# Patient Record
Sex: FEMALE | Race: WHITE | NOT HISPANIC OR LATINO | Employment: OTHER | URBAN - METROPOLITAN AREA
[De-identification: names, ages, dates, MRNs, and addresses within clinical notes are randomized per-mention and may not be internally consistent; named-entity substitution may affect disease eponyms.]

---

## 2021-01-20 ENCOUNTER — TELEPHONE (OUTPATIENT)
Dept: NEUROLOGY | Facility: CLINIC | Age: 86
End: 2021-01-20

## 2021-02-01 ENCOUNTER — TELEPHONE (OUTPATIENT)
Dept: NEUROLOGY | Facility: CLINIC | Age: 86
End: 2021-02-01

## 2021-02-02 ENCOUNTER — TELEPHONE (OUTPATIENT)
Dept: NEUROLOGY | Facility: CLINIC | Age: 86
End: 2021-02-02

## 2021-04-14 ENCOUNTER — OFFICE VISIT (OUTPATIENT)
Dept: INTERNAL MEDICINE | Facility: CLINIC | Age: 86
End: 2021-04-14
Payer: MEDICARE

## 2021-04-14 DIAGNOSIS — E78.5 HYPERLIPIDEMIA, UNSPECIFIED HYPERLIPIDEMIA TYPE: ICD-10-CM

## 2021-04-14 DIAGNOSIS — Z00.00 PREVENTATIVE HEALTH CARE: Primary | ICD-10-CM

## 2021-04-14 DIAGNOSIS — E03.9 HYPOTHYROIDISM, UNSPECIFIED TYPE: ICD-10-CM

## 2021-04-14 DIAGNOSIS — R45.84 ANHEDONIA: ICD-10-CM

## 2021-04-14 DIAGNOSIS — K62.89 RECTAL PAIN: ICD-10-CM

## 2021-04-14 DIAGNOSIS — D69.3 IDIOPATHIC THROMBOCYTOPENIC PURPURA (ITP): ICD-10-CM

## 2021-04-14 DIAGNOSIS — M81.0 OSTEOPOROSIS, UNSPECIFIED OSTEOPOROSIS TYPE, UNSPECIFIED PATHOLOGICAL FRACTURE PRESENCE: ICD-10-CM

## 2021-04-14 PROCEDURE — 99387 INIT PM E/M NEW PAT 65+ YRS: CPT | Mod: S$GLB,,, | Performed by: INTERNAL MEDICINE

## 2021-04-14 PROCEDURE — 99999 PR PBB SHADOW E&M-EST. PATIENT-LVL IV: ICD-10-PCS | Mod: PBBFAC,,, | Performed by: INTERNAL MEDICINE

## 2021-04-14 PROCEDURE — 1101F PR PT FALLS ASSESS DOC 0-1 FALLS W/OUT INJ PAST YR: ICD-10-PCS | Mod: CPTII,S$GLB,, | Performed by: INTERNAL MEDICINE

## 2021-04-14 PROCEDURE — 1126F PR PAIN SEVERITY QUANTIFIED, NO PAIN PRESENT: ICD-10-PCS | Mod: S$GLB,,, | Performed by: INTERNAL MEDICINE

## 2021-04-14 PROCEDURE — 99387 PR PREVENTIVE VISIT,NEW,65 & OVER: ICD-10-PCS | Mod: S$GLB,,, | Performed by: INTERNAL MEDICINE

## 2021-04-14 PROCEDURE — 3288F PR FALLS RISK ASSESSMENT DOCUMENTED: ICD-10-PCS | Mod: CPTII,S$GLB,, | Performed by: INTERNAL MEDICINE

## 2021-04-14 PROCEDURE — 1126F AMNT PAIN NOTED NONE PRSNT: CPT | Mod: S$GLB,,, | Performed by: INTERNAL MEDICINE

## 2021-04-14 PROCEDURE — 3288F FALL RISK ASSESSMENT DOCD: CPT | Mod: CPTII,S$GLB,, | Performed by: INTERNAL MEDICINE

## 2021-04-14 PROCEDURE — 1101F PT FALLS ASSESS-DOCD LE1/YR: CPT | Mod: CPTII,S$GLB,, | Performed by: INTERNAL MEDICINE

## 2021-04-14 PROCEDURE — 99999 PR PBB SHADOW E&M-EST. PATIENT-LVL IV: CPT | Mod: PBBFAC,,, | Performed by: INTERNAL MEDICINE

## 2021-04-14 RX ORDER — LEVOTHYROXINE SODIUM 75 UG/1
TABLET ORAL
COMMUNITY
End: 2021-04-14 | Stop reason: SDUPTHER

## 2021-04-14 RX ORDER — MEMANTINE HYDROCHLORIDE 10 MG/1
10 TABLET ORAL 2 TIMES DAILY
COMMUNITY
Start: 2021-04-05 | End: 2021-08-18 | Stop reason: SDUPTHER

## 2021-04-14 RX ORDER — AMLODIPINE BESYLATE 5 MG/1
5 TABLET ORAL DAILY
Qty: 90 TABLET | Refills: 1 | Status: SHIPPED | OUTPATIENT
Start: 2021-04-14 | End: 2021-09-21

## 2021-04-14 RX ORDER — AMLODIPINE BESYLATE 5 MG/1
TABLET ORAL
COMMUNITY
Start: 2021-03-05 | End: 2021-04-14 | Stop reason: SDUPTHER

## 2021-04-14 RX ORDER — BUPROPION HYDROCHLORIDE 150 MG/1
150 TABLET, EXTENDED RELEASE ORAL
COMMUNITY
End: 2021-04-14 | Stop reason: SDUPTHER

## 2021-04-14 RX ORDER — LOSARTAN POTASSIUM 50 MG/1
50 TABLET ORAL DAILY
Qty: 90 TABLET | Refills: 1 | Status: SHIPPED | OUTPATIENT
Start: 2021-04-14 | End: 2021-10-18

## 2021-04-14 RX ORDER — OMEPRAZOLE 20 MG/1
20 CAPSULE, DELAYED RELEASE ORAL DAILY PRN
Qty: 90 CAPSULE | Refills: 1 | Status: SHIPPED | OUTPATIENT
Start: 2021-04-14 | End: 2021-10-18

## 2021-04-14 RX ORDER — LEVOTHYROXINE SODIUM 75 UG/1
75 TABLET ORAL
Qty: 90 TABLET | Refills: 1 | Status: SHIPPED | OUTPATIENT
Start: 2021-04-14 | End: 2021-05-13 | Stop reason: SDUPTHER

## 2021-04-14 RX ORDER — PRAVASTATIN SODIUM 40 MG/1
TABLET ORAL
COMMUNITY
End: 2021-04-14 | Stop reason: SDUPTHER

## 2021-04-14 RX ORDER — PRAVASTATIN SODIUM 40 MG/1
40 TABLET ORAL DAILY
Qty: 90 TABLET | Refills: 1 | Status: SHIPPED | OUTPATIENT
Start: 2021-04-14 | End: 2021-09-21

## 2021-04-14 RX ORDER — GALANTAMINE HYDROBROMIDE 16 MG/1
CAPSULE, EXTENDED RELEASE ORAL
COMMUNITY
Start: 2021-03-22 | End: 2021-08-18 | Stop reason: SDUPTHER

## 2021-04-14 RX ORDER — OMEPRAZOLE 20 MG/1
CAPSULE, DELAYED RELEASE ORAL
COMMUNITY
End: 2021-04-14 | Stop reason: SDUPTHER

## 2021-04-14 RX ORDER — LOSARTAN POTASSIUM 50 MG/1
TABLET ORAL
COMMUNITY
End: 2021-04-14 | Stop reason: SDUPTHER

## 2021-04-14 RX ORDER — BUPROPION HYDROCHLORIDE 150 MG/1
150 TABLET ORAL DAILY
Qty: 90 TABLET | Refills: 1 | Status: SHIPPED | OUTPATIENT
Start: 2021-04-14 | End: 2021-09-21

## 2021-04-14 RX ORDER — PREDNISONE 1 MG/1
TABLET ORAL
COMMUNITY
Start: 2021-02-15 | End: 2022-04-05

## 2021-04-18 VITALS
TEMPERATURE: 99 F | DIASTOLIC BLOOD PRESSURE: 76 MMHG | BODY MASS INDEX: 26.75 KG/M2 | HEIGHT: 60 IN | WEIGHT: 136.25 LBS | SYSTOLIC BLOOD PRESSURE: 112 MMHG | HEART RATE: 67 BPM | OXYGEN SATURATION: 95 %

## 2021-04-21 ENCOUNTER — PATIENT MESSAGE (OUTPATIENT)
Dept: SURGERY | Facility: CLINIC | Age: 86
End: 2021-04-21

## 2021-04-21 ENCOUNTER — OFFICE VISIT (OUTPATIENT)
Dept: SURGERY | Facility: CLINIC | Age: 86
End: 2021-04-21
Payer: MEDICARE

## 2021-04-21 ENCOUNTER — LAB VISIT (OUTPATIENT)
Dept: LAB | Facility: HOSPITAL | Age: 86
End: 2021-04-21
Attending: INTERNAL MEDICINE
Payer: MEDICARE

## 2021-04-21 VITALS
WEIGHT: 135.69 LBS | HEIGHT: 60 IN | SYSTOLIC BLOOD PRESSURE: 126 MMHG | DIASTOLIC BLOOD PRESSURE: 79 MMHG | BODY MASS INDEX: 26.64 KG/M2 | HEART RATE: 76 BPM

## 2021-04-21 DIAGNOSIS — Z00.00 PREVENTATIVE HEALTH CARE: ICD-10-CM

## 2021-04-21 DIAGNOSIS — D69.3 IDIOPATHIC THROMBOCYTOPENIC PURPURA (ITP): ICD-10-CM

## 2021-04-21 DIAGNOSIS — R45.84 ANHEDONIA: ICD-10-CM

## 2021-04-21 DIAGNOSIS — K61.2 ANORECTAL ABSCESS: ICD-10-CM

## 2021-04-21 DIAGNOSIS — E03.9 HYPOTHYROIDISM, UNSPECIFIED TYPE: ICD-10-CM

## 2021-04-21 DIAGNOSIS — Z86.010 PERSONAL HISTORY OF COLONIC POLYPS: Primary | ICD-10-CM

## 2021-04-21 DIAGNOSIS — E78.5 HYPERLIPIDEMIA, UNSPECIFIED HYPERLIPIDEMIA TYPE: ICD-10-CM

## 2021-04-21 DIAGNOSIS — K62.89 RECTAL PAIN: ICD-10-CM

## 2021-04-21 LAB
ALBUMIN SERPL BCP-MCNC: 3.5 G/DL (ref 3.5–5.2)
ALP SERPL-CCNC: 77 U/L (ref 55–135)
ALT SERPL W/O P-5'-P-CCNC: 20 U/L (ref 10–44)
ANION GAP SERPL CALC-SCNC: 5 MMOL/L (ref 8–16)
AST SERPL-CCNC: 17 U/L (ref 10–40)
BASOPHILS # BLD AUTO: 0.05 K/UL (ref 0–0.2)
BASOPHILS NFR BLD: 1.1 % (ref 0–1.9)
BILIRUB SERPL-MCNC: 0.7 MG/DL (ref 0.1–1)
BUN SERPL-MCNC: 11 MG/DL (ref 8–23)
CALCIUM SERPL-MCNC: 9.8 MG/DL (ref 8.7–10.5)
CHLORIDE SERPL-SCNC: 112 MMOL/L (ref 95–110)
CHOLEST SERPL-MCNC: 135 MG/DL (ref 120–199)
CHOLEST/HDLC SERPL: 2.3 {RATIO} (ref 2–5)
CO2 SERPL-SCNC: 27 MMOL/L (ref 23–29)
CREAT SERPL-MCNC: 0.9 MG/DL (ref 0.5–1.4)
DIFFERENTIAL METHOD: ABNORMAL
EOSINOPHIL # BLD AUTO: 0.1 K/UL (ref 0–0.5)
EOSINOPHIL NFR BLD: 2 % (ref 0–8)
ERYTHROCYTE [DISTWIDTH] IN BLOOD BY AUTOMATED COUNT: 13.9 % (ref 11.5–14.5)
EST. GFR  (AFRICAN AMERICAN): >60 ML/MIN/1.73 M^2
EST. GFR  (NON AFRICAN AMERICAN): 58.5 ML/MIN/1.73 M^2
FOLATE SERPL-MCNC: 7.7 NG/ML (ref 4–24)
GLUCOSE SERPL-MCNC: 83 MG/DL (ref 70–110)
HCT VFR BLD AUTO: 45 % (ref 37–48.5)
HDLC SERPL-MCNC: 58 MG/DL (ref 40–75)
HDLC SERPL: 43 % (ref 20–50)
HGB BLD-MCNC: 14.9 G/DL (ref 12–16)
IMM GRANULOCYTES # BLD AUTO: 0.01 K/UL (ref 0–0.04)
IMM GRANULOCYTES NFR BLD AUTO: 0.2 % (ref 0–0.5)
LDLC SERPL CALC-MCNC: 56.6 MG/DL (ref 63–159)
LYMPHOCYTES # BLD AUTO: 1.8 K/UL (ref 1–4.8)
LYMPHOCYTES NFR BLD: 41.8 % (ref 18–48)
MCH RBC QN AUTO: 32 PG (ref 27–31)
MCHC RBC AUTO-ENTMCNC: 33.1 G/DL (ref 32–36)
MCV RBC AUTO: 97 FL (ref 82–98)
MONOCYTES # BLD AUTO: 0.6 K/UL (ref 0.3–1)
MONOCYTES NFR BLD: 13.9 % (ref 4–15)
NEUTROPHILS # BLD AUTO: 1.8 K/UL (ref 1.8–7.7)
NEUTROPHILS NFR BLD: 41 % (ref 38–73)
NONHDLC SERPL-MCNC: 77 MG/DL
NRBC BLD-RTO: 0 /100 WBC
PLATELET # BLD AUTO: 76 K/UL (ref 150–450)
PMV BLD AUTO: 11.1 FL (ref 9.2–12.9)
POTASSIUM SERPL-SCNC: 3.7 MMOL/L (ref 3.5–5.1)
PROT SERPL-MCNC: 6.3 G/DL (ref 6–8.4)
RBC # BLD AUTO: 4.65 M/UL (ref 4–5.4)
SODIUM SERPL-SCNC: 144 MMOL/L (ref 136–145)
TRIGL SERPL-MCNC: 102 MG/DL (ref 30–150)
TSH SERPL DL<=0.005 MIU/L-ACNC: 2.02 UIU/ML (ref 0.4–4)
VIT B12 SERPL-MCNC: 366 PG/ML (ref 210–950)
WBC # BLD AUTO: 4.4 K/UL (ref 3.9–12.7)

## 2021-04-21 PROCEDURE — 85025 COMPLETE CBC W/AUTO DIFF WBC: CPT | Performed by: INTERNAL MEDICINE

## 2021-04-21 PROCEDURE — 80061 LIPID PANEL: CPT | Performed by: INTERNAL MEDICINE

## 2021-04-21 PROCEDURE — 1126F PR PAIN SEVERITY QUANTIFIED, NO PAIN PRESENT: ICD-10-PCS | Mod: S$GLB,,, | Performed by: NURSE PRACTITIONER

## 2021-04-21 PROCEDURE — 99999 PR PBB SHADOW E&M-EST. PATIENT-LVL IV: ICD-10-PCS | Mod: PBBFAC,,, | Performed by: NURSE PRACTITIONER

## 2021-04-21 PROCEDURE — 80053 COMPREHEN METABOLIC PANEL: CPT | Performed by: INTERNAL MEDICINE

## 2021-04-21 PROCEDURE — 3288F FALL RISK ASSESSMENT DOCD: CPT | Mod: CPTII,S$GLB,, | Performed by: NURSE PRACTITIONER

## 2021-04-21 PROCEDURE — 99204 OFFICE O/P NEW MOD 45 MIN: CPT | Mod: 25,S$GLB,, | Performed by: NURSE PRACTITIONER

## 2021-04-21 PROCEDURE — 1159F MED LIST DOCD IN RCRD: CPT | Mod: S$GLB,,, | Performed by: NURSE PRACTITIONER

## 2021-04-21 PROCEDURE — 1101F PR PT FALLS ASSESS DOC 0-1 FALLS W/OUT INJ PAST YR: ICD-10-PCS | Mod: CPTII,S$GLB,, | Performed by: NURSE PRACTITIONER

## 2021-04-21 PROCEDURE — 84443 ASSAY THYROID STIM HORMONE: CPT | Performed by: INTERNAL MEDICINE

## 2021-04-21 PROCEDURE — 46600 PR DIAG2STIC A2SCOPY: ICD-10-PCS | Mod: S$GLB,,, | Performed by: NURSE PRACTITIONER

## 2021-04-21 PROCEDURE — 99999 PR PBB SHADOW E&M-EST. PATIENT-LVL IV: CPT | Mod: PBBFAC,,, | Performed by: NURSE PRACTITIONER

## 2021-04-21 PROCEDURE — 1101F PT FALLS ASSESS-DOCD LE1/YR: CPT | Mod: CPTII,S$GLB,, | Performed by: NURSE PRACTITIONER

## 2021-04-21 PROCEDURE — 46600 DIAGNOSTIC ANOSCOPY SPX: CPT | Mod: S$GLB,,, | Performed by: NURSE PRACTITIONER

## 2021-04-21 PROCEDURE — 82746 ASSAY OF FOLIC ACID SERUM: CPT | Performed by: INTERNAL MEDICINE

## 2021-04-21 PROCEDURE — 82607 VITAMIN B-12: CPT | Performed by: INTERNAL MEDICINE

## 2021-04-21 PROCEDURE — 1159F PR MEDICATION LIST DOCUMENTED IN MEDICAL RECORD: ICD-10-PCS | Mod: S$GLB,,, | Performed by: NURSE PRACTITIONER

## 2021-04-21 PROCEDURE — 36415 COLL VENOUS BLD VENIPUNCTURE: CPT | Performed by: INTERNAL MEDICINE

## 2021-04-21 PROCEDURE — 1126F AMNT PAIN NOTED NONE PRSNT: CPT | Mod: S$GLB,,, | Performed by: NURSE PRACTITIONER

## 2021-04-21 PROCEDURE — 99204 PR OFFICE/OUTPT VISIT, NEW, LEVL IV, 45-59 MIN: ICD-10-PCS | Mod: 25,S$GLB,, | Performed by: NURSE PRACTITIONER

## 2021-04-21 PROCEDURE — 3288F PR FALLS RISK ASSESSMENT DOCUMENTED: ICD-10-PCS | Mod: CPTII,S$GLB,, | Performed by: NURSE PRACTITIONER

## 2021-04-26 ENCOUNTER — PATIENT MESSAGE (OUTPATIENT)
Dept: ENDOSCOPY | Facility: HOSPITAL | Age: 86
End: 2021-04-26

## 2021-04-26 DIAGNOSIS — Z12.11 SPECIAL SCREENING FOR MALIGNANT NEOPLASMS, COLON: Primary | ICD-10-CM

## 2021-04-26 DIAGNOSIS — Z01.818 PRE-OP TESTING: ICD-10-CM

## 2021-04-26 RX ORDER — SODIUM, POTASSIUM,MAG SULFATES 17.5-3.13G
1 SOLUTION, RECONSTITUTED, ORAL ORAL DAILY
Qty: 1 KIT | Refills: 0 | Status: SHIPPED | OUTPATIENT
Start: 2021-04-26 | End: 2021-04-28

## 2021-04-29 ENCOUNTER — OFFICE VISIT (OUTPATIENT)
Dept: HEMATOLOGY/ONCOLOGY | Facility: CLINIC | Age: 86
End: 2021-04-29
Payer: MEDICARE

## 2021-04-29 VITALS
SYSTOLIC BLOOD PRESSURE: 116 MMHG | OXYGEN SATURATION: 95 % | RESPIRATION RATE: 16 BRPM | HEIGHT: 59 IN | TEMPERATURE: 99 F | HEART RATE: 63 BPM | WEIGHT: 138.31 LBS | BODY MASS INDEX: 27.88 KG/M2 | DIASTOLIC BLOOD PRESSURE: 63 MMHG

## 2021-04-29 DIAGNOSIS — D69.3 IDIOPATHIC THROMBOCYTOPENIC PURPURA (ITP): Primary | ICD-10-CM

## 2021-04-29 DIAGNOSIS — K64.8 OTHER HEMORRHOIDS: ICD-10-CM

## 2021-04-29 PROCEDURE — 99213 PR OFFICE/OUTPT VISIT, EST, LEVL III, 20-29 MIN: ICD-10-PCS | Mod: S$GLB,,, | Performed by: NURSE PRACTITIONER

## 2021-04-29 PROCEDURE — 1159F PR MEDICATION LIST DOCUMENTED IN MEDICAL RECORD: ICD-10-PCS | Mod: S$GLB,,, | Performed by: NURSE PRACTITIONER

## 2021-04-29 PROCEDURE — 99999 PR PBB SHADOW E&M-EST. PATIENT-LVL IV: CPT | Mod: PBBFAC,,, | Performed by: NURSE PRACTITIONER

## 2021-04-29 PROCEDURE — 1159F MED LIST DOCD IN RCRD: CPT | Mod: S$GLB,,, | Performed by: NURSE PRACTITIONER

## 2021-04-29 PROCEDURE — 99213 OFFICE O/P EST LOW 20 MIN: CPT | Mod: S$GLB,,, | Performed by: NURSE PRACTITIONER

## 2021-04-29 PROCEDURE — 99999 PR PBB SHADOW E&M-EST. PATIENT-LVL IV: ICD-10-PCS | Mod: PBBFAC,,, | Performed by: NURSE PRACTITIONER

## 2021-04-29 PROCEDURE — 1126F AMNT PAIN NOTED NONE PRSNT: CPT | Mod: S$GLB,,, | Performed by: NURSE PRACTITIONER

## 2021-04-29 PROCEDURE — 1126F PR PAIN SEVERITY QUANTIFIED, NO PAIN PRESENT: ICD-10-PCS | Mod: S$GLB,,, | Performed by: NURSE PRACTITIONER

## 2021-05-05 DIAGNOSIS — D69.3 IDIOPATHIC THROMBOCYTOPENIC PURPURA (ITP): ICD-10-CM

## 2021-05-05 DIAGNOSIS — C90.00 MULTIPLE MYELOMA, REMISSION STATUS UNSPECIFIED: Primary | ICD-10-CM

## 2021-05-13 RX ORDER — LEVOTHYROXINE SODIUM 75 UG/1
75 TABLET ORAL
Qty: 90 TABLET | Refills: 1 | Status: SHIPPED | OUTPATIENT
Start: 2021-05-13 | End: 2021-11-22

## 2021-05-17 ENCOUNTER — PATIENT MESSAGE (OUTPATIENT)
Dept: ENDOSCOPY | Facility: HOSPITAL | Age: 86
End: 2021-05-17

## 2021-05-20 ENCOUNTER — LAB VISIT (OUTPATIENT)
Dept: LAB | Facility: HOSPITAL | Age: 86
End: 2021-05-20
Payer: MEDICARE

## 2021-05-20 ENCOUNTER — OFFICE VISIT (OUTPATIENT)
Dept: NEUROLOGY | Facility: CLINIC | Age: 86
End: 2021-05-20
Payer: MEDICARE

## 2021-05-20 VITALS
HEART RATE: 58 BPM | DIASTOLIC BLOOD PRESSURE: 73 MMHG | HEIGHT: 59 IN | SYSTOLIC BLOOD PRESSURE: 120 MMHG | BODY MASS INDEX: 27.94 KG/M2

## 2021-05-20 DIAGNOSIS — E53.8 LOW SERUM VITAMIN B12: ICD-10-CM

## 2021-05-20 DIAGNOSIS — H91.93 BILATERAL HEARING LOSS, UNSPECIFIED HEARING LOSS TYPE: ICD-10-CM

## 2021-05-20 DIAGNOSIS — R41.3 MEMORY LOSS: ICD-10-CM

## 2021-05-20 DIAGNOSIS — R41.3 MEMORY LOSS: Primary | ICD-10-CM

## 2021-05-20 DIAGNOSIS — G47.33 OSA (OBSTRUCTIVE SLEEP APNEA): ICD-10-CM

## 2021-05-20 LAB
RPR SER QL: NORMAL
T4 FREE SERPL-MCNC: 1.25 NG/DL (ref 0.71–1.51)
TSH SERPL DL<=0.005 MIU/L-ACNC: 5.02 UIU/ML (ref 0.4–4)

## 2021-05-20 PROCEDURE — 84439 ASSAY OF FREE THYROXINE: CPT | Performed by: NURSE PRACTITIONER

## 2021-05-20 PROCEDURE — 84443 ASSAY THYROID STIM HORMONE: CPT | Performed by: NURSE PRACTITIONER

## 2021-05-20 PROCEDURE — 84425 ASSAY OF VITAMIN B-1: CPT | Performed by: NURSE PRACTITIONER

## 2021-05-20 PROCEDURE — 86592 SYPHILIS TEST NON-TREP QUAL: CPT | Performed by: NURSE PRACTITIONER

## 2021-05-20 PROCEDURE — 3288F FALL RISK ASSESSMENT DOCD: CPT | Mod: CPTII,S$GLB,, | Performed by: NURSE PRACTITIONER

## 2021-05-20 PROCEDURE — 99215 PR OFFICE/OUTPT VISIT, EST, LEVL V, 40-54 MIN: ICD-10-PCS | Mod: S$GLB,,, | Performed by: NURSE PRACTITIONER

## 2021-05-20 PROCEDURE — 36415 COLL VENOUS BLD VENIPUNCTURE: CPT | Performed by: NURSE PRACTITIONER

## 2021-05-20 PROCEDURE — 99215 OFFICE O/P EST HI 40 MIN: CPT | Mod: S$GLB,,, | Performed by: NURSE PRACTITIONER

## 2021-05-20 PROCEDURE — 99999 PR PBB SHADOW E&M-EST. PATIENT-LVL III: CPT | Mod: PBBFAC,,, | Performed by: NURSE PRACTITIONER

## 2021-05-20 PROCEDURE — 1159F MED LIST DOCD IN RCRD: CPT | Mod: S$GLB,,, | Performed by: NURSE PRACTITIONER

## 2021-05-20 PROCEDURE — 1126F PR PAIN SEVERITY QUANTIFIED, NO PAIN PRESENT: ICD-10-PCS | Mod: S$GLB,,, | Performed by: NURSE PRACTITIONER

## 2021-05-20 PROCEDURE — 3288F PR FALLS RISK ASSESSMENT DOCUMENTED: ICD-10-PCS | Mod: CPTII,S$GLB,, | Performed by: NURSE PRACTITIONER

## 2021-05-20 PROCEDURE — 1159F PR MEDICATION LIST DOCUMENTED IN MEDICAL RECORD: ICD-10-PCS | Mod: S$GLB,,, | Performed by: NURSE PRACTITIONER

## 2021-05-20 PROCEDURE — 1101F PR PT FALLS ASSESS DOC 0-1 FALLS W/OUT INJ PAST YR: ICD-10-PCS | Mod: CPTII,S$GLB,, | Performed by: NURSE PRACTITIONER

## 2021-05-20 PROCEDURE — 1126F AMNT PAIN NOTED NONE PRSNT: CPT | Mod: S$GLB,,, | Performed by: NURSE PRACTITIONER

## 2021-05-20 PROCEDURE — 1101F PT FALLS ASSESS-DOCD LE1/YR: CPT | Mod: CPTII,S$GLB,, | Performed by: NURSE PRACTITIONER

## 2021-05-20 PROCEDURE — 99999 PR PBB SHADOW E&M-EST. PATIENT-LVL III: ICD-10-PCS | Mod: PBBFAC,,, | Performed by: NURSE PRACTITIONER

## 2021-05-25 LAB — VIT B1 BLD-MCNC: 57 UG/L (ref 38–122)

## 2021-05-30 ENCOUNTER — LAB VISIT (OUTPATIENT)
Dept: SPORTS MEDICINE | Facility: CLINIC | Age: 86
End: 2021-05-30
Payer: MEDICARE

## 2021-05-30 DIAGNOSIS — Z01.818 PRE-OP TESTING: ICD-10-CM

## 2021-05-30 LAB — SARS-COV-2 RNA RESP QL NAA+PROBE: NOT DETECTED

## 2021-05-30 PROCEDURE — U0005 INFEC AGEN DETEC AMPLI PROBE: HCPCS | Performed by: CLINICAL NURSE SPECIALIST

## 2021-05-30 PROCEDURE — U0003 INFECTIOUS AGENT DETECTION BY NUCLEIC ACID (DNA OR RNA); SEVERE ACUTE RESPIRATORY SYNDROME CORONAVIRUS 2 (SARS-COV-2) (CORONAVIRUS DISEASE [COVID-19]), AMPLIFIED PROBE TECHNIQUE, MAKING USE OF HIGH THROUGHPUT TECHNOLOGIES AS DESCRIBED BY CMS-2020-01-R: HCPCS | Performed by: CLINICAL NURSE SPECIALIST

## 2021-06-02 ENCOUNTER — ANESTHESIA (OUTPATIENT)
Dept: ENDOSCOPY | Facility: HOSPITAL | Age: 86
End: 2021-06-02
Payer: MEDICARE

## 2021-06-02 ENCOUNTER — HOSPITAL ENCOUNTER (OUTPATIENT)
Facility: HOSPITAL | Age: 86
Discharge: HOME | End: 2021-06-02
Attending: COLON & RECTAL SURGERY | Admitting: COLON & RECTAL SURGERY
Payer: MEDICARE

## 2021-06-02 ENCOUNTER — ANESTHESIA EVENT (OUTPATIENT)
Dept: ENDOSCOPY | Facility: HOSPITAL | Age: 86
End: 2021-06-02
Payer: MEDICARE

## 2021-06-02 VITALS
OXYGEN SATURATION: 95 % | SYSTOLIC BLOOD PRESSURE: 111 MMHG | HEIGHT: 60 IN | WEIGHT: 134 LBS | TEMPERATURE: 98 F | DIASTOLIC BLOOD PRESSURE: 60 MMHG | HEART RATE: 57 BPM | RESPIRATION RATE: 17 BRPM | BODY MASS INDEX: 26.31 KG/M2

## 2021-06-02 DIAGNOSIS — Z86.010 PERSONAL HISTORY OF COLONIC POLYPS: Primary | ICD-10-CM

## 2021-06-02 PROBLEM — Z86.0100 PERSONAL HISTORY OF COLONIC POLYPS: Status: ACTIVE | Noted: 2021-06-02

## 2021-06-02 PROCEDURE — 45385 PR COLONOSCOPY,REMV LESN,SNARE: ICD-10-PCS | Mod: PT,,, | Performed by: COLON & RECTAL SURGERY

## 2021-06-02 PROCEDURE — 88305 TISSUE EXAM BY PATHOLOGIST: ICD-10-PCS | Mod: 26,,, | Performed by: PATHOLOGY

## 2021-06-02 PROCEDURE — 27201012 HC FORCEPS, HOT/COLD, DISP: Performed by: COLON & RECTAL SURGERY

## 2021-06-02 PROCEDURE — E9220 PRA ENDO ANESTHESIA: HCPCS | Mod: PT,,, | Performed by: NURSE ANESTHETIST, CERTIFIED REGISTERED

## 2021-06-02 PROCEDURE — 88305 TISSUE EXAM BY PATHOLOGIST: CPT | Performed by: PATHOLOGY

## 2021-06-02 PROCEDURE — 45380 COLONOSCOPY AND BIOPSY: CPT | Mod: 59,,, | Performed by: COLON & RECTAL SURGERY

## 2021-06-02 PROCEDURE — 37000008 HC ANESTHESIA 1ST 15 MINUTES: Performed by: COLON & RECTAL SURGERY

## 2021-06-02 PROCEDURE — E9220 PRA ENDO ANESTHESIA: ICD-10-PCS | Mod: PT,,, | Performed by: NURSE ANESTHETIST, CERTIFIED REGISTERED

## 2021-06-02 PROCEDURE — 45385 COLONOSCOPY W/LESION REMOVAL: CPT | Performed by: COLON & RECTAL SURGERY

## 2021-06-02 PROCEDURE — 25000003 PHARM REV CODE 250: Performed by: NURSE ANESTHETIST, CERTIFIED REGISTERED

## 2021-06-02 PROCEDURE — 27201089 HC SNARE, DISP (ANY): Performed by: COLON & RECTAL SURGERY

## 2021-06-02 PROCEDURE — 88305 TISSUE EXAM BY PATHOLOGIST: CPT | Mod: 26,,, | Performed by: PATHOLOGY

## 2021-06-02 PROCEDURE — 63600175 PHARM REV CODE 636 W HCPCS: Performed by: NURSE ANESTHETIST, CERTIFIED REGISTERED

## 2021-06-02 PROCEDURE — 25000003 PHARM REV CODE 250: Performed by: COLON & RECTAL SURGERY

## 2021-06-02 PROCEDURE — 37000009 HC ANESTHESIA EA ADD 15 MINS: Performed by: COLON & RECTAL SURGERY

## 2021-06-02 PROCEDURE — 45380 COLONOSCOPY AND BIOPSY: CPT | Performed by: COLON & RECTAL SURGERY

## 2021-06-02 PROCEDURE — 45380 PR COLONOSCOPY,BIOPSY: ICD-10-PCS | Mod: 59,,, | Performed by: COLON & RECTAL SURGERY

## 2021-06-02 PROCEDURE — 45385 COLONOSCOPY W/LESION REMOVAL: CPT | Mod: PT,,, | Performed by: COLON & RECTAL SURGERY

## 2021-06-02 RX ORDER — PROPOFOL 10 MG/ML
VIAL (ML) INTRAVENOUS CONTINUOUS PRN
Status: DISCONTINUED | OUTPATIENT
Start: 2021-06-02 | End: 2021-06-02

## 2021-06-02 RX ORDER — SODIUM CHLORIDE 9 MG/ML
INJECTION, SOLUTION INTRAVENOUS CONTINUOUS
Status: DISCONTINUED | OUTPATIENT
Start: 2021-06-02 | End: 2021-06-02 | Stop reason: HOSPADM

## 2021-06-02 RX ORDER — PROPOFOL 10 MG/ML
VIAL (ML) INTRAVENOUS
Status: DISCONTINUED | OUTPATIENT
Start: 2021-06-02 | End: 2021-06-02

## 2021-06-02 RX ORDER — LIDOCAINE HYDROCHLORIDE 20 MG/ML
INJECTION, SOLUTION EPIDURAL; INFILTRATION; INTRACAUDAL; PERINEURAL
Status: DISCONTINUED | OUTPATIENT
Start: 2021-06-02 | End: 2021-06-02

## 2021-06-02 RX ADMIN — PROPOFOL 40 MG: 10 INJECTION, EMULSION INTRAVENOUS at 12:06

## 2021-06-02 RX ADMIN — PROPOFOL 125 MCG/KG/MIN: 10 INJECTION, EMULSION INTRAVENOUS at 12:06

## 2021-06-02 RX ADMIN — SODIUM CHLORIDE: 0.9 INJECTION, SOLUTION INTRAVENOUS at 12:06

## 2021-06-02 RX ADMIN — GLYCOPYRROLATE 0.1 MG: 0.2 INJECTION, SOLUTION INTRAMUSCULAR; INTRAVITREAL at 12:06

## 2021-06-02 RX ADMIN — LIDOCAINE HYDROCHLORIDE 100 MG: 20 INJECTION, SOLUTION EPIDURAL; INFILTRATION; INTRACAUDAL at 12:06

## 2021-06-10 LAB
FINAL PATHOLOGIC DIAGNOSIS: NORMAL
Lab: NORMAL

## 2021-06-30 ENCOUNTER — OFFICE VISIT (OUTPATIENT)
Dept: NEUROLOGY | Facility: CLINIC | Age: 86
End: 2021-06-30
Payer: MEDICARE

## 2021-06-30 VITALS — WEIGHT: 135.81 LBS | BODY MASS INDEX: 26.52 KG/M2

## 2021-06-30 DIAGNOSIS — R41.3 MEMORY LOSS: ICD-10-CM

## 2021-06-30 DIAGNOSIS — E78.00 HYPERCHOLESTEREMIA: ICD-10-CM

## 2021-06-30 DIAGNOSIS — G31.84 AMNESTIC MCI (MILD COGNITIVE IMPAIRMENT WITH MEMORY LOSS): Primary | ICD-10-CM

## 2021-06-30 DIAGNOSIS — K64.9 HEMORRHOIDS, UNSPECIFIED HEMORRHOID TYPE: ICD-10-CM

## 2021-06-30 DIAGNOSIS — F33.0 MILD EPISODE OF RECURRENT MAJOR DEPRESSIVE DISORDER: ICD-10-CM

## 2021-06-30 DIAGNOSIS — E07.9 THYROID DISEASE: ICD-10-CM

## 2021-06-30 DIAGNOSIS — D69.6 THROMBOCYTOPENIA: ICD-10-CM

## 2021-06-30 DIAGNOSIS — H02.402 PTOSIS OF LEFT EYELID: ICD-10-CM

## 2021-06-30 PROCEDURE — 99212 OFFICE O/P EST SF 10 MIN: CPT | Mod: PBBFAC,25

## 2021-06-30 PROCEDURE — 96133 PR NEUROPSYCHOLOGIC TEST EVAL SVCS, EA ADDTL HR: ICD-10-PCS | Mod: S$GLB,,, | Performed by: PSYCHIATRY & NEUROLOGY

## 2021-06-30 PROCEDURE — 96133 NRPSYC TST EVAL PHYS/QHP EA: CPT | Mod: S$GLB,,, | Performed by: PSYCHIATRY & NEUROLOGY

## 2021-06-30 PROCEDURE — 96138 PR PSYCH/NEUROPSYCH TEST ADMIN/SCORING, BY TECH, 2+ TESTS, 1ST 30 MIN: ICD-10-PCS | Mod: S$GLB,,, | Performed by: PSYCHIATRY & NEUROLOGY

## 2021-06-30 PROCEDURE — 99499 NO LOS: ICD-10-PCS | Mod: S$GLB,,, | Performed by: PSYCHIATRY & NEUROLOGY

## 2021-06-30 PROCEDURE — 96139 PSYCL/NRPSYC TST TECH EA: CPT | Mod: S$GLB,,, | Performed by: PSYCHIATRY & NEUROLOGY

## 2021-06-30 PROCEDURE — 99499 UNLISTED E&M SERVICE: CPT | Mod: S$GLB,,, | Performed by: PSYCHIATRY & NEUROLOGY

## 2021-06-30 PROCEDURE — 96116 PR NEUROBEHAVIORAL STATUS EXAM BY PSYCH/PHYS: ICD-10-PCS | Mod: S$GLB,,, | Performed by: PSYCHIATRY & NEUROLOGY

## 2021-06-30 PROCEDURE — 96139 PR PSYCH/NEUROPSYCH TEST ADMIN/SCORING, BY TECH, 2+ TESTS, EA ADDTL 30 MIN: ICD-10-PCS | Mod: S$GLB,,, | Performed by: PSYCHIATRY & NEUROLOGY

## 2021-06-30 PROCEDURE — 96132 NRPSYC TST EVAL PHYS/QHP 1ST: CPT | Mod: S$GLB,,, | Performed by: PSYCHIATRY & NEUROLOGY

## 2021-06-30 PROCEDURE — 96116 NUBHVL XM PHYS/QHP 1ST HR: CPT | Mod: S$GLB,,, | Performed by: PSYCHIATRY & NEUROLOGY

## 2021-06-30 PROCEDURE — 99999 PR PBB SHADOW E&M-EST. PATIENT-LVL II: ICD-10-PCS | Mod: PBBFAC,,,

## 2021-06-30 PROCEDURE — 96132 PR NEUROPSYCHOLOGIC TEST EVAL SVCS, 1ST HR: ICD-10-PCS | Mod: S$GLB,,, | Performed by: PSYCHIATRY & NEUROLOGY

## 2021-06-30 PROCEDURE — 99999 PR PBB SHADOW E&M-EST. PATIENT-LVL II: CPT | Mod: PBBFAC,,,

## 2021-06-30 PROCEDURE — 99215 PR OFFICE/OUTPT VISIT, EST, LEVL V, 40-54 MIN: ICD-10-PCS | Mod: S$GLB,,, | Performed by: NURSE PRACTITIONER

## 2021-06-30 PROCEDURE — 96116 NUBHVL XM PHYS/QHP 1ST HR: CPT | Mod: PBBFAC | Performed by: PSYCHIATRY & NEUROLOGY

## 2021-06-30 PROCEDURE — 99215 OFFICE O/P EST HI 40 MIN: CPT | Mod: S$GLB,,, | Performed by: NURSE PRACTITIONER

## 2021-06-30 PROCEDURE — 96138 PSYCL/NRPSYC TECH 1ST: CPT | Mod: S$GLB,,, | Performed by: PSYCHIATRY & NEUROLOGY

## 2021-06-30 RX ORDER — LANOLIN ALCOHOL/MO/W.PET/CERES
100 CREAM (GRAM) TOPICAL DAILY
Status: ON HOLD | COMMUNITY
End: 2023-07-18 | Stop reason: SDUPTHER

## 2021-07-05 PROBLEM — G31.84 AMNESTIC MCI (MILD COGNITIVE IMPAIRMENT WITH MEMORY LOSS): Status: ACTIVE | Noted: 2021-07-05

## 2021-07-16 ENCOUNTER — TELEPHONE (OUTPATIENT)
Dept: NEUROLOGY | Facility: CLINIC | Age: 86
End: 2021-07-16

## 2021-07-16 ENCOUNTER — OUTPATIENT CASE MANAGEMENT (OUTPATIENT)
Dept: NEUROLOGY | Facility: CLINIC | Age: 86
End: 2021-07-16

## 2021-07-26 ENCOUNTER — OUTPATIENT CASE MANAGEMENT (OUTPATIENT)
Dept: NEUROLOGY | Facility: CLINIC | Age: 86
End: 2021-07-26

## 2021-08-06 ENCOUNTER — HOSPITAL ENCOUNTER (OUTPATIENT)
Dept: RADIOLOGY | Facility: CLINIC | Age: 86
Discharge: HOME | End: 2021-08-06
Attending: INTERNAL MEDICINE
Payer: MEDICARE

## 2021-08-06 ENCOUNTER — OFFICE VISIT (OUTPATIENT)
Dept: HEMATOLOGY/ONCOLOGY | Facility: CLINIC | Age: 86
End: 2021-08-06
Payer: MEDICARE

## 2021-08-06 VITALS
RESPIRATION RATE: 16 BRPM | HEART RATE: 57 BPM | HEIGHT: 60 IN | WEIGHT: 137.13 LBS | OXYGEN SATURATION: 95 % | BODY MASS INDEX: 26.92 KG/M2 | SYSTOLIC BLOOD PRESSURE: 123 MMHG | DIASTOLIC BLOOD PRESSURE: 62 MMHG | TEMPERATURE: 98 F

## 2021-08-06 DIAGNOSIS — M81.0 OSTEOPOROSIS, UNSPECIFIED OSTEOPOROSIS TYPE, UNSPECIFIED PATHOLOGICAL FRACTURE PRESENCE: ICD-10-CM

## 2021-08-06 DIAGNOSIS — D69.6 THROMBOCYTOPENIA: Primary | ICD-10-CM

## 2021-08-06 PROCEDURE — 3288F FALL RISK ASSESSMENT DOCD: CPT | Mod: CPTII,S$GLB,, | Performed by: INTERNAL MEDICINE

## 2021-08-06 PROCEDURE — 1101F PT FALLS ASSESS-DOCD LE1/YR: CPT | Mod: CPTII,S$GLB,, | Performed by: INTERNAL MEDICINE

## 2021-08-06 PROCEDURE — 77080 DXA BONE DENSITY AXIAL: CPT | Mod: 26,,, | Performed by: INTERNAL MEDICINE

## 2021-08-06 PROCEDURE — 3288F PR FALLS RISK ASSESSMENT DOCUMENTED: ICD-10-PCS | Mod: CPTII,S$GLB,, | Performed by: INTERNAL MEDICINE

## 2021-08-06 PROCEDURE — 1126F AMNT PAIN NOTED NONE PRSNT: CPT | Mod: CPTII,S$GLB,, | Performed by: INTERNAL MEDICINE

## 2021-08-06 PROCEDURE — 77080 DEXA BONE DENSITY SPINE HIP: ICD-10-PCS | Mod: 26,,, | Performed by: INTERNAL MEDICINE

## 2021-08-06 PROCEDURE — 3074F SYST BP LT 130 MM HG: CPT | Mod: CPTII,S$GLB,, | Performed by: INTERNAL MEDICINE

## 2021-08-06 PROCEDURE — 99999 PR PBB SHADOW E&M-EST. PATIENT-LVL IV: ICD-10-PCS | Mod: PBBFAC,,, | Performed by: INTERNAL MEDICINE

## 2021-08-06 PROCEDURE — 99204 OFFICE O/P NEW MOD 45 MIN: CPT | Mod: GC,S$GLB,, | Performed by: INTERNAL MEDICINE

## 2021-08-06 PROCEDURE — 1160F PR REVIEW ALL MEDS BY PRESCRIBER/CLIN PHARMACIST DOCUMENTED: ICD-10-PCS | Mod: CPTII,S$GLB,, | Performed by: INTERNAL MEDICINE

## 2021-08-06 PROCEDURE — 1160F RVW MEDS BY RX/DR IN RCRD: CPT | Mod: CPTII,S$GLB,, | Performed by: INTERNAL MEDICINE

## 2021-08-06 PROCEDURE — 1126F PR PAIN SEVERITY QUANTIFIED, NO PAIN PRESENT: ICD-10-PCS | Mod: CPTII,S$GLB,, | Performed by: INTERNAL MEDICINE

## 2021-08-06 PROCEDURE — 3078F PR MOST RECENT DIASTOLIC BLOOD PRESSURE < 80 MM HG: ICD-10-PCS | Mod: CPTII,S$GLB,, | Performed by: INTERNAL MEDICINE

## 2021-08-06 PROCEDURE — 99204 PR OFFICE/OUTPT VISIT, NEW, LEVL IV, 45-59 MIN: ICD-10-PCS | Mod: GC,S$GLB,, | Performed by: INTERNAL MEDICINE

## 2021-08-06 PROCEDURE — 1101F PR PT FALLS ASSESS DOC 0-1 FALLS W/OUT INJ PAST YR: ICD-10-PCS | Mod: CPTII,S$GLB,, | Performed by: INTERNAL MEDICINE

## 2021-08-06 PROCEDURE — 3078F DIAST BP <80 MM HG: CPT | Mod: CPTII,S$GLB,, | Performed by: INTERNAL MEDICINE

## 2021-08-06 PROCEDURE — 1159F MED LIST DOCD IN RCRD: CPT | Mod: CPTII,S$GLB,, | Performed by: INTERNAL MEDICINE

## 2021-08-06 PROCEDURE — 3074F PR MOST RECENT SYSTOLIC BLOOD PRESSURE < 130 MM HG: ICD-10-PCS | Mod: CPTII,S$GLB,, | Performed by: INTERNAL MEDICINE

## 2021-08-06 PROCEDURE — 1159F PR MEDICATION LIST DOCUMENTED IN MEDICAL RECORD: ICD-10-PCS | Mod: CPTII,S$GLB,, | Performed by: INTERNAL MEDICINE

## 2021-08-06 PROCEDURE — 77080 DXA BONE DENSITY AXIAL: CPT | Mod: TC

## 2021-08-06 PROCEDURE — 99999 PR PBB SHADOW E&M-EST. PATIENT-LVL IV: CPT | Mod: PBBFAC,,, | Performed by: INTERNAL MEDICINE

## 2021-08-06 RX ORDER — MEMANTINE HYDROCHLORIDE 5 MG/1
5 TABLET ORAL NIGHTLY
COMMUNITY
Start: 2021-06-01 | End: 2021-08-18 | Stop reason: SDUPTHER

## 2021-08-06 RX ORDER — ALENDRONATE SODIUM 70 MG/1
70 TABLET ORAL
COMMUNITY
Start: 2021-05-24 | End: 2021-08-06

## 2021-08-14 ENCOUNTER — TELEPHONE (OUTPATIENT)
Dept: INTERNAL MEDICINE | Facility: CLINIC | Age: 86
End: 2021-08-14

## 2021-08-16 ENCOUNTER — OUTPATIENT CASE MANAGEMENT (OUTPATIENT)
Dept: NEUROLOGY | Facility: CLINIC | Age: 86
End: 2021-08-16

## 2021-08-16 RX ORDER — ALENDRONATE SODIUM 70 MG/1
70 TABLET ORAL
Qty: 4 TABLET | Refills: 11 | Status: ON HOLD | OUTPATIENT
Start: 2021-08-16 | End: 2022-12-01

## 2021-08-17 ENCOUNTER — OUTPATIENT CASE MANAGEMENT (OUTPATIENT)
Dept: NEUROLOGY | Facility: CLINIC | Age: 86
End: 2021-08-17

## 2021-08-18 ENCOUNTER — OFFICE VISIT (OUTPATIENT)
Dept: INTERNAL MEDICINE | Facility: CLINIC | Age: 86
End: 2021-08-18
Payer: MEDICARE

## 2021-08-18 ENCOUNTER — LAB VISIT (OUTPATIENT)
Dept: LAB | Facility: HOSPITAL | Age: 86
End: 2021-08-18
Attending: INTERNAL MEDICINE
Payer: MEDICARE

## 2021-08-18 DIAGNOSIS — I10 HYPERTENSION, UNSPECIFIED TYPE: Primary | ICD-10-CM

## 2021-08-18 DIAGNOSIS — I10 HYPERTENSION, UNSPECIFIED TYPE: ICD-10-CM

## 2021-08-18 LAB
ALBUMIN SERPL BCP-MCNC: 3.6 G/DL (ref 3.5–5.2)
ALP SERPL-CCNC: 71 U/L (ref 55–135)
ALT SERPL W/O P-5'-P-CCNC: 21 U/L (ref 10–44)
ANION GAP SERPL CALC-SCNC: 7 MMOL/L (ref 8–16)
AST SERPL-CCNC: 16 U/L (ref 10–40)
BILIRUB SERPL-MCNC: 0.7 MG/DL (ref 0.1–1)
BUN SERPL-MCNC: 11 MG/DL (ref 8–23)
CALCIUM SERPL-MCNC: 10.2 MG/DL (ref 8.7–10.5)
CHLORIDE SERPL-SCNC: 110 MMOL/L (ref 95–110)
CO2 SERPL-SCNC: 26 MMOL/L (ref 23–29)
CREAT SERPL-MCNC: 0.9 MG/DL (ref 0.5–1.4)
EST. GFR  (AFRICAN AMERICAN): >60 ML/MIN/1.73 M^2
EST. GFR  (NON AFRICAN AMERICAN): 58.5 ML/MIN/1.73 M^2
GLUCOSE SERPL-MCNC: 88 MG/DL (ref 70–110)
POTASSIUM SERPL-SCNC: 4.3 MMOL/L (ref 3.5–5.1)
PROT SERPL-MCNC: 6.2 G/DL (ref 6–8.4)
SODIUM SERPL-SCNC: 143 MMOL/L (ref 136–145)

## 2021-08-18 PROCEDURE — 1159F PR MEDICATION LIST DOCUMENTED IN MEDICAL RECORD: ICD-10-PCS | Mod: CPTII,S$GLB,, | Performed by: INTERNAL MEDICINE

## 2021-08-18 PROCEDURE — 3074F SYST BP LT 130 MM HG: CPT | Mod: CPTII,S$GLB,, | Performed by: INTERNAL MEDICINE

## 2021-08-18 PROCEDURE — 3074F PR MOST RECENT SYSTOLIC BLOOD PRESSURE < 130 MM HG: ICD-10-PCS | Mod: CPTII,S$GLB,, | Performed by: INTERNAL MEDICINE

## 2021-08-18 PROCEDURE — 99215 OFFICE O/P EST HI 40 MIN: CPT | Mod: S$GLB,,, | Performed by: INTERNAL MEDICINE

## 2021-08-18 PROCEDURE — 3288F FALL RISK ASSESSMENT DOCD: CPT | Mod: CPTII,S$GLB,, | Performed by: INTERNAL MEDICINE

## 2021-08-18 PROCEDURE — 1101F PR PT FALLS ASSESS DOC 0-1 FALLS W/OUT INJ PAST YR: ICD-10-PCS | Mod: CPTII,S$GLB,, | Performed by: INTERNAL MEDICINE

## 2021-08-18 PROCEDURE — 3288F PR FALLS RISK ASSESSMENT DOCUMENTED: ICD-10-PCS | Mod: CPTII,S$GLB,, | Performed by: INTERNAL MEDICINE

## 2021-08-18 PROCEDURE — 1101F PT FALLS ASSESS-DOCD LE1/YR: CPT | Mod: CPTII,S$GLB,, | Performed by: INTERNAL MEDICINE

## 2021-08-18 PROCEDURE — 99999 PR PBB SHADOW E&M-EST. PATIENT-LVL IV: CPT | Mod: PBBFAC,,, | Performed by: INTERNAL MEDICINE

## 2021-08-18 PROCEDURE — 1126F PR PAIN SEVERITY QUANTIFIED, NO PAIN PRESENT: ICD-10-PCS | Mod: CPTII,S$GLB,, | Performed by: INTERNAL MEDICINE

## 2021-08-18 PROCEDURE — 3078F PR MOST RECENT DIASTOLIC BLOOD PRESSURE < 80 MM HG: ICD-10-PCS | Mod: CPTII,S$GLB,, | Performed by: INTERNAL MEDICINE

## 2021-08-18 PROCEDURE — 99999 PR PBB SHADOW E&M-EST. PATIENT-LVL IV: ICD-10-PCS | Mod: PBBFAC,,, | Performed by: INTERNAL MEDICINE

## 2021-08-18 PROCEDURE — 1159F MED LIST DOCD IN RCRD: CPT | Mod: CPTII,S$GLB,, | Performed by: INTERNAL MEDICINE

## 2021-08-18 PROCEDURE — 36415 COLL VENOUS BLD VENIPUNCTURE: CPT | Performed by: INTERNAL MEDICINE

## 2021-08-18 PROCEDURE — 80053 COMPREHEN METABOLIC PANEL: CPT | Performed by: INTERNAL MEDICINE

## 2021-08-18 PROCEDURE — 1126F AMNT PAIN NOTED NONE PRSNT: CPT | Mod: CPTII,S$GLB,, | Performed by: INTERNAL MEDICINE

## 2021-08-18 PROCEDURE — 3078F DIAST BP <80 MM HG: CPT | Mod: CPTII,S$GLB,, | Performed by: INTERNAL MEDICINE

## 2021-08-18 PROCEDURE — 99215 PR OFFICE/OUTPT VISIT, EST, LEVL V, 40-54 MIN: ICD-10-PCS | Mod: S$GLB,,, | Performed by: INTERNAL MEDICINE

## 2021-08-18 RX ORDER — GALANTAMINE HYDROBROMIDE 16 MG/1
16 CAPSULE, EXTENDED RELEASE ORAL
Qty: 90 CAPSULE | Refills: 1 | Status: SHIPPED | OUTPATIENT
Start: 2021-08-18 | End: 2022-03-02

## 2021-08-18 RX ORDER — MEMANTINE HYDROCHLORIDE 10 MG/1
10 TABLET ORAL 2 TIMES DAILY
Qty: 180 TABLET | Refills: 1 | Status: SHIPPED | OUTPATIENT
Start: 2021-08-18 | End: 2022-02-25

## 2021-08-23 VITALS
OXYGEN SATURATION: 95 % | DIASTOLIC BLOOD PRESSURE: 60 MMHG | HEART RATE: 64 BPM | TEMPERATURE: 99 F | HEIGHT: 60 IN | BODY MASS INDEX: 27.18 KG/M2 | WEIGHT: 138.44 LBS | SYSTOLIC BLOOD PRESSURE: 100 MMHG

## 2021-09-03 ENCOUNTER — PATIENT MESSAGE (OUTPATIENT)
Dept: NEUROLOGY | Facility: CLINIC | Age: 86
End: 2021-09-03

## 2021-10-06 ENCOUNTER — LAB VISIT (OUTPATIENT)
Dept: LAB | Facility: HOSPITAL | Age: 86
End: 2021-10-06
Attending: INTERNAL MEDICINE
Payer: MEDICARE

## 2021-10-06 DIAGNOSIS — D69.6 THROMBOCYTOPENIA: ICD-10-CM

## 2021-10-06 LAB
BASOPHILS # BLD AUTO: 0.04 K/UL (ref 0–0.2)
BASOPHILS NFR BLD: 0.8 % (ref 0–1.9)
DIFFERENTIAL METHOD: ABNORMAL
EOSINOPHIL # BLD AUTO: 0.1 K/UL (ref 0–0.5)
EOSINOPHIL NFR BLD: 1.8 % (ref 0–8)
ERYTHROCYTE [DISTWIDTH] IN BLOOD BY AUTOMATED COUNT: 13.2 % (ref 11.5–14.5)
HCT VFR BLD AUTO: 44.7 % (ref 37–48.5)
HGB BLD-MCNC: 14.4 G/DL (ref 12–16)
IMM GRANULOCYTES # BLD AUTO: 0.02 K/UL (ref 0–0.04)
IMM GRANULOCYTES NFR BLD AUTO: 0.4 % (ref 0–0.5)
LYMPHOCYTES # BLD AUTO: 1.4 K/UL (ref 1–4.8)
LYMPHOCYTES NFR BLD: 28.5 % (ref 18–48)
MCH RBC QN AUTO: 31.9 PG (ref 27–31)
MCHC RBC AUTO-ENTMCNC: 32.2 G/DL (ref 32–36)
MCV RBC AUTO: 99 FL (ref 82–98)
MONOCYTES # BLD AUTO: 0.7 K/UL (ref 0.3–1)
MONOCYTES NFR BLD: 13.3 % (ref 4–15)
NEUTROPHILS # BLD AUTO: 2.8 K/UL (ref 1.8–7.7)
NEUTROPHILS NFR BLD: 55.2 % (ref 38–73)
NRBC BLD-RTO: 0 /100 WBC
PLATELET # BLD AUTO: 68 K/UL (ref 150–450)
PMV BLD AUTO: 12.8 FL (ref 9.2–12.9)
RBC # BLD AUTO: 4.51 M/UL (ref 4–5.4)
WBC # BLD AUTO: 4.98 K/UL (ref 3.9–12.7)

## 2021-10-06 PROCEDURE — 85025 COMPLETE CBC W/AUTO DIFF WBC: CPT | Performed by: INTERNAL MEDICINE

## 2021-10-06 PROCEDURE — 36415 COLL VENOUS BLD VENIPUNCTURE: CPT | Mod: PO | Performed by: INTERNAL MEDICINE

## 2021-11-10 ENCOUNTER — LAB VISIT (OUTPATIENT)
Dept: LAB | Facility: HOSPITAL | Age: 86
End: 2021-11-10
Attending: INTERNAL MEDICINE
Payer: MEDICARE

## 2021-11-10 DIAGNOSIS — D69.3 IDIOPATHIC THROMBOCYTOPENIC PURPURA (ITP): ICD-10-CM

## 2021-11-10 LAB
BASOPHILS # BLD AUTO: 0.06 K/UL (ref 0–0.2)
BASOPHILS NFR BLD: 1.2 % (ref 0–1.9)
DIFFERENTIAL METHOD: ABNORMAL
EOSINOPHIL # BLD AUTO: 0.1 K/UL (ref 0–0.5)
EOSINOPHIL NFR BLD: 1.6 % (ref 0–8)
ERYTHROCYTE [DISTWIDTH] IN BLOOD BY AUTOMATED COUNT: 13.2 % (ref 11.5–14.5)
HCT VFR BLD AUTO: 45.5 % (ref 37–48.5)
HGB BLD-MCNC: 14.5 G/DL (ref 12–16)
IMM GRANULOCYTES # BLD AUTO: 0.01 K/UL (ref 0–0.04)
IMM GRANULOCYTES NFR BLD AUTO: 0.2 % (ref 0–0.5)
LYMPHOCYTES # BLD AUTO: 1.6 K/UL (ref 1–4.8)
LYMPHOCYTES NFR BLD: 32 % (ref 18–48)
MCH RBC QN AUTO: 31.5 PG (ref 27–31)
MCHC RBC AUTO-ENTMCNC: 31.9 G/DL (ref 32–36)
MCV RBC AUTO: 99 FL (ref 82–98)
MONOCYTES # BLD AUTO: 0.6 K/UL (ref 0.3–1)
MONOCYTES NFR BLD: 13 % (ref 4–15)
NEUTROPHILS # BLD AUTO: 2.6 K/UL (ref 1.8–7.7)
NEUTROPHILS NFR BLD: 52 % (ref 38–73)
NRBC BLD-RTO: 0 /100 WBC
PLATELET # BLD AUTO: 48 K/UL (ref 150–450)
PMV BLD AUTO: 12.5 FL (ref 9.2–12.9)
RBC # BLD AUTO: 4.6 M/UL (ref 4–5.4)
WBC # BLD AUTO: 4.91 K/UL (ref 3.9–12.7)

## 2021-11-10 PROCEDURE — 36415 COLL VENOUS BLD VENIPUNCTURE: CPT | Mod: PO | Performed by: INTERNAL MEDICINE

## 2021-11-10 PROCEDURE — 85025 COMPLETE CBC W/AUTO DIFF WBC: CPT | Performed by: INTERNAL MEDICINE

## 2021-11-23 ENCOUNTER — OFFICE VISIT (OUTPATIENT)
Dept: HEMATOLOGY/ONCOLOGY | Facility: CLINIC | Age: 86
End: 2021-11-23
Payer: MEDICARE

## 2021-11-23 DIAGNOSIS — D69.3 IMMUNE THROMBOCYTOPENIA: Primary | ICD-10-CM

## 2021-11-23 PROCEDURE — 99214 OFFICE O/P EST MOD 30 MIN: CPT | Mod: 95,,, | Performed by: INTERNAL MEDICINE

## 2021-11-23 PROCEDURE — 99214 PR OFFICE/OUTPT VISIT, EST, LEVL IV, 30-39 MIN: ICD-10-PCS | Mod: 95,,, | Performed by: INTERNAL MEDICINE

## 2021-11-29 ENCOUNTER — PATIENT MESSAGE (OUTPATIENT)
Dept: HEMATOLOGY/ONCOLOGY | Facility: CLINIC | Age: 86
End: 2021-11-29
Payer: MEDICARE

## 2021-12-01 ENCOUNTER — LAB VISIT (OUTPATIENT)
Dept: LAB | Facility: HOSPITAL | Age: 86
End: 2021-12-01
Attending: INTERNAL MEDICINE
Payer: MEDICARE

## 2021-12-01 ENCOUNTER — OFFICE VISIT (OUTPATIENT)
Dept: INTERNAL MEDICINE | Facility: CLINIC | Age: 86
End: 2021-12-01
Payer: MEDICARE

## 2021-12-01 DIAGNOSIS — R05.9 COUGH: ICD-10-CM

## 2021-12-01 DIAGNOSIS — R51.9 NONINTRACTABLE HEADACHE, UNSPECIFIED CHRONICITY PATTERN, UNSPECIFIED HEADACHE TYPE: Primary | ICD-10-CM

## 2021-12-01 DIAGNOSIS — I10 HYPERTENSION, UNSPECIFIED TYPE: ICD-10-CM

## 2021-12-01 LAB
ALBUMIN SERPL BCP-MCNC: 3.7 G/DL (ref 3.5–5.2)
ALP SERPL-CCNC: 74 U/L (ref 55–135)
ALT SERPL W/O P-5'-P-CCNC: 10 U/L (ref 10–44)
ANION GAP SERPL CALC-SCNC: 8 MMOL/L (ref 8–16)
AST SERPL-CCNC: 15 U/L (ref 10–40)
BILIRUB SERPL-MCNC: 0.6 MG/DL (ref 0.1–1)
BUN SERPL-MCNC: 7 MG/DL (ref 8–23)
CALCIUM SERPL-MCNC: 10.1 MG/DL (ref 8.7–10.5)
CHLORIDE SERPL-SCNC: 111 MMOL/L (ref 95–110)
CO2 SERPL-SCNC: 23 MMOL/L (ref 23–29)
CREAT SERPL-MCNC: 0.8 MG/DL (ref 0.5–1.4)
EST. GFR  (AFRICAN AMERICAN): >60 ML/MIN/1.73 M^2
EST. GFR  (NON AFRICAN AMERICAN): >60 ML/MIN/1.73 M^2
GLUCOSE SERPL-MCNC: 85 MG/DL (ref 70–110)
POTASSIUM SERPL-SCNC: 4.3 MMOL/L (ref 3.5–5.1)
PROT SERPL-MCNC: 6.5 G/DL (ref 6–8.4)
SODIUM SERPL-SCNC: 142 MMOL/L (ref 136–145)
T4 FREE SERPL-MCNC: 0.88 NG/DL (ref 0.71–1.51)
TSH SERPL DL<=0.005 MIU/L-ACNC: 10.29 UIU/ML (ref 0.4–4)

## 2021-12-01 PROCEDURE — 36415 COLL VENOUS BLD VENIPUNCTURE: CPT | Performed by: INTERNAL MEDICINE

## 2021-12-01 PROCEDURE — 99999 PR PBB SHADOW E&M-EST. PATIENT-LVL IV: ICD-10-PCS | Mod: PBBFAC,,, | Performed by: INTERNAL MEDICINE

## 2021-12-01 PROCEDURE — 80053 COMPREHEN METABOLIC PANEL: CPT | Performed by: INTERNAL MEDICINE

## 2021-12-01 PROCEDURE — 99215 OFFICE O/P EST HI 40 MIN: CPT | Mod: S$GLB,,, | Performed by: INTERNAL MEDICINE

## 2021-12-01 PROCEDURE — 99999 PR PBB SHADOW E&M-EST. PATIENT-LVL IV: CPT | Mod: PBBFAC,,, | Performed by: INTERNAL MEDICINE

## 2021-12-01 PROCEDURE — 99215 PR OFFICE/OUTPT VISIT, EST, LEVL V, 40-54 MIN: ICD-10-PCS | Mod: S$GLB,,, | Performed by: INTERNAL MEDICINE

## 2021-12-01 PROCEDURE — 84439 ASSAY OF FREE THYROXINE: CPT | Performed by: INTERNAL MEDICINE

## 2021-12-01 PROCEDURE — 84443 ASSAY THYROID STIM HORMONE: CPT | Performed by: INTERNAL MEDICINE

## 2021-12-01 RX ORDER — AMOXICILLIN 875 MG/1
875 TABLET, FILM COATED ORAL 2 TIMES DAILY
Qty: 14 TABLET | Refills: 0 | Status: SHIPPED | OUTPATIENT
Start: 2021-12-01 | End: 2021-12-08

## 2021-12-04 ENCOUNTER — TELEPHONE (OUTPATIENT)
Dept: INTERNAL MEDICINE | Facility: CLINIC | Age: 86
End: 2021-12-04
Payer: MEDICARE

## 2021-12-04 VITALS
TEMPERATURE: 99 F | DIASTOLIC BLOOD PRESSURE: 74 MMHG | WEIGHT: 134.94 LBS | HEIGHT: 60 IN | HEART RATE: 69 BPM | OXYGEN SATURATION: 95 % | SYSTOLIC BLOOD PRESSURE: 138 MMHG | BODY MASS INDEX: 26.49 KG/M2

## 2021-12-04 DIAGNOSIS — E03.9 HYPOTHYROIDISM, UNSPECIFIED TYPE: Primary | ICD-10-CM

## 2021-12-07 ENCOUNTER — TELEPHONE (OUTPATIENT)
Dept: INTERNAL MEDICINE | Facility: CLINIC | Age: 86
End: 2021-12-07
Payer: MEDICARE

## 2021-12-07 RX ORDER — LEVOTHYROXINE SODIUM 100 UG/1
100 TABLET ORAL
Qty: 30 TABLET | Refills: 11 | Status: SHIPPED | OUTPATIENT
Start: 2021-12-07 | End: 2022-04-05 | Stop reason: SDUPTHER

## 2021-12-28 ENCOUNTER — LAB VISIT (OUTPATIENT)
Dept: LAB | Facility: HOSPITAL | Age: 86
End: 2021-12-28
Attending: INTERNAL MEDICINE
Payer: MEDICARE

## 2021-12-28 DIAGNOSIS — D69.6 THROMBOCYTOPENIA: ICD-10-CM

## 2021-12-28 LAB
BASOPHILS # BLD AUTO: 0.04 K/UL (ref 0–0.2)
BASOPHILS NFR BLD: 0.9 % (ref 0–1.9)
DIFFERENTIAL METHOD: ABNORMAL
EOSINOPHIL # BLD AUTO: 0.1 K/UL (ref 0–0.5)
EOSINOPHIL NFR BLD: 1.3 % (ref 0–8)
ERYTHROCYTE [DISTWIDTH] IN BLOOD BY AUTOMATED COUNT: 13.5 % (ref 11.5–14.5)
HCT VFR BLD AUTO: 44.6 % (ref 37–48.5)
HGB BLD-MCNC: 14.5 G/DL (ref 12–16)
IMM GRANULOCYTES # BLD AUTO: 0.02 K/UL (ref 0–0.04)
IMM GRANULOCYTES NFR BLD AUTO: 0.4 % (ref 0–0.5)
LYMPHOCYTES # BLD AUTO: 1.2 K/UL (ref 1–4.8)
LYMPHOCYTES NFR BLD: 26.9 % (ref 18–48)
MCH RBC QN AUTO: 31.5 PG (ref 27–31)
MCHC RBC AUTO-ENTMCNC: 32.5 G/DL (ref 32–36)
MCV RBC AUTO: 97 FL (ref 82–98)
MONOCYTES # BLD AUTO: 0.6 K/UL (ref 0.3–1)
MONOCYTES NFR BLD: 12.9 % (ref 4–15)
NEUTROPHILS # BLD AUTO: 2.6 K/UL (ref 1.8–7.7)
NEUTROPHILS NFR BLD: 57.6 % (ref 38–73)
NRBC BLD-RTO: 0 /100 WBC
PLATELET # BLD AUTO: 76 K/UL (ref 150–450)
PMV BLD AUTO: 12.2 FL (ref 9.2–12.9)
RBC # BLD AUTO: 4.6 M/UL (ref 4–5.4)
WBC # BLD AUTO: 4.57 K/UL (ref 3.9–12.7)

## 2021-12-28 PROCEDURE — 85025 COMPLETE CBC W/AUTO DIFF WBC: CPT | Performed by: INTERNAL MEDICINE

## 2021-12-28 PROCEDURE — 36415 COLL VENOUS BLD VENIPUNCTURE: CPT | Mod: PN | Performed by: INTERNAL MEDICINE

## 2022-01-07 ENCOUNTER — LAB VISIT (OUTPATIENT)
Dept: LAB | Facility: HOSPITAL | Age: 87
End: 2022-01-07
Attending: INTERNAL MEDICINE
Payer: MEDICARE

## 2022-01-07 DIAGNOSIS — E03.9 HYPOTHYROIDISM, UNSPECIFIED TYPE: ICD-10-CM

## 2022-01-07 LAB
T4 FREE SERPL-MCNC: 1.03 NG/DL (ref 0.71–1.51)
TSH SERPL DL<=0.005 MIU/L-ACNC: 4.83 UIU/ML (ref 0.4–4)

## 2022-01-07 PROCEDURE — 84443 ASSAY THYROID STIM HORMONE: CPT | Performed by: INTERNAL MEDICINE

## 2022-01-07 PROCEDURE — 84439 ASSAY OF FREE THYROXINE: CPT | Performed by: INTERNAL MEDICINE

## 2022-01-07 PROCEDURE — 36415 COLL VENOUS BLD VENIPUNCTURE: CPT | Performed by: INTERNAL MEDICINE

## 2022-01-09 NOTE — TELEPHONE ENCOUNTER
No new care gaps identified.  Powered by [employer]. Reference number: [id number].   1/09/2022 4:54:57 AM CST

## 2022-01-12 RX ORDER — LOSARTAN POTASSIUM 50 MG/1
TABLET ORAL
Qty: 90 TABLET | Refills: 3 | Status: ON HOLD | OUTPATIENT
Start: 2022-01-12 | End: 2022-12-01 | Stop reason: HOSPADM

## 2022-01-13 NOTE — TELEPHONE ENCOUNTER
Refill Authorization Note   Rosario Menendez  is requesting a refill authorization.  Brief Assessment and Rationale for Refill:  Approve     Medication Therapy Plan:       Medication Reconciliation Completed: No   Comments:   --->Care Gap information included below if applicable.   Orders Placed This Encounter    losartan (COZAAR) 50 MG tablet      Requested Prescriptions   Signed Prescriptions Disp Refills    losartan (COZAAR) 50 MG tablet 90 tablet 3     Sig: TAKE 1 TABLET EVERY DAY       Cardiovascular:  Angiotensin Receptor Blockers Passed - 1/12/2022 10:53 PM        Passed - Patient is at least 18 years old        Passed - Last BP in normal range within 360 days     BP Readings from Last 1 Encounters:   12/01/21 138/74               Passed - Valid encounter within last 15 months     Recent Visits  Date Type Provider Dept   12/01/21 Office Visit Shi Simon MD [facility] Internal Medicine   08/18/21 Office Visit Shi Simon MD [facility] Internal Medicine   04/14/21 Office Visit Shi Simon MD [facility] Internal Medicine   Showing recent visits within past 720 days and meeting all other requirements  Future Appointments  No visits were found meeting these conditions.  Showing future appointments within next 150 days and meeting all other requirements      Future Appointments              In 2 weeks LAB, [facility] - Lab, [facility]    In 2 weeks Guilherme Regalado MD [facility] - Bone Marrow Transplant, [facility]    In 2 months MD [facility], [facility]                Passed - Cr is 1.39 or below and within 360 days     Lab Results   Component Value Date    CREATININE 0.8 12/01/2021    CREATININE 0.9 08/18/2021    CREATININE 0.9 04/21/2021              Passed - K is 5.2 or below and within 360 days     Potassium   Date Value Ref Range Status   12/01/2021 4.3 3.5 - 5.1 mmol/L Final   08/18/2021 4.3 3.5 - 5.1 mmol/L Final   04/21/2021 3.7  3.5 - 5.1 mmol/L Final              Passed - eGFR within 360 days     Lab Results   Component Value Date    EGFRNONAA >60.0 12/01/2021    EGFRNONAA 58.5 (A) 08/18/2021    EGFRNONAA 58.5 (A) 04/21/2021                    Appointments  past 12m or future 3m with PCP    Date Provider   Last Visit   12/1/2021 Shi Simon MD   Next Visit   4/5/2022 Shi Simon MD   ED visits in past 90 days: 0     Note composed:10:55 PM 01/12/2022

## 2022-01-28 ENCOUNTER — LAB VISIT (OUTPATIENT)
Dept: LAB | Facility: HOSPITAL | Age: 87
End: 2022-01-28
Attending: INTERNAL MEDICINE
Payer: MEDICARE

## 2022-01-28 DIAGNOSIS — D69.6 THROMBOCYTOPENIA: ICD-10-CM

## 2022-01-28 LAB
BASOPHILS # BLD AUTO: 0.04 K/UL (ref 0–0.2)
BASOPHILS NFR BLD: 0.7 % (ref 0–1.9)
DIFFERENTIAL METHOD: ABNORMAL
EOSINOPHIL # BLD AUTO: 0 K/UL (ref 0–0.5)
EOSINOPHIL NFR BLD: 0.7 % (ref 0–8)
ERYTHROCYTE [DISTWIDTH] IN BLOOD BY AUTOMATED COUNT: 14.6 % (ref 11.5–14.5)
HCT VFR BLD AUTO: 45 % (ref 37–48.5)
HGB BLD-MCNC: 14.4 G/DL (ref 12–16)
IMM GRANULOCYTES # BLD AUTO: 0.02 K/UL (ref 0–0.04)
IMM GRANULOCYTES NFR BLD AUTO: 0.4 % (ref 0–0.5)
LYMPHOCYTES # BLD AUTO: 1.3 K/UL (ref 1–4.8)
LYMPHOCYTES NFR BLD: 24.4 % (ref 18–48)
MCH RBC QN AUTO: 31.6 PG (ref 27–31)
MCHC RBC AUTO-ENTMCNC: 32 G/DL (ref 32–36)
MCV RBC AUTO: 99 FL (ref 82–98)
MONOCYTES # BLD AUTO: 0.6 K/UL (ref 0.3–1)
MONOCYTES NFR BLD: 11.1 % (ref 4–15)
NEUTROPHILS # BLD AUTO: 3.4 K/UL (ref 1.8–7.7)
NEUTROPHILS NFR BLD: 62.7 % (ref 38–73)
NRBC BLD-RTO: 0 /100 WBC
PLATELET # BLD AUTO: 47 K/UL (ref 150–450)
PMV BLD AUTO: 12.4 FL (ref 9.2–12.9)
RBC # BLD AUTO: 4.55 M/UL (ref 4–5.4)
WBC # BLD AUTO: 5.42 K/UL (ref 3.9–12.7)

## 2022-01-28 PROCEDURE — 36415 COLL VENOUS BLD VENIPUNCTURE: CPT | Performed by: INTERNAL MEDICINE

## 2022-01-28 PROCEDURE — 85025 COMPLETE CBC W/AUTO DIFF WBC: CPT | Performed by: INTERNAL MEDICINE

## 2022-02-25 RX ORDER — PRAVASTATIN SODIUM 40 MG/1
TABLET ORAL
Qty: 90 TABLET | Refills: 1 | Status: SHIPPED | OUTPATIENT
Start: 2022-02-25 | End: 2022-07-25

## 2022-02-25 RX ORDER — MEMANTINE HYDROCHLORIDE 10 MG/1
TABLET ORAL
Qty: 180 TABLET | Refills: 1 | Status: SHIPPED | OUTPATIENT
Start: 2022-02-25 | End: 2022-07-25

## 2022-02-25 RX ORDER — LOSARTAN POTASSIUM 50 MG/1
50 TABLET ORAL DAILY
Qty: 90 TABLET | Refills: 0 | Status: ON HOLD | OUTPATIENT
Start: 2022-02-25 | End: 2022-12-20 | Stop reason: SDUPTHER

## 2022-02-25 RX ORDER — BUPROPION HYDROCHLORIDE 150 MG/1
TABLET ORAL
Qty: 90 TABLET | Refills: 1 | Status: SHIPPED | OUTPATIENT
Start: 2022-02-25 | End: 2022-07-25

## 2022-02-25 NOTE — TELEPHONE ENCOUNTER
No new care gaps identified.  Powered by [employer]. Reference number: [id number].   2/25/2022 3:45:22 AM CST

## 2022-04-05 ENCOUNTER — OFFICE VISIT (OUTPATIENT)
Dept: INTERNAL MEDICINE | Facility: CLINIC | Age: 87
End: 2022-04-05
Payer: MEDICARE

## 2022-04-05 ENCOUNTER — LAB VISIT (OUTPATIENT)
Dept: LAB | Facility: HOSPITAL | Age: 87
End: 2022-04-05
Attending: INTERNAL MEDICINE
Payer: MEDICARE

## 2022-04-05 DIAGNOSIS — I10 HYPERTENSION, UNSPECIFIED TYPE: Primary | ICD-10-CM

## 2022-04-05 DIAGNOSIS — D69.3 IMMUNE THROMBOCYTOPENIA: ICD-10-CM

## 2022-04-05 DIAGNOSIS — I10 HYPERTENSION, UNSPECIFIED TYPE: ICD-10-CM

## 2022-04-05 DIAGNOSIS — E78.5 HYPERLIPIDEMIA, UNSPECIFIED HYPERLIPIDEMIA TYPE: ICD-10-CM

## 2022-04-05 LAB
ALBUMIN SERPL BCP-MCNC: 3.5 G/DL (ref 3.5–5.2)
ALP SERPL-CCNC: 64 U/L (ref 55–135)
ALT SERPL W/O P-5'-P-CCNC: 18 U/L (ref 10–44)
ANION GAP SERPL CALC-SCNC: 9 MMOL/L (ref 8–16)
AST SERPL-CCNC: 14 U/L (ref 10–40)
BILIRUB SERPL-MCNC: 0.6 MG/DL (ref 0.1–1)
BUN SERPL-MCNC: 13 MG/DL (ref 8–23)
CALCIUM SERPL-MCNC: 10.3 MG/DL (ref 8.7–10.5)
CHLORIDE SERPL-SCNC: 106 MMOL/L (ref 95–110)
CO2 SERPL-SCNC: 25 MMOL/L (ref 23–29)
CREAT SERPL-MCNC: 1 MG/DL (ref 0.5–1.4)
EST. GFR  (AFRICAN AMERICAN): 58.9 ML/MIN/1.73 M^2
EST. GFR  (NON AFRICAN AMERICAN): 51.1 ML/MIN/1.73 M^2
GLUCOSE SERPL-MCNC: 85 MG/DL (ref 70–110)
POTASSIUM SERPL-SCNC: 4.4 MMOL/L (ref 3.5–5.1)
PROT SERPL-MCNC: 6.3 G/DL (ref 6–8.4)
SODIUM SERPL-SCNC: 140 MMOL/L (ref 136–145)

## 2022-04-05 PROCEDURE — 99999 PR PBB SHADOW E&M-EST. PATIENT-LVL IV: CPT | Mod: PBBFAC,,, | Performed by: INTERNAL MEDICINE

## 2022-04-05 PROCEDURE — 99214 PR OFFICE/OUTPT VISIT, EST, LEVL IV, 30-39 MIN: ICD-10-PCS | Mod: S$GLB,,, | Performed by: INTERNAL MEDICINE

## 2022-04-05 PROCEDURE — 36415 COLL VENOUS BLD VENIPUNCTURE: CPT | Performed by: INTERNAL MEDICINE

## 2022-04-05 PROCEDURE — 99214 OFFICE O/P EST MOD 30 MIN: CPT | Mod: S$GLB,,, | Performed by: INTERNAL MEDICINE

## 2022-04-05 PROCEDURE — 80053 COMPREHEN METABOLIC PANEL: CPT | Performed by: INTERNAL MEDICINE

## 2022-04-05 PROCEDURE — 99214 OFFICE O/P EST MOD 30 MIN: CPT | Mod: PBBFAC | Performed by: INTERNAL MEDICINE

## 2022-04-05 PROCEDURE — 99999 PR PBB SHADOW E&M-EST. PATIENT-LVL IV: ICD-10-PCS | Mod: PBBFAC,,, | Performed by: INTERNAL MEDICINE

## 2022-04-05 RX ORDER — LEVOTHYROXINE SODIUM 100 UG/1
100 TABLET ORAL
Qty: 90 TABLET | Refills: 1 | Status: SHIPPED | OUTPATIENT
Start: 2022-04-05 | End: 2022-08-31

## 2022-04-09 VITALS
OXYGEN SATURATION: 97 % | WEIGHT: 136.69 LBS | TEMPERATURE: 98 F | HEART RATE: 60 BPM | BODY MASS INDEX: 27.56 KG/M2 | DIASTOLIC BLOOD PRESSURE: 78 MMHG | SYSTOLIC BLOOD PRESSURE: 138 MMHG | HEIGHT: 59 IN

## 2022-04-09 NOTE — PROGRESS NOTES
Subjective:       Patient ID: Rosario Menendez is a 86 y.o. female.    Chief Complaint: Hypertension    HPI  She returns for management of hypertension.  She has had hypertension for over a year.  Current treatment has included medications outlined in medication list.  She denies chest pain or shortness of breath.  No palpitations.  Denies left arm or neck pain.  She has hyperlipidemia.  Currently on Pravachol    Medications:  See med list    Social history:  Does not smoke, does not drink alcohol      Review of Systems   Constitutional: Negative for chills, fatigue, fever and unexpected weight change.   Respiratory: Negative for chest tightness and shortness of breath.    Cardiovascular: Negative for chest pain and palpitations.   Gastrointestinal: Negative for abdominal pain and blood in stool.   Neurological: Negative for dizziness, syncope, numbness and headaches.       Objective:      Physical Exam  HENT:      Right Ear: External ear normal.      Left Ear: External ear normal.      Nose: Nose normal.      Mouth/Throat:      Mouth: Mucous membranes are moist.      Pharynx: Oropharynx is clear.   Eyes:      Pupils: Pupils are equal, round, and reactive to light.   Cardiovascular:      Rate and Rhythm: Normal rate and regular rhythm.      Heart sounds: No murmur heard.  Pulmonary:      Breath sounds: Normal breath sounds.   Chest:   Breasts:      Right: No axillary adenopathy.      Left: No axillary adenopathy.       Abdominal:      General: There is no distension.      Palpations: There is no hepatomegaly or splenomegaly.      Tenderness: There is no abdominal tenderness.   Musculoskeletal:      Cervical back: Normal range of motion.   Lymphadenopathy:      Cervical: No cervical adenopathy.      Upper Body:      Right upper body: No axillary adenopathy.      Left upper body: No axillary adenopathy.   Neurological:      Cranial Nerves: No cranial nerve deficit.      Sensory: No sensory deficit.      Motor: Motor  function is intact.      Deep Tendon Reflexes: Reflexes are normal and symmetric.         Assessment/Plan       Assessment and plan:  1.  Hypertension:  Check CMP  2. Hyperlipidemia:  Continue Pravachol  3. Discussed mammogram, breast exam, CT scan of head, chest x-ray.  She declined all

## 2022-05-16 RX ORDER — GALANTAMINE HYDROBROMIDE 16 MG/1
CAPSULE, EXTENDED RELEASE ORAL
Qty: 90 CAPSULE | Refills: 0 | Status: SHIPPED | OUTPATIENT
Start: 2022-05-16 | End: 2022-10-07

## 2022-06-06 ENCOUNTER — OFFICE VISIT (OUTPATIENT)
Dept: URGENT CARE | Facility: CLINIC | Age: 87
End: 2022-06-06
Payer: MEDICARE

## 2022-06-06 VITALS
HEIGHT: 59 IN | TEMPERATURE: 98 F | HEART RATE: 75 BPM | OXYGEN SATURATION: 94 % | WEIGHT: 135 LBS | RESPIRATION RATE: 18 BRPM | SYSTOLIC BLOOD PRESSURE: 99 MMHG | DIASTOLIC BLOOD PRESSURE: 65 MMHG | BODY MASS INDEX: 27.21 KG/M2

## 2022-06-06 DIAGNOSIS — J20.8 ACUTE VIRAL BRONCHITIS: Primary | ICD-10-CM

## 2022-06-06 LAB
CTP QC/QA: YES
CTP QC/QA: YES
POC MOLECULAR INFLUENZA A AGN: NEGATIVE
POC MOLECULAR INFLUENZA B AGN: NEGATIVE
SARS-COV-2 RDRP RESP QL NAA+PROBE: NEGATIVE

## 2022-06-06 PROCEDURE — 1125F PR PAIN SEVERITY QUANTIFIED, PAIN PRESENT: ICD-10-PCS | Mod: CPTII,S$GLB,, | Performed by: STUDENT IN AN ORGANIZED HEALTH CARE EDUCATION/TRAINING PROGRAM

## 2022-06-06 PROCEDURE — 99214 PR OFFICE/OUTPT VISIT, EST, LEVL IV, 30-39 MIN: ICD-10-PCS | Mod: 25,S$GLB,, | Performed by: STUDENT IN AN ORGANIZED HEALTH CARE EDUCATION/TRAINING PROGRAM

## 2022-06-06 PROCEDURE — 87502 POCT INFLUENZA A/B MOLECULAR: ICD-10-PCS | Mod: QW,S$GLB,, | Performed by: STUDENT IN AN ORGANIZED HEALTH CARE EDUCATION/TRAINING PROGRAM

## 2022-06-06 PROCEDURE — U0002 COVID-19 LAB TEST NON-CDC: HCPCS | Mod: QW,S$GLB,, | Performed by: STUDENT IN AN ORGANIZED HEALTH CARE EDUCATION/TRAINING PROGRAM

## 2022-06-06 PROCEDURE — U0002: ICD-10-PCS | Mod: QW,S$GLB,, | Performed by: STUDENT IN AN ORGANIZED HEALTH CARE EDUCATION/TRAINING PROGRAM

## 2022-06-06 PROCEDURE — 1160F PR REVIEW ALL MEDS BY PRESCRIBER/CLIN PHARMACIST DOCUMENTED: ICD-10-PCS | Mod: CPTII,S$GLB,, | Performed by: STUDENT IN AN ORGANIZED HEALTH CARE EDUCATION/TRAINING PROGRAM

## 2022-06-06 PROCEDURE — 71046 X-RAY EXAM CHEST 2 VIEWS: CPT | Mod: FY,S$GLB,, | Performed by: RADIOLOGY

## 2022-06-06 PROCEDURE — 1125F AMNT PAIN NOTED PAIN PRSNT: CPT | Mod: CPTII,S$GLB,, | Performed by: STUDENT IN AN ORGANIZED HEALTH CARE EDUCATION/TRAINING PROGRAM

## 2022-06-06 PROCEDURE — 87502 INFLUENZA DNA AMP PROBE: CPT | Mod: QW,S$GLB,, | Performed by: STUDENT IN AN ORGANIZED HEALTH CARE EDUCATION/TRAINING PROGRAM

## 2022-06-06 PROCEDURE — 94640 AIRWAY INHALATION TREATMENT: CPT | Mod: S$GLB,,, | Performed by: STUDENT IN AN ORGANIZED HEALTH CARE EDUCATION/TRAINING PROGRAM

## 2022-06-06 PROCEDURE — 94640 PR INHAL RX, AIRWAY OBST/DX SPUTUM INDUCT: ICD-10-PCS | Mod: S$GLB,,, | Performed by: STUDENT IN AN ORGANIZED HEALTH CARE EDUCATION/TRAINING PROGRAM

## 2022-06-06 PROCEDURE — 99214 OFFICE O/P EST MOD 30 MIN: CPT | Mod: 25,S$GLB,, | Performed by: STUDENT IN AN ORGANIZED HEALTH CARE EDUCATION/TRAINING PROGRAM

## 2022-06-06 PROCEDURE — 1159F MED LIST DOCD IN RCRD: CPT | Mod: CPTII,S$GLB,, | Performed by: STUDENT IN AN ORGANIZED HEALTH CARE EDUCATION/TRAINING PROGRAM

## 2022-06-06 PROCEDURE — 71046 XR CHEST PA AND LATERAL: ICD-10-PCS | Mod: FY,S$GLB,, | Performed by: RADIOLOGY

## 2022-06-06 PROCEDURE — 1159F PR MEDICATION LIST DOCUMENTED IN MEDICAL RECORD: ICD-10-PCS | Mod: CPTII,S$GLB,, | Performed by: STUDENT IN AN ORGANIZED HEALTH CARE EDUCATION/TRAINING PROGRAM

## 2022-06-06 PROCEDURE — 1160F RVW MEDS BY RX/DR IN RCRD: CPT | Mod: CPTII,S$GLB,, | Performed by: STUDENT IN AN ORGANIZED HEALTH CARE EDUCATION/TRAINING PROGRAM

## 2022-06-06 RX ORDER — ALBUTEROL SULFATE 0.83 MG/ML
2.5 SOLUTION RESPIRATORY (INHALATION)
Status: COMPLETED | OUTPATIENT
Start: 2022-06-06 | End: 2022-06-06

## 2022-06-06 RX ORDER — BENZONATATE 100 MG/1
100 CAPSULE ORAL 3 TIMES DAILY PRN
Qty: 30 CAPSULE | Refills: 0 | Status: SHIPPED | OUTPATIENT
Start: 2022-06-06 | End: 2022-06-16

## 2022-06-06 RX ADMIN — ALBUTEROL SULFATE 2.5 MG: 0.83 SOLUTION RESPIRATORY (INHALATION) at 05:06

## 2022-06-06 NOTE — PROGRESS NOTES
"Subjective:       Patient ID: Rosario Menendez is a 86 y.o. female.    Vitals:  height is 4' 11" (1.499 m) and weight is 61.2 kg (135 lb). Her oral temperature is 98.2 °F (36.8 °C). Her blood pressure is 99/65 and her pulse is 75. Her respiration is 18 and oxygen saturation is 91% (abnormal).     Chief Complaint: Cough (86 years old. Cough and fever. Fully vaccinated with both boosters - Entered by patient)    Pt explain that symptoms began on Saturday and she used otc pain meds to treat her symptoms with but no relief and she would liked to be tested for covid in today visited. Baseline O2 93-94%. No known lung or cardiac conditions.     Cough  This is a new problem. The current episode started in the past 7 days. The problem has been unchanged. The problem occurs every few minutes. The cough is non-productive. Pertinent negatives include no chest pain, ear pain, hemoptysis, sore throat, shortness of breath or wheezing. Nothing aggravates the symptoms. Treatments tried: Tylenol. The treatment provided no relief.       HENT: Negative for ear pain, congestion, sinus pain, sinus pressure and sore throat.    Cardiovascular: Negative for chest pain.   Respiratory: Positive for cough. Negative for chest tightness, bloody sputum, shortness of breath and wheezing.        Objective:      Physical Exam   Constitutional: She does not appear ill. No distress.   Eyes: Conjunctivae are normal.   Cardiovascular: Normal rate.   Pulmonary/Chest: No respiratory distress. She has decreased breath sounds in the left upper field and the left middle field.   Neurological: She is alert.   Skin: Skin is warm and dry.   Psychiatric: Her behavior is normal. Mood and thought content normal.   Nursing note and vitals reviewed.        Assessment:       1. Acute viral bronchitis        Results for orders placed or performed in visit on 06/06/22   POCT COVID-19 Rapid Screening   Result Value Ref Range    POC Rapid COVID Negative Negative    "  Acceptable Yes    POCT Influenza A/B MOLECULAR   Result Value Ref Range    POC Molecular Influenza A Ag Negative Negative, Not Reported    POC Molecular Influenza B Ag Negative Negative, Not Reported     Acceptable Yes        Plan:         Acute viral bronchitis  -     POCT COVID-19 Rapid Screening  -     POCT Influenza A/B MOLECULAR  -     XR CHEST PA AND LATERAL; Future; Expected date: 06/06/2022  -     benzonatate (TESSALON) 100 MG capsule; Take 1 capsule (100 mg total) by mouth 3 (three) times daily as needed for Cough.  Dispense: 30 capsule; Refill: 0  -     albuterol nebulizer solution 2.5 mg    Rapid COVID and flu test both negative.  Chest x-ray without acute findings.  Discussed these results with the patient her daughter.  O2 91% on room air upon arrival.  Noted baseline to be 93-94% on room air.  Patient treated with albuterol neb in clinic and O2 sat improved to 94% on room air.  Discussed acute viral bronchitis with the patient.  More information provided and after visit summary.  Supportive care and medications for symptom relief as needed.  Tessalon Perles prescribed.  Okay to take plain Mucinex at home.  Follow-up with [facility] ED for any severe symptoms.

## 2022-06-07 ENCOUNTER — PATIENT MESSAGE (OUTPATIENT)
Dept: INTERNAL MEDICINE | Facility: CLINIC | Age: 87
End: 2022-06-07
Payer: MEDICARE

## 2022-07-24 NOTE — TELEPHONE ENCOUNTER
Care Due:                  Date            Visit Type   Department     Provider  --------------------------------------------------------------------------------                                EP -                              PRIMARY      [facility] INTERNAL  Last Visit: 04-      CARE ([facility])   MEDICINE       Shi Simon                              [facility]                              PRIMARY      [facility] INTERNAL  Next Visit: 10-      CARE ([facility])   MEDICINE       Shi Simon                                                            Last  Test          Frequency    Reason                     Performed    Due Date  --------------------------------------------------------------------------------    Lipid Panel.  12 months..  pravastatin..............  04- 04-    [employer] Embedded Care Gaps. Reference number: [id number]. 7/24/2022   2:45:42 AM CDT

## 2022-07-25 RX ORDER — MEMANTINE HYDROCHLORIDE 10 MG/1
TABLET ORAL
Qty: 180 TABLET | Refills: 1 | Status: SHIPPED | OUTPATIENT
Start: 2022-07-25 | End: 2023-03-02

## 2022-07-25 RX ORDER — PRAVASTATIN SODIUM 40 MG/1
40 TABLET ORAL DAILY
Qty: 90 TABLET | Refills: 0 | Status: ON HOLD | OUTPATIENT
Start: 2022-07-25 | End: 2022-12-19

## 2022-07-25 RX ORDER — BUPROPION HYDROCHLORIDE 150 MG/1
150 TABLET ORAL DAILY
Qty: 90 TABLET | Refills: 2 | Status: SHIPPED | OUTPATIENT
Start: 2022-07-25 | End: 2023-05-14

## 2022-07-25 NOTE — TELEPHONE ENCOUNTER
Refill Routing Note   Medication(s) are not appropriate for processing by [facility] for the following reason(s):      - Outside of protocol  - Required laboratory values are outdated    [facility] action(s):  Defer  Route  Approve Medication-related problems identified: Requires labs     Medication Therapy Plan: FOV  Medication reconciliation completed: No     Appointments  past 12m or future 3m with PCP    Date Provider   Last Visit   4/5/2022 Shi Simon MD   Next Visit   10/4/2022 Shi Simon MD   ED visits in past 90 days: 0        Note composed:1:55 PM 07/25/2022

## 2022-10-04 ENCOUNTER — OFFICE VISIT (OUTPATIENT)
Dept: INTERNAL MEDICINE | Facility: CLINIC | Age: 87
End: 2022-10-04
Payer: MEDICARE

## 2022-10-04 ENCOUNTER — TELEPHONE (OUTPATIENT)
Dept: HEMATOLOGY/ONCOLOGY | Facility: CLINIC | Age: 87
End: 2022-10-04
Payer: MEDICARE

## 2022-10-04 DIAGNOSIS — R45.84 ANHEDONIA: ICD-10-CM

## 2022-10-04 DIAGNOSIS — Z00.00 PREVENTATIVE HEALTH CARE: ICD-10-CM

## 2022-10-04 DIAGNOSIS — D69.3 IDIOPATHIC THROMBOCYTOPENIC PURPURA (ITP): Primary | ICD-10-CM

## 2022-10-04 DIAGNOSIS — I10 HYPERTENSION, UNSPECIFIED TYPE: ICD-10-CM

## 2022-10-04 DIAGNOSIS — E03.9 HYPOTHYROIDISM, UNSPECIFIED TYPE: ICD-10-CM

## 2022-10-04 PROCEDURE — 1159F MED LIST DOCD IN RCRD: CPT | Mod: CPTII,S$GLB,, | Performed by: INTERNAL MEDICINE

## 2022-10-04 PROCEDURE — 99397 PER PM REEVAL EST PAT 65+ YR: CPT | Mod: S$GLB,,, | Performed by: INTERNAL MEDICINE

## 2022-10-04 PROCEDURE — 99999 PR PBB SHADOW E&M-EST. PATIENT-LVL IV: CPT | Mod: PBBFAC,,, | Performed by: INTERNAL MEDICINE

## 2022-10-04 PROCEDURE — 3288F FALL RISK ASSESSMENT DOCD: CPT | Mod: CPTII,S$GLB,, | Performed by: INTERNAL MEDICINE

## 2022-10-04 PROCEDURE — 99397 PR PREVENTIVE VISIT,EST,65 & OVER: ICD-10-PCS | Mod: S$GLB,,, | Performed by: INTERNAL MEDICINE

## 2022-10-04 PROCEDURE — 1159F PR MEDICATION LIST DOCUMENTED IN MEDICAL RECORD: ICD-10-PCS | Mod: CPTII,S$GLB,, | Performed by: INTERNAL MEDICINE

## 2022-10-04 PROCEDURE — 99999 PR PBB SHADOW E&M-EST. PATIENT-LVL IV: ICD-10-PCS | Mod: PBBFAC,,, | Performed by: INTERNAL MEDICINE

## 2022-10-04 PROCEDURE — 1101F PT FALLS ASSESS-DOCD LE1/YR: CPT | Mod: CPTII,S$GLB,, | Performed by: INTERNAL MEDICINE

## 2022-10-04 PROCEDURE — 1126F PR PAIN SEVERITY QUANTIFIED, NO PAIN PRESENT: ICD-10-PCS | Mod: CPTII,S$GLB,, | Performed by: INTERNAL MEDICINE

## 2022-10-04 PROCEDURE — 1101F PR PT FALLS ASSESS DOC 0-1 FALLS W/OUT INJ PAST YR: ICD-10-PCS | Mod: CPTII,S$GLB,, | Performed by: INTERNAL MEDICINE

## 2022-10-04 PROCEDURE — 3288F PR FALLS RISK ASSESSMENT DOCUMENTED: ICD-10-PCS | Mod: CPTII,S$GLB,, | Performed by: INTERNAL MEDICINE

## 2022-10-04 PROCEDURE — 1126F AMNT PAIN NOTED NONE PRSNT: CPT | Mod: CPTII,S$GLB,, | Performed by: INTERNAL MEDICINE

## 2022-10-04 NOTE — TELEPHONE ENCOUNTER
----- Message from Sharee Rowe RN sent at 10/4/2022  2:18 PM CDT -----  Regarding: FW: Scheduling    ----- Message -----  From: Sophie Minor  Sent: 10/4/2022   2:05 PM CDT  To: Fabricio MCCARTY ([facility]) Staff, #  Subject: Scheduling                                       Please contact patient to schedule a follow-up and also labs the same day

## 2022-10-08 VITALS
HEART RATE: 76 BPM | BODY MASS INDEX: 26.45 KG/M2 | OXYGEN SATURATION: 98 % | WEIGHT: 131.19 LBS | SYSTOLIC BLOOD PRESSURE: 104 MMHG | HEIGHT: 59 IN | TEMPERATURE: 99 F | DIASTOLIC BLOOD PRESSURE: 76 MMHG

## 2022-10-08 NOTE — PROGRESS NOTES
Subjective:       Patient ID: Rosario Menendez is a 86 y.o. female.    Chief Complaint: Annual Exam    HPI  She is here for annual exam        Past medical history:  ITP, hypertension, hyperlipidemia, hypothyroidism, osteoporosis, sleep apnea, status post hysterectomy, wrist surgery, dementia, colon adenoma.  She had a colonoscopy June 2021     Medications:   Wellbutrin 150 mg daily, Razadyne, Synthroid .1 mg daily, Cozaar 50 mg daily, Namenda, Pravachol 40 mg daily, fosamax     No known drug allergies       Review of Systems   Constitutional:  Negative for chills, fatigue, fever and unexpected weight change.   Respiratory:  Negative for chest tightness and shortness of breath.    Cardiovascular:  Negative for chest pain and palpitations.   Gastrointestinal:  Negative for abdominal pain and blood in stool.   Neurological:  Negative for dizziness, syncope, numbness and headaches.     Objective:      Physical Exam  HENT:      Right Ear: External ear normal.      Left Ear: External ear normal.      Nose: Nose normal.      Mouth/Throat:      Mouth: Mucous membranes are moist.      Pharynx: Oropharynx is clear.   Eyes:      Pupils: Pupils are equal, round, and reactive to light.   Cardiovascular:      Rate and Rhythm: Normal rate and regular rhythm.      Heart sounds: No murmur heard.  Pulmonary:      Breath sounds: Normal breath sounds.   Abdominal:      General: There is no distension.      Palpations: There is no hepatomegaly or splenomegaly.      Tenderness: There is no abdominal tenderness.   Musculoskeletal:      Cervical back: Normal range of motion.   Lymphadenopathy:      Cervical: No cervical adenopathy.      Upper Body:      Right upper body: No axillary adenopathy.      Left upper body: No axillary adenopathy.   Neurological:      Cranial Nerves: No cranial nerve deficit.      Sensory: No sensory deficit.      Motor: Motor function is intact.      Deep Tendon Reflexes: Reflexes are normal and symmetric.        Assessment/Plan       Assessment and plan:  Annual exam.  Check CMP, lipid panel, TSH, B12

## 2022-10-28 ENCOUNTER — PATIENT MESSAGE (OUTPATIENT)
Dept: INTERNAL MEDICINE | Facility: CLINIC | Age: 87
End: 2022-10-28
Payer: MEDICARE

## 2022-10-28 NOTE — TELEPHONE ENCOUNTER
In response to message below, case was sent to on call provider for review.    Rosario Menendez  to BIBIANA CHRISTOPHER Staff (supporting Shi Simon MD)      4:37 PM  Dr Simon, Im pretty sure my Mother has a UTI.  Is there any way you could call in a prescription without seeing her. The pharmacy is [facility]. Thank you,   Aditi Menendez (for Rosario Menendez)

## 2022-10-29 RX ORDER — CEPHALEXIN 500 MG/1
500 CAPSULE ORAL EVERY 12 HOURS
Qty: 10 CAPSULE | Refills: 0 | Status: SHIPPED | OUTPATIENT
Start: 2022-10-29 | End: 2022-11-03

## 2022-11-12 RX ORDER — LOSARTAN POTASSIUM 50 MG/1
TABLET ORAL
Qty: 90 TABLET | Refills: 1 | Status: ON HOLD | OUTPATIENT
Start: 2022-11-12 | End: 2022-12-01 | Stop reason: HOSPADM

## 2022-11-12 NOTE — TELEPHONE ENCOUNTER
Care Due:                  Date            Visit Type   Department     Provider  --------------------------------------------------------------------------------                                EP -                              PRIMARY      [facility] INTERNAL  Last Visit: 10-      CARE ([facility])   MEDICINE       Shi Simon                              [facility]                              PRIMARY      [facility] INTERNAL  Next Visit: 04-      CARE ([facility])   MEDICINE       Shi Simon                                                            Last  Test          Frequency    Reason                     Performed    Due Date  --------------------------------------------------------------------------------    Lipid Panel.  12 months..  pravastatin..............  04- 04-    TSH.........  12 months..  levothyroxine............  01- 01-    [facility] Embedded Care Gaps. Reference number: [id number]. 11/12/2022   2:18:20 AM CST

## 2022-11-12 NOTE — TELEPHONE ENCOUNTER
Refill Decision Note   Rosario Menendez  is requesting a refill authorization.  Brief Assessment and Rationale for Refill:  Approve    -Medication-Related Problems Identified: Requires labs  Medication Therapy Plan:       Medication Reconciliation Completed: No   Comments:     Provider Staff:     Action is required for this patient.   Please see care gap opportunities below in Care Due Message.     Thanks!  [facility]     Appointments      Date Provider   Last Visit   10/4/2022 Shi Simon MD   Next Visit   4/4/2023 Shi Simon MD     Note composed:2:22 PM 11/12/2022           Note composed:2:22 PM 11/12/2022

## 2022-11-28 ENCOUNTER — TELEPHONE (OUTPATIENT)
Dept: HEMATOLOGY/ONCOLOGY | Facility: CLINIC | Age: 87
End: 2022-11-28
Payer: MEDICARE

## 2022-11-28 ENCOUNTER — PATIENT MESSAGE (OUTPATIENT)
Dept: INTERNAL MEDICINE | Facility: CLINIC | Age: 87
End: 2022-11-28
Payer: MEDICARE

## 2022-11-29 ENCOUNTER — HOSPITAL ENCOUNTER (OUTPATIENT)
Facility: HOSPITAL | Age: 87
Discharge: HOME | End: 2022-12-01
Attending: EMERGENCY MEDICINE | Admitting: INTERNAL MEDICINE
Payer: MEDICARE

## 2022-11-29 DIAGNOSIS — R53.1 GENERALIZED WEAKNESS: ICD-10-CM

## 2022-11-29 DIAGNOSIS — N30.01 ACUTE CYSTITIS WITH HEMATURIA: ICD-10-CM

## 2022-11-29 DIAGNOSIS — R07.9 CHEST PAIN: ICD-10-CM

## 2022-11-29 DIAGNOSIS — S32.010A CLOSED COMPRESSION FRACTURE OF L1 VERTEBRA, INITIAL ENCOUNTER: Primary | ICD-10-CM
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
## 2022-11-29 LAB
ALBUMIN SERPL BCP-MCNC: 3.6 G/DL (ref 3.5–5.2)
ALP SERPL-CCNC: 75 U/L (ref 55–135)
ALT SERPL W/O P-5'-P-CCNC: 19 U/L (ref 10–44)
ANION GAP SERPL CALC-SCNC: 7 MMOL/L (ref 8–16)
AST SERPL-CCNC: 19 U/L (ref 10–40)
BACTERIA #/AREA URNS AUTO: ABNORMAL /HPF
BASOPHILS # BLD AUTO: 0.04 K/UL (ref 0–0.2)
BASOPHILS NFR BLD: 0.4 % (ref 0–1.9)
BILIRUB SERPL-MCNC: 0.8 MG/DL (ref 0.1–1)
BILIRUB UR QL STRIP: NEGATIVE
BNP SERPL-MCNC: 19 PG/ML (ref 0–99)
BUN SERPL-MCNC: 14 MG/DL (ref 8–23)
CALCIUM SERPL-MCNC: 10 MG/DL (ref 8.7–10.5)
CHLORIDE SERPL-SCNC: 109 MMOL/L (ref 95–110)
CLARITY UR REFRACT.AUTO: ABNORMAL
CO2 SERPL-SCNC: 24 MMOL/L (ref 23–29)
COLOR UR AUTO: YELLOW
CREAT SERPL-MCNC: 0.9 MG/DL (ref 0.5–1.4)
DIFFERENTIAL METHOD: ABNORMAL
EOSINOPHIL # BLD AUTO: 0 K/UL (ref 0–0.5)
EOSINOPHIL NFR BLD: 0.3 % (ref 0–8)
ERYTHROCYTE [DISTWIDTH] IN BLOOD BY AUTOMATED COUNT: 13.3 % (ref 11.5–14.5)
EST. GFR  (NO RACE VARIABLE): >60 ML/MIN/1.73 M^2
GLUCOSE SERPL-MCNC: 93 MG/DL (ref 70–110)
GLUCOSE UR QL STRIP: NEGATIVE
HCT VFR BLD AUTO: 45 % (ref 37–48.5)
HGB BLD-MCNC: 15 G/DL (ref 12–16)
HGB UR QL STRIP: ABNORMAL
HYALINE CASTS UR QL AUTO: 0 /LPF
IMM GRANULOCYTES # BLD AUTO: 0.06 K/UL (ref 0–0.04)
IMM GRANULOCYTES NFR BLD AUTO: 0.7 % (ref 0–0.5)
KETONES UR QL STRIP: NEGATIVE
LEUKOCYTE ESTERASE UR QL STRIP: ABNORMAL
LYMPHOCYTES # BLD AUTO: 1.3 K/UL (ref 1–4.8)
LYMPHOCYTES NFR BLD: 14.9 % (ref 18–48)
MCH RBC QN AUTO: 31.2 PG (ref 27–31)
MCHC RBC AUTO-ENTMCNC: 33.3 G/DL (ref 32–36)
MCV RBC AUTO: 94 FL (ref 82–98)
MICROSCOPIC COMMENT: ABNORMAL
MONOCYTES # BLD AUTO: 0.7 K/UL (ref 0.3–1)
MONOCYTES NFR BLD: 7.9 % (ref 4–15)
NEUTROPHILS # BLD AUTO: 6.8 K/UL (ref 1.8–7.7)
NEUTROPHILS NFR BLD: 75.8 % (ref 38–73)
NITRITE UR QL STRIP: POSITIVE
NRBC BLD-RTO: 0 /100 WBC
PH UR STRIP: 7 [PH] (ref 5–8)
PLATELET # BLD AUTO: 37 K/UL (ref 150–450)
PLATELET BLD QL SMEAR: ABNORMAL
PMV BLD AUTO: 12.9 FL (ref 9.2–12.9)
POTASSIUM SERPL-SCNC: 4.3 MMOL/L (ref 3.5–5.1)
PROT SERPL-MCNC: 6.4 G/DL (ref 6–8.4)
PROT UR QL STRIP: ABNORMAL
RBC # BLD AUTO: 4.81 M/UL (ref 4–5.4)
RBC #/AREA URNS AUTO: 12 /HPF (ref 0–4)
SODIUM SERPL-SCNC: 140 MMOL/L (ref 136–145)
SP GR UR STRIP: 1.03 (ref 1–1.03)
SQUAMOUS #/AREA URNS AUTO: 5 /HPF
TROPONIN I SERPL DL<=0.01 NG/ML-MCNC: <0.006 NG/ML (ref 0–0.03)
TSH SERPL DL<=0.005 MIU/L-ACNC: 0.41 UIU/ML (ref 0.4–4)
URN SPEC COLLECT METH UR: ABNORMAL
WBC # BLD AUTO: 8.95 K/UL (ref 3.9–12.7)
WBC #/AREA URNS AUTO: >100 /HPF (ref 0–5)
WBC CLUMPS UR QL AUTO: ABNORMAL

## 2022-11-29 PROCEDURE — 93010 EKG 12-LEAD: ICD-10-PCS | Mod: ,,, | Performed by: INTERNAL MEDICINE

## 2022-11-29 PROCEDURE — G0378 HOSPITAL OBSERVATION PER HR: HCPCS

## 2022-11-29 PROCEDURE — 84484 ASSAY OF TROPONIN QUANT: CPT | Performed by: EMERGENCY MEDICINE

## 2022-11-29 PROCEDURE — 87077 CULTURE AEROBIC IDENTIFY: CPT | Performed by: EMERGENCY MEDICINE

## 2022-11-29 PROCEDURE — 63600175 PHARM REV CODE 636 W HCPCS: Performed by: EMERGENCY MEDICINE

## 2022-11-29 PROCEDURE — 80053 COMPREHEN METABOLIC PANEL: CPT | Performed by: EMERGENCY MEDICINE

## 2022-11-29 PROCEDURE — 87086 URINE CULTURE/COLONY COUNT: CPT | Performed by: EMERGENCY MEDICINE

## 2022-11-29 PROCEDURE — 99285 EMERGENCY DEPT VISIT HI MDM: CPT | Mod: 25

## 2022-11-29 PROCEDURE — 87040 BLOOD CULTURE FOR BACTERIA: CPT | Performed by: EMERGENCY MEDICINE

## 2022-11-29 PROCEDURE — 87088 URINE BACTERIA CULTURE: CPT | Performed by: EMERGENCY MEDICINE

## 2022-11-29 PROCEDURE — 81001 URINALYSIS AUTO W/SCOPE: CPT | Performed by: EMERGENCY MEDICINE

## 2022-11-29 PROCEDURE — 85025 COMPLETE CBC W/AUTO DIFF WBC: CPT | Performed by: EMERGENCY MEDICINE

## 2022-11-29 PROCEDURE — 25000003 PHARM REV CODE 250: Performed by: EMERGENCY MEDICINE

## 2022-11-29 PROCEDURE — 83880 ASSAY OF NATRIURETIC PEPTIDE: CPT | Performed by: EMERGENCY MEDICINE

## 2022-11-29 PROCEDURE — 84443 ASSAY THYROID STIM HORMONE: CPT | Performed by: EMERGENCY MEDICINE

## 2022-11-29 PROCEDURE — 99220 PR INITIAL OBSERVATION CARE,LEVL III: ICD-10-PCS | Mod: ,,, | Performed by: NURSE PRACTITIONER

## 2022-11-29 PROCEDURE — 96361 HYDRATE IV INFUSION ADD-ON: CPT

## 2022-11-29 PROCEDURE — 99285 EMERGENCY DEPT VISIT HI MDM: CPT | Mod: ,,, | Performed by: EMERGENCY MEDICINE

## 2022-11-29 PROCEDURE — 94761 N-INVAS EAR/PLS OXIMETRY MLT: CPT

## 2022-11-29 PROCEDURE — 99285 PR EMERGENCY DEPT VISIT,LEVEL V: ICD-10-PCS | Mod: ,,, | Performed by: EMERGENCY MEDICINE

## 2022-11-29 PROCEDURE — 96365 THER/PROPH/DIAG IV INF INIT: CPT

## 2022-11-29 PROCEDURE — 93010 ELECTROCARDIOGRAM REPORT: CPT | Mod: ,,, | Performed by: INTERNAL MEDICINE

## 2022-11-29 PROCEDURE — 87186 SC STD MICRODIL/AGAR DIL: CPT | Performed by: EMERGENCY MEDICINE

## 2022-11-29 PROCEDURE — 93005 ELECTROCARDIOGRAM TRACING: CPT

## 2022-11-29 PROCEDURE — 99220 PR INITIAL OBSERVATION CARE,LEVL III: CPT | Mod: ,,, | Performed by: NURSE PRACTITIONER

## 2022-11-29 PROCEDURE — 25000003 PHARM REV CODE 250: Performed by: NURSE PRACTITIONER

## 2022-11-29 RX ORDER — IBUPROFEN 200 MG
16 TABLET ORAL
Status: DISCONTINUED | OUTPATIENT
Start: 2022-11-30 | End: 2022-12-01 | Stop reason: HOSPADM

## 2022-11-29 RX ORDER — GLUCAGON 1 MG
1 KIT INJECTION
Status: DISCONTINUED | OUTPATIENT
Start: 2022-11-30 | End: 2022-12-01 | Stop reason: HOSPADM

## 2022-11-29 RX ORDER — MEMANTINE HYDROCHLORIDE 5 MG/1
10 TABLET ORAL 2 TIMES DAILY
Status: DISCONTINUED | OUTPATIENT
Start: 2022-11-29 | End: 2022-12-01 | Stop reason: HOSPADM

## 2022-11-29 RX ORDER — IBUPROFEN 200 MG
24 TABLET ORAL
Status: DISCONTINUED | OUTPATIENT
Start: 2022-11-30 | End: 2022-12-01 | Stop reason: HOSPADM

## 2022-11-29 RX ORDER — AMOXICILLIN 250 MG
1 CAPSULE ORAL DAILY
Status: DISCONTINUED | OUTPATIENT
Start: 2022-11-30 | End: 2022-12-01 | Stop reason: HOSPADM

## 2022-11-29 RX ORDER — BUPROPION HYDROCHLORIDE 150 MG/1
150 TABLET ORAL DAILY
Status: DISCONTINUED | OUTPATIENT
Start: 2022-11-30 | End: 2022-12-01 | Stop reason: HOSPADM

## 2022-11-29 RX ORDER — ONDANSETRON 2 MG/ML
4 INJECTION INTRAMUSCULAR; INTRAVENOUS EVERY 8 HOURS PRN
Status: DISCONTINUED | OUTPATIENT
Start: 2022-11-30 | End: 2022-12-01 | Stop reason: HOSPADM

## 2022-11-29 RX ORDER — ACETAMINOPHEN 325 MG/1
650 TABLET ORAL EVERY 6 HOURS PRN
Status: DISCONTINUED | OUTPATIENT
Start: 2022-11-29 | End: 2022-12-01 | Stop reason: HOSPADM

## 2022-11-29 RX ORDER — METHOCARBAMOL 500 MG/1
500 TABLET, FILM COATED ORAL 4 TIMES DAILY
Status: DISCONTINUED | OUTPATIENT
Start: 2022-11-29 | End: 2022-12-01 | Stop reason: HOSPADM

## 2022-11-29 RX ORDER — LOSARTAN POTASSIUM 50 MG/1
50 TABLET ORAL DAILY
Status: DISCONTINUED | OUTPATIENT
Start: 2022-11-30 | End: 2022-12-01

## 2022-11-29 RX ORDER — HYDROCODONE BITARTRATE AND ACETAMINOPHEN 5; 325 MG/1; MG/1
1 TABLET ORAL EVERY 6 HOURS PRN
Status: DISCONTINUED | OUTPATIENT
Start: 2022-11-29 | End: 2022-12-01 | Stop reason: HOSPADM

## 2022-11-29 RX ORDER — LEVOTHYROXINE SODIUM 100 UG/1
100 TABLET ORAL
Status: DISCONTINUED | OUTPATIENT
Start: 2022-11-30 | End: 2022-12-01 | Stop reason: HOSPADM

## 2022-11-29 RX ORDER — LIDOCAINE 50 MG/G
1 PATCH TOPICAL
Status: COMPLETED | OUTPATIENT
Start: 2022-11-29 | End: 2022-11-30

## 2022-11-29 RX ORDER — POLYETHYLENE GLYCOL 3350 17 G/17G
17 POWDER, FOR SOLUTION ORAL 2 TIMES DAILY PRN
Status: DISCONTINUED | OUTPATIENT
Start: 2022-11-30 | End: 2022-12-01 | Stop reason: HOSPADM

## 2022-11-29 RX ORDER — TALC
6 POWDER (GRAM) TOPICAL NIGHTLY PRN
Status: DISCONTINUED | OUTPATIENT
Start: 2022-11-30 | End: 2022-12-01 | Stop reason: HOSPADM

## 2022-11-29 RX ORDER — SODIUM CHLORIDE 0.9 % (FLUSH) 0.9 %
10 SYRINGE (ML) INJECTION EVERY 12 HOURS PRN
Status: DISCONTINUED | OUTPATIENT
Start: 2022-11-30 | End: 2022-12-01 | Stop reason: HOSPADM

## 2022-11-29 RX ORDER — HYDROCODONE BITARTRATE AND ACETAMINOPHEN 5; 325 MG/1; MG/1
1 TABLET ORAL
Status: COMPLETED | OUTPATIENT
Start: 2022-11-29 | End: 2022-11-29

## 2022-11-29 RX ORDER — PRAVASTATIN SODIUM 40 MG/1
40 TABLET ORAL DAILY
Status: DISCONTINUED | OUTPATIENT
Start: 2022-11-30 | End: 2022-12-01 | Stop reason: HOSPADM

## 2022-11-29 RX ORDER — LIDOCAINE 50 MG/G
1 PATCH TOPICAL
Status: DISCONTINUED | OUTPATIENT
Start: 2022-11-29 | End: 2022-12-01 | Stop reason: HOSPADM

## 2022-11-29 RX ORDER — NALOXONE HCL 0.4 MG/ML
0.02 VIAL (ML) INJECTION
Status: DISCONTINUED | OUTPATIENT
Start: 2022-11-30 | End: 2022-12-01 | Stop reason: HOSPADM

## 2022-11-29 RX ADMIN — SODIUM CHLORIDE 500 ML: 0.9 INJECTION, SOLUTION INTRAVENOUS at 04:11

## 2022-11-29 RX ADMIN — HYDROCODONE BITARTRATE AND ACETAMINOPHEN 1 TABLET: 5; 325 TABLET ORAL at 06:11

## 2022-11-29 RX ADMIN — CEFTRIAXONE 1 G: 1 INJECTION, SOLUTION INTRAVENOUS at 06:11

## 2022-11-29 RX ADMIN — METHOCARBAMOL 500 MG: 500 TABLET ORAL at 10:11

## 2022-11-29 RX ADMIN — MEMANTINE 10 MG: 10 TABLET ORAL at 10:11

## 2022-11-29 RX ADMIN — LIDOCAINE 1 PATCH: 50 PATCH CUTANEOUS at 06:11

## 2022-11-29 NOTE — ED PROVIDER NOTES
Encounter Date: 2022       History     Chief Complaint   Patient presents with    Fall     Pt had a tripped and fall. Denies hitting head. Reports tailbone pain.      The patient is an 87-year-old who presents by ambulance following a trip and fall that occurred in the kitchen of her home.  She was unable to stand following the fall.  She denies head trauma.  She landed on her buttocks and complains of coccygeal pain.  Her pain improved with acetaminophen that she took prior to EMS arrival.  She complains of generalized weakness that began earlier today.  She had an episode shortness of breath earlier today.  She denies cough, chest pain, abdominal pain, nausea, and vomiting.  She had lightheadedness earlier as well.    The history is provided by the patient. No  was used.   Review of patient's allergies indicates:  No Known Allergies  Past Medical History:   Diagnosis Date    Arthritis     Depression     Hypercholesteremia     Thrombocytopenia     Thyroid disease      Past Surgical History:   Procedure Laterality Date    ANKLE SURGERY      COLONOSCOPY N/A 2021    Procedure: COLONOSCOPY;  Surgeon: Alfonso Haque MD;  Location: [address];  Service: Endoscopy;  Laterality: N/A;  any crs  covid test -[facility]    HYSTERECTOMY      KNEE SURGERY      WRIST SURGERY       Family History   Problem Relation Age of Onset    Heart failure Mother          at 64    Heart failure Father          at 93    Suicide Son          at 34    Cancer Maternal Uncle         colon     Social History     Tobacco Use    Smoking status: Never    Smokeless tobacco: Never     Review of Systems   Constitutional:  Positive for appetite change. Negative for chills and fever.        + generalized weakness   Eyes:  Negative for visual disturbance.   Respiratory:  Positive for shortness of breath. Negative for cough.    Cardiovascular:  Negative for chest pain, palpitations and leg swelling.    Gastrointestinal:  Negative for abdominal pain, diarrhea, nausea and vomiting.   Endocrine: Negative for polydipsia and polyuria.   Genitourinary:  Negative for difficulty urinating and dysuria.   Musculoskeletal:  Positive for back pain.   Skin:  Negative for rash and wound.   Allergic/Immunologic: Negative for immunocompromised state.   Neurological:  Positive for light-headedness.     Physical Exam     Initial Vitals [11/29/22 1414]   BP Pulse Resp Temp SpO2   (!) 133/92 (!) 57 20 98.2 °F (36.8 °C) 96 %      MAP       --         Physical Exam    Nursing note and vitals reviewed.  Constitutional: She is not diaphoretic. No distress.   HENT:   Head: Normocephalic and atraumatic.   Mouth/Throat: Oropharynx is clear and moist.   Eyes: Conjunctivae and EOM are normal. Pupils are equal, round, and reactive to light. No scleral icterus.   Neck: No JVD present.   Cardiovascular:  Normal rate, regular rhythm and normal heart sounds.     Exam reveals no gallop and no friction rub.       No murmur heard.  Pulmonary/Chest: Effort normal and breath sounds normal. No stridor. No respiratory distress. She has no decreased breath sounds. She has no wheezes. She has no rhonchi. She has no rales.   Abdominal: Abdomen is soft. She exhibits no distension. There is no abdominal tenderness.   Musculoskeletal:      Thoracic back: No swelling, deformity, spasms or tenderness.      Lumbar back: No swelling, deformity, spasms or tenderness. Decreased range of motion. Negative right straight leg raise test and negative left straight leg raise test.      Right hip: No deformity or tenderness. Normal range of motion.      Left hip: No deformity or tenderness. Normal range of motion.      Right upper leg: No swelling, deformity or tenderness.      Left upper leg: No swelling, deformity or tenderness.      Right knee: No swelling. Normal range of motion. No tenderness.      Left knee: No swelling. Normal range of motion. No tenderness.      Neurological: She is alert and oriented to person, place, and time. She has normal strength. No cranial nerve deficit. Coordination normal. GCS score is 15. GCS eye subscore is 4. GCS verbal subscore is 5. GCS motor subscore is 6.   Skin: Skin is warm and dry. No pallor.   Psychiatric: She has a normal mood and affect. Her speech is normal and behavior is normal. She is not actively hallucinating. She is attentive.       ED Course   Procedures  Labs Reviewed   CBC W/ AUTO DIFFERENTIAL - Abnormal; Notable for the following components:       Result Value    MCH 31.2 (*)     Platelets 37 (*)     Immature Granulocytes 0.7 (*)     Immature Grans (Abs) 0.06 (*)     Gran % 75.8 (*)     Lymph % 14.9 (*)     Platelet Estimate Decreased (*)     All other components within normal limits    Narrative:      PLT  critical result(s) called and verbal readback obtained from   ARSEN AGUILAR RN by SAKSIA 11/29/2022 16:54   COMPREHENSIVE METABOLIC PANEL - Abnormal; Notable for the following components:    Anion Gap 7 (*)     All other components within normal limits   URINALYSIS, REFLEX TO URINE CULTURE - Abnormal; Notable for the following components:    Appearance, UA Hazy (*)     Protein, UA 1+ (*)     Occult Blood UA Trace (*)     Nitrite, UA Positive (*)     Leukocytes, UA 3+ (*)     All other components within normal limits    Narrative:     Specimen Source->Urine   URINALYSIS MICROSCOPIC - Abnormal; Notable for the following components:    RBC, UA 12 (*)     WBC, UA >100 (*)     WBC Clumps, UA Moderate (*)     Bacteria Many (*)     All other components within normal limits    Narrative:     Specimen Source->Urine   CULTURE, URINE   CULTURE, BLOOD   CULTURE, BLOOD   TROPONIN I   B-TYPE NATRIURETIC PEPTIDE   TSH     EKG Readings: (Independently Interpreted)   11/29/2022 16:47   Sinus bradycardia.  Ventricular rate 57 beats per minute.  Normal axis.  Normal QRS and QT intervals.  No ST segment elevation or depression.  Lateral  T-wave flattening.     Imaging Results              X-Ray Lumbar Spine Ap And Lateral (Final result)  Result time 11/29/22 20:21:27      Final result by Jose R Glass MD (11/29/22 20:21:27)                   Impression:      Grade 2 anterior spondylolisthesis L4 on L5.    Mild anterior superior endplate compression deformity L1 with approximately 20% anterior loss of height.    Severe degenerative disc space narrowing L4-5 and L5-S1.  Remaining lumbar disc spaces appear maintained.    Degenerative changes in the lower lumbar facet joints.      Electronically signed by: Jose R Glass MD  Date:    11/29/2022  Time:    20:21               Narrative:    EXAMINATION:  XR LUMBAR SPINE AP AND LATERAL    CLINICAL HISTORY:  Low back pain;    TECHNIQUE:  AP, lateral and spot images were performed of the lumbar spine.    COMPARISON:  None    FINDINGS:  Alignment: Grade 2 anterior spondylolisthesis L4 on L5.    Vertebrae: Mild anterior superior endplate compression deformity L1 with approximately 20% anterior loss of height.  Remaining lumbar vertebral body heights appear maintained.    Discs and facets: Severe degenerative disc space narrowing L4-5 and L5-S1.  Remaining lumbar disc spaces appear maintained.  Degenerative changes in the lower lumbar facet joints.    Miscellaneous: Vascular calcifications in the aorta.                                       X-Ray Chest AP Portable (Final result)  Result time 11/29/22 17:56:17      Final result by Eron Gibson MD (11/29/22 17:56:17)                   Impression:      No acute cardiopulmonary findings.    Electronically signed by resident: Eric Xiong  Date:    11/29/2022  Time:    17:39    Electronically signed by: Eron Gibson MD  Date:    11/29/2022  Time:    17:56               Narrative:    EXAMINATION:  XR CHEST AP PORTABLE    CLINICAL HISTORY:  Shortness of breath;    TECHNIQUE:  Single frontal view of the chest was performed.    COMPARISON:  Chest radiograph  06/06/2022    FINDINGS:  Mediastinal structures midline.  Cardiac silhouette stable.  Aortic arch calcification.    Few bands of subsegmental atelectasis, left more so than right.  No large consolidation.  No pleural fluid or pneumothorax.    No significant osseous abnormality.                                       Medications   LIDOcaine 5 % patch 1 patch (1 patch Transdermal Patch Applied 11/29/22 1826)   buPROPion TB24 tablet 150 mg (has no administration in time range)   memantine tablet 10 mg (has no administration in time range)   pravastatin tablet 40 mg (has no administration in time range)   losartan tablet 50 mg (has no administration in time range)   levothyroxine tablet 100 mcg (has no administration in time range)   cefTRIAXone (ROCEPHIN) 1 g/50 mL D5W IVPB (has no administration in time range)   acetaminophen tablet 650 mg (has no administration in time range)   HYDROcodone-acetaminophen 5-325 mg per tablet 1 tablet (has no administration in time range)   LIDOcaine 5 % patch 1 patch (has no administration in time range)   methocarbamoL tablet 500 mg (has no administration in time range)   sodium chloride 0.9% bolus 500 mL (0 mLs Intravenous Stopped 11/29/22 1849)   HYDROcodone-acetaminophen 5-325 mg per tablet 1 tablet (1 tablet Oral Given 11/29/22 1827)   cefTRIAXone (ROCEPHIN) 1 g/50 mL D5W IVPB (0 g Intravenous Stopped 11/29/22 1911)     Medical Decision Making:   History:   Old Medical Records: I decided to obtain old medical records.  Old Records Summarized: other records.       <> Summary of Records: [facility] reviewed as above.  Differential Diagnosis:   Dehydration, urinary tract and other infections, cardiac arrhythmia, acute coronary syndromes, electrolyte anomalies, renal failure  Clinical Tests:   Lab Tests: Ordered and Reviewed  Radiological Study: Ordered and Reviewed  Medical Tests: Ordered and Reviewed    MDM:  This was an emergent evaluation.  Differential diagnoses included the above as  well as other conditions. EKG negative for arrhythmia. No WBC elevation. Benign abdomen. UA c/w UTI. Patient developed suprapubic discomfort while in the ED. IV Rocephin administered for acute cystitis. Norco and lidoderm patch given for low back pain. L1 compression fracture on lumbar spine x-rays. Patient had continued mobility issues due to pain. She was admitted to hospital medicine for additional pain management, PT consult. Discharge home if pain and mobility improve. Otherwise, brief admission to a SNF might be needed.           ED Course as of 11/29/22 2115 Tue Nov 29, 2022 1732 Patient has UTI which explains her weakness. She is now complaining of suprapubic discomfort. Abdomen is still benign. Attempted to have her stand and ambulate. She complained of back pain which prevented this. She has low back pain with movement but no tenderness or bruising. Her daughter reports that she was diagnosed with a UTI within the last few weeks. She was having frequency. She completed a course of antibiotic that they think was Keflex. I am ordering x-rays of the lumbar spine. Will order a lidocaine patch and Norco for additional pain management. Also ordering IV Rocephin. [LP]      ED Course User Index  [LP] Emery Kelley III, MD                 Clinical Impression:   Final diagnoses:  [R53.1] Generalized weakness  [N30.01] Acute cystitis with hematuria (Primary)  [S32.010A] Closed compression fracture of L1 vertebra, initial encounter        ED Disposition Condition    Observation Stable                Emery Kelley III, MD  11/29/22 2116

## 2022-11-30 ENCOUNTER — PATIENT MESSAGE (OUTPATIENT)
Dept: HEMATOLOGY/ONCOLOGY | Facility: CLINIC | Age: 87
End: 2022-11-30
Payer: MEDICARE

## 2022-11-30 PROBLEM — S32.010A CLOSED COMPRESSION FRACTURE OF BODY OF L1 VERTEBRA: Status: ACTIVE | Noted: 2022-11-30

## 2022-11-30 PROBLEM — M43.9 COMPRESSION DEFORMITY OF VERTEBRA: Status: ACTIVE | Noted: 2022-11-30

## 2022-11-30 LAB
ANION GAP SERPL CALC-SCNC: 8 MMOL/L (ref 8–16)
BASOPHILS # BLD AUTO: 0.04 K/UL (ref 0–0.2)
BASOPHILS NFR BLD: 0.6 % (ref 0–1.9)
BUN SERPL-MCNC: 12 MG/DL (ref 8–23)
CALCIUM SERPL-MCNC: 10 MG/DL (ref 8.7–10.5)
CHLORIDE SERPL-SCNC: 109 MMOL/L (ref 95–110)
CO2 SERPL-SCNC: 22 MMOL/L (ref 23–29)
CREAT SERPL-MCNC: 0.8 MG/DL (ref 0.5–1.4)
DIFFERENTIAL METHOD: ABNORMAL
EOSINOPHIL # BLD AUTO: 0.1 K/UL (ref 0–0.5)
EOSINOPHIL NFR BLD: 0.9 % (ref 0–8)
ERYTHROCYTE [DISTWIDTH] IN BLOOD BY AUTOMATED COUNT: 13.4 % (ref 11.5–14.5)
EST. GFR  (NO RACE VARIABLE): >60 ML/MIN/1.73 M^2
GLUCOSE SERPL-MCNC: 94 MG/DL (ref 70–110)
HCT VFR BLD AUTO: 43.1 % (ref 37–48.5)
HGB BLD-MCNC: 14.4 G/DL (ref 12–16)
IMM GRANULOCYTES # BLD AUTO: 0.02 K/UL (ref 0–0.04)
IMM GRANULOCYTES NFR BLD AUTO: 0.3 % (ref 0–0.5)
LYMPHOCYTES # BLD AUTO: 1.1 K/UL (ref 1–4.8)
LYMPHOCYTES NFR BLD: 16.6 % (ref 18–48)
MAGNESIUM SERPL-MCNC: 2.2 MG/DL (ref 1.6–2.6)
MCH RBC QN AUTO: 31.2 PG (ref 27–31)
MCHC RBC AUTO-ENTMCNC: 33.4 G/DL (ref 32–36)
MCV RBC AUTO: 93 FL (ref 82–98)
MONOCYTES # BLD AUTO: 0.7 K/UL (ref 0.3–1)
MONOCYTES NFR BLD: 10.2 % (ref 4–15)
NEUTROPHILS # BLD AUTO: 4.7 K/UL (ref 1.8–7.7)
NEUTROPHILS NFR BLD: 71.4 % (ref 38–73)
NRBC BLD-RTO: 0 /100 WBC
PLATELET # BLD AUTO: 28 K/UL (ref 150–450)
PLATELET BLD QL SMEAR: ABNORMAL
PMV BLD AUTO: 13.1 FL (ref 9.2–12.9)
POTASSIUM SERPL-SCNC: 4 MMOL/L (ref 3.5–5.1)
RBC # BLD AUTO: 4.62 M/UL (ref 4–5.4)
SODIUM SERPL-SCNC: 139 MMOL/L (ref 136–145)
WBC # BLD AUTO: 6.64 K/UL (ref 3.9–12.7)

## 2022-11-30 PROCEDURE — 63600175 PHARM REV CODE 636 W HCPCS: Performed by: NURSE PRACTITIONER

## 2022-11-30 PROCEDURE — G0378 HOSPITAL OBSERVATION PER HR: HCPCS

## 2022-11-30 PROCEDURE — 99204 PR OFFICE/OUTPT VISIT, NEW, LEVL IV, 45-59 MIN: ICD-10-PCS | Mod: ,,, | Performed by: PHYSICIAN ASSISTANT

## 2022-11-30 PROCEDURE — 97165 OT EVAL LOW COMPLEX 30 MIN: CPT

## 2022-11-30 PROCEDURE — 25000003 PHARM REV CODE 250: Performed by: NURSE PRACTITIONER

## 2022-11-30 PROCEDURE — 96366 THER/PROPH/DIAG IV INF ADDON: CPT

## 2022-11-30 PROCEDURE — 85025 COMPLETE CBC W/AUTO DIFF WBC: CPT | Performed by: NURSE PRACTITIONER

## 2022-11-30 PROCEDURE — 36415 COLL VENOUS BLD VENIPUNCTURE: CPT | Performed by: NURSE PRACTITIONER

## 2022-11-30 PROCEDURE — 80048 BASIC METABOLIC PNL TOTAL CA: CPT | Performed by: NURSE PRACTITIONER

## 2022-11-30 PROCEDURE — 97535 SELF CARE MNGMENT TRAINING: CPT

## 2022-11-30 PROCEDURE — 99226 PR SUBSEQUENT OBSERVATION CARE,LEVEL III: ICD-10-PCS | Mod: ,,, | Performed by: PHYSICIAN ASSISTANT

## 2022-11-30 PROCEDURE — 99226 PR SUBSEQUENT OBSERVATION CARE,LEVEL III: CPT | Mod: ,,, | Performed by: PHYSICIAN ASSISTANT

## 2022-11-30 PROCEDURE — 99204 OFFICE O/P NEW MOD 45 MIN: CPT | Mod: ,,, | Performed by: PHYSICIAN ASSISTANT

## 2022-11-30 PROCEDURE — 83735 ASSAY OF MAGNESIUM: CPT | Performed by: NURSE PRACTITIONER

## 2022-11-30 RX ORDER — GALANTAMINE HYDROBROMIDE 16 MG/1
16 CAPSULE, EXTENDED RELEASE ORAL
Status: DISCONTINUED | OUTPATIENT
Start: 2022-12-01 | End: 2022-11-30

## 2022-11-30 RX ORDER — GABAPENTIN 100 MG/1
100 CAPSULE ORAL 3 TIMES DAILY
Status: DISCONTINUED | OUTPATIENT
Start: 2022-11-30 | End: 2022-12-01 | Stop reason: HOSPADM

## 2022-11-30 RX ORDER — GALANTAMINE HYDROBROMIDE 16 MG/1
16 CAPSULE, EXTENDED RELEASE ORAL
Status: DISCONTINUED | OUTPATIENT
Start: 2022-12-01 | End: 2022-12-01 | Stop reason: HOSPADM

## 2022-11-30 RX ORDER — CYANOCOBALAMIN (VITAMIN B-12) 250 MCG
250 TABLET ORAL DAILY
Status: DISCONTINUED | OUTPATIENT
Start: 2022-11-30 | End: 2022-12-01 | Stop reason: HOSPADM

## 2022-11-30 RX ADMIN — HYDROCODONE BITARTRATE AND ACETAMINOPHEN 1 TABLET: 5; 325 TABLET ORAL at 05:11

## 2022-11-30 RX ADMIN — MEMANTINE 10 MG: 10 TABLET ORAL at 09:11

## 2022-11-30 RX ADMIN — GABAPENTIN 100 MG: 100 CAPSULE ORAL at 09:11

## 2022-11-30 RX ADMIN — LOSARTAN POTASSIUM 50 MG: 50 TABLET, FILM COATED ORAL at 09:11

## 2022-11-30 RX ADMIN — METHOCARBAMOL 500 MG: 500 TABLET ORAL at 09:11

## 2022-11-30 RX ADMIN — HYDROCODONE BITARTRATE AND ACETAMINOPHEN 1 TABLET: 5; 325 TABLET ORAL at 12:11

## 2022-11-30 RX ADMIN — BUPROPION HYDROCHLORIDE 150 MG: 150 TABLET, FILM COATED, EXTENDED RELEASE ORAL at 09:11

## 2022-11-30 RX ADMIN — Medication 6 MG: at 09:11

## 2022-11-30 RX ADMIN — CYANOCOBALAMIN TAB 250 MCG 250 MCG: 250 TAB at 09:11

## 2022-11-30 RX ADMIN — GABAPENTIN 100 MG: 100 CAPSULE ORAL at 06:11

## 2022-11-30 RX ADMIN — PRAVASTATIN SODIUM 40 MG: 40 TABLET ORAL at 09:11

## 2022-11-30 RX ADMIN — SENNOSIDES AND DOCUSATE SODIUM 1 TABLET: 50; 8.6 TABLET ORAL at 09:11

## 2022-11-30 RX ADMIN — LEVOTHYROXINE SODIUM 100 MCG: 100 TABLET ORAL at 05:11

## 2022-11-30 RX ADMIN — METHOCARBAMOL 500 MG: 500 TABLET ORAL at 12:11

## 2022-11-30 RX ADMIN — HYDROCODONE BITARTRATE AND ACETAMINOPHEN 1 TABLET: 5; 325 TABLET ORAL at 09:11

## 2022-11-30 RX ADMIN — METHOCARBAMOL 500 MG: 500 TABLET ORAL at 06:11

## 2022-11-30 RX ADMIN — CEFTRIAXONE 1 G: 1 INJECTION, SOLUTION INTRAVENOUS at 06:11

## 2022-11-30 NOTE — ASSESSMENT & PLAN NOTE
-Afebrile, no leukocytosis.  -UA reviewed, follow cx.  -Blood cxs in process.  -Continue rocephin.

## 2022-11-30 NOTE — ASSESSMENT & PLAN NOTE
Patient is chronically on statin.will continue for now. Monitor clinically. Last LDL was   Lab Results   Component Value Date    LDLCALC 56.6 (L) 04/21/2021

## 2022-11-30 NOTE — ASSESSMENT & PLAN NOTE
Patient has chronic hypothyroidism. TFTs reviewed-   Lab Results   Component Value Date    TSH 0.412 11/29/2022   . Will continue chronic levothyroxine and adjust for and clinical changes.

## 2022-11-30 NOTE — ASSESSMENT & PLAN NOTE
Closed compression fracture of body of L1 vertebra  -Labs largely unremarkable. UA with UTI, likely contributing to weakness.  -Patient also reports poor oral intake for the last several days.  -Xray lumbar spine with grade 2 anterior spondylolisthesis L4 on L5. Mild anterior superior endplate compression deformity L1 with approximately 20% anterior loss of height.  -CT lumbar spine showed mild L1 vertebral body compression fracture of undetermined age. Grade 2 anterolisthesis of L4 on L5 associated with pars defect and mild spinal canal stenosis, severe right and moderate left neural foraminal narrowing  -Xray roslyn hips and pelvis in process.  -Fall precautions.  -Start scheduled robaxin, gabapentin, and lidocaine patches.  -PRN tylenol, norco  -NSGY consulted, appreciate recs  -PT/OT appreciate recs.

## 2022-11-30 NOTE — ASSESSMENT & PLAN NOTE
87 y.o. female with a pmhx of osteoporosis, thrombocytopenia, mild cognitive impairment, and arthritis. Patient tripped and fell onto buttocks yesterday with residual lower back pain after fall. Lumbar CT showing L1 compression fracture. Neurosurgery consulted for further eval.    --Admitted to  for treatment of UTI  --All pertinent labs and diagnostics personally reviewed.  --Thrombocytopenia: Chronic issue. Platelets 28 on 11/30.  --UTI: UA 11/29 obtained in ED showing +nitrite, 3+leukocytes, many bacteria, and >100 WBC. UA culture pending. Currently on Rocephin for treatment per primary.   --Lumbar x-rays (AP/Lateral) 11/29: revealing L1 compression fracture with 20% anterior height loss, and grade 2 anterolisthesis of L4 on L5.   --Lumbar CT 11/30 re demonstrating L1 compression fracture, and grade 2 anterolisthesis for L4 on L5 with associated pars defect. Severe R and moderate L neural foraminal narrowing.     Plan:  --No acute surgical intervention  --Obtain lumbar xrays upright/supine to assess compression fracture  --Obtain lumbar flex/ex xrays to assess for dynamic instability  --TLSO brace ordered/at bedside for comfort  --Final plan pending completion of imaging.    Discussed with Dr. Rivera

## 2022-11-30 NOTE — SUBJECTIVE & OBJECTIVE
Past Medical History:   Diagnosis Date    Arthritis     Depression     Hypercholesteremia     Thrombocytopenia     Thyroid disease        Past Surgical History:   Procedure Laterality Date    ANKLE SURGERY      COLONOSCOPY N/A 6/2/2021    Procedure: COLONOSCOPY;  Surgeon: Alfonso Haque MD;  Location: [address];  Service: Endoscopy;  Laterality: N/A;  any crs  covid test 5/30-[facility]    HYSTERECTOMY      KNEE SURGERY      WRIST SURGERY         Review of patient's allergies indicates:  No Known Allergies    No current facility-administered medications on file prior to encounter.     Current Outpatient Medications on File Prior to Encounter   Medication Sig    losartan (COZAAR) 50 MG tablet TAKE 1 TABLET EVERY DAY    alendronate (FOSAMAX) 70 MG tablet Take 1 tablet (70 mg total) by mouth every 7 days.    buPROPion (WELLBUTRIN XL) 150 MG TB24 tablet Take 1 tablet (150 mg total) by mouth once daily.    cyanocobalamin (VITAMIN B-12) 1000 MCG tablet Take 100 mcg by mouth once daily.    galantamine (RAZADYNE ER) 16 MG 24 hr capsule TAKE 1 CAPSULE EVERY DAY WITH BREAKFAST    levothyroxine (SYNTHROID) 100 MCG tablet TAKE 1 TABLET (100 MCG TOTAL) BY MOUTH BEFORE BREAKFAST.    losartan (COZAAR) 50 MG tablet TAKE 1 TABLET EVERY DAY    losartan (COZAAR) 50 MG tablet Take 1 tablet (50 mg total) by mouth once daily.    memantine (NAMENDA) 10 MG Tab TAKE 1 TABLET TWICE DAILY    pravastatin (PRAVACHOL) 40 MG tablet Take 1 tablet (40 mg total) by mouth once daily.     Family History       Problem Relation (Age of Onset)    Cancer Maternal Uncle    Heart failure Mother, Father    Suicide Son          Tobacco Use    Smoking status: Never    Smokeless tobacco: Never   Substance and Sexual Activity    Alcohol use: Not on file    Drug use: Not on file    Sexual activity: Not on file     Review of Systems   Constitutional:  Positive for appetite change (decreased). Negative for chills, diaphoresis, fatigue and fever.   HENT:   Negative for congestion, rhinorrhea and sore throat.    Eyes:  Negative for photophobia and visual disturbance.   Respiratory:  Positive for shortness of breath (resolved). Negative for cough and wheezing.    Cardiovascular:  Negative for chest pain, palpitations and leg swelling.   Gastrointestinal:  Negative for abdominal distention, abdominal pain, diarrhea, nausea and vomiting.   Genitourinary:  Positive for dysuria and frequency. Negative for difficulty urinating and hematuria.   Musculoskeletal:  Positive for arthralgias, back pain and gait problem. Negative for myalgias and neck pain.   Skin:  Negative for color change, pallor, rash and wound.   Neurological:  Positive for weakness (generalized) and light-headedness. Negative for dizziness, syncope, facial asymmetry and numbness.   Psychiatric/Behavioral:  Negative for confusion and hallucinations. The patient is not nervous/anxious.    Objective:     Vital Signs (Most Recent):  Temp: 98.2 °F (36.8 °C) (11/29/22 1414)  Pulse: (!) 56 (11/29/22 1923)  Resp: 16 (11/29/22 1923)  BP: (!) 151/73 (11/29/22 1923)  SpO2: 98 % (11/29/22 1923)   Vital Signs (24h Range):  Temp:  [98.2 °F (36.8 °C)] 98.2 °F (36.8 °C)  Pulse:  [56-57] 56  Resp:  [16-20] 16  SpO2:  [96 %-98 %] 98 %  BP: (133-156)/(72-92) 151/73     Weight: 59.9 kg (132 lb)  Body mass index is 26.66 kg/m².    Physical Exam  Vitals and nursing note reviewed.   Constitutional:       General: She is not in acute distress.     Appearance: She is not toxic-appearing or diaphoretic.   HENT:      Head: Normocephalic and atraumatic.      Nose: Nose normal.      Mouth/Throat:      Mouth: Mucous membranes are moist.   Eyes:      Pupils: Pupils are equal, round, and reactive to light.   Cardiovascular:      Rate and Rhythm: Normal rate and regular rhythm.      Pulses: Normal pulses.   Pulmonary:      Effort: Pulmonary effort is normal. No respiratory distress.      Breath sounds: No wheezing, rhonchi or rales.    Abdominal:      General: Bowel sounds are normal. There is no distension.      Palpations: Abdomen is soft.      Tenderness: There is no abdominal tenderness. There is no guarding.   Musculoskeletal:         General: No swelling or deformity.      Cervical back: Normal range of motion. No tenderness.      Thoracic back: No tenderness.      Lumbar back: No deformity or tenderness. Decreased range of motion (2/2 pain).   Skin:     General: Skin is warm and dry.      Capillary Refill: Capillary refill takes less than 2 seconds.   Neurological:      General: No focal deficit present.      Mental Status: She is alert and oriented to person, place, and time. Mental status is at baseline.      Sensory: No sensory deficit.      Motor: Weakness (4/5 strength to BLE, 5/5 strength to BUE) present.   Psychiatric:         Mood and Affect: Mood normal.         Behavior: Behavior normal.         CRANIAL NERVES     CN III, IV, VI   Pupils are equal, round, and reactive to light.     Significant Labs: All pertinent labs within the past 24 hours have been reviewed.  CBC:   Recent Labs   Lab 11/29/22  1602   WBC 8.95   HGB 15.0   HCT 45.0   PLT 37*     CMP:   Recent Labs   Lab 11/29/22  1602      K 4.3      CO2 24   GLU 93   BUN 14   CREATININE 0.9   CALCIUM 10.0   PROT 6.4   ALBUMIN 3.6   BILITOT 0.8   ALKPHOS 75   AST 19   ALT 19   ANIONGAP 7*     Cardiac Markers:   Recent Labs   Lab 11/29/22  1602   BNP 19     Troponin:   Recent Labs   Lab 11/29/22  1602   TROPONINI <0.006     Urine Studies:   Recent Labs   Lab 11/29/22  1600   COLORU Yellow   APPEARANCEUA Hazy*   PHUR 7.0   SPECGRAV 1.030   PROTEINUA 1+*   GLUCUA Negative   KETONESU Negative   BILIRUBINUA Negative   OCCULTUA Trace*   NITRITE Positive*   LEUKOCYTESUR 3+*   RBCUA 12*   WBCUA >100*   BACTERIA Many*   SQUAMEPITHEL 5   HYALINECASTS 0       Significant Imaging: I have reviewed all pertinent imaging results/findings within the past 24 hours.    Imaging  Results              X-Ray Hips Bilateral 2 View Inc AP Pelvis (In process)                      X-Ray Lumbar Spine Ap And Lateral (Final result)  Result time 11/29/22 20:21:27      Final result by Jose R Glass MD (11/29/22 20:21:27)                   Impression:      Grade 2 anterior spondylolisthesis L4 on L5.    Mild anterior superior endplate compression deformity L1 with approximately 20% anterior loss of height.    Severe degenerative disc space narrowing L4-5 and L5-S1.  Remaining lumbar disc spaces appear maintained.    Degenerative changes in the lower lumbar facet joints.      Electronically signed by: Jose R Glass MD  Date:    11/29/2022  Time:    20:21               Narrative:    EXAMINATION:  XR LUMBAR SPINE AP AND LATERAL    CLINICAL HISTORY:  Low back pain;    TECHNIQUE:  AP, lateral and spot images were performed of the lumbar spine.    COMPARISON:  None    FINDINGS:  Alignment: Grade 2 anterior spondylolisthesis L4 on L5.    Vertebrae: Mild anterior superior endplate compression deformity L1 with approximately 20% anterior loss of height.  Remaining lumbar vertebral body heights appear maintained.    Discs and facets: Severe degenerative disc space narrowing L4-5 and L5-S1.  Remaining lumbar disc spaces appear maintained.  Degenerative changes in the lower lumbar facet joints.    Miscellaneous: Vascular calcifications in the aorta.                                       X-Ray Chest AP Portable (Final result)  Result time 11/29/22 17:56:17      Final result by Eron Gibson MD (11/29/22 17:56:17)                   Impression:      No acute cardiopulmonary findings.    Electronically signed by resident: Eric Xiong  Date:    11/29/2022  Time:    17:39    Electronically signed by: Eron Gibson MD  Date:    11/29/2022  Time:    17:56               Narrative:    EXAMINATION:  XR CHEST AP PORTABLE    CLINICAL HISTORY:  Shortness of breath;    TECHNIQUE:  Single frontal view of the chest was  performed.    COMPARISON:  Chest radiograph 06/06/2022    FINDINGS:  Mediastinal structures midline.  Cardiac silhouette stable.  Aortic arch calcification.    Few bands of subsegmental atelectasis, left more so than right.  No large consolidation.  No pleural fluid or pneumothorax.    No significant osseous abnormality.

## 2022-11-30 NOTE — ASSESSMENT & PLAN NOTE
Patient has chronic hypothyroidism. TFTs reviewed-   Lab Results   Component Value Date    TSH 4.827 (H) 01/07/2022   . Will continue chronic levothyroxine and adjust for and clinical changes.

## 2022-11-30 NOTE — HPI
Rosario Menendez is a 87 y.o. female with a PMHx of osteoporosis, thrombocytopenia, depression, arthritis, hypercholesteremia, hypothyroidism, and MCI who presents to the ED via EMS after a trip and fall at home. The patient reports she tripped and fell in the kitchen and was unable to stand up following the fall. She denies any head trauma or LOC. She landed on her buttocks and complains of lower back pain and pain to her coccyx. She reports the pain is aggravated by movement.  Her pain improved with acetaminophen that she took prior to EMS arrival. She complains of generalized weakness that began earlier today. She had an episode shortness of breath earlier today that has since resolved. She had lightheadedness earlier as well. The patient notes poor oral intake for the last several days. She was treated recently for UTI with keflex but has had ongoing urinary frequency/dysuria for the last several weeks. She denies cough, chest pain, abdominal pain, nausea, vomiting, fever, or chills.      In the ED, mildly bradycardic but otherwise VSSAF. Plt 37k (at baseline). CMP unremarkable. Blood cxs in process. BNP and troponin WNL. UA nitrite + with 3+ leukocytes, >100 WBCs, and many bacteria. CXR negative for acute process. Xray lumbar spine with grade 2 anterior spondylolisthesis L4 on L5. Mild anterior superior endplate compression deformity L1 with approximately 20% anterior loss of height. Severe degenerative disc space narrowing L4-5 and L5-S1. Degenerative changes in the lower lumbar facet joints. Xray roslyn hips and pelvis in process. The patient received norco, lidocaine patch, IVF, and rocephin.

## 2022-11-30 NOTE — PROGRESS NOTES
"[facility]  Neurosurgery  Progress Note    Subjective:     History of Present Illness: Rosario Menendez is a 87 y.o. female with a past medical history significant for osteoporosis, thrombocytopenia, hypothyroidism, mild cognitive impairment, depression, and arthritis. She presents to [facility] after she tripped and fell onto her buttocks while in the kitchen. Patient states she "bounced" during her fall.  After fall she noted buttock and lower back pain. Buttock pain is worse than lower back pain. Pain started after fall and is exacerbated by movement. She denies radiating leg pain. She denies numbness, tinging, saddle anesthesia, or bowel dysfunction. Lumbar x-rays (AP/Lateral) obtained in ED for lower back pain revealing L1 compression fracture with 20% anterior height loss, and grade 2 anterolisthesis of L4 on L5. Lumbar CT 11/30 re demonstrating L1 compression fracture, and grade 2 anterolisthesis for L4 on L5 with associated pars defect.     Notes recent UTI with persistent dysuria s/p antibiotic treatment. She denies chills, nausea, or vomiting. Endorses slight subjective fever yesterday. UA 11/29 obtained in ED showing +nitrite, 3+leukocytes, many bacteria, and >100 WBC. UA culture pending. Blood culture NGTD.     Of note, she has baseline thrombocytopenia. Today platelets are 28.      Post-Op Info:  * No surgery found *         Medications Prior to Admission   Medication Sig Dispense Refill Last Dose    losartan (COZAAR) 50 MG tablet TAKE 1 TABLET EVERY DAY 90 tablet 1     alendronate (FOSAMAX) 70 MG tablet Take 1 tablet (70 mg total) by mouth every 7 days. 4 tablet 11     buPROPion (WELLBUTRIN XL) 150 MG TB24 tablet Take 1 tablet (150 mg total) by mouth once daily. 90 tablet 2     cyanocobalamin (VITAMIN B-12) 1000 MCG tablet Take 100 mcg by mouth once daily.       galantamine (RAZADYNE ER) 16 MG 24 hr capsule TAKE 1 CAPSULE EVERY DAY WITH BREAKFAST 90 capsule 0     levothyroxine " (SYNTHROID) 100 MCG tablet TAKE 1 TABLET (100 MCG TOTAL) BY MOUTH BEFORE BREAKFAST. 90 tablet 1     losartan (COZAAR) 50 MG tablet TAKE 1 TABLET EVERY DAY 90 tablet 3     losartan (COZAAR) 50 MG tablet Take 1 tablet (50 mg total) by mouth once daily. 90 tablet 0     memantine (NAMENDA) 10 MG Tab TAKE 1 TABLET TWICE DAILY 180 tablet 1     pravastatin (PRAVACHOL) 40 MG tablet Take 1 tablet (40 mg total) by mouth once daily. 90 tablet 0        Review of patient's allergies indicates:  No Known Allergies    Past Medical History:   Diagnosis Date    Arthritis     Depression     Hypercholesteremia     Thrombocytopenia     Thyroid disease      Past Surgical History:   Procedure Laterality Date    ANKLE SURGERY      COLONOSCOPY N/A 6/2/2021    Procedure: COLONOSCOPY;  Surgeon: Alfonso Haque MD;  Location: [address];  Service: Endoscopy;  Laterality: N/A;  any crs  covid test 5/30-[place]    HYSTERECTOMY      KNEE SURGERY      WRIST SURGERY       Family History       Problem Relation (Age of Onset)    Cancer Maternal Uncle    Heart failure Mother, Father    Suicide Son          Tobacco Use    Smoking status: Never    Smokeless tobacco: Never   Substance and Sexual Activity    Alcohol use: Not on file    Drug use: Not on file    Sexual activity: Not on file     Review of Systems   Constitutional:  Positive for fever. Negative for chills.   HENT:  Negative for hearing loss.    Eyes:  Negative for visual disturbance.   Respiratory:  Negative for shortness of breath.    Cardiovascular:  Negative for chest pain.   Gastrointestinal:  Negative for vomiting.   Genitourinary:  Positive for dysuria.   Musculoskeletal:  Positive for back pain.   Skin:  Negative for rash.   Neurological:  Negative for syncope and numbness.   Objective:     Weight: 63.7 kg (140 lb 6.9 oz)  Body mass index is 27.43 kg/m².  Vital Signs (Most Recent):  Temp: 97.5 °F (36.4 °C) (11/30/22 0837)  Pulse: (!) 58 (11/30/22  0837)  Resp: 19 (11/30/22 0837)  BP: (!) 150/68 (11/30/22 0837)  SpO2: (!) 94 % (11/30/22 0837)   Vital Signs (24h Range):  Temp:  [96.6 °F (35.9 °C)-98.6 °F (37 °C)] 97.5 °F (36.4 °C)  Pulse:  [56-67] 58  Resp:  [16-20] 19  SpO2:  [94 %-98 %] 94 %  BP: (133-183)/(68-92) 150/68       Neurosurgery Physical Exam  General: well developed, well nourished, no distress.   Head: normocephalic, atraumatic  Neck: No tracheal deviation. No palpable masses. Full ROM.   Neurologic: Alert. Slight confused present.  Mental Status: Awake, Alert, Oriented x3   Language: No aphasia  Speech: No dysarthria  Cranial nerves: face symmetric, tongue midline, CN II-XII grossly intact.   Eyes: EOMI.   Pulmonary: normal respirations, no signs of respiratory distress  Abdomen: soft, slightly-distended, not tender to palpation    Sensory: intact to light touch throughout  Motor Strength: Moves all extremities spontaneously with good tone.  Full strength upper and lower extremities. No abnormal movements seen.     Strength  Deltoids Triceps Biceps Wrist Extension Wrist Flexion Hand    Upper: R 5/5 5/5 5/5 5/5 5/5 5/5    L 5/5 5/5 5/5 5/5 5/5 5/5     Strength  Iliopsoas Quadriceps Knee  Flexion Tibialis  anterior Gastro- cnemius EHL   Lower: R 5/5 5/5 5/5 5/5 5/5 5/5    L 5/5 5/5 5/5 5/5 5/5 5/5     Vascular: No LE edema.   Skin: Skin is warm, dry and intact.    Reflexes:   Kim's: Negative  Clonus: Negative    Midline lumbar TTP: negative  TTP at coccyx/buttock: positive    Significant Labs:  Recent Labs   Lab 11/29/22  1602 11/30/22  0358   GLU 93 94    139   K 4.3 4.0    109   CO2 24 22*   BUN 14 12   CREATININE 0.9 0.8   CALCIUM 10.0 10.0   MG  --  2.2     Recent Labs   Lab 11/29/22  1602 11/30/22  0358   WBC 8.95 6.64   HGB 15.0 14.4   HCT 45.0 43.1   PLT 37* 28*     No results for input(s): LABPT, INR, APTT in the last 48 hours.  Microbiology Results (last 7 days)       Procedure Component Value Units Date/Time    Blood  Culture #1 **CANNOT BE ORDERED STAT** [[id number]] Collected: 11/29/22 1826    Order Status: Completed Specimen: Blood from Peripheral, Forearm, Right Updated: 11/30/22 0145     Blood Culture, Routine No Growth to date    Blood Culture #2 **CANNOT BE ORDERED STAT** [[id number]] Collected: 11/29/22 1826    Order Status: Completed Specimen: Blood from Peripheral, Antecubital, Right Updated: 11/30/22 0145     Blood Culture, Routine No Growth to date    Urine culture [[id number]] Collected: 11/29/22 1600    Order Status: No result Specimen: Urine Updated: 11/29/22 1711          All pertinent labs from the last 24 hours have been reviewed.    Significant Diagnostics:  I have reviewed all pertinent imaging results/findings within the past 24 hours.    Assessment/Plan:     Compression deformity of vertebra  87 y.o. female with a pmhx of osteoporosis, thrombocytopenia, mild cognitive impairment, and arthritis. Patient tripped and fell onto buttocks yesterday with residual lower back pain after fall. Lumbar CT showing L1 compression fracture. Neurosurgery consulted for further eval.    --Admitted to  for treatment of UTI  --All pertinent labs and diagnostics personally reviewed.  --Thrombocytopenia: Chronic issue. Platelets 28 on 11/30.  --UTI: UA 11/29 obtained in ED showing +nitrite, 3+leukocytes, many bacteria, and >100 WBC. UA culture pending. Currently on Rocephin for treatment per primary.   --Lumbar x-rays (AP/Lateral) 11/29: revealing L1 compression fracture with 20% anterior height loss, and grade 2 anterolisthesis of L4 on L5.   --Lumbar CT 11/30 re demonstrating L1 compression fracture, and grade 2 anterolisthesis for L4 on L5 with associated pars defect. Severe R and moderate L neural foraminal narrowing.     Plan:  --No acute surgical intervention  --Obtain lumbar xrays upright/supine to assess compression fracture  --Obtain lumbar flex/ex xrays to assess for dynamic instability  --TLSO brace ordered/at bedside  for comfort  --Final plan pending completion of imaging.    Discussed with Dr. Nicole Ozuna PA-C  Neurosurgery  [facility]

## 2022-11-30 NOTE — PROGRESS NOTES
[facility] [address] [place] [address] Medicine  Progress Note    Patient Name: Rosario Menendez  MRN: [id number]  Patient Class: OP- Observation   Admission Date: 11/29/2022  Length of Stay: 0 days  Attending Physician: Ulises Reaves MD  Primary Care Provider: Shi Simon MD        Subjective:     Principal Problem:Fall        HPI:  Rosario Menendez is a 87 y.o. female with a PMHx of osteoporosis, thrombocytopenia, depression, arthritis, hypercholesteremia, hypothyroidism, and MCI who presents to the ED via EMS after a trip and fall at home. The patient reports she tripped and fell in the kitchen and was unable to stand up following the fall. She denies any head trauma or LOC. She landed on her buttocks and complains of lower back pain and pain to her coccyx. She reports the pain is aggravated by movement.  Her pain improved with acetaminophen that she took prior to EMS arrival. She complains of generalized weakness that began earlier today. She had an episode shortness of breath earlier today that has since resolved. She had lightheadedness earlier as well. The patient notes poor oral intake for the last several days. She was treated recently for UTI with keflex but has had ongoing urinary frequency/dysuria for the last several weeks. She denies cough, chest pain, abdominal pain, nausea, vomiting, fever, or chills.      In the ED, mildly bradycardic but otherwise VSSAF. Plt 37k (at baseline). CMP unremarkable. Blood cxs in process. BNP and troponin WNL. UA nitrite + with 3+ leukocytes, >100 WBCs, and many bacteria. CXR negative for acute process. Xray lumbar spine with grade 2 anterior spondylolisthesis L4 on L5. Mild anterior superior endplate compression deformity L1 with approximately 20% anterior loss of height. Severe degenerative disc space narrowing L4-5 and L5-S1. Degenerative changes in the lower lumbar facet joints. Xray roslyn hips and pelvis in process. The patient received norco, lidocaine patch,  IVF, and rocephin.           Overview/Hospital Course:  Placed on observation s/p mechanical fall while at home. Xray bilat hips were negative for fractures as well as the CXR which showed no cardiopulmonary abnormalities. CT lumbar spine revealed compression fraction of L1 vertebrae and grade 2 anterolisthesis of L4 on L5 associated with pars defect and mild spinal canal stenosis. Neurosurgery consulted and recommendations advise TLSO brace, obtain upright + flex xrays of lumbar spine with brace to ensure stability of compression fracture followed by PT/OT. UA results showed haziness and many bacteria with RBCs + WBCs. Urine culture pending and blood cultures x2 have shown no growth to date. IV rocephin was initiated and will continue course as prescribed. Pain controlled with norco, robaxin, gabapentin, and lidocaine patches. Plan discharge tomorrow to SNF pending Xray results per neurosurgery recommendations      Interval History: NAEON. Pt seen and evaluated at bedside this am. Pt doing well pain well controlled w/ pain management regimen. NSGY consulted. PTOT to evaluate and treat.    Review of Systems   Constitutional:  Positive for fever. Negative for activity change, chills, diaphoresis and fatigue.   HENT:  Negative for congestion, facial swelling, hearing loss, rhinorrhea and sore throat.    Eyes:  Negative for photophobia, itching and visual disturbance.   Respiratory:  Negative for cough, chest tightness, shortness of breath and wheezing.    Cardiovascular:  Negative for chest pain, palpitations and leg swelling.   Gastrointestinal:  Negative for abdominal distention, abdominal pain, blood in stool, constipation, diarrhea, nausea and vomiting.   Genitourinary:  Positive for dysuria. Negative for difficulty urinating, frequency, hematuria and urgency.   Musculoskeletal:  Positive for back pain. Negative for arthralgias and neck stiffness.   Skin:  Negative for rash.   Neurological:  Negative for  dizziness, tremors, seizures, syncope, weakness, light-headedness, numbness and headaches.   Psychiatric/Behavioral:  Negative for agitation, confusion and hallucinations.    Objective:     Vital Signs (Most Recent):  Temp: 97.5 °F (36.4 °C) (11/30/22 1149)  Pulse: (!) 59 (11/30/22 1149)  Resp: 18 (11/30/22 1244)  BP: 134/71 (11/30/22 1149)  SpO2: (!) 91 % (11/30/22 1149)   Vital Signs (24h Range):  Temp:  [96.6 °F (35.9 °C)-98.6 °F (37 °C)] 97.5 °F (36.4 °C)  Pulse:  [56-67] 59  Resp:  [16-19] 18  SpO2:  [91 %-98 %] 91 %  BP: (134-183)/(68-81) 134/71     Weight: 63.7 kg (140 lb 6.9 oz)  Body mass index is 27.43 kg/m².  No intake or output data in the 24 hours ending 11/30/22 1415   Physical Exam  Vitals and nursing note reviewed.   Constitutional:       General: She is not in acute distress.     Appearance: She is not toxic-appearing or diaphoretic.   HENT:      Head: Normocephalic and atraumatic.      Nose: Nose normal.      Mouth/Throat:      Mouth: Mucous membranes are moist.   Eyes:      Pupils: Pupils are equal, round, and reactive to light.   Cardiovascular:      Rate and Rhythm: Normal rate and regular rhythm.      Pulses: Normal pulses.   Pulmonary:      Effort: Pulmonary effort is normal. No respiratory distress.      Breath sounds: No wheezing, rhonchi or rales.   Abdominal:      General: Bowel sounds are normal. There is no distension.      Palpations: Abdomen is soft.      Tenderness: There is no abdominal tenderness. There is no guarding.   Musculoskeletal:         General: No swelling or deformity.      Cervical back: Normal range of motion. No tenderness.      Thoracic back: No tenderness.      Lumbar back: No deformity or tenderness. Decreased range of motion (2/2 pain).   Skin:     General: Skin is warm and dry.      Capillary Refill: Capillary refill takes less than 2 seconds.   Neurological:      General: No focal deficit present.      Mental Status: She is alert and oriented to person, place,  and time. Mental status is at baseline.      Sensory: No sensory deficit.      Motor: Weakness (4/5 strength to BLE, 5/5 strength to BUE) present.   Psychiatric:         Mood and Affect: Mood normal.         Behavior: Behavior normal.       Significant Labs: All pertinent labs within the past 24 hours have been reviewed.    Significant Imaging: I have reviewed all pertinent imaging results/findings within the past 24 hours.      Assessment/Plan:      * Fall  Closed compression fracture of body of L1 vertebra  -Labs largely unremarkable. UA with UTI, likely contributing to weakness.  -Patient also reports poor oral intake for the last several days.  -Xray lumbar spine with grade 2 anterior spondylolisthesis L4 on L5. Mild anterior superior endplate compression deformity L1 with approximately 20% anterior loss of height.  -CT lumbar spine showed mild L1 vertebral body compression fracture of undetermined age. Grade 2 anterolisthesis of L4 on L5 associated with pars defect and mild spinal canal stenosis, severe right and moderate left neural foraminal narrowing  -Xray roslyn hips and pelvis in process.  -Fall precautions.  -Start scheduled robaxin, gabapentin, and lidocaine patches.  -PRN tylenol, norco  -NSGY consulted, appreciate recs  -PT/OT appreciate recs.    Acute cystitis with hematuria  -Afebrile, no leukocytosis.  -UA reviewed,  w/ GNR  -Blood cxs NGTD  -Continue rocephin.    Amnestic MCI (mild cognitive impairment with memory loss)  -Continue namenda.    Thrombocytopenia, unspecified  -Chronic, stable.  -Monitor and transfuse for <50K if bleeding or <10K if not bleeding  -Avoid antiplatelet medications including Lovenox, heparin,NSAIDs and aspirin.  -Follows with hem/onc outpatient.    Hypercholesteremia   Patient is chronically on statin.will continue for now. Monitor clinically. Last LDL was   Lab Results   Component Value Date    LDLCALC 56.6 (L) 04/21/2021        Thyroid disease  Patient has chronic  hypothyroidism. TFTs reviewed-   Lab Results   Component Value Date    TSH 0.412 11/29/2022   . Will continue chronic levothyroxine and adjust for and clinical changes.      VTE Risk Mitigation (From admission, onward)         Ordered     IP VTE HIGH RISK PATIENT  Once         11/29/22 2318     Place sequential compression device  Until discontinued         11/29/22 2318     Reason for No Pharmacological VTE Prophylaxis  Once        Question:  Reasons:  Answer:  Thrombocytopenia    11/29/22 2318                Discharge Planning   AMARA: 12/2/2022     Code Status: Full Code   Is the patient medically ready for discharge?: No    Reason for patient still in hospital (select all that apply): Patient trending condition, Laboratory test and Treatment                     Gayle Brewer PA-C  Department of Hospital Medicine   [facility]

## 2022-11-30 NOTE — PT/OT/SLP EVAL
Occupational Therapy   Evaluation    Name: Rosario Menendez  MRN: [id number]  Admitting Diagnosis:  Fall  Recent Surgery: * No surgery found *      Recommendations:     Discharge Recommendations: nursing facility, skilled  Discharge Equipment Recommendations:  none  Barriers to discharge:  None    Assessment:     Rosario Menendez is a 87 y.o. female with a medical diagnosis of Fall.  She presents with impaired ADL and mobility performance deficits. Pt found upright in bed and agreeable for therapy. Pt session included bedroom mobility, bathroom mobility, and setup in chair with pt's friend Yanna present and supportive. Pt limited 2/2 fear of falling and notably unsteady gait requiring min A x2. Pt wearing TLSO throughout session for comfort. PTA, pt was living alone and currently is not at her baseline. Pt would benefit from continued OT skilled services 3x/wk to improve daily living skills to optimize QOL.  Pt is recommended to discharge to SNF at this time.   Performance deficits affecting function: weakness, impaired functional mobility, impaired endurance, gait instability, impaired balance, impaired self care skills, decreased lower extremity function, decreased upper extremity function.      Rehab Prognosis: Good; patient would benefit from acute skilled OT services to address these deficits and reach maximum level of function.       Plan:     Patient to be seen 3 x/week to address the above listed problems via self-care/home management, therapeutic activities, therapeutic exercises  Plan of Care Expires: 12/30/22  Plan of Care Reviewed with: patient, friend    Subjective     Chief Complaint: back pain; fear of falling  Patient/Family Comments/goals: I have a walker but I don't really use it     Occupational Profile:  Living Environment: Pt lives alone in a H with threshold entry (2 small steps). Pt with WIS with bench for bathing.  Previous level of function: I with ADLs and mobility; has RW but rarely  uses.  Roles and Routines: Pt does not drive; daughter and friend Yayo visit (yayo visits 3x/wk, daughter 1x/wk)  Equipment Used at Home:  walker, rolling, wheelchair, rollator, cane, straight  Assistance upon Discharge: see above.    Pain/Comfort:  Pain Rating 1: 0/10  Location - Orientation 2: lower    Patients cultural, spiritual, Scientologist conflicts given the current situation: no    Objective:     Communicated with: RN prior to session.  Patient found HOB elevated with telemetry, PureWick upon OT entry to room.    General Precautions: Standard, fall   Orthopedic Precautions:N/A   Braces: N/A  Respiratory Status: Room air    Occupational Performance:    Bed Mobility:    Patient completed Rolling/Turning to Right with minimum assistance  Patient completed Scooting/Bridging with minimum assistance  Patient completed Supine to Sit with minimum assistance  Patient completed Sit to Supine with minimum assistance    Functional Mobility/Transfers:  Patient completed Sit <> Stand Transfer with minimum assistance and of 2 persons  with  hand-held assist   Patient completed Toilet Transfer Step Transfer technique with minimum assistance and of 2 persons with  hand-held assist  Functional Mobility: Pt stood with min A x2-HHA to bathroom with unsteady gait and lateral sway     Activities of Daily Living:  Upper Body Dressing: maximal assistance donning TLSO  Lower Body Dressing: total assistance donning socks   Toileting: total assistance standing grooming from toilet     Cognitive/Visual Perceptual:  Cognitive/Psychosocial Skills:     -       Oriented to: Person, Place, Time, and Situation   -       Follows Commands/attention:Follows multistep  commands  -       Communication: clear/fluent  -       Memory: No Deficits noted  -       Safety awareness/insight to disability: intact   -       Mood/Affect/Coping skills/emotional control: Pleasant    Physical Exam:  Balance:    -       demo good standing balance, fair dynamic b  alance   Upper Extremity Range of Motion:     -       Right Upper Extremity: WFL  -       Left Upper Extremity: WFL  Upper Extremity Strength:    -       Right Upper Extremity: WFL  -       Left Upper Extremity: WFL   Strength:    -       Right Upper Extremity: WFL  -       Left Upper Extremity: WFL    AMPAC 6 Click ADL:  AMPAC Total Score: 17    Treatment & Education:  Pt educated on role of occupational therapy, POC, and safety during ADLs and functional mobility. Pt and OT discussed importance of safe, continued mobility to optimize daily living skills. Pt verbalized understanding.     White board updated during session. Pt given instruction to call for medical staff/nurse for assistance.       Patient left HOB elevated with all lines intact, call button in reach, and RN notified    GOALS:   Multidisciplinary Problems       Occupational Therapy Goals          Problem: Occupational Therapy    Goal Priority Disciplines Outcome Interventions   Occupational Therapy Goal     OT, PT/OT Ongoing, Progressing    Description: Goals to be met by: 12/7/2022 (1 week)     Patient will increase functional independence with ADLs by performing:    UE Dressing with Supervision.  LE Dressing with Supervision.  Grooming while standing at sink with Supervision.  Toileting from toilet with Supervision for hygiene and clothing management.   Step transfer with Supervision  Toilet transfer to toilet with Supervision.                         History:     Past Medical History:   Diagnosis Date    Arthritis     Depression     Hypercholesteremia     Thrombocytopenia     Thyroid disease          Past Surgical History:   Procedure Laterality Date    ANKLE SURGERY      COLONOSCOPY N/A 6/2/2021    Procedure: COLONOSCOPY;  Surgeon: Alfonso Haque MD;  Location: [address];  Service: Endoscopy;  Laterality: N/A;  any crs  covid test 5/30-[facility]    HYSTERECTOMY      KNEE SURGERY      WRIST SURGERY         Time Tracking:     OT  Date of Treatment: 11/30/22  OT Start Time: 1105  OT Stop Time: 1141  OT Total Time (min): 36 min    Billable Minutes:Evaluation 10 min  Self Care/Home Management 26 min    11/30/2022

## 2022-11-30 NOTE — NURSING
"Patient appears to become confused at night. She could not stay on topic with the conversation. Patient stated that she has been waiting for three days for pain medication but this nurse explained to patient that she has only been on the unit since last night and had not asked for pain medication. I asked the patient was she in pain and she stated yes. Will medicate as prescribed. I asked patient does she get confused at night, she stated "maybe." Throughout the conversation, patient was pointing in the air saying "this is broken," but was unable to tell this nurse what was actually broken. Will notify MD. Will continue to monitor patient. Safety maintained.  "

## 2022-11-30 NOTE — PLAN OF CARE
Problem: Occupational Therapy  Goal: Occupational Therapy Goal  Description: Goals to be met by: 12/7/2022 (1 week)     Patient will increase functional independence with ADLs by performing:    UE Dressing with Supervision.  LE Dressing with Supervision.  Grooming while standing at sink with Supervision.  Toileting from toilet with Supervision for hygiene and clothing management.   Step transfer with Supervision  Toilet transfer to toilet with Supervision.    Evaluated pt and established OT POC. Continue OT as tolerated.  Adelita Anderson OT  11/30/2022    Outcome: Ongoing, Progressing

## 2022-11-30 NOTE — H&P
[facility] - Emergency Dept  [facility]  History & Physical    Patient Name: Rosario Menendez  MRN: [id number]  Patient Class: OP- Observation  Admission Date: 11/29/2022  Attending Physician: Ulises Reaves MD   Primary Care Provider: Shi Simon MD         Patient information was obtained from patient, past medical records, and ER records.     Subjective:     Principal Problem:Acute cystitis with hematuria    Chief Complaint:   Chief Complaint   Patient presents with    Fall     Pt had a tripped and fall. Denies hitting head. Reports tailbone pain.         HPI: Rosario Menendez is a 87 y.o. female with a PMHx of osteoporosis, thrombocytopenia, depression, arthritis, hypercholesteremia, hypothyroidism, and MCI who presents to the ED via EMS after a trip and fall at home. The patient reports she tripped and fell in the kitchen and was unable to stand up following the fall. She denies any head trauma or LOC. She landed on her buttocks and complains of lower back pain and pain to her coccyx. She reports the pain is aggravated by movement.  Her pain improved with acetaminophen that she took prior to EMS arrival. She complains of generalized weakness that began earlier today. She had an episode shortness of breath earlier today that has since resolved. She had lightheadedness earlier as well. The patient notes poor oral intake for the last several days. She was treated recently for UTI with keflex but has had ongoing urinary frequency/dysuria for the last several weeks. She denies cough, chest pain, abdominal pain, nausea, vomiting, fever, or chills.      In the ED, mildly bradycardic but otherwise VSSAF. Plt 37k (at baseline). CMP unremarkable. Blood cxs in process. BNP and troponin WNL. UA nitrite + with 3+ leukocytes, >100 WBCs, and many bacteria. CXR negative for acute process. Xray lumbar spine with grade 2 anterior spondylolisthesis L4 on L5. Mild anterior superior endplate compression deformity L1  with approximately 20% anterior loss of height. Severe degenerative disc space narrowing L4-5 and L5-S1. Degenerative changes in the lower lumbar facet joints. Xray roslyn hips and pelvis in process. The patient received norco, lidocaine patch, IVF, and rocephin.         Past Medical History:   Diagnosis Date    Arthritis     Depression     Hypercholesteremia     Thrombocytopenia     Thyroid disease        Past Surgical History:   Procedure Laterality Date    ANKLE SURGERY      COLONOSCOPY N/A 6/2/2021    Procedure: COLONOSCOPY;  Surgeon: Alfonso Haque MD;  Location: [facility] ([address] [place] [address]);  Service: Endoscopy;  Laterality: N/A;  any crs  covid test 5/30-[facility]    HYSTERECTOMY      KNEE SURGERY      WRIST SURGERY         Review of patient's allergies indicates:  No Known Allergies    No current facility-administered medications on file prior to encounter.     Current Outpatient Medications on File Prior to Encounter   Medication Sig    losartan (COZAAR) 50 MG tablet TAKE 1 TABLET EVERY DAY    alendronate (FOSAMAX) 70 MG tablet Take 1 tablet (70 mg total) by mouth every 7 days.    buPROPion (WELLBUTRIN XL) 150 MG TB24 tablet Take 1 tablet (150 mg total) by mouth once daily.    cyanocobalamin (VITAMIN B-12) 1000 MCG tablet Take 100 mcg by mouth once daily.    galantamine (RAZADYNE ER) 16 MG 24 hr capsule TAKE 1 CAPSULE EVERY DAY WITH BREAKFAST    levothyroxine (SYNTHROID) 100 MCG tablet TAKE 1 TABLET (100 MCG TOTAL) BY MOUTH BEFORE BREAKFAST.    losartan (COZAAR) 50 MG tablet TAKE 1 TABLET EVERY DAY    losartan (COZAAR) 50 MG tablet Take 1 tablet (50 mg total) by mouth once daily.    memantine (NAMENDA) 10 MG Tab TAKE 1 TABLET TWICE DAILY    pravastatin (PRAVACHOL) 40 MG tablet Take 1 tablet (40 mg total) by mouth once daily.     Family History       Problem Relation (Age of Onset)    Cancer Maternal Uncle    Heart failure Mother, Father    Suicide Son          Tobacco Use    Smoking status: Never    Smokeless  tobacco: Never   Substance and Sexual Activity    Alcohol use: Not on file    Drug use: Not on file    Sexual activity: Not on file     Review of Systems   Constitutional:  Positive for appetite change (decreased). Negative for chills, diaphoresis, fatigue and fever.   HENT:  Negative for congestion, rhinorrhea and sore throat.    Eyes:  Negative for photophobia and visual disturbance.   Respiratory:  Positive for shortness of breath (resolved). Negative for cough and wheezing.    Cardiovascular:  Negative for chest pain, palpitations and leg swelling.   Gastrointestinal:  Negative for abdominal distention, abdominal pain, diarrhea, nausea and vomiting.   Genitourinary:  Positive for dysuria and frequency. Negative for difficulty urinating and hematuria.   Musculoskeletal:  Positive for arthralgias, back pain and gait problem. Negative for myalgias and neck pain.   Skin:  Negative for color change, pallor, rash and wound.   Neurological:  Positive for weakness (generalized) and light-headedness. Negative for dizziness, syncope, facial asymmetry and numbness.   Psychiatric/Behavioral:  Negative for confusion and hallucinations. The patient is not nervous/anxious.    Objective:     Vital Signs (Most Recent):  Temp: 98.2 °F (36.8 °C) (11/29/22 1414)  Pulse: (!) 56 (11/29/22 1923)  Resp: 16 (11/29/22 1923)  BP: (!) 151/73 (11/29/22 1923)  SpO2: 98 % (11/29/22 1923)   Vital Signs (24h Range):  Temp:  [98.2 °F (36.8 °C)] 98.2 °F (36.8 °C)  Pulse:  [56-57] 56  Resp:  [16-20] 16  SpO2:  [96 %-98 %] 98 %  BP: (133-156)/(72-92) 151/73     Weight: 59.9 kg (132 lb)  Body mass index is 26.66 kg/m².    Physical Exam  Vitals and nursing note reviewed.   Constitutional:       General: She is not in acute distress.     Appearance: She is not toxic-appearing or diaphoretic.   HENT:      Head: Normocephalic and atraumatic.      Nose: Nose normal.      Mouth/Throat:      Mouth: Mucous membranes are moist.   Eyes:      Pupils: Pupils  are equal, round, and reactive to light.   Cardiovascular:      Rate and Rhythm: Normal rate and regular rhythm.      Pulses: Normal pulses.   Pulmonary:      Effort: Pulmonary effort is normal. No respiratory distress.      Breath sounds: No wheezing, rhonchi or rales.   Abdominal:      General: Bowel sounds are normal. There is no distension.      Palpations: Abdomen is soft.      Tenderness: There is no abdominal tenderness. There is no guarding.   Musculoskeletal:         General: No swelling or deformity.      Cervical back: Normal range of motion. No tenderness.      Thoracic back: No tenderness.      Lumbar back: No deformity or tenderness. Decreased range of motion (2/2 pain).   Skin:     General: Skin is warm and dry.      Capillary Refill: Capillary refill takes less than 2 seconds.   Neurological:      General: No focal deficit present.      Mental Status: She is alert and oriented to person, place, and time. Mental status is at baseline.      Sensory: No sensory deficit.      Motor: Weakness (4/5 strength to BLE, 5/5 strength to BUE) present.   Psychiatric:         Mood and Affect: Mood normal.         Behavior: Behavior normal.         CRANIAL NERVES     CN III, IV, VI   Pupils are equal, round, and reactive to light.     Significant Labs: All pertinent labs within the past 24 hours have been reviewed.  CBC:   Recent Labs   Lab 11/29/22  1602   WBC 8.95   HGB 15.0   HCT 45.0   PLT 37*     CMP:   Recent Labs   Lab 11/29/22  1602      K 4.3      CO2 24   GLU 93   BUN 14   CREATININE 0.9   CALCIUM 10.0   PROT 6.4   ALBUMIN 3.6   BILITOT 0.8   ALKPHOS 75   AST 19   ALT 19   ANIONGAP 7*     Cardiac Markers:   Recent Labs   Lab 11/29/22  1602   BNP 19     Troponin:   Recent Labs   Lab 11/29/22  1602   TROPONINI <0.006     Urine Studies:   Recent Labs   Lab 11/29/22  1600   COLORU Yellow   APPEARANCEUA Hazy*   PHUR 7.0   SPECGRAV 1.030   PROTEINUA 1+*   GLUCUA Negative   KETONESU Negative    BILIRUBINUA Negative   OCCULTUA Trace*   NITRITE Positive*   LEUKOCYTESUR 3+*   RBCUA 12*   WBCUA >100*   BACTERIA Many*   SQUAMEPITHEL 5   HYALINECASTS 0       Significant Imaging: I have reviewed all pertinent imaging results/findings within the past 24 hours.    Imaging Results              X-Ray Hips Bilateral 2 View Inc AP Pelvis (In process)                      X-Ray Lumbar Spine Ap And Lateral (Final result)  Result time 11/29/22 20:21:27      Final result by Jose R Glass MD (11/29/22 20:21:27)                   Impression:      Grade 2 anterior spondylolisthesis L4 on L5.    Mild anterior superior endplate compression deformity L1 with approximately 20% anterior loss of height.    Severe degenerative disc space narrowing L4-5 and L5-S1.  Remaining lumbar disc spaces appear maintained.    Degenerative changes in the lower lumbar facet joints.      Electronically signed by: Jose R Glass MD  Date:    11/29/2022  Time:    20:21               Narrative:    EXAMINATION:  XR LUMBAR SPINE AP AND LATERAL    CLINICAL HISTORY:  Low back pain;    TECHNIQUE:  AP, lateral and spot images were performed of the lumbar spine.    COMPARISON:  None    FINDINGS:  Alignment: Grade 2 anterior spondylolisthesis L4 on L5.    Vertebrae: Mild anterior superior endplate compression deformity L1 with approximately 20% anterior loss of height.  Remaining lumbar vertebral body heights appear maintained.    Discs and facets: Severe degenerative disc space narrowing L4-5 and L5-S1.  Remaining lumbar disc spaces appear maintained.  Degenerative changes in the lower lumbar facet joints.    Miscellaneous: Vascular calcifications in the aorta.                                       X-Ray Chest AP Portable (Final result)  Result time 11/29/22 17:56:17      Final result by Eron Gibson MD (11/29/22 17:56:17)                   Impression:      No acute cardiopulmonary findings.    Electronically signed by resident: Eric  Tyrese  Date:    11/29/2022  Time:    17:39    Electronically signed by: Eron Gibson MD  Date:    11/29/2022  Time:    17:56               Narrative:    EXAMINATION:  XR CHEST AP PORTABLE    CLINICAL HISTORY:  Shortness of breath;    TECHNIQUE:  Single frontal view of the chest was performed.    COMPARISON:  Chest radiograph 06/06/2022    FINDINGS:  Mediastinal structures midline.  Cardiac silhouette stable.  Aortic arch calcification.    Few bands of subsegmental atelectasis, left more so than right.  No large consolidation.  No pleural fluid or pneumothorax.    No significant osseous abnormality.                                    Assessment/Plan:     * Acute cystitis with hematuria  -Afebrile, no leukocytosis.  -UA reviewed, follow cx.  -Blood cxs in process.  -Continue rocephin.    Fall  Compression deformity of vertebra  -Labs largely unremarkable. UA with UTI, likely contributing to weakness.  -Patient also reports poor oral intake for the last several days.  -Xray lumbar spine with grade 2 anterior spondylolisthesis L4 on L5. Mild anterior superior endplate compression deformity L1 with approximately 20% anterior loss of height.  -CT lumbar spine ordered.  -Xray roslyn hips and pelvis in process.  -Fall precautions.  -Start scheduled robaxin, gabapentin, and lidocaine patches.  -PRN tylenol, norco  -PT/OT appreciate recs.    Amnestic MCI (mild cognitive impairment with memory loss)  -Continue namenda.    Thrombocytopenia, unspecified  -Chronic, stable.  -Monitor and transfuse for <50K if bleeding or <10K if not bleeding  -Avoid antiplatelet medications including Lovenox, heparin,NSAIDs and aspirin.  -Follows with hem/onc outpatient.    Hypercholesteremia   Patient is chronically on statin.will continue for now. Monitor clinically. Last LDL was   Lab Results   Component Value Date    LDLCALC 56.6 (L) 04/21/2021        Thyroid disease  Patient has chronic hypothyroidism. TFTs reviewed-   Lab Results   Component  Value Date    TSH 4.827 (H) 01/07/2022   . Will continue chronic levothyroxine and adjust for and clinical changes.    VTE Risk Mitigation (From admission, onward)           Ordered     IP VTE HIGH RISK PATIENT  Once         11/29/22 2318     Place sequential compression device  Until discontinued         11/29/22 2318     Reason for No Pharmacological VTE Prophylaxis  Once        Question:  Reasons:  Answer:  Thrombocytopenia    11/29/22 2318                       Johanna Morrison NP  Department of Hospital Medicine   [facility] - Emergency Dept

## 2022-11-30 NOTE — ED NOTES
Telemetry Verification   Patient placed on Telemetry Box  Verified with War Room  Box # [id number]   Monitor Tech    Rate    Rhythm

## 2022-11-30 NOTE — SUBJECTIVE & OBJECTIVE
Interval History: NAEON. Pt seen and evaluated at bedside this am. Pt doing well pain well controlled w/ pain management regimen. NSGY consulted. PTOT to evaluate and treat.    Review of Systems   Constitutional:  Positive for fever. Negative for activity change, chills, diaphoresis and fatigue.   HENT:  Negative for congestion, facial swelling, hearing loss, rhinorrhea and sore throat.    Eyes:  Negative for photophobia, itching and visual disturbance.   Respiratory:  Negative for cough, chest tightness, shortness of breath and wheezing.    Cardiovascular:  Negative for chest pain, palpitations and leg swelling.   Gastrointestinal:  Negative for abdominal distention, abdominal pain, blood in stool, constipation, diarrhea, nausea and vomiting.   Genitourinary:  Positive for dysuria. Negative for difficulty urinating, frequency, hematuria and urgency.   Musculoskeletal:  Positive for back pain. Negative for arthralgias and neck stiffness.   Skin:  Negative for rash.   Neurological:  Negative for dizziness, tremors, seizures, syncope, weakness, light-headedness, numbness and headaches.   Psychiatric/Behavioral:  Negative for agitation, confusion and hallucinations.    Objective:     Vital Signs (Most Recent):  Temp: 97.5 °F (36.4 °C) (11/30/22 1149)  Pulse: (!) 59 (11/30/22 1149)  Resp: 18 (11/30/22 1244)  BP: 134/71 (11/30/22 1149)  SpO2: (!) 91 % (11/30/22 1149)   Vital Signs (24h Range):  Temp:  [96.6 °F (35.9 °C)-98.6 °F (37 °C)] 97.5 °F (36.4 °C)  Pulse:  [56-67] 59  Resp:  [16-19] 18  SpO2:  [91 %-98 %] 91 %  BP: (134-183)/(68-81) 134/71     Weight: 63.7 kg (140 lb 6.9 oz)  Body mass index is 27.43 kg/m².  No intake or output data in the 24 hours ending 11/30/22 1415   Physical Exam  Vitals and nursing note reviewed.   Constitutional:       General: She is not in acute distress.     Appearance: She is not toxic-appearing or diaphoretic.   HENT:      Head: Normocephalic and atraumatic.      Nose: Nose normal.       Mouth/Throat:      Mouth: Mucous membranes are moist.   Eyes:      Pupils: Pupils are equal, round, and reactive to light.   Cardiovascular:      Rate and Rhythm: Normal rate and regular rhythm.      Pulses: Normal pulses.   Pulmonary:      Effort: Pulmonary effort is normal. No respiratory distress.      Breath sounds: No wheezing, rhonchi or rales.   Abdominal:      General: Bowel sounds are normal. There is no distension.      Palpations: Abdomen is soft.      Tenderness: There is no abdominal tenderness. There is no guarding.   Musculoskeletal:         General: No swelling or deformity.      Cervical back: Normal range of motion. No tenderness.      Thoracic back: No tenderness.      Lumbar back: No deformity or tenderness. Decreased range of motion (2/2 pain).   Skin:     General: Skin is warm and dry.      Capillary Refill: Capillary refill takes less than 2 seconds.   Neurological:      General: No focal deficit present.      Mental Status: She is alert and oriented to person, place, and time. Mental status is at baseline.      Sensory: No sensory deficit.      Motor: Weakness (4/5 strength to BLE, 5/5 strength to BUE) present.   Psychiatric:         Mood and Affect: Mood normal.         Behavior: Behavior normal.       Significant Labs: All pertinent labs within the past 24 hours have been reviewed.    Significant Imaging: I have reviewed all pertinent imaging results/findings within the past 24 hours.

## 2022-11-30 NOTE — ASSESSMENT & PLAN NOTE
Compression deformity of vertebra  -Labs largely unremarkable. UA with UTI, likely contributing to weakness.  -Patient also reports poor oral intake for the last several days.  -Xray lumbar spine with grade 2 anterior spondylolisthesis L4 on L5. Mild anterior superior endplate compression deformity L1 with approximately 20% anterior loss of height.  -CT lumbar spine ordered.  -Xray roslyn hips and pelvis in process.  -Fall precautions.  -Start scheduled robaxin, gabapentin, and lidocaine patches.  -PRN tylenol, norco  -PT/OT appreciate recs.

## 2022-11-30 NOTE — HOSPITAL COURSE
Placed on observation s/p mechanical fall while at home. Xray bilat hips were negative for fractures as well as the CXR which showed no cardiopulmonary abnormalities. CT lumbar spine revealed compression fraction of L1 vertebrae and grade 2 anterolisthesis of L4 on L5 associated with pars defect and mild spinal canal stenosis. Neurosurgery consulted and recommendations advise TLSO brace, obtain upright + flex xrays of lumbar spine with brace to ensure stability of compression fracture followed by PT/OT. UA results showed haziness and many bacteria with RBCs + WBCs. Urine culture pending and blood cultures x2 have shown no growth to date. IV rocephin was initiated and will continue course as prescribed. Patient completed 3 day course. Pain controlled with norco, robaxin, gabapentin, and lidocaine patches. Plan discharge tomorrow to SNF pending Xray results per neurosurgery recommendations. NSGY reviewed imaging. Will follow-up with patient in clinic in 4-6 weeks with repeat imaging. Patient is stable to transfer to SNF facility. All questions answered at bedside.

## 2022-11-30 NOTE — HPI
"Rosario Menendez is a 87 y.o. female with a past medical history significant for osteoporosis, thrombocytopenia, hypothyroidism, mild cognitive impairment, depression, and arthritis. She presents to [facility] after she tripped and fell onto her buttocks while in the kitchen. Patient states she "bounced" during her fall.  After fall she noted buttock and lower back pain. Buttock pain is worse than lower back pain. Pain started after fall and is exacerbated by movement. She denies radiating leg pain. She denies numbness, tinging, saddle anesthesia, or bowel dysfunction. Lumbar x-rays (AP/Lateral) obtained in ED for lower back pain revealing L1 compression fracture with 20% anterior height loss, and grade 2 anterolisthesis of L4 on L5. Lumbar CT 11/30 re demonstrating L1 compression fracture, and grade 2 anterolisthesis for L4 on L5 with associated pars defect.     Notes recent UTI with persistent dysuria s/p antibiotic treatment. She denies chills, nausea, or vomiting. Endorses slight subjective fever yesterday. UA 11/29 obtained in ED showing +nitrite, 3+leukocytes, many bacteria, and >100 WBC. UA culture pending. Blood culture NGTD.     Of note, she has baseline thrombocytopenia. Today platelets are 28.  "

## 2022-11-30 NOTE — CONSULTS
"[facility]  Neurosurgery  Consult Note    Inpatient consult to Neurosurgery  Consult performed by: Chacha Ozuna PA-C  Consult ordered by: Gayle Brewer PA-C        Subjective:     Chief Complaint/Reason for Admission: fall    History of Present Illness: Rosario Menendez is a 87 y.o. female with a past medical history significant for osteoporosis, thrombocytopenia, hypothyroidism, mild cognitive impairment, depression, and arthritis. She presents to [facility] after she tripped and fell onto her buttocks while in the kitchen. Patient states she "bounced" during her fall.  After fall she noted buttock and lower back pain. Buttock pain is worse than lower back pain. Pain started after fall and is exacerbated by movement. She denies radiating leg pain. She denies numbness, tinging, saddle anesthesia, or bowel dysfunction. Lumbar x-rays (AP/Lateral) obtained in ED for lower back pain revealing L1 compression fracture with 20% anterior height loss, and grade 2 anterolisthesis of L4 on L5. Lumbar CT 11/30 re demonstrating L1 compression fracture, and grade 2 anterolisthesis for L4 on L5 with associated pars defect.     Notes recent UTI with persistent dysuria s/p antibiotic treatment. She denies chills, nausea, or vomiting. Endorses slight subjective fever yesterday. UA 11/29 obtained in ED showing +nitrite, 3+leukocytes, many bacteria, and >100 WBC. UA culture pending. Blood culture NGTD.     Of note, she has baseline thrombocytopenia. Today platelets are 28.      Medications Prior to Admission   Medication Sig Dispense Refill Last Dose    losartan (COZAAR) 50 MG tablet TAKE 1 TABLET EVERY DAY 90 tablet 1     alendronate (FOSAMAX) 70 MG tablet Take 1 tablet (70 mg total) by mouth every 7 days. 4 tablet 11     buPROPion (WELLBUTRIN XL) 150 MG TB24 tablet Take 1 tablet (150 mg total) by mouth once daily. 90 tablet 2     cyanocobalamin (VITAMIN B-12) 1000 MCG tablet Take 100 mcg by mouth once daily.   "     galantamine (RAZADYNE ER) 16 MG 24 hr capsule TAKE 1 CAPSULE EVERY DAY WITH BREAKFAST 90 capsule 0     levothyroxine (SYNTHROID) 100 MCG tablet TAKE 1 TABLET (100 MCG TOTAL) BY MOUTH BEFORE BREAKFAST. 90 tablet 1     losartan (COZAAR) 50 MG tablet TAKE 1 TABLET EVERY DAY 90 tablet 3     losartan (COZAAR) 50 MG tablet Take 1 tablet (50 mg total) by mouth once daily. 90 tablet 0     memantine (NAMENDA) 10 MG Tab TAKE 1 TABLET TWICE DAILY 180 tablet 1     pravastatin (PRAVACHOL) 40 MG tablet Take 1 tablet (40 mg total) by mouth once daily. 90 tablet 0        Review of patient's allergies indicates:  No Known Allergies    Past Medical History:   Diagnosis Date    Arthritis     Depression     Hypercholesteremia     Thrombocytopenia     Thyroid disease      Past Surgical History:   Procedure Laterality Date    ANKLE SURGERY      COLONOSCOPY N/A 6/2/2021    Procedure: COLONOSCOPY;  Surgeon: Alfonso Haque MD;  Location: [address];  Service: Endoscopy;  Laterality: N/A;  any crs  covid test 5/30-[facility]    HYSTERECTOMY      KNEE SURGERY      WRIST SURGERY       Family History       Problem Relation (Age of Onset)    Cancer Maternal Uncle    Heart failure Mother, Father    Suicide Son          Tobacco Use    Smoking status: Never    Smokeless tobacco: Never   Substance and Sexual Activity    Alcohol use: Not on file    Drug use: Not on file    Sexual activity: Not on file     Review of Systems   Constitutional:  Positive for fever. Negative for chills.   HENT:  Negative for hearing loss.    Eyes:  Negative for visual disturbance.   Respiratory:  Negative for shortness of breath.    Cardiovascular:  Negative for chest pain.   Gastrointestinal:  Negative for vomiting.   Genitourinary:  Positive for dysuria.   Musculoskeletal:  Positive for back pain.   Skin:  Negative for rash.   Neurological:  Negative for syncope and numbness.   Objective:     Weight: 63.7 kg (140 lb 6.9 oz)  Body mass  index is 27.43 kg/m².  Vital Signs (Most Recent):  Temp: 97.5 °F (36.4 °C) (11/30/22 0837)  Pulse: (!) 58 (11/30/22 0837)  Resp: 19 (11/30/22 0837)  BP: (!) 150/68 (11/30/22 0837)  SpO2: (!) 94 % (11/30/22 0837)   Vital Signs (24h Range):  Temp:  [96.6 °F (35.9 °C)-98.6 °F (37 °C)] 97.5 °F (36.4 °C)  Pulse:  [56-67] 58  Resp:  [16-20] 19  SpO2:  [94 %-98 %] 94 %  BP: (133-183)/(68-92) 150/68       Neurosurgery Physical Exam  General: well developed, well nourished, no distress.   Head: normocephalic, atraumatic  Neck: No tracheal deviation. No palpable masses. Full ROM.   Neurologic: Alert. Slight confused present.  Mental Status: Awake, Alert, Oriented x3   Language: No aphasia  Speech: No dysarthria  Cranial nerves: face symmetric, tongue midline, CN II-XII grossly intact.   Eyes: EOMI.   Pulmonary: normal respirations, no signs of respiratory distress  Abdomen: soft, slightly-distended, not tender to palpation    Sensory: intact to light touch throughout  Motor Strength: Moves all extremities spontaneously with good tone.  Full strength upper and lower extremities. No abnormal movements seen.     Strength  Deltoids Triceps Biceps Wrist Extension Wrist Flexion Hand    Upper: R 5/5 5/5 5/5 5/5 5/5 5/5    L 5/5 5/5 5/5 5/5 5/5 5/5     Strength  Iliopsoas Quadriceps Knee  Flexion Tibialis  anterior Gastro- cnemius EHL   Lower: R 5/5 5/5 5/5 5/5 5/5 5/5    L 5/5 5/5 5/5 5/5 5/5 5/5     Vascular: No LE edema.   Skin: Skin is warm, dry and intact.    Reflexes:   Kim's: Negative  Clonus: Negative    Midline lumbar TTP: negative  TTP at coccyx/buttock: positive    Significant Labs:  Recent Labs   Lab 11/29/22  1602 11/30/22  0358   GLU 93 94    139   K 4.3 4.0    109   CO2 24 22*   BUN 14 12   CREATININE 0.9 0.8   CALCIUM 10.0 10.0   MG  --  2.2     Recent Labs   Lab 11/29/22  1602 11/30/22  0358   WBC 8.95 6.64   HGB 15.0 14.4   HCT 45.0 43.1   PLT 37* 28*     No results for input(s): LABPT, INR, APTT  in the last 48 hours.  Microbiology Results (last 7 days)       Procedure Component Value Units Date/Time    Blood Culture #1 **CANNOT BE ORDERED STAT** [[id number]] Collected: 11/29/22 1826    Order Status: Completed Specimen: Blood from Peripheral, Forearm, Right Updated: 11/30/22 0145     Blood Culture, Routine No Growth to date    Blood Culture #2 **CANNOT BE ORDERED STAT** [[id number]] Collected: 11/29/22 1826    Order Status: Completed Specimen: Blood from Peripheral, Antecubital, Right Updated: 11/30/22 0145     Blood Culture, Routine No Growth to date    Urine culture [[id number]] Collected: 11/29/22 1600    Order Status: No result Specimen: Urine Updated: 11/29/22 1711          All pertinent labs from the last 24 hours have been reviewed.    Significant Diagnostics:  I have reviewed all pertinent imaging results/findings within the past 24 hours.    Assessment/Plan:     Compression deformity of vertebra  87 y.o. female with a pmhx of osteoporosis, thrombocytopenia, mild cognitive impairment, and arthritis. Patient tripped and fell onto buttocks yesterday with residual lower back pain after fall. Lumbar CT showing L1 compression fracture. Neurosurgery consulted for further eval.    --Admitted to  for treatment of UTI  --All pertinent labs and diagnostics personally reviewed.  --Thrombocytopenia: Chronic issue. Platelets 28 on 11/30.  --UTI: UA 11/29 obtained in ED showing +nitrite, 3+leukocytes, many bacteria, and >100 WBC. UA culture pending. Currently on Rocephin for treatment per primary.   --Lumbar x-rays (AP/Lateral) 11/29: revealing L1 compression fracture with 20% anterior height loss, and grade 2 anterolisthesis of L4 on L5.   --Lumbar CT 11/30 re demonstrating L1 compression fracture, and grade 2 anterolisthesis for L4 on L5 with associated pars defect. Severe R and moderate L neural foraminal narrowing.     Plan:  --No acute surgical intervention  --Obtain lumbar xrays upright/supine to assess  compression fracture  --Obtain lumbar flex/ex xrays to assess for dynamic instability  --TLSO brace ordered/at bedside for comfort  --Final plan pending completion of imaging.    Discussed with Dr. Rivera          Thank you for your consult. I will follow-up with patient. Please contact us if you have any additional questions.    Chacha Ozuna PA-C  Neurosurgery  [facility]

## 2022-11-30 NOTE — ASSESSMENT & PLAN NOTE
-Chronic, stable.  -Monitor and transfuse for <50K if bleeding or <10K if not bleeding  -Avoid antiplatelet medications including Lovenox, heparin,NSAIDs and aspirin.  -Follows with hem/onc outpatient.

## 2022-12-01 ENCOUNTER — HOSPITAL ENCOUNTER (INPATIENT)
Facility: HOSPITAL | Age: 87
LOS: 20 days | Discharge: HOME HEALTH | DRG: 560 | End: 2022-12-21
Attending: HOSPITALIST | Admitting: HOSPITALIST
Payer: MEDICARE

## 2022-12-01 VITALS
BODY MASS INDEX: 27.57 KG/M2 | WEIGHT: 140.44 LBS | HEIGHT: 60 IN | OXYGEN SATURATION: 93 % | HEART RATE: 57 BPM | SYSTOLIC BLOOD PRESSURE: 109 MMHG | DIASTOLIC BLOOD PRESSURE: 56 MMHG | TEMPERATURE: 98 F | RESPIRATION RATE: 18 BRPM

## 2022-12-01 DIAGNOSIS — S32.000A COMPRESSION FX, LUMBAR SPINE: ICD-10-CM

## 2022-12-01 DIAGNOSIS — S32.010A CLOSED COMPRESSION FRACTURE OF BODY OF L1 VERTEBRA: Primary | ICD-10-CM

## 2022-12-01 LAB
ANION GAP SERPL CALC-SCNC: 6 MMOL/L (ref 8–16)
BACTERIA UR CULT: ABNORMAL
BUN SERPL-MCNC: 12 MG/DL (ref 8–23)
CALCIUM SERPL-MCNC: 9.3 MG/DL (ref 8.7–10.5)
CHLORIDE SERPL-SCNC: 109 MMOL/L (ref 95–110)
CO2 SERPL-SCNC: 19 MMOL/L (ref 23–29)
CREAT SERPL-MCNC: 0.8 MG/DL (ref 0.5–1.4)
EST. GFR  (NO RACE VARIABLE): >60 ML/MIN/1.73 M^2
GLUCOSE SERPL-MCNC: 87 MG/DL (ref 70–110)
MAGNESIUM SERPL-MCNC: 2.2 MG/DL (ref 1.6–2.6)
POTASSIUM SERPL-SCNC: 4.3 MMOL/L (ref 3.5–5.1)
SODIUM SERPL-SCNC: 134 MMOL/L (ref 136–145)

## 2022-12-01 PROCEDURE — 96366 THER/PROPH/DIAG IV INF ADDON: CPT

## 2022-12-01 PROCEDURE — 25000003 PHARM REV CODE 250: Performed by: HOSPITALIST

## 2022-12-01 PROCEDURE — 80048 BASIC METABOLIC PNL TOTAL CA: CPT | Performed by: NURSE PRACTITIONER

## 2022-12-01 PROCEDURE — 99214 OFFICE O/P EST MOD 30 MIN: CPT | Mod: ,,, | Performed by: PHYSICIAN ASSISTANT

## 2022-12-01 PROCEDURE — 97530 THERAPEUTIC ACTIVITIES: CPT

## 2022-12-01 PROCEDURE — 99217 PR OBSERVATION CARE DISCHARGE: ICD-10-PCS | Mod: ,,,

## 2022-12-01 PROCEDURE — 94761 N-INVAS EAR/PLS OXIMETRY MLT: CPT

## 2022-12-01 PROCEDURE — 99214 PR OFFICE/OUTPT VISIT, EST, LEVL IV, 30-39 MIN: ICD-10-PCS | Mod: ,,, | Performed by: PHYSICIAN ASSISTANT

## 2022-12-01 PROCEDURE — 83735 ASSAY OF MAGNESIUM: CPT | Performed by: NURSE PRACTITIONER

## 2022-12-01 PROCEDURE — 97161 PT EVAL LOW COMPLEX 20 MIN: CPT

## 2022-12-01 PROCEDURE — 25000003 PHARM REV CODE 250: Performed by: NURSE PRACTITIONER

## 2022-12-01 PROCEDURE — 63600175 PHARM REV CODE 636 W HCPCS

## 2022-12-01 PROCEDURE — 63700000 PHARM REV CODE 250 ALT 637 W/O HCPCS: Performed by: NURSE PRACTITIONER

## 2022-12-01 PROCEDURE — 99217 PR OBSERVATION CARE DISCHARGE: CPT | Mod: ,,,

## 2022-12-01 PROCEDURE — 11000004 HC SNF PRIVATE

## 2022-12-01 PROCEDURE — 36415 COLL VENOUS BLD VENIPUNCTURE: CPT | Performed by: NURSE PRACTITIONER

## 2022-12-01 PROCEDURE — G0378 HOSPITAL OBSERVATION PER HR: HCPCS

## 2022-12-01 RX ORDER — ACETAMINOPHEN 325 MG/1
650 TABLET ORAL EVERY 6 HOURS PRN
Status: DISCONTINUED | OUTPATIENT
Start: 2022-12-01 | End: 2022-12-21 | Stop reason: HOSPADM

## 2022-12-01 RX ORDER — GALANTAMINE 4 MG/1
4 TABLET, FILM COATED ORAL
Status: DISCONTINUED | OUTPATIENT
Start: 2022-12-02 | End: 2022-12-05

## 2022-12-01 RX ORDER — HYDROCODONE BITARTRATE AND ACETAMINOPHEN 5; 325 MG/1; MG/1
1 TABLET ORAL EVERY 6 HOURS PRN
Qty: 20 TABLET | Refills: 0 | Status: ON HOLD | OUTPATIENT
Start: 2022-12-01 | End: 2022-12-20

## 2022-12-01 RX ORDER — ALENDRONATE SODIUM 70 MG/1
70 TABLET ORAL
Qty: 4 TABLET | Refills: 11 | Status: SHIPPED | OUTPATIENT
Start: 2022-12-01 | End: 2022-12-01 | Stop reason: SDUPTHER

## 2022-12-01 RX ORDER — CALCIUM CARBONATE 200(500)MG
500 TABLET,CHEWABLE ORAL 2 TIMES DAILY PRN
Status: DISCONTINUED | OUTPATIENT
Start: 2022-12-01 | End: 2022-12-21 | Stop reason: HOSPADM

## 2022-12-01 RX ORDER — HYDROCODONE BITARTRATE AND ACETAMINOPHEN 5; 325 MG/1; MG/1
1 TABLET ORAL EVERY 6 HOURS PRN
Status: DISCONTINUED | OUTPATIENT
Start: 2022-12-01 | End: 2022-12-21 | Stop reason: HOSPADM

## 2022-12-01 RX ORDER — MEMANTINE HYDROCHLORIDE 10 MG/1
10 TABLET ORAL DAILY
Status: DISCONTINUED | OUTPATIENT
Start: 2022-12-02 | End: 2022-12-05

## 2022-12-01 RX ORDER — BUPROPION HYDROCHLORIDE 150 MG/1
150 TABLET ORAL DAILY
Status: DISCONTINUED | OUTPATIENT
Start: 2022-12-02 | End: 2022-12-21 | Stop reason: HOSPADM

## 2022-12-01 RX ORDER — LIDOCAINE 50 MG/G
1 PATCH TOPICAL
Status: DISPENSED | OUTPATIENT
Start: 2022-12-01 | End: 2022-12-11

## 2022-12-01 RX ORDER — LEVOTHYROXINE SODIUM 100 UG/1
100 TABLET ORAL
Status: DISCONTINUED | OUTPATIENT
Start: 2022-12-02 | End: 2022-12-21 | Stop reason: HOSPADM

## 2022-12-01 RX ORDER — METHOCARBAMOL 500 MG/1
500 TABLET, FILM COATED ORAL 4 TIMES DAILY
Qty: 40 TABLET | Refills: 0 | Status: ON HOLD | OUTPATIENT
Start: 2022-12-01 | End: 2022-12-20 | Stop reason: HOSPADM

## 2022-12-01 RX ORDER — AMOXICILLIN 250 MG
1 CAPSULE ORAL 2 TIMES DAILY
Status: DISCONTINUED | OUTPATIENT
Start: 2022-12-01 | End: 2022-12-07

## 2022-12-01 RX ORDER — ALENDRONATE SODIUM 70 MG/1
70 TABLET ORAL
Qty: 4 TABLET | Refills: 11 | Status: ON HOLD | OUTPATIENT
Start: 2023-01-30 | End: 2023-07-18 | Stop reason: SDUPTHER

## 2022-12-01 RX ORDER — LOSARTAN POTASSIUM 25 MG/1
25 TABLET ORAL DAILY
Status: DISCONTINUED | OUTPATIENT
Start: 2022-12-02 | End: 2022-12-02 | Stop reason: SDUPTHER

## 2022-12-01 RX ORDER — LOSARTAN POTASSIUM 50 MG/1
50 TABLET ORAL DAILY
Status: DISCONTINUED | OUTPATIENT
Start: 2022-12-02 | End: 2022-12-21 | Stop reason: HOSPADM

## 2022-12-01 RX ORDER — PRAVASTATIN SODIUM 20 MG/1
40 TABLET ORAL DAILY
Status: DISCONTINUED | OUTPATIENT
Start: 2022-12-02 | End: 2022-12-21 | Stop reason: HOSPADM

## 2022-12-01 RX ORDER — LANOLIN ALCOHOL/MO/W.PET/CERES
1000 CREAM (GRAM) TOPICAL DAILY
Status: DISCONTINUED | OUTPATIENT
Start: 2022-12-02 | End: 2022-12-21 | Stop reason: HOSPADM

## 2022-12-01 RX ORDER — LIDOCAINE 50 MG/G
1 PATCH TOPICAL DAILY
Qty: 20 PATCH | Refills: 0 | Status: ON HOLD | OUTPATIENT
Start: 2022-12-01 | End: 2022-12-20

## 2022-12-01 RX ORDER — TALC
6 POWDER (GRAM) TOPICAL NIGHTLY PRN
Status: DISCONTINUED | OUTPATIENT
Start: 2022-12-01 | End: 2022-12-21 | Stop reason: HOSPADM

## 2022-12-01 RX ORDER — METHOCARBAMOL 500 MG/1
500 TABLET, FILM COATED ORAL 4 TIMES DAILY
Status: COMPLETED | OUTPATIENT
Start: 2022-12-01 | End: 2022-12-11

## 2022-12-01 RX ADMIN — ACETAMINOPHEN 650 MG: 325 TABLET ORAL at 08:12

## 2022-12-01 RX ADMIN — SENNOSIDES AND DOCUSATE SODIUM 1 TABLET: 50; 8.6 TABLET ORAL at 08:12

## 2022-12-01 RX ADMIN — PRAVASTATIN SODIUM 40 MG: 40 TABLET ORAL at 08:12

## 2022-12-01 RX ADMIN — BUPROPION HYDROCHLORIDE 150 MG: 150 TABLET, FILM COATED, EXTENDED RELEASE ORAL at 08:12

## 2022-12-01 RX ADMIN — HYDROCODONE BITARTRATE AND ACETAMINOPHEN 1 TABLET: 5; 325 TABLET ORAL at 06:12

## 2022-12-01 RX ADMIN — GABAPENTIN 100 MG: 100 CAPSULE ORAL at 08:12

## 2022-12-01 RX ADMIN — Medication 6 MG: at 10:12

## 2022-12-01 RX ADMIN — SENNOSIDES AND DOCUSATE SODIUM 1 TABLET: 50; 8.6 TABLET ORAL at 10:12

## 2022-12-01 RX ADMIN — METHOCARBAMOL 500 MG: 500 TABLET ORAL at 08:12

## 2022-12-01 RX ADMIN — MEMANTINE 10 MG: 10 TABLET ORAL at 08:12

## 2022-12-01 RX ADMIN — METHOCARBAMOL 500 MG: 500 TABLET ORAL at 10:12

## 2022-12-01 RX ADMIN — ACETAMINOPHEN 650 MG: 325 TABLET ORAL at 12:12

## 2022-12-01 RX ADMIN — CYANOCOBALAMIN TAB 250 MCG 250 MCG: 250 TAB at 08:12

## 2022-12-01 RX ADMIN — LEVOTHYROXINE SODIUM 100 MCG: 100 TABLET ORAL at 06:12

## 2022-12-01 RX ADMIN — HYDROCODONE BITARTRATE AND ACETAMINOPHEN 1 TABLET: 5; 325 TABLET ORAL at 04:12

## 2022-12-01 RX ADMIN — METHOCARBAMOL 500 MG: 500 TABLET ORAL at 04:12

## 2022-12-01 RX ADMIN — CEFTRIAXONE 1 G: 1 INJECTION, SOLUTION INTRAVENOUS at 02:12

## 2022-12-01 RX ADMIN — GALANTAMINE 16 MG: 16 CAPSULE, EXTENDED RELEASE ORAL at 06:12

## 2022-12-01 RX ADMIN — GABAPENTIN 100 MG: 100 CAPSULE ORAL at 02:12

## 2022-12-01 RX ADMIN — LIDOCAINE 5% 1 PATCH: 700 PATCH TOPICAL at 10:12

## 2022-12-01 RX ADMIN — LOSARTAN POTASSIUM 50 MG: 50 TABLET, FILM COATED ORAL at 08:12

## 2022-12-01 RX ADMIN — METHOCARBAMOL 500 MG: 500 TABLET ORAL at 02:12

## 2022-12-01 NOTE — ASSESSMENT & PLAN NOTE
87 y.o. female with a pmhx of osteoporosis, thrombocytopenia, mild cognitive impairment, and arthritis. Patient tripped and fell onto buttocks yesterday with residual lower back pain after fall. Lumbar CT showing L1 compression fracture. Neurosurgery consulted for further eval.    --Admitted to  for treatment of UTI  --All pertinent labs and diagnostics personally reviewed.  --Thrombocytopenia: Chronic issue. Platelets 28 on 11/30.  --UTI: UA 11/29 obtained in ED showing +nitrite, 3+leukocytes, many bacteria, and >100 WBC. UA culture growing GNR. Currently on Rocephin for treatment per primary.   --Lumbar x-rays (AP/Lateral) 11/29: revealing L1 compression fracture with 20% anterior height loss, and grade 2 anterolisthesis of L4 on L5.   --Lumbar CT 11/30 re demonstrating L1 compression fracture, and grade 2 anterolisthesis for L4 on L5 with associated pars defect. Severe R and moderate L neural foraminal narrowing.     Plan:  --No acute surgical intervention  --Obtain lumbar xrays upright/supine with increased height loss upon standing. No kyphotic deformity  --Obtain lumbar flex/ex xrays reviewed re-demonstrating L4-L5 grade 2 anterolisthesis  --TLSO brace ordered/at bedside for comfort  --Will schedule outpatient follow up in 4-6 weeks with repeat Lumbar xrays. Neurosurgery will arrange.   --NSGY will sign off at this time. Please page with questions, concerns, or acute neuro changes.     Discussed with Dr. Rivera

## 2022-12-01 NOTE — PLAN OF CARE
SW met with pt and pt's daughter to discuss discharge plan.  SW provided education on SNF services.  Pt and pt's daughter agreeable.  SW sent referral via [facility].      SW sent referrals to the following facilities in pt's network:  [facility], [facility], and [facility].      12/01/22 1129   Post-Acute Status   Post-Acute Authorization Placement   Post-Acute Placement Status Referrals Sent   Discharge Delays None known at this time   Discharge Plan   Discharge Plan A Skilled Nursing Facility   Discharge Plan B Home;Home with family;Home Health     Vanessa Hamilton LMSW  PRN-  [facility]  Ext. [phone]

## 2022-12-01 NOTE — PLAN OF CARE
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       Patient is homebound due to:  Fall    Allergies:Review of patient's allergies indicates:  No Known Allergies    Vitals:  Routine    Diet: regular diet    Activities:   Activity as tolerated    Goals of Care Treatment Preferences:  Code Status: Full Code      Labs:  Per facility protocol     Nursing Precautions:  Fall and Pressure ulcer prevention    Consults:   PT to evaluate and treat- 5 times a week and OT to evaluate and treat- 5 times a week                 Medications: Discontinue all previous medication orders, if any. See new list below.     Medication List        START taking these medications      HYDROcodone-acetaminophen 5-325 mg per tablet  Commonly known as: NORCO  Take 1 tablet by mouth every 6 (six) hours as needed for Pain.     LIDOcaine 5 %  Commonly known as: LIDODERM  Place 1 patch onto the skin once daily. Remove & Discard patch within 12 hours or as directed by MD for 10 days     methocarbamoL 500 MG Tab  Commonly known as: ROBAXIN  Take 1 tablet (500 mg total) by mouth 4 (four) times daily. for 10 days            CHANGE how you take these medications      alendronate 70 MG tablet  Commonly known as: FOSAMAX  Take 1 tablet (70 mg total) by mouth every 7 days.  Start taking on: January 30, 2023  What changed: These instructions start on  January 30, 2023. If you are unsure what to do until then, ask your doctor or other care provider.     losartan 50 MG tablet  Commonly known as: COZAAR  Take 1 tablet (50 mg total) by mouth once daily.  What changed: Another medication with the same name was removed. Continue taking this medication, and follow the directions you see here.            CONTINUE taking these medications      buPROPion 150 MG TB24 tablet  Commonly known as: WELLBUTRIN XL  Take 1 tablet (150 mg total) by mouth once daily.     cyanocobalamin 1000 MCG tablet  Commonly known as: VITAMIN B-12  Take 100 mcg by mouth once daily.     galantamine 16 MG 24 hr capsule  Commonly known as: RAZADYNE ER  TAKE 1 CAPSULE EVERY DAY WITH BREAKFAST     levothyroxine 100 MCG tablet  Commonly known as: SYNTHROID  TAKE 1 TABLET (100 MCG TOTAL) BY MOUTH BEFORE BREAKFAST.     memantine 10 MG Tab  Commonly known as: NAMENDA  TAKE 1 TABLET TWICE DAILY     pravastatin 40 MG tablet  Commonly known as: PRAVACHOL  Take 1 tablet (40 mg total) by mouth once daily.                Immunizations Administered as of 12/1/2022       Name Date Dose VIS Date Route Exp Date    COVID-19, MRNA, LN-S, PF (Pfizer) (Purple Cap) 3/29/2021 -- -- Intramuscular --    Site: Right deltoid     : Pf[employer] Inc     Lot: [id number]     COVID-19, MRNA, LN-S, PF (Pfizer) (Purple Cap) 3/8/2021 -- -- Intramuscular --    Site: Left deltoid     : Pf[employer] Inc     Lot: [id number]             This patient has had both covid vaccinations    Some patients may experience side effects after vaccination.  These may include fever, headache, muscle or joint aches.  Most symptoms resolve with 24-48 hours and do not require urgent medical evaluation unless they persist for more than 72 hours or symptoms are concerning for an unrelated medical condition.          _________________________________  Helga Herndon PA-C  12/01/2022

## 2022-12-01 NOTE — PT/OT/SLP EVAL
Physical Therapy Evaluation and Treatment     Patient Name:  Rosario Menendez  MRN: [id number]    Admit Date: 11/29/2022  Admitting Diagnosis:  Fall  Length of Stay: 0 days  Recent Surgery: * No surgery found *      Recommendations:     Discharge Recommendations: Skilled Nursing Facility   Equipment recommendations: none  Barriers to discharge: Decreased caregiver support available  and Fall risk     Assessment:     Rosario Menendez is a 87 y.o. female admitted to [facility] on 11/29/2022 with medical diagnosis of Fall. Pt presents with weakness, impaired endurance, impaired self care skills, impaired functional mobility, gait instability, impaired balance, pain. These deficits effect their roles and responsibilities in which they were able to complete prior to admit. Rosario Menendez would benefit from acute PT intervention to improve quality of life, focus on recovery of impairments, provide patient/caregiver education, reduce fall risk, and maximize (I) and safety with functional mobility. Pt is motivated to participate in therapy, able to follow commands appropriately and demonstrated excellent potential for recovery of impairments. Once medically stable, recommending pt discharge to Skilled Nursing Facility .      Rehab Prognosis: Good    Plan:     During this hospitalization, patient to be seen 4 x/week to address the identified rehab impairments via gait training, therapeutic activities, therapeutic exercises, neuromuscular re-education and progress towards stated goals.     Plan of Care Expires:  12/31/22  Plan of Care reviewed with: patient, daughter    This plan of care has been discussed with the patient/caregiver, who was included in its development and is in agreement with the identified goals and treatment plan.     Subjective     Communicated with RN prior to session.  Patient found HOB elevated upon PT entry to room, agreeable to evaluation. Pt's daughter present during session.    Chief Complaint: Fall  "(Pt had a tripped and fall. Denies hitting head. Reports tailbone pain. )      Patient/Family Comments/goals: "My back hurts"; pt reported feeling uncomfortable 2/2 feeling frequent need to urinate, but difficulty going     Pain/Comfort:  Pain Rating 1:  (did not rate)  Location 1: back  Pain Addressed 1: Distraction, Reposition, Nurse notified  Pain Rating Post-Intervention 1:  (did not rate)    Patients cultural, spiritual, [employer] conflicts given the current situation: None identified     Patient History: information obtained from pt      Living Environment: Pt lives alone in single level home  with 1 MITZI. Bathroom set-up: walk-in shower with built-in bench and shower chair option   Prior Level of Function: modified (I) for mobility and ADLs using rollator as AD   DME owned: rolling walker, shower chair, and rollator  Support Available/Caregiver Assistance: family support is limited (pt does not have 24/7 assist)    Objective:     Patient found with: telemetry, [place]    Recent Surgery: * No surgery found *    General Precautions: Standard, fall   Orthopedic Precautions:N/A   Braces: TLSO   Oxygen Device: room air      Exams:    Cognition:  Oriented X 4 and Alert  Command following: Follows multistep verbal commands  Communication: clear/fluent    Sensation:   Light touch sensation: Intact BLEs    Edema/Skin Integrity: None noted; Visible skin intact    Postural examination/scapula alignment: Rounded shoulder    Lower Extremity Range of Motion:  Right Lower Extremity: WFL  Left Lower Extremity: WFL    Lower Extremity Strength:  Right Lower Extremity:  grossly 3+/5   Left Lower Extremity: grossly 3+/5     Functional Mobility:    Bed Mobility:  Rolling to right with minimal assistance  Supine > Sit with minimal assistance  *pt educated on log rolling technique to improve pain management/tolerance for bed mobility     Transfers:   Sit <> Stand Transfer: Minimal Assistance x 1 trials from EOB with RW, x 1 trial " from bedside commode with RW   Bed <> Bedside commode: Minimal Assistance with RW               Gait:  Distance: ~4 ft.   Assistance level: Minimal Assistance  Assistive Device: rolling walker  Gait Assessment: decreased step length , decreased gait speed, narrow base of support, and unsteady gait     Balance:  Standing:  Static: FAIR: Maintains without assist but unable to take challenges   Dynamic: POOR+: Needs MIN (minimal ) assist during gait    Outcome Measure: AM-PAC 6 CLICK MOBILITY  Total Score:17     Patient/Caregiver Education:     Therapist educated pt/caregiver regarding:   PT POC and goals for therapy   Log rolling techniques   Donning/doffing TLSO for comfort/pain management per MD recs. Pt required max A to don/doff TLSO   Safety with mobility and fall risk   RW management   Instruction on use of call button and importance of calling nursing staff for assistance with mobility   Time provided for therapeutic counseling and discussion of current health disposition. All questions answered to satisfaction, within scope of PT practice     Patient/caregiver able to verbalize understanding and expressed no further questions this visit; will follow-up with pt/caregiver during current admit for additional questions/concerns within scope of practice.     White board updated.     Patient left up in chair with all lines intact, call button in reach, RN notified, and daughter present.    GOALS:   Multidisciplinary Problems       Physical Therapy Goals          Problem: Physical Therapy    Goal Priority Disciplines Outcome Goal Variances Interventions   Physical Therapy Goal     PT, PT/OT Ongoing, Progressing     Description: Goals to be met by: 12/15/22     Patient will increase functional independence with mobility by performin. Supine to sit with Modified [place]  2. Sit to supine with Modified [place]  3. Sit to stand transfer with Supervision  4. Gait  x 100 feet with Supervision using Rolling  Walker.   5. Lower extremity exercise program x20 reps per handout, with supervision                           History:     Past Medical History:   Diagnosis Date    Arthritis     Depression     Hypercholesteremia     Thrombocytopenia     Thyroid disease        Past Surgical History:   Procedure Laterality Date    ANKLE SURGERY      COLONOSCOPY N/A 6/2/2021    Procedure: COLONOSCOPY;  Surgeon: Alfonso Haque MD;  Location: [address]);  Service: Endoscopy;  Laterality: N/A;  any crs  covid test 5/30-[facility]    HYSTERECTOMY      KNEE SURGERY      WRIST SURGERY         Time Tracking:     PT Received On: 12/01/22  PT Start Time: 1034     PT Stop Time: 1114  PT Total Time (min): 40 min     Billable Minutes: Evaluation 15 min and Therapeutic Activity 25 min    12/01/2022

## 2022-12-01 NOTE — PROGRESS NOTES
"[facility]  Neurosurgery  Progress Note    Subjective:     History of Present Illness: Rosario Menendez is a 87 y.o. female with a past medical history significant for osteoporosis, thrombocytopenia, hypothyroidism, mild cognitive impairment, depression, and arthritis. She presents to [facility] after she tripped and fell onto her buttocks while in the kitchen. Patient states she "bounced" during her fall.  After fall she noted buttock and lower back pain. Buttock pain is worse than lower back pain. Pain started after fall and is exacerbated by movement. She denies radiating leg pain. She denies numbness, tinging, saddle anesthesia, or bowel dysfunction. Lumbar x-rays (AP/Lateral) obtained in ED for lower back pain revealing L1 compression fracture with 20% anterior height loss, and grade 2 anterolisthesis of L4 on L5. Lumbar CT 11/30 re demonstrating L1 compression fracture, and grade 2 anterolisthesis for L4 on L5 with associated pars defect.     Notes recent UTI with persistent dysuria s/p antibiotic treatment. She denies chills, nausea, or vomiting. Endorses slight subjective fever yesterday. UA 11/29 obtained in ED showing +nitrite, 3+leukocytes, many bacteria, and >100 WBC. UA culture pending. Blood culture NGTD.     Of note, she has baseline thrombocytopenia. Today platelets are 28.      Post-Op Info:  * No surgery found *         Interval History: Neuro stable. Endorsing abdominal pain. UA with GNR. Reporting improvement of lower back pain. No new weakness or numbness. Lumbar xrays with slight increased height loss upon standing but without kyphotic deformity. OK to follow up outpatient - NSGY will arrange.     Medications:  Continuous Infusions:  Scheduled Meds:   buPROPion  150 mg Oral Daily    cefTRIAXone (ROCEPHIN) IVPB  1 g Intravenous Q24H    cyanocobalamin  250 mcg Oral Daily    gabapentin  100 mg Oral TID    galantamine  16 mg Oral Before breakfast    levothyroxine  100 mcg Oral " Before breakfast    LIDOcaine  1 patch Transdermal Q24H    losartan  50 mg Oral Daily    memantine  10 mg Oral BID    methocarbamoL  500 mg Oral QID    pravastatin  40 mg Oral Daily    senna-docusate 8.6-50 mg  1 tablet Oral Daily     PRN Meds:acetaminophen, dextrose 10%, dextrose 10%, glucagon (human recombinant), glucose, glucose, HYDROcodone-acetaminophen, influenza 65up-adj, melatonin, naloxone, ondansetron, polyethylene glycol, sodium chloride 0.9%     Review of Systems  Objective:     Weight: 63.7 kg (140 lb 6.9 oz)  Body mass index is 27.43 kg/m².  Vital Signs (Most Recent):  Temp: 97.7 °F (36.5 °C) (12/01/22 0811)  Pulse: (!) 51 (12/01/22 0811)  Resp: 18 (12/01/22 0811)  BP: (!) 111/56 (12/01/22 0811)  SpO2: (!) 94 % (12/01/22 0811)   Vital Signs (24h Range):  Temp:  [97 °F (36.1 °C)-98.8 °F (37.1 °C)] 97.7 °F (36.5 °C)  Pulse:  [50-66] 51  Resp:  [16-20] 18  SpO2:  [91 %-94 %] 94 %  BP: (111-143)/(56-77) 111/56     Neurosurgery Physical Exam  General: well developed, well nourished, no distress.   Head: normocephalic, atraumatic  Neck: No tracheal deviation. No palpable masses. Full ROM.   Neurologic: Alert. Slight confused present.  Mental Status: Awake, Alert, Oriented x3   Language: No aphasia  Speech: No dysarthria  Cranial nerves: face symmetric, tongue midline, CN II-XII grossly intact.   Eyes: EOMI.   Pulmonary: normal respirations, no signs of respiratory distress  Abdomen: soft, slightly-distended, not tender to palpation     Sensory: intact to light touch throughout  Motor Strength: Moves all extremities spontaneously with good tone.  Full strength upper and lower extremities. No abnormal movements seen.      Strength   Deltoids Triceps Biceps Wrist Extension Wrist Flexion Hand    Upper: R 5/5 5/5 5/5 5/5 5/5 5/5     L 5/5 5/5 5/5 5/5 5/5 5/5      Strength   Iliopsoas Quadriceps Knee  Flexion Tibialis  anterior Gastro- cnemius EHL   Lower: R 5/5 5/5 5/5 5/5 5/5 5/5     L 5/5 5/5 5/5 5/5  5/5 5/5      Vascular: No LE edema.   Skin: Skin is warm, dry and intact.       Midline lumbar TTP: negative  SI joint tenderness negative bilaterally  TTP at coccyx/buttock: positive  Significant Labs:  Recent Labs   Lab 11/29/22  1602 11/30/22  0358 12/01/22  0447   GLU 93 94 87    139 134*   K 4.3 4.0 4.3    109 109   CO2 24 22* 19*   BUN 14 12 12   CREATININE 0.9 0.8 0.8   CALCIUM 10.0 10.0 9.3   MG  --  2.2 2.2     Recent Labs   Lab 11/29/22  1602 11/30/22  0358   WBC 8.95 6.64   HGB 15.0 14.4   HCT 45.0 43.1   PLT 37* 28*     No results for input(s): LABPT, INR, APTT in the last 48 hours.  Microbiology Results (last 7 days)       Procedure Component Value Units Date/Time    Blood Culture #2 **CANNOT BE ORDERED STAT** [[id number]] Collected: 11/29/22 1826    Order Status: Completed Specimen: Blood from Peripheral, Antecubital, Right Updated: 11/30/22 2012     Blood Culture, Routine No Growth to date      No Growth to date    Blood Culture #1 **CANNOT BE ORDERED STAT** [[id number]] Collected: 11/29/22 1826    Order Status: Completed Specimen: Blood from Peripheral, Forearm, Right Updated: 11/30/22 2012     Blood Culture, Routine No Growth to date      No Growth to date    Urine culture [[id number]]  (Abnormal) Collected: 11/29/22 1600    Order Status: Completed Specimen: Urine Updated: 11/30/22 1238     Urine Culture, Routine GRAM NEGATIVE DIEGO  >100,000 cfu/ml  Identification and susceptibility pending      Narrative:      Specimen Source->Urine          All pertinent labs from the last 24 hours have been reviewed.    Significant Diagnostics:  I have reviewed all pertinent imaging results/findings within the past 24 hours.    Assessment/Plan:     Closed compression fracture of body of L1 vertebra  87 y.o. female with a pmhx of osteoporosis, thrombocytopenia, mild cognitive impairment, and arthritis. Patient tripped and fell onto buttocks yesterday with residual lower back pain after fall. Lumbar CT  showing L1 compression fracture. Neurosurgery consulted for further eval.    --Admitted to  for treatment of UTI  --All pertinent labs and diagnostics personally reviewed.  --Thrombocytopenia: Chronic issue. Platelets 28 on 11/30.  --UTI: UA 11/29 obtained in ED showing +nitrite, 3+leukocytes, many bacteria, and >100 WBC. UA culture growing GNR. Currently on Rocephin for treatment per primary.   --Lumbar x-rays (AP/Lateral) 11/29: revealing L1 compression fracture with 20% anterior height loss, and grade 2 anterolisthesis of L4 on L5.   --Lumbar CT 11/30 re demonstrating L1 compression fracture, and grade 2 anterolisthesis for L4 on L5 with associated pars defect. Severe R and moderate L neural foraminal narrowing.     Plan:  --No acute surgical intervention  --Obtain lumbar xrays upright/supine with increased height loss upon standing. No kyphotic deformity  --Obtain lumbar flex/ex xrays reviewed re-demonstrating L4-L5 grade 2 anterolisthesis  --TLSO brace ordered/at bedside for comfort  --Will schedule outpatient follow up in 4-6 weeks with repeat Lumbar xrays. Neurosurgery will arrange.   --NSGY will sign off at this time. Please page with questions, concerns, or acute neuro changes.     Discussed with Dr. Nicole Ozuna PA-C  Neurosurgery  [facility]

## 2022-12-01 NOTE — PLAN OF CARE
[facility]  Initial Discharge Assessment       Primary Care Provider: Shi Simon MD    Admission Diagnosis: Acute cystitis with hematuria [N30.01]  Generalized weakness [R53.1]  Closed compression fracture of L1 vertebra, initial encounter [S32.010A]    Admission Date: 11/29/2022  Expected Discharge Date: 12/2/2022    Discharge Barriers Identified: None    Payor: [employer] MANAGED MEDICARE / Plan: [employer] MEDICARE HMO / Product Type: Capitation /     Extended Emergency Contact Information  Primary Emergency Contact: Aditi Qiu  Address: [address] [place] [id number] [place]  Mobile Phone: [phone]  Relation: Daughter   needed? No    Discharge Plan A: Skilled Nursing Facility  Discharge Plan B: Home, Home with family, [facility]      [facility] - [place], [place] - [address]  [address] [facility] [address]  Phone: [phone] Fax: [phone]    [facility] - [place] - [address]   [address] [place] [address]  Phone: [phone] Fax: [phone]      Initial Assessment (most recent)       Adult Discharge Assessment - 12/01/22 1142          Discharge Assessment    Assessment Type Discharge Planning Assessment     Confirmed/corrected address, phone number and insurance Yes     Confirmed Demographics Correct on Facesheet     Source of Information patient     Communicated AMARA with patient/caregiver Yes     Reason For Admission Fall     Lives With alone     Do you expect to return to your current living situation? Yes     Do you have help at home or someone to help you manage your care at home? No     Current cognitive status: Alert/Oriented     Walking or Climbing Stairs Difficulty ambulation difficulty, requires equipment     Mobility Management RW, Wheel Chair, Rollator, and Straight Cane.     Dressing/Bathing Difficulty none     Equipment Currently Used at Home walker,  rolling;wheelchair;rollator;cane, straight     Readmission within 30 days? No     Patient currently being followed by outpatient case management? No     Do you currently have service(s) that help you manage your care at home? No     Do you take prescription medications? Yes     Do you have prescription coverage? Yes     Coverage [employer] Man[employer] Medicare     Do you have any problems affording any of your prescribed medications? No     Is the patient taking medications as prescribed? yes     Who is going to help you get home at discharge? Pt's family will provide transportation home.     How do you get to doctors appointments? family or friend will provide     Are you on dialysis? No     Do you take coumadin? No     Discharge Plan A Skilled Nursing Facility     Discharge Plan B Home;Home with family;Home Health     DME Needed Upon Discharge  other (see comments)   TBD    Discharge Plan discussed with: Patient;Adult children     Discharge Barriers Identified None                      SW completed initial assessment with pt and pt's daughter, Aditi.  Pt's family will provide transportation home.     Pt lives alone in a [facility].  Pt has family support with her five adult kids. Pt has the following DME at home:  Walker, rolling, wheel chair, rollator, and straight cane.    Pt does not receive coumadin or dialysis.      Disp:  [facility] sent.  [facility] following.     Vanessa Hamilton LMSW  PRN-  [facility]  Ext. [phone]

## 2022-12-01 NOTE — PLAN OF CARE
Pt will discharge to [facility].  Pt and pt's daughter notified of discharge.      SCOTT scheduled d/c transportation to [facility] through [facility]. Patient is scheduled to be picked up at 5:30p.m.. SCOTT provided patient's nurse with report number [phone]; ask for the nurse for 3rd Floor.  SCOTT is in communication with patient's CM and patient's Care Team.  Pt will transport via stretcher/ room air.     7:36 PM  SCOTT telephoned [facility] to verify transport to [facility] and was advised ETA between 6:30-8:30p.m.     12/01/22 1644   Post-Acute Status   Post-Acute Authorization Placement   Post-Acute Placement Status Set-up Complete/Auth obtained  (OS[facility])   Discharge Delays None known at this time   Discharge Plan   Discharge Plan A Skilled Nursing Facility   Discharge Plan B Home;Home with family;Home Health     Vanessa Hamilton LMSW  PRN-  [facility]  Ext. [phone]

## 2022-12-01 NOTE — SUBJECTIVE & OBJECTIVE
Interval History: Neuro stable. Endorsing abdominal pain. UA with GNR. Reporting improvement of lower back pain. No new weakness or numbness. Lumbar xrays with slight increased height loss upon standing but without kyphotic deformity. OK to follow up outpatient - NSGY will arrange.     Medications:  Continuous Infusions:  Scheduled Meds:   buPROPion  150 mg Oral Daily    cefTRIAXone (ROCEPHIN) IVPB  1 g Intravenous Q24H    cyanocobalamin  250 mcg Oral Daily    gabapentin  100 mg Oral TID    galantamine  16 mg Oral Before breakfast    levothyroxine  100 mcg Oral Before breakfast    LIDOcaine  1 patch Transdermal Q24H    losartan  50 mg Oral Daily    memantine  10 mg Oral BID    methocarbamoL  500 mg Oral QID    pravastatin  40 mg Oral Daily    senna-docusate 8.6-50 mg  1 tablet Oral Daily     PRN Meds:acetaminophen, dextrose 10%, dextrose 10%, glucagon (human recombinant), glucose, glucose, HYDROcodone-acetaminophen, influenza 65up-adj, melatonin, naloxone, ondansetron, polyethylene glycol, sodium chloride 0.9%     Review of Systems  Objective:     Weight: 63.7 kg (140 lb 6.9 oz)  Body mass index is 27.43 kg/m².  Vital Signs (Most Recent):  Temp: 97.7 °F (36.5 °C) (12/01/22 0811)  Pulse: (!) 51 (12/01/22 0811)  Resp: 18 (12/01/22 0811)  BP: (!) 111/56 (12/01/22 0811)  SpO2: (!) 94 % (12/01/22 0811)   Vital Signs (24h Range):  Temp:  [97 °F (36.1 °C)-98.8 °F (37.1 °C)] 97.7 °F (36.5 °C)  Pulse:  [50-66] 51  Resp:  [16-20] 18  SpO2:  [91 %-94 %] 94 %  BP: (111-143)/(56-77) 111/56     Neurosurgery Physical Exam  General: well developed, well nourished, no distress.   Head: normocephalic, atraumatic  Neck: No tracheal deviation. No palpable masses. Full ROM.   Neurologic: Alert. Slight confused present.  Mental Status: Awake, Alert, Oriented x3   Language: No aphasia  Speech: No dysarthria  Cranial nerves: face symmetric, tongue midline, CN II-XII grossly intact.   Eyes: EOMI.   Pulmonary: normal respirations, no signs  of respiratory distress  Abdomen: soft, slightly-distended, not tender to palpation     Sensory: intact to light touch throughout  Motor Strength: Moves all extremities spontaneously with good tone.  Full strength upper and lower extremities. No abnormal movements seen.      Strength   Deltoids Triceps Biceps Wrist Extension Wrist Flexion Hand    Upper: R 5/5 5/5 5/5 5/5 5/5 5/5     L 5/5 5/5 5/5 5/5 5/5 5/5      Strength   Iliopsoas Quadriceps Knee  Flexion Tibialis  anterior Gastro- cnemius EHL   Lower: R 5/5 5/5 5/5 5/5 5/5 5/5     L 5/5 5/5 5/5 5/5 5/5 5/5      Vascular: No LE edema.   Skin: Skin is warm, dry and intact.       Midline lumbar TTP: negative  SI joint tenderness negative bilaterally  TTP at coccyx/buttock: positive  Significant Labs:  Recent Labs   Lab 11/29/22  1602 11/30/22  0358 12/01/22  0447   GLU 93 94 87    139 134*   K 4.3 4.0 4.3    109 109   CO2 24 22* 19*   BUN 14 12 12   CREATININE 0.9 0.8 0.8   CALCIUM 10.0 10.0 9.3   MG  --  2.2 2.2     Recent Labs   Lab 11/29/22  1602 11/30/22  0358   WBC 8.95 6.64   HGB 15.0 14.4   HCT 45.0 43.1   PLT 37* 28*     No results for input(s): LABPT, INR, APTT in the last 48 hours.  Microbiology Results (last 7 days)       Procedure Component Value Units Date/Time    Blood Culture #2 **CANNOT BE ORDERED STAT** [[id number]] Collected: 11/29/22 1826    Order Status: Completed Specimen: Blood from Peripheral, Antecubital, Right Updated: 11/30/22 2012     Blood Culture, Routine No Growth to date      No Growth to date    Blood Culture #1 **CANNOT BE ORDERED STAT** [[id number]] Collected: 11/29/22 1826    Order Status: Completed Specimen: Blood from Peripheral, Forearm, Right Updated: 11/30/22 2012     Blood Culture, Routine No Growth to date      No Growth to date    Urine culture [[id number]]  (Abnormal) Collected: 11/29/22 1600    Order Status: Completed Specimen: Urine Updated: 11/30/22 1238     Urine Culture, Routine GRAM NEGATIVE  DIEGO  >100,000 cfu/ml  Identification and susceptibility pending      Narrative:      Specimen Source->Urine          All pertinent labs from the last 24 hours have been reviewed.    Significant Diagnostics:  I have reviewed all pertinent imaging results/findings within the past 24 hours.

## 2022-12-01 NOTE — PLAN OF CARE
Plan of Care:  PT evaluation completed- see note for details. Goals and POC established.     Discharge Recommendation: SNF.  Please continue Progressive Mobility Protocol as appropriate.  Pt to be seen 4x/week during acute hospitalization to address listed goals below.     2022    Physical Therapy Treatment Goals:  Multidisciplinary Problems       Physical Therapy Goals          Problem: Physical Therapy    Goal Priority Disciplines Outcome Goal Variances Interventions   Physical Therapy Goal     PT, PT/OT Ongoing, Progressing     Description: Goals to be met by: 12/15/22     Patient will increase functional independence with mobility by performin. Supine to sit with Modified [place]  2. Sit to supine with Modified [place]  3. Sit to stand transfer with Supervision  4. Gait  x 100 feet with Supervision using Rolling Walker.   5. Lower extremity exercise program x20 reps per handout, with supervision

## 2022-12-01 NOTE — DISCHARGE INSTRUCTIONS
Neurosurgery Discharge Instructions  --follow up in 4-6 weeks with repeat imagine to evaluate stability of L1 compression fracture  --wear brace for comfort    Contact the Neurosurgery Office immediately if:  If you begin to notice any neurologic changes such as:           -Sudden onset of lethargy or sleepiness           -Sudden confusion, trouble speaking, or understanding            -Sudden trouble seeing in one or both eyes            -Sudden trouble walking, dizziness, loss of coordination            -Sudden severe headache with no known cause            -Sudden onset of numbness or weakness     Please contact the Neurosurgery Office at [phone] with any questions or concerns.   Our after hours number is [phone] - ask for Neurosurgery On Call.

## 2022-12-01 NOTE — PLAN OF CARE
PT rested this shift with some complaints of pain.       Problem: Adult Inpatient Plan of Care  Goal: Plan of Care Review  Outcome: Ongoing, Progressing  Goal: Patient-Specific Goal (Individualized)  Outcome: Ongoing, Progressing  Goal: Absence of Hospital-Acquired Illness or Injury  Outcome: Ongoing, Progressing  Goal: Optimal Comfort and Wellbeing  Outcome: Ongoing, Progressing  Goal: Readiness for Transition of Care  Outcome: Ongoing, Progressing     Problem: Fall Injury Risk  Goal: Absence of Fall and Fall-Related Injury  Outcome: Ongoing, Progressing                                                                                                                                                                           Problem: Adult Inpatient Plan of Care  Goal: Plan of Care Review  Outcome: Ongoing, Progressing  Goal: Patient-Specific Goal (Individualized)  Outcome: Ongoing, Progressing  Goal: Absence of Hospital-Acquired Illness or Injury  Outcome: Ongoing, Progressing  Goal: Optimal Comfort and Wellbeing  Outcome: Ongoing, Progressing  Goal: Readiness for Transition of Care  Outcome: Ongoing, Progressing     Problem: Fall Injury Risk  Goal: Absence of Fall and Fall-Related Injury  Outcome: Ongoing, Progressing

## 2022-12-02 PROCEDURE — 11000004 HC SNF PRIVATE

## 2022-12-02 PROCEDURE — 97530 THERAPEUTIC ACTIVITIES: CPT

## 2022-12-02 PROCEDURE — 97165 OT EVAL LOW COMPLEX 30 MIN: CPT

## 2022-12-02 PROCEDURE — 97535 SELF CARE MNGMENT TRAINING: CPT

## 2022-12-02 PROCEDURE — 97162 PT EVAL MOD COMPLEX 30 MIN: CPT

## 2022-12-02 PROCEDURE — 25000003 PHARM REV CODE 250: Performed by: HOSPITALIST

## 2022-12-02 RX ADMIN — HYDROCODONE BITARTRATE AND ACETAMINOPHEN 1 TABLET: 5; 325 TABLET ORAL at 01:12

## 2022-12-02 RX ADMIN — LOSARTAN POTASSIUM 25 MG: 25 TABLET, FILM COATED ORAL at 09:12

## 2022-12-02 RX ADMIN — CYANOCOBALAMIN TAB 1000 MCG 1000 MCG: 1000 TAB at 09:12

## 2022-12-02 RX ADMIN — HYDROCODONE BITARTRATE AND ACETAMINOPHEN 1 TABLET: 5; 325 TABLET ORAL at 09:12

## 2022-12-02 RX ADMIN — METHOCARBAMOL 500 MG: 500 TABLET ORAL at 09:12

## 2022-12-02 RX ADMIN — SENNOSIDES AND DOCUSATE SODIUM 1 TABLET: 50; 8.6 TABLET ORAL at 09:12

## 2022-12-02 RX ADMIN — METHOCARBAMOL 500 MG: 500 TABLET ORAL at 06:12

## 2022-12-02 RX ADMIN — METHOCARBAMOL 500 MG: 500 TABLET ORAL at 01:12

## 2022-12-02 RX ADMIN — HYDROCODONE BITARTRATE AND ACETAMINOPHEN 1 TABLET: 5; 325 TABLET ORAL at 06:12

## 2022-12-02 RX ADMIN — LEVOTHYROXINE SODIUM 100 MCG: 100 TABLET ORAL at 05:12

## 2022-12-02 RX ADMIN — PRAVASTATIN SODIUM 40 MG: 20 TABLET ORAL at 09:12

## 2022-12-02 RX ADMIN — MEMANTINE 10 MG: 10 TABLET ORAL at 09:12

## 2022-12-02 RX ADMIN — BUPROPION HYDROCHLORIDE 150 MG: 150 TABLET, FILM COATED, EXTENDED RELEASE ORAL at 09:12

## 2022-12-02 RX ADMIN — Medication 6 MG: at 09:12

## 2022-12-02 NOTE — PLAN OF CARE
Problem: Physical Therapy  Goal: Physical Therapy Goal  Description: Goals to be met by: 1/1/23    Patient will increase functional independence with mobility by performing:    . Supine to sit with Stand-by Assistance  . Sit to supine with Stand-by Assistance  . Rolling to Left and Right with Stand-by Assistance.  . Sit to stand transfer with Stand-by Assistance  . Bed to chair transfer with Stand-by Assistance using Rolling Walker  . Gait  x 100 feet with Stand-by Assistance using Rolling Walker.   . Wheelchair propulsion x100 feet with Supervision using bilateral uppper extremities    Outcome: Ongoing, Progressing

## 2022-12-02 NOTE — PLAN OF CARE
[facility]  Discharge Final Note    Primary Care Provider: Shi Simon MD    Expected Discharge Date: 12/1/2022    Pt discharged to [facility].  Pt discharged via stretcher/room air.     Final Discharge Note (most recent)       Final Note - 12/01/22 9193          Final Note    Assessment Type Final Discharge Note     Anticipated Discharge Disposition Skilled Nursing Facility   [facility]       Post-Acute Status    Post-Acute Authorization Placement     Post-Acute Placement Status Set-up Complete/Auth obtained     Discharge Delays None known at this time                     Important Message from Medicare           Future Appointments   Date Time Provider Department Center   1/10/2023  2:30 PM [facility] XR-A LIMIT 350 LBS [facility] XRAY OP [facility] [facility]   1/10/2023  3:30 PM Candy Yang PA-C [facility] NEUROSU [facility] [facility]   4/4/2023  1:00 PM Shi Simon MD [facility] LMSW  PRN-  [facility]  Ext. [phone]

## 2022-12-02 NOTE — PT/OT/SLP EVAL
Physical Therapy Evaluation    Patient Name:  Rosario Menendez   MRN:  [id number]  Admit Date: 12/1/2022  Admitting Diagnosis: Falls    General Precautions: Standard, fall   Orthopedic Precautions:N/A   Braces: TLSO     Recommendations:     Discharge Recommendations:  home health PT   Level of Assistance Recommended: 24 hours light assistance  Discharge Equipment Recommendations: wheelchair   Barriers to discharge: None    Assessment:     Rosario Menendez is a 87 y.o. female admitted with a medical diagnosis of <principal problem not specified> Falls.  Performance deficits affecting function  weakness, impaired endurance, impaired self care skills, impaired functional mobility, gait instability, impaired balance, decreased upper extremity function, decreased lower extremity function, pain, impaired cardiopulmonary response to activity .pt was Mod I PTA and now requires ModA/Gagandeep for all functional tasks. Pt will therefore benefit from continued SNF rehab.    Rehab Potential is good     Activity Tolerance: Good    Plan:     Patient to be seen 6 x/week to address the above listed problems via gait training, therapeutic activities, therapeutic exercises, neuromuscular re-education, wheelchair management/training     Plan of Care Expires: 01/01/23  Plan of Care Reviewed with: patient    Subjective     Chief Complaint: pain  Patient/Family Comments/goals: gain (I)  Pain/Comfort:  Pain Rating 1: 3/10  Location - Side 1: Bilateral  Location - Orientation 1: generalized  Location 1: back  Pain Addressed 1: Pre-medicate for activity, Reposition, Distraction  Pain Rating Post-Intervention 1: 3/10    Living Environment:   Pt lives alone in single level home  with 1 MITZI. Bathroom set-up: walk-in shower with built-in bench and shower chair option   Prior Level of Function: modified (I) for mobility and ADLs using rollator as AD   DME owned: rolling walker, shower chair, and rollator  DME no using: [facility]  Support  Available/Caregiver Assistance: family support is limited (pt does not have 24/7 assist)    Patients cultural, spiritual, Buddhist conflicts given the current situation: no    Objective:     Communicated with pt's nurse prior to session.  Patient found up in chair with    upon PT entry to room.    Exams:  Cognitive Exam:  Patient is oriented to Person, Place, and Situation  Gross Motor Coordination:  WFL  Sensation:    -       Intact  Skin Integrity/Edema:      -       Skin integrity: Visible skin intact  RLE ROM: WFL  RLE Strength: WFL  LLE ROM: WFL  LLE Strength: WNL    Functional Mobility:     12/02/22 1155   Prior Functioning: Everyday Activities   Self Care Independent   Indoor Mobility (Ambulation) Independent   Stairs Unknown   Functional Cognition Independent   Prior Device Use Walker   Toileting Hygiene   Assistance Needed Supervision;Verbal cues   CARE Score - Toileting Hygiene 4   Roll Left and Right   Physical Assistance Level 25% or less   Comment per OT eval   CARE Score - Roll Left and Right 3   Sit to Lying   Physical Assistance Level 26%-50%   Comment per OT eval   CARE Score - Sit to Lying 3   Lying to Sitting on Side of Bed   Physical Assistance Level 26%-50%   Comment per OT eval   CARE Score - Lying to Sitting on Side of Bed 3   Sit to Stand   Physical Assistance Level 25% or less   Comment with RW   CARE Score - Sit to Stand 3   Chair/Bed-to-Chair Transfer   Physical Assistance Level 25% or less   Comment with RW, SPT   CARE Score - Chair/Bed-to-Chair Transfer 3   Chair/Bed-to-Chair Transfer Discharge Goal   Discharge Goal 4   Toilet Transfer   Physical Assistance Level 25% or less   Comment using grab bars   CARE Score - Toilet Transfer 3   Car Transfer   Reason if not Attempted Environmental limitations   CARE Score - Car Transfer 10   Walk 10 Feet   Physical Assistance Level 25% or less   Comment with RW, Gagandeep to keep trunk extended, decrease step length and ekta. pt became fatigued at  17ft   CARE Score - Walk 10 Feet 3   Walk 50 Feet with Two Turns   Reason if not Attempted Safety concerns   CARE Score - Walk 50 Feet with Two Turns 88   Walk 150 Feet   Reason if not Attempted Safety concerns   CARE Score - Walk 150 Feet 88   Walking 10 Feet on Uneven Surfaces   Reason if not Attempted Safety concerns   CARE Score - Walking 10 Feet on Uneven Surfaces 88   1 Step (Curb)   Reason if not Attempted Safety concerns   CARE Score - 1 Step (Curb) 88   4 Steps   Reason if not Attempted Safety concerns   CARE Score - 4 Steps 88   12 Steps   Reason if not Attempted Safety concerns   CARE Score - 12 Steps 88   Picking Up Object   Reason if not Attempted Safety concerns   CARE Score - Picking Up Object 88   Uses a Wheelchair/Scooter?   Uses a Wheelchair/Scooter? Yes   Wheel 50 Feet with Two Turns   Physical Assistance Level 26%-50%   Comment using B U/E's   CARE Score - Wheel 50 Feet with Two Turns 3   Type of Wheelchair/Scooter Manual   Wheel 150 Feet   Reason if not Attempted Safety concerns   CARE Score - Wheel 150 Feet 88   Type of Wheelchair/Scooter Manual       Therapeutic Activities and Exercises: Additional time spent with pt completing toileting.    Education:  Patient provided with daily orientation and goals of this PT session.  Patient educated to transfer to bedside chair/bedside commode/bathroom with RN/PCT present.   Patient educated on Fall risk and plan of care by explanation.   Patient Verbalized Understanding.  Time provided for therapeutic counseling and discussion of current health disposition. All questions answered to satisfaction, within scope of PT practice; voiced no other concerns. White board updated in patient's room, RN notified of session.     AM-PAC 6 CLICK MOBILITY  Total Score:16     Patient left up in chair with call button in reach.    GOALS:   Multidisciplinary Problems       Physical Therapy Goals          Problem: Physical Therapy    Goal Priority Disciplines Outcome Goal  Variances Interventions   Physical Therapy Goal     PT, PT/OT Ongoing, Progressing     Description: Goals to be met by: 1/1/23    Patient will increase functional independence with mobility by performing:    . Supine to sit with Stand-by Assistance  . Sit to supine with Stand-by Assistance  . Rolling to Left and Right with Stand-by Assistance.  . Sit to stand transfer with Stand-by Assistance  . Bed to chair transfer with Stand-by Assistance using Rolling Walker  . Gait  x 100 feet with Stand-by Assistance using Rolling Walker.   . Wheelchair propulsion x100 feet with Supervision using bilateral uppper extremities                         History:     Past Medical History:   Diagnosis Date    Arthritis     Depression     Hypercholesteremia     Thrombocytopenia     Thyroid disease        Past Surgical History:   Procedure Laterality Date    ANKLE SURGERY      COLONOSCOPY N/A 6/2/2021    Procedure: COLONOSCOPY;  Surgeon: Alfonso Haque MD;  Location: [address];  Service: Endoscopy;  Laterality: N/A;  any crs  covid test 5/30-[facility]    HYSTERECTOMY      KNEE SURGERY      WRIST SURGERY         Time Tracking:     PT Received On: 12/02/22  PT Start Time: 1150     PT Stop Time: 1218  PT Total Time (min): 28 min     Billable Minutes: Evaluation 20mins and Therapeutic Activity 8mins      12/02/2022

## 2022-12-02 NOTE — PLAN OF CARE
Problem: Occupational Therapy  Goal: Occupational Therapy Goal  Description: Goals to be met by: 3 weeks     Patient will increase functional independence with ADLs by performing:    UE Dressing with Supervision.  LE Dressing with Supervision.  Grooming while seated at sink with Set-up Assistance.  Toileting from toilet with Stand-by Assistance for hygiene and clothing management.   Bathing sitting at sink with Stand-by Assistance.  Supine to sit with Modified [place].  Step transfer with Supervision with appropriate A/D  Upper extremity exercise with supervision.  Caregiver will be educated on level of assist required to safely perform self care tasks and functional transfers..   Outcome: Ongoing, Progressing

## 2022-12-02 NOTE — PROGRESS NOTES
"                                                        [facility]                                  Skilled Nursing Facility                   Progress Note     Admit Date: 12/1/2022  AMARA   Principal Problem:  <principal problem not specified>   HPI obtained from patient interview and chart review     Chief Complaint: Establish Care/ Initial Visit     HPI:   Rosario Menendez is a 87 y.o. female with a past medical history significant for osteoporosis, thrombocytopenia, hypothyroidism, mild cognitive impairment, depression, and arthritis. [place] [facility] [place]pped and fell onto her buttocks while in the kitchen. Patient states she "bounced" during her fall.  After fall she noted buttock and lower back pain. Buttock pain is worse than lower back pain. Pain started after fall and is exacerbated by movement. She denies radiating leg pain. She denies numbness, tinging, saddle anesthesia, or bowel dysfunction. Lumbar x-rays (AP/Lateral) obtained in ED for lower back pain revealing L1 compression fracture with 20% anterior height loss, and grade 2 anterolisthesis of L4 on L5. Lumbar CT 11/30 re demonstrating L1 compression fracture, and grade 2 anterolisthesis for L4 on L5 with associated pars defect.      Notes recent UTI with persistent dysuria s/p antibiotic treatment. She denies chills, nausea, or vomiting. Endorses slight subjective fever yesterday. UA 11/29 obtained in ED showing +nitrite, 3+leukocytes, many bacteria, and >100 WBC. UA culture pending. Blood culture NGTD. Of note, she has baseline thrombocytopenia.     Patient will be treated at [facility] with PT and OT to improve functional status and ability to perform ADLs.     Past Medical History: Patient has a past medical history of Arthritis, Depression, Hypercholesteremia, Thrombocytopenia, and Thyroid disease.    Past Surgical History: Patient has a past surgical history that includes Hysterectomy; Knee surgery; Ankle " surgery; Wrist surgery; and Colonoscopy (N/A, 6/2/2021).    Social History: Patient reports that she has never smoked. She has never used smokeless tobacco.    Family History: family history includes Cancer in her maternal uncle; Heart failure in her father and mother; Suicide in her son.    Allergies: Patient has No Known Allergies.    ROS  Constitutional: Negative for fever   Eyes: Negative for blurred vision, double vision   Respiratory: Negative for cough, shortness of breath   Cardiovascular: Negative for chest pain, palpitations, and leg swelling.   Gastrointestinal: Negative for abdominal pain, constipation, diarrhea, nausea, vomiting.   Genitourinary: Negative for dysuria, frequency   Musculoskeletal:  + generalized weakness. + for back pain and myalgias.   Skin: Negative for itching and rash.   Neurological: Negative for dizziness, headaches.   Psychiatric/Behavioral: Negative for depression. The patient is not nervous/anxious.      24 hour Vital Sign Range   Temp:  [97.7 °F (36.5 °C)-98.3 °F (36.8 °C)]   Pulse:  [51-57]   Resp:  [17-18]   BP: (109-146)/(56-68)   SpO2:  [93 %]     Current BMI: Body mass index is 28.07 kg/m².    PEx  Constitutional: Patient appears debilitated.  No distress noted  HENT:   Head: Normocephalic and atraumatic.   Eyes: Pupils are equal, round  Neck: Normal range of motion. Neck supple.   Cardiovascular: Normal rate, regular rhythm and normal heart sounds.    Pulmonary/Chest: Effort normal and breath sounds are clear  Abdominal: Soft. Bowel sounds are normal.   Musculoskeletal: Normal range of motion.   Neurological: Alert and oriented to person,   Psychiatric: Normal mood and affect. Behavior is normal. +forgetful   Skin: Skin is warm and dry. Full skin assessment    No results for input(s): GLUCOSE, NA, K, CL, CO2, BUN, CREATININE, MG in the last 24 hours.    Invalid input(s):  CALCIUM    No results for input(s): WBC, RBC, HGB, HCT, PLT, MCV, MCH, MCHC in the last 24  hours.      Assessment and Plan:  Closed compression fracture of body of L1 vertebra  -87 y.o. female with a pmhx of osteoporosis, thrombocytopenia, mild cognitive impairment, and arthritis. Patient tripped and fell onto buttocks yesterday with residual lower back pain after fall. Lumbar CT showing L1 compression fracture. Neurosurgery consulted for further eval.  -Lumbar x-rays (AP/Lateral) 11/29: revealing L1 compression fracture with 20% anterior height loss, and grade 2 anterolisthesis of L4 on L5.   -Lumbar CT 11/30 re demonstrating L1 compression fracture, and grade 2 anterolisthesis for L4 on L5 with associated pars defect. Severe R and moderate L neural foraminal narrowing.   -No acute surgical intervention  -Obtain lumbar xrays upright/supine with increased height loss upon standing. No kyphotic deformity  -Obtain lumbar flex/ex xrays reviewed re-demonstrating L4-L5 grade 2 anterolisthesis  -TLSO brace ordered/at bedside for comfort  -Will schedule outpatient follow up in 4-6 weeks with repeat Lumbar xrays. Neurosurgery will arrange.      Acute cystitis with hematuria  -UA reviewed, UC w/ GNR  -Blood cxs NGTD  -Completed rocephin.     Amnestic MCI (mild cognitive impairment with memory loss)  -Continue namenda.     Thrombocytopenia, unspecified  -Chronic, stable.  -Monitor and transfuse for <50K if bleeding or <10K if not bleeding  -Avoid antiplatelet medications including Lovenox, heparin,NSAIDs and aspirin.  -Follows with hem/onc outpatient.     Hypercholesteremia  -cont statin         Thyroid disease  -cont home synthroid     Anticipate disposition:  Home with home health      Follow-up needed during SNF stay-    Follow-up needed after discharge from SNF: PCP, neurosurgery     Future Appointments   Date Time Provider Department Center   1/10/2023  2:30 PM [facility] ODC XR-A LIMIT 350 LBS [facility] XRAY OP [facility]   1/10/2023  3:30 PM Candy Yang PA-C [facility] NEUROSU [facility]   4/4/2023  1:00 PM  Shi Simon MD [facility] Oc Nguyen PCW         I certify that SNF services are required to be given on an inpatient basis because Rosario Menendez needs for skilled nursing care and/or skilled rehabilitation are required on a daily basis and such services can only practically be provided in a skilled nursing facility setting and are for an ongoing condition for which she received inpatient care in the hospital.     Total time of the visit 8:00-9:55,115 min   Non physical exam/ non charting time: 84 minutes   Description of non physical exam/non charting time: counseling patient on clinical conditions and therapies provided regarding med rec. POC while in SNF.   Extensive chart review completed including all consultation notes.  All pertinent laboratory and radiographical images reviewed.        Afsaneh Chawla NP  Department of Hospital Medicine   [facility]     DOS: 12/2/2022       Patient note was created using MModal Dictation.  Any errors in syntax or even information may not have been identified and edited on initial review prior to signing this note.

## 2022-12-02 NOTE — NURSING
Patient arrived to [facility] skilled [facility] via stretcher. Patient was assisted into bed, with belongings at bedside. Family at bedside. Patient was alert and oriented, able to make needs known. Skin dry and intact. VS stable. Bed in lowest position, call bell within reach. No complaints at this time. Will continue to monitor.

## 2022-12-02 NOTE — PT/OT/SLP EVAL
Occupational Therapy   Evaluation / Treatment    Name: Rosario Menendez  MRN: [id number]  Admit Date: 12/1/2022  Recent Surgeries: N/A  Admitting Diagnosis:  Compression Fx Lumbar Spine  General Precautions: Standard, fall   Orthopedic Precautions:N/A   Braces: TLSO     Recommendations:     Discharge Recommendations: home health OT  Level of Assistance Recommended: 24 hours light assistance  Discharge Equipment Recommendations:  wheelchair  Barriers to discharge:  None    Assessment:     Rosario Menendez is a 87 y.o. female with a medical diagnosis of Compression Fx Lumbar Spine.  She remains limited in performance of self-care , functional mobility and ADLs and currently not performing tasks at PLOF . Currently presenting with performance deficits including  weakness, impaired endurance, impaired self care skills, impaired functional mobility, impaired balance, impaired cognition, decreased coordination, decreased upper extremity function, decreased lower extremity function, decreased safety awareness, pain, decreased ROM, orthopedic precautions.    Pt tolerated Tx without incident but demonstrated min confusion and required assist to perform self care tasks, functional mobility and functional transfers .  She would continue to benefit from OT intervention to further her functional (I)ce and safety.     Rehab Potential is good    Activity Tolerance: Good    Plan:     Patient to be seen 6 x/week to address the above listed problems via self-care/home management, therapeutic activities, therapeutic exercises  Plan of Care Expires: 01/02/23  Plan of Care Reviewed with: patient    Subjective     Chief Complaint: Pt reporting lower back pain.  Patient/Family Comments/goals: Pt wants to return to PLOF    Occupational Profile:  Living Environment: Pt lives alone in a [facility] with threshold entry (2 small steps). Pt with Walk in shower  with bench for bathing and a standard height toilet. .  Previous level of function: I with  ADLs and mobility; has RW but rarely uses.  Roles and Routines: Pt does not drive; daughter and friend Yayo visit (yayo visits 3x/wk, daughter 1x/wk)  Equipment Used at Home:  walker, rolling, wheelchair, rollator, cane, straight  Assistance upon Discharge: see above.    Pain/Comfort:  Pain Rating 1: 4/10  Location - Side 1: Bilateral  Location - Orientation 1: lower  Location 1: back  Pain Addressed 1: Pre-medicate for activity, Reposition, Cessation of Activity, Distraction, Nurse notified  Pain Rating Post-Intervention 1: 4/10    Patients cultural, spiritual, Spiritism conflicts given the current situation: no    Objective:     Communicated with: nurse prior to session.  Patient found supine with  (no active lines) upon OT entry to room.    Occupational Performance:     Eating   Assistance Needed Set-up / clean-up   Physical Assistance Level No physical assistance  (after set up.)   CARE Score - Eating 5          Oral Hygiene   Assistance Needed Set-up / clean-up   Physical Assistance Level No physical assistance  (grooming performed seated sinkside with V/Cs required.)   CARE Score - Oral Hygiene 5          Toileting Hygiene   Assistance Needed Physical assistance   Physical Assistance Level 51%-75%  (with (A0 to steady when standing , to clean rear and to pull pants and brief  up)   CARE Score - Toileting Hygiene 2          Shower/Bathe Self   Assistance Needed Physical assistance   Physical Assistance Level 51%-75%  (with V/Cs throughout and (A) to wash lower body and to steady when standing.  V/Cs needed throughout)   CARE Score - Shower/Bathe Self 2          Upper Body Dressing   Assistance Needed Physical assistance   Physical Assistance Level 25% or less  (with (A) to pull shirt down in back when donning pullover shirt. V/Cs needed throughout)   CARE Score - Upper Body Dressing 3          Lower Body Dressing   Assistance Needed Physical assistance   Physical Assistance Level 51%-75%  (with Pt donning  pants, brief, socks and shoes. V/Cs needed throughout)   CARE Score - Lower Body Dressing 2          Putting On/Taking Off Footwear   Assistance Needed Physical assistance   Physical Assistance Level 51%-75%   CARE Score - Putting On/Taking Off Footwear 2          Roll Left and Right   Assistance Needed Physical assistance   Physical Assistance Level 25% or less  (with HOB flat and Pt using bed rails as assist)   CARE Score - Roll Left and Right 3   Sit to Lying   Assistance Needed Physical assistance   Physical Assistance Level 26%-50%  (with HOB flat and Pt using bed rails as assist)   CARE Score - Sit to Lying 3   Lying to Sitting on Side of Bed   Assistance Needed Physical assistance   Physical Assistance Level 26%-50%  (with HOB flat and Pt using bed rails as assist)   CARE Score - Lying to Sitting on Side of Bed 3   Sit to Stand   Assistance Needed Physical assistance   Physical Assistance Level 25% or less  (when transitioning from sitting to standing with RW)   CARE Score - Sit to Stand 3   Chair/Bed-to-Chair Transfer   Assistance Needed Physical assistance   Physical Assistance Level 25% or less  (when transitioning from sitting to standing with RW)   CARE Score - Chair/Bed-to-Chair Transfer 3   Toilet Transfer   Assistance Needed Physical assistance   Physical Assistance Level 25% or less  (when transitioning from sitting to standing with RW)   CARE Score - Toilet Transfer 3     Cognitive/Visual Perceptual:  Cognitive/Psychosocial Skills:     -       Oriented to: Person, Place, and Situation   -       Follows Commands/attention:Follows one-step commands  -       Communication: clear/fluent  -       Memory: No Deficits noted  -       Safety awareness/insight to disability: impaired   -       Mood/Affect/Coping skills/emotional control: Appropriate to situation  Visual/Perceptual:      -Intact      Physical Exam:  Balance:  Pt demonstrated good functional sitting balance as noted when performing self care  tasks. Fair Minus/ Poor Plus functional standing with RW and  Min(A) required for safety.   Postural examination/scapula alignment:    -       Rounded shoulders  -       Posterior pelvic tilt  Skin integrity: Visible skin intact  Edema:  None noted  Sensation:    -       Intact  Motor Planning:  WFLs  Dominant hand:  Right  Upper Extremity Range of Motion:     -       Right Upper Extremity: WFL  -       Left Upper Extremity: WFL  Upper Extremity Strength:    -       Right Upper Extremity: WFL  -       Left Upper Extremity: WFL   Strength:    -       Right Upper Extremity: WFL  -       Left Upper Extremity: WFL  Fine Motor Coordination:    -       Intact    AMPAC 6 Click ADL:  AMPAC Total Score: 15    Treatment & Education:  Pt edu on role of OT, POC, safety when performing self care tasks , benefit of performing OOB activity, and safety when performing functional transfers and mobility.  - White board updated  - Self care tasks completed-- as noted above      Patient left up in chair with call button in reach and friend present    GOALS:   Multidisciplinary Problems       Occupational Therapy Goals          Problem: Occupational Therapy    Goal Priority Disciplines Outcome Interventions   Occupational Therapy Goal     OT, PT/OT Ongoing, Progressing    Description: Goals to be met by: 3 weeks     Patient will increase functional independence with ADLs by performing:    UE Dressing with Supervision.  LE Dressing with Supervision.  Grooming while seated at sink with Set-up Assistance.  Toileting from toilet with Stand-by Assistance for hygiene and clothing management.   Bathing sitting at sink with Stand-by Assistance.  Supine to sit with Modified [place].  Step transfer with Supervision with appropriate A/D  Upper extremity exercise with supervision.  Caregiver will be educated on level of assist required to safely perform self care tasks and functional transfers..                        History:     Past  Medical History:   Diagnosis Date    Arthritis     Depression     Hypercholesteremia     Thrombocytopenia     Thyroid disease          Past Surgical History:   Procedure Laterality Date    ANKLE SURGERY      COLONOSCOPY N/A 6/2/2021    Procedure: COLONOSCOPY;  Surgeon: Alfonso Haque MD;  Location: [address];  Service: Endoscopy;  Laterality: N/A;  any crs  covid test 5/30-[facility]    HYSTERECTOMY      KNEE SURGERY      WRIST SURGERY         Time Tracking:     OT Date of Treatment: 12/02/22  OT Start Time: 1000  OT Stop Time: 1055  OT Total Time (min): 55 min    Billable Minutes:Evaluation 15   Self Care/Home Management 40    12/2/2022

## 2022-12-02 NOTE — CONSULTS
[facility] - Skilled Nursing  Adult Nutrition  Consult Note    SUMMARY   Recommendations  Continue regular diet, recommend MVI,  recommend Vit D  level, oral supplement if pt willing.RD following  Goals: PO to meet 75% of EEN by next RD follow up  Nutrition Goal Status: new  Communication of RD Recs: reviewed with physician    Assessment and Plan  Inadequate oral intake related to poor appetite, decline in ADL's as evidenced by diet hx reveals, one meal per day, likely inadequate protein intake, refusal to try oral supplements, frequent restaurant take -out , eating with no concern regarding nutrient content.     New    Plan  General diet  Collaboration with other providers  Nutrition education- healthy meal planning  Vitamin therapy, Vit B12, recommend MVI  Commercial beverage trial        Malnutrition Assessment   12/2/22     Eyes (Micronutrient): conjunctiva dull  Neck/Chest (Micronutrient): muscle wasting  Musculoskeletal/Lower Extremities: muscle wasting   Micronutrient Evaluation: suspected deficiency       Orbital Region (Subcutaneous Fat Loss): severe depletion  Upper Arm Region (Subcutaneous Fat Loss): mild depletion  Thoracic and Lumbar Region: well nourished   Faith Region (Muscle Loss): mild depletion  Clavicle and Acromion Bone Region (Muscle Loss): mild depletion  Scapular Bone Region (Muscle Loss): mild depletion  Dorsal Hand (Muscle Loss): mild depletion  Patellar Region (Muscle Loss): mild depletion  Anterior Thigh Region (Muscle Loss): mild depletion  Posterior Calf Region (Muscle Loss): well nourished   Edema (Fluid Accumulation): 0-->no edema present             Reason for Assessment    Reason For Assessment: consult  Diagnosis:  (Fx of L1)  Relevant Medical History: HLD  Interdisciplinary Rounds: did not attend  General Information Comments: Fall at home, lives alone, supportive family, pt admits she does not eat healthy, refused oral supplement, did not eat lunch today or outside food as  "yet, sweet tooth, used to enjoy eating out with friends, now less often. stable weight despite one meal per day, "sometimes I dont eat at all!" NFPE completed showing mild loss of fat and muscle, may be age-related.  Nutrition Discharge Planning: DC regular diet, ONS of choice    Nutrition/Diet History    Patient Reported Diet/Restrictions/Preferences: general  Typical Food/Fluid Intake: one meal per day, people bring her food, loves [employer] frappe  Spiritual, Cultural Beliefs, Jew Practices, Values that Affect Care: no  Food Allergies: NKFA  Factors Affecting Nutritional Intake: decreased appetite    Anthropometrics    Temp: 98.3 °F (36.8 °C)  Height: 5' (152.4 cm)  Height (inches): 60 in  Weight Method: Standard Scale  Weight: 65.2 kg (143 lb 11.8 oz)  Weight (lb): 143.74 lb  Ideal Body Weight (IBW), Female: 100 lb  % Ideal Body Weight, Female (lb): 143.74 %  BMI (Calculated): 28.1  BMI Grade: 25 - 29.9 - overweight  Usual Body Weight (UBW), k kg  % Usual Body Weight: 108.89  % Weight Change From Usual Weight: 8.67 %       Lab/Procedures/Meds    Pertinent Labs Reviewed: reviewed  Pertinent Labs Comments: Na 134  Pertinent Medications Reviewed: reviewed  Pertinent Medications Comments: Vit B12, docusate       Estimated/Assessed Needs    Weight Used For Calorie Calculations: 65.2 kg (143 lb 11.8 oz)  Energy Calorie Requirements (kcal): 1209  Energy Need Method: Caribou-St Jeor (x 1.2(PAL))  Protein Requirements: 65-78  Weight Used For Protein Calculations: 65.2 kg (143 lb 11.8 oz) (1.0-1.2g/kg)  Fluid Requirements (mL): 1209 or per MD  Estimated Fluid Requirement Method: RDA Method  RDA Method (mL): 1209  CHO Requirement: -      Nutrition Prescription Ordered    Current Diet Order: Regular  Nutrition Order Comments: refuses supplement, PO 0-10% today  Oral Nutrition Supplement: -    Evaluation of Received Nutrient/Fluid Intake    I/O: no data  Energy Calories Required: not meeting needs  Protein " Required: not meeting needs  Fluid Required: not meeting needs  Comments: LBM 12/2  Tolerance: tolerating  % Intake of Estimated Energy Needs: 0 - 25 %  % Meal Intake: 0 - 25 %    Nutrition Risk    Level of Risk/Frequency of Follow-up: high (two times per week)       Monitor and Evaluation    Food and Nutrient Intake: food and beverage intake  Food and Nutrient Adminstration: diet order  Knowledge/Beliefs/Attitudes: food and nutrition knowledge/skill  Anthropometric Measurements: weight change  Biochemical Data, Medical Tests and Procedures: gastrointestinal profile, electrolyte and renal panel  Nutrition-Focused Physical Findings: overall appearance       Nutrition Follow-Up    RD Follow-up?: Yes

## 2022-12-03 PROCEDURE — 97116 GAIT TRAINING THERAPY: CPT

## 2022-12-03 PROCEDURE — 25000003 PHARM REV CODE 250: Performed by: HOSPITALIST

## 2022-12-03 PROCEDURE — 11000004 HC SNF PRIVATE

## 2022-12-03 PROCEDURE — 97530 THERAPEUTIC ACTIVITIES: CPT

## 2022-12-03 RX ADMIN — METHOCARBAMOL 500 MG: 500 TABLET ORAL at 08:12

## 2022-12-03 RX ADMIN — METHOCARBAMOL 500 MG: 500 TABLET ORAL at 01:12

## 2022-12-03 RX ADMIN — LEVOTHYROXINE SODIUM 100 MCG: 100 TABLET ORAL at 05:12

## 2022-12-03 RX ADMIN — PRAVASTATIN SODIUM 40 MG: 20 TABLET ORAL at 08:12

## 2022-12-03 RX ADMIN — SENNOSIDES AND DOCUSATE SODIUM 1 TABLET: 50; 8.6 TABLET ORAL at 08:12

## 2022-12-03 RX ADMIN — HYDROCODONE BITARTRATE AND ACETAMINOPHEN 1 TABLET: 5; 325 TABLET ORAL at 03:12

## 2022-12-03 RX ADMIN — HYDROCODONE BITARTRATE AND ACETAMINOPHEN 1 TABLET: 5; 325 TABLET ORAL at 05:12

## 2022-12-03 RX ADMIN — LOSARTAN POTASSIUM 50 MG: 50 TABLET, FILM COATED ORAL at 08:12

## 2022-12-03 RX ADMIN — METHOCARBAMOL 500 MG: 500 TABLET ORAL at 05:12

## 2022-12-03 RX ADMIN — GALANTAMINE 4 MG: 4 TABLET, FILM COATED ORAL at 08:12

## 2022-12-03 RX ADMIN — CYANOCOBALAMIN TAB 1000 MCG 1000 MCG: 1000 TAB at 08:12

## 2022-12-03 RX ADMIN — METHOCARBAMOL 500 MG: 500 TABLET ORAL at 09:12

## 2022-12-03 RX ADMIN — HYDROCODONE BITARTRATE AND ACETAMINOPHEN 1 TABLET: 5; 325 TABLET ORAL at 09:12

## 2022-12-03 RX ADMIN — SENNOSIDES AND DOCUSATE SODIUM 1 TABLET: 50; 8.6 TABLET ORAL at 09:12

## 2022-12-03 RX ADMIN — MEMANTINE 10 MG: 10 TABLET ORAL at 08:12

## 2022-12-03 RX ADMIN — BUPROPION HYDROCHLORIDE 150 MG: 150 TABLET, FILM COATED, EXTENDED RELEASE ORAL at 08:12

## 2022-12-03 RX ADMIN — LIDOCAINE 5% 1 PATCH: 700 PATCH TOPICAL at 09:12

## 2022-12-03 NOTE — PLAN OF CARE
Continue regular diet, recommend MVI,  recommend Vit D  level, oral supplement if pt willing.RD following  Goals: PO to meet 75% of EEN by next RD follow up  Nutrition Goal Status: new  Communication of RD Recs: reviewed with physician     Assessment and Plan  Inadequate oral intake related to poor appetite, decline in ADL's as evidenced by diet hx reveals, one meal per day, likely inadequate protein intake, refusal to try oral supplements, frequent restaurant take -out , eating with no concern regarding nutrient content.      New     Plan  General diet  Collaboration with other providers  Nutrition education- healthy meal planning  Vitamin therapy, Vit B12, recommend MVI  Commercial beverage trial

## 2022-12-03 NOTE — ASSESSMENT & PLAN NOTE
Closed compression fracture of body of L1 vertebra  -Labs largely unremarkable. UA with UTI, likely contributing to weakness.  -Patient also reports poor oral intake for the last several days.  -Xray lumbar spine with grade 2 anterior spondylolisthesis L4 on L5. Mild anterior superior endplate compression deformity L1 with approximately 20% anterior loss of height.  -CT lumbar spine showed mild L1 vertebral body compression fracture of undetermined age. Grade 2 anterolisthesis of L4 on L5 associated with pars defect and mild spinal canal stenosis, severe right and moderate left neural foraminal narrowing  -Xray roslyn hips and pelvis in process.  -Fall precautions.  -Start scheduled robaxin, gabapentin, and lidocaine patches.  -PRN tylenol, norco  -NSGY consulted, appreciate recs  --No acute surgical intervention  --Obtain lumbar xrays upright/supine with increased height loss upon standing. No kyphotic deformity  --Obtain lumbar flex/ex xrays reviewed re-demonstrating L4-L5 grade 2 anterolisthesis  --TLSO brace ordered/at bedside for comfort  --Will schedule outpatient follow up in 4-6 weeks with repeat Lumbar xrays. Neurosurgery will arrange.   --NSGY will sign off at this time. Please page with questions, concerns, or acute neuro changes.  -PT/OT, recommend SNF

## 2022-12-03 NOTE — PT/OT/SLP PROGRESS
"Physical Therapy Treatment    Patient Name:  Rosario Menendez   MRN:  [id number]  Admit Date: 12/1/2022  Admitting Diagnosis: fall at home      General Precautions: Standard, fall   Orthopedic Precautions:N/A   Braces: TLSO     Recommendations:     Discharge Recommendations:  home health PT   Level of Assistance Recommended at Discharge: 24 hours significant assistance  Discharge Equipment Recommendations: wheelchair   Barriers to discharge: None    Assessment:     Rosario Menendez is a 87 y.o. female admitted with a medical diagnosis of a fall at home. She tolerated treatment well today. She was able to practice bed mobility, standing, transfers, and gait training. She needed constant cueing to participate and follow commands, but did well overall. Focus of today's treatment was improving overall mobility and strength.       Performance deficits affecting function:  weakness, impaired endurance, impaired self care skills, impaired functional mobility, gait instability, impaired balance, impaired cognition, decreased coordination, decreased safety awareness, pain, orthopedic precautions .    Rehab Potential is good    Activity Tolerance: Good    Plan:     Patient to be seen 6 x/week to address the above listed problems via gait training, therapeutic activities, therapeutic exercises, neuromuscular re-education, wheelchair management/training    Plan of Care Expires: 01/01/23  Plan of Care Reviewed with: patient    Subjective     "What do I do next?"     Pain/Comfort:  Pain Rating 1:  (did not rate)  Location - Orientation 1: generalized  Location 1: back  Pain Addressed 1: Reposition, Distraction, Cessation of Activity  Pain Rating Post-Intervention 1:  (did not rate)    Patient's cultural, spiritual, Yazidi conflicts given the current situation:  no    Objective:     Patient found HOB elevated with PureWick upon PT entry to room.       Functional Mobility:  Bed Mobility:     Supine to Sit: minimum " assistance  Donned TLSO in sitting  Transfers:     Sit to Stand:  minimum assistance with rolling walker  Bed to Chair: minimum assistance with  rolling walker  using  Step Transfer  Chair to mat: minimum assistance with  rolling walker  using  Step Transfer  Toilet Transfer: minimum assistance with  rolling walker  using  Step Transfer  Gait: 15 feet and 12 feet - Ted using RW  Deviations: decreased speed, decreased step length, narrow base of support  Patient required constant tactile and verbal cueing for walker negotiation and step sequencing    AM-PAC 6 CLICK MOBILITY  17    Patient left up in chair with call button in reach and family present.    GOALS:   Multidisciplinary Problems       Physical Therapy Goals          Problem: Physical Therapy    Goal Priority Disciplines Outcome Goal Variances Interventions   Physical Therapy Goal     PT, PT/OT Ongoing, Progressing     Description: Goals to be met by: 1/1/23    Patient will increase functional independence with mobility by performing:    . Supine to sit with Stand-by Assistance  . Sit to supine with Stand-by Assistance  . Rolling to Left and Right with Stand-by Assistance.  . Sit to stand transfer with Stand-by Assistance  . Bed to chair transfer with Stand-by Assistance using Rolling Walker  . Gait  x 100 feet with Stand-by Assistance using Rolling Walker.   . Wheelchair propulsion x100 feet with Supervision using bilateral uppper extremities                         Time Tracking:     PT Received On: 12/03/22  PT Start Time: 1305  PT Stop Time: 1335  PT Total Time (min): 30 min    Billable Minutes: Gait Training 15 and Therapeutic Activity 15    Treatment Type: Treatment  PT/PTA: PT     PTA Visit Number: 0     12/03/2022

## 2022-12-03 NOTE — DISCHARGE SUMMARY
[facility] [address] [place] [address] Medicine  Discharge Summary      Patient Name: Rosario Menendez  MRN: [id number]  CICI: [id number]  Patient Class: OP- Observation  Admission Date: 11/29/2022  Hospital Length of Stay: 0 days  Discharge Date and Time:  12/03/2022 2:08 PM  Attending Physician: Kayleigh att. providers found   Discharging Provider: Helga Herndon PA-C  Primary Care Provider: Shi Simon MD  [facility] Medicine Team: [facility]  Helga Herndon PA-C  Primary Care Team: [facility] MED     HPI:   Rosario Menendez is a 87 y.o. female with a PMHx of osteoporosis, thrombocytopenia, depression, arthritis, hypercholesteremia, hypothyroidism, and MCI who presents to the ED via EMS after a trip and fall at home. The patient reports she tripped and fell in the kitchen and was unable to stand up following the fall. She denies any head trauma or LOC. She landed on her buttocks and complains of lower back pain and pain to her coccyx. She reports the pain is aggravated by movement.  Her pain improved with acetaminophen that she took prior to EMS arrival. She complains of generalized weakness that began earlier today. She had an episode shortness of breath earlier today that has since resolved. She had lightheadedness earlier as well. The patient notes poor oral intake for the last several days. She was treated recently for UTI with keflex but has had ongoing urinary frequency/dysuria for the last several weeks. She denies cough, chest pain, abdominal pain, nausea, vomiting, fever, or chills.      In the ED, mildly bradycardic but otherwise VSSAF. Plt 37k (at baseline). CMP unremarkable. Blood cxs in process. BNP and troponin WNL. UA nitrite + with 3+ leukocytes, >100 WBCs, and many bacteria. CXR negative for acute process. Xray lumbar spine with grade 2 anterior spondylolisthesis L4 on L5. Mild anterior superior endplate compression deformity L1 with approximately 20% anterior loss of height. Severe  degenerative disc space narrowing L4-5 and L5-S1. Degenerative changes in the lower lumbar facet joints. Xray roslyn hips and pelvis in process. The patient received norco, lidocaine patch, IVF, and rocephin.           * No surgery found *      Hospital Course:   Placed on observation s/p mechanical fall while at home. Xray bilat hips were negative for fractures as well as the CXR which showed no cardiopulmonary abnormalities. CT lumbar spine revealed compression fraction of L1 vertebrae and grade 2 anterolisthesis of L4 on L5 associated with pars defect and mild spinal canal stenosis. Neurosurgery consulted and recommendations advise TLSO brace, obtain upright + flex xrays of lumbar spine with brace to ensure stability of compression fracture followed by PT/OT. UA results showed haziness and many bacteria with RBCs + WBCs. Urine culture pending and blood cultures x2 have shown no growth to date. IV rocephin was initiated and will continue course as prescribed. Patient completed 3 day course. Pain controlled with norco, robaxin, gabapentin, and lidocaine patches. Plan discharge tomorrow to SNF pending Xray results per neurosurgery recommendations. NSGY reviewed imaging. Will follow-up with patient in clinic in 4-6 weeks with repeat imaging. Patient is stable to transfer to SNF facility. All questions answered at bedside.        Goals of Care Treatment Preferences:  Code Status: Full Code      Consults:   Consults (From admission, onward)        Status Ordering Provider     Inpatient consult to Neurosurgery  Once        Provider:  (Not yet assigned)    Completed LIN RENO.          * Fall  Closed compression fracture of body of L1 vertebra  -Labs largely unremarkable. UA with UTI, likely contributing to weakness.  -Patient also reports poor oral intake for the last several days.  -Xray lumbar spine with grade 2 anterior spondylolisthesis L4 on L5. Mild anterior superior endplate compression deformity L1 with  approximately 20% anterior loss of height.  -CT lumbar spine showed mild L1 vertebral body compression fracture of undetermined age. Grade 2 anterolisthesis of L4 on L5 associated with pars defect and mild spinal canal stenosis, severe right and moderate left neural foraminal narrowing  -Xray roslyn hips and pelvis in process.  -Fall precautions.  -Start scheduled robaxin, gabapentin, and lidocaine patches.  -PRN tylenol, norco  -NSGY consulted, appreciate recs  --No acute surgical intervention  --Obtain lumbar xrays upright/supine with increased height loss upon standing. No kyphotic deformity  --Obtain lumbar flex/ex xrays reviewed re-demonstrating L4-L5 grade 2 anterolisthesis  --TLSO brace ordered/at bedside for comfort  --Will schedule outpatient follow up in 4-6 weeks with repeat Lumbar xrays. Neurosurgery will arrange.   --NSGY will sign off at this time. Please page with questions, concerns, or acute neuro changes.  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 Date: 12/01/23     Order Specific Question Answer Comments   May the Radiologist modify the order per protocol to meet the clinical needs of the patient? Yes      Ambulatory referral/consult to Neurosurgery   Standing Status: Future   Referral Priority: Routine Referral  Type: Consultation   Referral Reason: Specialty Services Required   Requested Specialty: Neurosurgery   Number of Visits Requested: 1     Diet Adult Regular     Notify your health care provider if you experience any of the following:  temperature >100.4     Notify your health care provider if you experience any of the following:  severe uncontrolled pain     Notify your health care provider if you experience any of the following:  persistent dizziness, light-headedness, or visual disturbances     Notify your health care provider if you experience any of the following:  increased confusion or weakness     Activity as tolerated       Significant Diagnostic Studies: Labs: BMP: No results for input(s): GLU, NA, K, CL, CO2, BUN, CREATININE, CALCIUM, MG in the last 48 hours. and CBC No results for input(s): WBC, HGB, HCT, PLT in the last 48 hours.    Pending Diagnostic Studies:     None         Medications:  Reconciled Home Medications:      Medication List      START taking these medications    HYDROcodone-acetaminophen 5-325 mg per tablet  Commonly known as: NORCO  Take 1 tablet by mouth every 6 (six) hours as needed for Pain.     LIDOcaine 5 %  Commonly known as: LIDODERM  Place 1 patch onto the skin once daily. Remove & Discard patch within 12 hours or as directed by MD for 10 days     methocarbamoL 500 MG Tab  Commonly known as: ROBAXIN  Take 1 tablet (500 mg total) by mouth 4 (four) times daily. for 10 days        CHANGE how you take these medications    alendronate 70 MG tablet  Commonly known as: FOSAMAX  Take 1 tablet (70 mg total) by mouth every 7 days.  Start taking on: January 30, 2023  What changed: These instructions start on January 30, 2023. If you are unsure what to do until then, ask your doctor or other care provider.     losartan 50 MG tablet  Commonly known as: COZAAR  Take 1 tablet (50 mg total) by mouth once daily.  What changed: Another medication with the same name was removed. Continue taking  this medication, and follow the directions you see here.        CONTINUE taking these medications    buPROPion 150 MG TB24 tablet  Commonly known as: WELLBUTRIN XL  Take 1 tablet (150 mg total) by mouth once daily.     cyanocobalamin 1000 MCG tablet  Commonly known as: VITAMIN B-12  Take 100 mcg by mouth once daily.     galantamine 16 MG 24 hr capsule  Commonly known as: RAZADYNE ER  TAKE 1 CAPSULE EVERY DAY WITH BREAKFAST     levothyroxine 100 MCG tablet  Commonly known as: SYNTHROID  TAKE 1 TABLET (100 MCG TOTAL) BY MOUTH BEFORE BREAKFAST.     memantine 10 MG Tab  Commonly known as: NAMENDA  TAKE 1 TABLET TWICE DAILY     pravastatin 40 MG tablet  Commonly known as: PRAVACHOL  Take 1 tablet (40 mg total) by mouth once daily.            Indwelling Lines/Drains at time of discharge:   Lines/Drains/Airways     None                 Time spent on the discharge of patient: 36 minutes         Helga Herndon PA-C  Department of Hospital Medicine  [facility]

## 2022-12-04 LAB
BACTERIA BLD CULT: NORMAL
BACTERIA BLD CULT: NORMAL

## 2022-12-04 PROCEDURE — 99306 PR NURSING FACILITY CARE, INIT, HIGH SEVERITY: ICD-10-PCS | Mod: AI,HCNC,, | Performed by: HOSPITALIST

## 2022-12-04 PROCEDURE — 97530 THERAPEUTIC ACTIVITIES: CPT | Mod: CO

## 2022-12-04 PROCEDURE — 25000003 PHARM REV CODE 250: Performed by: HOSPITALIST

## 2022-12-04 PROCEDURE — 99306 1ST NF CARE HIGH MDM 50: CPT | Mod: AI,HCNC,, | Performed by: HOSPITALIST

## 2022-12-04 PROCEDURE — 11000004 HC SNF PRIVATE

## 2022-12-04 PROCEDURE — 97535 SELF CARE MNGMENT TRAINING: CPT | Mod: CO

## 2022-12-04 RX ADMIN — LEVOTHYROXINE SODIUM 100 MCG: 100 TABLET ORAL at 05:12

## 2022-12-04 RX ADMIN — GALANTAMINE 4 MG: 4 TABLET, FILM COATED ORAL at 09:12

## 2022-12-04 RX ADMIN — HYDROCODONE BITARTRATE AND ACETAMINOPHEN 1 TABLET: 5; 325 TABLET ORAL at 04:12

## 2022-12-04 RX ADMIN — SENNOSIDES AND DOCUSATE SODIUM 1 TABLET: 50; 8.6 TABLET ORAL at 09:12

## 2022-12-04 RX ADMIN — LIDOCAINE 5% 1 PATCH: 700 PATCH TOPICAL at 09:12

## 2022-12-04 RX ADMIN — LOSARTAN POTASSIUM 50 MG: 50 TABLET, FILM COATED ORAL at 09:12

## 2022-12-04 RX ADMIN — PRAVASTATIN SODIUM 40 MG: 20 TABLET ORAL at 09:12

## 2022-12-04 RX ADMIN — HYDROCODONE BITARTRATE AND ACETAMINOPHEN 1 TABLET: 5; 325 TABLET ORAL at 09:12

## 2022-12-04 RX ADMIN — METHOCARBAMOL 500 MG: 500 TABLET ORAL at 06:12

## 2022-12-04 RX ADMIN — Medication 6 MG: at 09:12

## 2022-12-04 RX ADMIN — MEMANTINE 10 MG: 10 TABLET ORAL at 09:12

## 2022-12-04 RX ADMIN — CYANOCOBALAMIN TAB 1000 MCG 1000 MCG: 1000 TAB at 09:12

## 2022-12-04 RX ADMIN — METHOCARBAMOL 500 MG: 500 TABLET ORAL at 09:12

## 2022-12-04 RX ADMIN — METHOCARBAMOL 500 MG: 500 TABLET ORAL at 01:12

## 2022-12-04 RX ADMIN — BUPROPION HYDROCHLORIDE 150 MG: 150 TABLET, FILM COATED, EXTENDED RELEASE ORAL at 09:12

## 2022-12-04 RX ADMIN — HYDROCODONE BITARTRATE AND ACETAMINOPHEN 1 TABLET: 5; 325 TABLET ORAL at 03:12

## 2022-12-04 NOTE — PT/OT/SLP PROGRESS
Occupational Therapy   Treatment    Name: Rosario Menendez  MRN: [id number]  Admit Date: 12/1/2022  Admitting Diagnosis:  Closed compression fracture of body of L1 vertebra    General Precautions: Standard, fall   Orthopedic Precautions:spinal precautions  Braces: TLSO     Recommendations:     Discharge Recommendations: home health OT  Level of Assistance Recommended at Discharge: 24 hours light assistance   Discharge Equipment Recommendations:  wheelchair  Barriers to discharge:  None    Assessment:     Rosario Menendez is a 87 y.o. female with a medical diagnosis of Closed compression fracture of body of L1 vertebra.  She presents with performance deficits affecting function are weakness, impaired endurance, impaired self care skills, impaired functional mobility, gait instability, impaired balance, decreased upper extremity function, decreased coordination, decreased safety awareness, pain, orthopedic precautions.  Pt tolerated session well and without incident, but she continues to require assistance to perform self-care tasks and mobility.  She would continue to benefit from skilled OT services at [facility] to maximize her gains in functional independence.      Rehab Potential is good    Activity tolerance:  Good    Plan:     Patient to be seen 6 x/week to address the above listed problems via self-care/home management, therapeutic activities, therapeutic exercises    Plan of Care Expires: 01/02/23  Plan of Care Reviewed with: patient    Subjective     Communicated with: deisy prior to session.      Pain/Comfort:  Pain Rating 1:  (pt did not report pain)  Pain Rating Post-Intervention 1:  (pt did not report pain)    Patient's cultural, spiritual, Buddhist conflicts given the current situation:  no    Objective:     Patient found supine with PureWick upon OT entry to room.    Bed Mobility:    Patient completed Rolling/Turning to Right with stand by assistance  Patient completed Scooting/Bridging with minimum  assistance  Patient completed Supine to Sit with minimum assistance     Functional Mobility/Transfers:  Patient completed Sit <> Stand Transfer with minimum assistance  with  rolling walker   Patient completed Bed <> Chair Transfer using Stand Pivot technique with minimum assistance with rolling walker    Activities of Daily Living:  Feeding: pt performed feeding with Set-up A; pt required assistance for opening containers seated in w/c at table top  Grooming: pt performed hair grooming, face washing and oral hygiene with stand by assistance seated in w/c at sink  Upper Body Dressing: pt don/doff shirt  with Mod A seated at EOB   Lower Body Dressing: pt donned pants with Max A seated at EOB; Pt required steadying assistance standing with RW for pulling pants over hips     AMPAC 6 Click ADL: 15    Treatment & Education:  Pt educated on ADLs, functional mobility and safety awareness    Patient left up in chair with all lines intact and call button in reach    GOALS:   Multidisciplinary Problems       Occupational Therapy Goals          Problem: Occupational Therapy    Goal Priority Disciplines Outcome Interventions   Occupational Therapy Goal     OT, PT/OT Ongoing, Progressing    Description: Goals to be met by: 3 weeks     Patient will increase functional independence with ADLs by performing:    UE Dressing with Supervision.  LE Dressing with Supervision.  Grooming while seated at sink with Set-up Assistance.  Toileting from toilet with Stand-by Assistance for hygiene and clothing management.   Bathing sitting at sink with Stand-by Assistance.  Supine to sit with Modified [place].  Step transfer with Supervision with appropriate A/D  Upper extremity exercise with supervision.  Caregiver will be educated on level of assist required to safely perform self care tasks and functional transfers..                        Time Tracking:     OT Date of Treatment: 12/04/22  OT Start Time: 0853    OT Stop Time: 0918  OT  wrist pain/injury Total Time (min): 25 min    Billable Minutes:Self Care/Home Management 17  Therapeutic Activity 8    12/4/2022

## 2022-12-04 NOTE — PLAN OF CARE
Problem: Adult Inpatient Plan of Care  Goal: Plan of Care Review  Outcome: Ongoing, Progressing  Goal: Patient-Specific Goal (Individualized)  Outcome: Ongoing, Progressing  Goal: Absence of Hospital-Acquired Illness or Injury  Outcome: Ongoing, Progressing  Goal: Optimal Comfort and Wellbeing  Outcome: Ongoing, Progressing  Goal: Readiness for Transition of Care  Outcome: Ongoing, Progressing     Problem: Fall Injury Risk  Goal: Absence of Fall and Fall-Related Injury  Outcome: Ongoing, Progressing     Problem: Skin Injury Risk Increased  Goal: Skin Health and Integrity  Outcome: Ongoing, Progressing      Gen

## 2022-12-04 NOTE — H&P
"Hospital Medicine  Skilled Nursing Facility   History and Physical Exam      Date of Service: 12/04/2022      Patient Name: Rosario Menendez  MRN: [id number]  Admission Date: 12/1/2022  Attending Physician: Leo Bowen MD  Primary Care Provider: Shi Simon MD  Code Status: Full Code      Principal problem:  Closed compression fracture of body of L1 vertebra      Chief Complaint:   Chief Complaint   Patient presents with    Back Pain     Admitted to [facility] for rehabilitation following hospital stay for fall resulting in lumbar compression fracture.           HPI:  "Rosario Menendez is a 87 y.o. female with a past medical history significant for osteoporosis, thrombocytopenia, hypothyroidism, mild cognitive impairment, depression, and arthritis. She presents to [facility] after she tripped and fell onto her buttocks while in the kitchen. Patient states she "bounced" during her fall.  After fall she noted buttock and lower back pain. Buttock pain is worse than lower back pain. Pain started after fall and is exacerbated by movement. She denies radiating leg pain. She denies numbness, tinging, saddle anesthesia, or bowel dysfunction. Lumbar x-rays (AP/Lateral) obtained in ED for lower back pain revealing L1 compression fracture with 20% anterior height loss, and grade 2 anterolisthesis of L4 on L5. Lumbar CT 11/30 re demonstrating L1 compression fracture, and grade 2 anterolisthesis for L4 on L5 with associated pars defect.      Notes recent UTI with persistent dysuria s/p antibiotic treatment. She denies chills, nausea, or vomiting. Endorses slight subjective fever yesterday. UA 11/29 obtained in ED showing +nitrite, 3+leukocytes, many bacteria, and >100 WBC. UA culture pending. Blood culture NGTD. Of note, she has baseline thrombocytopenia." Per Afsaneh Chawla NP on 12/2/22.     She is doing okay today. She denies any dysuria or urgency, but does report mild suprapubic pain/discomfort when urinating. Denies " any fever or chills.  She walked 15 and then 12 feet with RW and Ted yesterday. She had a good OT session today.     Patient admitted with skilled services with PT and OT to improve functional status and ability to perform ADLs.       Past Medical History:   Past Medical History:   Diagnosis Date    Arthritis     Depression     Hypercholesteremia     Thrombocytopenia     Thyroid disease        Past Surgical History:   Past Surgical History:   Procedure Laterality Date    ANKLE SURGERY      COLONOSCOPY N/A 6/2/2021    Procedure: COLONOSCOPY;  Surgeon: Alfonso Haque MD;  Location: [address];  Service: Endoscopy;  Laterality: N/A;  any crs  covid test 5/30-[place]    HYSTERECTOMY      KNEE SURGERY      WRIST SURGERY         Social History:   Tobacco Use    Smoking status: Never    Smokeless tobacco: Never   Substance and Sexual Activity    Alcohol use: Not on file    Drug use: Not on file    Sexual activity: Not on file       Family History:   Family History       Problem Relation (Age of Onset)    Cancer Maternal Uncle    Heart failure Mother, Father    Suicide Son            No current facility-administered medications on file prior to encounter.     Current Outpatient Medications on File Prior to Encounter   Medication Sig    [START ON 1/30/2023] alendronate (FOSAMAX) 70 MG tablet Take 1 tablet (70 mg total) by mouth every 7 days.    buPROPion (WELLBUTRIN XL) 150 MG TB24 tablet Take 1 tablet (150 mg total) by mouth once daily.    cyanocobalamin (VITAMIN B-12) 1000 MCG tablet Take 100 mcg by mouth once daily.    galantamine (RAZADYNE ER) 16 MG 24 hr capsule TAKE 1 CAPSULE EVERY DAY WITH BREAKFAST    HYDROcodone-acetaminophen (NORCO) 5-325 mg per tablet Take 1 tablet by mouth every 6 (six) hours as needed for Pain.    levothyroxine (SYNTHROID) 100 MCG tablet TAKE 1 TABLET (100 MCG TOTAL) BY MOUTH BEFORE BREAKFAST.    LIDOcaine (LIDODERM) 5 % Place 1 patch onto the skin once daily. Remove & Discard  patch within 12 hours or as directed by MD for 10 days    losartan (COZAAR) 50 MG tablet Take 1 tablet (50 mg total) by mouth once daily.    memantine (NAMENDA) 10 MG Tab TAKE 1 TABLET TWICE DAILY    methocarbamoL (ROBAXIN) 500 MG Tab Take 1 tablet (500 mg total) by mouth 4 (four) times daily. for 10 days    pravastatin (PRAVACHOL) 40 MG tablet Take 1 tablet (40 mg total) by mouth once daily.       Allergies:   Review of patient's allergies indicates:  No Known Allergies    ROS:  Review of Systems   Constitutional:  Positive for appetite change. Negative for chills and fever.   HENT:  Negative for congestion and sore throat.    Eyes:  Negative for redness and visual disturbance.   Respiratory:  Negative for cough and shortness of breath.    Cardiovascular:  Negative for chest pain and palpitations.   Gastrointestinal:  Positive for abdominal pain (suprapubic area). Negative for constipation, nausea and vomiting.   Genitourinary:  Negative for difficulty urinating, dysuria and urgency.   Musculoskeletal:  Positive for back pain. Negative for arthralgias.   Skin:  Negative for pallor and rash.   Allergic/Immunologic: Negative for environmental allergies and food allergies.   Neurological:  Positive for weakness. Negative for dizziness and light-headedness.   Hematological:  Does not bruise/bleed easily.   Psychiatric/Behavioral:  Negative for sleep disturbance. The patient is not nervous/anxious.        Objective:  Temp:  [97.4 °F (36.3 °C)-98.7 °F (37.1 °C)]   Pulse:  [52-63]   Resp:  [16-18]   BP: (140-156)/(62-86)   SpO2:  [92 %-94 %]     Body mass index is 28.07 kg/m².      Physical Exam  Vitals and nursing note reviewed.   Constitutional:       General: She is not in acute distress.     Appearance: She is well-developed.   HENT:      Head: Normocephalic and atraumatic.      Right Ear: External ear normal.      Left Ear: External ear normal.      Nose: No nasal deformity or mucosal edema.      Mouth/Throat:       Mouth: Mucous membranes are moist.      Pharynx: Uvula midline. No oropharyngeal exudate.   Eyes:      General: No scleral icterus.     Conjunctiva/sclera: Conjunctivae normal.      Pupils: Pupils are equal, round, and reactive to light.   Neck:      Trachea: Phonation normal.   Cardiovascular:      Rate and Rhythm: Normal rate and regular rhythm.      Heart sounds: S1 normal and S2 normal. No murmur heard.  Pulmonary:      Effort: Pulmonary effort is normal. No respiratory distress.      Breath sounds: Normal breath sounds. No wheezing or rales.   Abdominal:      General: Bowel sounds are normal. There is no distension.      Palpations: Abdomen is soft.      Tenderness: There is no abdominal tenderness. There is no guarding or rebound.   Musculoskeletal:         General: No tenderness.      Cervical back: Neck supple. No muscular tenderness.      Right lower leg: No edema.      Left lower leg: No edema.   Lymphadenopathy:      Cervical:      Right cervical: No superficial cervical adenopathy.     Left cervical: No superficial cervical adenopathy.      Upper Body:      Right upper body: No supraclavicular adenopathy.      Left upper body: No supraclavicular adenopathy.   Skin:     General: Skin is warm and dry.      Findings: No bruising or rash.   Neurological:      Mental Status: She is alert and oriented to person, place, and time.      Motor: No tremor or seizure activity.   Psychiatric:         Mood and Affect: Mood normal.         Behavior: Behavior normal. Behavior is cooperative.         Thought Content: Thought content normal.       Significant Labs:   TSH:   Recent Labs   Lab 11/29/22  1602   TSH 0.412     Lab Results   Component Value Date    WBC 6.64 11/30/2022    HGB 14.4 11/30/2022    HCT 43.1 11/30/2022    MCV 93 11/30/2022    PLT 28 (LL) 11/30/2022       BMP  Lab Results   Component Value Date     (L) 12/01/2022    K 4.3 12/01/2022     12/01/2022    CO2 19 (L) 12/01/2022    BUN 12  12/01/2022    CREATININE 0.8 12/01/2022    CALCIUM 9.3 12/01/2022    ANIONGAP 6 (L) 12/01/2022    EGFRNORACEVR >60.0 12/01/2022         Significant Imaging: I have reviewed all pertinent imaging results/findings completed during prior hospitalization.      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   -Lumbar CT 11/30 re demonstrating L1 compression fracture, and grade 2 anterolisthesis for L4 on L5 with associated pars defect. Severe R and moderate L neural foraminal narrowing.   -No acute surgical intervention  -TLSO brace ordered/at bedside for comfort  - Outpatient follow up in 4-6 weeks with repeat Lumbar xrays with Neurosurgery.   - Continue methocarbamol 500 mg q6 hrs, acetaminophen PRN, and hydrocodone/APAP PRN.   - PT/OT     Acute cystitis with hematuria due to E.coli  -Completed ceftriaxone while in the hospital.      Amnestic MCI (mild cognitive impairment with memory loss)  -Continue memantine 10 mg BID and galantamine 4 mg daily.     Thrombocytopenia, unspecified  - Follows with Hematology as outpatient.   -Monitor and transfuse for <50K if bleeding or <10K if not bleeding  -Avoid antiplatelet medications including Lovenox, heparin,NSAIDs and aspirin.    Essential Hypertension  Continue losartan 50 mg daily.      Hypercholesteremia  - Continue pravastatin 40 mg daily     Thyroid disease  -cont home  levothyroxine 100 mcg daily.      Anticipated Disposition:  Home with home health      Future Appointments   Date Time Provider Department Center   1/10/2023  2:30 PM [facility] XR-A LIMIT 350 LBS [facility] XRAY OP [place] [facility]   1/10/2023  3:30 PM Candy Yang PA-C [facility] NEUROSU [place] [facility]   4/4/2023  1:00 PM Shi Simon MD [facility]       I certify that SNF services are required to be given on an inpatient basis because Rosario Menendez needs for skilled nursing care and/or skilled rehabilitation are required on a daily basis and such services can only practically be provided in a skilled nursing facility setting and are for an ongoing condition for which she received inpatient care in the hospital.       Leo Bowen MD  Department of Hospital Medicine   [facility] - Skilled Nursing

## 2022-12-05 LAB
ANION GAP SERPL CALC-SCNC: 11 MMOL/L (ref 8–16)
BASOPHILS # BLD AUTO: 0.04 K/UL (ref 0–0.2)
BASOPHILS NFR BLD: 0.7 % (ref 0–1.9)
BUN SERPL-MCNC: 15 MG/DL (ref 8–23)
CALCIUM SERPL-MCNC: 9.8 MG/DL (ref 8.7–10.5)
CHLORIDE SERPL-SCNC: 108 MMOL/L (ref 95–110)
CO2 SERPL-SCNC: 19 MMOL/L (ref 23–29)
CREAT SERPL-MCNC: 0.7 MG/DL (ref 0.5–1.4)
DIFFERENTIAL METHOD: ABNORMAL
EOSINOPHIL # BLD AUTO: 0.1 K/UL (ref 0–0.5)
EOSINOPHIL NFR BLD: 2 % (ref 0–8)
ERYTHROCYTE [DISTWIDTH] IN BLOOD BY AUTOMATED COUNT: 13.6 % (ref 11.5–14.5)
EST. GFR  (NO RACE VARIABLE): >60 ML/MIN/1.73 M^2
GLUCOSE SERPL-MCNC: 72 MG/DL (ref 70–110)
HCT VFR BLD AUTO: 42.9 % (ref 37–48.5)
HGB BLD-MCNC: 14.1 G/DL (ref 12–16)
IMM GRANULOCYTES # BLD AUTO: 0.03 K/UL (ref 0–0.04)
IMM GRANULOCYTES NFR BLD AUTO: 0.6 % (ref 0–0.5)
LYMPHOCYTES # BLD AUTO: 1.3 K/UL (ref 1–4.8)
LYMPHOCYTES NFR BLD: 23.6 % (ref 18–48)
MAGNESIUM SERPL-MCNC: 2.2 MG/DL (ref 1.6–2.6)
MCH RBC QN AUTO: 30.7 PG (ref 27–31)
MCHC RBC AUTO-ENTMCNC: 32.9 G/DL (ref 32–36)
MCV RBC AUTO: 94 FL (ref 82–98)
MONOCYTES # BLD AUTO: 0.7 K/UL (ref 0.3–1)
MONOCYTES NFR BLD: 13 % (ref 4–15)
NEUTROPHILS # BLD AUTO: 3.2 K/UL (ref 1.8–7.7)
NEUTROPHILS NFR BLD: 60.1 % (ref 38–73)
NRBC BLD-RTO: 0 /100 WBC
PHOSPHATE SERPL-MCNC: 2.1 MG/DL (ref 2.7–4.5)
PLATELET # BLD AUTO: 77 K/UL (ref 150–450)
PMV BLD AUTO: 12 FL (ref 9.2–12.9)
POTASSIUM SERPL-SCNC: 4.2 MMOL/L (ref 3.5–5.1)
RBC # BLD AUTO: 4.59 M/UL (ref 4–5.4)
SODIUM SERPL-SCNC: 138 MMOL/L (ref 136–145)
WBC # BLD AUTO: 5.39 K/UL (ref 3.9–12.7)

## 2022-12-05 PROCEDURE — 25000003 PHARM REV CODE 250: Performed by: NURSE PRACTITIONER

## 2022-12-05 PROCEDURE — 84100 ASSAY OF PHOSPHORUS: CPT | Performed by: HOSPITALIST

## 2022-12-05 PROCEDURE — 80048 BASIC METABOLIC PNL TOTAL CA: CPT | Performed by: HOSPITALIST

## 2022-12-05 PROCEDURE — 97116 GAIT TRAINING THERAPY: CPT

## 2022-12-05 PROCEDURE — 25000003 PHARM REV CODE 250: Performed by: HOSPITALIST

## 2022-12-05 PROCEDURE — 83735 ASSAY OF MAGNESIUM: CPT | Performed by: HOSPITALIST

## 2022-12-05 PROCEDURE — 94761 N-INVAS EAR/PLS OXIMETRY MLT: CPT

## 2022-12-05 PROCEDURE — 11000004 HC SNF PRIVATE

## 2022-12-05 PROCEDURE — 85025 COMPLETE CBC W/AUTO DIFF WBC: CPT | Performed by: HOSPITALIST

## 2022-12-05 PROCEDURE — 97530 THERAPEUTIC ACTIVITIES: CPT

## 2022-12-05 PROCEDURE — 36415 COLL VENOUS BLD VENIPUNCTURE: CPT | Performed by: HOSPITALIST

## 2022-12-05 PROCEDURE — 97535 SELF CARE MNGMENT TRAINING: CPT

## 2022-12-05 RX ORDER — CIPROFLOXACIN 500 MG/1
500 TABLET ORAL EVERY 12 HOURS
Status: COMPLETED | OUTPATIENT
Start: 2022-12-05 | End: 2022-12-11

## 2022-12-05 RX ORDER — SODIUM,POTASSIUM PHOSPHATES 280-250MG
2 POWDER IN PACKET (EA) ORAL 3 TIMES DAILY
Status: COMPLETED | OUTPATIENT
Start: 2022-12-05 | End: 2022-12-06

## 2022-12-05 RX ORDER — GALANTAMINE 8 MG/1
8 TABLET, FILM COATED ORAL 2 TIMES DAILY WITH MEALS
Status: DISCONTINUED | OUTPATIENT
Start: 2022-12-05 | End: 2022-12-08

## 2022-12-05 RX ORDER — MEMANTINE HYDROCHLORIDE 10 MG/1
10 TABLET ORAL 2 TIMES DAILY
Status: DISCONTINUED | OUTPATIENT
Start: 2022-12-05 | End: 2022-12-21 | Stop reason: HOSPADM

## 2022-12-05 RX ADMIN — CIPROFLOXACIN HYDROCHLORIDE 500 MG: 500 TABLET, FILM COATED ORAL at 03:12

## 2022-12-05 RX ADMIN — HYDROCODONE BITARTRATE AND ACETAMINOPHEN 1 TABLET: 5; 325 TABLET ORAL at 09:12

## 2022-12-05 RX ADMIN — LEVOTHYROXINE SODIUM 100 MCG: 100 TABLET ORAL at 05:12

## 2022-12-05 RX ADMIN — PRAVASTATIN SODIUM 40 MG: 20 TABLET ORAL at 09:12

## 2022-12-05 RX ADMIN — METHOCARBAMOL 500 MG: 500 TABLET ORAL at 09:12

## 2022-12-05 RX ADMIN — MEMANTINE 10 MG: 10 TABLET ORAL at 09:12

## 2022-12-05 RX ADMIN — GALANTAMINE 4 MG: 4 TABLET, FILM COATED ORAL at 09:12

## 2022-12-05 RX ADMIN — HYDROCODONE BITARTRATE AND ACETAMINOPHEN 1 TABLET: 5; 325 TABLET ORAL at 12:12

## 2022-12-05 RX ADMIN — CIPROFLOXACIN HYDROCHLORIDE 500 MG: 500 TABLET, FILM COATED ORAL at 09:12

## 2022-12-05 RX ADMIN — LOSARTAN POTASSIUM 50 MG: 50 TABLET, FILM COATED ORAL at 09:12

## 2022-12-05 RX ADMIN — SENNOSIDES AND DOCUSATE SODIUM 1 TABLET: 50; 8.6 TABLET ORAL at 09:12

## 2022-12-05 RX ADMIN — POTASSIUM & SODIUM PHOSPHATES POWDER PACK 280-160-250 MG 2 PACKET: 280-160-250 PACK at 03:12

## 2022-12-05 RX ADMIN — LIDOCAINE 5% 1 PATCH: 700 PATCH TOPICAL at 09:12

## 2022-12-05 RX ADMIN — POTASSIUM & SODIUM PHOSPHATES POWDER PACK 280-160-250 MG 2 PACKET: 280-160-250 PACK at 09:12

## 2022-12-05 RX ADMIN — BUPROPION HYDROCHLORIDE 150 MG: 150 TABLET, FILM COATED, EXTENDED RELEASE ORAL at 09:12

## 2022-12-05 RX ADMIN — GALANTAMINE 8 MG: 4 TABLET, FILM COATED ORAL at 05:12

## 2022-12-05 RX ADMIN — CYANOCOBALAMIN TAB 1000 MCG 1000 MCG: 1000 TAB at 09:12

## 2022-12-05 RX ADMIN — Medication 6 MG: at 09:12

## 2022-12-05 RX ADMIN — METHOCARBAMOL 500 MG: 500 TABLET ORAL at 05:12

## 2022-12-05 RX ADMIN — HYDROCODONE BITARTRATE AND ACETAMINOPHEN 1 TABLET: 5; 325 TABLET ORAL at 06:12

## 2022-12-05 RX ADMIN — METHOCARBAMOL 500 MG: 500 TABLET ORAL at 12:12

## 2022-12-05 NOTE — PROGRESS NOTES
"                                                        [facility]                                  Skilled Nursing Facility                   Progress Note     Admit Date: 12/1/2022  AMARA   Principal Problem:  Closed compression fracture of body of L1 vertebra   HPI obtained from patient interview and chart review     Chief Complaint: Re-evaluation of medical DX and therapy status: lab review , UTI     HPI:   Rosario Menendez is a 87 y.o. female with a past medical history significant for osteoporosis, thrombocytopenia, hypothyroidism, mild cognitive impairment, depression, and arthritis. She presents to [facility] after she tripped and fell onto her buttocks while in the kitchen. Patient states she "bounced" during her fall.  After fall she noted buttock and lower back pain. Buttock pain is worse than lower back pain. Pain started after fall and is exacerbated by movement. She denies radiating leg pain. She denies numbness, tinging, saddle anesthesia, or bowel dysfunction. Lumbar x-rays (AP/Lateral) obtained in ED for lower back pain revealing L1 compression fracture with 20% anterior height loss, and grade 2 anterolisthesis of L4 on L5. Lumbar CT 11/30 re demonstrating L1 compression fracture, and grade 2 anterolisthesis for L4 on L5 with associated pars defect.      Notes recent UTI with persistent dysuria s/p antibiotic treatment. She denies chills, nausea, or vomiting. Endorses slight subjective fever yesterday. UA 11/29 obtained in ED showing +nitrite, 3+leukocytes, many bacteria, and >100 WBC. UA culture pending. Blood culture NGTD. Of note, she has baseline thrombocytopenia.     Patient will be treated at [facility] with PT and OT to improve functional status and ability to perform ADLs.     Interval Hx  No acute events over night  Labs reviewed, no critical values  Patient complains of dysuria x 4 days. Was treated in acute for UTI with rocephin. Pt still with symptoms. Reviewed urine CX " with E coli >100k. Will give cipro x 7 days.   All of today's labs reviewed and are listed below.  24 hr vital sign ranges listed below.  Patient denies shortness of breath, abdominal discomfort, nausea, or vomiting.  Patient denies dysuria.  Patient reports having regular bowel movements.  Patient progessing with PT/OT. Continuing to follow and treat all acute and chronic conditions.     Past Medical History: Patient has a past medical history of Arthritis, Depression, Hypercholesteremia, Thrombocytopenia, and Thyroid disease.    Past Surgical History: Patient has a past surgical history that includes Hysterectomy; Knee surgery; Ankle surgery; Wrist surgery; and Colonoscopy (N/A, 6/2/2021).    Social History: Patient reports that she has never smoked. She has never used smokeless tobacco.    Family History: family history includes Cancer in her maternal uncle; Heart failure in her father and mother; Suicide in her son.    Allergies: Patient has No Known Allergies.    ROS  Constitutional: Negative for fever   Eyes: Negative for blurred vision, double vision   Respiratory: Negative for cough, shortness of breath   Cardiovascular: Negative for chest pain, palpitations, and leg swelling.   Gastrointestinal: Negative for abdominal pain, constipation, diarrhea, nausea, vomiting.   Genitourinary: + for dysuria, frequency   Musculoskeletal:  + generalized weakness. + for back pain and myalgias.   Skin: Negative for itching and rash.   Neurological: Negative for dizziness, headaches.   Psychiatric/Behavioral: Negative for depression. The patient is not nervous/anxious.      24 hour Vital Sign Range   Temp:  [97.9 °F (36.6 °C)-98.1 °F (36.7 °C)]   Pulse:  [52-54]   Resp:  [16-97]   BP: (136-142)/(65-79)   SpO2:  [94 %]     Current BMI: Body mass index is 25.4 kg/m².    PEx  Constitutional: Patient appears debilitated.  No distress noted  HENT:   Head: Normocephalic and atraumatic.   Eyes: Pupils are equal, round  Neck:  Normal range of motion. Neck supple.   Cardiovascular: Normal rate, regular rhythm and normal heart sounds.    Pulmonary/Chest: Effort normal and breath sounds are clear  Abdominal: Soft. Bowel sounds are normal.   Musculoskeletal: Normal range of motion.   Neurological: Alert and oriented to person,   Psychiatric: Normal mood and affect. Behavior is normal. +forgetful   Skin: Skin is warm and dry. Full skin assessment    No results for input(s): GLUCOSE, NA, K, CL, CO2, BUN, CREATININE, MG in the last 24 hours.    Invalid input(s):  CALCIUM    No results for input(s): WBC, RBC, HGB, HCT, PLT, MCV, MCH, MCHC in the last 24 hours.      Assessment and Plan:  Closed compression fracture of body of L1 vertebra  -87 y.o. female with a pmhx of osteoporosis, thrombocytopenia, mild cognitive impairment, and arthritis. Patient tripped and fell onto buttocks yesterday with residual lower back pain after fall. Lumbar CT showing L1 compression fracture. Neurosurgery consulted for further eval.  -Lumbar x-rays (AP/Lateral) 11/29: revealing L1 compression fracture with 20% anterior height loss, and grade 2 anterolisthesis of L4 on L5.   -Lumbar CT 11/30 re demonstrating L1 compression fracture, and grade 2 anterolisthesis for L4 on L5 with associated pars defect. Severe R and moderate L neural foraminal narrowing.   -No acute surgical intervention  -Obtain lumbar xrays upright/supine with increased height loss upon standing. No kyphotic deformity  -Obtain lumbar flex/ex xrays reviewed re-demonstrating L4-L5 grade 2 anterolisthesis  -TLSO brace ordered/at bedside for comfort  -Will schedule outpatient follow up in 4-6 weeks with repeat Lumbar xrays. Neurosurgery will arrange.      Acute cystitis with hematuria  -UA reviewed,  w/ GNR  -Blood cxs NGTD  -Completed rocephin.  -12/5-Initiate order for cipro 500 mg BID, patient still with symptoms of UTI, will treat further      Amnestic MCI (mild cognitive impairment with memory  loss)  -Continue namenda.     Thrombocytopenia, unspecified  -Chronic, stable.  -Monitor and transfuse for <50K if bleeding or <10K if not bleeding  -Avoid antiplatelet medications including Lovenox, heparin,NSAIDs and aspirin.  -Follows with hem/onc outpatient.     Hypercholesteremia  -cont statin         Thyroid disease  -cont home synthroid     Anticipate disposition:  Home with home health      Follow-up needed during SNF stay-    Follow-up needed after discharge from SNF: PCP, neurosurgery     Future Appointments   Date Time Provider Department Center   1/10/2023  2:30 PM [facility] XR-A LIMIT 350 LBS [facility] XRAY OP [place] [facility]   1/10/2023  3:30 PM Candy Yang PA-C [facility] NEUROSU [place] [facility]   4/4/2023  1:00 PM Shi Simon MD [facility] [place] PC[facility]         I certify that SNF services are required to be given on an inpatient basis because Rosario Menendez needs for skilled nursing care and/or skilled rehabilitation are required on a daily basis and such services can only practically be provided in a skilled nursing facility setting and are for an ongoing condition for which she received inpatient care in the hospital.       Afsaneh Chawla NP  Department of Hospital Medicine   [facility]- Skilled Nursing Facility     DOS: 12/5/2022       Patient note was created using MModal Dictation.  Any errors in syntax or even information may not have been identified and edited on initial review prior to signing this note.

## 2022-12-05 NOTE — PT/OT/SLP PROGRESS
"Physical Therapy Treatment    Patient Name:  Rosario Menendez   MRN:  [id number]  Admit Date: 12/1/2022  Admitting Diagnosis: Closed compression fracture of body of L1 vertebra  Recent Surgeries: no surgery found    General Precautions: Standard, fall  Orthopedic Precautions: spinal precautions  Braces: TLSO    Recommendations:     Discharge Recommendations: home health PT  Level of Assistance Recommended at Discharge: 24 hours significant assistance  Discharge Equipment Recommendations: wheelchair  Barriers to discharge: Decreased caregiver support    Assessment:     Rosario Menendez is a 87 y.o. female admitted with a medical diagnosis of Closed compression fracture of body of L1 vertebra. Patient agreeable to therapy treatment this AM. Patient with overall maintenance of ambulation distance traveled this session. Cues required for command following and safety awareness during all mobility tasks. Patient requiring CGA/SBA for bed mobility, functional transfers and ambulation. Patient to benefit from continued SNF rehabilitation services to address deficits with progression toward PLOF and increased functional independence.    Performance deficits affecting function: weakness, impaired endurance, impaired functional mobility, gait instability, impaired balance, decreased lower extremity function, decreased safety awareness, pain, orthopedic precautions.    Rehab Potential is good    Activity Tolerance: Good    Plan:     Patient to be seen 6 x/week to address the above listed problems via gait training, therapeutic activities, therapeutic exercises, neuromuscular re-education, wheelchair management/training    Plan of Care Expires: 01/01/23  Plan of Care Reviewed with: patient    Subjective     "Are we staying in here or going out of the room?"     Pain/Comfort:  Pain Rating 1: 4/10  Location - Side 1: Bilateral  Location - Orientation 1: lower  Location 1: back  Pain Addressed 1: Pre-medicate for activity, " Reposition, Distraction, Cessation of Activity  Pain Rating Post-Intervention 1: 4/10    Patient's cultural, spiritual, Rastafari conflicts given the current situation:  no    Objective:     Communicated with patient's nurse prior to session.  Patient found supine with PureWick upon PT entry to room.     Therapeutic Activities and Exercises:   PT assisted patient with removal of PureWick and hygiene needs while supine in bed. Patient performed repeated rolling to complete task. TLSO donned with PT assist seated EOB. PT then assisted patient to commode in restroom for toileting including hygiene in standing.     Functional Mobility:  Bed Mobility:     Rolling Left:  stand by assistance and HOB flat with use of bed rails  Rolling Right: stand by assistance and HOB flat with use of bed rails  Supine to Sit: stand by assistance, contact guard assistance, and HOB flat with use of bed rails  Transfers:     Sit to Stand:  stand by assistance and contact guard assistance with rolling walker  Bed to Chair: contact guard assistance with  rolling walker  using  Step Transfer  Toilet Transfer: contact guard assistance with  grab bars  using  Stand Pivot  Gait: Patient ambulated 15 feet and 20 feet using RW with CGA. Seated rest break between trials. Patient with short step length and decreased step height bilaterally. Patient with frequent pauses throughout each gait trial. W/C in tow.   Wheelchair Propulsion:  Pt propelled Standard wheelchair x 20 feet on Level tile with  Bilateral upper extremity with Stand-by Assistance and verbal cues for steering.     AM-PAC 6 CLICK MOBILITY  17    Patient left up in chair with call button in reach and nursing staff notified.    GOALS:   Multidisciplinary Problems       Physical Therapy Goals          Problem: Physical Therapy    Goal Priority Disciplines Outcome Goal Variances Interventions   Physical Therapy Goal     PT, PT/OT Ongoing, Progressing     Description: Goals to be met by:  1/1/23    Patient will increase functional independence with mobility by performing:    . Supine to sit with Stand-by Assistance  . Sit to supine with Stand-by Assistance  . Rolling to Left and Right with Stand-by Assistance.  . Sit to stand transfer with Stand-by Assistance  . Bed to chair transfer with Stand-by Assistance using Rolling Walker  . Gait  x 100 feet with Stand-by Assistance using Rolling Walker.   . Wheelchair propulsion x100 feet with Supervision using bilateral uppper extremities                         Time Tracking:     PT Received On: 12/05/22  PT Start Time: 0837  PT Stop Time: 0923  PT Total Time (min): 46 min    Billable Minutes: Gait Training 12 and Therapeutic Activity 34    Treatment Type: Treatment  PT/PTA: PT     PTA Visit Number: 0     12/05/2022

## 2022-12-05 NOTE — PLAN OF CARE
Continue regular diet, add chocolate milk with meals, double meat in am, RD following  Goals: PO to meet 75% of EEN by next RD follow up  Nutrition Goal Status: goal not met  Communication of RD Recs: reviewed with physician     Assessment and Plan  Inadequate oral intake related to poor appetite, decline in ADL's as evidenced by diet hx reveals, one meal per day, likely inadequate protein intake, refusal to try oral supplements, frequent restaurant take -out , eating with no concern regarding nutrient content.      New     Plan  General diet  Protein modified diet- chocolate milk with meals  Collaboration with other providers  Nutrition education- healthy meal planning  Vitamin therapy, Vit B12, recommend MVI

## 2022-12-05 NOTE — CLINICAL REVIEW
Clinical Pharmacy Chart Review Note      Admit Date: 12/1/2022   LOS: 4 days       Rosario Menendez is a 87 y.o. female admitted to SNF for PT/OT after hospitalization for closed compression fracture of body of L1 vertebra.    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     Review of patient's allergies indicates:  No Known Allergies  Patient Active Problem List    Diagnosis Date Noted    Closed compression fracture of body of L1 vertebra 11/30/2022    Acute cystitis with hematuria 11/29/2022    Fall 11/29/2022    Amnestic MCI (mild cognitive impairment with memory loss) 07/05/2021    Thyroid disease     Hypercholesteremia     Personal history of colonic polyps 06/02/2021    Thrombocytopenia, unspecified 04/11/2018       Scheduled Meds:    buPROPion  150 mg Oral Daily    cyanocobalamin  1,000 mcg Oral Daily    galantamine  4 mg Oral Daily with breakfast    levothyroxine  100 mcg Oral Before breakfast    LIDOcaine  1 patch Transdermal Q24H    losartan  50 mg Oral Daily    memantine  10 mg Oral BID    methocarbamoL  500 mg Oral QID    pravastatin  40 mg Oral Daily    senna-docusate 8.6-50 mg  1 tablet Oral BID     Continuous Infusions:   PRN Meds: acetaminophen, calcium carbonate, HYDROcodone-acetaminophen, melatonin    OBJECTIVE:     Vital Signs (Last 24H)  Temp:  [97.9 °F (36.6 °C)-98.1 °F (36.7 °C)]   Pulse:  [52-54]   Resp:  [16-97]   BP: (136-142)/(65-79)   SpO2:  [94 %]     Laboratory:  CBC:   Recent Labs   Lab 11/29/22  1602 11/30/22  0358 12/05/22  0753   WBC 8.95 6.64 5.39   RBC 4.81 4.62 4.59   HGB 15.0 14.4 14.1   HCT 45.0 43.1 42.9   PLT 37* 28* 77*   MCV 94  93 94   MCH 31.2* 31.2* 30.7   MCHC 33.3 33.4 32.9     BMP:   Recent Labs   Lab 11/30/22  0358 12/01/22  0447 12/05/22  0753   GLU 94 87 72    134* 138   K 4.0 4.3 4.2    109 108   CO2 22* 19* 19*   BUN 12 12 15   CREATININE 0.8 0.8 0.7   CALCIUM 10.0 9.3 9.8   MG 2.2 2.2 2.2     CMP:   Recent Labs   Lab 11/29/22  1602 11/30/22  0358 12/01/22  0447 12/05/22  0753   GLU 93 94 87 72   CALCIUM 10.0 10.0 9.3 9.8   ALBUMIN 3.6  --   --   --    PROT 6.4  --   --   --     139 134* 138   K 4.3 4.0 4.3 4.2   CO2 24 22* 19* 19*    109 109 108   BUN 14 12 12 15   CREATININE 0.9 0.8 0.8 0.7   ALKPHOS 75  --   --   --    ALT 19  --   --   --    AST 19  --   --   --    BILITOT 0.8  --   --   --      LFTs:   Recent Labs   Lab 11/29/22  1602   ALT 19   AST 19   ALKPHOS 75   BILITOT 0.8   PROT 6.4   ALBUMIN 3.6       Others:   Recent Labs   Lab 11/29/22  1602   TSH 0.412     .  ASSESSMENT/PLAN:     I have reviewed the medications in compliance with CMS Regulation F756 of the [place]. Based on information gathered, the following items may need to be addressed:    **Patient is taking bupropion (home med) for depression. A gradual dose reduction is not recommended at this time. Patient to follow-up with prescribing MD as outpatient.  Monitor: mental status for depression, suicide ideation, anxiety, serotonin syndrome, hypertension, hyponatremia, dizziness, drowsiness, somnolence      Medications reviewed by PharmD, please re-consult if needed.      Ximena Mendoza, Pharm. D.  Clinical Pharmacist  [facility]

## 2022-12-05 NOTE — PT/OT/SLP PROGRESS
"Occupational Therapy   Treatment    Name: Rosario Menendez  MRN: [id number]  Admit Date: 12/1/2022  Admitting Diagnosis:  Closed compression fracture of body of L1 vertebra    General Precautions: Standard, fall   Orthopedic Precautions: spinal precautions   Braces: TLSO    Recommendations:     Discharge Recommendations:  home health OT  Level of Assistance Recommended at Discharge: 24 hours light assistance for ADL's and homemaking tasks  Discharge Equipment Recommendations: wheelchair  Barriers to discharge:  None    Assessment:     Rosario Menendez is a 87 y.o. female with a medical diagnosis of Closed compression fracture of body of L1 vertebra.  She presents with the following performance deficits affecting function are weakness, impaired endurance, impaired self care skills, impaired functional mobility, gait instability, impaired balance, decreased coordination, decreased lower extremity function, impaired cognition, decreased safety awareness, pain, orthopedic precautions, impaired cardiopulmonary response to activity.  Pt tolerated session well and without incident, but she continues to require assistance to perform self-care tasks and mobility. Pt follows 1-step commands only and requires frequent verbal cues for initiation of tasks. She would continue to benefit from skilled OT services at SNF to maximize her gains in functional independence.      Rehab Potential is good    Activity tolerance:  Fair    Plan:     Patient to be seen 6 x/week to address the above listed problems via self-care/home management, therapeutic activities, therapeutic exercises    Plan of Care Expires: 01/02/23  Plan of Care Reviewed with: patient, friend    Subjective   "What next? What do I do?" Asked frequently throughout session.  Communicated with: Jay prior to session.    Pain/Comfort:  Pain Rating 1: 3/10  Location - Side 1: Bilateral  Location - Orientation 1: lower  Location 1: back  Pain Addressed 1: Reposition, " Distraction, Cessation of Activity  Pain Rating Post-Intervention 1: 4/10    Patient's cultural, spiritual, Scientologist conflicts given the current situation:  no    Objective:     Patient found up in chair with TLSO and friend present upon OT entry to room.    Bed Mobility:    N/A due to up in chair     Functional Mobility/Transfers:  Patient completed Sit <> Stand Transfer x2 from w/c with contact guard assistance  with  grab bars(s)/bed rail  Patient completed Wheelchair <> Chair Transfer using Stand Pivot technique with minimum assistance with hand-held assist and use of bed rail for support  Patient completed Toilet Transfer Stand Pivot technique with 3-in-1 placed over toilet with contact guard assistance with  grab bars    Activities of Daily Living:  Grooming: minimum assistance to maintain sitting forward in wheelchair. Pt required verbal cues for initiation of all tasks. Completed hand hygiene, face and oral hygiene.   Upper Body Dressing: maximal assistance to doff open front sweater   Lower Body Dressing: maximal assistance to doff/don pants, with (A) to thread BLE and to pull up over hips in stance  Toileting: contact guard assistance for balance while completing seated pericares. Pt requires max A for clothing mgmt    [facility] 6 Click ADL: 15      Treatment & Education:  Pt edu on role of OT, POC, safety when performing self care tasks , benefit of performing OOB activity, orthopedic precautions, and safety when performing functional transfers and mobility.  -Pt unable to recall spinal precautions. Educated pt on BLT acronym for spinal precautions. Pt able to recall 2/3 after 2 minutes.   - Self care tasks completed-- as noted above      Patient left up in chair with call button in reach, Nsg notified, and pt's friend present    GOALS:   Multidisciplinary Problems       Occupational Therapy Goals          Problem: Occupational Therapy    Goal Priority Disciplines Outcome Interventions   Occupational Therapy  Goal     OT, PT/OT Ongoing, Progressing    Description: Goals to be met by: 3 weeks     Patient will increase functional independence with ADLs by performing:    UE Dressing with Supervision.  LE Dressing with Supervision.  Grooming while seated at sink with Set-up Assistance.  Toileting from toilet with Stand-by Assistance for hygiene and clothing management.   Bathing sitting at sink with Stand-by Assistance.  Supine to sit with Modified [place].  Step transfer with Supervision with appropriate A/D  Upper extremity exercise with supervision.  Caregiver will be educated on level of assist required to safely perform self care tasks and functional transfers..                        Time Tracking:     OT Date of Treatment: 12/05/22  OT Start Time: 1120    OT Stop Time: 1147  OT Total Time (min): 27 min    Billable Minutes:Self Care/Home Management 27    12/5/2022

## 2022-12-05 NOTE — PROGRESS NOTES
[facility], [place] - Skilled Nursing  Adult Nutrition  Progress Note    SUMMARY   Recommendations  Continue regular diet, add chocolate milk with meals, double meat in am, RD following  Goals: PO to meet 75% of EEN by next RD follow up  Nutrition Goal Status: goal not met  Communication of RD Recs: reviewed with physician    Assessment and Plan  Inadequate oral intake related to poor appetite, decline in ADL's as evidenced by diet hx reveals, one meal per day, likely inadequate protein intake, refusal to try oral supplements, frequent restaurant take -out , eating with no concern regarding nutrient content.      New     Plan  General diet  Protein modified diet- chocolate milk with meals  Collaboration with other providers  Nutrition education- healthy meal planning  Vitamin therapy, Vit B12, recommend MVI    Malnutrition Assessment 12/2/22     Eyes (Micronutrient): conjunctiva dull  Neck/Chest (Micronutrient): muscle wasting  Musculoskeletal/Lower Extremities: muscle wasting   Micronutrient Evaluation: suspected deficiency       Orbital Region (Subcutaneous Fat Loss): severe depletion  Upper Arm Region (Subcutaneous Fat Loss): mild depletion  Thoracic and Lumbar Region: well nourished   [place] Region (Muscle Loss): mild depletion  Clavicle and Acromion Bone Region (Muscle Loss): mild depletion  Scapular Bone Region (Muscle Loss): mild depletion  Dorsal Hand (Muscle Loss): mild depletion  Patellar Region (Muscle Loss): mild depletion  Anterior Thigh Region (Muscle Loss): mild depletion  Posterior Calf Region (Muscle Loss): well nourished   Edema (Fluid Accumulation): 0-->no edema present             Reason for Assessment    Reason For Assessment: RD follow-up  Diagnosis:  (Fx of L1)  Relevant Medical History: HLD  Interdisciplinary Rounds: did not attend  General Information Comments: Daughter in room states pt eats a lot of sweets at home, she concurs that patient protein intake may be inadequate, Patient asking her to  bring taco's tomorrow and mocha drink from [employer] again. pt will not take/try oral supplement but does agree to chocolate milk with meals  Nutrition Discharge Planning: DC regular diet, ONS of choice  Nutrition/Diet History    Patient Reported Diet/Restrictions/Preferences: general  Typical Food/Fluid Intake: one meal per day, people bring her food, loves [employer] frappe  Spiritual, Cultural Beliefs, Sabianist Practices, Values that Affect Care: no  Food Allergies: NKFA  Factors Affecting Nutritional Intake: decreased appetite    Anthropometrics    Temp: 97.9 °F (36.6 °C)  Height: 5' (152.4 cm)  Height (inches): 60 in  Weight Method: Standard Scale  Weight: 59 kg (130 lb 1.1 oz) (standard scale)  Weight (lb): 130.07 lb  Ideal Body Weight (IBW), Female: 100 lb  % Ideal Body Weight, Female (lb): 143.74 %  BMI (Calculated): 25.4  BMI Grade: 25 - 29.9 - overweight  Usual Body Weight (UBW), k kg  Weight Change Amount:  (patient weight is stable)  % Usual Body Weight: 98.54  % Weight Change From Usual Weight: 8.67 %  Weight Loss Since Admission:  (daughter has documentation of weights and does not believe that pt weighed 143# recently)       Lab/Procedures/Meds    Pertinent Labs Reviewed: reviewed  Pertinent Labs Comments: PO4 2.1  Pertinent Medications Reviewed: reviewed  Pertinent Medications Comments: KPO4 ,Abx    Estimated/Assessed Needs    Weight Used For Calorie Calculations: 65.2 kg (143 lb 11.8 oz)  Energy Calorie Requirements (kcal): 1209  Energy Need Method: [place] (x 1.2(PAL))  Protein Requirements: 65-78  Weight Used For Protein Calculations: 65.2 kg (143 lb 11.8 oz) (1.0-1.2g/kg)  Fluid Requirements (mL): 1209 or per MD  Estimated Fluid Requirement Method: RDA Method  RDA Method (mL): 1209  CHO Requirement: -    Nutrition Prescription Ordered  Current Diet Order: Regular  Nutrition Order Comments: Chocolate milk w meals, double meat at breakfast  Oral Nutrition Supplement:  refuses    Evaluation of Received Nutrient/Fluid Intake    I/O: +2100  Energy Calories Required: not meeting needs, other (see comments)  Protein Required: not meeting needs  Fluid Required: meeting needs  Comments: LBM 12/2  Tolerance: tolerating  % Intake of Estimated Energy Needs: 25 - 50 %  % Meal Intake: 25 - 50 %    Nutrition Risk    Level of Risk/Frequency of Follow-up: high (two times per week)     Monitor and Evaluation    Food and Nutrient Intake: food and beverage intake  Food and Nutrient Adminstration: diet order  Knowledge/Beliefs/Attitudes: food and nutrition knowledge/skill  Anthropometric Measurements: weight change  Biochemical Data, Medical Tests and Procedures: gastrointestinal profile, electrolyte and renal panel  Nutrition-Focused Physical Findings: overall appearance     Nutrition Follow-Up    RD Follow-up?: Yes

## 2022-12-06 PROCEDURE — 25000003 PHARM REV CODE 250: Performed by: NURSE PRACTITIONER

## 2022-12-06 PROCEDURE — 11000004 HC SNF PRIVATE

## 2022-12-06 PROCEDURE — 25000003 PHARM REV CODE 250: Performed by: HOSPITALIST

## 2022-12-06 PROCEDURE — 97530 THERAPEUTIC ACTIVITIES: CPT | Mod: CO

## 2022-12-06 PROCEDURE — 97116 GAIT TRAINING THERAPY: CPT

## 2022-12-06 PROCEDURE — 97110 THERAPEUTIC EXERCISES: CPT

## 2022-12-06 PROCEDURE — 97530 THERAPEUTIC ACTIVITIES: CPT

## 2022-12-06 RX ADMIN — METHOCARBAMOL 500 MG: 500 TABLET ORAL at 01:12

## 2022-12-06 RX ADMIN — SENNOSIDES AND DOCUSATE SODIUM 1 TABLET: 50; 8.6 TABLET ORAL at 09:12

## 2022-12-06 RX ADMIN — HYDROCODONE BITARTRATE AND ACETAMINOPHEN 1 TABLET: 5; 325 TABLET ORAL at 09:12

## 2022-12-06 RX ADMIN — MEMANTINE 10 MG: 10 TABLET ORAL at 09:12

## 2022-12-06 RX ADMIN — CIPROFLOXACIN HYDROCHLORIDE 500 MG: 500 TABLET, FILM COATED ORAL at 09:12

## 2022-12-06 RX ADMIN — LOSARTAN POTASSIUM 50 MG: 50 TABLET, FILM COATED ORAL at 09:12

## 2022-12-06 RX ADMIN — CYANOCOBALAMIN TAB 1000 MCG 1000 MCG: 1000 TAB at 09:12

## 2022-12-06 RX ADMIN — METHOCARBAMOL 500 MG: 500 TABLET ORAL at 09:12

## 2022-12-06 RX ADMIN — METHOCARBAMOL 500 MG: 500 TABLET ORAL at 06:12

## 2022-12-06 RX ADMIN — PRAVASTATIN SODIUM 40 MG: 20 TABLET ORAL at 09:12

## 2022-12-06 RX ADMIN — POTASSIUM & SODIUM PHOSPHATES POWDER PACK 280-160-250 MG 2 PACKET: 280-160-250 PACK at 09:12

## 2022-12-06 RX ADMIN — LEVOTHYROXINE SODIUM 100 MCG: 100 TABLET ORAL at 06:12

## 2022-12-06 RX ADMIN — Medication 6 MG: at 09:12

## 2022-12-06 RX ADMIN — LIDOCAINE 5% 1 PATCH: 700 PATCH TOPICAL at 09:12

## 2022-12-06 RX ADMIN — BUPROPION HYDROCHLORIDE 150 MG: 150 TABLET, FILM COATED, EXTENDED RELEASE ORAL at 09:12

## 2022-12-06 NOTE — PT/OT/SLP PROGRESS
Occupational Therapy   Treatment    Name: Rosario Menendez  MRN: [id number]  Admit Date: 12/1/2022  Admitting Diagnosis:  Closed compression fracture of body of L1 vertebra    General Precautions: Standard, fall   Orthopedic Precautions: spinal precautions   Braces: TLSO    Recommendations:     Discharge Recommendations:  home health OT  Level of Assistance Recommended at Discharge: 24 hours light assistance for ADL's and homemaking tasks  Discharge Equipment Recommendations: wheelchair  Barriers to discharge:  None    Assessment:     Rosario Menendez is a 87 y.o. female with a medical diagnosis of Closed compression fracture of body of L1 vertebra.  She presents with  Performance deficits affecting function are weakness, impaired endurance, impaired self care skills, impaired functional mobility, gait instability, impaired balance, decreased coordination, decreased lower extremity function, impaired cognition, decreased safety awareness, pain, orthopedic precautions, impaired cardiopulmonary response to activity.     Pt. With cues for safety and hand placement with instruction on proper placement and cues to stand continuously engaged on this day Pt  continues to demonstrate levels of physical deficits with  functional indep with daily management activities tasks, selfcare skills with balance,  functional mobility, UB strength and endurance. Pt. Will continue to benefit from continued OT to progress towards goals      Rehab Potential is fair    Activity tolerance:  Fair    Plan:     Patient to be seen 6 x/week to address the above listed problems via self-care/home management, therapeutic activities, therapeutic exercises    Plan of Care Expires: 01/02/23  Plan of Care Reviewed with: patient    Subjective     Communicated with:  and Pt.prior to session I am just tired today    Pain/Comfort:  Pain Rating 1: 3/10  Location - Side 1: Bilateral  Location - Orientation 1: lower  Location 1: back  Pain  Addressed 1: Pre-medicate for activity, Distraction, Reposition  Pain Rating Post-Intervention 1: 3/10    Patient's cultural, spiritual, Muslim conflicts given the current situation:  no    Objective:     Patient found up in chair in activity room  activity room director with TLSO upon OT entry to room.    Bed Mobility:    Not tested     Functional Mobility/Transfers:  Patient completed Sit <> Stand Transfer with contact guard assistance  with  rolling walker  x 2 trials from w/c with cues for safety and hand placement. Pt. Kept grabbing at draw handles to use to stand Pt. Verbally cued for safety       Activities of Daily Living:  Not tested     [facility] 6 Click ADL: 15    OT Exercises: UE Ergometer set for 10 min with multiple breaks inbetween    Treatment & Education:  Pt. With standing act on this day with task. Pt. With CGA/SBA for balance aspects with task with  AD at raised counter Pt with visual perception task with discrimination of various shapes and sizes x 2:30 min/sec and 58 sec  with standing bal Pt. With completing activity seated and min cues through out with weight shifting and use of BUE's incorporated and crossing mid line and facilitation with posture in prep for home management .     Pt. With 2# dowel activity with x10 reps with  shd flex, bicep curls horz adb/add and forward flex motion to increase BUE ROM and strength.    Pt. With therex performed to increase ROM, endurance selfcare task and fxl mobility for independence.     Pt edu on role of OT, POC, safety when performing self care tasks , benefit of performing OOB activity, and safety when performing functional transfers and mobility management for preparation with goals to progress towards next level of care     Patient left up in chair with all lines intact and call button in reach    GOALS:   Multidisciplinary Problems       Occupational Therapy Goals          Problem: Occupational Therapy    Goal Priority Disciplines Outcome  Interventions   Occupational Therapy Goal     OT, PT/OT Ongoing, Progressing    Description: Goals to be met by: 3 weeks     Patient will increase functional independence with ADLs by performing:    UE Dressing with Supervision.  LE Dressing with Supervision.  Grooming while seated at sink with Set-up Assistance.  Toileting from toilet with Stand-by Assistance for hygiene and clothing management.   Bathing sitting at sink with Stand-by Assistance.  Supine to sit with Modified [place].  Step transfer with Supervision with appropriate A/D  Upper extremity exercise with supervision.  Caregiver will be educated on level of assist required to safely perform self care tasks and functional transfers..                        Time Tracking:     OT Date of Treatment: 12/06/22  OT Start Time: 1047    OT Stop Time: 1125  OT Total Time (min): 38 min    Billable Minutes:Therapeutic Activity 38    12/6/2022  A client care conference was performed between the DONAVON and MO, prior to treatment to discuss the patient's status, treatment plan and established goals.

## 2022-12-06 NOTE — PT/OT/SLP PROGRESS
"Physical Therapy Treatment    Patient Name:  Rosario Menendez   MRN:  [id number]  Admit Date: 12/1/2022  Admitting Diagnosis: Closed compression fracture of body of L1 vertebra  Recent Surgeries: no surgery found    General Precautions: Standard, fall  Orthopedic Precautions: spinal precautions  Braces: TLSO    Recommendations:     Discharge Recommendations: home health PT  Level of Assistance Recommended at Discharge: 24 hours significant assistance  Discharge Equipment Recommendations: wheelchair  Barriers to discharge: Decreased caregiver support    Assessment:     Rosario Menendez is a 87 y.o. female admitted with a medical diagnosis of Closed compression fracture of body of L1 vertebra. Patient agreeable to PT session this AM. Patient reports feeling tired at beginning of treatment. Patient able to ambulate increased gait distance this session with improved step length and continuous stepping pattern. Patient continues to require cues for completion of mobility tasks and placement of BUEs for safe transfer technique. Patient will benefit from continued SNF rehabilitation services to address deficits with progression toward PLOF.      Performance deficits affecting function: weakness, impaired endurance, impaired functional mobility, gait instability, impaired balance, decreased lower extremity function, decreased safety awareness, pain, orthopedic precautions.    Rehab Potential is good    Activity Tolerance: Good    Plan:     Patient to be seen 6 x/week to address the above listed problems via gait training, therapeutic activities, therapeutic exercises, neuromuscular re-education, wheelchair management/training    Plan of Care Expires: 01/01/23  Plan of Care Reviewed with: patient    Subjective     "What are we doing again?"     Pain/Comfort:  Pain Rating 1: 4/10  Location - Side 1: Bilateral  Location - Orientation 1: lower  Location 1: back  Pain Addressed 1: Pre-medicate for activity, Reposition, " Distraction, Cessation of Activity  Pain Rating Post-Intervention 1: 4/10    Patient's cultural, spiritual, Pentecostalism conflicts given the current situation:  no    Objective:     Communicated with patient's nurse prior to session.  Patient found up in chair with TLSO upon PT entry to room.     Therapeutic Activities and Exercises:   PT assisted with adjustment of TLSO for improved fit and comfort at beginning of session.     Seated BLE exercises x 20 reps each including Ankle DF/PF, hip flexion, LAQs, hip abduction    Functional Mobility:   12/06/22 1441   Sit to Stand   Assistance Needed Supervision   Physical Assistance Level No physical assistance   Comment SBA for sit to stand transfer using RW   CARE Score - Sit to Stand 4   Car Transfer   Reason if not Attempted Environmental limitations   CARE Score - Car Transfer 10   Walk 10 Feet   Assistance Needed Supervision   Physical Assistance Level No physical assistance   Comment Patient ambulated 38 feet x 2 trials using RW with SBA. Follow with chair.   CARE Score - Walk 10 Feet 4   Walk 50 Feet with Two Turns   Comment Patient with fatigue at 38 feet of ambulation.   Reason if not Attempted Safety concerns   CARE Score - Walk 50 Feet with Two Turns 88   Walk 150 Feet   Reason if not Attempted Safety concerns   CARE Score - Walk 150 Feet 88   Walking 10 Feet on Uneven Surfaces   Reason if not Attempted Safety concerns   CARE Score - Walking 10 Feet on Uneven Surfaces 88   1 Step (Curb)   Reason if not Attempted Safety concerns   CARE Score - 1 Step (Curb) 88   4 Steps   Reason if not Attempted Safety concerns   CARE Score - 4 Steps 88   12 Steps   Reason if not Attempted Safety concerns   CARE Score - 12 Steps 88   Picking Up Object   Reason if not Attempted Safety concerns   CARE Score - Picking Up Object 88   Uses a Wheelchair/Scooter?   Uses a Wheelchair/Scooter? Yes   Wheel 50 Feet with Two Turns   Assistance Needed Verbal cues;Supervision   Physical  Assistance Level No physical assistance   Comment Patient propelled W/C 67 feet with SBA using BUEs. Cues to increase push with LUE for appropriate steering as patient veering to L.   CARE Score - Wheel 50 Feet with Two Turns 4   Type of Wheelchair/Scooter Manual   Wheel 150 Feet   Comment BUE fatigue after 67 feet of propulsion.   Reason if not Attempted Safety concerns   CARE Score - Wheel 150 Feet 88   Type of Wheelchair/Scooter Manual       AM-PAC 6 CLICK MOBILITY  17    Patient left up in chair with call button in reach and nursing staff notified.    GOALS:   Multidisciplinary Problems       Physical Therapy Goals          Problem: Physical Therapy    Goal Priority Disciplines Outcome Goal Variances Interventions   Physical Therapy Goal     PT, PT/OT Ongoing, Progressing     Description: Goals to be met by: 1/1/23    Patient will increase functional independence with mobility by performing:    . Supine to sit with Stand-by Assistance  . Sit to supine with Stand-by Assistance  . Rolling to Left and Right with Stand-by Assistance.  . Sit to stand transfer with Stand-by Assistance  . Bed to chair transfer with Stand-by Assistance using Rolling Walker  . Gait  x 100 feet with Stand-by Assistance using Rolling Walker.   . Wheelchair propulsion x100 feet with Supervision using bilateral uppper extremities                         Time Tracking:     PT Received On: 12/06/22  PT Start Time: 0925  PT Stop Time: 1003  PT Total Time (min): 38 min    Billable Minutes: Gait Training 12, Therapeutic Activity 16, and Therapeutic Exercise 10    Treatment Type: Treatment  PT/PTA: PT     PTA Visit Number: 0     12/06/2022

## 2022-12-07 PROCEDURE — 25000003 PHARM REV CODE 250: Performed by: HOSPITALIST

## 2022-12-07 PROCEDURE — 97530 THERAPEUTIC ACTIVITIES: CPT

## 2022-12-07 PROCEDURE — 11000004 HC SNF PRIVATE

## 2022-12-07 PROCEDURE — 25000003 PHARM REV CODE 250: Performed by: NURSE PRACTITIONER

## 2022-12-07 PROCEDURE — 97116 GAIT TRAINING THERAPY: CPT

## 2022-12-07 RX ORDER — ADHESIVE BANDAGE
30 BANDAGE TOPICAL DAILY PRN
Status: DISCONTINUED | OUTPATIENT
Start: 2022-12-07 | End: 2022-12-21 | Stop reason: HOSPADM

## 2022-12-07 RX ORDER — AMOXICILLIN 250 MG
2 CAPSULE ORAL 2 TIMES DAILY
Status: DISCONTINUED | OUTPATIENT
Start: 2022-12-07 | End: 2022-12-21 | Stop reason: HOSPADM

## 2022-12-07 RX ORDER — PHENAZOPYRIDINE HYDROCHLORIDE 100 MG/1
100 TABLET, FILM COATED ORAL
Status: COMPLETED | OUTPATIENT
Start: 2022-12-07 | End: 2022-12-09

## 2022-12-07 RX ADMIN — SENNOSIDES AND DOCUSATE SODIUM 1 TABLET: 50; 8.6 TABLET ORAL at 08:12

## 2022-12-07 RX ADMIN — HYDROCODONE BITARTRATE AND ACETAMINOPHEN 1 TABLET: 5; 325 TABLET ORAL at 02:12

## 2022-12-07 RX ADMIN — METHOCARBAMOL 500 MG: 500 TABLET ORAL at 01:12

## 2022-12-07 RX ADMIN — HYDROCODONE BITARTRATE AND ACETAMINOPHEN 1 TABLET: 5; 325 TABLET ORAL at 09:12

## 2022-12-07 RX ADMIN — LOSARTAN POTASSIUM 50 MG: 50 TABLET, FILM COATED ORAL at 08:12

## 2022-12-07 RX ADMIN — LEVOTHYROXINE SODIUM 100 MCG: 100 TABLET ORAL at 05:12

## 2022-12-07 RX ADMIN — PHENAZOPYRIDINE 100 MG: 100 TABLET ORAL at 04:12

## 2022-12-07 RX ADMIN — BUPROPION HYDROCHLORIDE 150 MG: 150 TABLET, FILM COATED, EXTENDED RELEASE ORAL at 08:12

## 2022-12-07 RX ADMIN — MEMANTINE 10 MG: 10 TABLET ORAL at 09:12

## 2022-12-07 RX ADMIN — MAGNESIUM HYDROXIDE 2400 MG: 400 SUSPENSION ORAL at 06:12

## 2022-12-07 RX ADMIN — GALANTAMINE 8 MG: 4 TABLET, FILM COATED ORAL at 04:12

## 2022-12-07 RX ADMIN — METHOCARBAMOL 500 MG: 500 TABLET ORAL at 04:12

## 2022-12-07 RX ADMIN — MEMANTINE 10 MG: 10 TABLET ORAL at 08:12

## 2022-12-07 RX ADMIN — LIDOCAINE 5% 1 PATCH: 700 PATCH TOPICAL at 09:12

## 2022-12-07 RX ADMIN — CYANOCOBALAMIN TAB 1000 MCG 1000 MCG: 1000 TAB at 08:12

## 2022-12-07 RX ADMIN — HYDROCODONE BITARTRATE AND ACETAMINOPHEN 1 TABLET: 5; 325 TABLET ORAL at 08:12

## 2022-12-07 RX ADMIN — GALANTAMINE 8 MG: 4 TABLET, FILM COATED ORAL at 08:12

## 2022-12-07 RX ADMIN — CIPROFLOXACIN HYDROCHLORIDE 500 MG: 500 TABLET, FILM COATED ORAL at 09:12

## 2022-12-07 RX ADMIN — METHOCARBAMOL 500 MG: 500 TABLET ORAL at 08:12

## 2022-12-07 RX ADMIN — PRAVASTATIN SODIUM 40 MG: 20 TABLET ORAL at 08:12

## 2022-12-07 RX ADMIN — SENNOSIDES AND DOCUSATE SODIUM 2 TABLET: 50; 8.6 TABLET ORAL at 09:12

## 2022-12-07 RX ADMIN — METHOCARBAMOL 500 MG: 500 TABLET ORAL at 09:12

## 2022-12-07 RX ADMIN — CIPROFLOXACIN HYDROCHLORIDE 500 MG: 500 TABLET, FILM COATED ORAL at 08:12

## 2022-12-07 RX ADMIN — Medication 6 MG: at 09:12

## 2022-12-07 NOTE — PT/OT/SLP PROGRESS
"Physical Therapy Treatment    Patient Name:  Rosario Menendez   MRN:  [id number]  Admit Date: 12/1/2022  Admitting Diagnosis: Closed compression fracture of body of L1 vertebra  Recent Surgeries: no surgery found    General Precautions: Standard, fall  Orthopedic Precautions: spinal precautions  Braces: TLSO    Recommendations:     Discharge Recommendations: home health PT  Level of Assistance Recommended at Discharge: 24 hours significant assistance  Discharge Equipment Recommendations: wheelchair  Barriers to discharge: Decreased caregiver support    Assessment:     Rosario Menendez is a 87 y.o. female admitted with a medical diagnosis of Closed compression fracture of body of L1 vertebra. Patient agreeable to PT session this PM. Patient reports fatigue, but able to maintain gait distance traveled from prior session with additional trial completed. Patient continues to require frequent verbal cues for completion of tasks. Patient will benefit from continued SNF rehabilitation services to address deficits with progression toward PLOF with decreased burden of care.      Performance deficits affecting function: weakness, impaired endurance, impaired functional mobility, gait instability, impaired balance, impaired cognition, decreased lower extremity function, decreased safety awareness, pain, decreased ROM, orthopedic precautions.    Rehab Potential is good    Activity Tolerance: Good    Plan:     Patient to be seen 6 x/week to address the above listed problems via gait training, therapeutic activities, therapeutic exercises, neuromuscular re-education, wheelchair management/training    Plan of Care Expires: 01/01/23  Plan of Care Reviewed with: patient    Subjective     "I am tired."     Pain/Comfort:  Pain Rating 1: 4/10  Location - Side 1: Bilateral  Location - Orientation 1: generalized  Location 1: back  Pain Addressed 1: Reposition, Distraction, Cessation of Activity  Pain Rating Post-Intervention 1: " 4/10    Patient's cultural, spiritual, Evangelical conflicts given the current situation:  no    Objective:     Communicated with patient's nurse prior to session.  Patient found up in chair with TLSO upon PT entry to room.     Therapeutic Activities and Exercises:   PT assisted patient back to bed at end of session following reports of fatigue. Patient noted to be sitting up in chair since breakfast this AM. PT doffed TLSO prior to assisting patient supine.     Functional Mobility:  Bed Mobility:     Rolling Left:  stand by assistance and HOB flat and use of bed rails  Rolling Right: stand by assistance and HOB flat with use of bed rails  Supine to Sit: moderate assistance and HOB flat with use of bed rails  Sit to Supine: moderate assistance and HOB flat with use of bed rails  Transfers:     Sit to Stand:  stand by assistance with rolling walker  Bed to Chair: contact guard assistance with  rolling walker  using  Step Transfer  Gait: Patient ambulated 38 feet x 3 trials using RW with CGA. Prolonged seated rest break between trials.     AM-PAC 6 CLICK MOBILITY  16    Patient left supine with call button in reach and PCT notified.    GOALS:   Multidisciplinary Problems       Physical Therapy Goals          Problem: Physical Therapy    Goal Priority Disciplines Outcome Goal Variances Interventions   Physical Therapy Goal     PT, PT/OT Ongoing, Progressing     Description: Goals to be met by: 1/1/23    Patient will increase functional independence with mobility by performing:    . Supine to sit with Stand-by Assistance  . Sit to supine with Stand-by Assistance  . Rolling to Left and Right with Stand-by Assistance.  . Sit to stand transfer with Stand-by Assistance  . Bed to chair transfer with Stand-by Assistance using Rolling Walker  . Gait  x 100 feet with Stand-by Assistance using Rolling Walker.   . Wheelchair propulsion x100 feet with Supervision using bilateral uppper extremities                         Time  Tracking:     PT Received On: 12/07/22  PT Start Time: 1356  PT Stop Time: 1436  PT Total Time (min): 40 min    Billable Minutes: Gait Training 25 and Therapeutic Activity 15    Treatment Type: Treatment  PT/PTA: PT     PTA Visit Number: 0     12/07/2022

## 2022-12-07 NOTE — PROGRESS NOTES
"                                                        [facility]                                  Skilled Nursing Facility                   Progress Note     Admit Date: 12/1/2022  AMARA   Principal Problem:  Closed compression fracture of body of L1 vertebra   HPI obtained from patient interview and chart review     Chief Complaint: Re-evaluation of medical DX and therapy status: UTI, dysuria , constipation     HPI:   Rosario Menendez is a 87 y.o. female with a past medical history significant for osteoporosis, thrombocytopenia, hypothyroidism, mild cognitive impairment, depression, and arthritis. She presents to [facility] after she tripped and fell onto her buttocks while in the kitchen. Patient states she "bounced" during her fall.  After fall she noted buttock and lower back pain. Buttock pain is worse than lower back pain. Pain started after fall and is exacerbated by movement. She denies radiating leg pain. She denies numbness, tinging, saddle anesthesia, or bowel dysfunction. Lumbar x-rays (AP/Lateral) obtained in ED for lower back pain revealing L1 compression fracture with 20% anterior height loss, and grade 2 anterolisthesis of L4 on L5. Lumbar CT 11/30 re demonstrating L1 compression fracture, and grade 2 anterolisthesis for L4 on L5 with associated pars defect.      Notes recent UTI with persistent dysuria s/p antibiotic treatment. She denies chills, nausea, or vomiting. Endorses slight subjective fever yesterday. UA 11/29 obtained in ED showing +nitrite, 3+leukocytes, many bacteria, and >100 WBC. UA culture pending. Blood culture NGTD. Of note, she has baseline thrombocytopenia.     Patient will be treated at [facility] with PT and OT to improve functional status and ability to perform ADLs.     Interval Hx  No acute events over night  Was started on cipro yesterday for UTI. Patient continues to have dysuria today. Will order pyridium x 2 days. Renally appropriate as Cr 0.6 and GFR " >60.    Today patient complains of constipation, last BM three days ago. Denies Abd pain and passing gas. Will order MoM and increase senna.  24 hr vital sign ranges listed below.  Patient denies shortness of breath, abdominal discomfort, nausea, or vomiting.  Patient denies dysuria.    Patient progessing with PT/OT. Continuing to follow and treat all acute and chronic conditions.     Past Medical History: Patient has a past medical history of Arthritis, Depression, Hypercholesteremia, Thrombocytopenia, and Thyroid disease.    Past Surgical History: Patient has a past surgical history that includes Hysterectomy; Knee surgery; Ankle surgery; Wrist surgery; and Colonoscopy (N/A, 6/2/2021).    Social History: Patient reports that she has never smoked. She has never used smokeless tobacco.    Family History: family history includes Cancer in her maternal uncle; Heart failure in her father and mother; Suicide in her son.    Allergies: Patient has No Known Allergies.    ROS  Constitutional: Negative for fever   Eyes: Negative for blurred vision, double vision   Respiratory: Negative for cough, shortness of breath   Cardiovascular: Negative for chest pain, palpitations, and leg swelling.   Gastrointestinal: Negative for abdominal pain, constipation, diarrhea, nausea, vomiting.   Genitourinary: + for dysuria, frequency   Musculoskeletal:  + generalized weakness. + for back pain and myalgias.   Skin: Negative for itching and rash.   Neurological: Negative for dizziness, headaches.   Psychiatric/Behavioral: Negative for depression. The patient is not nervous/anxious.      24 hour Vital Sign Range   Temp:  [98.2 °F (36.8 °C)]   Pulse:  [51-63]   Resp:  [16-18]   BP: (114-154)/(64-73)   SpO2:  [94 %-95 %]     Current BMI: Body mass index is 25.4 kg/m².    PEx  Constitutional: Patient appears debilitated.  No distress noted  HENT:   Head: Normocephalic and atraumatic.   Eyes: Pupils are equal, round  Neck: Normal range of  motion. Neck supple.   Cardiovascular: Normal rate, regular rhythm and normal heart sounds.    Pulmonary/Chest: Effort normal and breath sounds are clear  Abdominal: Soft. Bowel sounds are normal.   Musculoskeletal: Normal range of motion.   Neurological: Alert and oriented to person,   Psychiatric: Normal mood and affect. Behavior is normal. +forgetful   Skin: Skin is warm and dry. Full skin assessment    No results for input(s): GLUCOSE, NA, K, CL, CO2, BUN, CREATININE, MG in the last 24 hours.    Invalid input(s):  CALCIUM    No results for input(s): WBC, RBC, HGB, HCT, PLT, MCV, MCH, MCHC in the last 24 hours.      Assessment and Plan:  Closed compression fracture of body of L1 vertebra  -87 y.o. female with a pmhx of osteoporosis, thrombocytopenia, mild cognitive impairment, and arthritis. Patient tripped and fell onto buttocks yesterday with residual lower back pain after fall. Lumbar CT showing L1 compression fracture. Neurosurgery consulted for further eval.  -Lumbar x-rays (AP/Lateral) 11/29: revealing L1 compression fracture with 20% anterior height loss, and grade 2 anterolisthesis of L4 on L5.   -Lumbar CT 11/30 re demonstrating L1 compression fracture, and grade 2 anterolisthesis for L4 on L5 with associated pars defect. Severe R and moderate L neural foraminal narrowing.   -No acute surgical intervention  -Obtain lumbar xrays upright/supine with increased height loss upon standing. No kyphotic deformity  -Obtain lumbar flex/ex xrays reviewed re-demonstrating L4-L5 grade 2 anterolisthesis  -TLSO brace ordered/at bedside for comfort  -Will schedule outpatient follow up in 4-6 weeks with repeat Lumbar xrays. Neurosurgery will arrange.      Acute cystitis with hematuria  -UA reviewed,  w/ GNR  -Blood cxs NGTD  -Completed rocephin.  -12/5-Initiate order for cipro 500 mg BID, patient still with symptoms of UTI, will treat further   -12/6-Initiate order for pyridium 100 mg TID x 2 days      Constipation  -12/7-Initiate order for MoM PRN and increase senna to 2 tabs BID      Amnestic MCI (mild cognitive impairment with memory loss)  -Continue namenda.     Thrombocytopenia, unspecified  -Chronic, stable.  -Monitor and transfuse for <50K if bleeding or <10K if not bleeding  -Avoid antiplatelet medications including Lovenox, heparin,NSAIDs and aspirin.  -Follows with hem/onc outpatient.     Hypercholesteremia  -cont statin         Thyroid disease  -cont home synthroid     Anticipate disposition:  Home with home health      Follow-up needed during SNF stay-    Follow-up needed after discharge from SNF: PCP, neurosurgery     Future Appointments   Date Time Provider Department Center   1/10/2023  2:30 PM [facility]DC XR-A LIMIT 350 LBS [facility] XRAY OP [facility]   1/10/2023  3:30 PM Candy Yang PA-C [facility] NEUROSU [facility]   4/4/2023  1:00 PM Shi Simon MD [facility] PC[facility]         I certify that SNF services are required to be given on an inpatient basis because Rosario Menendez needs for skilled nursing care and/or skilled rehabilitation are required on a daily basis and such services can only practically be provided in a skilled nursing facility setting and are for an ongoing condition for which she received inpatient care in the hospital.       Afsaneh Chawla NP  Department of Hospital Medicine   [facility]     DOS: 12/7/2022       Patient note was created using MModal Dictation.  Any errors in syntax or even information may not have been identified and edited on initial review prior to signing this note.

## 2022-12-08 LAB
ANION GAP SERPL CALC-SCNC: 7 MMOL/L (ref 8–16)
BASOPHILS # BLD AUTO: 0.05 K/UL (ref 0–0.2)
BASOPHILS NFR BLD: 0.9 % (ref 0–1.9)
BUN SERPL-MCNC: 14 MG/DL (ref 8–23)
CALCIUM SERPL-MCNC: 10 MG/DL (ref 8.7–10.5)
CHLORIDE SERPL-SCNC: 108 MMOL/L (ref 95–110)
CO2 SERPL-SCNC: 23 MMOL/L (ref 23–29)
CREAT SERPL-MCNC: 0.9 MG/DL (ref 0.5–1.4)
DIFFERENTIAL METHOD: ABNORMAL
EOSINOPHIL # BLD AUTO: 0.1 K/UL (ref 0–0.5)
EOSINOPHIL NFR BLD: 2.4 % (ref 0–8)
ERYTHROCYTE [DISTWIDTH] IN BLOOD BY AUTOMATED COUNT: 14.2 % (ref 11.5–14.5)
EST. GFR  (NO RACE VARIABLE): >60 ML/MIN/1.73 M^2
GLUCOSE SERPL-MCNC: 79 MG/DL (ref 70–110)
HCT VFR BLD AUTO: 43.8 % (ref 37–48.5)
HGB BLD-MCNC: 14.2 G/DL (ref 12–16)
IMM GRANULOCYTES # BLD AUTO: 0.02 K/UL (ref 0–0.04)
IMM GRANULOCYTES NFR BLD AUTO: 0.4 % (ref 0–0.5)
LYMPHOCYTES # BLD AUTO: 1.6 K/UL (ref 1–4.8)
LYMPHOCYTES NFR BLD: 29.4 % (ref 18–48)
MAGNESIUM SERPL-MCNC: 2.4 MG/DL (ref 1.6–2.6)
MCH RBC QN AUTO: 31.1 PG (ref 27–31)
MCHC RBC AUTO-ENTMCNC: 32.4 G/DL (ref 32–36)
MCV RBC AUTO: 96 FL (ref 82–98)
MONOCYTES # BLD AUTO: 0.8 K/UL (ref 0.3–1)
MONOCYTES NFR BLD: 14.3 % (ref 4–15)
NEUTROPHILS # BLD AUTO: 2.8 K/UL (ref 1.8–7.7)
NEUTROPHILS NFR BLD: 52.6 % (ref 38–73)
NRBC BLD-RTO: 0 /100 WBC
PHOSPHATE SERPL-MCNC: 2.8 MG/DL (ref 2.7–4.5)
PLATELET # BLD AUTO: 105 K/UL (ref 150–450)
PMV BLD AUTO: 11.2 FL (ref 9.2–12.9)
POTASSIUM SERPL-SCNC: 4.6 MMOL/L (ref 3.5–5.1)
RBC # BLD AUTO: 4.56 M/UL (ref 4–5.4)
SODIUM SERPL-SCNC: 138 MMOL/L (ref 136–145)
WBC # BLD AUTO: 5.4 K/UL (ref 3.9–12.7)

## 2022-12-08 PROCEDURE — 85025 COMPLETE CBC W/AUTO DIFF WBC: CPT | Performed by: HOSPITALIST

## 2022-12-08 PROCEDURE — 84100 ASSAY OF PHOSPHORUS: CPT | Performed by: HOSPITALIST

## 2022-12-08 PROCEDURE — 63700000 PHARM REV CODE 250 ALT 637 W/O HCPCS: Performed by: NURSE PRACTITIONER

## 2022-12-08 PROCEDURE — 97116 GAIT TRAINING THERAPY: CPT

## 2022-12-08 PROCEDURE — 25000003 PHARM REV CODE 250: Performed by: NURSE PRACTITIONER

## 2022-12-08 PROCEDURE — 11000004 HC SNF PRIVATE

## 2022-12-08 PROCEDURE — 83735 ASSAY OF MAGNESIUM: CPT | Performed by: HOSPITALIST

## 2022-12-08 PROCEDURE — 36415 COLL VENOUS BLD VENIPUNCTURE: CPT | Performed by: HOSPITALIST

## 2022-12-08 PROCEDURE — 25000003 PHARM REV CODE 250: Performed by: HOSPITALIST

## 2022-12-08 PROCEDURE — 80048 BASIC METABOLIC PNL TOTAL CA: CPT | Performed by: HOSPITALIST

## 2022-12-08 PROCEDURE — 97530 THERAPEUTIC ACTIVITIES: CPT

## 2022-12-08 PROCEDURE — 97530 THERAPEUTIC ACTIVITIES: CPT | Mod: CO

## 2022-12-08 PROCEDURE — 97110 THERAPEUTIC EXERCISES: CPT

## 2022-12-08 RX ORDER — DOCUSATE SODIUM 283 MG/5ML
1 LIQUID RECTAL DAILY PRN
Status: DISCONTINUED | OUTPATIENT
Start: 2022-12-08 | End: 2022-12-21 | Stop reason: HOSPADM

## 2022-12-08 RX ORDER — GALANTAMINE HYDROBROMIDE 16 MG/1
16 CAPSULE, EXTENDED RELEASE ORAL
Status: DISCONTINUED | OUTPATIENT
Start: 2022-12-08 | End: 2022-12-21 | Stop reason: HOSPADM

## 2022-12-08 RX ADMIN — SENNOSIDES AND DOCUSATE SODIUM 2 TABLET: 50; 8.6 TABLET ORAL at 09:12

## 2022-12-08 RX ADMIN — MEMANTINE 10 MG: 10 TABLET ORAL at 09:12

## 2022-12-08 RX ADMIN — METHOCARBAMOL 500 MG: 500 TABLET ORAL at 01:12

## 2022-12-08 RX ADMIN — Medication 6 MG: at 09:12

## 2022-12-08 RX ADMIN — CIPROFLOXACIN HYDROCHLORIDE 500 MG: 500 TABLET, FILM COATED ORAL at 09:12

## 2022-12-08 RX ADMIN — PHENAZOPYRIDINE 100 MG: 100 TABLET ORAL at 09:12

## 2022-12-08 RX ADMIN — LIDOCAINE 5% 1 PATCH: 700 PATCH TOPICAL at 09:12

## 2022-12-08 RX ADMIN — METHOCARBAMOL 500 MG: 500 TABLET ORAL at 09:12

## 2022-12-08 RX ADMIN — LEVOTHYROXINE SODIUM 100 MCG: 100 TABLET ORAL at 05:12

## 2022-12-08 RX ADMIN — HYDROCODONE BITARTRATE AND ACETAMINOPHEN 1 TABLET: 5; 325 TABLET ORAL at 09:12

## 2022-12-08 RX ADMIN — PRAVASTATIN SODIUM 40 MG: 20 TABLET ORAL at 09:12

## 2022-12-08 RX ADMIN — LOSARTAN POTASSIUM 50 MG: 50 TABLET, FILM COATED ORAL at 09:12

## 2022-12-08 RX ADMIN — PHENAZOPYRIDINE 100 MG: 100 TABLET ORAL at 01:12

## 2022-12-08 RX ADMIN — CYANOCOBALAMIN TAB 1000 MCG 1000 MCG: 1000 TAB at 09:12

## 2022-12-08 RX ADMIN — PHENAZOPYRIDINE 100 MG: 100 TABLET ORAL at 05:12

## 2022-12-08 RX ADMIN — METHOCARBAMOL 500 MG: 500 TABLET ORAL at 05:12

## 2022-12-08 RX ADMIN — BUPROPION HYDROCHLORIDE 150 MG: 150 TABLET, FILM COATED, EXTENDED RELEASE ORAL at 09:12

## 2022-12-08 RX ADMIN — GALANTAMINE HYDROBROMIDE 16 MG: 16 CAPSULE, EXTENDED RELEASE ORAL at 11:12

## 2022-12-08 NOTE — PT/OT/SLP PROGRESS
Occupational Therapy   Treatment    Name: Rosario Menendez  MRN: [id number]  Admit Date: 12/1/2022  Admitting Diagnosis:  Closed compression fracture of body of L1 vertebra    General Precautions: Standard, fall   Orthopedic Precautions: spinal precautions   Braces: TLSO    Recommendations:     Discharge Recommendations:  home health OT  Level of Assistance Recommended at Discharge: 24 hours light assistance for ADL's and homemaking tasks  Discharge Equipment Recommendations: wheelchair  Barriers to discharge:  None    Assessment:     Rosario Menendez is a 87 y.o. female with a medical diagnosis of Closed compression fracture of body of L1 vertebra.  She presents with  Performance deficits affecting function are weakness, impaired endurance, impaired self care skills, impaired functional mobility, gait instability, impaired balance, decreased coordination, decreased lower extremity function, impaired cognition, decreased safety awareness, pain, orthopedic precautions, impaired cardiopulmonary response to activity.     Pt.  participated well with session on this day. Pt. With increase time and cues to execute task on this day with several intermittent breaks in between.  Pt  continues to demonstrate levels of physical deficits with  functional indep with daily management activities tasks, selfcare skills with balance,  functional mobility, UB strength and endurance. Pt. Will continue to benefit from continued OT to progress towards goals    Rehab Potential is fair    Activity tolerance:  Fair    Plan:     Patient to be seen 6 x/week to address the above listed problems via self-care/home management, therapeutic activities, therapeutic exercises    Plan of Care Expires: 01/02/23  Plan of Care Reviewed with: patient    Subjective     Communicated with: nsg and Pt. prior to session. Pt. Agreeable to session     Pain/Comfort:  Pain Rating 1: 3/10  Location - Side 1: Bilateral  Location - Orientation 1:  generalized  Location 1: back  Pain Addressed 1: Pre-medicate for activity, Reposition, Distraction  Pain Rating Post-Intervention 1: 4/10    Patient's cultural, spiritual, Sikhism conflicts given the current situation:  no    Objective:     Patient found up in chair  upon OT entry to room.    Bed Mobility:    Not tested     Functional Mobility/Transfers:  Patient completed Sit <> Stand Transfer with contact guard assistance  with  rolling walker   Patient completed Toilet Transfer Step Transfer technique with contact guard assistance with  rolling walker  Functional Mobility: Pt. With fxl mobility to and from inroo bath with RW and cues for safety     Activities of Daily Living:  Grooming: contact guard assistance standing sink side with hand hygiene   Toileting: moderate assistance overall with cleaning and clothing management and fresh brief application      AMPAC 6 Click ADL: 15    OT Exercises: UE Ergometer x 6 min with intermittent breaks in between     Treatment & Education:  Pt. With  AAROM with 2# dowel activity with 2x20 reps with  shd flex, bicep curls and forward flex motion to increase BUE ROM and strength.    Pt. With therex performed to increase ROM, endurance selfcare task and fxl mobility for independence     Pt edu on role of OT, POC, safety when performing self care tasks , benefit of performing OOB activity, and safety when performing functional transfers and mobility management for preparation with goals to progress towards next level of care     Patient left up in chair with all lines intact and call button in reach    GOALS:   Multidisciplinary Problems       Occupational Therapy Goals          Problem: Occupational Therapy    Goal Priority Disciplines Outcome Interventions   Occupational Therapy Goal     OT, PT/OT Ongoing, Progressing    Description: Goals to be met by: 3 weeks     Patient will increase functional independence with ADLs by performing:    UE Dressing with Supervision.  NOELLE  Dressing with Supervision.  Grooming while seated at sink with Set-up Assistance.  Toileting from toilet with Stand-by Assistance for hygiene and clothing management.   Bathing sitting at sink with Stand-by Assistance.  Supine to sit with Modified [place].  Step transfer with Supervision with appropriate A/D  Upper extremity exercise with supervision.  Caregiver will be educated on level of assist required to safely perform self care tasks and functional transfers..                        Time Tracking:     OT Date of Treatment: 12/08/22  OT Start Time: 1025    OT Stop Time: 1125  OT Total Time (min): 38 min    Billable Minutes:Therapeutic Activity 38    12/8/2022

## 2022-12-08 NOTE — PLAN OF CARE
Interdisciplinary team, Megha Fernandes, RN Nurse Manager, Ailyn Dominguez, RN MDS Coordinator, Afsaneh Enamorado PT, Rehab Supervisor, Marquise Luis LMSW, Leander Del Valle, and Nyasia Sierra, Dietician, spoke to patient and patient's daughter for care plan conference, weekly status update, and therapy progress update. Tentative discharge date set for 12/21/22.

## 2022-12-08 NOTE — PT/OT/SLP PROGRESS
"Physical Therapy Treatment    Patient Name:  Rosario Menendez   MRN:  [id number]  Admit Date: 12/1/2022  Admitting Diagnosis: Closed compression fracture of body of L1 vertebra  Recent Surgeries: no surgery found    General Precautions: Standard, fall  Orthopedic Precautions: spinal precautions  Braces: TLSO    Recommendations:     Discharge Recommendations: home health PT  Level of Assistance Recommended at Discharge: 24 hours significant assistance  Discharge Equipment Recommendations: wheelchair  Barriers to discharge: Decreased caregiver support    Assessment:     Rosario Menendez is a 87 y.o. female admitted with a medical diagnosis of Closed compression fracture of body of L1 vertebra. Patient agreeable to treatment this AM with encouragement. Patient able to ambulate increased gait distance this session. Patient continues to require frequent cues to initiate movement and complete given tasks. Patient will benefit from continued SNF rehabilitation services to address deficits with progression toward increased functional independence and PLOF.     Performance deficits affecting function: weakness, impaired endurance, impaired functional mobility, gait instability, impaired balance, decreased lower extremity function, decreased safety awareness, pain, decreased ROM, orthopedic precautions.    Rehab Potential is good    Activity Tolerance: Good    Plan:     Patient to be seen 6 x/week to address the above listed problems via gait training, therapeutic activities, therapeutic exercises, neuromuscular re-education, wheelchair management/training    Plan of Care Expires: 01/01/23  Plan of Care Reviewed with: patient    Subjective     "Are we staying in here?"     Pain/Comfort:  Pain Rating 1: 2/10  Location - Side 1: Bilateral  Location - Orientation 1: generalized  Location 1: back  Pain Addressed 1: Reposition, Distraction, Cessation of Activity  Pain Rating Post-Intervention 1: 2/10    Patient's cultural, " spiritual, Buddhist conflicts given the current situation:  no    Objective:     Communicated with patient's nurse prior to session.  Patient found supine with PureWick upon PT entry to room.     Therapeutic Activities and Exercises:   PT assisted patient with removal of PureWick and placement of clean brief. Patient completed repeated rolling to achieve task. PT assisted patient with doffing/donning pants and shirt in preparation for OOB therapy. TLSO donned at bedside.     Seated BLE exercises completed x 20 reps each including ankle DF/PF, LAQs, hip flexion, hip abduction/adduction; cues to complete set through its entirety.     Functional Mobility:  Bed Mobility:     Rolling Left:  stand by assistance and HOB flat with use of bed rails  Rolling Right: stand by assistance and HOB flat with use of bed rails  Supine to Sit: minimum assistance, moderate assistance, and HOB flat with use of bed rails  Transfers:     Sit to Stand:  stand by assistance with rolling walker  Bed to Chair: stand by assistance and contact guard assistance with  rolling walker  using  Step Transfer  Gait: Patient able to ambulate 68 feet and 70 feet using RW with CGA. Seated rest break between trials. Cues to stand erect and within MASOOD of RW; pushing walker far forward.     AM-PAC 6 CLICK MOBILITY  16    Patient left up in chair with call button in reach and nursing staff notified.    GOALS:   Multidisciplinary Problems       Physical Therapy Goals          Problem: Physical Therapy    Goal Priority Disciplines Outcome Goal Variances Interventions   Physical Therapy Goal     PT, PT/OT Ongoing, Progressing     Description: Goals to be met by: 1/1/23    Patient will increase functional independence with mobility by performing:    . Supine to sit with Stand-by Assistance  . Sit to supine with Stand-by Assistance  . Rolling to Left and Right with Stand-by Assistance.  . Sit to stand transfer with Stand-by Assistance  . Bed to chair transfer  with Stand-by Assistance using Rolling Walker  . Gait  x 100 feet with Stand-by Assistance using Rolling Walker.   . Wheelchair propulsion x100 feet with Supervision using bilateral uppper extremities                         Time Tracking:     PT Received On: 12/08/22  PT Start Time: 0805  PT Stop Time: 0848  PT Total Time (min): 43 min    Billable Minutes: Gait Training 12, Therapeutic Activity 21, and Therapeutic Exercise 10    Treatment Type: Treatment  PT/PTA: PT     PTA Visit Number: 0     12/08/2022

## 2022-12-08 NOTE — PLAN OF CARE
Problem: Adult Inpatient Plan of Care  Goal: Plan of Care Review  Outcome: Ongoing, Progressing  Goal: Patient-Specific Goal (Individualized)  Outcome: Ongoing, Progressing  Goal: Absence of Hospital-Acquired Illness or Injury  Outcome: Ongoing, Progressing  Goal: Optimal Comfort and Wellbeing  Outcome: Ongoing, Progressing     Problem: Fall Injury Risk  Goal: Absence of Fall and Fall-Related Injury  Outcome: Ongoing, Progressing     Problem: Skin Injury Risk Increased  Goal: Skin Health and Integrity  Outcome: Ongoing, Progressing  Ciprofloxacin therapy continues. No adverse effects reported. Patient encouraged to increase fluid intake to help antibiotics flush out toxins. Patient denied experiencing any pain or burning when voiding. No acute distress noted. Call light within patient's reach.

## 2022-12-08 NOTE — PROGRESS NOTES
"                                                        [facility]                                  Skilled Nursing Facility                   Progress Note     Admit Date: 12/1/2022  AMARA   Principal Problem:  Closed compression fracture of body of L1 vertebra   HPI obtained from patient interview and chart review     Chief Complaint: Re-evaluation of medical DX and therapy status: constipation , lab review     HPI:   Rosario Menendez is a 87 y.o. female with a past medical history significant for osteoporosis, thrombocytopenia, hypothyroidism, mild cognitive impairment, depression, and arthritis. She presents to [facility] after she tripped and fell onto her buttocks while in the kitchen. Patient states she "bounced" during her fall.  After fall she noted buttock and lower back pain. Buttock pain is worse than lower back pain. Pain started after fall and is exacerbated by movement. She denies radiating leg pain. She denies numbness, tinging, saddle anesthesia, or bowel dysfunction. Lumbar x-rays (AP/Lateral) obtained in ED for lower back pain revealing L1 compression fracture with 20% anterior height loss, and grade 2 anterolisthesis of L4 on L5. Lumbar CT 11/30 re demonstrating L1 compression fracture, and grade 2 anterolisthesis for L4 on L5 with associated pars defect.      Notes recent UTI with persistent dysuria s/p antibiotic treatment. She denies chills, nausea, or vomiting. Endorses slight subjective fever yesterday. UA 11/29 obtained in ED showing +nitrite, 3+leukocytes, many bacteria, and >100 WBC. UA culture pending. Blood culture NGTD. Of note, she has baseline thrombocytopenia.     Patient will be treated at [facility] with PT and OT to improve functional status and ability to perform ADLs.     Interval Hx  No acute events over night  Patient still with constipation. Was given MoM and increased senna yesterday. Will order enema today.  Labs reviewed, no critical values  24 hr vital sign " ranges listed below.  Patient denies shortness of breath, abdominal discomfort, nausea, or vomiting.  Patient denies dysuria.    Patient progessing with PT/OT. Continuing to follow and treat all acute and chronic conditions.     Past Medical History: Patient has a past medical history of Arthritis, Depression, Hypercholesteremia, Thrombocytopenia, and Thyroid disease.    Past Surgical History: Patient has a past surgical history that includes Hysterectomy; Knee surgery; Ankle surgery; Wrist surgery; and Colonoscopy (N/A, 6/2/2021).    Social History: Patient reports that she has never smoked. She has never used smokeless tobacco.    Family History: family history includes Cancer in her maternal uncle; Heart failure in her father and mother; Suicide in her son.    Allergies: Patient has No Known Allergies.    ROS  Constitutional: Negative for fever   Eyes: Negative for blurred vision, double vision   Respiratory: Negative for cough, shortness of breath   Cardiovascular: Negative for chest pain, palpitations, and leg swelling.   Gastrointestinal: Negative for abdominal pain, constipation, diarrhea, nausea, vomiting.   Genitourinary: + for dysuria, frequency   Musculoskeletal:  + generalized weakness. + for back pain and myalgias.   Skin: Negative for itching and rash.   Neurological: Negative for dizziness, headaches.   Psychiatric/Behavioral: Negative for depression. The patient is not nervous/anxious.      24 hour Vital Sign Range   Temp:  [97.6 °F (36.4 °C)-97.9 °F (36.6 °C)]   Pulse:  [51-73]   Resp:  [16-18]   BP: (117-139)/(62-63)   SpO2:  [95 %-96 %]     Current BMI: Body mass index is 25.27 kg/m².    PEx  Constitutional: Patient appears debilitated.  No distress noted  HENT:   Head: Normocephalic and atraumatic.   Eyes: Pupils are equal, round  Neck: Normal range of motion. Neck supple.   Cardiovascular: Normal rate, regular rhythm and normal heart sounds.    Pulmonary/Chest: Effort normal and breath sounds  are clear  Abdominal: Soft. Bowel sounds are normal.   Musculoskeletal: Normal range of motion.   Neurological: Alert and oriented to person,   Psychiatric: Normal mood and affect. Behavior is normal. +forgetful   Skin: Skin is warm and dry. Full skin assessment    Recent Labs   Lab 12/08/22  0506      K 4.6      CO2 23   BUN 14   CREATININE 0.9   MG 2.4       Recent Labs   Lab 12/08/22  0505   WBC 5.40   RBC 4.56   HGB 14.2   HCT 43.8   *   MCV 96   MCH 31.1*   MCHC 32.4         Assessment and Plan:  Closed compression fracture of body of L1 vertebra  -87 y.o. female with a pmhx of osteoporosis, thrombocytopenia, mild cognitive impairment, and arthritis. Patient tripped and fell onto buttocks yesterday with residual lower back pain after fall. Lumbar CT showing L1 compression fracture. Neurosurgery consulted for further eval.  -Lumbar x-rays (AP/Lateral) 11/29: revealing L1 compression fracture with 20% anterior height loss, and grade 2 anterolisthesis of L4 on L5.   -Lumbar CT 11/30 re demonstrating L1 compression fracture, and grade 2 anterolisthesis for L4 on L5 with associated pars defect. Severe R and moderate L neural foraminal narrowing.   -No acute surgical intervention  -Obtain lumbar xrays upright/supine with increased height loss upon standing. No kyphotic deformity  -Obtain lumbar flex/ex xrays reviewed re-demonstrating L4-L5 grade 2 anterolisthesis  -TLSO brace ordered/at bedside for comfort  -Will schedule outpatient follow up in 4-6 weeks with repeat Lumbar xrays. Neurosurgery will arrange.      Acute cystitis with hematuria  -UA reviewed, UC w/ GNR  -Blood cxs NGTD  -Completed rocephin.  -12/5-Initiate order for cipro 500 mg BID, patient still with symptoms of UTI, will treat further   -12/6-Initiate order for pyridium 100 mg TID x 2 days     Constipation  -12/7-Initiate order for MoM PRN and increase senna to 2 tabs BID   -12/8-Initiate order for enema x 1      Amnestic MCI (mild  cognitive impairment with memory loss)  -Continue namenda.     Thrombocytopenia, unspecified  -Chronic, stable.  -Monitor and transfuse for <50K if bleeding or <10K if not bleeding  -Avoid antiplatelet medications including Lovenox, heparin,NSAIDs and aspirin.  -Follows with hem/onc outpatient.     Hypercholesteremia  -cont statin         Thyroid disease  -cont home synthroid     Anticipate disposition:  Home with home health      Follow-up needed during SNF stay-    Follow-up needed after discharge from SNF: PCP, neurosurgery     Future Appointments   Date Time Provider Department Center   1/10/2023  2:30 PM [facility] XR-A LIMIT 350 LBS [facility] XRAY [facility] [place] [facility]   1/10/2023  3:30 PM Candy Yang PA-C [facility] NEUROSU [place] [facility]   4/4/2023  1:00 PM [facility]         I certify that SNF services are required to be given on an inpatient basis because Rosario Menendez needs for skilled nursing care and/or skilled rehabilitation are required on a daily basis and such services can only practically be provided in a skilled nursing facility setting and are for an ongoing condition for which she received inpatient care in the hospital.       Afsaneh Chawla NP  Department of Hospital Medicine   [facility]     DOS: 12/8/2022       Patient note was created using MModal Dictation.  Any errors in syntax or even information may not have been identified and edited on initial review prior to signing this note.

## 2022-12-09 LAB — SARS-COV-2 RNA RESP QL NAA+PROBE: NOT DETECTED

## 2022-12-09 PROCEDURE — 25000003 PHARM REV CODE 250: Performed by: NURSE PRACTITIONER

## 2022-12-09 PROCEDURE — 63700000 PHARM REV CODE 250 ALT 637 W/O HCPCS: Performed by: NURSE PRACTITIONER

## 2022-12-09 PROCEDURE — 97116 GAIT TRAINING THERAPY: CPT | Mod: CQ

## 2022-12-09 PROCEDURE — U0005 INFEC AGEN DETEC AMPLI PROBE: HCPCS | Performed by: HOSPITALIST

## 2022-12-09 PROCEDURE — U0003 INFECTIOUS AGENT DETECTION BY NUCLEIC ACID (DNA OR RNA); SEVERE ACUTE RESPIRATORY SYNDROME CORONAVIRUS 2 (SARS-COV-2) (CORONAVIRUS DISEASE [COVID-19]), AMPLIFIED PROBE TECHNIQUE, MAKING USE OF HIGH THROUGHPUT TECHNOLOGIES AS DESCRIBED BY CMS-2020-01-R: HCPCS | Performed by: HOSPITALIST

## 2022-12-09 PROCEDURE — 97110 THERAPEUTIC EXERCISES: CPT | Mod: CQ

## 2022-12-09 PROCEDURE — 25000003 PHARM REV CODE 250: Performed by: HOSPITALIST

## 2022-12-09 PROCEDURE — 97535 SELF CARE MNGMENT TRAINING: CPT | Mod: CO

## 2022-12-09 PROCEDURE — 11000004 HC SNF PRIVATE

## 2022-12-09 PROCEDURE — 97530 THERAPEUTIC ACTIVITIES: CPT | Mod: CQ

## 2022-12-09 RX ADMIN — METHOCARBAMOL 500 MG: 500 TABLET ORAL at 05:12

## 2022-12-09 RX ADMIN — MEMANTINE 10 MG: 10 TABLET ORAL at 08:12

## 2022-12-09 RX ADMIN — HYDROCODONE BITARTRATE AND ACETAMINOPHEN 1 TABLET: 5; 325 TABLET ORAL at 09:12

## 2022-12-09 RX ADMIN — SENNOSIDES AND DOCUSATE SODIUM 2 TABLET: 50; 8.6 TABLET ORAL at 08:12

## 2022-12-09 RX ADMIN — GALANTAMINE HYDROBROMIDE 16 MG: 16 CAPSULE, EXTENDED RELEASE ORAL at 08:12

## 2022-12-09 RX ADMIN — METHOCARBAMOL 500 MG: 500 TABLET ORAL at 08:12

## 2022-12-09 RX ADMIN — HYDROCODONE BITARTRATE AND ACETAMINOPHEN 1 TABLET: 5; 325 TABLET ORAL at 05:12

## 2022-12-09 RX ADMIN — CIPROFLOXACIN HYDROCHLORIDE 500 MG: 500 TABLET, FILM COATED ORAL at 08:12

## 2022-12-09 RX ADMIN — METHOCARBAMOL 500 MG: 500 TABLET ORAL at 01:12

## 2022-12-09 RX ADMIN — PRAVASTATIN SODIUM 40 MG: 20 TABLET ORAL at 08:12

## 2022-12-09 RX ADMIN — LIDOCAINE 5% 1 PATCH: 700 PATCH TOPICAL at 08:12

## 2022-12-09 RX ADMIN — BUPROPION HYDROCHLORIDE 150 MG: 150 TABLET, FILM COATED, EXTENDED RELEASE ORAL at 08:12

## 2022-12-09 RX ADMIN — PHENAZOPYRIDINE 100 MG: 100 TABLET ORAL at 01:12

## 2022-12-09 RX ADMIN — PHENAZOPYRIDINE 100 MG: 100 TABLET ORAL at 08:12

## 2022-12-09 RX ADMIN — CYANOCOBALAMIN TAB 1000 MCG 1000 MCG: 1000 TAB at 08:12

## 2022-12-09 RX ADMIN — LOSARTAN POTASSIUM 50 MG: 50 TABLET, FILM COATED ORAL at 08:12

## 2022-12-09 RX ADMIN — LEVOTHYROXINE SODIUM 100 MCG: 100 TABLET ORAL at 05:12

## 2022-12-09 NOTE — PT/OT/SLP PROGRESS
"Physical Therapy Treatment    Patient Name:  Rosario Menendez   MRN:  [id number]  Admit Date: 12/1/2022  Admitting Diagnosis: Closed compression fracture of body of L1 vertebra    General Precautions: Standard, fall  Orthopedic Precautions: spinal precautions  Braces: TLSO    Recommendations:     Discharge Recommendations: home health PT  Level of Assistance Recommended at Discharge: 24 hours significant assistance  Discharge Equipment Recommendations: wheelchair  Barriers to discharge: Decreased caregiver support    Assessment:     Rosario Menendez is a 87 y.o. female admitted with a medical diagnosis of Closed compression fracture of body of L1 vertebra. Pt tolerated session well with no adverse effects noted. Pt improved with distance ambulated and tolerated LE erg and WC mobility without c/o increased pain. Pt will continue to benefit from skilled therapy services to improve LE strength and endurance to continue to improve functional mobility and reach highest levels of independence.      Performance deficits affecting function: weakness, impaired endurance, impaired functional mobility, gait instability, impaired balance, decreased lower extremity function, decreased safety awareness, pain, decreased ROM, orthopedic precautions.    Rehab Potential is good    Activity Tolerance: Good    Plan:     Patient to be seen 6 x/week to address the above listed problems via gait training, therapeutic activities, therapeutic exercises, neuromuscular re-education, wheelchair management/training    Plan of Care Expires: 01/01/23  Plan of Care Reviewed with: patient    Subjective     "I am feeling ok. My dog is visiting later."    Pain/Comfort:  Pain Rating 1: 3/10  Location - Side 1: Bilateral  Location - Orientation 1: generalized  Location 1: back  Pain Addressed 1: Pre-medicate for activity, Reposition, Distraction  Pain Rating Post-Intervention 1: 3/10    Patient's cultural, spiritual, Cheondoism conflicts given the " current situation:  no    Objective:     Patient found up in chair with TLSO upon PT entry to room.     Therapeutic Activities and Exercises:   Questions answered within PTA scope of practice  Spinal precautions reviewed prior to session  LE erg x 10 minutes for mm strength and endurance    Functional Mobility:  Transfers:     Sit to Stand: contact guard assistance with rolling walker vc for positioning and technique  Bedside chair <> Chair: contact guard assistance with no AD using Step Transfer  Gait: Pt ambulated 80' + 65' in RW with CGA  Pt reported increasing fatigue in UEs and demonstrated increasing flexed posture with extension of RW away from body as fatigue increased  Wheelchair Propulsion: Pt propelled Standard wheelchair x 50 feet on Level tile with Bilateral upper extremity with Minimal Assistance.     AM-PAC 6 CLICK MOBILITY  16    Patient left up in chair with all lines intact and call button in reach.    GOALS:   Multidisciplinary Problems       Physical Therapy Goals          Problem: Physical Therapy    Goal Priority Disciplines Outcome Goal Variances Interventions   Physical Therapy Goal     PT, PT/OT Ongoing, Progressing     Description: Goals to be met by: 1/1/23    Patient will increase functional independence with mobility by performing:    . Supine to sit with Stand-by Assistance  . Sit to supine with Stand-by Assistance  . Rolling to Left and Right with Stand-by Assistance.  . Sit to stand transfer with Stand-by Assistance  . Bed to chair transfer with Stand-by Assistance using Rolling Walker  . Gait  x 100 feet with Stand-by Assistance using Rolling Walker.   . Wheelchair propulsion x100 feet with Supervision using bilateral uppper extremities                         Time Tracking:     PT Received On: 12/09/22  PT Start Time: 1341  PT Stop Time: 1427  PT Total Time (min): 46 min    Billable Minutes: Gait Training 16, Therapeutic Activity 18, and Therapeutic Exercise 12    Treatment Type:  Treatment  PT/PTA: PTA     PTA Visit Number: 1     12/09/2022

## 2022-12-09 NOTE — PROGRESS NOTES
"                                                        [facility]                                  Skilled Nursing Facility                   Progress Note     Admit Date: 12/1/2022  AMARA 12/21/2022  Principal Problem:  Closed compression fracture of body of L1 vertebra   HPI obtained from patient interview and chart review     Chief Complaint: Re-evaluation of medical DX and therapy status: constipation    HPI:   Rosario Menendez is a 87 y.o. female with a past medical history significant for osteoporosis, thrombocytopenia, hypothyroidism, mild cognitive impairment, depression, and arthritis. She presents to [facility] after she tripped and fell onto her buttocks while in the kitchen. Patient states she "bounced" during her fall.  After fall she noted buttock and lower back pain. Buttock pain is worse than lower back pain. Pain started after fall and is exacerbated by movement. She denies radiating leg pain. She denies numbness, tinging, saddle anesthesia, or bowel dysfunction. Lumbar x-rays (AP/Lateral) obtained in ED for lower back pain revealing L1 compression fracture with 20% anterior height loss, and grade 2 anterolisthesis of L4 on L5. Lumbar CT 11/30 re demonstrating L1 compression fracture, and grade 2 anterolisthesis for L4 on L5 with associated pars defect.      Notes recent UTI with persistent dysuria s/p antibiotic treatment. She denies chills, nausea, or vomiting. Endorses slight subjective fever yesterday. UA 11/29 obtained in ED showing +nitrite, 3+leukocytes, many bacteria, and >100 WBC. UA culture pending. Blood culture NGTD. Of note, she has baseline thrombocytopenia.     Patient will be treated at [facility] with PT and OT to improve functional status and ability to perform ADLs.     Interval Hx  No acute events over night  Constipation resolved, two BMs yesterday  24 hr vital sign ranges listed below.  Patient denies shortness of breath, abdominal discomfort, nausea, or " vomiting.  Patient denies dysuria.    Patient progessing with PT/OT. Continuing to follow and treat all acute and chronic conditions.     Past Medical History: Patient has a past medical history of Arthritis, Depression, Hypercholesteremia, Thrombocytopenia, and Thyroid disease.    Past Surgical History: Patient has a past surgical history that includes Hysterectomy; Knee surgery; Ankle surgery; Wrist surgery; and Colonoscopy (N/A, 6/2/2021).    Social History: Patient reports that she has never smoked. She has never used smokeless tobacco.    Family History: family history includes Cancer in her maternal uncle; Heart failure in her father and mother; Suicide in her son.    Allergies: Patient has No Known Allergies.    ROS  Constitutional: Negative for fever   Eyes: Negative for blurred vision, double vision   Respiratory: Negative for cough, shortness of breath   Cardiovascular: Negative for chest pain, palpitations, and leg swelling.   Gastrointestinal: Negative for abdominal pain, constipation, diarrhea, nausea, vomiting.   Genitourinary: + for dysuria, frequency   Musculoskeletal:  + generalized weakness. + for back pain and myalgias.   Skin: Negative for itching and rash.   Neurological: Negative for dizziness, headaches.   Psychiatric/Behavioral: Negative for depression. The patient is not nervous/anxious.      24 hour Vital Sign Range   Temp:  [97.7 °F (36.5 °C)-98.3 °F (36.8 °C)]   Pulse:  [54-57]   Resp:  [16-18]   BP: (141-146)/(67-69)   SpO2:  [91 %-96 %]     Current BMI: Body mass index is 25.27 kg/m².    PEx  Constitutional: Patient appears debilitated.  No distress noted  HENT:   Head: Normocephalic and atraumatic.   Eyes: Pupils are equal, round  Neck: Normal range of motion. Neck supple.   Cardiovascular: Normal rate, regular rhythm and normal heart sounds.    Pulmonary/Chest: Effort normal and breath sounds are clear  Abdominal: Soft. Bowel sounds are normal.   Musculoskeletal: Normal range of  motion.   Neurological: Alert and oriented to person,   Psychiatric: Normal mood and affect. Behavior is normal. +forgetful   Skin: Skin is warm and dry. Full skin assessment    No results for input(s): GLUCOSE, NA, K, CL, CO2, BUN, CREATININE, MG in the last 24 hours.    Invalid input(s):  CALCIUM      No results for input(s): WBC, RBC, HGB, HCT, PLT, MCV, MCH, MCHC in the last 24 hours.        Assessment and Plan:  Closed compression fracture of body of L1 vertebra  -87 y.o. female with a pmhx of osteoporosis, thrombocytopenia, mild cognitive impairment, and arthritis. Patient tripped and fell onto buttocks yesterday with residual lower back pain after fall. Lumbar CT showing L1 compression fracture. Neurosurgery consulted for further eval.  -Lumbar x-rays (AP/Lateral) 11/29: revealing L1 compression fracture with 20% anterior height loss, and grade 2 anterolisthesis of L4 on L5.   -Lumbar CT 11/30 re demonstrating L1 compression fracture, and grade 2 anterolisthesis for L4 on L5 with associated pars defect. Severe R and moderate L neural foraminal narrowing.   -No acute surgical intervention  -Obtain lumbar xrays upright/supine with increased height loss upon standing. No kyphotic deformity  -Obtain lumbar flex/ex xrays reviewed re-demonstrating L4-L5 grade 2 anterolisthesis  -TLSO brace ordered/at bedside for comfort  -Will schedule outpatient follow up in 4-6 weeks with repeat Lumbar xrays. Neurosurgery will arrange.      Acute cystitis with hematuria  -UA reviewed,  w/ GNR  -Blood cxs NGTD  -Completed rocephin.  -12/5-Initiate order for cipro 500 mg BID, patient still with symptoms of UTI, will treat further   -12/6-Initiate order for pyridium 100 mg TID x 2 days     Constipation  -12/7-Initiate order for MoM PRN and increase senna to 2 tabs BID   -12/8-Initiate order for enema x 1      Amnestic MCI (mild cognitive impairment with memory loss)  -Continue namenda.     Thrombocytopenia, unspecified  -Chronic,  stable.  -Monitor and transfuse for <50K if bleeding or <10K if not bleeding  -Avoid antiplatelet medications including Lovenox, heparin,NSAIDs and aspirin.  -Follows with hem/onc outpatient.     Hypercholesteremia  -cont statin         Thyroid disease  -cont home synthroid     Anticipate disposition:  Home with home health      Follow-up needed during SNF stay-    Follow-up needed after discharge from SNF: PCP, neurosurgery     Future Appointments   Date Time Provider Department Center   1/10/2023  2:30 PM [facility] XR-A LIMIT 350 LBS [facility] XRAY OP [place] [facility]   1/10/2023  3:30 PM Candy Yang PA-C [facility] NEUROSU [place] [facility]   4/4/2023  1:00 PM Shi Simon MD [facility]         I certify that SNF services are required to be given on an inpatient basis because Rsoario Menendez needs for skilled nursing care and/or skilled rehabilitation are required on a daily basis and such services can only practically be provided in a skilled nursing facility setting and are for an ongoing condition for which she received inpatient care in the hospital.       Afsaneh Chawla NP  Department of Hospital Medicine   [facility]     DOS: 12/9/2022       Patient note was created using MModal Dictation.  Any errors in syntax or even information may not have been identified and edited on initial review prior to signing this note.

## 2022-12-09 NOTE — PT/OT/SLP PROGRESS
Occupational Therapy   Treatment    Name: Rosario Menendez  MRN: [id number]  Admit Date: 12/1/2022  Admitting Diagnosis:  Closed compression fracture of body of L1 vertebra    General Precautions: Standard, fall   Orthopedic Precautions: spinal precautions   Braces: TLSO    Recommendations:     Discharge Recommendations:  home health OT  Level of Assistance Recommended at Discharge: 24 hours light assistance for ADL's and homemaking tasks  Discharge Equipment Recommendations: wheelchair  Barriers to discharge:  None    Assessment:     Rosario Menendez is a 87 y.o. female with a medical diagnosis of Closed compression fracture of body of L1 vertebra.  She presents with  Performance deficits affecting function are weakness, impaired endurance, impaired self care skills, impaired functional mobility, gait instability, impaired balance, decreased coordination, decreased lower extremity function, impaired cognition, decreased safety awareness, pain, orthopedic precautions, impaired cardiopulmonary response to activity.     Pt.  participated well with session on this day. Pt. With increase time and cues to execute task on this day with several intermittent breaks in between.  Pt. With reporting she was cold. Pt  continues to demonstrate levels of physical deficits with  functional indep with daily management activities tasks, selfcare skills with balance,  functional mobility, UB strength and endurance. Pt. Will continue to benefit from continued OT to progress towards goals      Rehab Potential is fair    Activity tolerance:  Fair    Plan:     Patient to be seen 6 x/week to address the above listed problems via self-care/home management, therapeutic activities, therapeutic exercises    Plan of Care Expires: 01/02/23  Plan of Care Reviewed with: patient    Subjective     Communicated with: nsg and Pt. prior to session. Its is so cold in here    Pain/Comfort:  Pain Rating 1: 4/10  Location - Side 1: Bilateral  Location -  Orientation 1: generalized  Location 1: back  Pain Addressed 1: Pre-medicate for activity, Reposition, Distraction  Pain Rating Post-Intervention 1: 4/10    Patient's cultural, spiritual, Mandaeism conflicts given the current situation:  no    Objective:     Patient found up in chair with BLE elevated  upon OT entry to room.    Bed Mobility:    Not tested     Functional Mobility/Transfers:  Patient completed Sit <> Stand Transfer with contact guard assistance  with  rolling walker   Patient completed Toilet Transfer Step Transfer technique with contact guard assistance with  rolling walker  Functional Mobility: Pt. With fxl mobility to and from inroom bath with RW and cues for safety     Activities of Daily Living:  Feeding:  set up assistance  with breakfast   Grooming: stand by assistance standing sink side with grooming needs  Upper Body Dressing: moderate assistance to lelia sweater around back and adjust TLSO brace  Toileting: minimum assistance overall with cleaning and clothing management and fresh brief application   Pt. Able to wipe frontal area     AMPAC 6 Click ADL: 15    Treatment & Education:    Pt edu on role of OT, POC, safety when performing self care tasks , benefit of performing OOB activity, and safety when performing functional transfers and mobility management for preparation with goals to progress towards next level of care     Patient left up in chair with all lines intact and call button in reach    GOALS:   Multidisciplinary Problems       Occupational Therapy Goals          Problem: Occupational Therapy    Goal Priority Disciplines Outcome Interventions   Occupational Therapy Goal     OT, PT/OT Ongoing, Progressing    Description: Goals to be met by: 3 weeks     Patient will increase functional independence with ADLs by performing:    UE Dressing with Supervision.  LE Dressing with Supervision.  Grooming while seated at sink with Set-up Assistance.  Toileting from toilet with Stand-by  Assistance for hygiene and clothing management.   Bathing sitting at sink with Stand-by Assistance.  Supine to sit with Modified [place].  Step transfer with Supervision with appropriate A/D  Upper extremity exercise with supervision.  Caregiver will be educated on level of assist required to safely perform self care tasks and functional transfers..                        Time Tracking:     OT Date of Treatment: 12/09/22  OT Start Time: 0803    OT Stop Time: 0837  OT Total Time (min): 34 min    Billable Minutes:Self Care/Home Management 34    12/9/2022

## 2022-12-09 NOTE — PLAN OF CARE
SW met with patient in room for Discharge Planning Assessment.           [facility] - Skilled Nursing  Initial Discharge Assessment     Preferred Name: Rosario Menendez     Primary Care Provider: Shi Simon MD     Admission Diagnosis: Closed compression fracture of body of L1 vertebra     Admission Date: 12/01/22  Expected Discharge Date: 12/21/22     Discharge Barriers Identified: None     Payor: [employer]  Type: Medicare     Extended Emergency Contact Information  Primary Emergency Contact: Aditi Menendez  Mobile Phone: [phone]  Relation: Daughter  Preferred language: English   needed? No     Discharge Plan A: Care Home        Initial Discharge Assessment        Assessment Type Discharge Planning Assessment       Source of Information Patient      Communicated AMARA with patient/caregiver yes      Lives With alone      Do you expect to return to your current living situation?  no      Do you have help at home or someone to help you manage your care at home?  yes      Prior to hospitalization cognitive status: Oriented       Current cognitive status: Oriented      Equipment Currently Used at Home Walker      Readmission within 30 days? No      Patient currently being followed by outpatient case management? No      Do you currently have service(s) that help you manage your care at home?       Do you take prescription medications?  yes      Do you have prescription coverage?  yes      Do you have any problems affording any of your prescribed medications? no      Who is going to help you get home at discharge? family      How do you get to doctors appointments? family      Are you on dialysis?  no      Do you take coumadin?  no      Discharge Plan A Care Home      Discharge Plan B N/A      DME Needed Upon Discharge  TBD      Discharge Plan discussed with:  Patient and Family      Discharge Barriers Identified  yes

## 2022-12-10 PROCEDURE — 63700000 PHARM REV CODE 250 ALT 637 W/O HCPCS: Performed by: NURSE PRACTITIONER

## 2022-12-10 PROCEDURE — 11000004 HC SNF PRIVATE

## 2022-12-10 PROCEDURE — 25000003 PHARM REV CODE 250: Performed by: NURSE PRACTITIONER

## 2022-12-10 PROCEDURE — 25000003 PHARM REV CODE 250: Performed by: HOSPITALIST

## 2022-12-10 RX ADMIN — MEMANTINE 10 MG: 10 TABLET ORAL at 09:12

## 2022-12-10 RX ADMIN — GALANTAMINE HYDROBROMIDE 16 MG: 16 CAPSULE, EXTENDED RELEASE ORAL at 09:12

## 2022-12-10 RX ADMIN — BUPROPION HYDROCHLORIDE 150 MG: 150 TABLET, FILM COATED, EXTENDED RELEASE ORAL at 09:12

## 2022-12-10 RX ADMIN — MAGNESIUM HYDROXIDE 2400 MG: 400 SUSPENSION ORAL at 02:12

## 2022-12-10 RX ADMIN — HYDROCODONE BITARTRATE AND ACETAMINOPHEN 1 TABLET: 5; 325 TABLET ORAL at 05:12

## 2022-12-10 RX ADMIN — METHOCARBAMOL 500 MG: 500 TABLET ORAL at 09:12

## 2022-12-10 RX ADMIN — METHOCARBAMOL 500 MG: 500 TABLET ORAL at 12:12

## 2022-12-10 RX ADMIN — SENNOSIDES AND DOCUSATE SODIUM 2 TABLET: 50; 8.6 TABLET ORAL at 09:12

## 2022-12-10 RX ADMIN — CIPROFLOXACIN HYDROCHLORIDE 500 MG: 500 TABLET, FILM COATED ORAL at 09:12

## 2022-12-10 RX ADMIN — PRAVASTATIN SODIUM 40 MG: 20 TABLET ORAL at 09:12

## 2022-12-10 RX ADMIN — LOSARTAN POTASSIUM 50 MG: 50 TABLET, FILM COATED ORAL at 09:12

## 2022-12-10 RX ADMIN — METHOCARBAMOL 500 MG: 500 TABLET ORAL at 05:12

## 2022-12-10 RX ADMIN — HYDROCODONE BITARTRATE AND ACETAMINOPHEN 1 TABLET: 5; 325 TABLET ORAL at 09:12

## 2022-12-10 RX ADMIN — HYDROCODONE BITARTRATE AND ACETAMINOPHEN 1 TABLET: 5; 325 TABLET ORAL at 12:12

## 2022-12-10 RX ADMIN — CYANOCOBALAMIN TAB 1000 MCG 1000 MCG: 1000 TAB at 09:12

## 2022-12-10 RX ADMIN — LEVOTHYROXINE SODIUM 100 MCG: 100 TABLET ORAL at 05:12

## 2022-12-10 NOTE — PROGRESS NOTES
[facility] - Skilled Nursing  Adult Nutrition  Progress Note    SUMMARY   Recommendations  Continue regular diet, add chocolate milk with meals BID, double meat in am, RD following  Goals: PO to meet 75% of EEN by next RD follow up  Nutrition Goal Status: goal not met  Communication of RD Recs: other (comment) (POC)    Assessment and Plan   Inadequate oral intake related to poor appetite, decline in ADL's as evidenced by diet hx reveals, one meal per day, likely inadequate protein intake, refusal to try oral supplements, frequent restaurant take -out , eating with no concern regarding nutrient content.      Continues     Plan  General diet  Protein modified diet- chocolate milk with meals BID  Collaboration with other providers  Nutrition education- healthy meal planning  Vitamin therapy, Vit B12, recommend MVI    Malnutrition Assessment 12/2     Eyes (Micronutrient): conjunctiva dull  Neck/Chest (Micronutrient): muscle wasting  Musculoskeletal/Lower Extremities: muscle wasting   Micronutrient Evaluation: suspected deficiency       Orbital Region (Subcutaneous Fat Loss): severe depletion  Upper Arm Region (Subcutaneous Fat Loss): mild depletion  Thoracic and Lumbar Region: well nourished   [place] Region (Muscle Loss): mild depletion  Clavicle and Acromion Bone Region (Muscle Loss): mild depletion  Scapular Bone Region (Muscle Loss): mild depletion  Dorsal Hand (Muscle Loss): mild depletion  Patellar Region (Muscle Loss): mild depletion  Anterior Thigh Region (Muscle Loss): mild depletion  Posterior Calf Region (Muscle Loss): well nourished   Edema (Fluid Accumulation): 0-->no edema present             Reason for Assessment    Reason For Assessment: RD follow-up  Diagnosis:  (Fx of L1)  Relevant Medical History: HLD  Interdisciplinary Rounds: attended  General Information Comments: patient taking chocolate milk , but may be getting too much as poriton size observed today was 16 oz rather than 8. Patient now with UTI  and constipation this week,PO 0-25%  Nutrition Discharge Planning: DC regular diet, ONS of choice    Nutrition/Diet History    Patient Reported Diet/Restrictions/Preferences: general  Typical Food/Fluid Intake: one meal per day, people bring her food, loves [employer]  Spiritual, Cultural Beliefs, Anglican Practices, Values that Affect Care: no  Food Allergies: NKFA  Factors Affecting Nutritional Intake: decreased appetite    Anthropometrics    Temp: 98.3 °F (36.8 °C)  Height: 5' (152.4 cm)  Height (inches): 60 in  Weight Method: Bed Scale  Weight: 58.7 kg (129 lb 6.6 oz)  Weight (lb): 129.41 lb  Ideal Body Weight (IBW), Female: 100 lb  % Ideal Body Weight, Female (lb): 143.74 %  BMI (Calculated): 25.3  BMI Grade: 25 - 29.9 - overweight  Usual Body Weight (UBW), k kg  Weight Change Amount:  (patient weight is stable)  % Usual Body Weight: 98.54  % Weight Change From Usual Weight: 8.67 %  Weight Loss Since Admission:  (daughter has documentation of weights and does not believe that pt weighed 143# recently)       Lab/Procedures/Meds    Pertinent Labs Reviewed: reviewed  Pertinent Labs Comments: PO4 2.1  Pertinent Medications Reviewed: reviewed  Pertinent Medications Comments: senna-docusate, Abx  Estimated/Assessed Needs    Weight Used For Calorie Calculations: 65.2 kg (143 lb 11.8 oz)  Energy Calorie Requirements (kcal): 1209  Energy Need Method: [place]-St Jeor (x 1.2(PAL))  Protein Requirements: 65-78  Weight Used For Protein Calculations: 65.2 kg (143 lb 11.8 oz) (1.0-1.2g/kg)  Fluid Requirements (mL): 1209 or per MD  Estimated Fluid Requirement Method: RDA Method  RDA Method (mL): 1209  CHO Requirement: -      Nutrition Prescription Ordered    Current Diet Order: Regular  Nutrition Order Comments: PO poor 0-25%, asking family for outside food daily,  Oral Nutrition Supplement: refuses    Evaluation of Received Nutrient/Fluid Intake    I/O: no data  Energy Calories Required: not meeting  needs  Protein Required: not meeting needs  Fluid Required: meeting needs  Comments: LBM 12/9  Tolerance: tolerating  % Intake of Estimated Energy Needs: 25 - 50 %  % Meal Intake: 0 - 25 %    Nutrition Risk    Level of Risk/Frequency of Follow-up: high (two times per week)     Monitor and Evaluation    Food and Nutrient Intake: food and beverage intake  Food and Nutrient Adminstration: diet order  Knowledge/Beliefs/Attitudes: food and nutrition knowledge/skill  Anthropometric Measurements: weight change  Biochemical Data, Medical Tests and Procedures: gastrointestinal profile, electrolyte and renal panel  Nutrition-Focused Physical Findings: overall appearance     Nutrition Follow-Up    RD Follow-up?: Yes

## 2022-12-11 LAB
BILIRUB UR QL STRIP: NEGATIVE
CLARITY UR REFRACT.AUTO: ABNORMAL
COLOR UR AUTO: YELLOW
GLUCOSE UR QL STRIP: NEGATIVE
HGB UR QL STRIP: NEGATIVE
KETONES UR QL STRIP: NEGATIVE
LEUKOCYTE ESTERASE UR QL STRIP: NEGATIVE
NITRITE UR QL STRIP: NEGATIVE
PH UR STRIP: 6 [PH] (ref 5–8)
PROT UR QL STRIP: ABNORMAL
SP GR UR STRIP: >1.03 (ref 1–1.03)
URN SPEC COLLECT METH UR: ABNORMAL

## 2022-12-11 PROCEDURE — 11000004 HC SNF PRIVATE: Mod: HCNC

## 2022-12-11 PROCEDURE — 97116 GAIT TRAINING THERAPY: CPT | Mod: HCNC,CQ

## 2022-12-11 PROCEDURE — 81003 URINALYSIS AUTO W/O SCOPE: CPT | Mod: HCNC | Performed by: HOSPITALIST

## 2022-12-11 PROCEDURE — 25000003 PHARM REV CODE 250: Mod: HCNC | Performed by: HOSPITALIST

## 2022-12-11 PROCEDURE — 25000003 PHARM REV CODE 250: Mod: HCNC | Performed by: NURSE PRACTITIONER

## 2022-12-11 PROCEDURE — 63700000 PHARM REV CODE 250 ALT 637 W/O HCPCS: Mod: HCNC | Performed by: NURSE PRACTITIONER

## 2022-12-11 PROCEDURE — 97110 THERAPEUTIC EXERCISES: CPT | Mod: HCNC,CQ

## 2022-12-11 PROCEDURE — 97535 SELF CARE MNGMENT TRAINING: CPT | Mod: HCNC,CO

## 2022-12-11 RX ADMIN — BUPROPION HYDROCHLORIDE 150 MG: 150 TABLET, FILM COATED, EXTENDED RELEASE ORAL at 09:12

## 2022-12-11 RX ADMIN — PRAVASTATIN SODIUM 40 MG: 20 TABLET ORAL at 09:12

## 2022-12-11 RX ADMIN — HYDROCODONE BITARTRATE AND ACETAMINOPHEN 1 TABLET: 5; 325 TABLET ORAL at 08:12

## 2022-12-11 RX ADMIN — CYANOCOBALAMIN TAB 1000 MCG 1000 MCG: 1000 TAB at 09:12

## 2022-12-11 RX ADMIN — CIPROFLOXACIN HYDROCHLORIDE 500 MG: 500 TABLET, FILM COATED ORAL at 08:12

## 2022-12-11 RX ADMIN — Medication 6 MG: at 08:12

## 2022-12-11 RX ADMIN — MEMANTINE 10 MG: 10 TABLET ORAL at 08:12

## 2022-12-11 RX ADMIN — GALANTAMINE HYDROBROMIDE 16 MG: 16 CAPSULE, EXTENDED RELEASE ORAL at 08:12

## 2022-12-11 RX ADMIN — HYDROCODONE BITARTRATE AND ACETAMINOPHEN 1 TABLET: 5; 325 TABLET ORAL at 10:12

## 2022-12-11 RX ADMIN — MEMANTINE 10 MG: 10 TABLET ORAL at 09:12

## 2022-12-11 RX ADMIN — METHOCARBAMOL 500 MG: 500 TABLET ORAL at 09:12

## 2022-12-11 RX ADMIN — METHOCARBAMOL 500 MG: 500 TABLET ORAL at 02:12

## 2022-12-11 RX ADMIN — LOSARTAN POTASSIUM 50 MG: 50 TABLET, FILM COATED ORAL at 09:12

## 2022-12-11 RX ADMIN — SENNOSIDES AND DOCUSATE SODIUM 2 TABLET: 50; 8.6 TABLET ORAL at 08:12

## 2022-12-11 RX ADMIN — LEVOTHYROXINE SODIUM 100 MCG: 100 TABLET ORAL at 05:12

## 2022-12-11 RX ADMIN — CIPROFLOXACIN HYDROCHLORIDE 500 MG: 500 TABLET, FILM COATED ORAL at 09:12

## 2022-12-11 RX ADMIN — METHOCARBAMOL 500 MG: 500 TABLET ORAL at 05:12

## 2022-12-11 RX ADMIN — HYDROCODONE BITARTRATE AND ACETAMINOPHEN 1 TABLET: 5; 325 TABLET ORAL at 02:12

## 2022-12-11 NOTE — PLAN OF CARE
Patient remained free of injury, trauma, or falls during  shift.   Patient is alert  and oriented x 2. Patient c/o of needing to urinate, urinate in bedside comode, then as soon as got in bed needed to go again. Happened x 3. Patient complained of pain and discomfort. Bladderscan show less than 100 ml in bladder. MD notified of patient discomfort. Order new UA.   Care plan reviewed with patient, questions encouraged.   Vitals remained stable throughout shift, will continue to monitor     Problem: Adult Inpatient Plan of Care  Goal: Plan of Care Review  Outcome: Ongoing, Progressing  Flowsheets (Taken 12/11/2022 0341)  Plan of Care Reviewed With: patient  Goal: Patient-Specific Goal (Individualized)  Outcome: Ongoing, Progressing  Goal: Absence of Hospital-Acquired Illness or Injury  Outcome: Ongoing, Progressing  Intervention: Identify and Manage Fall Risk  Flowsheets (Taken 12/11/2022 0341)  Safety Promotion/Fall Prevention:   assistive device/personal item within reach   diversional activities provided   Fall Risk reviewed with patient/family   Fall Risk signage in place   lighting adjusted   medications reviewed   side rails raised x 2   instructed to call staff for mobility  Goal: Optimal Comfort and Wellbeing  Outcome: Ongoing, Progressing  Intervention: Provide Person-Centered Care  Flowsheets (Taken 12/11/2022 0341)  Trust Relationship/Rapport:   care explained   questions encouraged   choices provided   reassurance provided   emotional support provided   thoughts/feelings acknowledged   empathic listening provided   questions answered  Goal: Readiness for Transition of Care  Outcome: Ongoing, Progressing     Problem: Fall Injury Risk  Goal: Absence of Fall and Fall-Related Injury  Outcome: Ongoing, Progressing     Problem: Skin Injury Risk Increased  Goal: Skin Health and Integrity  Outcome: Ongoing, Progressing

## 2022-12-11 NOTE — PLAN OF CARE
Problem: Occupational Therapy  Goal: Occupational Therapy Goal  Description: Goals to be met by: 3 weeks     Patient will increase functional independence with ADLs by performing:    UE Dressing with Supervision.  LE Dressing with Supervision.  Grooming while seated at sink with Set-up Assistance.  Toileting from toilet with Stand-by Assistance for hygiene and clothing management.   Bathing sitting at sink with Stand-by Assistance.  Supine to sit with Modified [place].  Step transfer with Supervision with appropriate A/D  Upper extremity exercise with supervision.  Caregiver will be educated on level of assist required to safely perform self care tasks and functional transfers..   Outcome: Ongoing, Progressing

## 2022-12-11 NOTE — PT/OT/SLP PROGRESS
"Physical Therapy Treatment    Patient Name:  Roasrio Menendez   MRN:  [id number]  Admit Date: 12/1/2022  Admitting Diagnosis: Closed compression fracture of body of L1 vertebra    General Precautions: Standard, fall  Orthopedic Precautions: spinal precautions  Braces: TLSO    Recommendations:     Discharge Recommendations: home health PT  Level of Assistance Recommended at Discharge: 24 hours significant assistance  Discharge Equipment Recommendations: wheelchair  Barriers to discharge: Decreased caregiver support    Assessment:     Rosario Menendez is a 87 y.o. female admitted with a medical diagnosis of Closed compression fracture of body of L1 vertebra. Pt tolerated session fairly well, but was notably more confused on this date requiring frequent cuing and reminders of what she is meant to be doing. Poor carry over of previous cuing for positioning and technique for subsequent repetitions. Pt with reduced performance for functional mobility tasks secondary to increased pain and c/o diarrhea all morning leaving her feeling weak. Pt will continue to benefit from skilled therapy services to improve LE strength and endurance to continue to improve functional mobility and reach highest levels of independence.      Performance deficits affecting function: weakness, impaired endurance, impaired functional mobility, gait instability, impaired balance, decreased lower extremity function, decreased safety awareness, pain, decreased ROM, orthopedic precautions.    Rehab Potential is good    Activity Tolerance: Good    Plan:     Patient to be seen 6 x/week to address the above listed problems via gait training, therapeutic activities, therapeutic exercises, neuromuscular re-education, wheelchair management/training    Plan of Care Expires: 01/01/23  Plan of Care Reviewed with: patient    Subjective     "I don't want to, but I know I need it."     Pain/Comfort:  Pain Rating 1: other (see comments) (not rated)  Location - " Side 1: Bilateral  Location - Orientation 1: generalized  Location 1: back  Pain Addressed 1: Reposition, Distraction, Cessation of Activity  Pain Rating Post-Intervention 1: other (see comments) (not rated)    Patient's cultural, spiritual, Anglican conflicts given the current situation:  no    Objective:     Communicated with nsg prior to session. Patient found  sitting EOB with PCT  with TLSO (donned at start of session) upon PT entry to room.     Therapeutic Activities and Exercises:   TLSO donned at start of session seated EOB where pt was found    Education provided for spinal precautions, improved technique with transfers, and continued need for exercises outside of therapy    LE erg x 10' for mm strength and endurance    Functional Mobility:  Transfers:     Sit to Stand: minimum assistance with rolling walker  Chair to BSC: minimum assistance with  hand-held assist using  Stand Pivot  Gait: Pt ambulated 30' + 16' using RW with CGA  Pt demonstrated reduced hip/knee flexion B, reduced foot clearance, and poor RW management; vc for improved foot clearance and hip/knee flexion with poor follow through  Wheelchair Propulsion: Pt propelled Standard wheelchair x ~40 feet on Level tile with  Bilateral upper extremity with Contact Guard Assistance    AM-PAC 6 CLICK MOBILITY  14    Patient left up in chair with all lines intact and call button in reach.    GOALS:   Multidisciplinary Problems       Physical Therapy Goals          Problem: Physical Therapy    Goal Priority Disciplines Outcome Goal Variances Interventions   Physical Therapy Goal     PT, PT/OT Ongoing, Progressing     Description: Goals to be met by: 1/1/23    Patient will increase functional independence with mobility by performing:    . Supine to sit with Stand-by Assistance  . Sit to supine with Stand-by Assistance  . Rolling to Left and Right with Stand-by Assistance.  . Sit to stand transfer with Stand-by Assistance  . Bed to chair transfer with  Stand-by Assistance using Rolling Walker  . Gait  x 100 feet with Stand-by Assistance using Rolling Walker.   . Wheelchair propulsion x100 feet with Supervision using bilateral uppper extremities                         Time Tracking:     PT Received On: 12/11/22  PT Start Time: 1355  PT Stop Time: 1429  PT Total Time (min): 34 min    Billable Minutes: Gait Training 12 and Therapeutic Exercise 22    Treatment Type: Treatment  PT/PTA: PTA     PTA Visit Number: 2     12/11/2022

## 2022-12-11 NOTE — PT/OT/SLP PROGRESS
"Occupational Therapy   Treatment    Name: Rosario Menendez  MRN: [id number]  Admit Date: 12/1/2022  Admitting Diagnosis:  Closed compression fracture of body of L1 vertebra    General Precautions: Standard, fall   Orthopedic Precautions: spinal precautions   Braces: TLSO    Recommendations:     Discharge Recommendations:  home health OT  Level of Assistance Recommended at Discharge: 24 hours light assistance for ADL's and homemaking tasks  Discharge Equipment Recommendations: wheelchair  Barriers to discharge:  None    Assessment:     Rosario Menendez is a 87 y.o. female with a medical diagnosis of Closed compression fracture of body of L1 vertebra .  She presents with performance deficits affecting function are weakness, impaired endurance, impaired self care skills, impaired functional mobility, gait instability, impaired balance, decreased coordination, decreased lower extremity function, impaired cognition, decreased safety awareness, pain, orthopedic precautions, impaired cardiopulmonary response to activity.   Pt participated well during today's session. Pt required increased time and max verbal cues throughout session for safety.  Pt continues to demonstrate deficits with self care skills, balance, functional mobility, UB strength and endurance. Pt will benefit from continued OT services to progress towards goals.     Rehab Potential is fair    Activity tolerance:  Fair    Plan:     Patient to be seen 6 x/week to address the above listed problems via self-care/home management, therapeutic activities, therapeutic exercises    Plan of Care Expires: 01/02/23  Plan of Care Reviewed with: patient    Subjective     Communicated with: Jay prior to session. "I need help with breakfast please" .    Pain/Comfort:  Pain Rating 1: 5/10  Location - Side 1: Bilateral  Location - Orientation 1: generalized  Location 1: back  Pain Addressed 1: Reposition, Distraction, Nurse notified  Pain Rating Post-Intervention 1: " 5/10    Patient's cultural, spiritual, Restoration conflicts given the current situation:  no    Objective:     Patient found up in chair upon OT entry to room.    Bed Mobility:    Patient completed Rolling/Turning to Right with contact guard assistance  Patient completed Scooting/Bridging with contact guard assistance  Patient completed Sit to Supine with minimum assistance to manage (B) LE.      Functional Mobility/Transfers:  Patient completed Sit <> Stand Transfer with contact guard assistance  with  rolling walker   Patient completed Bedside Chair <> Wheelchair Transfer using Step Transfer technique with contact guard assistance with rolling walker  Patient completed Toilet Transfer Stand Pivot technique with contact guard assistance with  rolling walker and grab bars  Functional Mobility: Pt ambulated approx 12 ft in room with CGA and RW for safety.     Activities of Daily Living:  Feeding:  minimum assistance to cut Upper sorbian toast. Pt stated that she could not perform.   Toileting: minimum assistance to perform becky hygiene to buttocvk region. Pt in standing position to manage pants and brief over hips. Pt use grab bars for support and safety.     [facility] 6 Click ADL: 15    OT Exercises: AROM x 1 set of 10 with no weight applied. Pt complete shoulder flex/ext. And forward flexion motion to improve ROM and endurance.     Treatment & Education:  Pt educated on role of OT, safety while performing functional transfers, safety while performing self care tasks, spinal precautions, importance to adhering to precautions and progress towards OT goals.  Pt unable to recall 0/3 spinal precautions.     Patient left HOB elevated with call button in reach and Nsg and PCT notified.    GOALS:   Multidisciplinary Problems       Occupational Therapy Goals          Problem: Occupational Therapy    Goal Priority Disciplines Outcome Interventions   Occupational Therapy Goal     OT, PT/OT Ongoing, Progressing    Description: Goals to be  met by: 3 weeks     Patient will increase functional independence with ADLs by performing:    UE Dressing with Supervision.  LE Dressing with Supervision.  Grooming while seated at sink with Set-up Assistance.  Toileting from toilet with Stand-by Assistance for hygiene and clothing management.   Bathing sitting at sink with Stand-by Assistance.  Supine to sit with Modified [place].  Step transfer with Supervision with appropriate A/D  Upper extremity exercise with supervision.  Caregiver will be educated on level of assist required to safely perform self care tasks and functional transfers..                        Time Tracking:     OT Date of Treatment: 12/11/22  OT Start Time: 0855    OT Stop Time: 0927  OT Total Time (min): 32 min    Billable Minutes:Self Care/Home Management 32    12/11/2022  A client care conference was performed between the LORENZOR and MO, prior to treatment by MO, to discuss the patient's status, treatment plan and established goals.

## 2022-12-12 LAB
ANION GAP SERPL CALC-SCNC: 6 MMOL/L (ref 8–16)
BASOPHILS # BLD AUTO: 0.05 K/UL (ref 0–0.2)
BASOPHILS NFR BLD: 1.3 % (ref 0–1.9)
BUN SERPL-MCNC: 12 MG/DL (ref 8–23)
CALCIUM SERPL-MCNC: 9.6 MG/DL (ref 8.7–10.5)
CHLORIDE SERPL-SCNC: 111 MMOL/L (ref 95–110)
CO2 SERPL-SCNC: 20 MMOL/L (ref 23–29)
CREAT SERPL-MCNC: 0.8 MG/DL (ref 0.5–1.4)
DIFFERENTIAL METHOD: ABNORMAL
EOSINOPHIL # BLD AUTO: 0.1 K/UL (ref 0–0.5)
EOSINOPHIL NFR BLD: 3.5 % (ref 0–8)
ERYTHROCYTE [DISTWIDTH] IN BLOOD BY AUTOMATED COUNT: 13.9 % (ref 11.5–14.5)
EST. GFR  (NO RACE VARIABLE): >60 ML/MIN/1.73 M^2
GLUCOSE SERPL-MCNC: 84 MG/DL (ref 70–110)
HCT VFR BLD AUTO: 40.8 % (ref 37–48.5)
HGB BLD-MCNC: 13.4 G/DL (ref 12–16)
IMM GRANULOCYTES # BLD AUTO: 0.01 K/UL (ref 0–0.04)
IMM GRANULOCYTES NFR BLD AUTO: 0.3 % (ref 0–0.5)
LYMPHOCYTES # BLD AUTO: 1.5 K/UL (ref 1–4.8)
LYMPHOCYTES NFR BLD: 39 % (ref 18–48)
MAGNESIUM SERPL-MCNC: 2.4 MG/DL (ref 1.6–2.6)
MCH RBC QN AUTO: 30.7 PG (ref 27–31)
MCHC RBC AUTO-ENTMCNC: 32.8 G/DL (ref 32–36)
MCV RBC AUTO: 94 FL (ref 82–98)
MONOCYTES # BLD AUTO: 0.7 K/UL (ref 0.3–1)
MONOCYTES NFR BLD: 18 % (ref 4–15)
NEUTROPHILS # BLD AUTO: 1.5 K/UL (ref 1.8–7.7)
NEUTROPHILS NFR BLD: 37.9 % (ref 38–73)
NRBC BLD-RTO: 0 /100 WBC
PHOSPHATE SERPL-MCNC: 2.5 MG/DL (ref 2.7–4.5)
PLATELET # BLD AUTO: 91 K/UL (ref 150–450)
PMV BLD AUTO: 11.3 FL (ref 9.2–12.9)
POTASSIUM SERPL-SCNC: 4.2 MMOL/L (ref 3.5–5.1)
RBC # BLD AUTO: 4.36 M/UL (ref 4–5.4)
SODIUM SERPL-SCNC: 137 MMOL/L (ref 136–145)
WBC # BLD AUTO: 3.95 K/UL (ref 3.9–12.7)

## 2022-12-12 PROCEDURE — 84100 ASSAY OF PHOSPHORUS: CPT | Mod: HCNC | Performed by: HOSPITALIST

## 2022-12-12 PROCEDURE — 85025 COMPLETE CBC W/AUTO DIFF WBC: CPT | Mod: HCNC | Performed by: HOSPITALIST

## 2022-12-12 PROCEDURE — 25000003 PHARM REV CODE 250: Mod: HCNC | Performed by: HOSPITALIST

## 2022-12-12 PROCEDURE — 63700000 PHARM REV CODE 250 ALT 637 W/O HCPCS: Mod: HCNC | Performed by: NURSE PRACTITIONER

## 2022-12-12 PROCEDURE — 25000003 PHARM REV CODE 250: Mod: HCNC | Performed by: NURSE PRACTITIONER

## 2022-12-12 PROCEDURE — 83735 ASSAY OF MAGNESIUM: CPT | Mod: HCNC | Performed by: HOSPITALIST

## 2022-12-12 PROCEDURE — 97110 THERAPEUTIC EXERCISES: CPT | Mod: HCNC

## 2022-12-12 PROCEDURE — 97110 THERAPEUTIC EXERCISES: CPT | Mod: HCNC,CQ

## 2022-12-12 PROCEDURE — 11000004 HC SNF PRIVATE: Mod: HCNC

## 2022-12-12 PROCEDURE — 36415 COLL VENOUS BLD VENIPUNCTURE: CPT | Mod: HCNC | Performed by: HOSPITALIST

## 2022-12-12 PROCEDURE — 97116 GAIT TRAINING THERAPY: CPT | Mod: HCNC,CQ

## 2022-12-12 PROCEDURE — 97530 THERAPEUTIC ACTIVITIES: CPT | Mod: HCNC

## 2022-12-12 PROCEDURE — 80048 BASIC METABOLIC PNL TOTAL CA: CPT | Mod: HCNC | Performed by: HOSPITALIST

## 2022-12-12 PROCEDURE — 97535 SELF CARE MNGMENT TRAINING: CPT | Mod: HCNC

## 2022-12-12 PROCEDURE — 97530 THERAPEUTIC ACTIVITIES: CPT | Mod: HCNC,CQ

## 2022-12-12 RX ADMIN — SENNOSIDES AND DOCUSATE SODIUM 2 TABLET: 50; 8.6 TABLET ORAL at 09:12

## 2022-12-12 RX ADMIN — PRAVASTATIN SODIUM 40 MG: 20 TABLET ORAL at 09:12

## 2022-12-12 RX ADMIN — LEVOTHYROXINE SODIUM 100 MCG: 100 TABLET ORAL at 05:12

## 2022-12-12 RX ADMIN — GALANTAMINE HYDROBROMIDE 16 MG: 16 CAPSULE, EXTENDED RELEASE ORAL at 09:12

## 2022-12-12 RX ADMIN — HYDROCODONE BITARTRATE AND ACETAMINOPHEN 1 TABLET: 5; 325 TABLET ORAL at 09:12

## 2022-12-12 RX ADMIN — CYANOCOBALAMIN TAB 1000 MCG 1000 MCG: 1000 TAB at 09:12

## 2022-12-12 RX ADMIN — BUPROPION HYDROCHLORIDE 150 MG: 150 TABLET, FILM COATED, EXTENDED RELEASE ORAL at 09:12

## 2022-12-12 RX ADMIN — LOSARTAN POTASSIUM 50 MG: 50 TABLET, FILM COATED ORAL at 09:12

## 2022-12-12 RX ADMIN — MEMANTINE 10 MG: 10 TABLET ORAL at 09:12

## 2022-12-12 RX ADMIN — HYDROCODONE BITARTRATE AND ACETAMINOPHEN 1 TABLET: 5; 325 TABLET ORAL at 04:12

## 2022-12-12 RX ADMIN — Medication 6 MG: at 09:12

## 2022-12-12 NOTE — PLAN OF CARE
Problem: Occupational Therapy  Goal: Occupational Therapy Goal  Description: Goals to be met by: 3 weeks     Patient will increase functional independence with ADLs by performing:    UE Dressing with Supervision.  LE Dressing with Supervision.  Grooming while seated at sink with Set-up Assistance.  Toileting from toilet with Stand-by Assistance for hygiene and clothing management.   Bathing sitting at sink with Stand-by Assistance.  Supine to sit with Modified [place].  Step transfer with Supervision with appropriate A/D  Upper extremity exercise with supervision.  Caregiver will be educated on level of assist required to safely perform self care tasks and functional transfers..   Outcome: Ongoing, Progressing

## 2022-12-12 NOTE — PT/OT/SLP PROGRESS
"Physical Therapy Treatment    Patient Name:  Rosario Menendez   MRN:  [id number]  Admit Date: 12/1/2022  Admitting Diagnosis: Closed compression fracture of body of L1 vertebra    General Precautions: Standard, fall  Orthopedic Precautions: spinal precautions  Braces: TLSO    Recommendations:     Discharge Recommendations: home health PT  Level of Assistance Recommended at Discharge: 24 hours significant assistance  Discharge Equipment Recommendations: wheelchair  Barriers to discharge: Decreased caregiver support    Assessment:     Rosario Menendez is a 87 y.o. female admitted with a medical diagnosis of Closed compression fracture of body of L1 vertebra.      Performance deficits affecting function: weakness, impaired endurance, impaired functional mobility, gait instability, impaired balance, decreased lower extremity function, decreased safety awareness, pain, decreased ROM, orthopedic precautions.    Rehab Potential is good    Activity Tolerance: Good    Plan:     Patient to be seen 6 x/week to address the above listed problems via gait training, therapeutic activities, therapeutic exercises, neuromuscular re-education, wheelchair management/training    Plan of Care Expires: 01/01/23  Plan of Care Reviewed with: patient    Subjective     "How much longer do I have to do this? I'm just tired"    Pain/Comfort:  Pain Rating 1: other (see comments) (unrated c/o pain)  Location - Orientation 1: generalized  Location 1: back  Pain Addressed 1: Reposition, Distraction  Pain Rating Post-Intervention 1: other (see comments) (unrated)    Patient's cultural, spiritual, Islam conflicts given the current situation:  no    Objective:     Communicated with RN prior to session.  Patient found  seated in w/c in pts room  with TLSO upon PTA entry to room.     Therapeutic Activities and Exercises:   Seated mini elliptical x 10 min with minimal resistance. Good tolerance, a few short rest breaks taken, encouraged to " "participate meaningfully.     Functional Mobility:  Bed Mobility:     Rolling Left:  contact guard assistance  Sit to Supine: minimum assistance  Transfers:     Sit to Stand:  minimum assistance with rolling walker  Bed to Chair: minimum assistance with  rolling walker  using  Stand Pivot  Gait: Pt ambulates 96 ft x 2 trials with RW and CGA. Pt requires maximal encouragement to participate in increased gait distance, no signs of fatigue but pt with c/o "feeling tired/weak". Short B step length with shuffled gait, cued on improving to more consistent step through gait.     AM-PAC 6 CLICK MOBILITY  14    Patient left HOB elevated with all lines intact and call button in reach.    GOALS:   Multidisciplinary Problems       Physical Therapy Goals          Problem: Physical Therapy    Goal Priority Disciplines Outcome Goal Variances Interventions   Physical Therapy Goal     PT, PT/OT Ongoing, Progressing     Description: Goals to be met by: 1/1/23    Patient will increase functional independence with mobility by performing:    . Supine to sit with Stand-by Assistance  . Sit to supine with Stand-by Assistance  . Rolling to Left and Right with Stand-by Assistance.  . Sit to stand transfer with Stand-by Assistance  . Bed to chair transfer with Stand-by Assistance using Rolling Walker  . Gait  x 100 feet with Stand-by Assistance using Rolling Walker.   . Wheelchair propulsion x100 feet with Supervision using bilateral uppper extremities                         Time Tracking:     PT Received On: 12/12/22  PT Start Time: 1315  PT Stop Time: 1356  PT Total Time (min): 41 min    Billable Minutes: Gait Training 20, Therapeutic Activity 11, and Therapeutic Exercise 10    Treatment Type: Treatment  PT/PTA: PTA     PTA Visit Number: 3     12/12/2022  "

## 2022-12-12 NOTE — PT/OT/SLP PROGRESS
Occupational Therapy   Treatment    Name: Rosario Menendez  MRN: [id number]  Admit Date: 12/1/2022  Admitting Diagnosis:  Closed compression fracture of body of L1 vertebra    General Precautions: Standard, fall   Orthopedic Precautions: spinal precautions   Braces: TLSO    Recommendations:     Discharge Recommendations:  home health OT  Level of Assistance Recommended at Discharge: Intermittent assistance for ADL's and homemaking tasks  Discharge Equipment Recommendations: wheelchair  Barriers to discharge:  None    Assessment:     Rosario Menendez is a 87 y.o. female with a medical diagnosis of Closed compression fracture of body of L1 vertebra .  She remains limited in performance of self-care , functional mobility and ADLs and currently not performing tasks at OF . Currently presenting with performance deficits including weakness, impaired endurance, impaired self care skills, impaired functional mobility, gait instability, impaired balance, decreased coordination, decreased lower extremity function, impaired cognition, decreased safety awareness, pain, orthopedic precautions, impaired cardiopulmonary response to activity.   Pt tolerated Tx without incident and is making progress but continues to require assist to perform self care tasks, functional mobility and functional transfers .  She would continue to benefit from OT intervention to further her functional (I)ce and safety.     Rehab Potential is good    Activity tolerance:  Good    Plan:     Patient to be seen 6 x/week to address the above listed problems via self-care/home management, therapeutic activities, therapeutic exercises    Plan of Care Expires: 01/02/23  Plan of Care Reviewed with: patient    Subjective     Communicated with: nurse prior to session.     Pain/Comfort:  Pain Rating 1:  (Pt did not rate pain)  Location - Side 1: Bilateral  Location - Orientation 1: generalized  Location 1: back  Pain Addressed 1: Reposition, Distraction,  Cessation of Activity  Pain Rating Post-Intervention 1:  (same)    Patient's cultural, spiritual, Amish conflicts given the current situation:  no    Objective:     Patient found up in chair with TLSO upon OT entry to room.    Bed Mobility:    Pt seated in bedside chair at onset of therapy session.     Functional Mobility/Transfers:  Patient completed Sit <> Stand Transfer with contact guard assistance  with  rolling walker   Patient completed Bed <> Chair Transfer using Stand Pivot technique with contact guard assistance with rolling walker    Activities of Daily Living:  Upper Body Dressing: minimum assistance donning/doffing cardigan style sweater.(A) to pass sweater behind her back.   Lower Body Dressing: contact guard assistance with Pt donning/doffing pants and socks using figure 4 method ,but needing (A) to steady when standing to pull clothing over her bottom.    [facility] 6 Click ADL: 17    OT Exercises: Pt performed UBE exercise for 10 minutes with Min resistance.  UE exercises performed to increase functional endurance and strength in order increase independence when performing self care tasks, functional ambulation, W/C propulsion, and functional standing activities .     dexamethasone suppression testPt worked on functional standing activity consisting of standing with RW while reaching in all planes , crossing of midline and reaching to varying heights to facilitate (B) wt shifting and stability in standing in prep for performance of self care tasks and functional ADLS in standing.  Pt tolerated up to 3 Min. and then 2 min  in standing with SBA and RW to steady.     Treatment & Education:  Pt edu on POC, safety when performing self care tasks , benefit of performing OOB activity, and safety when performing functional transfers and mobility.  - White board updated    Patient left up in chair with call button in reach and friend  present    GOALS:   Multidisciplinary Problems       Occupational Therapy  Goals          Problem: Occupational Therapy    Goal Priority Disciplines Outcome Interventions   Occupational Therapy Goal     OT, PT/OT Ongoing, Progressing    Description: Goals to be met by: 3 weeks     Patient will increase functional independence with ADLs by performing:    UE Dressing with Supervision.  LE Dressing with Supervision.  Grooming while seated at sink with Set-up Assistance.  Toileting from toilet with Stand-by Assistance for hygiene and clothing management.   Bathing sitting at sink with Stand-by Assistance.  Supine to sit with Modified [place].  Step transfer with Supervision with appropriate A/D  Upper extremity exercise with supervision.  Caregiver will be educated on level of assist required to safely perform self care tasks and functional transfers..                        Time Tracking:     OT Date of Treatment: 12/12/22  OT Start Time: 1130    OT Stop Time: 1210  OT Total Time (min): 40 min    Billable Minutes:Self Care/Home Management 14  Therapeutic Activity 16  Therapeutic Exercise 10    12/12/2022

## 2022-12-12 NOTE — PROGRESS NOTES
"                                                        [facility]                                  Skilled Nursing Facility                   Progress Note     Admit Date: 12/1/2022  AMARA 12/21/2022  Principal Problem:  Closed compression fracture of body of L1 vertebra   HPI obtained from patient interview and chart review     Chief Complaint: Re-evaluation of medical DX and therapy status: lab review     HPI:   Rosario Menendez is a 87 y.o. female with a past medical history significant for osteoporosis, thrombocytopenia, hypothyroidism, mild cognitive impairment, depression, and arthritis. She presents to [facility] after she tripped and fell onto her buttocks while in the kitchen. Patient states she "bounced" during her fall.  After fall she noted buttock and lower back pain. Buttock pain is worse than lower back pain. Pain started after fall and is exacerbated by movement. She denies radiating leg pain. She denies numbness, tinging, saddle anesthesia, or bowel dysfunction. Lumbar x-rays (AP/Lateral) obtained in ED for lower back pain revealing L1 compression fracture with 20% anterior height loss, and grade 2 anterolisthesis of L4 on L5. Lumbar CT 11/30 re demonstrating L1 compression fracture, and grade 2 anterolisthesis for L4 on L5 with associated pars defect.      Notes recent UTI with persistent dysuria s/p antibiotic treatment. She denies chills, nausea, or vomiting. Endorses slight subjective fever yesterday. UA 11/29 obtained in ED showing +nitrite, 3+leukocytes, many bacteria, and >100 WBC. UA culture pending. Blood culture NGTD. Of note, she has baseline thrombocytopenia.     Patient will be treated at [facility] with PT and OT to improve functional status and ability to perform ADLs.     Interval Hx  No acute events over night  Labs reviewed, no critical values  UA was obtained over the weekend, negative now with ABX treatment   24 hr vital sign ranges listed below.  Patient denies " shortness of breath, abdominal discomfort, nausea, or vomiting.  Patient denies dysuria.    Patient progessing with PT/OT. Continuing to follow and treat all acute and chronic conditions.     Past Medical History: Patient has a past medical history of Arthritis, Depression, Hypercholesteremia, Thrombocytopenia, and Thyroid disease.    Past Surgical History: Patient has a past surgical history that includes Hysterectomy; Knee surgery; Ankle surgery; Wrist surgery; and Colonoscopy (N/A, 6/2/2021).    Social History: Patient reports that she has never smoked. She has never used smokeless tobacco.    Family History: family history includes Cancer in her maternal uncle; Heart failure in her father and mother; Suicide in her son.    Allergies: Patient has No Known Allergies.    ROS  Constitutional: Negative for fever   Eyes: Negative for blurred vision, double vision   Respiratory: Negative for cough, shortness of breath   Cardiovascular: Negative for chest pain, palpitations, and leg swelling.   Gastrointestinal: Negative for abdominal pain, constipation, diarrhea, nausea, vomiting.   Genitourinary: + for dysuria, frequency   Musculoskeletal:  + generalized weakness. + for back pain and myalgias.   Skin: Negative for itching and rash.   Neurological: Negative for dizziness, headaches.   Psychiatric/Behavioral: Negative for depression. The patient is not nervous/anxious.      24 hour Vital Sign Range   Temp:  [97.8 °F (36.6 °C)-98.3 °F (36.8 °C)]   Pulse:  [64-92]   Resp:  [17-18]   BP: (142-163)/(60-70)   SpO2:  [95 %]     Current BMI: Body mass index is 25.1 kg/m².    PEx  Constitutional: Patient appears debilitated.  No distress noted  HENT:   Head: Normocephalic and atraumatic.   Eyes: Pupils are equal, round  Neck: Normal range of motion. Neck supple.   Cardiovascular: Normal rate, regular rhythm and normal heart sounds.    Pulmonary/Chest: Effort normal and breath sounds are clear  Abdominal: Soft. Bowel sounds are  normal.   Musculoskeletal: Normal range of motion.   Neurological: Alert and oriented to person,   Psychiatric: Normal mood and affect. Behavior is normal. +forgetful   Skin: Skin is warm and dry. Full skin assessment    Recent Labs   Lab 12/12/22  0555      K 4.2   *   CO2 20*   BUN 12   CREATININE 0.8   MG 2.4         Recent Labs   Lab 12/12/22  0555   WBC 3.95   RBC 4.36   HGB 13.4   HCT 40.8   PLT 91*   MCV 94   MCH 30.7   MCHC 32.8           Assessment and Plan:  Closed compression fracture of body of L1 vertebra  -87 y.o. female with a pmhx of osteoporosis, thrombocytopenia, mild cognitive impairment, and arthritis. Patient tripped and fell onto buttocks yesterday with residual lower back pain after fall. Lumbar CT showing L1 compression fracture. Neurosurgery consulted for further eval.  -Lumbar x-rays (AP/Lateral) 11/29: revealing L1 compression fracture with 20% anterior height loss, and grade 2 anterolisthesis of L4 on L5.   -Lumbar CT 11/30 re demonstrating L1 compression fracture, and grade 2 anterolisthesis for L4 on L5 with associated pars defect. Severe R and moderate L neural foraminal narrowing.   -No acute surgical intervention  -Obtain lumbar xrays upright/supine with increased height loss upon standing. No kyphotic deformity  -Obtain lumbar flex/ex xrays reviewed re-demonstrating L4-L5 grade 2 anterolisthesis  -TLSO brace ordered/at bedside for comfort  -Will schedule outpatient follow up in 4-6 weeks with repeat Lumbar xrays. Neurosurgery will arrange.      Acute cystitis with hematuria  -UA reviewed, UC w/ GNR  -Blood cxs NGTD  -Completed rocephin.  -12/5-Initiate order for cipro 500 mg BID, patient still with symptoms of UTI, will treat further   -12/6-Initiate order for pyridium 100 mg TID x 2 days     Constipation  -12/7-Initiate order for MoM PRN and increase senna to 2 tabs BID   -12/8-Initiate order for enema x 1      Amnestic MCI (mild cognitive impairment with memory  loss)  -Continue namenda.     Thrombocytopenia, unspecified  -Chronic, stable.  -Monitor and transfuse for <50K if bleeding or <10K if not bleeding  -Avoid antiplatelet medications including Lovenox, heparin,NSAIDs and aspirin.  -Follows with hem/onc outpatient.     Hypercholesteremia  -cont statin         Thyroid disease  -cont home synthroid     Anticipate disposition:  Home with home health      Follow-up needed during SNF stay-    Follow-up needed after discharge from SNF: PCP, neurosurgery     Future Appointments   Date Time Provider Department Center   1/10/2023  2:30 PM [facility] XR-A LIMIT 350 LBS [facility] XRAY OP [place] [facility]   1/10/2023  3:30 PM Candy Yang PA-C [facility] NEUROSU [place] [facility]   4/4/2023  1:00 PM Shi Simon MD [facility] Oc [facility]         I certify that SNF services are required to be given on an inpatient basis because Rosario Menendez needs for skilled nursing care and/or skilled rehabilitation are required on a daily basis and such services can only practically be provided in a skilled nursing facility setting and are for an ongoing condition for which she received inpatient care in the hospital.       Afsaneh Chawla NP  Department of Hospital Medicine   [facility]     DOS: 12/12/2022       Patient note was created using MModal Dictation.  Any errors in syntax or even information may not have been identified and edited on initial review prior to signing this note.

## 2022-12-12 NOTE — PLAN OF CARE
Patient remained free of injury, trauma, or falls during  shift.   Patient is alert  and oriented x 3. Patient reports ongoing pain with urination. Gave medication for pain symptoms controlled, per patient.    Care plan reviewed with patient, questions encouraged.   Vitals remained stable throughout shift, will continue to monitor     Problem: Adult Inpatient Plan of Care  Goal: Patient-Specific Goal (Individualized)  Outcome: Ongoing, Progressing  Goal: Optimal Comfort and Wellbeing  Outcome: Ongoing, Progressing  Intervention: Provide Person-Centered Care  Flowsheets (Taken 12/12/2022 0213)  Trust Relationship/Rapport:   care explained   questions encouraged   choices provided   reassurance provided   emotional support provided   thoughts/feelings acknowledged   empathic listening provided   questions answered  Goal: Readiness for Transition of Care  Outcome: Ongoing, Progressing     Problem: Fall Injury Risk  Goal: Absence of Fall and Fall-Related Injury  Outcome: Ongoing, Progressing     Problem: Skin Injury Risk Increased  Goal: Skin Health and Integrity  Outcome: Ongoing, Progressing

## 2022-12-12 NOTE — PLAN OF CARE
Problem: Adult Inpatient Plan of Care  Goal: Patient-Specific Goal (Individualized)  Outcome: Ongoing, Progressing     Problem: Adult Inpatient Plan of Care  Goal: Optimal Comfort and Wellbeing  Outcome: Ongoing, Progressing     Problem: Fall Injury Risk  Goal: Absence of Fall and Fall-Related Injury  Outcome: Ongoing, Progressing

## 2022-12-13 LAB — SARS-COV-2 RNA RESP QL NAA+PROBE: NOT DETECTED

## 2022-12-13 PROCEDURE — 11000004 HC SNF PRIVATE: Mod: HCNC

## 2022-12-13 PROCEDURE — U0003 INFECTIOUS AGENT DETECTION BY NUCLEIC ACID (DNA OR RNA); SEVERE ACUTE RESPIRATORY SYNDROME CORONAVIRUS 2 (SARS-COV-2) (CORONAVIRUS DISEASE [COVID-19]), AMPLIFIED PROBE TECHNIQUE, MAKING USE OF HIGH THROUGHPUT TECHNOLOGIES AS DESCRIBED BY CMS-2020-01-R: HCPCS | Mod: HCNC | Performed by: HOSPITALIST

## 2022-12-13 PROCEDURE — 97530 THERAPEUTIC ACTIVITIES: CPT | Mod: HCNC,CQ

## 2022-12-13 PROCEDURE — 63700000 PHARM REV CODE 250 ALT 637 W/O HCPCS: Mod: HCNC | Performed by: NURSE PRACTITIONER

## 2022-12-13 PROCEDURE — 25000003 PHARM REV CODE 250: Mod: HCNC | Performed by: HOSPITALIST

## 2022-12-13 PROCEDURE — 97116 GAIT TRAINING THERAPY: CPT | Mod: HCNC,CQ

## 2022-12-13 PROCEDURE — 25000003 PHARM REV CODE 250: Mod: HCNC | Performed by: NURSE PRACTITIONER

## 2022-12-13 PROCEDURE — U0005 INFEC AGEN DETEC AMPLI PROBE: HCPCS | Performed by: HOSPITALIST

## 2022-12-13 PROCEDURE — 97110 THERAPEUTIC EXERCISES: CPT | Mod: HCNC,CQ

## 2022-12-13 PROCEDURE — 97535 SELF CARE MNGMENT TRAINING: CPT | Mod: HCNC,CO

## 2022-12-13 RX ADMIN — CYANOCOBALAMIN TAB 1000 MCG 1000 MCG: 1000 TAB at 08:12

## 2022-12-13 RX ADMIN — GALANTAMINE HYDROBROMIDE 16 MG: 16 CAPSULE, EXTENDED RELEASE ORAL at 09:12

## 2022-12-13 RX ADMIN — MEMANTINE 10 MG: 10 TABLET ORAL at 08:12

## 2022-12-13 RX ADMIN — LEVOTHYROXINE SODIUM 100 MCG: 100 TABLET ORAL at 06:12

## 2022-12-13 RX ADMIN — BUPROPION HYDROCHLORIDE 150 MG: 150 TABLET, FILM COATED, EXTENDED RELEASE ORAL at 08:12

## 2022-12-13 RX ADMIN — HYDROCODONE BITARTRATE AND ACETAMINOPHEN 1 TABLET: 5; 325 TABLET ORAL at 08:12

## 2022-12-13 RX ADMIN — Medication 6 MG: at 08:12

## 2022-12-13 RX ADMIN — SENNOSIDES AND DOCUSATE SODIUM 2 TABLET: 50; 8.6 TABLET ORAL at 08:12

## 2022-12-13 RX ADMIN — LOSARTAN POTASSIUM 50 MG: 50 TABLET, FILM COATED ORAL at 08:12

## 2022-12-13 RX ADMIN — PRAVASTATIN SODIUM 40 MG: 20 TABLET ORAL at 08:12

## 2022-12-13 NOTE — PT/OT/SLP PROGRESS
Occupational Therapy   Treatment    Name: Rosario Menendez  MRN: [id number]  Admit Date: 12/1/2022  Admitting Diagnosis:  Closed compression fracture of body of L1 vertebra    General Precautions: Standard, fall   Orthopedic Precautions: spinal precautions   Braces: TLSO    Recommendations:     Discharge Recommendations:  home health OT  Level of Assistance Recommended at Discharge: 24 hours light assistance for ADL's and homemaking tasks  Discharge Equipment Recommendations: wheelchair  Barriers to discharge:  None    Assessment:     Rosario Menendez is a 87 y.o. female with a medical diagnosis of Closed compression fracture of body of L1 vertebra.  She presents with  Performance deficits affecting function are weakness, impaired endurance, impaired self care skills, impaired functional mobility, gait instability, impaired balance, decreased coordination, decreased lower extremity function, impaired cognition, decreased safety awareness, pain, orthopedic precautions, impaired cardiopulmonary response to activity.     Pt.  participated well with session on this day. Pt. With increase time and cues to execute and redirection with task on this day   Pt. Unable to recall 0/3 spinal precautions Pt  continues to demonstrate levels of physical deficits with  functional indep with daily management activities tasks, selfcare skills with balance,  functional mobility, UB strength and endurance. Pt. Will continue to benefit from continued OT to progress towards goals      Rehab Potential is fair    Activity tolerance:  Fair    Plan:     Patient to be seen 6 x/week to address the above listed problems via self-care/home management, therapeutic activities, therapeutic exercises    Plan of Care Expires: 01/02/23  Plan of Care Reviewed with: patient    Subjective     Communicated with: nsg and Pt. prior to session. Oh My back    Pain/Comfort:  Pain Rating 1:  (did not rate)  Location - Orientation 1: generalized  Location 1:  back  Pain Addressed 1: Pre-medicate for activity, Reposition, Distraction    Patient's cultural, spiritual, Episcopalian conflicts given the current situation:  no    Objective:     Patient found up in chair with BLE elevated  upon OT entry to room.    Bed Mobility:    Patient completed Scooting/Bridging with contact guard assistance with use of bed rails  Patient completed Sit to Supine with minimum assistance and use of bed rails     Functional Mobility/Transfers:  Patient completed Sit <> Stand Transfer with contact guard assistance  with  rolling walker   Patient completed Toilet Transfer Step Transfer technique with contact guard assistance with  rolling walker  Functional Mobility: Pt. With fxl mobility to and from inroom bath with RW and cues for safety     Activities of Daily Living:  Feeding:  set up assistance  with breakfast   Grooming: stand by assistance standing sink side with grooming needs  Upper Body Dressing: minimum assistance to  doff/lelia pull over shirt and  Mod A  lelia sweater around back and lelia TLSO brace  Lower Body Dressing: moderate assistance to lelia pants seated and to manage over hips instance with RW for bal  Toileting: minimum assistance overall with cleaning and clothing management and fresh brief application   Pt. Able to wipe frontal area     [facility] 6 Click ADL: 17    Treatment & Education:    Pt edu on role of OT, POC, safety when performing self care tasks , benefit of performing OOB activity, and safety when performing functional transfers and mobility management for preparation with goals to progress towards next level of care     Patient left up in chair with all lines intact and call button in reach    GOALS:   Multidisciplinary Problems       Occupational Therapy Goals          Problem: Occupational Therapy    Goal Priority Disciplines Outcome Interventions   Occupational Therapy Goal     OT, PT/OT Ongoing, Progressing    Description: Goals to be met by: 3 weeks     Patient  will increase functional independence with ADLs by performing:    UE Dressing with Supervision.  LE Dressing with Supervision.  Grooming while seated at sink with Set-up Assistance.  Toileting from toilet with Stand-by Assistance for hygiene and clothing management.   Bathing sitting at sink with Stand-by Assistance.  Supine to sit with Modified [place].  Step transfer with Supervision with appropriate A/D  Upper extremity exercise with supervision.  Caregiver will be educated on level of assist required to safely perform self care tasks and functional transfers..                        Time Tracking:     OT Date of Treatment: 12/13/22  OT Start Time: 0800    OT Stop Time: 0830  OT Total Time (min): 30 min    Billable Minutes:Self Care/Home Management 30    12/13/2022

## 2022-12-13 NOTE — PT/OT/SLP PROGRESS
Physical Therapy Treatment    Patient Name:  Rosario Menendez   MRN:  [id number]  Admit Date: 12/1/2022  Admitting Diagnosis: Closed compression fracture of body of L1 vertebra    General Precautions: Standard, fall  Orthopedic Precautions: spinal precautions  Braces: TLSO    Recommendations:     Discharge Recommendations: home health PT  Level of Assistance Recommended at Discharge: 24 hours significant assistance  Discharge Equipment Recommendations: wheelchair  Barriers to discharge: Decreased caregiver support    Assessment:     Rosario Menendez is a 87 y.o. female admitted with a medical diagnosis of Closed compression fracture of body of L1 vertebra.      Performance deficits affecting function: weakness, impaired endurance, impaired functional mobility, gait instability, impaired balance, decreased lower extremity function, decreased safety awareness, pain, decreased ROM, orthopedic precautions.    Rehab Potential is good    Activity Tolerance: Good    Plan:     Patient to be seen 6 x/week to address the above listed problems via gait training, therapeutic activities, therapeutic exercises, neuromuscular re-education, wheelchair management/training    Plan of Care Expires: 01/01/23  Plan of Care Reviewed with: patient    Subjective      Pain/Comfort:  Pain Rating 1: other (see comments) (unrated)  Location - Orientation 1: generalized  Location 1: back  Pain Addressed 1: Reposition, Distraction, Pre-medicate for activity  Pain Rating Post-Intervention 1: other (see comments) (unrated)    Patient's cultural, spiritual, Temple conflicts given the current situation:  no    Objective:     Communicated with RN prior to session.  Patient found  seated in bedside chair in pts room  with TLSO upon PTA entry to room.     Therapeutic Activities and Exercises:   Pt assisted to bathroom for toileting. Postive void. CGA for standing balance for pericare. Min A overall for toileting for lower body dressing for  donning/doffing pants/adult brief.   Seated mini elliptical x 10 min with Moderate resistance. Fair tolerance. 2 brief rest breaks. Encouragement to complete participation for full time.    Functional Mobility:  Transfers:     Sit to Stand:  minimum assistance with rolling walker  Toilet Transfer: contact guard assistance with  rolling walker  using  Step Transfer  Gait: Pt ambulates 10 ft x 2 trials and 96 ft x 2 trials with RW and CGA. Slow ekta, inconsistent step-through vs step-to gait.     AM-PAC 6 CLICK MOBILITY  14    Patient left  seated in w/c in pts room, returned by therapy technician  with call button in reach.    GOALS:   Multidisciplinary Problems       Physical Therapy Goals          Problem: Physical Therapy    Goal Priority Disciplines Outcome Goal Variances Interventions   Physical Therapy Goal     PT, PT/OT Ongoing, Progressing     Description: Goals to be met by: 1/1/23    Patient will increase functional independence with mobility by performing:    . Supine to sit with Stand-by Assistance  . Sit to supine with Stand-by Assistance  . Rolling to Left and Right with Stand-by Assistance.  . Sit to stand transfer with Stand-by Assistance  . Bed to chair transfer with Stand-by Assistance using Rolling Walker  . Gait  x 100 feet with Stand-by Assistance using Rolling Walker.   . Wheelchair propulsion x100 feet with Supervision using bilateral uppper extremities                         Time Tracking:     PT Received On: 12/13/22  PT Start Time: 1027  PT Stop Time: 1105  PT Total Time (min): 38 min    Billable Minutes: Gait Training 15, Therapeutic Activity 13, and Therapeutic Exercise 10    Treatment Type: Treatment  PT/PTA: PTA     PTA Visit Number: 4     12/13/2022

## 2022-12-14 PROCEDURE — 63700000 PHARM REV CODE 250 ALT 637 W/O HCPCS: Mod: HCNC | Performed by: NURSE PRACTITIONER

## 2022-12-14 PROCEDURE — 97110 THERAPEUTIC EXERCISES: CPT | Mod: HCNC

## 2022-12-14 PROCEDURE — 25000003 PHARM REV CODE 250: Mod: HCNC | Performed by: HOSPITALIST

## 2022-12-14 PROCEDURE — 97530 THERAPEUTIC ACTIVITIES: CPT | Mod: HCNC

## 2022-12-14 PROCEDURE — 97530 THERAPEUTIC ACTIVITIES: CPT | Mod: HCNC,CO

## 2022-12-14 PROCEDURE — 11000004 HC SNF PRIVATE: Mod: HCNC

## 2022-12-14 PROCEDURE — 97116 GAIT TRAINING THERAPY: CPT | Mod: HCNC

## 2022-12-14 PROCEDURE — 25000003 PHARM REV CODE 250: Mod: HCNC | Performed by: NURSE PRACTITIONER

## 2022-12-14 RX ADMIN — BUPROPION HYDROCHLORIDE 150 MG: 150 TABLET, FILM COATED, EXTENDED RELEASE ORAL at 09:12

## 2022-12-14 RX ADMIN — Medication 6 MG: at 08:12

## 2022-12-14 RX ADMIN — HYDROCODONE BITARTRATE AND ACETAMINOPHEN 1 TABLET: 5; 325 TABLET ORAL at 09:12

## 2022-12-14 RX ADMIN — PRAVASTATIN SODIUM 40 MG: 20 TABLET ORAL at 09:12

## 2022-12-14 RX ADMIN — GALANTAMINE HYDROBROMIDE 16 MG: 16 CAPSULE, EXTENDED RELEASE ORAL at 09:12

## 2022-12-14 RX ADMIN — SENNOSIDES AND DOCUSATE SODIUM 2 TABLET: 50; 8.6 TABLET ORAL at 09:12

## 2022-12-14 RX ADMIN — SENNOSIDES AND DOCUSATE SODIUM 2 TABLET: 50; 8.6 TABLET ORAL at 08:12

## 2022-12-14 RX ADMIN — HYDROCODONE BITARTRATE AND ACETAMINOPHEN 1 TABLET: 5; 325 TABLET ORAL at 05:12

## 2022-12-14 RX ADMIN — MEMANTINE 10 MG: 10 TABLET ORAL at 08:12

## 2022-12-14 RX ADMIN — LEVOTHYROXINE SODIUM 100 MCG: 100 TABLET ORAL at 06:12

## 2022-12-14 RX ADMIN — MEMANTINE 10 MG: 10 TABLET ORAL at 09:12

## 2022-12-14 RX ADMIN — CYANOCOBALAMIN TAB 1000 MCG 1000 MCG: 1000 TAB at 09:12

## 2022-12-14 RX ADMIN — LOSARTAN POTASSIUM 50 MG: 50 TABLET, FILM COATED ORAL at 09:12

## 2022-12-14 NOTE — PT/OT/SLP PROGRESS
"Physical Therapy Treatment    Patient Name:  Rosario Menendez   MRN:  [id number]  Admit Date: 12/1/2022  Admitting Diagnosis: Closed compression fracture of body of L1 vertebra  Recent Surgeries: N/A    General Precautions: Standard, fall  Orthopedic Precautions: spinal precautions  Braces: TLSO    Recommendations:     Discharge Recommendations: home health PT  Level of Assistance Recommended at Discharge: 24 hours significant assistance  Discharge Equipment Recommendations: wheelchair  Barriers to discharge: Decreased caregiver support    Assessment:     Rosario Menendez is a 87 y.o. female admitted with a medical diagnosis of Closed compression fracture of body of L1 vertebra. Patient agreeable to treatment session this AM. Patient ambulated 3 trials this session; decreased distance traveled this session. Patient requires encouragement for initiation of mobility tasks. Patient will benefit from continued SNF rehabilitation services to address deficits with progression toward PLOF as tolerated.     Performance deficits affecting function: weakness, impaired endurance, impaired functional mobility, gait instability, impaired balance, decreased lower extremity function, decreased safety awareness, pain, orthopedic precautions.    Rehab Potential is good    Activity Tolerance: Good    Plan:     Patient to be seen 6 x/week to address the above listed problems via gait training, therapeutic activities, therapeutic exercises, neuromuscular re-education, wheelchair management/training    Plan of Care Expires: 01/01/23  Plan of Care Reviewed with: patient    Subjective     "I can go."     Pain/Comfort:  Pain Rating 1:  (Patient initially reports no pain. Patient reports back pain with ambulation; not rated.)  Location - Side 1: Bilateral  Location - Orientation 1: generalized  Location 1: back  Pain Addressed 1: Reposition, Distraction, Cessation of Activity  Pain Rating Post-Intervention 1:  (Pain level not reported at " "end of session.)    Patient's cultural, spiritual, Rastafari conflicts given the current situation:  no    Objective:     Communicated with patient's nurse prior to session.  Patient found up in chair in activity room with TLSO upon PT entry to room.     Therapeutic Activities and Exercises:   LE ergometer x 10 minutes for LE ROM, endurance and strengthening; brief rest breaks throughout    Functional Mobility:  Transfers:     Sit to Stand:  contact guard assistance and minimum assistance with rolling walker  Gait: Patient ambulated 59 feet, 49 feet and 50 feet using RW with CGA. Seated rest breaks between trials. Cues to stand erect and within MASOOD of RW; patient pushing RW far forward.  Stairs:  Pt ascended/descended 4" curb step with Rolling Walker with no handrails with Contact Guard Assistance.     AM-PAC 6 CLICK MOBILITY  15    Patient left up in chair with call button in reach and nursing staff notified.    GOALS:   Multidisciplinary Problems       Physical Therapy Goals          Problem: Physical Therapy    Goal Priority Disciplines Outcome Goal Variances Interventions   Physical Therapy Goal     PT, PT/OT Ongoing, Progressing     Description: Goals to be met by: 1/1/23    Patient will increase functional independence with mobility by performing:    . Supine to sit with Stand-by Assistance  . Sit to supine with Stand-by Assistance  . Rolling to Left and Right with Stand-by Assistance.  . Sit to stand transfer with Stand-by Assistance  . Bed to chair transfer with Stand-by Assistance using Rolling Walker  . Gait  x 100 feet with Stand-by Assistance using Rolling Walker.   . Wheelchair propulsion x100 feet with Supervision using bilateral uppper extremities                         Time Tracking:     PT Received On: 12/14/22  PT Start Time: 1115  PT Stop Time: 1155  PT Total Time (min): 40 min    Billable Minutes: Gait Training 20, Therapeutic Activity 8, and Therapeutic Exercise 12    Treatment Type: " Treatment  PT/PTA: PT     PTA Visit Number: 0     12/14/2022

## 2022-12-14 NOTE — PLAN OF CARE
Family Training     Patient Name:  Rosario Menendez   MRN:  [id number]  Admit Date: 12/1/2022      Rehab tech called to schedule family training. Unable to reach pt's family/caregiver and voicemail left. Will re-attempt to schedule as able.     12/14/2022

## 2022-12-14 NOTE — PT/OT/SLP PROGRESS
Occupational Therapy   Treatment    Name: Rosario Menendez  MRN: [id number]  Admit Date: 12/1/2022  Admitting Diagnosis:  Closed compression fracture of body of L1 vertebra    General Precautions: Standard, fall   Orthopedic Precautions: spinal precautions   Braces: TLSO    Recommendations:     Discharge Recommendations:  home health OT  Level of Assistance Recommended at Discharge: 24 hours light assistance for ADL's and homemaking tasks  Discharge Equipment Recommendations: wheelchair  Barriers to discharge:  None    Assessment:     Rosario Menendez is a 87 y.o. female with a medical diagnosis of Closed compression fracture of body of L1 vertebra.  She presents with  Performance deficits affecting function are weakness, impaired endurance, impaired self care skills, impaired functional mobility, gait instability, impaired balance, decreased coordination, decreased lower extremity function, impaired cognition, decreased safety awareness, pain, orthopedic precautions, impaired cardiopulmonary response to activity.     Pt.  participated well with session on this day. Pt. With increase time and cues to execute and redirection with task on this day   Pt. Unable to recall 0/3 spinal precautions Pt  continues to demonstrate levels of physical deficits with  functional indep with daily management activities tasks, selfcare skills with balance,  functional mobility, UB strength and endurance. Pt. Will continue to benefit from continued OT to progress towards goals      Rehab Potential is fair    Activity tolerance:  Fair    Plan:     Patient to be seen 6 x/week to address the above listed problems via self-care/home management, therapeutic activities, therapeutic exercises    Plan of Care Expires: 01/02/23  Plan of Care Reviewed with: patient    Subjective     Communicated with: nsg and Pt. prior to session. I am doing well today    Pain/Comfort:  Pain Rating 1:  (did not rate)  Location 1: back  Pain Addressed 1:  Reposition, Distraction    Patient's cultural, spiritual, Confucianism conflicts given the current situation:  no    Objective:     Patient found up in chair with BLE elevated  upon OT entry to room.    Bed Mobility:    Not tested   Functional Mobility/Transfers:  Patient completed Sit <> Stand Transfer with contact guard assistance  with  rolling walker   Patient completed Toilet Transfer Step Transfer technique with contact guard assistance with  rolling walker    Activities of Daily Living:  Grooming: stand by assistance standing sink side with grooming needs  Upper Body Dressing: moderate assistance to  lelia sweater around back and lelia TLSO brace  Toileting: moderate assistance overall with cleaning and clothing management and to pull up depends    Pt. Able to wipe frontal area     AMPA 6 Click ADL: 17    Treatment & Education:  Pt. With standing act on this day with task. Pt. With CGA/SBA for balance aspects with task with  AD at raised counter Pt with visual perception task with discrimination of various shapes and sizes x 2:30 min/sec 1:50 min/sec  and then 1:57 min/sec  with standing bal and min cues through out with weight shifting and use of BUE's incorporated and crossing mid line and facilitation with posture in prep for home management .     Pt edu on role of OT, POC, safety when performing self care tasks , benefit of performing OOB activity, and safety when performing functional transfers and mobility management for preparation with goals to progress towards next level of care     Patient left up in chair with all lines intact and call button in reach    GOALS:   Multidisciplinary Problems       Occupational Therapy Goals          Problem: Occupational Therapy    Goal Priority Disciplines Outcome Interventions   Occupational Therapy Goal     OT, PT/OT Ongoing, Progressing    Description: Goals to be met by: 3 weeks     Patient will increase functional independence with ADLs by performing:    UE  Dressing with Supervision.  LE Dressing with Supervision.  Grooming while seated at sink with Set-up Assistance.  Toileting from toilet with Stand-by Assistance for hygiene and clothing management.   Bathing sitting at sink with Stand-by Assistance.  Supine to sit with Modified [place].  Step transfer with Supervision with appropriate A/D  Upper extremity exercise with supervision.  Caregiver will be educated on level of assist required to safely perform self care tasks and functional transfers..                        Time Tracking:     OT Date of Treatment: 12/14/22  OT Start Time: 1001    OT Stop Time: 1039  OT Total Time (min): 38 min    Billable Minutes:Therapeutic Activity 38    12/14/2022

## 2022-12-15 LAB
ANION GAP SERPL CALC-SCNC: 7 MMOL/L (ref 8–16)
BASOPHILS # BLD AUTO: 0.06 K/UL (ref 0–0.2)
BASOPHILS NFR BLD: 1.2 % (ref 0–1.9)
BUN SERPL-MCNC: 13 MG/DL (ref 8–23)
CALCIUM SERPL-MCNC: 9.7 MG/DL (ref 8.7–10.5)
CHLORIDE SERPL-SCNC: 111 MMOL/L (ref 95–110)
CO2 SERPL-SCNC: 22 MMOL/L (ref 23–29)
CREAT SERPL-MCNC: 0.8 MG/DL (ref 0.5–1.4)
DIFFERENTIAL METHOD: ABNORMAL
EOSINOPHIL # BLD AUTO: 0.1 K/UL (ref 0–0.5)
EOSINOPHIL NFR BLD: 2.2 % (ref 0–8)
ERYTHROCYTE [DISTWIDTH] IN BLOOD BY AUTOMATED COUNT: 14.5 % (ref 11.5–14.5)
EST. GFR  (NO RACE VARIABLE): >60 ML/MIN/1.73 M^2
GLUCOSE SERPL-MCNC: 80 MG/DL (ref 70–110)
HCT VFR BLD AUTO: 41.6 % (ref 37–48.5)
HGB BLD-MCNC: 13.3 G/DL (ref 12–16)
IMM GRANULOCYTES # BLD AUTO: 0.01 K/UL (ref 0–0.04)
IMM GRANULOCYTES NFR BLD AUTO: 0.2 % (ref 0–0.5)
LYMPHOCYTES # BLD AUTO: 1.8 K/UL (ref 1–4.8)
LYMPHOCYTES NFR BLD: 35.5 % (ref 18–48)
MAGNESIUM SERPL-MCNC: 2.2 MG/DL (ref 1.6–2.6)
MCH RBC QN AUTO: 31.4 PG (ref 27–31)
MCHC RBC AUTO-ENTMCNC: 32 G/DL (ref 32–36)
MCV RBC AUTO: 98 FL (ref 82–98)
MONOCYTES # BLD AUTO: 0.8 K/UL (ref 0.3–1)
MONOCYTES NFR BLD: 15.9 % (ref 4–15)
NEUTROPHILS # BLD AUTO: 2.3 K/UL (ref 1.8–7.7)
NEUTROPHILS NFR BLD: 45 % (ref 38–73)
NRBC BLD-RTO: 0 /100 WBC
PHOSPHATE SERPL-MCNC: 2.7 MG/DL (ref 2.7–4.5)
PLATELET # BLD AUTO: 81 K/UL (ref 150–450)
PMV BLD AUTO: 11.1 FL (ref 9.2–12.9)
POTASSIUM SERPL-SCNC: 4 MMOL/L (ref 3.5–5.1)
RBC # BLD AUTO: 4.23 M/UL (ref 4–5.4)
SODIUM SERPL-SCNC: 140 MMOL/L (ref 136–145)
WBC # BLD AUTO: 5.04 K/UL (ref 3.9–12.7)

## 2022-12-15 PROCEDURE — 63600175 PHARM REV CODE 636 W HCPCS: Mod: HCNC | Performed by: NURSE PRACTITIONER

## 2022-12-15 PROCEDURE — 83735 ASSAY OF MAGNESIUM: CPT | Mod: HCNC | Performed by: HOSPITALIST

## 2022-12-15 PROCEDURE — 36415 COLL VENOUS BLD VENIPUNCTURE: CPT | Mod: HCNC | Performed by: HOSPITALIST

## 2022-12-15 PROCEDURE — 80048 BASIC METABOLIC PNL TOTAL CA: CPT | Mod: HCNC | Performed by: HOSPITALIST

## 2022-12-15 PROCEDURE — G0008 ADMIN INFLUENZA VIRUS VAC: HCPCS | Mod: HCNC | Performed by: NURSE PRACTITIONER

## 2022-12-15 PROCEDURE — 25000003 PHARM REV CODE 250: Mod: HCNC | Performed by: NURSE PRACTITIONER

## 2022-12-15 PROCEDURE — 97535 SELF CARE MNGMENT TRAINING: CPT | Mod: HCNC,CO

## 2022-12-15 PROCEDURE — 63600175 PHARM REV CODE 636 W HCPCS: Mod: HCNC | Performed by: HOSPITALIST

## 2022-12-15 PROCEDURE — 90694 VACC AIIV4 NO PRSRV 0.5ML IM: CPT | Mod: HCNC | Performed by: NURSE PRACTITIONER

## 2022-12-15 PROCEDURE — 11000004 HC SNF PRIVATE: Mod: HCNC

## 2022-12-15 PROCEDURE — 97530 THERAPEUTIC ACTIVITIES: CPT | Mod: HCNC,CQ

## 2022-12-15 PROCEDURE — 0134A: CPT | Mod: HCNC | Performed by: HOSPITALIST

## 2022-12-15 PROCEDURE — 63700000 PHARM REV CODE 250 ALT 637 W/O HCPCS: Mod: HCNC | Performed by: NURSE PRACTITIONER

## 2022-12-15 PROCEDURE — 91313 PHARM REV CODE 636 W HCPCS: CPT | Mod: HCNC | Performed by: HOSPITALIST

## 2022-12-15 PROCEDURE — 97116 GAIT TRAINING THERAPY: CPT | Mod: HCNC,CQ

## 2022-12-15 PROCEDURE — 90471 IMMUNIZATION ADMIN: CPT | Mod: HCNC | Performed by: NURSE PRACTITIONER

## 2022-12-15 PROCEDURE — 84100 ASSAY OF PHOSPHORUS: CPT | Mod: HCNC | Performed by: HOSPITALIST

## 2022-12-15 PROCEDURE — 85025 COMPLETE CBC W/AUTO DIFF WBC: CPT | Mod: HCNC | Performed by: HOSPITALIST

## 2022-12-15 PROCEDURE — 25000003 PHARM REV CODE 250: Mod: HCNC | Performed by: HOSPITALIST

## 2022-12-15 RX ADMIN — CYANOCOBALAMIN TAB 1000 MCG 1000 MCG: 1000 TAB at 08:12

## 2022-12-15 RX ADMIN — GALANTAMINE HYDROBROMIDE 16 MG: 16 CAPSULE, EXTENDED RELEASE ORAL at 08:12

## 2022-12-15 RX ADMIN — MEMANTINE 10 MG: 10 TABLET ORAL at 08:12

## 2022-12-15 RX ADMIN — LOSARTAN POTASSIUM 50 MG: 50 TABLET, FILM COATED ORAL at 08:12

## 2022-12-15 RX ADMIN — PRAVASTATIN SODIUM 40 MG: 20 TABLET ORAL at 08:12

## 2022-12-15 RX ADMIN — LEVOTHYROXINE SODIUM 100 MCG: 100 TABLET ORAL at 05:12

## 2022-12-15 RX ADMIN — INFLUENZA A VIRUS A/VICTORIA/2570/2019 IVR-215 (H1N1) ANTIGEN (FORMALDEHYDE INACTIVATED), INFLUENZA A VIRUS A/DARWIN/6/2021 IVR-227 (H3N2) ANTIGEN (FORMALDEHYDE INACTIVATED), INFLUENZA B VIRUS B/AUSTRIA/1359417/2021 BVR-26 ANTIGEN (FORMALDEHYDE INACTIVATED), INFLUENZA B VIRUS B/PHUKET/3073/2013 BVR-1B ANTIGEN (FORMALDEHYDE INACTIVATED) 0.5 ML: 15; 15; 15; 15 INJECTION, SUSPENSION INTRAMUSCULAR at 10:12

## 2022-12-15 RX ADMIN — SENNOSIDES AND DOCUSATE SODIUM 2 TABLET: 50; 8.6 TABLET ORAL at 08:12

## 2022-12-15 RX ADMIN — MODERNA COVID-19 VACCINE, BIVALENT 0.5 ML: 25; 25 INJECTION, SUSPENSION INTRAMUSCULAR at 10:12

## 2022-12-15 RX ADMIN — HYDROCODONE BITARTRATE AND ACETAMINOPHEN 1 TABLET: 5; 325 TABLET ORAL at 08:12

## 2022-12-15 RX ADMIN — Medication 6 MG: at 08:12

## 2022-12-15 RX ADMIN — HYDROCODONE BITARTRATE AND ACETAMINOPHEN 1 TABLET: 5; 325 TABLET ORAL at 06:12

## 2022-12-15 RX ADMIN — BUPROPION HYDROCHLORIDE 150 MG: 150 TABLET, FILM COATED, EXTENDED RELEASE ORAL at 08:12

## 2022-12-15 NOTE — PROGRESS NOTES
"                                                        [facility]                                  Skilled Nursing Facility                   Progress Note     Admit Date: 12/1/2022  AMARA 12/21/2022  Principal Problem:  Closed compression fracture of body of L1 vertebra   HPI obtained from patient interview and chart review     Chief Complaint: Re-evaluation of medical DX and therapy status: lab review     HPI:   Rosario Menendez is a 87 y.o. female with a past medical history significant for osteoporosis, thrombocytopenia, hypothyroidism, mild cognitive impairment, depression, and arthritis. She presents to [facility] after she tripped and fell onto her buttocks while in the kitchen. Patient states she "bounced" during her fall.  After fall she noted buttock and lower back pain. Buttock pain is worse than lower back pain. Pain started after fall and is exacerbated by movement. She denies radiating leg pain. She denies numbness, tinging, saddle anesthesia, or bowel dysfunction. Lumbar x-rays (AP/Lateral) obtained in ED for lower back pain revealing L1 compression fracture with 20% anterior height loss, and grade 2 anterolisthesis of L4 on L5. Lumbar CT 11/30 re demonstrating L1 compression fracture, and grade 2 anterolisthesis for L4 on L5 with associated pars defect.      Notes recent UTI with persistent dysuria s/p antibiotic treatment. She denies chills, nausea, or vomiting. Endorses slight subjective fever yesterday. UA 11/29 obtained in ED showing +nitrite, 3+leukocytes, many bacteria, and >100 WBC. UA culture pending. Blood culture NGTD. Of note, she has baseline thrombocytopenia.     Patient will be treated at [facility] with PT and OT to improve functional status and ability to perform ADLs.     Interval Hx  No acute events over night  Labs reviewed, no critical values  24 hr vital sign ranges listed below.  Patient denies shortness of breath, abdominal discomfort, nausea, or vomiting.  " Patient denies dysuria.    Patient progessing with PT/OT. Continuing to follow and treat all acute and chronic conditions.     Past Medical History: Patient has a past medical history of Arthritis, Depression, Hypercholesteremia, Thrombocytopenia, and Thyroid disease.    Past Surgical History: Patient has a past surgical history that includes Hysterectomy; Knee surgery; Ankle surgery; Wrist surgery; and Colonoscopy (N/A, 6/2/2021).    Social History: Patient reports that she has never smoked. She has never used smokeless tobacco.    Family History: family history includes Cancer in her maternal uncle; Heart failure in her father and mother; Suicide in her son.    Allergies: Patient has No Known Allergies.    ROS  Constitutional: Negative for fever   Eyes: Negative for blurred vision, double vision   Respiratory: Negative for cough, shortness of breath   Cardiovascular: Negative for chest pain, palpitations, and leg swelling.   Gastrointestinal: Negative for abdominal pain, constipation, diarrhea, nausea, vomiting.   Genitourinary: + for dysuria, frequency   Musculoskeletal:  + generalized weakness. + for back pain and myalgias.   Skin: Negative for itching and rash.   Neurological: Negative for dizziness, headaches.   Psychiatric/Behavioral: Negative for depression. The patient is not nervous/anxious.      24 hour Vital Sign Range   Temp:  [98 °F (36.7 °C)-98.1 °F (36.7 °C)]   Pulse:  [58-65]   Resp:  [17-18]   BP: (117-130)/(60-80)   SpO2:  [93 %-95 %]     Current BMI: Body mass index is 25.58 kg/m².    PEx  Constitutional: Patient appears debilitated.  No distress noted  HENT:   Head: Normocephalic and atraumatic.   Eyes: Pupils are equal, round  Neck: Normal range of motion. Neck supple.   Cardiovascular: Normal rate, regular rhythm and normal heart sounds.    Pulmonary/Chest: Effort normal and breath sounds are clear  Abdominal: Soft. Bowel sounds are normal.   Musculoskeletal: Normal range of motion.    Neurological: Alert and oriented to person,   Psychiatric: Normal mood and affect. Behavior is normal. +forgetful   Skin: Skin is warm and dry. Full skin assessment    Recent Labs   Lab 12/15/22  0535      K 4.0   *   CO2 22*   BUN 13   CREATININE 0.8   MG 2.2         Recent Labs   Lab 12/15/22  0535   WBC 5.04   RBC 4.23   HGB 13.3   HCT 41.6   PLT 81*   MCV 98   MCH 31.4*   MCHC 32.0           Assessment and Plan:  Closed compression fracture of body of L1 vertebra  -87 y.o. female with a pmhx of osteoporosis, thrombocytopenia, mild cognitive impairment, and arthritis. Patient tripped and fell onto buttocks yesterday with residual lower back pain after fall. Lumbar CT showing L1 compression fracture. Neurosurgery consulted for further eval.  -Lumbar x-rays (AP/Lateral) 11/29: revealing L1 compression fracture with 20% anterior height loss, and grade 2 anterolisthesis of L4 on L5.   -Lumbar CT 11/30 re demonstrating L1 compression fracture, and grade 2 anterolisthesis for L4 on L5 with associated pars defect. Severe R and moderate L neural foraminal narrowing.   -No acute surgical intervention  -Obtain lumbar xrays upright/supine with increased height loss upon standing. No kyphotic deformity  -Obtain lumbar flex/ex xrays reviewed re-demonstrating L4-L5 grade 2 anterolisthesis  -TLSO brace ordered/at bedside for comfort  -Will schedule outpatient follow up in 4-6 weeks with repeat Lumbar xrays. Neurosurgery will arrange.      Acute cystitis with hematuria  -UA reviewed,  w/ GNR  -Blood cxs NGTD  -Completed rocephin.  -12/5-Initiate order for cipro 500 mg BID, patient still with symptoms of UTI, will treat further   -12/6-Initiate order for pyridium 100 mg TID x 2 days     Constipation  -12/7-Initiate order for MoM PRN and increase senna to 2 tabs BID   -12/8-Initiate order for enema x 1      Amnestic MCI (mild cognitive impairment with memory loss)  -Continue namenda.     Thrombocytopenia,  unspecified  -Chronic, stable.  -Monitor and transfuse for <50K if bleeding or <10K if not bleeding  -Avoid antiplatelet medications including Lovenox, heparin,NSAIDs and aspirin.  -Follows with hem/onc outpatient.     Hypercholesteremia  -cont statin         Thyroid disease  -cont home synthroid     Anticipate disposition:  Home with home health      Follow-up needed during SNF stay-    Follow-up needed after discharge from SNF: PCP, neurosurgery     Future Appointments   Date Time Provider Department Center   1/10/2023  2:30 PM [facility] XR-A LIMIT 350 LBS [facility] XRAY OP [facility]   1/10/2023  3:30 PM Candy Yang PA-C [facility] NEUROSU [facility]   4/4/2023  1:00 PM [facility]         I certify that SNF services are required to be given on an inpatient basis because Rosario Menendez needs for skilled nursing care and/or skilled rehabilitation are required on a daily basis and such services can only practically be provided in a skilled nursing facility setting and are for an ongoing condition for which she received inpatient care in the hospital.       Afsaneh Chawla NP  Department of Hospital Medicine   [facility]     DOS: 12/15/2022       Patient note was created using MModal Dictation.  Any errors in syntax or even information may not have been identified and edited on initial review prior to signing this note.

## 2022-12-15 NOTE — PT/OT/SLP PROGRESS
Occupational Therapy   Treatment    Name: Rosario Menendez  MRN: [id number]  Admit Date: 12/1/2022  Admitting Diagnosis:  Closed compression fracture of body of L1 vertebra    General Precautions: Standard, fall   Orthopedic Precautions: spinal precautions   Braces: TLSO    Recommendations:     Discharge Recommendations:  home health OT  Level of Assistance Recommended at Discharge: 24 hours light assistance for ADL's and homemaking tasks  Discharge Equipment Recommendations: wheelchair  Barriers to discharge:  None    Assessment:     Rosario Menendez is a 87 y.o. female with a medical diagnosis of Closed compression fracture of body of L1 vertebra.  She presents with  Performance deficits affecting function are weakness, impaired endurance, impaired self care skills, impaired functional mobility, gait instability, impaired balance, decreased coordination, decreased lower extremity function, impaired cognition, decreased safety awareness, pain, orthopedic precautions, impaired cardiopulmonary response to activity.     Pt.  participated well with session on this day. Pt. With over min to mod a for selfcare with task on this day.  Pt. Able to recall 1/3 spinal precautions Pt  continues to demonstrate levels of physical deficits with  functional indep with daily management activities tasks, selfcare skills with balance,  functional mobility, UB strength and endurance. Pt. Will continue to benefit from continued OT to progress towards goals      Rehab Potential is fair    Activity tolerance:  Fair    Plan:     Patient to be seen 6 x/week to address the above listed problems via self-care/home management, therapeutic activities, therapeutic exercises    Plan of Care Expires: 01/02/23  Plan of Care Reviewed with: patient    Subjective     Communicated with: nsg and Pt. prior to session.I am doing well today    Pain/Comfort:  Pain Rating 1: 4/10  Location - Side 1: Bilateral  Location - Orientation 1: generalized  Location  1: back  Pain Addressed 1: Pre-medicate for activity, Reposition, Distraction  Pain Rating Post-Intervention 1: 4/10    Patient's cultural, spiritual, Alevism conflicts given the current situation:  no    Objective:     Patient found supine upon OT entry to room.    Bed Mobility:    Patient completed Scooting/Bridging with contact guard assistance with use of bed rails  Patient completed Sit to Supine with contact guard assistance and use of bed rails     Functional Mobility/Transfers:  Patient completed Sit <> Stand Transfer with contact guard assistance  with  rolling walker   Patient completed Toilet Transfer Step Transfer technique with contact guard assistance with  rolling walker  Functional Mobility: Pt. With fxl mobility to  inroom bath with RW and cues for safety     Activities of Daily Living:  Grooming: stand by assistance at sink side with grooming needs  Upper Body Dressing: stand by assistance to  doff/lelia pull over shirt and  Mod A  lelia sweater around back and lelia TLSO brace  Lower Body Dressing: minimum assistance to lelia pants seated and to manage over hips instance with RW for bal  Toileting: minimum assistance overall with cleaning and clothing management and fresh brief application   Pt. Able to wipe frontal area     AMPAC 6 Click ADL: 17    Treatment & Education:    Pt edu on role of OT, POC, safety when performing self care tasks , benefit of performing OOB activity, and safety when performing functional transfers and mobility management for preparation with goals to progress towards next level of care     Patient left up in chair with all lines intact and call button in reach    GOALS:   Multidisciplinary Problems       Occupational Therapy Goals          Problem: Occupational Therapy    Goal Priority Disciplines Outcome Interventions   Occupational Therapy Goal     OT, PT/OT Ongoing, Progressing    Description: Goals to be met by: 3 weeks     Patient will increase functional independence  with ADLs by performing:    UE Dressing with Supervision.  LE Dressing with Supervision.  Grooming while seated at sink with Set-up Assistance.  Toileting from toilet with Stand-by Assistance for hygiene and clothing management.   Bathing sitting at sink with Stand-by Assistance.  Supine to sit with Modified [place].  Step transfer with Supervision with appropriate A/D  Upper extremity exercise with supervision.  Caregiver will be educated on level of assist required to safely perform self care tasks and functional transfers..                        Time Tracking:     OT Date of Treatment: 12/15/22  OT Start Time: 0736    OT Stop Time: 0804  OT Total Time (min): 28 min    Billable Minutes:Self Care/Home Management 28    12/15/2022

## 2022-12-15 NOTE — PLAN OF CARE
Family Training     Patient Name:  Rosario Menendez   MRN:  [id number]  Admit Date: 12/1/2022      Rehab tech called to schedule family training this date. Pt's family/caregiver agreeable to attend training Monday 12/19/2022 at 11AM with friend and daughter.     12/15/2022

## 2022-12-15 NOTE — PLAN OF CARE
Interdisciplinary team, Mireya Figueroa, RN Charge Nurse, Ailyn Dominguez RN MDS Coordinator, Afsaneh Enamorado PT, Rehab Supervisor, and Leander Del Valle, spoke to patient and patient's daughter for care plan conference, weekly status update, and therapy progress update. Tentative discharge date set for 12/21/22.

## 2022-12-15 NOTE — PT/OT/SLP PROGRESS
Physical Therapy Treatment    Patient Name:  Rosario Menendez   MRN:  [id number]  Admit Date: 12/1/2022  Admitting Diagnosis: Closed compression fracture of body of L1 vertebra    General Precautions: Standard, fall  Orthopedic Precautions: spinal precautions  Braces: TLSO    Recommendations:     Discharge Recommendations: home health PT  Level of Assistance Recommended at Discharge: 24 hours significant assistance  Discharge Equipment Recommendations: wheelchair  Barriers to discharge: Decreased caregiver support    Assessment:     Rosario Menendez is a 87 y.o. female admitted with a medical diagnosis of Closed compression fracture of body of L1 vertebra .      Performance deficits affecting function: weakness, impaired endurance, impaired functional mobility, gait instability, impaired balance, decreased lower extremity function, decreased safety awareness, pain, orthopedic precautions.    Rehab Potential is good    Activity Tolerance: Good    Plan:     Patient to be seen 6 x/week to address the above listed problems via gait training, therapeutic activities, therapeutic exercises, neuromuscular re-education, wheelchair management/training    Plan of Care Expires: 01/01/23  Plan of Care Reviewed with: patient, daughter    Subjective     Pain/Comfort:  Pain Rating 1: 0/10  Pain Rating Post-Intervention 1: 0/10    Patient's cultural, spiritual, Hindu conflicts given the current situation:  no    Objective:     Communicated with RN prior to session.  Patient found  seated in bedside chair  with  (TLSO doffed and lying on bed on entry) upon PTA entry to room. Daughter at bedside.    Therapeutic Activities and Exercises:   TLSO donned prior to any mobility. Pt and daughter educated on brace wear schedule and that pt should have brace donned while upright or OOB. Both verbalize understanding.   Pts daughters questions regarding pts progression and readiness for return home answered within scope of PTA. Discussed pts  "inconsistency with meaningful participation and self-limiting behavior. Pt verbalizes understanding she needs to maximize her remaining therapy time in SNF setting to maximize progress before return home.     Functional Mobility:  Transfers:     Sit to Stand:  contact guard assistance with rolling walker  Bedside chair <> wheelchair: Min A with RW; assist for RW mgmt and stability  Gait: Pt ambulates 120 ft and 229 ft with RW and Min A. Pt requires frequent cues for posture and therapist intervention for balance and RW mgmt. Pt with flexed trunk and RW extended too far out ahead for safe support. Short B step length and decreased B step clearance. Seated rest between trials.   Curb step: Pt asc/dec 4" curb x 2 trials with RW and Mod A for mgmt of RW and balance.  Wheelchair Propulsion:  Pt propelled Standard wheelchair x 186 feet on Level tile with  Bilateral upper extremity with Minimal Assistance. Steering assist.     AM-PAC 6 CLICK MOBILITY  15    Patient left  seated in bedside chair in pts room  with call button in reach and daughter present.    GOALS:   Multidisciplinary Problems       Physical Therapy Goals          Problem: Physical Therapy    Goal Priority Disciplines Outcome Goal Variances Interventions   Physical Therapy Goal     PT, PT/OT Ongoing, Progressing     Description: Goals to be met by: 1/1/23    Patient will increase functional independence with mobility by performing:    . Supine to sit with Stand-by Assistance  . Sit to supine with Stand-by Assistance  . Rolling to Left and Right with Stand-by Assistance.  . Sit to stand transfer with Stand-by Assistance  . Bed to chair transfer with Stand-by Assistance using Rolling Walker  . Gait  x 100 feet with Stand-by Assistance using Rolling Walker.   . Wheelchair propulsion x100 feet with Supervision using bilateral uppper extremities                         Time Tracking:     PT Received On: 12/15/22  PT Start Time: 1315 (return start 1352)  PT Stop " Time: 1333 (end 1420)  PT Total Time (min): 18 min + return time = 46 total min.     Billable Minutes: Gait Training 30 and Therapeutic Activity 16    Treatment Type: Treatment  PT/PTA: PTA     PTA Visit Number: 1     12/15/2022

## 2022-12-16 LAB — SARS-COV-2 RNA RESP QL NAA+PROBE: NOT DETECTED

## 2022-12-16 PROCEDURE — 11000004 HC SNF PRIVATE: Mod: HCNC

## 2022-12-16 PROCEDURE — 97530 THERAPEUTIC ACTIVITIES: CPT | Mod: HCNC

## 2022-12-16 PROCEDURE — 97110 THERAPEUTIC EXERCISES: CPT | Mod: HCNC

## 2022-12-16 PROCEDURE — 25000003 PHARM REV CODE 250: Mod: HCNC | Performed by: HOSPITALIST

## 2022-12-16 PROCEDURE — 25000003 PHARM REV CODE 250: Mod: HCNC | Performed by: NURSE PRACTITIONER

## 2022-12-16 PROCEDURE — 97116 GAIT TRAINING THERAPY: CPT | Mod: HCNC,CQ

## 2022-12-16 PROCEDURE — 97110 THERAPEUTIC EXERCISES: CPT | Mod: HCNC,CQ

## 2022-12-16 PROCEDURE — 97530 THERAPEUTIC ACTIVITIES: CPT | Mod: HCNC,CQ

## 2022-12-16 PROCEDURE — 63700000 PHARM REV CODE 250 ALT 637 W/O HCPCS: Mod: HCNC | Performed by: NURSE PRACTITIONER

## 2022-12-16 PROCEDURE — 97535 SELF CARE MNGMENT TRAINING: CPT | Mod: HCNC

## 2022-12-16 PROCEDURE — U0005 INFEC AGEN DETEC AMPLI PROBE: HCPCS | Performed by: HOSPITALIST

## 2022-12-16 RX ADMIN — LEVOTHYROXINE SODIUM 100 MCG: 100 TABLET ORAL at 06:12

## 2022-12-16 RX ADMIN — CYANOCOBALAMIN TAB 1000 MCG 1000 MCG: 1000 TAB at 09:12

## 2022-12-16 RX ADMIN — LOSARTAN POTASSIUM 50 MG: 50 TABLET, FILM COATED ORAL at 09:12

## 2022-12-16 RX ADMIN — BUPROPION HYDROCHLORIDE 150 MG: 150 TABLET, FILM COATED, EXTENDED RELEASE ORAL at 09:12

## 2022-12-16 RX ADMIN — HYDROCODONE BITARTRATE AND ACETAMINOPHEN 1 TABLET: 5; 325 TABLET ORAL at 01:12

## 2022-12-16 RX ADMIN — SENNOSIDES AND DOCUSATE SODIUM 2 TABLET: 50; 8.6 TABLET ORAL at 09:12

## 2022-12-16 RX ADMIN — MEMANTINE 10 MG: 10 TABLET ORAL at 08:12

## 2022-12-16 RX ADMIN — MEMANTINE 10 MG: 10 TABLET ORAL at 09:12

## 2022-12-16 RX ADMIN — PRAVASTATIN SODIUM 40 MG: 20 TABLET ORAL at 09:12

## 2022-12-16 RX ADMIN — GALANTAMINE HYDROBROMIDE 16 MG: 16 CAPSULE, EXTENDED RELEASE ORAL at 07:12

## 2022-12-16 RX ADMIN — HYDROCODONE BITARTRATE AND ACETAMINOPHEN 1 TABLET: 5; 325 TABLET ORAL at 06:12

## 2022-12-16 NOTE — PT/OT/SLP PROGRESS
"Physical Therapy Treatment    Patient Name:  Rosario Menendez   MRN:  [id number]  Admit Date: 12/1/2022  Admitting Diagnosis: Closed compression fracture of body of L1 vertebra    General Precautions: Standard, fall  Orthopedic Precautions: spinal precautions  Braces: TLSO    Recommendations:     Discharge Recommendations: home health PT  Level of Assistance Recommended at Discharge: 24 hours significant assistance  Discharge Equipment Recommendations: wheelchair  Barriers to discharge: Decreased caregiver support    Assessment:     Rosario Menendez is a 87 y.o. female admitted with a medical diagnosis of Closed compression fracture of body of L1 vertebra . Pt tolerated session fairly well with no adverse effects noted. Pt repeated that she was tired and would prefer to return to her room throughout session, but agreed to continue with minimal encouragement. Pt demonstrated reduced activity tolerance and performance with functional tasks on this date as well as increased distraction requiring continuous redirection for continued participation. Pt will continue to benefit from skilled therapy services to improve LE strength and endurance to continue to improve functional mobility and reach highest levels of independence.    Performance deficits affecting function: weakness, impaired endurance, impaired functional mobility, gait instability, impaired balance, decreased lower extremity function, decreased safety awareness, pain, orthopedic precautions.    Rehab Potential is good    Activity Tolerance: Good    Plan:     Patient to be seen 6 x/week to address the above listed problems via gait training, therapeutic activities, therapeutic exercises, neuromuscular re-education, wheelchair management/training    Plan of Care Expires: 01/01/23  Plan of Care Reviewed with: patient    Subjective     "I am really tired."     Pain/Comfort:  Pain Rating 1: 0/10  Pain Rating Post-Intervention 1: 0/10    Patient's cultural, " spiritual, Spiritism conflicts given the current situation:  no    Objective:     Patient found up in chair with TLSO upon PT entry to room.     Therapeutic Activities and Exercises:   LE erg x 12 minutes mod resistance for mm strength and endurance    Functional Mobility:  Transfers:     Sit to Stand:  minimum assistance with rolling walker  Bed to Chair: minimum assistance with  no AD  using  Stand Pivot  Gait: Pt ambulated 92' using RW with min A for direction and vc for redirection to task  No LOB or instability noted, but RANDELL required due to constant distraction and need for assistance with RW management  Wheelchair Propulsion:  Pt propelled Standard wheelchair x ~180 feet on Level tile with  Bilateral upper extremity with Minimal Assistance.     AM-PAC 6 CLICK MOBILITY  15    Patient left up in chair with all lines intact and call button in reach.    GOALS:   Multidisciplinary Problems       Physical Therapy Goals          Problem: Physical Therapy    Goal Priority Disciplines Outcome Goal Variances Interventions   Physical Therapy Goal     PT, PT/OT Ongoing, Progressing     Description: Goals to be met by: 1/1/23    Patient will increase functional independence with mobility by performing:    . Supine to sit with Stand-by Assistance  . Sit to supine with Stand-by Assistance  . Rolling to Left and Right with Stand-by Assistance.  . Sit to stand transfer with Stand-by Assistance  . Bed to chair transfer with Stand-by Assistance using Rolling Walker  . Gait  x 100 feet with Stand-by Assistance using Rolling Walker.   . Wheelchair propulsion x100 feet with Supervision using bilateral uppper extremities                         Time Tracking:     PT Received On: 12/16/22  PT Start Time: 1117  PT Stop Time: 1155  PT Total Time (min): 38 min    Billable Minutes: Gait Training 12, Therapeutic Activity 13, and Therapeutic Exercise 13    Treatment Type: Treatment  PT/PTA: PTA     PTA Visit Number: 2     12/16/2022

## 2022-12-16 NOTE — NURSING
Patient received both flu and covid vaccines. Patient monitored for signs and symptoms of reactions. No symptoms present. Will continue to monitor.

## 2022-12-16 NOTE — PLAN OF CARE
Problem: Fall Injury Risk  Goal: Absence of Fall and Fall-Related Injury  Outcome: Ongoing, Progressing     Problem: Adult Inpatient Plan of Care  Goal: Optimal Comfort and Wellbeing  Outcome: Ongoing, Progressing     Problem: Adult Inpatient Plan of Care  Goal: Plan of Care Review  Outcome: Ongoing, Progressing

## 2022-12-16 NOTE — PT/OT/SLP PROGRESS
Occupational Therapy   Treatment    Name: Rosario Menendez  MRN: [id number]  Admit Date: 12/1/2022  Admitting Diagnosis:  Closed compression fracture of body of L1 vertebra    General Precautions: Standard, fall   Orthopedic Precautions: spinal precautions   Braces: TLSO    Recommendations:     Discharge Recommendations:  home health OT  Level of Assistance Recommended at Discharge: 24 hours light assistance for ADL's and homemaking tasks  Discharge Equipment Recommendations: wheelchair  Barriers to discharge:  None    Assessment:     Rosario Menendez is a 87 y.o. female with a medical diagnosis of Closed compression fracture of body of L1 vertebra .  She remains limited in performance of self-care , functional mobility and ADLs and currently not performing tasks at PLOF . Currently presenting with performance deficits including weakness, impaired endurance, impaired self care skills, impaired functional mobility, gait instability, impaired balance, decreased coordination, decreased lower extremity function, impaired cognition, decreased safety awareness, pain, orthopedic precautions, impaired cardiopulmonary response to activity.   Pt tolerated Tx without incident and is making progress but continues to require assist to perform self care tasks, functional mobility and functional transfers .  She would continue to benefit from OT intervention to further her functional (I)ce and safety.     Rehab Potential is good    Activity tolerance:  Good    Plan:     Patient to be seen 6 x/week to address the above listed problems via self-care/home management, therapeutic activities, therapeutic exercises    Plan of Care Expires: 01/02/23  Plan of Care Reviewed with: patient    Subjective     Communicated with: nurse prior to session.  .    Pain/Comfort:  Pain Rating 1: 0/10  Pain Rating Post-Intervention 1: 0/10    Patient's cultural, spiritual, Rastafarian conflicts given the current situation:  no    Objective:     Patient  found supine with TLSO upon OT entry to room.    Bed Mobility:    Patient completed Rolling/Turning to Right with modified independence  Patient completed Scooting/Bridging with modified independence  Patient completed Supine to Sit with supervision     Functional Mobility/Transfers:  Patient completed Sit <> Stand Transfer with stand by assistance  with  rolling walker   Patient completed Bed <> Chair Transfer using Stand Pivot technique with stand by assistance with rolling walker  Patient completed Toilet Transfer Stand Pivot technique with stand by assistance with  rolling walker  Functional Mobility: Pt ambulated with RW from EOB to nurses station a distance of  141 ft  with close SBA with no loss of balance observed but V/Cs required for safety.      Activities of Daily Living:  Grooming: supervision with Pt grooming standing sinkside with RW to steady.     Upper Body Dressing: stand by assistance donning pajama top but (A) needed to do TLSO.   Toileting: minimum assistance with (A) to manage clothing around TLSO     [facility] 6 Click ADL: 17    OT Exercises: UE Ergometer Performed 10 minutes on UBE with Min resistance. UE exercises performed to increase functional endurance and strength in order increase independence when performing self care tasks, functional ambulation, W/C propulsion, and functional standing activities .     Treatment & Education:  Pt edu on safety when performing self care tasks  and safety when performing functional transfers and mobility.  - White board updated    Patient left up in chair with call button in reach    GOALS:   Multidisciplinary Problems       Occupational Therapy Goals          Problem: Occupational Therapy    Goal Priority Disciplines Outcome Interventions   Occupational Therapy Goal     OT, PT/OT Ongoing, Progressing    Description: Goals to be met by: 3 weeks     Patient will increase functional independence with ADLs by performing:    UE Dressing with Supervision.  NOELLE  Dressing with Supervision.  Grooming while seated at sink with Set-up Assistance.  Toileting from toilet with Stand-by Assistance for hygiene and clothing management.   Bathing sitting at sink with Stand-by Assistance.  Supine to sit with Modified [place].  Step transfer with Supervision with appropriate A/D  Upper extremity exercise with supervision.  Caregiver will be educated on level of assist required to safely perform self care tasks and functional transfers..                        Time Tracking:     OT Date of Treatment: 12/16/22  OT Start Time: 1415    OT Stop Time: 1500  OT Total Time (min): 45 min    Billable Minutes:Self Care/Home Management 18  Therapeutic Activity 15  Therapeutic Exercise 12    12/16/2022

## 2022-12-16 NOTE — PLAN OF CARE
Problem: Occupational Therapy  Goal: Occupational Therapy Goal  Description: Goals to be met by: 3 weeks     Patient will increase functional independence with ADLs by performing:    UE Dressing with Supervision.  LE Dressing with Supervision.  Grooming while seated at sink with Set-up Assistance.  Toileting from toilet with Stand-by Assistance for hygiene and clothing management.   Bathing sitting at sink with Stand-by Assistance.  Supine to sit with Modified [place].  Step transfer with Supervision with appropriate A/D  Upper extremity exercise with supervision.  Caregiver will be educated on level of assist required to safely perform self care tasks and functional transfers..   Outcome: Ongoing, Progressing

## 2022-12-17 PROCEDURE — 97110 THERAPEUTIC EXERCISES: CPT | Mod: HCNC

## 2022-12-17 PROCEDURE — 63700000 PHARM REV CODE 250 ALT 637 W/O HCPCS: Mod: HCNC | Performed by: NURSE PRACTITIONER

## 2022-12-17 PROCEDURE — 25000003 PHARM REV CODE 250: Mod: HCNC | Performed by: NURSE PRACTITIONER

## 2022-12-17 PROCEDURE — 25000003 PHARM REV CODE 250: Mod: HCNC | Performed by: HOSPITALIST

## 2022-12-17 PROCEDURE — 97535 SELF CARE MNGMENT TRAINING: CPT | Mod: HCNC

## 2022-12-17 PROCEDURE — 97530 THERAPEUTIC ACTIVITIES: CPT | Mod: HCNC

## 2022-12-17 PROCEDURE — 11000004 HC SNF PRIVATE: Mod: HCNC

## 2022-12-17 RX ADMIN — Medication 6 MG: at 08:12

## 2022-12-17 RX ADMIN — LOSARTAN POTASSIUM 50 MG: 50 TABLET, FILM COATED ORAL at 10:12

## 2022-12-17 RX ADMIN — HYDROCODONE BITARTRATE AND ACETAMINOPHEN 1 TABLET: 5; 325 TABLET ORAL at 11:12

## 2022-12-17 RX ADMIN — HYDROCODONE BITARTRATE AND ACETAMINOPHEN 1 TABLET: 5; 325 TABLET ORAL at 08:12

## 2022-12-17 RX ADMIN — PRAVASTATIN SODIUM 40 MG: 20 TABLET ORAL at 10:12

## 2022-12-17 RX ADMIN — SENNOSIDES AND DOCUSATE SODIUM 2 TABLET: 50; 8.6 TABLET ORAL at 08:12

## 2022-12-17 RX ADMIN — SENNOSIDES AND DOCUSATE SODIUM 2 TABLET: 50; 8.6 TABLET ORAL at 10:12

## 2022-12-17 RX ADMIN — LEVOTHYROXINE SODIUM 100 MCG: 100 TABLET ORAL at 06:12

## 2022-12-17 RX ADMIN — MEMANTINE 10 MG: 10 TABLET ORAL at 10:12

## 2022-12-17 RX ADMIN — BUPROPION HYDROCHLORIDE 150 MG: 150 TABLET, FILM COATED, EXTENDED RELEASE ORAL at 10:12

## 2022-12-17 RX ADMIN — GALANTAMINE HYDROBROMIDE 16 MG: 16 CAPSULE, EXTENDED RELEASE ORAL at 10:12

## 2022-12-17 RX ADMIN — MEMANTINE 10 MG: 10 TABLET ORAL at 08:12

## 2022-12-17 RX ADMIN — CYANOCOBALAMIN TAB 1000 MCG 1000 MCG: 1000 TAB at 10:12

## 2022-12-17 NOTE — PLAN OF CARE
Problem: Occupational Therapy  Goal: Occupational Therapy Goal  Description: Goals to be met by: 3 weeks     Patient will increase functional independence with ADLs by performing:    UE Dressing with Supervision.  LE Dressing with Supervision.  Grooming while seated at sink with Set-up Assistance.  Toileting from toilet with Stand-by Assistance for hygiene and clothing management.   Bathing sitting at sink with Stand-by Assistance.  Supine to sit with Modified [place].  Step transfer with Supervision with appropriate A/D  Upper extremity exercise with supervision.  Caregiver will be educated on level of assist required to safely perform self care tasks and functional transfers..   Outcome: Ongoing, Progressing

## 2022-12-17 NOTE — PT/OT/SLP PROGRESS
Occupational Therapy   Treatment    Name: Rosario Menendez  MRN: [id number]  Admit Date: 12/1/2022  Admitting Diagnosis:  Closed compression fracture of body of L1 vertebra    General Precautions: Standard, fall   Orthopedic Precautions: spinal precautions   Braces: TLSO    Recommendations:     Discharge Recommendations:  home health OT  Level of Assistance Recommended at Discharge: 24 hours light assistance for ADL's and homemaking tasks  Discharge Equipment Recommendations: wheelchair  Barriers to discharge:  None    Assessment:     Rosario Menendez is a 87 y.o. female with a medical diagnosis of Closed compression fracture of body of L1 vertebra .  She remains limited in performance of self-care , functional mobility and ADLs and currently not performing tasks at PLOF . Currently presenting with performance deficits including weakness, impaired endurance, impaired self care skills, impaired functional mobility, gait instability, impaired balance, decreased coordination, decreased lower extremity function, impaired cognition, decreased safety awareness, pain, orthopedic precautions, impaired cardiopulmonary response to activity.   Pt tolerated Tx without incident and is making progress but continues to require supervision and assist to perform self care tasks, functional mobility and functional transfers .  She would continue to benefit from OT intervention to further her functional (I)ce and safety.     Rehab Potential is good    Activity tolerance:  Good    Plan:     Patient to be seen 6 x/week to address the above listed problems via self-care/home management, therapeutic activities, therapeutic exercises    Plan of Care Expires: 01/02/23  Plan of Care Reviewed with: patient    Subjective     Communicated with: nurse prior to session.  .    Pain/Comfort:  Pain Rating 1: 4/10  Location - Side 1: Bilateral  Location - Orientation 1: lower  Location 1: back  Pain Addressed 1: Pre-medicate for activity, Reposition,  Distraction, Cessation of Activity  Pain Rating Post-Intervention 1: 4/10    Patient's cultural, spiritual, Bahai conflicts given the current situation:  no    Objective:     Patient found supine with TLSO upon OT entry to room.    Bed Mobility:    Patient completed Scooting/Bridging with supervision  Patient completed Supine to Sit with supervision  Patient completed Sit to Supine with supervision     Functional Mobility/Transfers:  Patient completed Sit <> Stand Transfer with stand by assistance  with  rolling walker   Patient completed Bed <> Chair Transfer using Stand Pivot technique with stand by assistance with rolling walker  Patient completed Toilet Transfer Stand Pivot technique with stand by assistance with  rolling walker  Functional Mobility: Pt ambulated with RW from EOB to therapy gym a distance of         187 ft with close SBA and V/Vs for safety.     .      Activities of Daily Living:  Grooming: supervision with grooming performed seated sinkside.  Bathing: minimum assistance with (A) to wash rear and CGA to steady when standing.  Upper Body Dressing: supervision with Pt donning/doffing pullover shirt.   Lower Body Dressing: minimum assistance With (A) to don socks. No assist to doff . Pt donning pants with CGA when standing.  Toileting: minimum assistance with (A) to clean rear and to steady when standing with RW. Pt toileting from raised toilet.    [facility] 6 Click ADL: 17    OT Exercises: Pt performed UBE exercise for 12 minutes with Min  resistance.  UE exercises performed to increase functional endurance and strength in order increase independence when performing self care tasks, functional ambulation, W/C propulsion, and functional standing activities .     Treatment & Education:  Pt edu on safety when performing self care tasks , benefit of performing OOB activity, and safety when performing functional transfers and mobility.  - White board updated    Patient left supine with call button in  reach    GOALS:   Multidisciplinary Problems       Occupational Therapy Goals          Problem: Occupational Therapy    Goal Priority Disciplines Outcome Interventions   Occupational Therapy Goal     OT, PT/OT Ongoing, Progressing    Description: Goals to be met by: 3 weeks     Patient will increase functional independence with ADLs by performing:    UE Dressing with Supervision.  LE Dressing with Supervision.  Grooming while seated at sink with Set-up Assistance.  Toileting from toilet with Stand-by Assistance for hygiene and clothing management.   Bathing sitting at sink with Stand-by Assistance.  Supine to sit with Modified [place].  Step transfer with Supervision with appropriate A/D  Upper extremity exercise with supervision.  Caregiver will be educated on level of assist required to safely perform self care tasks and functional transfers..                        Time Tracking:     OT Date of Treatment: 12/17/22  OT Start Time: 1030    OT Stop Time: 1115  OT Total Time (min): 45 min    Billable Minutes:Self Care/Home Management 20  Therapeutic Activity 15  Therapeutic Exercise 10    12/17/2022

## 2022-12-17 NOTE — PLAN OF CARE
Problem: Adult Inpatient Plan of Care  Goal: Optimal Comfort and Wellbeing  Outcome: Ongoing, Progressing  Goal: Readiness for Transition of Care  Outcome: Ongoing, Progressing     Problem: Fall Injury Risk  Goal: Absence of Fall and Fall-Related Injury  Outcome: Ongoing, Progressing

## 2022-12-18 PROCEDURE — 97110 THERAPEUTIC EXERCISES: CPT | Mod: HCNC,CQ

## 2022-12-18 PROCEDURE — 25000003 PHARM REV CODE 250: Mod: HCNC | Performed by: NURSE PRACTITIONER

## 2022-12-18 PROCEDURE — 63700000 PHARM REV CODE 250 ALT 637 W/O HCPCS: Mod: HCNC | Performed by: NURSE PRACTITIONER

## 2022-12-18 PROCEDURE — 97530 THERAPEUTIC ACTIVITIES: CPT | Mod: HCNC,CQ

## 2022-12-18 PROCEDURE — 25000003 PHARM REV CODE 250: Mod: HCNC | Performed by: HOSPITALIST

## 2022-12-18 PROCEDURE — 97116 GAIT TRAINING THERAPY: CPT | Mod: HCNC,CQ

## 2022-12-18 PROCEDURE — 11000004 HC SNF PRIVATE: Mod: HCNC

## 2022-12-18 RX ORDER — LOPERAMIDE HYDROCHLORIDE 2 MG/1
2 CAPSULE ORAL ONCE
Status: COMPLETED | OUTPATIENT
Start: 2022-12-18 | End: 2022-12-18

## 2022-12-18 RX ADMIN — MEMANTINE 10 MG: 10 TABLET ORAL at 08:12

## 2022-12-18 RX ADMIN — LOSARTAN POTASSIUM 50 MG: 50 TABLET, FILM COATED ORAL at 08:12

## 2022-12-18 RX ADMIN — PRAVASTATIN SODIUM 40 MG: 20 TABLET ORAL at 08:12

## 2022-12-18 RX ADMIN — LEVOTHYROXINE SODIUM 100 MCG: 100 TABLET ORAL at 05:12

## 2022-12-18 RX ADMIN — BUPROPION HYDROCHLORIDE 150 MG: 150 TABLET, FILM COATED, EXTENDED RELEASE ORAL at 08:12

## 2022-12-18 RX ADMIN — CYANOCOBALAMIN TAB 1000 MCG 1000 MCG: 1000 TAB at 08:12

## 2022-12-18 RX ADMIN — Medication 6 MG: at 09:12

## 2022-12-18 RX ADMIN — HYDROCODONE BITARTRATE AND ACETAMINOPHEN 1 TABLET: 5; 325 TABLET ORAL at 09:12

## 2022-12-18 RX ADMIN — MEMANTINE 10 MG: 10 TABLET ORAL at 09:12

## 2022-12-18 RX ADMIN — LOPERAMIDE HYDROCHLORIDE 2 MG: 2 CAPSULE ORAL at 01:12

## 2022-12-18 RX ADMIN — GALANTAMINE HYDROBROMIDE 16 MG: 16 CAPSULE, EXTENDED RELEASE ORAL at 08:12

## 2022-12-18 NOTE — PT/OT/SLP PROGRESS
"Physical Therapy Treatment    Patient Name:  Rosario Menendez   MRN:  [id number]  Admit Date: 12/1/2022  Admitting Diagnosis: Closed compression fracture of body of L1 vertebra  Recent Surgeries:     General Precautions: Standard, fall  Orthopedic Precautions: spinal precautions  Braces: TLSO    Recommendations:     Discharge Recommendations: home health PT  Level of Assistance Recommended at Discharge: 24 hours significant assistance  Discharge Equipment Recommendations: wheelchair  Barriers to discharge: Decreased caregiver support    Assessment:     Rosario Menendez is a 87 y.o. female admitted with a medical diagnosis of Closed compression fracture of body of L1 vertebra . Pt tolerated well, requires mod vcs throughout session for inc safety awareness with trfs and gait, no carry over noted ,pt would continue to benefit from skilled PT services to improve overall functional mobility, strength and endurance.  .      Performance deficits affecting function: weakness, impaired endurance, impaired functional mobility, gait instability, impaired balance, decreased lower extremity function, decreased safety awareness, pain, orthopedic precautions.    Rehab Potential is good    Activity Tolerance: Fair    Plan:     Patient to be seen 6 x/week to address the above listed problems via gait training, therapeutic activities, therapeutic exercises, neuromuscular re-education, wheelchair management/training    Plan of Care Expires: 01/01/23  Plan of Care Reviewed with: patient    Subjective     "Just tired". "Need to go to the bathroom"     Pain/Comfort:  Pain Rating 1: 0/10  Pain Rating Post-Intervention 1: 0/10    Patient's cultural, spiritual, Gnosticism conflicts given the current situation:  no    Objective:      Patient found with  (in BSC A to lelia TLSO, ed on wearing OOB/mobility) upon PT entry to room.     Therapeutic Activities and Exercises: mini elliptical x 8 minutes limited by pt needing to use the bathroom " again    Functional Mobility:  Transfers:     Sit to Stand:  stand by assistance and contact guard assistance with rolling walker and vcs for hand placement  Bed to Chair: contact guard assistance with  rolling walker  using  Stand Pivot ~ 15 ft x 2 and 10 ft  BSC and WC >bathroom then back to BSC  Toilet Transfer: contact guard assistance with  rolling walker  using  Stand Pivot x 2 trials pre/post session urinated only   A with clothing mgmt, set up for hand hygiene in sitting and becky care in sitting  Gait: amb with RW CGA ~ 120 ft TLSO donned vcs for erect posture, staying close to RW    AM-PAC 6 CLICK MOBILITY  15    Patient left up in chair with call button in reach and belongings in reach .    GOALS:   Multidisciplinary Problems       Physical Therapy Goals          Problem: Physical Therapy    Goal Priority Disciplines Outcome Goal Variances Interventions   Physical Therapy Goal     PT, PT/OT Ongoing, Progressing     Description: Goals to be met by: 1/1/23    Patient will increase functional independence with mobility by performing:    . Supine to sit with Stand-by Assistance  . Sit to supine with Stand-by Assistance  . Rolling to Left and Right with Stand-by Assistance.  . Sit to stand transfer with Stand-by Assistance  . Bed to chair transfer with Stand-by Assistance using Rolling Walker  . Gait  x 100 feet with Stand-by Assistance using Rolling Walker.   . Wheelchair propulsion x100 feet with Supervision using bilateral uppper extremities                         Time Tracking:     PT Received On: 12/18/22  PT Start Time: 1122  PT Stop Time: 1203  PT Total Time (min): 41 min    Billable Minutes: Gait Training 15, Therapeutic Activity 18, and Therapeutic Exercise 8    Treatment Type: Treatment  PT/PTA: PTA     PTA Visit Number: 3     12/18/2022

## 2022-12-19 PROCEDURE — 25000003 PHARM REV CODE 250: Mod: HCNC | Performed by: NURSE PRACTITIONER

## 2022-12-19 PROCEDURE — 11000004 HC SNF PRIVATE: Mod: HCNC

## 2022-12-19 PROCEDURE — 97535 SELF CARE MNGMENT TRAINING: CPT | Mod: HCNC,CO

## 2022-12-19 PROCEDURE — 97530 THERAPEUTIC ACTIVITIES: CPT | Mod: HCNC

## 2022-12-19 PROCEDURE — 97116 GAIT TRAINING THERAPY: CPT | Mod: HCNC

## 2022-12-19 PROCEDURE — 25000003 PHARM REV CODE 250: Mod: HCNC | Performed by: HOSPITALIST

## 2022-12-19 PROCEDURE — 63700000 PHARM REV CODE 250 ALT 637 W/O HCPCS: Mod: HCNC | Performed by: NURSE PRACTITIONER

## 2022-12-19 RX ADMIN — MEMANTINE 10 MG: 10 TABLET ORAL at 08:12

## 2022-12-19 RX ADMIN — LOSARTAN POTASSIUM 50 MG: 50 TABLET, FILM COATED ORAL at 08:12

## 2022-12-19 RX ADMIN — CYANOCOBALAMIN TAB 1000 MCG 1000 MCG: 1000 TAB at 08:12

## 2022-12-19 RX ADMIN — HYDROCODONE BITARTRATE AND ACETAMINOPHEN 1 TABLET: 5; 325 TABLET ORAL at 03:12

## 2022-12-19 RX ADMIN — BUPROPION HYDROCHLORIDE 150 MG: 150 TABLET, FILM COATED, EXTENDED RELEASE ORAL at 08:12

## 2022-12-19 RX ADMIN — LEVOTHYROXINE SODIUM 100 MCG: 100 TABLET ORAL at 05:12

## 2022-12-19 RX ADMIN — SENNOSIDES AND DOCUSATE SODIUM 2 TABLET: 50; 8.6 TABLET ORAL at 08:12

## 2022-12-19 RX ADMIN — GALANTAMINE HYDROBROMIDE 16 MG: 16 CAPSULE, EXTENDED RELEASE ORAL at 08:12

## 2022-12-19 RX ADMIN — PRAVASTATIN SODIUM 40 MG: 20 TABLET ORAL at 08:12

## 2022-12-19 NOTE — PT/OT/SLP PROGRESS
"Occupational Therapy   Treatment/Family Training    Name: Rosario Menendez  MRN: [id number]  Admit Date: 12/1/2022  Admitting Diagnosis:  Closed compression fracture of body of L1 vertebra    General Precautions: Standard, fall   Orthopedic Precautions: spinal precautions   Braces: TLSO    Recommendations:     Discharge Recommendations:  home health OT  Level of Assistance Recommended at Discharge: 24 hours light assistance for ADL's and homemaking tasks  Discharge Equipment Recommendations: wheelchair  Barriers to discharge:  None    Assessment:     Rosario Menendez is a 87 y.o. female with a medical diagnosis of Closed compression fracture of body of L1 vertebra .  She presents with . Performance deficits affecting function are weakness, impaired endurance, impaired self care skills, impaired functional mobility, gait instability, impaired balance, decreased coordination, decreased lower extremity function, impaired cognition, decreased safety awareness, pain, orthopedic precautions, impaired cardiopulmonary response to activity.   Pt participated well during today's session. Family training performed on today. Pt continues to demonstrate deficits with self care skills, balance, functional mobility, UB strength and endurance. Pt will benefit from continued OT services to progress towards goals.     Rehab Potential is good    Activity tolerance:  Good    Plan:     Patient to be seen 6 x/week to address the above listed problems via self-care/home management, therapeutic activities, therapeutic exercises    Plan of Care Expires: 01/02/23  Plan of Care Reviewed with: patient, daughter, friend    Subjective     Communicated with: Jay prior to session. "I need to go slow" .    Pain/Comfort:  Pain Rating 1: 3/10  Location - Side 1: Bilateral  Location - Orientation 1: lower  Location 1: back  Pain Addressed 1: Pre-medicate for activity, Reposition, Distraction  Pain Rating Post-Intervention 1: 2/10    Patient's " cultural, spiritual, Restoration conflicts given the current situation:  no    Objective:     Patient found up in chair with friend present upon OT entry to room.    Bed Mobility:    Not performed.     Functional Mobility/Transfers:  Patient completed Sit <> Stand Transfer x 3 trials with stand by assistance  with  rolling walker     Activities of Daily Living:  Upper Body Dressing: moderate assistance to manage TLSO brace in standing position., Pt able to doff/don pullover shirt seated in bedside chair.   Lower Body Dressing: contact guard assistance to don brief and thread (B) LE into pants and manage brief and pants over hips in standing position with use of RW for balance and safety.      [facility] 6 Click ADL: 17    OT Exercises:     Treatment & Education:  Pt, family and friend educated on role of OT, level of assistance, safety while performing functional transfers, safety while performing self care tasks, spinal precautions, importance to adhering to precautions and progress towards OT goals.  Pt unable able recall spinal precautions.   HASSAN answered all questions within scope of practice.     Patient left up in chair with  familym friend and PT present.    GOALS:   Multidisciplinary Problems       Occupational Therapy Goals          Problem: Occupational Therapy    Goal Priority Disciplines Outcome Interventions   Occupational Therapy Goal     OT, PT/OT Ongoing, Progressing    Description: Goals to be met by: 3 weeks     Patient will increase functional independence with ADLs by performing:    UE Dressing with Supervision.  LE Dressing with Supervision.  Grooming while seated at sink with Set-up Assistance.  Toileting from toilet with Stand-by Assistance for hygiene and clothing management.   Bathing sitting at sink with Stand-by Assistance.  Supine to sit with Modified [place].  Step transfer with Supervision with appropriate A/D  Upper extremity exercise with supervision.  Caregiver will be educated on  level of assist required to safely perform self care tasks and functional transfers..                        Time Tracking:     OT Date of Treatment: 12/19/22  OT Start Time: 1058    OT Stop Time: 1123  OT Total Time (min): 25 min    Billable Minutes:Self Care/Home Management 25 12/19/2022

## 2022-12-19 NOTE — PLAN OF CARE
Problem: Physical Therapy  Goal: Physical Therapy Goal  Description: Goals to be met by: 1/1/23    Patient will increase functional independence with mobility by performing:    . Supine to sit with Stand-by Assistance -MET 12/19 (using bedrails) CFW  . Sit to supine with Stand-by Assistance -MET 12/19 (using bedrails)  . Rolling to Left and Right with Stand-by Assistance. -MET 12/19 (using bedrails)  . Sit to stand transfer with Stand-by Assistance -MET 12/19 (using RW, 75%VC)  . Bed to chair transfer with Stand-by Assistance using Rolling Walker -MET 12/19 (using RW, 75% VC)  . Gait  x 100 feet with Stand-by Assistance using Rolling Walker. -MET 12/19 (180' using RW, 75% VC)    . Wheelchair propulsion x100 feet with Supervision using bilateral uppper extremities    Updated goals:   Supine to sit with ESTER using bedrails  . Sit to supine with ESTER using bedrails  . Rolling to Left and Right with ESTER using bedrails  . Sit to stand transfer with Supervision  . Bed to chair transfer with Supervision  . Gait  x 200 feet with Supervision using Rolling Walker.   . Curb step with SBA using Rolling Walker    Outcome: Ongoing, Progressing    Goals updated this date - patient progressing

## 2022-12-19 NOTE — PT/OT/SLP PROGRESS
"Physical Therapy Treatment    Patient Name:  Rosario Menendez   MRN:  [id number]  Admit Date: 12/1/2022  Admitting Diagnosis: Closed compression fracture of body of L1 vertebra  Recent Surgeries: N/A    General Precautions: Standard, fall  Orthopedic Precautions: spinal precautions  Braces: TLSO    Recommendations:     Discharge Recommendations: home health PT (Presence of another during OOB activity)  Level of Assistance Recommended at Discharge: 24 hours supervision  Discharge Equipment Recommendations: walker, rolling  Barriers to discharge: None    Assessment:     Rosario Menendez is a 87 y.o. female admitted with a medical diagnosis of Closed compression fracture of body of L1 vertebra . Patient demos positive response to PT intervention evidenced by increased gait capacity this date and inc independence during transfers, bed mobility, ambulation and 4" curb step navigation. Patient requires grossly SBA for OOB activity due to MAX cueing for safe AD mgmt/positioning, upright posture and extrinsic encouragement throughout to facilitate highest level functional performance.     Family training completed this date - family states they will order bedrail and provide 24/7 supervision at [place].     Patient presents below PLOF and would benefit from acute care skilled PT to address deficits detailed above and promote safe and functional independence..      Performance deficits affecting function: weakness, impaired endurance, impaired functional mobility, gait instability, impaired cognition, decreased lower extremity function, decreased safety awareness, pain, impaired cardiopulmonary response to activity, orthopedic precautions.    Rehab Potential is good    Activity Tolerance: Good    Plan:     Patient to be seen 6 x/week to address the above listed problems via gait training, therapeutic activities, therapeutic exercises, neuromuscular re-education    Plan of Care Expires: 01/01/23  Plan of Care Reviewed with: " parent, daughter, friend    Subjective     Can I take a break now?.     Pain/Comfort:  Pain Rating 1: 3/10  Location - Side 1: Bilateral  Location - Orientation 1: lower  Location 1: back  Pain Addressed 1: Pre-medicate for activity, Reposition, Distraction  Pain Rating Post-Intervention 1: 3/10    Patient's cultural, spiritual, Caodaism conflicts given the current situation:  no    Objective:     Communicated with OT prior to session.  Patient found up in chair with  (no lines active) upon PT entry to room, daughter and friend present in room. Agreeable to PT intervention    Therapeutic Activities and Exercises:     Performed functional mobility as detailed below.     Family training completed this date to optimize safe transition to discharge from facility: patient and family educated on PT POC, CLOF, spinal precautions, DME recommendations (bedrail, RW which patient owns) as well as cues and safe techniques for bed mobility, transfers, gait and curb step navigation using RW, re-educated on spinal precautions with 1/3 recall from patient following cues. Patient and family verbalize understanding of education provided, allotted time for all questions to be answered.      Functional Mobility:  Bed Mobility:   all performed using bedrails with 25% VC for logroll technique  Rolling Left:  stand by assistance  Rolling Right: stand by assistance  Scooting: stand by assistance  Supine to Sit: stand by assistance  Sit to Supine: stand by assistance  Transfers:   all performed requiring 75% VC for safe hand placement  Sit to Stand:  stand by assistance with rolling walker  Bed to Chair: stand by assistance with  rolling walker  using  Stand Pivot  Gait: Patient ambulates 180'x2 using RW requiring SBA and 75% VC for: upright posture, close AD proximity, increased step length, and extrinsic encouragement to maximize gait capacity. Patient with limited carryover requiring repetition of cues throughout training. Extended  "seated rest breaks between trials to combat fatigue. Patient endorses 3/10 LBP with ambulation, resolves with seated rest break.  Stairs:  Pt ascended/descended 4" curb step with Rolling Walker with no handrails with Contact Guard Assistance.     AM-PAC 6 CLICK MOBILITY  18    Patient left up in chair with  family/friend present, all needs met.     GOALS:   Multidisciplinary Problems       Physical Therapy Goals          Problem: Physical Therapy    Goal Priority Disciplines Outcome Goal Variances Interventions   Physical Therapy Goal     PT, PT/OT Ongoing, Progressing     Description: Goals to be met by: 1/1/23    Patient will increase functional independence with mobility by performing:    . Supine to sit with Stand-by Assistance -MET 12/19 (using bedrails) CFW  . Sit to supine with Stand-by Assistance -MET 12/19 (using bedrails)  . Rolling to Left and Right with Stand-by Assistance. -MET 12/19 (using bedrails)  . Sit to stand transfer with Stand-by Assistance -MET 12/19 (using RW, 75%VC)  . Bed to chair transfer with Stand-by Assistance using Rolling Walker -MET 12/19 (using RW, 75% VC)  . Gait  x 100 feet with Stand-by Assistance using Rolling Walker. -MET 12/19 (180' using RW, 75% VC)    . Wheelchair propulsion x100 feet with Supervision using bilateral uppper extremities    Updated goals:   Supine to sit with ESTER using bedrails  . Sit to supine with ESTER using bedrails  . Rolling to Left and Right with ESTER using bedrails  . Sit to stand transfer with Supervision  . Bed to chair transfer with Supervision  . Gait  x 200 feet with Supervision using Rolling Walker.   . Curb step with SBA using Rolling Walker                         Time Tracking:     PT Received On: 12/19/22  PT Start Time: 1124  PT Stop Time: 1206  PT Total Time (min): 42 min    Billable Minutes: Gait Training 15 and Therapeutic Activity 27    Treatment Type: Treatment  PT/PTA: PT     PTA Visit Number: 0     12/19/2022  "

## 2022-12-20 ENCOUNTER — PATIENT MESSAGE (OUTPATIENT)
Dept: INTERNAL MEDICINE | Facility: CLINIC | Age: 87
End: 2022-12-20
Payer: MEDICARE

## 2022-12-20 DIAGNOSIS — S32.000A COMPRESSION FX, LUMBAR SPINE: Primary | ICD-10-CM

## 2022-12-20 LAB
ANION GAP SERPL CALC-SCNC: 5 MMOL/L (ref 8–16)
BASOPHILS # BLD AUTO: 0.04 K/UL (ref 0–0.2)
BASOPHILS NFR BLD: 0.8 % (ref 0–1.9)
BUN SERPL-MCNC: 14 MG/DL (ref 8–23)
CALCIUM SERPL-MCNC: 9.4 MG/DL (ref 8.7–10.5)
CHLORIDE SERPL-SCNC: 114 MMOL/L (ref 95–110)
CO2 SERPL-SCNC: 22 MMOL/L (ref 23–29)
CREAT SERPL-MCNC: 1 MG/DL (ref 0.5–1.4)
DIFFERENTIAL METHOD: ABNORMAL
EOSINOPHIL # BLD AUTO: 0.1 K/UL (ref 0–0.5)
EOSINOPHIL NFR BLD: 2.2 % (ref 0–8)
ERYTHROCYTE [DISTWIDTH] IN BLOOD BY AUTOMATED COUNT: 14.2 % (ref 11.5–14.5)
EST. GFR  (NO RACE VARIABLE): 54.5 ML/MIN/1.73 M^2
GLUCOSE SERPL-MCNC: 85 MG/DL (ref 70–110)
HCT VFR BLD AUTO: 38.4 % (ref 37–48.5)
HGB BLD-MCNC: 12.7 G/DL (ref 12–16)
IMM GRANULOCYTES # BLD AUTO: 0.01 K/UL (ref 0–0.04)
IMM GRANULOCYTES NFR BLD AUTO: 0.2 % (ref 0–0.5)
LYMPHOCYTES # BLD AUTO: 1.5 K/UL (ref 1–4.8)
LYMPHOCYTES NFR BLD: 30.9 % (ref 18–48)
MAGNESIUM SERPL-MCNC: 1.9 MG/DL (ref 1.6–2.6)
MCH RBC QN AUTO: 31.2 PG (ref 27–31)
MCHC RBC AUTO-ENTMCNC: 33.1 G/DL (ref 32–36)
MCV RBC AUTO: 94 FL (ref 82–98)
MONOCYTES # BLD AUTO: 0.8 K/UL (ref 0.3–1)
MONOCYTES NFR BLD: 16.3 % (ref 4–15)
NEUTROPHILS # BLD AUTO: 2.5 K/UL (ref 1.8–7.7)
NEUTROPHILS NFR BLD: 49.6 % (ref 38–73)
NRBC BLD-RTO: 0 /100 WBC
PHOSPHATE SERPL-MCNC: 2.6 MG/DL (ref 2.7–4.5)
PLATELET # BLD AUTO: 57 K/UL (ref 150–450)
PMV BLD AUTO: 11.6 FL (ref 9.2–12.9)
POTASSIUM SERPL-SCNC: 3.3 MMOL/L (ref 3.5–5.1)
RBC # BLD AUTO: 4.07 M/UL (ref 4–5.4)
SODIUM SERPL-SCNC: 141 MMOL/L (ref 136–145)
WBC # BLD AUTO: 4.98 K/UL (ref 3.9–12.7)

## 2022-12-20 PROCEDURE — 36415 COLL VENOUS BLD VENIPUNCTURE: CPT | Mod: HCNC | Performed by: NURSE PRACTITIONER

## 2022-12-20 PROCEDURE — 97110 THERAPEUTIC EXERCISES: CPT | Mod: HCNC

## 2022-12-20 PROCEDURE — 80048 BASIC METABOLIC PNL TOTAL CA: CPT | Mod: HCNC | Performed by: NURSE PRACTITIONER

## 2022-12-20 PROCEDURE — 97535 SELF CARE MNGMENT TRAINING: CPT | Mod: HCNC

## 2022-12-20 PROCEDURE — 11000004 HC SNF PRIVATE: Mod: HCNC

## 2022-12-20 PROCEDURE — 97530 THERAPEUTIC ACTIVITIES: CPT | Mod: HCNC

## 2022-12-20 PROCEDURE — 25000003 PHARM REV CODE 250: Mod: HCNC | Performed by: HOSPITALIST

## 2022-12-20 PROCEDURE — 84100 ASSAY OF PHOSPHORUS: CPT | Mod: HCNC | Performed by: NURSE PRACTITIONER

## 2022-12-20 PROCEDURE — 85025 COMPLETE CBC W/AUTO DIFF WBC: CPT | Mod: HCNC | Performed by: NURSE PRACTITIONER

## 2022-12-20 PROCEDURE — 63700000 PHARM REV CODE 250 ALT 637 W/O HCPCS: Mod: HCNC | Performed by: NURSE PRACTITIONER

## 2022-12-20 PROCEDURE — U0005 INFEC AGEN DETEC AMPLI PROBE: HCPCS | Performed by: HOSPITALIST

## 2022-12-20 PROCEDURE — U0003 INFECTIOUS AGENT DETECTION BY NUCLEIC ACID (DNA OR RNA); SEVERE ACUTE RESPIRATORY SYNDROME CORONAVIRUS 2 (SARS-COV-2) (CORONAVIRUS DISEASE [COVID-19]), AMPLIFIED PROBE TECHNIQUE, MAKING USE OF HIGH THROUGHPUT TECHNOLOGIES AS DESCRIBED BY CMS-2020-01-R: HCPCS | Mod: HCNC | Performed by: HOSPITALIST

## 2022-12-20 PROCEDURE — 83735 ASSAY OF MAGNESIUM: CPT | Mod: HCNC | Performed by: NURSE PRACTITIONER

## 2022-12-20 PROCEDURE — 25000003 PHARM REV CODE 250: Mod: HCNC | Performed by: NURSE PRACTITIONER

## 2022-12-20 RX ORDER — HYDROCODONE BITARTRATE AND ACETAMINOPHEN 5; 325 MG/1; MG/1
1 TABLET ORAL EVERY 6 HOURS PRN
Qty: 20 TABLET | Refills: 0 | Status: SHIPPED | OUTPATIENT
Start: 2022-12-20 | End: 2022-12-25

## 2022-12-20 RX ORDER — GALANTAMINE HYDROBROMIDE 16 MG/1
CAPSULE, EXTENDED RELEASE ORAL
Qty: 30 CAPSULE | Refills: 0 | Status: SHIPPED | OUTPATIENT
Start: 2022-12-20 | End: 2023-06-22 | Stop reason: SDUPTHER

## 2022-12-20 RX ORDER — LIDOCAINE 50 MG/G
1 PATCH TOPICAL DAILY
Qty: 20 PATCH | Refills: 0 | Status: SHIPPED | OUTPATIENT
Start: 2022-12-20 | End: 2022-12-30

## 2022-12-20 RX ORDER — LOSARTAN POTASSIUM 50 MG/1
50 TABLET ORAL DAILY
Qty: 30 TABLET | Refills: 0 | Status: ON HOLD | OUTPATIENT
Start: 2022-12-20 | End: 2023-07-18 | Stop reason: HOSPADM

## 2022-12-20 RX ADMIN — MEMANTINE 10 MG: 10 TABLET ORAL at 09:12

## 2022-12-20 RX ADMIN — CYANOCOBALAMIN TAB 1000 MCG 1000 MCG: 1000 TAB at 09:12

## 2022-12-20 RX ADMIN — PRAVASTATIN SODIUM 40 MG: 20 TABLET ORAL at 09:12

## 2022-12-20 RX ADMIN — BUPROPION HYDROCHLORIDE 150 MG: 150 TABLET, FILM COATED, EXTENDED RELEASE ORAL at 09:12

## 2022-12-20 RX ADMIN — GALANTAMINE HYDROBROMIDE 16 MG: 16 CAPSULE, EXTENDED RELEASE ORAL at 09:12

## 2022-12-20 RX ADMIN — LEVOTHYROXINE SODIUM 100 MCG: 100 TABLET ORAL at 05:12

## 2022-12-20 RX ADMIN — LOSARTAN POTASSIUM 50 MG: 50 TABLET, FILM COATED ORAL at 09:12

## 2022-12-20 NOTE — PT/OT/SLP DISCHARGE
Physical Therapy Discharge Summary    Name: Rosario Menendez  MRN: [id number]   Principal Problem: Closed compression fracture of body of L1 vertebra     Patient Discharged from acute Physical Therapy on ***.  Please refer to prior PT noted date on *** for functional status.     Assessment:     {IP THERAPY DC ASSESSMENT [facility]:[id number]}    Objective:     GOALS:   Multidisciplinary Problems       Physical Therapy Goals          Problem: Physical Therapy    Goal Priority Disciplines Outcome Goal Variances Interventions   Physical Therapy Goal     PT, PT/OT Ongoing, Progressing     Description: Goals to be met by: 1/1/23    Patient will increase functional independence with mobility by performing:    . Supine to sit with Stand-by Assistance -MET 12/19 (using bedrails) CFW  . Sit to supine with Stand-by Assistance -MET 12/19 (using bedrails)  . Rolling to Left and Right with Stand-by Assistance. -MET 12/19 (using bedrails)  . Sit to stand transfer with Stand-by Assistance -MET 12/19 (using RW, 75%VC)  . Bed to chair transfer with Stand-by Assistance using Rolling Walker -MET 12/19 (using RW, 75% VC)  . Gait  x 100 feet with Stand-by Assistance using Rolling Walker. -MET 12/19 (180' using RW, 75% VC)    . Wheelchair propulsion x100 feet with Supervision using bilateral uppper extremities    Updated goals:   Supine to sit with ESTER using bedrails  . Sit to supine with ESTER using bedrails  . Rolling to Left and Right with ESTER using bedrails  . Sit to stand transfer with Supervision  . Bed to chair transfer with Supervision  . Gait  x 200 feet with Supervision using Rolling Walker.   . Curb step with SBA using Rolling Walker                         Reasons for Discontinuation of Therapy Services  {REASONS FOR DISCHARGE :[id number]}      Plan:     Patient Discharged to: {IP DISCHARGE DISPOSITION THERAPY [facility]:22399}.      12/20/2022

## 2022-12-20 NOTE — PLAN OF CARE
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       Follow Up Appointments:  Future Appointments   Date Time Provider Department Center   1/10/2023  2:30 PM [facility] OD XR-A LIMIT 350 LBS [facility] XRAY OP [place] [facility]   1/10/2023  3:30 PM Candy Yang PA-C [facility] NEUROSU [place] [facility]   4/4/2023  1:00 PM Shi Simon MD [facility] PCW       Allergies:Review of patient's allergies indicates:  No Known Allergies    Medications: Review discharge medications with patient and family and provide education.    Current Facility-Administered Medications   Medication Dose Route Frequency Provider Last Rate Last Admin    acetaminophen tablet 650 mg  650 mg Oral Q6H PRN Leo Bowen MD        buPROPion TB24 tablet 150 mg  150 mg Oral Daily Leo Bowen MD   150 mg at 12/20/22 0932    calcium carbonate 200 mg calcium (500 mg) chewable tablet 500 mg  500 mg Oral BID PRN Leo Bowen MD        cyanocobalamin tablet 1,000 mcg  1,000 mcg Oral Daily Leo Bowen MD   1,000 mcg at 12/20/22 0932    docusate sodium 1 enema  1 enema Rectal Daily PRN Afsaneh Chawla NP        galantamine 24 hr capsule 16 mg  16 mg Oral Daily with breakfast Afsaneh Chawla NP   16 mg at 12/20/22 0932     HYDROcodone-acetaminophen 5-325 mg per tablet 1 tablet  1 tablet Oral Q6H PRN Leo Bowen MD   1 tablet at 12/19/22 1511    levothyroxine tablet 100 mcg  100 mcg Oral Before breakfast Leo Bowen MD   100 mcg at 12/20/22 0558    losartan tablet 50 mg  50 mg Oral Daily Leo Bowen MD   50 mg at 12/20/22 0932    magnesium hydroxide 400 mg/5 ml suspension 2,400 mg  30 mL Oral Daily PRN Afsaneh Chawla NP   2,400 mg at 12/10/22 1455    melatonin tablet 6 mg  6 mg Oral Nightly PRN Leo Bowen MD   6 mg at 12/18/22 2124    memantine tablet 10 mg  10 mg Oral BID Afsaneh Chawla NP   10 mg at 12/20/22 0932    pravastatin tablet 40 mg  40 mg Oral Daily Leo Bowen MD   40 mg at 12/20/22 0932    senna-docusate 8.6-50 mg per tablet 2 tablet  2 tablet Oral BID Afsaneh Chawla NP   2 tablet at 12/19/22 0851     Current Discharge Medication List        CONTINUE these medications which have CHANGED    Details   galantamine (RAZADYNE ER) 16 MG 24 hr capsule TAKE 1 CAPSULE EVERY DAY WITH BREAKFAST  Qty: 30 capsule, Refills: 0      HYDROcodone-acetaminophen (NORCO) 5-325 mg per tablet Take 1 tablet by mouth every 6 (six) hours as needed for Pain.  Qty: 20 tablet, Refills: 0    Comments: Quantity prescribed more than 7 day supply? No      LIDOcaine (LIDODERM) 5 % Place 1 patch onto the skin once daily. Remove & Discard patch within 12 hours or as directed by MD for 10 days  Qty: 20 patch, Refills: 0      losartan (COZAAR) 50 MG tablet Take 1 tablet (50 mg total) by mouth once daily.  Qty: 30 tablet, Refills: 0    Comments: .           CONTINUE these medications which have NOT CHANGED    Details   alendronate (FOSAMAX) 70 MG tablet Take 1 tablet (70 mg total) by mouth every 7 days.  Qty: 4 tablet, Refills: 11      buPROPion (WELLBUTRIN XL) 150 MG TB24 tablet Take 1 tablet (150 mg total) by mouth once daily.  Qty: 90 tablet, Refills: 2      cyanocobalamin (VITAMIN B-12) 1000 MCG  tablet Take 100 mcg by mouth once daily.      levothyroxine (SYNTHROID) 100 MCG tablet TAKE 1 TABLET (100 MCG TOTAL) BY MOUTH BEFORE BREAKFAST.  Qty: 90 tablet, Refills: 1      memantine (NAMENDA) 10 MG Tab TAKE 1 TABLET TWICE DAILY  Qty: 180 tablet, Refills: 1      pravastatin (PRAVACHOL) 40 MG tablet TAKE 1 TABLET EVERY DAY  Qty: 90 tablet, Refills: 1           STOP taking these medications       methocarbamoL (ROBAXIN) 500 MG Tab Comments:   Reason for Stopping:                 I have seen and examined this patient within the last 30 days. My clinical findings that support the need for the home health skilled services and home bound status are the following:no   Weakness/numbness causing balance and gait disturbance due to Fracture making it taxing to leave home.  Requiring assistive device to leave home due to unsteady gait caused by  Fracture.     Diet:   regular diet    Labs:  SN to perform labs:  CBC: Weekly; 2 month(s) and BMP: Weekly; 2 month(s) and Report Lab results to PCP.    Referrals/ Consults  Physical Therapy to evaluate and treat. Evaluate for home safety and equipment needs; Establish/upgrade home exercise program. Perform / instruct on therapeutic exercises, gait training, transfer training, and Range of Motion.  Occupational Therapy to evaluate and treat. Evaluate home environment for safety and equipment needs. Perform/Instruct on transfers, ADL training, ROM, and therapeutic exercises.   to evaluate for community resources/long-range planning.  Aide to provide assistance with personal care, ADLs, and vital signs.    Activities:   activity as tolerated    Nursing:   Agency to admit patient within 24 hours of hospital discharge unless specified on physician order or at patient request    SN to complete comprehensive assessment including routine vital signs. Instruct on disease process and s/s of complications to report to MD. Review/verify medication list sent home with the patient  at time of discharge  and instruct patient/caregiver as needed. Frequency may be adjusted depending on start of care date.     Skilled nurse to perform up to 3 visits PRN for symptoms related to diagnosis    Notify MD if SBP > 160 or < 90; DBP > 90 or < 50; HR > 120 or < 50; Temp > 101; O2 < 88%; Other:       Ok to schedule additional visits based on staff availability and patient request on consecutive days within the home health episode.    When multiple disciplines ordered:    Start of Care occurs on Sunday - Wednesday schedule remaining discipline evaluations as ordered on separate consecutive days following the start of care.    Thursday SOC -schedule subsequent evaluations Friday and Monday the following week.     Friday - Saturday SOC - schedule subsequent discipline evaluations on consecutive days starting Monday of the following week.    For all post-discharge communication and subsequent orders please contact patient's primary care physician.     Home Health Aide:  Nursing Weekly, Physical Therapy Three times weekly, Occupational Therapy Three times weekly, Medical Social Work Weekly, and Home Health Aide Three times weekly    I certify that this patient is confined to her home and needs intermittent skilled nursing care, physical therapy, and occupational therapy.

## 2022-12-20 NOTE — PROGRESS NOTES
"                                                        [facility]                                  Skilled Nursing Facility                   Progress Note     Admit Date: 12/1/2022  AMARA 12/21/2022  Principal Problem:  Closed compression fracture of body of L1 vertebra   HPI obtained from patient interview and chart review     Chief Complaint: Re-evaluation of medical DX and therapy status: lab review     HPI:   Rosario Menendez is a 87 y.o. female with a past medical history significant for osteoporosis, thrombocytopenia, hypothyroidism, mild cognitive impairment, depression, and arthritis. She presents to [facility] after she tripped and fell onto her buttocks while in the kitchen. Patient states she "bounced" during her fall.  After fall she noted buttock and lower back pain. Buttock pain is worse than lower back pain. Pain started after fall and is exacerbated by movement. She denies radiating leg pain. She denies numbness, tinging, saddle anesthesia, or bowel dysfunction. Lumbar x-rays (AP/Lateral) obtained in ED for lower back pain revealing L1 compression fracture with 20% anterior height loss, and grade 2 anterolisthesis of L4 on L5. Lumbar CT 11/30 re demonstrating L1 compression fracture, and grade 2 anterolisthesis for L4 on L5 with associated pars defect.      Notes recent UTI with persistent dysuria s/p antibiotic treatment. She denies chills, nausea, or vomiting. Endorses slight subjective fever yesterday. UA 11/29 obtained in ED showing +nitrite, 3+leukocytes, many bacteria, and >100 WBC. UA culture pending. Blood culture NGTD. Of note, she has baseline thrombocytopenia.     Patient will be treated at [facility] with PT and OT to improve functional status and ability to perform ADLs.     Interval Hx  No acute events over night  Labs reviewed, no critical values  24 hr vital sign ranges listed below.  Patient denies shortness of breath, abdominal discomfort, nausea, or vomiting.  " Patient denies dysuria.    Patient progessing with PT/OT. Continuing to follow and treat all acute and chronic conditions.     Past Medical History: Patient has a past medical history of Arthritis, Depression, Hypercholesteremia, Thrombocytopenia, and Thyroid disease.    Past Surgical History: Patient has a past surgical history that includes Hysterectomy; Knee surgery; Ankle surgery; Wrist surgery; and Colonoscopy (N/A, 6/2/2021).    Social History: Patient reports that she has never smoked. She has never used smokeless tobacco.    Family History: family history includes Cancer in her maternal uncle; Heart failure in her father and mother; Suicide in her son.    Allergies: Patient has No Known Allergies.    ROS  Constitutional: Negative for fever   Eyes: Negative for blurred vision, double vision   Respiratory: Negative for cough, shortness of breath   Cardiovascular: Negative for chest pain, palpitations, and leg swelling.   Gastrointestinal: Negative for abdominal pain, constipation, diarrhea, nausea, vomiting.   Genitourinary: + for dysuria, frequency   Musculoskeletal:  + generalized weakness. + for back pain and myalgias.   Skin: Negative for itching and rash.   Neurological: Negative for dizziness, headaches.   Psychiatric/Behavioral: Negative for depression. The patient is not nervous/anxious.      24 hour Vital Sign Range   Temp:  [97.3 °F (36.3 °C)]   Pulse:  [64]   Resp:  [18]   BP: (114-127)/(59-64)   SpO2:  [92 %]     Current BMI: Body mass index is 23.08 kg/m².    PEx  Constitutional: Patient appears debilitated.  No distress noted  HENT:   Head: Normocephalic and atraumatic.   Eyes: Pupils are equal, round  Neck: Normal range of motion. Neck supple.   Cardiovascular: Normal rate, regular rhythm and normal heart sounds.    Pulmonary/Chest: Effort normal and breath sounds are clear  Abdominal: Soft. Bowel sounds are normal.   Musculoskeletal: Normal range of motion.   Neurological: Alert and oriented to  person,   Psychiatric: Normal mood and affect. Behavior is normal. +forgetful   Skin: Skin is warm and dry. Full skin assessment    Recent Labs   Lab 12/20/22  0513      K 3.3*   *   CO2 22*   BUN 14   CREATININE 1.0   MG 1.9         Recent Labs   Lab 12/20/22  0513   WBC 4.98   RBC 4.07   HGB 12.7   HCT 38.4   PLT 57*   MCV 94   MCH 31.2*   MCHC 33.1           Assessment and Plan:  Closed compression fracture of body of L1 vertebra  -87 y.o. female with a pmhx of osteoporosis, thrombocytopenia, mild cognitive impairment, and arthritis. Patient tripped and fell onto buttocks yesterday with residual lower back pain after fall. Lumbar CT showing L1 compression fracture. Neurosurgery consulted for further eval.  -Lumbar x-rays (AP/Lateral) 11/29: revealing L1 compression fracture with 20% anterior height loss, and grade 2 anterolisthesis of L4 on L5.   -Lumbar CT 11/30 re demonstrating L1 compression fracture, and grade 2 anterolisthesis for L4 on L5 with associated pars defect. Severe R and moderate L neural foraminal narrowing.   -No acute surgical intervention  -Obtain lumbar xrays upright/supine with increased height loss upon standing. No kyphotic deformity  -Obtain lumbar flex/ex xrays reviewed re-demonstrating L4-L5 grade 2 anterolisthesis  -TLSO brace ordered/at bedside for comfort  -Will schedule outpatient follow up in 4-6 weeks with repeat Lumbar xrays. Neurosurgery will arrange.      Acute cystitis with hematuria  -UA reviewed,  w/ GNR  -Blood cxs NGTD  -Completed rocephin.  -12/5-Initiate order for cipro 500 mg BID, patient still with symptoms of UTI, will treat further   -12/6-Initiate order for pyridium 100 mg TID x 2 days     Constipation  -12/7-Initiate order for MoM PRN and increase senna to 2 tabs BID   -12/8-Initiate order for enema x 1      Amnestic MCI (mild cognitive impairment with memory loss)  -Continue namenda.     Thrombocytopenia, unspecified  -Chronic, stable.  -Monitor and  transfuse for <50K if bleeding or <10K if not bleeding  -Avoid antiplatelet medications including Lovenox, heparin,NSAIDs and aspirin.  -Follows with hem/onc outpatient.     Hypercholesteremia  -cont statin         Thyroid disease  -cont home synthroid     Anticipate disposition:  Home with home health      Follow-up needed during SNF stay-    Follow-up needed after discharge from SNF: PCP, neurosurgery     Future Appointments   Date Time Provider Department Center   1/10/2023  2:30 PM [facility] XR-A LIMIT 350 LBS [facility] XRAY OP [place] [facility]   1/10/2023  3:30 PM Candy Yang PA-C [facility] NEUROSU [place] [facility]   4[facility]4/2023  1:00 [facility]         I certify that SNF services are required to be given on an inpatient basis because Rosario Menendez needs for skilled nursing care and/or skilled rehabilitation are required on a daily basis and such services can only practically be provided in a skilled nursing facility setting and are for an ongoing condition for which she received inpatient care in the hospital.       Afsaneh Chawla NP  Department of Hospital Medicine   [facility]     DOS: 12/20/2022       Patient note was created using MModal Dictation.  Any errors in syntax or even information may not have been identified and edited on initial review prior to signing this note.

## 2022-12-20 NOTE — PT/OT/SLP PROGRESS
Occupational Therapy   Treatment    Name: Rosario Menendez  MRN: [id number]  Admit Date: 12/1/2022  Admitting Diagnosis:  Closed compression fracture of body of L1 vertebra    General Precautions: Standard, fall   Orthopedic Precautions: spinal precautions   Braces: TLSO    Recommendations:     Discharge Recommendations:  home health OT  Level of Assistance Recommended at Discharge: 24 hours light assistance for ADL's and homemaking tasks  Discharge Equipment Recommendations: wheelchair  Barriers to discharge:  None    Assessment:     Rosario Menendez is a 87 y.o. female with a medical diagnosis of Closed compression fracture of body of L1 vertebra .  She remains limited in performance of self-care , functional mobility and ADLs and currently not performing tasks at PLOF . Currently presenting with performance deficits including weakness, impaired endurance, impaired self care skills, impaired functional mobility, gait instability, impaired balance, decreased coordination, decreased lower extremity function, impaired cognition, decreased safety awareness, pain, orthopedic precautions, impaired cardiopulmonary response to activity.   Pt tolerated Tx without incident and is making progress but continues to require assist to perform self care tasks, functional mobility and functional transfers .  She would continue to benefit from OT intervention to further her functional (I)ce and safety.     Rehab Potential is good    Activity tolerance:  Good    Plan:     Patient to be seen 6 x/week to address the above listed problems via self-care/home management, therapeutic activities, therapeutic exercises    Plan of Care Expires: 01/02/23  Plan of Care Reviewed with: patient, daughter, friend    Subjective     Communicated with: nurse prior to session.     Pain/Comfort:  Pain Rating 1: 3/10  Location - Side 1: Bilateral  Location - Orientation 1: lower  Location 1: back  Pain Addressed 1: Reposition, Distraction, Cessation of  Activity  Pain Rating Post-Intervention 1: 2/10    Patient's cultural, spiritual, Worship conflicts given the current situation:  no    Objective:     Patient found up in chair with  (no lines) upon OT entry to room.    Bed Mobility:    Pt seated in bedside chair at onset of therapy session.     Functional Mobility/Transfers:  Patient completed Sit <> Stand Transfer with stand by assistance  with  rolling walker   Patient completed Bed <> Chair Transfer using Stand Pivot technique with stand by assistance with rolling walker    AMPAC 6 Click ADL: 17    OT Exercises: UE Ergometer performed 10 minutes on UBE with Min resistance.     UE exercises performed to increase functional endurance and strength in order increase independence when performing self care tasks, functional ambulation, W/C propulsion, and functional standing activities .   Grooming:  Pt performed grooming seated sinkside with (S) only .   Upper Body dressing:  SBA donning shirt but Min (A) required to don TLOS.    Treatment & Education:  Pt edu on safety when performing self care tasks , benefit of performing OOB activity, and safety when performing functional transfers and mobility.  - White board updated    Pt worked on functional standing activity consisting of standing with RW while reaching in all planes , crossing of midline and reaching to varying heights to facilitate (B) wt shifting and stability in standing in prep for performance of self care tasks and functional ADLS in standing.  Pt tolerated up to  10 Min. in standing with SBA and RW to steady.   -   Patient left up in chair with call button in reach    GOALS:   Multidisciplinary Problems       Occupational Therapy Goals          Problem: Occupational Therapy    Goal Priority Disciplines Outcome Interventions   Occupational Therapy Goal     OT, PT/OT Ongoing, Progressing    Description: Goals to be met by: 3 weeks     Patient will increase functional independence with ADLs by  performing:    UE Dressing with Supervision.  LE Dressing with Supervision.  Grooming while seated at sink with Set-up Assistance.  Toileting from toilet with Stand-by Assistance for hygiene and clothing management.   Bathing sitting at sink with Stand-by Assistance.  Supine to sit with Modified Jack.  Step transfer with Supervision with appropriate A/D  Upper extremity exercise with supervision.  Caregiver will be educated on level of assist required to safely perform self care tasks and functional transfers..                        Time Tracking:     OT Date of Treatment: 12/20/22  OT Start Time: 1310    OT Stop Time: 1352  OT Total Time (min): 42 min    Billable Minutes:Self Care/Home Management 10  Therapeutic Activity 17  Therapeutic Exercise 15    12/20/2022

## 2022-12-20 NOTE — PT/OT/SLP PROGRESS
"Physical Therapy Treatment and Discharge    Patient Name:  Rosario Menendez   MRN:  [id number]  Admit Date: 12/1/2022  Admitting Diagnosis: Closed compression fracture of body of L1 vertebra  Recent Surgeries: N/A    General Precautions: Standard, fall  Orthopedic Precautions: spinal precautions  Braces: TLSO    Recommendations:     Discharge Recommendations: home health PT  Level of Assistance Recommended at Discharge: 24 hours supervision  Discharge Equipment Recommendations: walker, rolling, 3-in-1 commode, hip kit  Barriers to discharge: None    Assessment:     Rosario Menendez is a 87 y.o. female admitted with a medical diagnosis of Closed compression fracture of body of L1 vertebra Patient has progressed as a result of skilled PT services, evidenced by increased functional gait capacity and decreased level of assistance required for functional mobility tasks since start of care. Patient has reached maximal functional capacity, and is appropriate for discharge from skilled services at this time, with the recommendations detailed above in order to facilitate a safe transition home.      Performance deficits affecting function: weakness, impaired endurance, impaired self care skills, impaired functional mobility, gait instability, impaired balance, decreased coordination, decreased lower extremity function, decreased safety awareness, pain.    Rehab Potential is good    Activity Tolerance: Fair    Plan:     Patient to be seen 6 x/week to address the above listed problems via gait training, therapeutic activities, therapeutic exercises, neuromuscular re-education    Plan of Care Expires: 01/01/23  Plan of Care Reviewed with: patient    Subjective     "I'm tired today".     Pain/Comfort:  Pain Rating 1: 3/10  Location - Side 1: Bilateral  Location - Orientation 1: lower  Location 1: back  Pain Addressed 1: Distraction, Reposition  Pain Rating Post-Intervention 1: 0/10    Patient's cultural, spiritual, Methodist " "conflicts given the current situation:  no    Objective:     Communicated with RN prior to session.  Patient found  on commode  with  (no active lines) upon PT entry to room. Donned TLSO with MAXA. Patient with loose stools requiring extra time for toileting as well as reported fatigue, limiting standing tolerance this date.     Therapeutic Activities and Exercises:     Performed functional mobility detailed below.     Performed the following seated exercise due to patient increased fatigue this date - pt declined gait training today. Seated rest breaks between trials to combat fatigue, patient tolerates without adverse effect.  Exercise Position Sets x Reps Resistance Skilled Cues   Hip flexion Seated 3x10 BLE Body weight Multimodal cues to increase AROM and for pacing   LAQ " " " "   Hip ABDuction " " " "   Ankle DF/PF " " " "         Functional Mobility: Performed with +TLSO and RW  Transfers:     Sit to Stand:  stand by assistance with rolling walker  50% VC for safe hand placement and RW mgmt  Toilet Transfer: stand by assistance with  rolling walker  using  Stand Pivot  50% VC for safe hand placement  Patient tolerates static standing 2 min x3 trials to wash hands at sink and complete pericare  Gait: Patient ambulates short distance in room 15ft using RW requiring SBA + VC for close AD proximity  Ambulates with excessive anterior RW placement, dec step length BLE, slow ekta, steps outside of RW during turn requiring cues to correct.   Gait capacity limited by fatigue and dec activity tolerance this date    AM-PAC 6 CLICK MOBILITY  18    Patient left up in chair with  RN notified of loose stools and increased fatigue.     GOALS:   Multidisciplinary Problems       Physical Therapy Goals       Not on file              Multidisciplinary Problems (Resolved)          Problem: Physical Therapy    Goal Priority Disciplines Outcome Goal Variances Interventions   Physical Therapy Goal   (Resolved)     PT, PT/OT Met   "   Description: Goals to be met by: 1/1/23    Patient will increase functional independence with mobility by performing:    . Supine to sit with Stand-by Assistance -MET 12/19 (using bedrails) CFW  . Sit to supine with Stand-by Assistance -MET 12/19 (using bedrails)  . Rolling to Left and Right with Stand-by Assistance. -MET 12/19 (using bedrails)  . Sit to stand transfer with Stand-by Assistance -MET 12/19 (using RW, 75%VC)  . Bed to chair transfer with Stand-by Assistance using Rolling Walker -MET 12/19 (using RW, 75% VC)  . Gait  x 100 feet with Stand-by Assistance using Rolling Walker. -MET 12/19 (180' using RW, 75% VC)    . Wheelchair propulsion x100 feet with Supervision using bilateral uppper extremities NOT MET    Updated goals:   Supine to sit with ESTER using bedrails -NOT MET  . Sit to supine with ESTER using bedrails -NOT MET  . Rolling to Left and Right with ESTER using bedrails -NOT MET  . Sit to stand transfer with Supervision -NOT MET  . Bed to chair transfer with Supervision -NOT MET  . Gait  x 200 feet with Supervision using Rolling Walker. -NOT MET   . Curb step with SBA using Rolling Walker -NOT MET                         Time Tracking:     PT Received On: 12/20/22  PT Start Time: 0815  PT Stop Time: 0843  PT Total Time (min): 28 min    Billable Minutes: Therapeutic Activity 15 and Therapeutic Exercise 13    Treatment Type: Treatment  PT/PTA: PT     PTA Visit Number: 0     12/20/2022

## 2022-12-20 NOTE — TREATMENT PLAN
Rehab Services' DME recommendations    Rosario Menendez  MRN: [id number]    [x] Hip Kit Standard/Short    [x] 3 in 1 Commode     [x] Other bedrails (B sides)     [x] Home health PT, OT, MSW, Aide, and Nurse      Whitney Brewer, PT 12/20/2022

## 2022-12-20 NOTE — PLAN OF CARE
Problem: Fall Injury Risk  Goal: Absence of Fall and Fall-Related Injury  Outcome: Ongoing, Progressing     Problem: Adult Inpatient Plan of Care  Goal: Optimal Comfort and Wellbeing  Outcome: Ongoing, Progressing     Problem: Adult Inpatient Plan of Care  Goal: Patient-Specific Goal (Individualized)  Outcome: Ongoing, Progressing

## 2022-12-20 NOTE — PLAN OF CARE
Problem: Physical Therapy  Goal: Physical Therapy Goal  Description: Goals to be met by: 1/1/23    Patient will increase functional independence with mobility by performing:    . Supine to sit with Stand-by Assistance -MET 12/19 (using bedrails) CFW  . Sit to supine with Stand-by Assistance -MET 12/19 (using bedrails)  . Rolling to Left and Right with Stand-by Assistance. -MET 12/19 (using bedrails)  . Sit to stand transfer with Stand-by Assistance -MET 12/19 (using RW, 75%VC)  . Bed to chair transfer with Stand-by Assistance using Rolling Walker -MET 12/19 (using RW, 75% VC)  . Gait  x 100 feet with Stand-by Assistance using Rolling Walker. -MET 12/19 (180' using RW, 75% VC)    . Wheelchair propulsion x100 feet with Supervision using bilateral uppper extremities NOT MET    Updated goals:   Supine to sit with ESTER using bedrails -NOT MET  . Sit to supine with ESTER using bedrails -NOT MET  . Rolling to Left and Right with ESTER using bedrails -NOT MET  . Sit to stand transfer with Supervision -NOT MET  . Bed to chair transfer with Supervision -NOT MET  . Gait  x 200 feet with Supervision using Rolling Walker. -NOT MET   . Curb step with SBA using Rolling Walker -NOT MET    Outcome: Met    Discharged from PT services this date. Appropriate for transition home with 24/7 supervision and recommended DME, HHPT services

## 2022-12-20 NOTE — PROGRESS NOTES
[facility] - Skilled Nursing  Adult Nutrition  Progress Note    SUMMARY   Recommendations  Continue regular diet, add chocolate milk with meals, double meat in am, RD following  Goals: PO to meet 75% of EEN by next RD follow up  Nutrition Goal Status: goal not met  Communication of RD Recs: other (comment) (POC)    Assessment and Plan  Inadequate oral intake related to poor appetite, decline in ADL's as evidenced by diet hx reveals, one meal per day, likely inadequate protein intake, refusal to try oral supplements, frequent restaurant take -out , eating with no concern regarding nutrient content.      Continues     Plan  General diet  Protein modified diet- chocolate milk with meals BID  Collaboration with other providers  Nutrition education- healthy meal planning, refused 12/19  Vitamin therapy, Vit B12, recommend MVI    Malnutrition Fcjvgnefct31/2     Eyes (Micronutrient): conjunctiva dull  Neck/Chest (Micronutrient): muscle wasting  Musculoskeletal/Lower Extremities: muscle wasting   Micronutrient Evaluation: suspected deficiency       Orbital Region (Subcutaneous Fat Loss): severe depletion  Upper Arm Region (Subcutaneous Fat Loss): mild depletion  Thoracic and Lumbar Region: well nourished   Sabianist Region (Muscle Loss): mild depletion  Clavicle and Acromion Bone Region (Muscle Loss): mild depletion  Scapular Bone Region (Muscle Loss): mild depletion  Dorsal Hand (Muscle Loss): mild depletion  Patellar Region (Muscle Loss): mild depletion  Anterior Thigh Region (Muscle Loss): mild depletion  Posterior Calf Region (Muscle Loss): well nourished   Edema (Fluid Accumulation): 0-->no edema present             Reason for Assessment    Reason For Assessment: RD follow-up  Diagnosis:  (Fx of L1)  Relevant Medical History: HLD  Interdisciplinary Rounds: attended  General Information Comments: Daughter in room, to dc soon to group home, ate well tonight prefers sweets, PO 50% patient and family refused educaton on  general healthy diet for dc  Nutrition Discharge Planning: DC regular diet, ONS of choice      Nutrition/Diet History    Patient Reported Diet/Restrictions/Preferences: general  Typical Food/Fluid Intake: one meal per day, people bring her food, loves [employer]  Spiritual, Cultural Beliefs, Muslim Practices, Values that Affect Care: no  Food Allergies: NKFA  Factors Affecting Nutritional Intake: decreased appetite    Anthropometrics    Temp: 98.3 °F (36.8 °C)  Height: 5' (152.4 cm)  Height (inches): 60 in  Weight Method: Bed Scale  Weight: 53.6 kg (118 lb 2.7 oz)  Weight (lb): 118.17 lb  Ideal Body Weight (IBW), Female: 100 lb  % Ideal Body Weight, Female (lb): 143.74 %  BMI (Calculated): 23.1  BMI Grade: 25 - 29.9 - overweight  Usual Body Weight (UBW), k kg  Weight Change Amount:  (patient weight is stable)  % Usual Body Weight: 98.54  % Weight Change From Usual Weight: 8.67 %  Weight Loss Since Admission:  (daughter has documentation of weights and does not believe that pt weighed 143# recently)       Lab/Procedures/Meds    Pertinent Labs Reviewed: reviewed  Pertinent Labs Comments: PO4 2.1  Pertinent Medications Reviewed: reviewed  Pertinent Medications Comments: senna-docusate, Abx      Estimated/Assessed Needs    Weight Used For Calorie Calculations: 65.2 kg (143 lb 11.8 oz)  Energy Calorie Requirements (kcal): 1209  Energy Need Method: [place]-St Jeor (x 1.2(PAL))  Protein Requirements: 65-78  Weight Used For Protein Calculations: 65.2 kg (143 lb 11.8 oz) (1.0-1.2g/kg)  Fluid Requirements (mL): 1209 or per MD  Estimated Fluid Requirement Method: RDA Method  RDA Method (mL): 1209  CHO Requirement: -    Weight loss, 12/15 130#, now  118# asked for re-weight from charge nurse.    Nutrition Prescription Ordered    Current Diet Order: Regular  Nutrition Order Comments: PO 50%, outside food  Oral Nutrition Supplement: refuses    Evaluation of Received Nutrient/Fluid Intake    I/O: no  data  Energy Calories Required: not meeting needs  Protein Required: not meeting needs  Fluid Required: meeting needs  Comments: LBM 12/19  Tolerance: tolerating  % Intake of Estimated Energy Needs: 50 - 75 %  % Meal Intake: 50 - 75 %    Nutrition Risk    Level of Risk/Frequency of Follow-up: low (one time per week)     Monitor and Evaluation    Food and Nutrient Intake: food and beverage intake  Food and Nutrient Adminstration: diet order  Knowledge/Beliefs/Attitudes: food and nutrition knowledge/skill  Anthropometric Measurements: weight change  Biochemical Data, Medical Tests and Procedures: gastrointestinal profile, electrolyte and renal panel  Nutrition-Focused Physical Findings: overall appearance     Nutrition Follow-Up    RD Follow-up?: Yes

## 2022-12-20 NOTE — TELEPHONE ENCOUNTER
Pt daughter stated that they spoke with someone from the rehab and was told that they can have an NP come out to see her. Pts daughter stated that if you need her to come in they would need another date.

## 2022-12-20 NOTE — PLAN OF CARE
Problem: Occupational Therapy  Goal: Occupational Therapy Goal  Description: Goals to be met by: 3 weeks     Patient will increase functional independence with ADLs by performing:    UE Dressing with Supervision. -MET  LE Dressing with Supervision.  Grooming while seated at sink with Set-up Assistance. - MET  Toileting from toilet with Stand-by Assistance for hygiene and clothing management.   Bathing sitting at sink with Stand-by Assistance.  Supine to sit with Modified [place].  Step transfer with Supervision with appropriate A/D  Upper extremity exercise with supervision.  Caregiver will be educated on level of assist required to safely perform self care tasks and functional transfers..   Outcome: Ongoing, Progressing

## 2022-12-21 VITALS
HEIGHT: 60 IN | BODY MASS INDEX: 23.2 KG/M2 | DIASTOLIC BLOOD PRESSURE: 72 MMHG | RESPIRATION RATE: 16 BRPM | HEART RATE: 62 BPM | WEIGHT: 118.19 LBS | SYSTOLIC BLOOD PRESSURE: 140 MMHG | OXYGEN SATURATION: 95 % | TEMPERATURE: 99 F

## 2022-12-21 LAB — SARS-COV-2 RNA RESP QL NAA+PROBE: NOT DETECTED

## 2022-12-21 PROCEDURE — 25000003 PHARM REV CODE 250: Mod: HCNC | Performed by: NURSE PRACTITIONER

## 2022-12-21 PROCEDURE — 63700000 PHARM REV CODE 250 ALT 637 W/O HCPCS: Mod: HCNC | Performed by: NURSE PRACTITIONER

## 2022-12-21 PROCEDURE — 25000003 PHARM REV CODE 250: Mod: HCNC | Performed by: HOSPITALIST

## 2022-12-21 RX ADMIN — GALANTAMINE HYDROBROMIDE 16 MG: 16 CAPSULE, EXTENDED RELEASE ORAL at 08:12

## 2022-12-21 RX ADMIN — MEMANTINE 10 MG: 10 TABLET ORAL at 08:12

## 2022-12-21 RX ADMIN — PRAVASTATIN SODIUM 40 MG: 20 TABLET ORAL at 08:12

## 2022-12-21 RX ADMIN — CYANOCOBALAMIN TAB 1000 MCG 1000 MCG: 1000 TAB at 08:12

## 2022-12-21 RX ADMIN — LOSARTAN POTASSIUM 50 MG: 50 TABLET, FILM COATED ORAL at 08:12

## 2022-12-21 RX ADMIN — BUPROPION HYDROCHLORIDE 150 MG: 150 TABLET, FILM COATED, EXTENDED RELEASE ORAL at 08:12

## 2022-12-21 NOTE — PLAN OF CARE
[facility] Orders    12/21/2022  [facility]  [address]  [place] [address]  Dept: [phone]  Loc: [phone]     Admit to 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       Patient is homebound due to:  Closed compression fracture of body of L1 vertebra    Allergies:Review of patient's allergies indicates:  No Known Allergies    Vitals:  Routine    Diet: regular diet    Activities:   Activity as tolerated    Goals of Care Treatment Preferences:  Code Status: Full Code      Nursing Precautions:  Fall    Medications: Discontinue all previous medication orders, if any. See new list below.     Medication List        CHANGE how you take these medications      pravastatin 40 MG tablet  Commonly known as: PRAVACHOL  TAKE 1 TABLET EVERY DAY  What changed: when to take this            CONTINUE taking these medications      alendronate 70 MG tablet  Commonly known as: FOSAMAX  Take 1 tablet (70 mg total) by mouth every 7 days.  Start taking on: January 30, 2023     buPROPion 150 MG TB24 tablet  Commonly known as: WELLBUTRIN XL  Take 1 tablet (150 mg total) by mouth once daily.     cyanocobalamin 1000 MCG tablet  Commonly known as: VITAMIN B-12  Take 100 mcg by mouth once daily.     galantamine 16 MG 24 hr capsule  Commonly known as: RAZADYNE ER  TAKE 1 CAPSULE EVERY DAY WITH BREAKFAST     HYDROcodone-acetaminophen 5-325 mg per tablet  Commonly known as: NORCO  Take 1 tablet by mouth every 6 (six) hours as needed for Pain.     levothyroxine 100 MCG tablet  Commonly known  as: SYNTHROID  TAKE 1 TABLET (100 MCG TOTAL) BY MOUTH BEFORE BREAKFAST.     LIDOcaine 5 %  Commonly known as: LIDODERM  Place 1 patch onto the skin once daily. Remove & Discard patch within 12 hours or as directed by MD for 10 days     losartan 50 MG tablet  Commonly known as: COZAAR  Take 1 tablet (50 mg total) by mouth once daily.     memantine 10 MG Tab  Commonly known as: NAMENDA  TAKE 1 TABLET TWICE DAILY            STOP taking these medications      methocarbamoL 500 MG Tab  Commonly known as: ROBAXIN                Immunizations Administered as of 12/21/2022       Name Date Dose VIS Date Route Exp Date    COVID-19, MRNA, LN-S, PF (Pfizer) (Purple Cap) 3/29/2021 -- -- Intramuscular --    Site: Right deltoid     : Pf[employer] Inc     Lot: [id number]     COVID-19, MRNA, LN-S, PF (Pfizer) (Purple Cap) 3/8/2021 -- -- Intramuscular --    Site: Left deltoid     : Pf[employer] Inc     Lot: [id number]             This patient has had both covid vaccinations    Some patients may experience side effects after vaccination.  These may include fever, headache, muscle or joint aches.  Most symptoms resolve with 24-48 hours and do not require urgent medical evaluation unless they persist for more than 72 hours or symptoms are concerning for an unrelated medical condition.          _________________________________  Afsaneh Chawla NP  12/21/2022

## 2022-12-21 NOTE — DISCHARGE SUMMARY
"[facility]   Skilled Nursing Facility   Discharge Summary  Patient Name: Rosario Menendez  : 1935  MRN: [id number]  Attending Physician: Leo Bowen MD  Nurse Practitioner: Afsaneh Chawla NP    Admit Date: 2022   Date of Discharge:    Length of Stay:  LOS: 20 days     Date of Service: 2022    Principal Problem: Closed compression fracture of body of L1 vertebra    HFalban Menendez is a 87 y.o. female with a past medical history significant for osteoporosis, thrombocytopenia, hypothyroidism, mild cognitive impairment, depression, and arthritis. She presents to [facility] after she tripped and fell onto her buttocks while in the kitchen. Patient states she "bounced" during her fall.  After fall she noted buttock and lower back pain. Buttock pain is worse than lower back pain. Pain started after fall and is exacerbated by movement. She denies radiating leg pain. She denies numbness, tinging, saddle anesthesia, or bowel dysfunction. Lumbar x-rays (AP/Lateral) obtained in ED for lower back pain revealing L1 compression fracture with 20% anterior height loss, and grade 2 anterolisthesis of L4 on L5. Lumbar CT  re demonstrating L1 compression fracture, and grade 2 anterolisthesis for L4 on L5 with associated pars defect.      Notes recent UTI with persistent dysuria s/p antibiotic treatment. She denies chills, nausea, or vomiting. Endorses slight subjective fever yesterday. UA  obtained in ED showing +nitrite, 3+leukocytes, many bacteria, and >100 WBC. UA culture pending. Blood culture NGTD. Of note, she has baseline thrombocytopenia.      Patient will be treated at [facility] with PT and OT to improve functional status and ability to perform ADLsPI      Hospital Course  Patient progressed well with PT and OT- last PT note states that patient ambulated 15ft using RW requiring SBA + VC for close AD proximity. Patient had no significant events during their stay at SNF. Home " health was set up. DME was ordered if needed. Follow up appointment to be made by patient within one week. All prescriptions and discharge instructions were ordered to be given to the patient prior to discharge.     Closed compression fracture of body of L1 vertebra  -87 y.o. female with a pmhx of osteoporosis, thrombocytopenia, mild cognitive impairment, and arthritis. Patient tripped and fell onto buttocks yesterday with residual lower back pain after fall. Lumbar CT showing L1 compression fracture. Neurosurgery consulted for further eval.  -Lumbar x-rays (AP/Lateral) 11/29: revealing L1 compression fracture with 20% anterior height loss, and grade 2 anterolisthesis of L4 on L5.   -Lumbar CT 11/30 re demonstrating L1 compression fracture, and grade 2 anterolisthesis for L4 on L5 with associated pars defect. Severe R and moderate L neural foraminal narrowing.   -No acute surgical intervention  -Obtain lumbar xrays upright/supine with increased height loss upon standing. No kyphotic deformity  -Obtain lumbar flex/ex xrays reviewed re-demonstrating L4-L5 grade 2 anterolisthesis  -TLSO brace ordered/at bedside for comfort  -Will schedule outpatient follow up in 4-6 weeks with repeat Lumbar xrays. Neurosurgery will arrange.      Acute cystitis with hematuria  -UA reviewed, UC w/ GNR  -Blood cxs NGTD  -Completed rocephin.  -12/5-Initiate order for cipro 500 mg BID, patient still with symptoms of UTI, will treat further   -12/6-Initiate order for pyridium 100 mg TID x 2 days      Constipation  -12/7-Initiate order for MoM PRN and increase senna to 2 tabs BID   -12/8-Initiate order for enema x 1      Amnestic MCI (mild cognitive impairment with memory loss)  -Continue namenda.     Thrombocytopenia, unspecified  -Chronic, stable.  -Monitor and transfuse for <50K if bleeding or <10K if not bleeding  -Avoid antiplatelet medications including Lovenox, heparin,NSAIDs and aspirin.  -Follows with hem/onc outpatient.      Hypercholesteremia  -cont statin         Thyroid disease  -cont home synthroid    Physical Exam  Constitutional: Patient appears debilitated.  No distress noted  HENT:   Head: Normocephalic and atraumatic.   Eyes: Pupils are equal, round  Neck: Normal range of motion. Neck supple.   Cardiovascular: Normal rate, regular rhythm and normal heart sounds.    Pulmonary/Chest: Effort normal and breath sounds are clear  Abdominal: Soft. Bowel sounds are normal.   Musculoskeletal: Normal range of motion.   Neurological: Alert and oriented to person,   Psychiatric: Normal mood and affect. Behavior is normal. +forgetful   Skin: Skin is warm and dry.       Recent Labs   Lab 12/15/22  0535 12/20/22 0513   WBC 5.04 4.98   HGB 13.3 12.7   HCT 41.6 38.4   PLT 81* 57*     Recent Labs   Lab 12/15/22  0535 12/20/22  0513    141   K 4.0 3.3*   * 114*   CO2 22* 22*   BUN 13 14   CREATININE 0.8 1.0   GLU 80 85   CALCIUM 9.7 9.4   MG 2.2 1.9   PHOS 2.7 2.6*     No results for input(s): ALKPHOS, ALT, AST, ALBUMIN, PROT, BILITOT, INR in the last 168 hours.   No results for input(s): CPK, CPKMB, MB, TROPONINI in the last 72 hours.  No results for input(s): LACTATE in the last 72 hours.    No results for input(s): POCTGLUCOSE in the last 168 hours.   No results found for: HGBA1C      Current Discharge Medication List        CONTINUE these medications which have CHANGED    Details   galantamine (RAZADYNE ER) 16 MG 24 hr capsule TAKE 1 CAPSULE EVERY DAY WITH BREAKFAST  Qty: 30 capsule, Refills: 0      HYDROcodone-acetaminophen (NORCO) 5-325 mg per tablet Take 1 tablet by mouth every 6 (six) hours as needed for Pain.  Qty: 20 tablet, Refills: 0    Comments: Quantity prescribed more than 7 day supply? No      LIDOcaine (LIDODERM) 5 % Place 1 patch onto the skin once daily. Remove & Discard patch within 12 hours or as directed by MD for 10 days  Qty: 20 patch, Refills: 0      losartan (COZAAR) 50 MG tablet Take 1 tablet (50 mg  total) by mouth once daily.  Qty: 30 tablet, Refills: 0    Comments: .           CONTINUE these medications which have NOT CHANGED    Details   alendronate (FOSAMAX) 70 MG tablet Take 1 tablet (70 mg total) by mouth every 7 days.  Qty: 4 tablet, Refills: 11      buPROPion (WELLBUTRIN XL) 150 MG TB24 tablet Take 1 tablet (150 mg total) by mouth once daily.  Qty: 90 tablet, Refills: 2      cyanocobalamin (VITAMIN B-12) 1000 MCG tablet Take 100 mcg by mouth once daily.      levothyroxine (SYNTHROID) 100 MCG tablet TAKE 1 TABLET (100 MCG TOTAL) BY MOUTH BEFORE BREAKFAST.  Qty: 90 tablet, Refills: 1      memantine (NAMENDA) 10 MG Tab TAKE 1 TABLET TWICE DAILY  Qty: 180 tablet, Refills: 1      pravastatin (PRAVACHOL) 40 MG tablet TAKE 1 TABLET EVERY DAY  Qty: 90 tablet, Refills: 1           STOP taking these medications       methocarbamoL (ROBAXIN) 500 MG Tab Comments:   Reason for Stopping:               Discharge Diet:regular diet with Normal Fluid intake of 1500 - 2000 mL per day    Activity: activity as tolerated    Discharge Condition: Stable     Disposition: Home or Self Care    Future Appointments   Date Time Provider Department Center   1/5/2023  8:00 AM El Zarate NP [facility] C3[facility] [place]   1/10/2023  2:30 PM [facility] XR-A LIMIT 350 LBS [facility] XRAY OP [facility]   1/10/2023  3:30 PM Candy Yang PA-C [facility] NEUROSU [facility]   4/4/2023  1:00 PM Shi Simon MD [facility] svetlana PC[facility]       38 minutes total time spent on the discharge of the patient including review of hospital course with the patient, reviewing discharge medications, and arranging follow-up care.      Afsaneh Chawla NP  [facility]   Skilled Nursing [facility]

## 2022-12-21 NOTE — NURSING
1149 pt is discharging from SNF unit to [facility] with her daughter via personal vehicle .Discharge instructions and follow up appointments have been discussed with patient and family members. All questions have been answered. No further instructions or information is needed at this time.

## 2023-01-06 ENCOUNTER — TELEPHONE (OUTPATIENT)
Dept: NEUROSURGERY | Facility: CLINIC | Age: 88
End: 2023-01-06
Payer: MEDICARE

## 2023-01-06 NOTE — TELEPHONE ENCOUNTER
Called pt to confirm appointment on Tuesday 1/10/2023. Pt daughter stated that she will not be able to make that appointment due to her having covid and she just got out the hospital today and she cannot stand up. Pt daughter stated that she will call to reschedule her mother's appointment when she is feeling better. I sated to pt's daughter that is fine. Pt's daughter voiced understanding

## 2023-01-16 ENCOUNTER — OFFICE VISIT (OUTPATIENT)
Dept: HOME HEALTH SERVICES | Facility: CLINIC | Age: 88
End: 2023-01-16
Payer: MEDICARE

## 2023-01-16 VITALS
SYSTOLIC BLOOD PRESSURE: 133 MMHG | DIASTOLIC BLOOD PRESSURE: 71 MMHG | RESPIRATION RATE: 18 BRPM | WEIGHT: 118 LBS | HEIGHT: 60 IN | BODY MASS INDEX: 23.16 KG/M2 | TEMPERATURE: 98 F | HEART RATE: 67 BPM | OXYGEN SATURATION: 98 %
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
## 2023-01-16 DIAGNOSIS — S32.000A COMPRESSION FX, LUMBAR SPINE: ICD-10-CM

## 2023-01-16 PROCEDURE — 99350 HOME/RES VST EST HIGH MDM 60: CPT | Mod: S$GLB,,, | Performed by: NURSE PRACTITIONER

## 2023-01-16 PROCEDURE — 1160F RVW MEDS BY RX/DR IN RCRD: CPT | Mod: CPTII,S$GLB,, | Performed by: NURSE PRACTITIONER

## 2023-01-16 PROCEDURE — 99497 ADVNCD CARE PLAN 30 MIN: CPT | Mod: S$GLB,,, | Performed by: NURSE PRACTITIONER

## 2023-01-16 PROCEDURE — 99350 PR HOME VISIT,ESTAB PATIENT,LEVEL IV: ICD-10-PCS | Mod: S$GLB,,, | Performed by: NURSE PRACTITIONER

## 2023-01-16 PROCEDURE — 1159F PR MEDICATION LIST DOCUMENTED IN MEDICAL RECORD: ICD-10-PCS | Mod: CPTII,S$GLB,, | Performed by: NURSE PRACTITIONER

## 2023-01-16 PROCEDURE — 1160F PR REVIEW ALL MEDS BY PRESCRIBER/CLIN PHARMACIST DOCUMENTED: ICD-10-PCS | Mod: CPTII,S$GLB,, | Performed by: NURSE PRACTITIONER

## 2023-01-16 PROCEDURE — 99497 PR ADVNCD CARE PLAN 30 MIN: ICD-10-PCS | Mod: S$GLB,,, | Performed by: NURSE PRACTITIONER

## 2023-01-16 PROCEDURE — 1159F MED LIST DOCD IN RCRD: CPT | Mod: CPTII,S$GLB,, | Performed by: NURSE PRACTITIONER

## 2023-01-17 NOTE — PROGRESS NOTES
[facility]  Transition of Care Home Visit    Visit Date: 1/16/23  Encounter Provider: Devika Garland   PCP:  Shi Simon MD    PRESENTING HISTORY      Patient ID: Rosario Menendez is a 87 y.o. female.    Consult Requested By:  Dr. Leo Bowen  Reason for Consult:  Hospital Follow Up.    Rosario is being seen at home due to being seen at home due to physical debility that presents a taxing effort to leave the home, to mitigate high risk of hospital readmission and/or due to the limited availability of reliable or safe options for transportation to the point of access to the provider. Prior to treatment on this visit the chart was reviewed and patient verbal consent was obtained.      Chief Complaint: Transitional Care        History of Present Illness: Ms. Rosario Menendez is a 87 y.o. female who was recently admitted to the hospital.    Admit: 1/2/2023   Discharge: 1/5/2023  Discharge to: Home with     Rosario Menendez is a 87 y.o. 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 Per daughter, patient has become more altered over the course of the last week. The patient was visiting her family over the sean. She states that she has been having a cough, nasal congestion, and generalized weakness for about a week and a half. Her symptoms are mild at present. She denies any fever, chills, SOB, fever, chest pain, abdominal pain, or any other pains or symptoms at this time. No other specific aggravating or relieving factors otherwise.      On chart check, patient recently admitted to [facility] for rehab following hospital stay for fall resulting in lumbar compression fracture.      In the ED, hypertensive to 173/84, otherwise vitals stable, sating well on RA,  Covid positive, UA with 11-20 wbcs, 3-5 rbcs CT head negative, CXR negative. AST, ALT, ALP, potassium normal. CBC with platelets 130. Admitted for Covid and UTI which was treated with 1g Rocephin in ED.       Hospital Course:   AMS work up including CT head was without significant finding. Patient alert and oriented x3 throughout the entirety of her stay and did not appear to be altered per primary team.    Under the guidance of ID patient's nearly asymptomatic COVID infection (On RA, normal CXR) was treated with 3 days of Remdesivir given patient's advanced age and comorbidities. Pyuria noted and as patient denied dysuria or frequency it was not treated with antibiotics.   _________________________________________________________  Today:   With this visit today patient is found sitting up on the sofa at the [facility]. Patient is AAOx3, able to verify her name and . Most of patients other history was received from her medical record. She currently sees HH with [facility]; PT/OT twice per week, RN/aide once per week. She sees Dr. Simon as her PCP. She endorses eating x 3 small meals per day per staff, her appetite is good. She endorses regular BMs with prn colace and miralax for occasional constipation. She denies acute concerns today.      [facility] PT is working with patient over the last few weeks as well as  to help with patient needs. Education completed ~ 15 minutes. Plan to follow up.     VSS. Denies fever, chest pain, shortness of breath, nausea, vomiting, diarrhea. Risks of environmental exposure to coronavirus discussed including: social distancing, hand hygiene, and limiting departures from the home for necessities only.  Reports understanding and willingness to comply.  All hospital discharge orders reviewed and being followed, all medications reconciled and reviewed, patient and family verbalized understanding. No other needs identified at this time.     I initiated the process of  advance care planning today and explained the importance of this process to the patient and family.  I introduced the concept of advance directives to the patient, as well. Then the patient received detailed information about the importance of designating a Health Care Power of  (HCPOA). She was also instructed to communicate with this person about his wishes for future healthcare, should he become sick and lose decision-making capacity. FULL CODE STATUS    I Introduced [place] form with patient/family, explaining this is the patient's wishes, and this form will be uploaded into the patient's [facility] and the [place] Registry.     We spoke about ACP for 20 minutes.    Attestation: Screening criteria to assess the level of the patient's risk for infection with COVID-19 as recommended by the CDC at the time of the above documented home visit concluded appropriateness to proceed. Universal precautions were maintained at all times, including provider use of 60% alcohol gel hand  immediately prior to entry and upon departing the patient's home.    Review of Systems   Constitutional:  Negative for fatigue and unexpected weight change.   HENT:  Negative for congestion.    Respiratory:  Negative for cough, choking and shortness of breath.    Cardiovascular:  Negative for chest pain and palpitations.   Gastrointestinal:  Negative for abdominal distention.   Genitourinary:  Negative for difficulty urinating.   Musculoskeletal:  Positive for gait problem.   Skin:  Negative for color change and rash.   Neurological:  Positive for weakness. Negative for dizziness.     Assessments:  Environmental: [facility] in [facility]  Functional Status: Min asst with ADLs and mobility   Safety: Fall precautions  Nutritional: Heart healthy  Home Health/DME/Supplies: [facility]    PAST HISTORY:     Past Medical History:   Diagnosis Date    Arthritis     Depression     Hypercholesteremia     Thrombocytopenia     Thyroid  disease        Past Surgical History:   Procedure Laterality Date    ANKLE SURGERY      COLONOSCOPY N/A 2021    Procedure: COLONOSCOPY;  Surgeon: Alfonso Haque MD;  Location: [facility] ([address] [place] [address]);  Service: Endoscopy;  Laterality: N/A;  any crs  covid test -[place]    HYSTERECTOMY      KNEE SURGERY      WRIST SURGERY         Family History   Problem Relation Age of Onset    Heart failure Mother          at 64    Heart failure Father          at 93    Suicide Son          at 34    Cancer Maternal Uncle         colon       Social History     Socioeconomic History    Marital status:    Tobacco Use    Smoking status: Never    Smokeless tobacco: Never       MEDICATIONS & ALLERGIES:     Current Outpatient Medications on File Prior to Visit   Medication Sig Dispense Refill    [START ON 2023] alendronate (FOSAMAX) 70 MG tablet Take 1 tablet (70 mg total) by mouth every 7 days. 4 tablet 11    buPROPion (WELLBUTRIN XL) 150 MG TB24 tablet Take 1 tablet (150 mg total) by mouth once daily. 90 tablet 2    cyanocobalamin (VITAMIN B-12) 1000 MCG tablet Take 100 mcg by mouth once daily.      galantamine (RAZADYNE ER) 16 MG 24 hr capsule TAKE 1 CAPSULE EVERY DAY WITH BREAKFAST 30 capsule 0    levothyroxine (SYNTHROID) 100 MCG tablet TAKE 1 TABLET (100 MCG TOTAL) BY MOUTH BEFORE BREAKFAST. 90 tablet 1    losartan (COZAAR) 50 MG tablet Take 1 tablet (50 mg total) by mouth once daily. 30 tablet 0    memantine (NAMENDA) 10 MG Tab TAKE 1 TABLET TWICE DAILY 180 tablet 1    pravastatin (PRAVACHOL) 40 MG tablet TAKE 1 TABLET EVERY DAY 90 tablet 1     No current facility-administered medications on file prior to visit.        Review of patient's allergies indicates:  No Known Allergies    OBJECTIVE:     Vital Signs:  Vitals:    23 1413   BP: 133/71   Pulse: 67   Resp: 18   Temp: 97.8 °F (36.6 °C)     Body mass index is 23.05 kg/m².     Physical Exam:  Physical Exam  HENT:      Head: Atraumatic.    Cardiovascular:      Rate and Rhythm: Normal rate.      Pulses: Normal pulses.   Pulmonary:      Effort: Pulmonary effort is normal.   Abdominal:      General: Abdomen is flat.   Skin:     General: Skin is warm and dry.      Capillary Refill: Capillary refill takes less than 2 seconds.   Neurological:      Mental Status: She is alert. Mental status is at baseline.   Psychiatric:         Mood and Affect: Mood normal.       Laboratory  Lab Results   Component Value Date    WBC 4.98 12/20/2022    HGB 12.7 12/20/2022    HCT 38.4 12/20/2022    MCV 94 12/20/2022    PLT 57 (L) 12/20/2022     No results found for: INR, PROTIME  No results found for: HGBA1C  No results for input(s): POCTGLUCOSE in the last 72 hours.    Diagnostic Results:  N/a    TRANSITION OF CARE:     [facility] Contact Note: 01/06/2022    Family and/or Caretaker present at visit?  Yes.  Diagnostic tests reviewed/disposition: No diagnosic tests pending after this hospitalization.  Disease/illness education: Fall precautions  Home health/community services discussion/referrals: Patient has home health established at [facility] .   Establishment or re-establishment of referral orders for community resources: No other necessary community resources.   Discussion with other health care providers: No discussion with other health care providers necessary.     Transition of Care Visit:  I have reviewed and updated the history and problem list.  I have reconciled the medication list.  I have discussed the hospitalization and current medical issues, prognosis and plans with the patient/family.  I  spent more than 50% of time discussing the care with the patient/family.  Total Face-to-Face Encounter: 60 minutes.    Medications Reconciliation:   I have reconciled the patient's home medications and discharge medications with the patient/family. I have updated all changes.  Refer to After-Visit Medication List.    ASSESSMENT & PLAN:     HIGH RISK CONDITION(S):  Patient  has a condition that poses threat to life and bodily function    1. Compression fx, lumbar spine  -     Ambulatory referral/consult to [facility]    Continue home PT/OT      Were controlled substances prescribed?  No      Patient Instructions Given:  - Continue all medications, treatments and therapies as ordered.   - Follow all instructions, recommendations as discussed.  - Maintain Safety Precautions at all times.  - Attend all medical appointments as scheduled.  - For worsening symptoms: call Primary Care Physician or Nurse Practitioner.  - For emergencies, call 911 or immediately report to the nearest emergency room.    After Visit Medication List :     Medication List            Accurate as of January 16, 2023  8:14 PM. If you have any questions, ask your nurse or doctor.                CONTINUE taking these medications      alendronate 70 MG tablet  Commonly known as: FOSAMAX  Take 1 tablet (70 mg total) by mouth every 7 days.  Start taking on: January 30, 2023     buPROPion 150 MG TB24 tablet  Commonly known as: WELLBUTRIN XL  Take 1 tablet (150 mg total) by mouth once daily.     cyanocobalamin 1000 MCG tablet  Commonly known as: VITAMIN B-12     galantamine 16 MG 24 hr capsule  Commonly known as: RAZADYNE ER  TAKE 1 CAPSULE EVERY DAY WITH BREAKFAST     levothyroxine 100 MCG tablet  Commonly known as: SYNTHROID  TAKE 1 TABLET (100 MCG TOTAL) BY MOUTH BEFORE BREAKFAST.     losartan 50 MG tablet  Commonly known as: COZAAR  Take 1 tablet (50 mg total) by mouth once daily.     memantine 10 MG Tab  Commonly known as: NAMENDA  TAKE 1 TABLET TWICE DAILY     pravastatin 40 MG tablet  Commonly known as: PRAVACHOL  TAKE 1 TABLET EVERY DAY            Future Appointments   Date Time Provider Department Center   4/4/2023  1:00 PM Shi Simon MD [facility] PCW     Risks of environmental exposure to coronavirus discussed including: social distancing, hand hygiene, and limiting departures from the  home for necessities only. Reports understanding and willingness to comply.     Signature:    Devika Garland, MSN, APRN, FNP-C  [facility]

## 2023-01-17 NOTE — PATIENT INSTRUCTIONS
Instructions:  - [facility] Nurse Practitioner to schedule home follow-up visit with patient  as needed.  - Continue all medications, treatments and therapies as ordered.   - Follow all instructions, recommendations as discussed.  - Maintain Safety Precautions at all times.  - Attend all medical appointments as scheduled.  - For worsening symptoms: call Primary Care Physician or Nurse Practitioner.  - For emergencies, call 911 or immediately report to the nearest emergency room.  - Limit Risks of environmental exposure to coronavirus/COVID-19 as discussed including: social distancing, hand hygiene, and limiting departures from the home for necessities only.

## 2023-02-01 ENCOUNTER — TELEPHONE (OUTPATIENT)
Dept: INTERNAL MEDICINE | Facility: CLINIC | Age: 88
End: 2023-02-01
Payer: MEDICARE

## 2023-02-01 NOTE — TELEPHONE ENCOUNTER
----- Message from Raquel García sent at 2/1/2023  9:29 AM CST -----  Contact: rusty  Type:  Needs Medical Advice    [facility] Called: [facility]   Symptoms (please be specific): they need orders to continue home health physical therapy and a verbal will be acceptable     Would the patient rather a call back or a response via [facility]? call  Best Call Back Number: [phone]  Additional Information: [phone]

## 2023-02-07 DIAGNOSIS — Z00.00 ENCOUNTER FOR MEDICARE ANNUAL WELLNESS EXAM: ICD-10-CM

## 2023-02-09 DIAGNOSIS — Z00.00 ENCOUNTER FOR MEDICARE ANNUAL WELLNESS EXAM: ICD-10-CM

## 2023-02-15 ENCOUNTER — TELEPHONE (OUTPATIENT)
Dept: NEUROLOGY | Facility: CLINIC | Age: 88
End: 2023-02-15
Payer: MEDICARE

## 2023-02-15 NOTE — TELEPHONE ENCOUNTER
Spoke with the patients daughter. She will call back to schedule an appointment if needed. Mom has an appointment with another provider.

## 2023-02-17 ENCOUNTER — TELEPHONE (OUTPATIENT)
Dept: INTERNAL MEDICINE | Facility: CLINIC | Age: 88
End: 2023-02-17
Payer: MEDICARE

## 2023-02-17 NOTE — TELEPHONE ENCOUNTER
----- Message from Michelle Quiles sent at 2/17/2023  2:46 PM CST -----  Contact: Doug MOON/ [facility]   Doug moon/ [facility] is calling in regards to having sent over some orders for the doctor to review and put in. Doug is asking if Dr Simon had a chance to look at the request? If so please fill out the physician order and fax back too . Please call and advise.

## 2023-02-20 ENCOUNTER — TELEPHONE (OUTPATIENT)
Dept: INTERNAL MEDICINE | Facility: CLINIC | Age: 88
End: 2023-02-20
Payer: MEDICARE

## 2023-02-20 NOTE — TELEPHONE ENCOUNTER
Called Doug from [facility]. She is out of the office today and will be back in on Wednsday

## 2023-02-27 ENCOUNTER — TELEPHONE (OUTPATIENT)
Dept: INTERNAL MEDICINE | Facility: CLINIC | Age: 88
End: 2023-02-27
Payer: MEDICARE

## 2023-02-27 NOTE — TELEPHONE ENCOUNTER
----- Message from Yu Morrison sent at 2/27/2023  1:35 PM CST -----  Contact: [phone]/ Aditi /daughter  Caller is requesting an earlier appointment then we can schedule.  Caller is requesting a message be sent to the provider.  When is the next available appointment with their provider:  5/25/23  Reason for the appointment:  4/4/ book out  Patient preference of timeframe to be scheduled:    Would the patient like a call back, or a response through their [facility] portal?:   call back  Comments:   Please call christophe Haywoodna to r/s

## 2023-03-13 ENCOUNTER — DOCUMENT SCAN (OUTPATIENT)
Dept: HOME HEALTH SERVICES | Facility: HOSPITAL | Age: 88
End: 2023-03-13

## 2023-03-13 ENCOUNTER — DOCUMENT SCAN (OUTPATIENT)
Dept: HOME HEALTH SERVICES | Facility: HOSPITAL | Age: 88
End: 2023-03-13
Payer: MEDICARE

## 2023-03-15 ENCOUNTER — OFFICE VISIT (OUTPATIENT)
Dept: INTERNAL MEDICINE | Facility: CLINIC | Age: 88
End: 2023-03-15
Payer: MEDICARE

## 2023-03-15 DIAGNOSIS — D69.6 THROMBOCYTOPENIA, UNSPECIFIED: ICD-10-CM

## 2023-03-15 DIAGNOSIS — I70.0 AORTIC ATHEROSCLEROSIS: ICD-10-CM

## 2023-03-15 DIAGNOSIS — E03.9 HYPOTHYROIDISM, UNSPECIFIED TYPE: ICD-10-CM

## 2023-03-15 DIAGNOSIS — I10 HYPERTENSION, UNSPECIFIED TYPE: Primary | ICD-10-CM

## 2023-03-15 PROCEDURE — 3288F PR FALLS RISK ASSESSMENT DOCUMENTED: ICD-10-PCS | Mod: HCNC,CPTII,S$GLB, | Performed by: INTERNAL MEDICINE

## 2023-03-15 PROCEDURE — 1126F AMNT PAIN NOTED NONE PRSNT: CPT | Mod: HCNC,CPTII,S$GLB, | Performed by: INTERNAL MEDICINE

## 2023-03-15 PROCEDURE — 1101F PR PT FALLS ASSESS DOC 0-1 FALLS W/OUT INJ PAST YR: ICD-10-PCS | Mod: HCNC,CPTII,S$GLB, | Performed by: INTERNAL MEDICINE

## 2023-03-15 PROCEDURE — 3288F FALL RISK ASSESSMENT DOCD: CPT | Mod: HCNC,CPTII,S$GLB, | Performed by: INTERNAL MEDICINE

## 2023-03-15 PROCEDURE — 1101F PT FALLS ASSESS-DOCD LE1/YR: CPT | Mod: HCNC,CPTII,S$GLB, | Performed by: INTERNAL MEDICINE

## 2023-03-15 PROCEDURE — 99214 PR OFFICE/OUTPT VISIT, EST, LEVL IV, 30-39 MIN: ICD-10-PCS | Mod: HCNC,S$GLB,, | Performed by: INTERNAL MEDICINE

## 2023-03-15 PROCEDURE — 1126F PR PAIN SEVERITY QUANTIFIED, NO PAIN PRESENT: ICD-10-PCS | Mod: HCNC,CPTII,S$GLB, | Performed by: INTERNAL MEDICINE

## 2023-03-15 PROCEDURE — 99999 PR PBB SHADOW E&M-EST. PATIENT-LVL III: ICD-10-PCS | Mod: PBBFAC,HCNC,, | Performed by: INTERNAL MEDICINE

## 2023-03-15 PROCEDURE — 99999 PR PBB SHADOW E&M-EST. PATIENT-LVL III: CPT | Mod: PBBFAC,HCNC,, | Performed by: INTERNAL MEDICINE

## 2023-03-15 PROCEDURE — 99214 OFFICE O/P EST MOD 30 MIN: CPT | Mod: HCNC,S$GLB,, | Performed by: INTERNAL MEDICINE

## 2023-03-19 VITALS
OXYGEN SATURATION: 97 % | BODY MASS INDEX: 24.94 KG/M2 | HEART RATE: 70 BPM | HEIGHT: 60 IN | WEIGHT: 127 LBS | SYSTOLIC BLOOD PRESSURE: 126 MMHG | TEMPERATURE: 99 F | DIASTOLIC BLOOD PRESSURE: 74 MMHG

## 2023-03-19 PROBLEM — I70.0 AORTIC ATHEROSCLEROSIS: Status: ACTIVE | Noted: 2023-03-19

## 2023-03-19 NOTE — PROGRESS NOTES
Subjective:       Patient ID: Rosario Menendez is a 87 y.o. female.    Chief Complaint: Hypertension    HPI  She returns for management of hypertension.  She has had hypertension for over a year.  Current treatment has included medications outlined in medication list.  She denies chest pain or shortness of breath.  No palpitations.  Denies left arm or neck pain.  She has hypothyroidism.  Currently on Synthroid     Past medical history:  ITP, hypertension, hyperlipidemia, hypothyroidism, osteoporosis, sleep apnea, status post hysterectomy, wrist surgery, dementia, colon adenoma.  She had a colonoscopy June 2021     Medications: Razadyne, Synthroid .1 mg daily, Cozaar 50 mg daily, Namenda, Pravachol 40 mg daily, fosamax     No known drug allergies    Review of Systems   Constitutional:  Negative for chills, fatigue, fever and unexpected weight change.   Respiratory:  Negative for chest tightness and shortness of breath.    Cardiovascular:  Negative for chest pain and palpitations.   Gastrointestinal:  Negative for abdominal pain and blood in stool.   Neurological:  Negative for dizziness, syncope, numbness and headaches.     Objective:      Physical Exam  HENT:      Right Ear: External ear normal.      Left Ear: External ear normal.      Nose: Nose normal.      Mouth/Throat:      Mouth: Mucous membranes are moist.      Pharynx: Oropharynx is clear.   Eyes:      Pupils: Pupils are equal, round, and reactive to light.   Cardiovascular:      Rate and Rhythm: Normal rate and regular rhythm.      Heart sounds: No murmur heard.  Pulmonary:      Breath sounds: Normal breath sounds.   Abdominal:      General: There is no distension.      Palpations: There is no hepatomegaly or splenomegaly.      Tenderness: There is no abdominal tenderness.   Musculoskeletal:      Cervical back: Normal range of motion.   Lymphadenopathy:      Cervical: No cervical adenopathy.      Upper Body:      Right upper body: No axillary adenopathy.       Left upper body: No axillary adenopathy.   Neurological:      Cranial Nerves: No cranial nerve deficit.      Sensory: No sensory deficit.      Motor: Motor function is intact.      Deep Tendon Reflexes: Reflexes are normal and symmetric.       Assessment/Plan       Assessment and plan:  1.  Hypertension:  Controlled.  Continue Cozaar  2.  Hypothyroidism:  Continue Synthroid  3. Discussed Hematology appointment, Pap smear, pelvic exam, spine clinic appointment.  She declined all

## 2023-04-09 RX ORDER — LEVOTHYROXINE SODIUM 100 UG/1
100 TABLET ORAL
Qty: 90 TABLET | Refills: 2 | Status: SHIPPED | OUTPATIENT
Start: 2023-04-09 | End: 2023-09-20 | Stop reason: SDUPTHER

## 2023-04-09 NOTE — TELEPHONE ENCOUNTER
Care Due:                  Date            Visit Type   Department     Provider  --------------------------------------------------------------------------------                                EP -                              PRIMARY      [facility] INTERNAL  Last Visit: 03-      CARE ([facility])   MEDICINE       Shi Simon                              [facility]                              PRIMARY      [facility] INTERNAL  Next Visit: 09-      CARE ([facility])   MEDICINE       Shi Simon                                                            Last  Test          Frequency    Reason                     Performed    Due Date  --------------------------------------------------------------------------------    Lipid Panel.  12 months..  pravastatin..............  04- 04-    [employer] Embedded Care Gaps. Reference number: [id number]. 4/09/2023   3:19:52 AM CDT

## 2023-04-09 NOTE — TELEPHONE ENCOUNTER
Refill Decision Note   Rosario Menendez  is requesting a refill authorization.  Brief Assessment and Rationale for Refill:  Approve     Medication Therapy Plan:       Medication Reconciliation Completed: No   Comments:     Provider Staff:     Action is required for this patient.   Please see care gap opportunities below in Care Due Message.     Thanks!  [facility]     Appointments      Date Provider   Last Visit   3/15/2023 Shi Simon MD   Next Visit   9/20/2023 Shi Simon MD     Note composed:4:17 PM 04/09/2023           Note composed:4:17 PM 04/09/2023

## 2023-05-11 ENCOUNTER — TELEPHONE (OUTPATIENT)
Dept: INTERNAL MEDICINE | Facility: CLINIC | Age: 88
End: 2023-05-11
Payer: MEDICARE

## 2023-05-11 NOTE — TELEPHONE ENCOUNTER
----- Message from Raquel Barnett sent at 5/11/2023  2:21 PM CDT -----  Contact: [phone] Mary Grace Brody with [facility] is checking on the status of a discharge from home health order. Please call and advise

## 2023-05-19 ENCOUNTER — TELEPHONE (OUTPATIENT)
Dept: INTERNAL MEDICINE | Facility: CLINIC | Age: 88
End: 2023-05-19
Payer: MEDICARE

## 2023-05-19 NOTE — TELEPHONE ENCOUNTER
----- Message from Derek Shukla sent at 5/19/2023  3:23 PM CDT -----  Contact: Doug with [facility]  fax # [phone]  Mary Grace called in regards to order she has not yet received it please refax and advise

## 2023-06-22 ENCOUNTER — PATIENT MESSAGE (OUTPATIENT)
Dept: INTERNAL MEDICINE | Facility: CLINIC | Age: 88
End: 2023-06-22
Payer: MEDICARE

## 2023-06-22 RX ORDER — GALANTAMINE HYDROBROMIDE 16 MG/1
CAPSULE, EXTENDED RELEASE ORAL
Qty: 30 CAPSULE | Refills: 3 | Status: SHIPPED | OUTPATIENT
Start: 2023-06-22 | End: 2023-09-07

## 2023-06-23 ENCOUNTER — TELEPHONE (OUTPATIENT)
Dept: INTERNAL MEDICINE | Facility: CLINIC | Age: 88
End: 2023-06-23
Payer: MEDICARE

## 2023-06-23 ENCOUNTER — PATIENT MESSAGE (OUTPATIENT)
Dept: INTERNAL MEDICINE | Facility: CLINIC | Age: 88
End: 2023-06-23
Payer: MEDICARE

## 2023-06-23 RX ORDER — GALANTAMINE HYDROBROMIDE 16 MG/1
CAPSULE, EXTENDED RELEASE ORAL
Qty: 30 CAPSULE | Refills: 3 | OUTPATIENT
Start: 2023-06-23

## 2023-06-23 NOTE — TELEPHONE ENCOUNTER
----- Message from Georgie Brewer sent at 6/23/2023 12:06 PM CDT -----  Contact: Aditi/[phone]  DISREGARD THIS MESSAGE      Patients daughter Aditi  is calling to clarify an RX.  RX name:  galantamine (RAZADYNE ER) 16 MG 24 hr capsule  What do they need to clarify:  [employer] states that prescriber denied medication because refill is inappropriate  Comments:

## 2023-07-15 ENCOUNTER — HOSPITAL ENCOUNTER (OUTPATIENT)
Facility: HOSPITAL | Age: 88
Discharge: NURSING, REHAB OR LONG-TERM CARE | End: 2023-07-18
Attending: EMERGENCY MEDICINE | Admitting: EMERGENCY MEDICINE
Payer: MEDICARE

## 2023-07-15 DIAGNOSIS — R06.02 SOB (SHORTNESS OF BREATH): ICD-10-CM

## 2023-07-15 DIAGNOSIS — R07.9 CHEST PAIN: ICD-10-CM

## 2023-07-15 DIAGNOSIS — R41.82 ALTERED MENTAL STATUS, UNSPECIFIED ALTERED MENTAL STATUS TYPE: Primary | ICD-10-CM
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
## 2023-07-15 DIAGNOSIS — N39.0 URINARY TRACT INFECTION WITHOUT HEMATURIA, SITE UNSPECIFIED: ICD-10-CM

## 2023-07-15 DIAGNOSIS — R55 SYNCOPE: ICD-10-CM

## 2023-07-15 DIAGNOSIS — N39.0 UTI (URINARY TRACT INFECTION): ICD-10-CM

## 2023-07-15 DIAGNOSIS — R50.9 FEVER: ICD-10-CM

## 2023-07-15 LAB
ALBUMIN SERPL BCP-MCNC: 3.5 G/DL (ref 3.5–5.2)
ALP SERPL-CCNC: 68 U/L (ref 55–135)
ALT SERPL W/O P-5'-P-CCNC: 13 U/L (ref 10–44)
ANION GAP SERPL CALC-SCNC: 10 MMOL/L (ref 8–16)
AST SERPL-CCNC: 14 U/L (ref 10–40)
BACTERIA #/AREA URNS AUTO: ABNORMAL /HPF
BASOPHILS # BLD AUTO: 0.04 K/UL (ref 0–0.2)
BASOPHILS NFR BLD: 0.3 % (ref 0–1.9)
BILIRUB SERPL-MCNC: 1.2 MG/DL (ref 0.1–1)
BILIRUB UR QL STRIP: NEGATIVE
BNP SERPL-MCNC: 50 PG/ML (ref 0–99)
BUN SERPL-MCNC: 15 MG/DL (ref 8–23)
CALCIUM SERPL-MCNC: 10.2 MG/DL (ref 8.7–10.5)
CHLORIDE SERPL-SCNC: 108 MMOL/L (ref 95–110)
CLARITY UR REFRACT.AUTO: CLEAR
CO2 SERPL-SCNC: 20 MMOL/L (ref 23–29)
COLOR UR AUTO: YELLOW
CREAT SERPL-MCNC: 0.9 MG/DL (ref 0.5–1.4)
DIFFERENTIAL METHOD: ABNORMAL
EOSINOPHIL # BLD AUTO: 0 K/UL (ref 0–0.5)
EOSINOPHIL NFR BLD: 0.1 % (ref 0–8)
ERYTHROCYTE [DISTWIDTH] IN BLOOD BY AUTOMATED COUNT: 14.1 % (ref 11.5–14.5)
EST. GFR  (NO RACE VARIABLE): >60 ML/MIN/1.73 M^2
GLUCOSE SERPL-MCNC: 137 MG/DL (ref 70–110)
GLUCOSE UR QL STRIP: NEGATIVE
HCT VFR BLD AUTO: 44.6 % (ref 37–48.5)
HGB BLD-MCNC: 14.4 G/DL (ref 12–16)
HGB UR QL STRIP: NEGATIVE
HYALINE CASTS UR QL AUTO: 15 /LPF
IMM GRANULOCYTES # BLD AUTO: 0.08 K/UL (ref 0–0.04)
IMM GRANULOCYTES NFR BLD AUTO: 0.6 % (ref 0–0.5)
KETONES UR QL STRIP: ABNORMAL
LACTATE SERPL-SCNC: 1.5 MMOL/L (ref 0.5–2.2)
LEUKOCYTE ESTERASE UR QL STRIP: ABNORMAL
LYMPHOCYTES # BLD AUTO: 1.5 K/UL (ref 1–4.8)
LYMPHOCYTES NFR BLD: 10.3 % (ref 18–48)
MAGNESIUM SERPL-MCNC: 2.1 MG/DL (ref 1.6–2.6)
MCH RBC QN AUTO: 31 PG (ref 27–31)
MCHC RBC AUTO-ENTMCNC: 32.3 G/DL (ref 32–36)
MCV RBC AUTO: 96 FL (ref 82–98)
MICROSCOPIC COMMENT: ABNORMAL
MONOCYTES # BLD AUTO: 1.6 K/UL (ref 0.3–1)
MONOCYTES NFR BLD: 11.1 % (ref 4–15)
NEUTROPHILS # BLD AUTO: 10.9 K/UL (ref 1.8–7.7)
NEUTROPHILS NFR BLD: 77.6 % (ref 38–73)
NITRITE UR QL STRIP: POSITIVE
NRBC BLD-RTO: 0 /100 WBC
PH UR STRIP: 6 [PH] (ref 5–8)
PLATELET # BLD AUTO: 62 K/UL (ref 150–450)
PMV BLD AUTO: 11 FL (ref 9.2–12.9)
POTASSIUM SERPL-SCNC: 4 MMOL/L (ref 3.5–5.1)
PROT SERPL-MCNC: 7 G/DL (ref 6–8.4)
PROT UR QL STRIP: ABNORMAL
RBC # BLD AUTO: 4.64 M/UL (ref 4–5.4)
RBC #/AREA URNS AUTO: 2 /HPF (ref 0–4)
SARS-COV-2 RDRP RESP QL NAA+PROBE: NEGATIVE
SODIUM SERPL-SCNC: 138 MMOL/L (ref 136–145)
SP GR UR STRIP: >1.03 (ref 1–1.03)
SQUAMOUS #/AREA URNS AUTO: 1 /HPF
T4 FREE SERPL-MCNC: 1.42 NG/DL (ref 0.71–1.51)
TROPONIN I SERPL DL<=0.01 NG/ML-MCNC: 0.01 NG/ML (ref 0–0.03)
TSH SERPL DL<=0.005 MIU/L-ACNC: 0.02 UIU/ML (ref 0.4–4)
URN SPEC COLLECT METH UR: ABNORMAL
WBC # BLD AUTO: 14.02 K/UL (ref 3.9–12.7)
WBC #/AREA URNS AUTO: 27 /HPF (ref 0–5)

## 2023-07-15 PROCEDURE — 87077 CULTURE AEROBIC IDENTIFY: CPT | Mod: HCNC | Performed by: STUDENT IN AN ORGANIZED HEALTH CARE EDUCATION/TRAINING PROGRAM

## 2023-07-15 PROCEDURE — 63600175 PHARM REV CODE 636 W HCPCS: Mod: HCNC | Performed by: STUDENT IN AN ORGANIZED HEALTH CARE EDUCATION/TRAINING PROGRAM

## 2023-07-15 PROCEDURE — 99285 EMERGENCY DEPT VISIT HI MDM: CPT | Mod: 25,HCNC

## 2023-07-15 PROCEDURE — 87186 SC STD MICRODIL/AGAR DIL: CPT | Mod: HCNC | Performed by: STUDENT IN AN ORGANIZED HEALTH CARE EDUCATION/TRAINING PROGRAM

## 2023-07-15 PROCEDURE — 80053 COMPREHEN METABOLIC PANEL: CPT | Mod: HCNC | Performed by: STUDENT IN AN ORGANIZED HEALTH CARE EDUCATION/TRAINING PROGRAM

## 2023-07-15 PROCEDURE — U0002 COVID-19 LAB TEST NON-CDC: HCPCS | Mod: HCNC | Performed by: STUDENT IN AN ORGANIZED HEALTH CARE EDUCATION/TRAINING PROGRAM

## 2023-07-15 PROCEDURE — 63600175 PHARM REV CODE 636 W HCPCS: Mod: HCNC

## 2023-07-15 PROCEDURE — 84439 ASSAY OF FREE THYROXINE: CPT | Mod: HCNC | Performed by: STUDENT IN AN ORGANIZED HEALTH CARE EDUCATION/TRAINING PROGRAM

## 2023-07-15 PROCEDURE — P9612 CATHETERIZE FOR URINE SPEC: HCPCS | Mod: HCNC

## 2023-07-15 PROCEDURE — 93005 ELECTROCARDIOGRAM TRACING: CPT | Mod: HCNC

## 2023-07-15 PROCEDURE — 83605 ASSAY OF LACTIC ACID: CPT | Mod: HCNC | Performed by: STUDENT IN AN ORGANIZED HEALTH CARE EDUCATION/TRAINING PROGRAM

## 2023-07-15 PROCEDURE — 93010 ELECTROCARDIOGRAM REPORT: CPT | Mod: HCNC,,, | Performed by: INTERNAL MEDICINE

## 2023-07-15 PROCEDURE — 85025 COMPLETE CBC W/AUTO DIFF WBC: CPT | Mod: HCNC | Performed by: STUDENT IN AN ORGANIZED HEALTH CARE EDUCATION/TRAINING PROGRAM

## 2023-07-15 PROCEDURE — 81001 URINALYSIS AUTO W/SCOPE: CPT | Mod: HCNC | Performed by: STUDENT IN AN ORGANIZED HEALTH CARE EDUCATION/TRAINING PROGRAM

## 2023-07-15 PROCEDURE — 87088 URINE BACTERIA CULTURE: CPT | Mod: HCNC | Performed by: STUDENT IN AN ORGANIZED HEALTH CARE EDUCATION/TRAINING PROGRAM

## 2023-07-15 PROCEDURE — 84484 ASSAY OF TROPONIN QUANT: CPT | Mod: HCNC | Performed by: STUDENT IN AN ORGANIZED HEALTH CARE EDUCATION/TRAINING PROGRAM

## 2023-07-15 PROCEDURE — 87086 URINE CULTURE/COLONY COUNT: CPT | Mod: HCNC | Performed by: STUDENT IN AN ORGANIZED HEALTH CARE EDUCATION/TRAINING PROGRAM

## 2023-07-15 PROCEDURE — 84443 ASSAY THYROID STIM HORMONE: CPT | Mod: HCNC | Performed by: STUDENT IN AN ORGANIZED HEALTH CARE EDUCATION/TRAINING PROGRAM

## 2023-07-15 PROCEDURE — 96361 HYDRATE IV INFUSION ADD-ON: CPT | Mod: HCNC

## 2023-07-15 PROCEDURE — 96365 THER/PROPH/DIAG IV INF INIT: CPT | Mod: HCNC

## 2023-07-15 PROCEDURE — 25000003 PHARM REV CODE 250: Mod: HCNC

## 2023-07-15 PROCEDURE — 93010 EKG 12-LEAD: ICD-10-PCS | Mod: HCNC,,, | Performed by: INTERNAL MEDICINE

## 2023-07-15 PROCEDURE — 83880 ASSAY OF NATRIURETIC PEPTIDE: CPT | Mod: HCNC | Performed by: STUDENT IN AN ORGANIZED HEALTH CARE EDUCATION/TRAINING PROGRAM

## 2023-07-15 PROCEDURE — 83735 ASSAY OF MAGNESIUM: CPT | Mod: HCNC | Performed by: STUDENT IN AN ORGANIZED HEALTH CARE EDUCATION/TRAINING PROGRAM

## 2023-07-15 PROCEDURE — 25000003 PHARM REV CODE 250: Mod: HCNC | Performed by: STUDENT IN AN ORGANIZED HEALTH CARE EDUCATION/TRAINING PROGRAM

## 2023-07-15 RX ORDER — ACETAMINOPHEN 500 MG
1000 TABLET ORAL
Status: COMPLETED | OUTPATIENT
Start: 2023-07-15 | End: 2023-07-15

## 2023-07-15 RX ORDER — ACETAMINOPHEN 325 MG/1
650 TABLET ORAL
Status: COMPLETED | OUTPATIENT
Start: 2023-07-15 | End: 2023-07-15

## 2023-07-15 RX ADMIN — ACETAMINOPHEN 1000 MG: 500 TABLET ORAL at 11:07

## 2023-07-15 RX ADMIN — ACETAMINOPHEN 650 MG: 325 TABLET ORAL at 07:07

## 2023-07-15 RX ADMIN — CEFTRIAXONE 1 G: 1 INJECTION, POWDER, FOR SOLUTION INTRAMUSCULAR; INTRAVENOUS at 11:07

## 2023-07-15 RX ADMIN — SODIUM CHLORIDE, POTASSIUM CHLORIDE, SODIUM LACTATE AND CALCIUM CHLORIDE 500 ML: 600; 310; 30; 20 INJECTION, SOLUTION INTRAVENOUS at 07:07

## 2023-07-15 NOTE — ED PROVIDER NOTES
Encounter Date: 7/15/2023       History     Chief Complaint   Patient presents with    Fever     Pt reports fever and generalized weakness x2 days.      87-year-old female with past medical history of osteoporosis, thrombocytopenia, hypothyroidism, mild cognitive impairment, depression, and arthritis presenting to ED with chief complaint of altered mental status.  Patient is accompanied by her daughter who provided history.  Patient went to a bookstore yesterday and had a near syncopal episode. She had her walker with her. She fell and had fecal incontinence with the episode. She has been weak and confused since that time.  Patient's daughter did not witness the incident though she believes the patient fell on her bottom and did not hit her head.  Daughter reports that the patient had dinner with her family the night before and was feeling well.  Daughter states that patient seems slightly more confused with slower mentation today.  Patient was reported to have a fever at her nursing facility today.  Remainder of history is limited due to patient's dementia.  Patient cannot recall yesterday's events.      Review of patient's allergies indicates:   Allergen Reactions    Codeine Other (See Comments)     Past Medical History:   Diagnosis Date    Arthritis     Depression     Hypercholesteremia     Thrombocytopenia     Thyroid disease      Past Surgical History:   Procedure Laterality Date    ANKLE SURGERY      COLONOSCOPY N/A 2021    Procedure: COLONOSCOPY;  Surgeon: Alfonso Haque MD;  Location: [facility] ([address] [place] [address]);  Service: Endoscopy;  Laterality: N/A;  any crs  covid test -[place]    HYSTERECTOMY      KNEE SURGERY      WRIST SURGERY       Family History   Problem Relation Age of Onset    Heart failure Mother          at 64    Heart failure Father          at 93    Suicide Son          at 34    Cancer Maternal Uncle         colon     Social History     Tobacco Use    Smoking status: Never     Smokeless tobacco: Never     Review of Systems   Unable to perform ROS: Dementia     Physical Exam     Initial Vitals [07/15/23 1707]   BP Pulse Resp Temp SpO2   114/60 92 18 (!) 100.4 °F (38 °C) 96 %      MAP       --         Physical Exam    Nursing note and vitals reviewed.  Constitutional: She is not diaphoretic. No distress.   HENT:   Head: Normocephalic and atraumatic.   Mouth/Throat: Oropharynx is clear and moist.   Eyes: Conjunctivae and EOM are normal. Pupils are equal, round, and reactive to light.   Neck: Neck supple.   Normal range of motion.  Cardiovascular:  Normal rate, regular rhythm and normal heart sounds.           Pulmonary/Chest: Breath sounds normal. She has no wheezes. She has no rhonchi. She has no rales.   Abdominal: Abdomen is soft. She exhibits no distension. There is no abdominal tenderness.   Musculoskeletal:         General: No edema. Normal range of motion.      Cervical back: Normal range of motion and neck supple.     Neurological: She is alert and oriented to person, place, and time. She has normal strength. No cranial nerve deficit or sensory deficit.   Skin: Skin is warm and dry. Capillary refill takes less than 2 seconds.       ED Course   Procedures  Labs Reviewed   CBC W/ AUTO DIFFERENTIAL - Abnormal; Notable for the following components:       Result Value    WBC 14.02 (*)     Platelets 62 (*)     Immature Granulocytes 0.6 (*)     Gran # (ANC) 10.9 (*)     Immature Grans (Abs) 0.08 (*)     Mono # 1.6 (*)     Gran % 77.6 (*)     Lymph % 10.3 (*)     All other components within normal limits   COMPREHENSIVE METABOLIC PANEL - Abnormal; Notable for the following components:    CO2 20 (*)     Glucose 137 (*)     Total Bilirubin 1.2 (*)     All other components within normal limits   URINALYSIS, REFLEX TO URINE CULTURE - Abnormal; Notable for the following components:    Specific Gravity, UA >1.030 (*)     Protein, UA 1+ (*)     Ketones, UA Trace (*)     Nitrite, UA Positive (*)      Leukocytes, UA 3+ (*)     All other components within normal limits    Narrative:     Specimen Source->Urine   TSH - Abnormal; Notable for the following components:    TSH 0.023 (*)     All other components within normal limits   URINALYSIS MICROSCOPIC - Abnormal; Notable for the following components:    WBC, UA 27 (*)     Bacteria Many (*)     Hyaline Casts, UA 15 (*)     All other components within normal limits    Narrative:     Specimen Source->Urine   CULTURE, URINE   LACTIC ACID, PLASMA   SARS-COV-2 RNA AMPLIFICATION, QUAL   TROPONIN I   B-TYPE NATRIURETIC PEPTIDE   MAGNESIUM   T4, FREE     EKG Readings: (Independently Interpreted)   Initial Reading: No STEMI. Previous EKG: Compared with most recent EKG Rhythm: Normal Sinus Rhythm. Heart Rate: 80. Ectopy: No Ectopy. Conduction: Normal. ST Segments: Normal ST Segments.     Imaging Results              CT Head Without Contrast (Final result)  Result time 07/15/23 20:35:57      Final result by Jose R Glass MD (07/15/23 20:35:57)                   Impression:      No acute intracranial abnormalities.    Senescent changes.      Electronically signed by: Jose R Glass MD  Date:    07/15/2023  Time:    20:35               Narrative:    EXAMINATION:  CT HEAD WITHOUT CONTRAST    CLINICAL HISTORY:  Head trauma, minor (Age >= 65y);    TECHNIQUE:  Low dose axial images were obtained through the head.  Coronal and sagittal reformations were also performed. Contrast was not administered.    COMPARISON:  None.    FINDINGS:  The brain parenchyma appears normal for age with good corticomedullary differentiation.  There is no evidence of acute infarct, hemorrhage, or mass.  There is ventricular and sulcal enlargement consistent with generalized atrophy.  Moderate confluent decreased supratentorial white matter attenuation most likely related to chronic nonspecific small vessel disease.   No mass-effect or midline shift.  There are no abnormal extra-axial fluid  collections.  The paranasal sinuses and mastoid air cells are essentially clear .  The calvarium appears intact.  .                                       CT Cervical Spine Without Contrast (Final result)  Result time 07/15/23 21:21:15      Final result by Jose R Glass MD (07/15/23 21:21:15)                   Impression:      No acute fracture or traumatic malalignment of the cervical spine.  Multilevel degenerative changes as detailed above and most severe at C4-C5 and C5-C6 moderate to severe spinal canal stenosis.    Electronically signed by resident: Ruma Pereira  Date:    07/15/2023  Time:    20:49    Electronically signed by: Jose R Glass MD  Date:    07/15/2023  Time:    21:21               Narrative:    EXAMINATION:  CT CERVICAL SPINE WITHOUT CONTRAST    CLINICAL HISTORY:  Neck trauma (Age >= 65y);    TECHNIQUE:  Low dose axial images, sagittal and coronal reformations were performed though the cervical spine.  Contrast was not administered.    COMPARISON:  CT head 07/15/2023.    FINDINGS:  Skull base and craniocervical junction (partially imaged): No significant abnormality.    Spinal alignment: Straightening of the normal cervical lordosis.  No significant spondylolisthesis..    Vertebrae: Vertebral body heights are well maintained.  There is no evidence of fracture or dislocation.  Mild diffuse osteopenia.  Osseous fusion of the right C2-C3 facets    Discs: Mild disc space height loss most notable at C5-C6 and C6-C7.    Degenerative changes:    Multilevel degenerative change with loss of disc height most notable at C5-C6 and C6-C7.  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  Mild anterior osteophyte formation at C4-C7.    C2-C3:Severe right facet arthropathy.  No significant spinal canal stenosis or neural foraminal narrowing.    C3-C4:Severe right facet arthropathy and mild left facet arthropathy, bilateral uncovertebral joint spurring, right greater than left  and small posterior disc osteophyte complex.  Findings result in near total occlusion of the right neural foramen.    C4-C5:Bilateral facet arthropathy, uncovertebral joint spurring and right paracentral posterior disc osteophyte complex.  Findings result in moderate right and mild left neural foraminal narrowing and moderate spinal canal stenosis.    C5-C6:Mild facet arthropathy, uncovertebral joint spurring, and left paracentral posterior disc osteophyte complex.  Findings result in mild right and moderate left neural foraminal narrowing and severe left spinal canal stenosis.    C6-C7:Bilateral facet arthropathy and uncovertebral joint spurring.  Findings result in mild right neural foraminal narrowing.    C7-T1:No significant spinal canal stenosis or neural foraminal narrowing.    Limited evaluation of the intraspinal contents demonstrates no hematoma or mass.    Paraspinal soft tissues exhibit no acute abnormalities.    The soft tissue structures visualized in the neck are unremarkable.    The airway is patent and the lung apices are unremarkable.  Significant atherosclerosis of the aortic arch.  The visualized portions of the brain demonstrate no significant abnormality.                                       X-Ray Chest AP Portable (Final result)  Result time 07/15/23 18:42:08      Final result by Lionel Braun DO (07/15/23 18:42:08)                   Impression:      Stable chronic background interstitial prominence.  No new focal consolidation.      Electronically signed by: Lionel Braun  Date:    07/15/2023  Time:    18:42               Narrative:    EXAMINATION:  XR CHEST AP PORTABLE    CLINICAL HISTORY:  Fever, unspecified    TECHNIQUE:  Single frontal view of the chest was performed.    COMPARISON:  11/29/2022.    FINDINGS:  The lungs are well expanded.  Mild bandlike opacities in the left mid lung, likely subsegmental atelectasis or scarring.  There is mild background interstitial prominence,  chronic and stable.  No new focal consolidation.  The pleural spaces are clear.    The cardiac silhouette is borderline.    The visualized osseous structures are unremarkable.                                       X-Ray Hip 2 or 3 views Left (with Pelvis when performed) (Final result)  Result time 07/15/23 19:29:01      Final result by Lionel Braun DO (07/15/23 19:29:01)                   Impression:      No acute osseous abnormality.      Electronically signed by: Lionel Braun  Date:    07/15/2023  Time:    19:29               Narrative:    EXAMINATION:  XR HIP WITH PELVIS WHEN PERFORMED, 2 OR 3 VIEWS LEFT    CLINICAL HISTORY:  Unspecified fall, initial encounter    TECHNIQUE:  AP view of the pelvis and frog leg lateral view of the left hip were performed.    COMPARISON:  11/29/2022.    FINDINGS:  There is diffuse osteopenia.  There is no evidence of acute fracture or dislocation.  Alignment is normal.  The femoral heads are well seated in the acetabula.  There are degenerative changes.                                    X-Rays:   Independently Interpreted Readings:   Chest X-Ray: Normal heart size.  No infiltrates.  No acute abnormalities. NO PNEUMOTHORAX OR FREE AIR   Head CT: No hemorrhage.  No skull fracture.  No acute stroke.   Medications   lactated ringers bolus 500 mL (0 mLs Intravenous Stopped 7/15/23 2058)   acetaminophen tablet 650 mg (650 mg Oral Given 7/15/23 1938)     Medical Decision Making:   History:   Old Medical Records: I decided to obtain old medical records.  Old Records Summarized: records from previous admission(s).  Differential Diagnosis:   UTI  Pneumonia  COVID-19  Viral URI  Arrhythmia   Electrolyte derangement  Hip fracture  Sepsis  Independently Interpreted Test(s):   I have ordered and independently interpreted X-rays - see prior notes.  I have ordered and independently interpreted EKG Reading(s) - see prior notes  Clinical Tests:   Lab Tests: Ordered and Reviewed  Radiological  Study: Ordered and Reviewed  Medical Tests: Ordered and Reviewed  ED Management:  Patient is febrile but otherwise hemodynamically stable.  Patient given Tylenol and IV fluid bolus.  COVID test negative.  She is alert and oriented to person, place and year.  She does not know the month or the president.  Her daughter states this is near baseline the patient seems to be mentating slower.  Daughter states that this is typically how patient presents when she has a UTI.  Labs demonstrate leukocytosis.  UA is pending.  CT of head is negative.  No acute fracture or traumatic malalignment of the cervical spine.  Multilevel degenerative changes as detailed above and most severe at C4-C5 and C5-C6 moderate to severe spinal canal stenosis. Left hip x-ray and pelvic x-rays are negative for acute fracture.  Chest x-ray is stable.  Care of this patient signed out to oncoming ED team.          Attending Attestation:   Physician Attestation Statement for Resident:  As the supervising MD   Physician Attestation Statement: I have personally seen and examined this patient.   I agree with the above history.  -:   As the supervising MD I agree with the above PE.     As the supervising MD I agree with the above treatment, course, plan, and disposition.                        I have reviewed and concur with the resident's history, physical, assessment, and plan.  I have personally interviewed and examined the patient at bedside.   I did supervise any and all procedures and was present for any critical portion, and was always immediately available for help and as a resource.     The above history physical, review of symptoms, HPI and physical exam reflect my independent interpretation and evaluation.    Complexity: High Risk    Final diagnoses:  [R50.9] Fever  [R55] Syncope  [W19.XXXA] Fall  [R41.82] Altered mental status, unspecified altered mental status type (Primary)     Tra Baer DO, FAAEM  Emergency Staff Physician   Dept of  Emergency Medicine   [facility]  Spectralink: [id number]        Disclaimer: This note has been generated using voice-recognition software. There may be typographical errors that have been missed during proof-reading.                 Clinical Impression:   Final diagnoses:  [R50.9] Fever  [R55] Syncope  [W19.XXXA] Fall  [R41.82] Altered mental status, unspecified altered mental status type (Primary)               Aditi Adams MD  Resident  07/15/23 [id number]       Tra Baer DO  07/15/23 [id number]

## 2023-07-16 PROBLEM — R53.81 DEBILITY: Status: ACTIVE | Noted: 2022-11-29

## 2023-07-16 PROBLEM — G93.40 ACUTE ENCEPHALOPATHY: Status: ACTIVE | Noted: 2023-07-16

## 2023-07-16 PROBLEM — R65.20 SEPSIS WITH ENCEPHALOPATHY WITHOUT SEPTIC SHOCK: Status: ACTIVE | Noted: 2023-07-16

## 2023-07-16 PROBLEM — R41.82 AMS (ALTERED MENTAL STATUS): Status: ACTIVE | Noted: 2023-07-16

## 2023-07-16 PROBLEM — N30.00 ACUTE CYSTITIS WITHOUT HEMATURIA: Status: ACTIVE | Noted: 2023-07-16

## 2023-07-16 PROBLEM — A41.9 SEPSIS WITHOUT ACUTE ORGAN DYSFUNCTION: Status: ACTIVE | Noted: 2023-07-16

## 2023-07-16 PROBLEM — G93.41 SEPSIS WITH ENCEPHALOPATHY WITHOUT SEPTIC SHOCK: Status: ACTIVE | Noted: 2023-07-16

## 2023-07-16 LAB
ALBUMIN SERPL BCP-MCNC: 2.9 G/DL (ref 3.5–5.2)
ALP SERPL-CCNC: 66 U/L (ref 55–135)
ALT SERPL W/O P-5'-P-CCNC: 11 U/L (ref 10–44)
ANION GAP SERPL CALC-SCNC: 7 MMOL/L (ref 8–16)
ASCENDING AORTA: 2.94 CM
AST SERPL-CCNC: 11 U/L (ref 10–40)
AV INDEX (PROSTH): 0.73
AV MEAN GRADIENT: 8 MMHG
AV PEAK GRADIENT: 14 MMHG
AV VALVE AREA: 2.31 CM2
AV VELOCITY RATIO: 0.9
BASOPHILS # BLD AUTO: 0.04 K/UL (ref 0–0.2)
BASOPHILS # BLD AUTO: 0.05 K/UL (ref 0–0.2)
BASOPHILS NFR BLD: 0.3 % (ref 0–1.9)
BASOPHILS NFR BLD: 0.4 % (ref 0–1.9)
BILIRUB SERPL-MCNC: 0.7 MG/DL (ref 0.1–1)
BSA FOR ECHO PROCEDURE: 1.59 M2
BUN SERPL-MCNC: 17 MG/DL (ref 8–23)
CALCIUM SERPL-MCNC: 9.8 MG/DL (ref 8.7–10.5)
CHLORIDE SERPL-SCNC: 109 MMOL/L (ref 95–110)
CO2 SERPL-SCNC: 20 MMOL/L (ref 23–29)
CREAT SERPL-MCNC: 0.9 MG/DL (ref 0.5–1.4)
CV ECHO LV RWT: 0.7 CM
DIFFERENTIAL METHOD: ABNORMAL
DIFFERENTIAL METHOD: ABNORMAL
DOP CALC AO PEAK VEL: 1.84 M/S
DOP CALC AO VTI: 33.02 CM
DOP CALC LVOT AREA: 3.1 CM2
DOP CALC LVOT DIAMETER: 2 CM
DOP CALC LVOT PEAK VEL: 1.66 M/S
DOP CALC LVOT STROKE VOLUME: 76.11 CM3
DOP CALC MV VTI: 28.56 CM
DOP CALCLVOT PEAK VEL VTI: 24.24 CM
E WAVE DECELERATION TIME: 302.65 MSEC
E/A RATIO: 0.68
E/E' RATIO: 12 M/S
ECHO LV POSTERIOR WALL: 1.09 CM (ref 0.6–1.1)
EJECTION FRACTION: 70 %
EOSINOPHIL # BLD AUTO: 0 K/UL (ref 0–0.5)
EOSINOPHIL # BLD AUTO: 0 K/UL (ref 0–0.5)
EOSINOPHIL NFR BLD: 0.1 % (ref 0–8)
EOSINOPHIL NFR BLD: 0.1 % (ref 0–8)
ERYTHROCYTE [DISTWIDTH] IN BLOOD BY AUTOMATED COUNT: 14.2 % (ref 11.5–14.5)
ERYTHROCYTE [DISTWIDTH] IN BLOOD BY AUTOMATED COUNT: 14.6 % (ref 11.5–14.5)
EST. GFR  (NO RACE VARIABLE): >60 ML/MIN/1.73 M^2
FRACTIONAL SHORTENING: 32 % (ref 28–44)
GLUCOSE SERPL-MCNC: 141 MG/DL (ref 70–110)
HCT VFR BLD AUTO: 39.7 % (ref 37–48.5)
HCT VFR BLD AUTO: 46.7 % (ref 37–48.5)
HGB BLD-MCNC: 13 G/DL (ref 12–16)
HGB BLD-MCNC: 14.5 G/DL (ref 12–16)
IMM GRANULOCYTES # BLD AUTO: 0.08 K/UL (ref 0–0.04)
IMM GRANULOCYTES # BLD AUTO: 0.09 K/UL (ref 0–0.04)
IMM GRANULOCYTES NFR BLD AUTO: 0.6 % (ref 0–0.5)
IMM GRANULOCYTES NFR BLD AUTO: 0.6 % (ref 0–0.5)
INTERVENTRICULAR SEPTUM: 1.11 CM (ref 0.6–1.1)
IVRT: 83.73 MSEC
LA MAJOR: 4.98 CM
LA MINOR: 4.69 CM
LA WIDTH: 3.91 CM
LACTATE SERPL-SCNC: 1.3 MMOL/L (ref 0.5–2.2)
LEFT ATRIUM SIZE: 2.84 CM
LEFT ATRIUM VOLUME INDEX MOD: 25.1 ML/M2
LEFT ATRIUM VOLUME INDEX: 29.2 ML/M2
LEFT ATRIUM VOLUME MOD: 39.1 CM3
LEFT ATRIUM VOLUME: 45.6 CM3
LEFT INTERNAL DIMENSION IN SYSTOLE: 2.12 CM (ref 2.1–4)
LEFT VENTRICLE DIASTOLIC VOLUME INDEX: 24.64 ML/M2
LEFT VENTRICLE DIASTOLIC VOLUME: 38.44 ML
LEFT VENTRICLE MASS INDEX: 64 G/M2
LEFT VENTRICLE SYSTOLIC VOLUME INDEX: 9.4 ML/M2
LEFT VENTRICLE SYSTOLIC VOLUME: 14.68 ML
LEFT VENTRICULAR INTERNAL DIMENSION IN DIASTOLE: 3.12 CM (ref 3.5–6)
LEFT VENTRICULAR MASS: 100.6 G
LV LATERAL E/E' RATIO: 11.25 M/S
LV SEPTAL E/E' RATIO: 12.86 M/S
LYMPHOCYTES # BLD AUTO: 1.3 K/UL (ref 1–4.8)
LYMPHOCYTES # BLD AUTO: 2 K/UL (ref 1–4.8)
LYMPHOCYTES NFR BLD: 10.2 % (ref 18–48)
LYMPHOCYTES NFR BLD: 13.9 % (ref 18–48)
MAGNESIUM SERPL-MCNC: 2.1 MG/DL (ref 1.6–2.6)
MCH RBC QN AUTO: 31 PG (ref 27–31)
MCH RBC QN AUTO: 31.7 PG (ref 27–31)
MCHC RBC AUTO-ENTMCNC: 31 G/DL (ref 32–36)
MCHC RBC AUTO-ENTMCNC: 32.7 G/DL (ref 32–36)
MCV RBC AUTO: 100 FL (ref 82–98)
MCV RBC AUTO: 97 FL (ref 82–98)
MONOCYTES # BLD AUTO: 1.9 K/UL (ref 0.3–1)
MONOCYTES # BLD AUTO: 2 K/UL (ref 0.3–1)
MONOCYTES NFR BLD: 14 % (ref 4–15)
MONOCYTES NFR BLD: 14.8 % (ref 4–15)
MV MEAN GRADIENT: 3 MMHG
MV PEAK A VEL: 1.32 M/S
MV PEAK E VEL: 0.9 M/S
MV PEAK GRADIENT: 9 MMHG
MV STENOSIS PRESSURE HALF TIME: 101.05 MS
MV VALVE AREA BY CONTINUITY EQUATION: 2.67 CM2
MV VALVE AREA P 1/2 METHOD: 2.18 CM2
NEUTROPHILS # BLD AUTO: 10.1 K/UL (ref 1.8–7.7)
NEUTROPHILS # BLD AUTO: 9.6 K/UL (ref 1.8–7.7)
NEUTROPHILS NFR BLD: 71.1 % (ref 38–73)
NEUTROPHILS NFR BLD: 73.9 % (ref 38–73)
NRBC BLD-RTO: 0 /100 WBC
NRBC BLD-RTO: 0 /100 WBC
PHOSPHATE SERPL-MCNC: 1.3 MG/DL (ref 2.7–4.5)
PISA TR MAX VEL: 2.58 M/S
PLATELET # BLD AUTO: 67 K/UL (ref 150–450)
PLATELET # BLD AUTO: 93 K/UL (ref 150–450)
PMV BLD AUTO: 11.7 FL (ref 9.2–12.9)
PMV BLD AUTO: 12 FL (ref 9.2–12.9)
POTASSIUM SERPL-SCNC: 4.1 MMOL/L (ref 3.5–5.1)
PROT SERPL-MCNC: 6.2 G/DL (ref 6–8.4)
RA MAJOR: 4.15 CM
RA PRESSURE: 3 MMHG
RA WIDTH: 3.48 CM
RBC # BLD AUTO: 4.1 M/UL (ref 4–5.4)
RBC # BLD AUTO: 4.68 M/UL (ref 4–5.4)
RIGHT VENTRICULAR END-DIASTOLIC DIMENSION: 3 CM
SINUS: 2.63 CM
SODIUM SERPL-SCNC: 136 MMOL/L (ref 136–145)
STJ: 2.33 CM
TDI LATERAL: 0.08 M/S
TDI SEPTAL: 0.07 M/S
TDI: 0.08 M/S
TR MAX PG: 27 MMHG
TRICUSPID ANNULAR PLANE SYSTOLIC EXCURSION: 2.51 CM
TV REST PULMONARY ARTERY PRESSURE: 30 MMHG
WBC # BLD AUTO: 12.96 K/UL (ref 3.9–12.7)
WBC # BLD AUTO: 14.17 K/UL (ref 3.9–12.7)

## 2023-07-16 PROCEDURE — 25000003 PHARM REV CODE 250: Mod: HCNC

## 2023-07-16 PROCEDURE — 96365 THER/PROPH/DIAG IV INF INIT: CPT | Mod: 59

## 2023-07-16 PROCEDURE — 36415 COLL VENOUS BLD VENIPUNCTURE: CPT | Mod: HCNC

## 2023-07-16 PROCEDURE — 85025 COMPLETE CBC W/AUTO DIFF WBC: CPT | Mod: HCNC

## 2023-07-16 PROCEDURE — 85025 COMPLETE CBC W/AUTO DIFF WBC: CPT | Mod: 91,HCNC | Performed by: INTERNAL MEDICINE

## 2023-07-16 PROCEDURE — 97161 PT EVAL LOW COMPLEX 20 MIN: CPT | Mod: HCNC

## 2023-07-16 PROCEDURE — 97535 SELF CARE MNGMENT TRAINING: CPT | Mod: HCNC

## 2023-07-16 PROCEDURE — 80053 COMPREHEN METABOLIC PANEL: CPT | Mod: HCNC | Performed by: INTERNAL MEDICINE

## 2023-07-16 PROCEDURE — 97165 OT EVAL LOW COMPLEX 30 MIN: CPT | Mod: HCNC

## 2023-07-16 PROCEDURE — 36415 COLL VENOUS BLD VENIPUNCTURE: CPT | Mod: HCNC | Performed by: INTERNAL MEDICINE

## 2023-07-16 PROCEDURE — 96367 TX/PROPH/DG ADDL SEQ IV INF: CPT

## 2023-07-16 PROCEDURE — 96366 THER/PROPH/DIAG IV INF ADDON: CPT

## 2023-07-16 PROCEDURE — 96361 HYDRATE IV INFUSION ADD-ON: CPT

## 2023-07-16 PROCEDURE — G0378 HOSPITAL OBSERVATION PER HR: HCPCS | Mod: HCNC

## 2023-07-16 PROCEDURE — 63600175 PHARM REV CODE 636 W HCPCS: Mod: HCNC

## 2023-07-16 PROCEDURE — 63600175 PHARM REV CODE 636 W HCPCS: Mod: HCNC | Performed by: INTERNAL MEDICINE

## 2023-07-16 PROCEDURE — 84100 ASSAY OF PHOSPHORUS: CPT | Mod: HCNC | Performed by: INTERNAL MEDICINE

## 2023-07-16 PROCEDURE — 83605 ASSAY OF LACTIC ACID: CPT | Mod: HCNC

## 2023-07-16 PROCEDURE — 87040 BLOOD CULTURE FOR BACTERIA: CPT | Mod: HCNC

## 2023-07-16 PROCEDURE — 97530 THERAPEUTIC ACTIVITIES: CPT | Mod: HCNC

## 2023-07-16 PROCEDURE — 94761 N-INVAS EAR/PLS OXIMETRY MLT: CPT | Mod: HCNC

## 2023-07-16 PROCEDURE — 99223 1ST HOSP IP/OBS HIGH 75: CPT | Mod: HCNC,,,

## 2023-07-16 PROCEDURE — 83735 ASSAY OF MAGNESIUM: CPT | Mod: HCNC | Performed by: INTERNAL MEDICINE

## 2023-07-16 PROCEDURE — 99223 PR INITIAL HOSPITAL CARE,LEVL III: ICD-10-PCS | Mod: HCNC,,,

## 2023-07-16 PROCEDURE — 96372 THER/PROPH/DIAG INJ SC/IM: CPT

## 2023-07-16 RX ORDER — BUPROPION HYDROCHLORIDE 150 MG/1
150 TABLET ORAL DAILY
Status: DISCONTINUED | OUTPATIENT
Start: 2023-07-16 | End: 2023-07-18 | Stop reason: HOSPADM

## 2023-07-16 RX ORDER — TALC
6 POWDER (GRAM) TOPICAL NIGHTLY PRN
Status: DISCONTINUED | OUTPATIENT
Start: 2023-07-16 | End: 2023-07-18 | Stop reason: HOSPADM

## 2023-07-16 RX ORDER — ACETAMINOPHEN 325 MG/1
650 TABLET ORAL EVERY 4 HOURS PRN
Status: DISCONTINUED | OUTPATIENT
Start: 2023-07-16 | End: 2023-07-16

## 2023-07-16 RX ORDER — PRAVASTATIN SODIUM 40 MG/1
40 TABLET ORAL DAILY
Status: DISCONTINUED | OUTPATIENT
Start: 2023-07-16 | End: 2023-07-18 | Stop reason: HOSPADM

## 2023-07-16 RX ORDER — BISACODYL 10 MG
10 SUPPOSITORY, RECTAL RECTAL DAILY PRN
Status: DISCONTINUED | OUTPATIENT
Start: 2023-07-16 | End: 2023-07-18 | Stop reason: HOSPADM

## 2023-07-16 RX ORDER — MEMANTINE HYDROCHLORIDE 5 MG/1
10 TABLET ORAL 2 TIMES DAILY
Status: DISCONTINUED | OUTPATIENT
Start: 2023-07-16 | End: 2023-07-18 | Stop reason: HOSPADM

## 2023-07-16 RX ORDER — IBUPROFEN 200 MG
24 TABLET ORAL
Status: DISCONTINUED | OUTPATIENT
Start: 2023-07-16 | End: 2023-07-18 | Stop reason: HOSPADM

## 2023-07-16 RX ORDER — ONDANSETRON 8 MG/1
8 TABLET, ORALLY DISINTEGRATING ORAL EVERY 8 HOURS PRN
Status: DISCONTINUED | OUTPATIENT
Start: 2023-07-16 | End: 2023-07-18 | Stop reason: HOSPADM

## 2023-07-16 RX ORDER — CEPHALEXIN 500 MG/1
500 CAPSULE ORAL 4 TIMES DAILY
Qty: 28 CAPSULE | Refills: 0 | Status: SHIPPED | OUTPATIENT
Start: 2023-07-16 | End: 2023-07-18 | Stop reason: HOSPADM

## 2023-07-16 RX ORDER — MAGNESIUM SULFATE HEPTAHYDRATE 40 MG/ML
4 INJECTION, SOLUTION INTRAVENOUS ONCE
Status: DISCONTINUED | OUTPATIENT
Start: 2023-07-16 | End: 2023-07-16

## 2023-07-16 RX ORDER — GLUCAGON 1 MG
1 KIT INJECTION
Status: DISCONTINUED | OUTPATIENT
Start: 2023-07-16 | End: 2023-07-18 | Stop reason: HOSPADM

## 2023-07-16 RX ORDER — PROMETHAZINE HYDROCHLORIDE 25 MG/1
25 TABLET ORAL EVERY 6 HOURS PRN
Status: DISCONTINUED | OUTPATIENT
Start: 2023-07-16 | End: 2023-07-18 | Stop reason: HOSPADM

## 2023-07-16 RX ORDER — POLYETHYLENE GLYCOL 3350 17 G/17G
17 POWDER, FOR SOLUTION ORAL DAILY PRN
Status: DISCONTINUED | OUTPATIENT
Start: 2023-07-16 | End: 2023-07-17

## 2023-07-16 RX ORDER — ACETAMINOPHEN 500 MG
1000 TABLET ORAL EVERY 8 HOURS PRN
Status: DISCONTINUED | OUTPATIENT
Start: 2023-07-16 | End: 2023-07-18 | Stop reason: HOSPADM

## 2023-07-16 RX ORDER — SODIUM CHLORIDE 0.9 % (FLUSH) 0.9 %
10 SYRINGE (ML) INJECTION
Status: DISCONTINUED | OUTPATIENT
Start: 2023-07-16 | End: 2023-07-18 | Stop reason: HOSPADM

## 2023-07-16 RX ORDER — ENOXAPARIN SODIUM 100 MG/ML
40 INJECTION SUBCUTANEOUS EVERY 24 HOURS
Status: DISCONTINUED | OUTPATIENT
Start: 2023-07-16 | End: 2023-07-18 | Stop reason: HOSPADM

## 2023-07-16 RX ORDER — MAGNESIUM SULFATE HEPTAHYDRATE 40 MG/ML
2 INJECTION, SOLUTION INTRAVENOUS
Status: COMPLETED | OUTPATIENT
Start: 2023-07-16 | End: 2023-07-16

## 2023-07-16 RX ORDER — IBUPROFEN 200 MG
16 TABLET ORAL
Status: DISCONTINUED | OUTPATIENT
Start: 2023-07-16 | End: 2023-07-18 | Stop reason: HOSPADM

## 2023-07-16 RX ORDER — LEVOTHYROXINE SODIUM 100 UG/1
100 TABLET ORAL
Status: DISCONTINUED | OUTPATIENT
Start: 2023-07-16 | End: 2023-07-18 | Stop reason: HOSPADM

## 2023-07-16 RX ORDER — LOSARTAN POTASSIUM 50 MG/1
50 TABLET ORAL DAILY
Status: DISCONTINUED | OUTPATIENT
Start: 2023-07-16 | End: 2023-07-16

## 2023-07-16 RX ORDER — GALANTAMINE HYDROBROMIDE 16 MG/1
16 CAPSULE, EXTENDED RELEASE ORAL
Status: DISCONTINUED | OUTPATIENT
Start: 2023-07-16 | End: 2023-07-18 | Stop reason: HOSPADM

## 2023-07-16 RX ADMIN — ENOXAPARIN SODIUM 40 MG: 40 INJECTION SUBCUTANEOUS at 06:07

## 2023-07-16 RX ADMIN — CEFEPIME 1 G: 1 INJECTION, POWDER, FOR SOLUTION INTRAMUSCULAR; INTRAVENOUS at 06:07

## 2023-07-16 RX ADMIN — MAGNESIUM SULFATE 2 G: 2 INJECTION INTRAVENOUS at 06:07

## 2023-07-16 RX ADMIN — SODIUM CHLORIDE, POTASSIUM CHLORIDE, SODIUM LACTATE AND CALCIUM CHLORIDE 1000 ML: 600; 310; 30; 20 INJECTION, SOLUTION INTRAVENOUS at 06:07

## 2023-07-16 RX ADMIN — ACETAMINOPHEN 1000 MG: 500 TABLET ORAL at 12:07

## 2023-07-16 RX ADMIN — MEMANTINE HYDROCHLORIDE 10 MG: 5 TABLET ORAL at 08:07

## 2023-07-16 RX ADMIN — MEMANTINE HYDROCHLORIDE 10 MG: 5 TABLET ORAL at 09:07

## 2023-07-16 RX ADMIN — PRAVASTATIN SODIUM 40 MG: 40 TABLET ORAL at 08:07

## 2023-07-16 RX ADMIN — BUPROPION HYDROCHLORIDE 150 MG: 150 TABLET, FILM COATED, EXTENDED RELEASE ORAL at 08:07

## 2023-07-16 RX ADMIN — SODIUM CHLORIDE, POTASSIUM CHLORIDE, SODIUM LACTATE AND CALCIUM CHLORIDE 500 ML: 600; 310; 30; 20 INJECTION, SOLUTION INTRAVENOUS at 05:07

## 2023-07-16 RX ADMIN — MAGNESIUM SULFATE 2 G: 2 INJECTION INTRAVENOUS at 09:07

## 2023-07-16 RX ADMIN — LEVOTHYROXINE SODIUM 100 MCG: 100 TABLET ORAL at 05:07

## 2023-07-16 NOTE — PLAN OF CARE
PT evaluation complete - see note for details. POC and goals established.    Problem: Physical Therapy  Goal: Physical Therapy Goal  Description: Goals to be met by: 23     Patient will increase functional independence with mobility by performin. Supine to sit with MInimal Assistance  2. Sit to stand transfer with Minimal Assistance  3. Bed to chair transfer with Minimal Assistance using Rolling Walker  4. Gait  x 100 feet with Minimal Assistance using Rolling Walker.   4. Sitting at edge of bed x8 minutes with Contact Guard Assistance while performing ADLs.    Outcome: Ongoing, Progressing     2023

## 2023-07-16 NOTE — PLAN OF CARE
Pt with fever that's down to 98.8 oral. Pts pressure 90s/50s. Rapid Response at pts bedside, per MARIANA Morrison PA-C. Repeat labs to be drawn, awaiting new orders at this time.

## 2023-07-16 NOTE — PLAN OF CARE
Patient is alert and oriented with no communication barriers. Patient states that she is not on coumadin or dialysis. Patient states that family will help her transport at discharge.   07/16/23 1541   Discharge Assessment   Assessment Type Discharge Planning Assessment   Confirmed/corrected address, phone number and insurance Yes   Confirmed Demographics Correct on Facesheet   Source of Information patient   People in Home alone   Do you expect to return to your current living situation? No   Do you have help at home or someone to help you manage your care at home? No   Prior to hospitilization cognitive status: Alert/Oriented   Current cognitive status: Alert/Oriented   Equipment Currently Used at Home walker, rolling   Readmission within 30 days? No   Patient currently being followed by outpatient case management? No   Do you currently have service(s) that help you manage your care at home? No   Do you take prescription medications? Yes   Do you have prescription coverage? Yes   Do you have any problems affording any of your prescribed medications? No   Is the patient taking medications as prescribed? yes   Who is going to help you get home at discharge? Family   How do you get to doctors appointments? family or friend will provide;health plan transportation   Are you on dialysis? No   Do you take coumadin? No   Discharge Plan A Home   DME Needed Upon Discharge  none     [facility]  Initial Discharge Assessment       Primary Care Provider: Shi Simon MD    Admission Diagnosis: Syncope [R55]  UTI (urinary tract infection) [N39.0]  Fall [W19.XXXA]  Fever [R50.9]  Chest pain [R07.9]  Urinary tract infection without hematuria, site unspecified [N39.0]  Altered mental status, unspecified altered mental status type [R41.82]    Admission Date: 7/15/2023  Expected Discharge Date: 7/18/2023         Payor: [employer] MANAGED MEDICARE / Plan: [employer] MEDICARE HMO / Product Type: Capitation /     Extended  Emergency Contact Information  Primary Emergency Contact: Aditi Qiu  Address: [address] [place] [address] [facility]  Work Phone: [phone]  Mobile Phone: [phone]  Relation: Daughter   needed? No  Secondary Emergency Contact: Matt Hernandez  Mobile Phone: [phone]  Relation: Relative   needed? No    Discharge Plan A: (P) Home         [facility] - [place], [place] - [address]  [address] [facility] [address]  Phone: [phone] Fax: [phone]    [facility] ([place]) - [place] - [address] [place] [address]  [address] [place] [address]  [place] [address]  Phone: [phone] Fax: [phone]      Initial Assessment (most recent)       Adult Discharge Assessment - 07/16/23 1541          Discharge Assessment    Assessment Type Discharge Planning Assessment (P)      Confirmed/corrected address, phone number and insurance Yes (P)      Confirmed Demographics Correct on Facesheet (P)      Source of Information patient (P)      People in Home alone (P)      Do you expect to return to your current living situation? No (P)      Do you have help at home or someone to help you manage your care at home? No (P)      Prior to hospitilization cognitive status: Alert/Oriented (P)      Current cognitive status: Alert/Oriented (P)      Equipment Currently Used at Home walker, rolling (P)      Readmission within 30 days? No (P)      Patient currently being followed by outpatient case management? No (P)      Do you currently have service(s) that help you manage your care at home? No (P)      Do you take prescription medications? Yes (P)      Do you have prescription coverage? Yes (P)      Do you have any problems affording any of your prescribed medications? No (P)      Is the patient taking medications as prescribed? yes (P)      Who is going to help you get home at discharge? Family (P)      How do you get to doctors appointments?  family or friend will provide;health plan transportation (P)      Are you on dialysis? No (P)      Do you take coumadin? No (P)      Discharge Plan A Home (P)      DME Needed Upon Discharge  none (P)

## 2023-07-16 NOTE — CARE UPDATE
RAPID RESPONSE NURSE PROACTIVE ROUNDING NOTE       Time of Visit: 1720    Admit Date: 7/15/2023  LOS: 0  Code Status: Full Code   Date of Visit: 2023  : 1935  Age: 87 y.o.  Sex: female  Race: White  Bed: [facility] A:   MRN: [id number]  Was the patient discharged from an ICU this admission? No   Was the patient discharged from a PACU within last 24 hours? No   Did the patient receive conscious sedation/general anesthesia in last 24 hours? No  Was the patient in the ED within the past 24 hours? Yes  Was the patient on NIPPV within the past 24 hours? No   Attending Physician: Ulises Reaves MD  Primary Service: [facility] MED    Time spent at the bedside: 30 - 45 min    SITUATION    Notified by JOSE ANGEL.  Reason for alert: sepsis  Called to evaluate the patient for Circulatory    BACKGROUND     Why is the patient in the hospital?: Sepsis with encephalopathy without septic shock    Patient has a past medical history of Arthritis, Depression, Hypercholesteremia, Thrombocytopenia, and Thyroid disease.    Last Vitals:  Temp: 98.7 °F (37.1 °C) (1648)  Pulse: 69 (1648)  Resp: 17 (1648)  BP: 99/52 (1648)  SpO2: 95 % (1648)    24 Hours Vitals Range:  Temp:  [98.3 °F (36.8 °C)-101.2 °F (38.4 °C)]   Pulse:  [69-92]   Resp:  [16-19]   BP: ()/(52-70)   SpO2:  [92 %-97 %]     Labs:  Recent Labs     07/15/23  1929 07/16/23  0753   WBC 14.02* 12.96*   HGB 14.4 14.5   HCT 44.6 46.7   PLT 62* 67*       Recent Labs     07/15/23  1929 07/16/23  1235    136   K 4.0 4.1    109   CO2 20* 20*   CREATININE 0.9 0.9   * 141*   PHOS  --  1.3*   MG 2.1 2.1        No results for input(s): PH, PCO2, PO2, HCO3, POCSATURATED, BE in the last 72 hours.     ASSESSMENT    Physical Exam  Vitals and nursing note reviewed.   Cardiovascular:      Rate and Rhythm: Normal rate.      Pulses: Normal pulses.   Pulmonary:      Effort: Pulmonary effort is normal.   Skin:     General: Skin is warm and  dry.   Neurological:      Mental Status: She is alert and oriented to person, place, and time.       INTERVENTIONS    The patient was seen for Cardiac problem. Staff concerns included hypotension. The following interventions were performed: CBC, continuous cardiac monitoring continued, and 1L LR Bolus, peripheral IV insertion, Lactic acid.    RECOMMENDATIONS    - Follow up with labs  - Continuous cardiac monitoring   - Recheck BP after fluid bolus is complete  - Please call [facility]ids if patient becomes symptomatic    PROVIDER ESCALATION    Yes/No  Yes    Orders received and case discussed with  DOMI Dey.    Disposition: Remain in room 1124.    FOLLOW-UP    Bedside RNFlorinda  updated on plan of care. Instructed to call the Rapid Response Nurse, Suzanne Garcia RN at [phone] for additional questions or concerns.

## 2023-07-16 NOTE — PROGRESS NOTES
[facility] [address] [place] [address] Medicine  Progress Note    Patient Name: Rosario Menendez  MRN: [id number]  Patient Class: OP- Observation   Admission Date: 7/15/2023  Length of Stay: 0 days  Attending Physician: Ulises Reaves MD  Primary Care Provider: Shi Simon MD        Subjective:     Principal Problem:Sepsis without acute organ dysfunction        HPI:  Rosario Qiu is a 87 y.o. female with a hx of osteoporosis, hypothyroidism, dementia, and depression presents to the ed for AMS. Patient is a poor historian. Daughter not at bedside during my exam. Family at bedside able to provide minimal hx. Per family, pt had an episode where she passed out yesterday. Was very weak following. He notes that at her assisted living she was noted to have a temp of 103, was given tylenol, then recheck was 101 before presenting to the ED. Per ED note: pt had a near syncopal episode at the bookstore while using her walker. She was weak and confused since the incident. Patient's daughter did not witness the incident though she believes the patient fell on her bottom and did not hit her head.  Daughter reports that the patient had dinner with her family the night before and was feeling well.  Daughter states that patient seems slightly more confused with slower mentation today. Patient awake and oriented x 4 on my exam. Endorses feeling very weak. Denies chills, chest pain, SOB, abdominal pain, dysuria, frequency, constipation and diarrhea.     ED: Temp 100.4, /60. WBC 14K. CMP largely unremarkable. UA infectious with 15 hyaline casts. CXR showed stable chronic background interstitial prominence.  No new focal consolidation. CTH showed no acute abnormalities. CT C spine showed no acute fracture. Given IVFs and rocephin in ED.       Overview/Hospital Course:  Ms. Menendez was placed in observation for sepsis without acute organ dysfunction and acute encephalopathy in the setting of acute cystitis. The patient was  started on IV rocephin and subsequently broadened to IV zosyn given slow clinical response. Urine and blood cultures pending. PT/OT consulted.       Interval History: Pt seen and examined by me this morning with son-in-law at bedside. She is febrile to 101.2, mildly tachypnic, and sating 92% on RA. The patient reports significant lethargy and generalized weakness, as well as suprapubic pain. Family at bedside report her encephalopathy has improved slightly from admission.     Review of Systems   Unable to perform ROS: Dementia   Constitutional:  Positive for fatigue.   Genitourinary:  Positive for pelvic pain. Negative for dysuria.   Neurological:  Positive for weakness.   Objective:     Vital Signs (Most Recent):  Temp: (!) 101.2 °F (38.4 °C) (07/16/23 0808)  Pulse: 92 (07/16/23 0808)  Resp: 19 (07/16/23 0808)  BP: (!) 157/66 (07/16/23 0808)  SpO2: (!) 92 % (07/16/23 0808) Vital Signs (24h Range):  Temp:  [98.3 °F (36.8 °C)-101.2 °F (38.4 °C)] 101.2 °F (38.4 °C)  Pulse:  [70-92] 92  Resp:  [16-19] 19  SpO2:  [92 %-97 %] 92 %  BP: (108-157)/(58-70) 157/66     Weight: 60.1 kg (132 lb 7.9 oz)  Body mass index is 25.88 kg/m².    Intake/Output Summary (Last 24 hours) at 7/16/2023 1135  Last data filed at 7/16/2023 0630  Gross per 24 hour   Intake --   Output 1 ml   Net -1 ml         Physical Exam  Vitals and nursing note reviewed.   Constitutional:       Appearance: She is well-developed. She is ill-appearing.   HENT:      Head: Normocephalic and atraumatic.      Mouth/Throat:      Mouth: Mucous membranes are dry.   Eyes:      Extraocular Movements: Extraocular movements intact.      Pupils: Pupils are equal, round, and reactive to light.   Cardiovascular:      Rate and Rhythm: Regular rhythm. Tachycardia present.      Heart sounds: Normal heart sounds.   Pulmonary:      Effort: Pulmonary effort is normal. Tachypnea present. No respiratory distress.      Breath sounds: Normal breath sounds. No wheezing.   Chest:       "Chest wall: No tenderness.   Abdominal:      General: Bowel sounds are normal. There is no distension.      Palpations: Abdomen is soft.      Tenderness: There is abdominal tenderness in the suprapubic area.   Musculoskeletal:         General: No tenderness. Normal range of motion.      Right lower leg: No edema.      Left lower leg: No edema.   Skin:     General: Skin is warm and dry.      Capillary Refill: Capillary refill takes less than 2 seconds.      Findings: No rash.   Neurological:      Mental Status: She is alert.      Cranial Nerves: No cranial nerve deficit.      Sensory: No sensory deficit.      Motor: Weakness present.      Comments: Oriented to person, time, and loosely oriented to place "SAE"  Cannot remember year of birth    Psychiatric:         Mood and Affect: Affect is flat.         Speech: Speech is delayed.         Behavior: Behavior is slowed. Behavior is cooperative.         Cognition and Memory: Memory is impaired.           Significant Labs: All pertinent labs within the past 24 hours have been reviewed.  CBC:   Recent Labs   Lab 07/15/23  1929 07/16/23  0753   WBC 14.02* 12.96*   HGB 14.4 14.5   HCT 44.6 46.7   PLT 62* 67*       Significant Imaging: I have reviewed all pertinent imaging results/findings within the past 24 hours.      Assessment/Plan:      * Sepsis with encephalopathy without septic shock  Acute cystitis without hematuria   Acute encephalopathy    - 87 y.o. F presenting with urosepsis and acute encephalopathy   - 2/4 SIRS on admission: Tmax 101.2, WBC 14K  - UA: 3+ leuks, positive nitrite, 27 WBCs, 1+ protein, 15 hyaline casts   - s/p IVFs & rocephin in the ED, given additional 500 mL at time of admission  - Lactic 1.5 at admission   - Fevered on empiric ceftriaxone 1g -> broaden to IV cefepime   - Urine culture pending  - Previous history of UTI with e.coli with resistance to augmentin & ampicillin - UA infectious  - Blood cultures pending  - Fall precautions  - Delirium " precautions     Debility  - PT/OT consulted at admission, appreciate recs     Amnestic MCI (mild cognitive impairment with memory loss)  - Continue memantine, galantamine, and buproprion  - Delirium precautions     Thrombocytopenia, unspecified  - Chronic and stable   - Continue to monitor with daily CBC    Hypercholesteremia  - continue statin    Hypothyroidism  - TSH 0.023, Free T4 1.42  - continue synthroid      VTE Risk Mitigation (From admission, onward)           Ordered     enoxaparin injection 40 mg  Daily         07/16/23 0220     IP VTE HIGH RISK PATIENT  Once         07/16/23 0220                    Discharge Planning   AMARA: 7/18/2023     Code Status: Full Code   Is the patient medically ready for discharge?: No    Reason for patient still in hospital (select all that apply): Patient trending condition, Laboratory test, Treatment and PT / OT recommendations                     Swathi Morrison PA-C  Department of Hospital Medicine   [facility]

## 2023-07-16 NOTE — ASSESSMENT & PLAN NOTE
- pt awake, alert and oriented x 4 on my exam  - 2/4 SIRs: Temp and WBC  - Tmax 100.4, WBC 14K  - s/p IVFs in ED, will give additional 500 mL  - lactic pending  - UA infectious  - Blood cultures pending  - Urine culture pending  - continue rocephin  - PT/ OT consulted  - fall precautions

## 2023-07-16 NOTE — SUBJECTIVE & OBJECTIVE
Past Medical History:   Diagnosis Date    Arthritis     Depression     Hypercholesteremia     Thrombocytopenia     Thyroid disease        Past Surgical History:   Procedure Laterality Date    ANKLE SURGERY      COLONOSCOPY N/A 6/2/2021    Procedure: COLONOSCOPY;  Surgeon: Alfonso Haque MD;  Location: [address];  Service: Endoscopy;  Laterality: N/A;  any crs  covid test 5/30-[facility]    HYSTERECTOMY      KNEE SURGERY      WRIST SURGERY         Review of patient's allergies indicates:   Allergen Reactions    Codeine Other (See Comments)       No current facility-administered medications on file prior to encounter.     Current Outpatient Medications on File Prior to Encounter   Medication Sig    alendronate (FOSAMAX) 70 MG tablet Take 1 tablet (70 mg total) by mouth every 7 days.    buPROPion (WELLBUTRIN XL) 150 MG TB24 tablet TAKE 1 TABLET EVERY DAY    cyanocobalamin (VITAMIN B-12) 1000 MCG tablet Take 100 mcg by mouth once daily.    galantamine (RAZADYNE ER) 16 MG 24 hr capsule TAKE 1 CAPSULE EVERY DAY WITH BREAKFAST    levothyroxine (SYNTHROID) 100 MCG tablet Take 1 tablet (100 mcg total) by mouth before breakfast.    losartan (COZAAR) 50 MG tablet Take 1 tablet (50 mg total) by mouth once daily.    memantine (NAMENDA) 10 MG Tab TAKE 1 TABLET TWICE DAILY    pravastatin (PRAVACHOL) 40 MG tablet TAKE 1 TABLET EVERY DAY     Family History       Problem Relation (Age of Onset)    Cancer Maternal Uncle    Heart failure Mother, Father    Suicide Son          Tobacco Use    Smoking status: Never    Smokeless tobacco: Never   Substance and Sexual Activity    Alcohol use: Not on file    Drug use: Not on file    Sexual activity: Not on file     Review of Systems   Unable to perform ROS: Dementia   Objective:     Vital Signs (Most Recent):  Temp: 99.5 °F (37.5 °C) (07/16/23 0032)  Pulse: 71 (07/16/23 0223)  Resp: 16 (07/16/23 0032)  BP: 123/60 (07/16/23 0140)  SpO2: 95 % (07/16/23 0223) Vital Signs (24h  Range):  Temp:  [99.5 °F (37.5 °C)-100.4 °F (38 °C)] 99.5 °F (37.5 °C)  Pulse:  [70-92] 71  Resp:  [16-18] 16  SpO2:  [94 %-97 %] 95 %  BP: (108-147)/(59-70) 123/60     Weight: 54.4 kg (120 lb)  Body mass index is 23.44 kg/m².     Physical Exam  Vitals and nursing note reviewed.   Constitutional:       General: She is not in acute distress.     Appearance: She is well-developed.   HENT:      Head: Normocephalic and atraumatic.      Mouth/Throat:      Mouth: Mucous membranes are dry.      Pharynx: No oropharyngeal exudate.   Eyes:      Pupils: Pupils are equal, round, and reactive to light.   Cardiovascular:      Rate and Rhythm: Normal rate and regular rhythm.      Heart sounds: Normal heart sounds.   Pulmonary:      Effort: Pulmonary effort is normal. No respiratory distress.      Breath sounds: Normal breath sounds. No wheezing.   Abdominal:      General: Bowel sounds are normal. There is no distension.      Palpations: Abdomen is soft.      Tenderness: There is no abdominal tenderness.   Musculoskeletal:         General: No tenderness. Normal range of motion.   Skin:     General: Skin is warm and dry.      Capillary Refill: Capillary refill takes less than 2 seconds.      Findings: No rash.   Neurological:      Mental Status: She is alert.      Cranial Nerves: No cranial nerve deficit.      Sensory: No sensory deficit.      Motor: Weakness present.      Comments: Oriented x 4 on exam   Psychiatric:         Cognition and Memory: Memory is impaired.            CRANIAL NERVES     CN III, IV, VI   Pupils are equal, round, and reactive to light.     Significant Labs: All pertinent labs within the past 24 hours have been reviewed.  BMP:   Recent Labs   Lab 07/15/23  1929   *      K 4.0      CO2 20*   BUN 15   CREATININE 0.9   CALCIUM 10.2   MG 2.1     CBC:   Recent Labs   Lab 07/15/23  1929   WBC 14.02*   HGB 14.4   HCT 44.6   PLT 62*       Significant Imaging: I have reviewed all pertinent imaging  results/findings within the past 24 hours.    Imaging Results              CT Head Without Contrast (Final result)  Result time 07/15/23 20:35:57      Final result by Jose R Glass MD (07/15/23 20:35:57)                   Impression:      No acute intracranial abnormalities.    Senescent changes.      Electronically signed by: Jose R Glass MD  Date:    07/15/2023  Time:    20:35               Narrative:    EXAMINATION:  CT HEAD WITHOUT CONTRAST    CLINICAL HISTORY:  Head trauma, minor (Age >= 65y);    TECHNIQUE:  Low dose axial images were obtained through the head.  Coronal and sagittal reformations were also performed. Contrast was not administered.    COMPARISON:  None.    FINDINGS:  The brain parenchyma appears normal for age with good corticomedullary differentiation.  There is no evidence of acute infarct, hemorrhage, or mass.  There is ventricular and sulcal enlargement consistent with generalized atrophy.  Moderate confluent decreased supratentorial white matter attenuation most likely related to chronic nonspecific small vessel disease.   No mass-effect or midline shift.  There are no abnormal extra-axial fluid collections.  The paranasal sinuses and mastoid air cells are essentially clear .  The calvarium appears intact.  .                                       CT Cervical Spine Without Contrast (Final result)  Result time 07/15/23 21:21:15      Final result by Jose R Glass MD (07/15/23 21:21:15)                   Impression:      No acute fracture or traumatic malalignment of the cervical spine.  Multilevel degenerative changes as detailed above and most severe at C4-C5 and C5-C6 moderate to severe spinal canal stenosis.    Electronically signed by resident: Ruma Pereira  Date:    07/15/2023  Time:    20:49    Electronically signed by: Jose R Glass MD  Date:    07/15/2023  Time:    21:21               Narrative:    EXAMINATION:  CT CERVICAL SPINE WITHOUT CONTRAST    CLINICAL  HISTORY:  Neck trauma (Age >= 65y);    TECHNIQUE:  Low dose axial images, sagittal and coronal reformations were performed though the cervical spine.  Contrast was not administered.    COMPARISON:  CT head 07/15/2023.    FINDINGS:  Skull base and craniocervical junction (partially imaged): No significant abnormality.    Spinal alignment: Straightening of the normal cervical lordosis.  No significant spondylolisthesis..    Vertebrae: Vertebral body heights are well maintained.  There is no evidence of fracture or dislocation.  Mild diffuse osteopenia.  Osseous fusion of the right C2-C3 facets    Discs: Mild disc space height loss most notable at C5-C6 and C6-C7.    Degenerative changes:    Multilevel degenerative change with loss of disc height most notable at C5-C6 and C6-C7.  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  Mild anterior osteophyte formation at C4-C7.    C2-C3:Severe right facet arthropathy.  No significant spinal canal stenosis or neural foraminal narrowing.    C3-C4:Severe right facet arthropathy and mild left facet arthropathy, bilateral uncovertebral joint spurring, right greater than left and small posterior disc osteophyte complex.  Findings result in near total occlusion of the right neural foramen.    C4-C5:Bilateral facet arthropathy, uncovertebral joint spurring and right paracentral posterior disc osteophyte complex.  Findings result in moderate right and mild left neural foraminal narrowing and moderate spinal canal stenosis.    C5-C6:Mild facet arthropathy, uncovertebral joint spurring, and left paracentral posterior disc osteophyte complex.  Findings result in mild right and moderate left neural foraminal narrowing and severe left spinal canal stenosis.    C6-C7:Bilateral facet arthropathy and uncovertebral joint spurring.  Findings result in mild right neural foraminal narrowing.    C7-T1:No significant spinal canal stenosis or neural foraminal  narrowing.    Limited evaluation of the intraspinal contents demonstrates no hematoma or mass.    Paraspinal soft tissues exhibit no acute abnormalities.    The soft tissue structures visualized in the neck are unremarkable.    The airway is patent and the lung apices are unremarkable.  Significant atherosclerosis of the aortic arch.  The visualized portions of the brain demonstrate no significant abnormality.                                       X-Ray Chest AP Portable (Final result)  Result time 07/15/23 18:42:08      Final result by Lionel Braun DO (07/15/23 18:42:08)                   Impression:      Stable chronic background interstitial prominence.  No new focal consolidation.      Electronically signed by: Lionel Braun  Date:    07/15/2023  Time:    18:42               Narrative:    EXAMINATION:  XR CHEST AP PORTABLE    CLINICAL HISTORY:  Fever, unspecified    TECHNIQUE:  Single frontal view of the chest was performed.    COMPARISON:  11/29/2022.    FINDINGS:  The lungs are well expanded.  Mild bandlike opacities in the left mid lung, likely subsegmental atelectasis or scarring.  There is mild background interstitial prominence, chronic and stable.  No new focal consolidation.  The pleural spaces are clear.    The cardiac silhouette is borderline.    The visualized osseous structures are unremarkable.                                       X-Ray Hip 2 or 3 views Left (with Pelvis when performed) (Final result)  Result time 07/15/23 19:29:01      Final result by Lionel Braun DO (07/15/23 19:29:01)                   Impression:      No acute osseous abnormality.      Electronically signed by: Lionel Braun  Date:    07/15/2023  Time:    19:29               Narrative:    EXAMINATION:  XR HIP WITH PELVIS WHEN PERFORMED, 2 OR 3 VIEWS LEFT    CLINICAL HISTORY:  Unspecified fall, initial encounter    TECHNIQUE:  AP view of the pelvis and frog leg lateral view of the left hip were  performed.    COMPARISON:  11/29/2022.    FINDINGS:  There is diffuse osteopenia.  There is no evidence of acute fracture or dislocation.  Alignment is normal.  The femoral heads are well seated in the acetabula.  There are degenerative changes.

## 2023-07-16 NOTE — PT/OT/SLP EVAL
Occupational Therapy   Co-Evaluation and Co-Treatment  Co-treatment with PT for maximal pt participation, safety, and activity tolerance       Name: Rosario Menendez  MRN: [id number]  Admitting Diagnosis: Sepsis with encephalopathy without septic shock  Recent Surgery: * No surgery found *      Recommendations:     Discharge Recommendations: nursing facility, skilled  Discharge Equipment Recommendations:  other (see comments), to be determined by next level of care  Barriers to discharge:       Assessment:     Rosario Menendez is a 87 y.o. female with a medical diagnosis of Sepsis with encephalopathy without septic shock.  She presents with impaired ADL and mobility performance deficits. Pt found upright in bed and agreeable for therapy with pt's friend present and supportive. Pt oriented x3 (not to date) and followed commands with  min cues. Pt required mod A for bed mobility and sat EOB with CGA-mod A with spontaneous posterior lean. Pt stood with mod A x2 with L hip pain reported and fearful behavior noted. PTA, pt was mobilizing using a RW and living in an long term. Pt would benefit from continued OT skilled services 3x/wk to improve daily living skills to optimize QOL. Pt is recommended to discharge to SNF at this time.  Performance deficits affecting function: weakness, gait instability, decreased upper extremity function, decreased lower extremity function, impaired balance, impaired endurance, impaired self care skills, impaired functional mobility, decreased safety awareness, impaired cognition, decreased ROM.      Rehab Prognosis: Good; patient would benefit from acute skilled OT services to address these deficits and reach maximum level of function.       Plan:     Patient to be seen 3 x/week to address the above listed problems via self-care/home management, therapeutic activities, therapeutic exercises  Plan of Care Expires:    Plan of Care Reviewed with: patient, friend    Subjective     Chief Complaint: L  hip discomfort   Patient/Family Comments/goals: to improve strength    Occupational Profile:  Living Environment: Pt lives in an [facility] (~half yr/yr resident). Pt has communal bathing setup.  Previous level of function: A with bathing from staff MWF. Daugher visits/A on tuesdays. Friend assists pt on Wednesdays. Pt uses a RW for mobility within [facility] for community. Can dressing and complete most basic ADLs without A  Roles and Routines: Pt enjoys outings with friends   Equipment Used at Home: walker, rolling, wheelchair, rollator, cane, straight  Assistance upon Discharge: [facility] staff    Pain/Comfort:  Pain Rating 1: other (see comments) (pain in L hip not ranked)  Location - Side 1: Left  Location 1: hip  Pain Addressed 1: Reposition, Distraction    Patients cultural, spiritual, Jainism conflicts given the current situation: no    Objective:     Communicated with: RN prior to session.  Patient found HOB elevated with bed alarm upon OT entry to room.    General Precautions: Standard, fall  Orthopedic Precautions: N/A  Braces: N/A  Respiratory Status: Room air    Occupational Performance:    Bed Mobility:    Patient completed Rolling/Turning to Left with  moderate assistance  Patient completed Rolling/Turning to Right with moderate assistance  Patient completed Scooting/Bridging with moderate assistance  Patient completed Supine to Sit with moderate assistance  Patient completed Sit to Supine with moderate assistance    Functional Mobility/Transfers:  Patient completed Sit <> Stand Transfer with moderate assistance and of 2 persons  with  rolling walker   Functional Mobility: Pt stood with mod A x2 unable to take steps today using RW. Pt appearing fearful with L hip discomfort reported     Activities of Daily Living:  Grooming: stand by assistance washing face and brushing teeth at EOB   Upper Body Dressing: minimum assistance donning gown at EOB   Toileting: total assistance for pericare/brief replacement in bed  rolling    Cognitive/Visual Perceptual:  Cognitive/Psychosocial Skills:     -       Oriented to: Person, Place, and Situation   -       Follows Commands/attention:Follows multistep  commands  -       Communication: clear/fluent  -       Memory: Poor immediate recall  -       Safety awareness/insight to disability: impaired   -       Mood/Affect/Coping skills/emotional control: Appropriate to situation    Physical Exam:  Balance:    -       demo CGA- mod A for EOB Balance   Dominant hand:    -       right  Upper Extremity Range of Motion:     -       Right Upper Extremity: WFL  -       Left Upper Extremity: WFL  Upper Extremity Strength:    -       Right Upper Extremity: Deficits: 4/5  -       Left Upper Extremity: Deficits: 4/5   Strength:    -       Right Upper Extremity: WFL  -       Left Upper Extremity: WFL    AMPAC 6 Click ADL:  AMPAC Total Score:      Treatment & Education:  Pt educated on role of occupational therapy, POC, and safety during ADLs and functional mobility. Pt and OT discussed importance of safe, continued mobility to optimize daily living skills. Pt verbalized understanding. White board updated during session. Pt given instruction to call for medical staff/nurse for assistance.       Patient left HOB elevated with all lines intact, call button in reach, and RN notified    GOALS:   Multidisciplinary Problems       Occupational Therapy Goals          Problem: Occupational Therapy    Goal Priority Disciplines Outcome Interventions   Occupational Therapy Goal     OT, PT/OT Ongoing, Progressing    Description: Goals to be met by: 8/16/23 (1 month)     Patient will increase functional independence with ADLs by performing:    UE Dressing with [place].  LE Dressing with [place].  Grooming while standing with Contact Guard Assistance.  Toileting from toilet with Contact Guard Assistance for hygiene and clothing management.   Rolling to Bilateral with [place].   Supine to sit with  Supervision.  Step transfer with Supervision  Toilet transfer to toilet with Stand-by Assistance.                         History:     Past Medical History:   Diagnosis Date    Arthritis     Depression     Hypercholesteremia     Thrombocytopenia     Thyroid disease          Past Surgical History:   Procedure Laterality Date    ANKLE SURGERY      COLONOSCOPY N/A 6/2/2021    Procedure: COLONOSCOPY;  Surgeon: Alfonso Haque MD;  Location: [address];  Service: Endoscopy;  Laterality: N/A;  any crs  covid test 5/30-[facility]    HYSTERECTOMY      KNEE SURGERY      WRIST SURGERY         Time Tracking:     OT Date of Treatment: 07/16/23  OT Start Time: 1029  OT Stop Time: 1055  OT Total Time (min): 26 min    Billable Minutes:Evaluation 10 min  Self Care/Home Management 16 min    7/16/2023

## 2023-07-16 NOTE — PLAN OF CARE
Per MARIANA Morrison PA-C, abx given early. Med admin at this time. Pt resting in bed in NAD at this time. Bed locked in lowest position. Call bell within reach.

## 2023-07-16 NOTE — ASSESSMENT & PLAN NOTE
Acute cystitis without hematuria   Acute encephalopathy    - 87 y.o. F presenting with urosepsis and acute encephalopathy   - 2/4 SIRS on admission: Tmax 101.2, WBC 14K  - UA: 3+ leuks, positive nitrite, 27 WBCs, 1+ protein, 15 hyaline casts   - s/p IVFs & rocephin in the ED, given additional 500 mL at time of admission  - Lactic 1.5 at admission   - Fevered on empiric ceftriaxone 1g -> broaden to IV cefepime   - Urine culture pending  - Previous history of UTI with e.coli with resistance to augmentin & ampicillin - UA infectious  - Blood cultures pending  - Fall precautions  - Delirium precautions

## 2023-07-16 NOTE — SUBJECTIVE & OBJECTIVE
Interval History: Pt seen and examined by me this morning with son-in-law at bedside. She is febrile to 101.2, mildly tachypnic, and sating 92% on RA. The patient reports significant lethargy and generalized weakness, as well as suprapubic pain. Family at bedside report her encephalopathy has improved slightly from admission.     Review of Systems   Unable to perform ROS: Dementia   Constitutional:  Positive for fatigue.   Genitourinary:  Positive for pelvic pain. Negative for dysuria.   Neurological:  Positive for weakness.   Objective:     Vital Signs (Most Recent):  Temp: (!) 101.2 °F (38.4 °C) (07/16/23 0808)  Pulse: 92 (07/16/23 0808)  Resp: 19 (07/16/23 0808)  BP: (!) 157/66 (07/16/23 0808)  SpO2: (!) 92 % (07/16/23 0808) Vital Signs (24h Range):  Temp:  [98.3 °F (36.8 °C)-101.2 °F (38.4 °C)] 101.2 °F (38.4 °C)  Pulse:  [70-92] 92  Resp:  [16-19] 19  SpO2:  [92 %-97 %] 92 %  BP: (108-157)/(58-70) 157/66     Weight: 60.1 kg (132 lb 7.9 oz)  Body mass index is 25.88 kg/m².    Intake/Output Summary (Last 24 hours) at 7/16/2023 1135  Last data filed at 7/16/2023 0630  Gross per 24 hour   Intake --   Output 1 ml   Net -1 ml         Physical Exam  Vitals and nursing note reviewed.   Constitutional:       Appearance: She is well-developed. She is ill-appearing.   HENT:      Head: Normocephalic and atraumatic.      Mouth/Throat:      Mouth: Mucous membranes are dry.   Eyes:      Extraocular Movements: Extraocular movements intact.      Pupils: Pupils are equal, round, and reactive to light.   Cardiovascular:      Rate and Rhythm: Regular rhythm. Tachycardia present.      Heart sounds: Normal heart sounds.   Pulmonary:      Effort: Pulmonary effort is normal. Tachypnea present. No respiratory distress.      Breath sounds: Normal breath sounds. No wheezing.   Chest:      Chest wall: No tenderness.   Abdominal:      General: Bowel sounds are normal. There is no distension.      Palpations: Abdomen is soft.       "Tenderness: There is abdominal tenderness in the suprapubic area.   Musculoskeletal:         General: No tenderness. Normal range of motion.      Right lower leg: No edema.      Left lower leg: No edema.   Skin:     General: Skin is warm and dry.      Capillary Refill: Capillary refill takes less than 2 seconds.      Findings: No rash.   Neurological:      Mental Status: She is alert.      Cranial Nerves: No cranial nerve deficit.      Sensory: No sensory deficit.      Motor: Weakness present.      Comments: Oriented to person, time, and loosely oriented to place "SAE"  Cannot remember year of birth    Psychiatric:         Mood and Affect: Affect is flat.         Speech: Speech is delayed.         Behavior: Behavior is slowed. Behavior is cooperative.         Cognition and Memory: Memory is impaired.           Significant Labs: All pertinent labs within the past 24 hours have been reviewed.  CBC:   Recent Labs   Lab 07/15/23  1929 07/16/23  0753   WBC 14.02* 12.96*   HGB 14.4 14.5   HCT 44.6 46.7   PLT 62* 67*       Significant Imaging: I have reviewed all pertinent imaging results/findings within the past 24 hours.  "

## 2023-07-16 NOTE — HOSPITAL COURSE
Ms. Menendez was placed in observation for sepsis without acute organ dysfunction and acute encephalopathy in the setting of pyelonephritis. The patient was started on IV rocephin and subsequently broadened to IV cefepime given slow clinical response. Urine culture + pansensitive e.coli. Blood cultures NGTD. Soft tissue ultrasound consistent with cellulitis vs small developing abscess, and the patient was started on IV vancomycin. PT/OT consulted and recommending SNF. On my evaluation this morning, the patient reports feeling well and is medically ready for SNF transfer. All questions were answered. Patient was discharged to [facility] on 7/18/2023 in stable condition. Education regarding condition provided and return precautions given.

## 2023-07-16 NOTE — ED NOTES
Telemetry Verification   Patient placed on Telemetry Box  Verified with War Room  Box # [id number]   Monitor Tech War room   Rate 65   Rhythm NSR

## 2023-07-16 NOTE — H&P
[facility] - Emergency Dept  [facility] Medicine  History & Physical    Patient Name: Rosario Menendez  MRN: [id number]  Patient Class: OP- Observation  Admission Date: 7/15/2023  Attending Physician: Ulises Reaves MD   Primary Care Provider: Shi Simon MD         Patient information was obtained from patient and ER records.     Subjective:     Principal Problem:AMS (altered mental status)    Chief Complaint:   Chief Complaint   Patient presents with    Fever     Pt reports fever and generalized weakness x2 days.         HPI: Rosario Qiu is a 87 y.o. female with a hx of osteoporosis, hypothyroidism, dementia, and depression presents to the ed for AMS. Patient is a poor historian. Daughter not at bedside during my exam. Family at bedside able to provide minimal hx. Per family, pt had an episode where she passed out yesterday. Was very weak following. He notes that at her assisted living she was noted to have a temp of 103, was given tylenol, then recheck was 101 before presenting to the ED. Per ED note: pt had a near syncopal episode at the bookstore while using her walker. She was weak and confused since the incident. Patient's daughter did not witness the incident though she believes the patient fell on her bottom and did not hit her head.  Daughter reports that the patient had dinner with her family the night before and was feeling well.  Daughter states that patient seems slightly more confused with slower mentation today. Patient awake and oriented x 4 on my exam. Endorses feeling very weak. Denies chills, chest pain, SOB, abdominal pain, dysuria, frequency, constipation and diarrhea.     ED: Temp 100.4, /60. WBC 14K. CMP largely unremarkable. UA infectious with 15 hyaline casts. CXR showed stable chronic background interstitial prominence.  No new focal consolidation. CTH showed no acute abnormalities. CT C spine showed no acute fracture. Given IVFs and rocephin in ED.       Past Medical History:    Diagnosis Date    Arthritis     Depression     Hypercholesteremia     Thrombocytopenia     Thyroid disease        Past Surgical History:   Procedure Laterality Date    ANKLE SURGERY      COLONOSCOPY N/A 6/2/2021    Procedure: COLONOSCOPY;  Surgeon: Alfonso Haque MD;  Location: [address];  Service: Endoscopy;  Laterality: N/A;  any crs  covid test 5/30-[place]    HYSTERECTOMY      KNEE SURGERY      WRIST SURGERY         Review of patient's allergies indicates:   Allergen Reactions    Codeine Other (See Comments)       No current facility-administered medications on file prior to encounter.     Current Outpatient Medications on File Prior to Encounter   Medication Sig    alendronate (FOSAMAX) 70 MG tablet Take 1 tablet (70 mg total) by mouth every 7 days.    buPROPion (WELLBUTRIN XL) 150 MG TB24 tablet TAKE 1 TABLET EVERY DAY    cyanocobalamin (VITAMIN B-12) 1000 MCG tablet Take 100 mcg by mouth once daily.    galantamine (RAZADYNE ER) 16 MG 24 hr capsule TAKE 1 CAPSULE EVERY DAY WITH BREAKFAST    levothyroxine (SYNTHROID) 100 MCG tablet Take 1 tablet (100 mcg total) by mouth before breakfast.    losartan (COZAAR) 50 MG tablet Take 1 tablet (50 mg total) by mouth once daily.    memantine (NAMENDA) 10 MG Tab TAKE 1 TABLET TWICE DAILY    pravastatin (PRAVACHOL) 40 MG tablet TAKE 1 TABLET EVERY DAY     Family History       Problem Relation (Age of Onset)    Cancer Maternal Uncle    Heart failure Mother, Father    Suicide Son          Tobacco Use    Smoking status: Never    Smokeless tobacco: Never   Substance and Sexual Activity    Alcohol use: Not on file    Drug use: Not on file    Sexual activity: Not on file     Review of Systems   Unable to perform ROS: Dementia   Objective:     Vital Signs (Most Recent):  Temp: 99.5 °F (37.5 °C) (07/16/23 0032)  Pulse: 71 (07/16/23 0223)  Resp: 16 (07/16/23 0032)  BP: 123/60 (07/16/23 0140)  SpO2: 95 % (07/16/23 0223) Vital Signs (24h  Range):  Temp:  [99.5 °F (37.5 °C)-100.4 °F (38 °C)] 99.5 °F (37.5 °C)  Pulse:  [70-92] 71  Resp:  [16-18] 16  SpO2:  [94 %-97 %] 95 %  BP: (108-147)/(59-70) 123/60     Weight: 54.4 kg (120 lb)  Body mass index is 23.44 kg/m².     Physical Exam  Vitals and nursing note reviewed.   Constitutional:       General: She is not in acute distress.     Appearance: She is well-developed.   HENT:      Head: Normocephalic and atraumatic.      Mouth/Throat:      Mouth: Mucous membranes are dry.      Pharynx: No oropharyngeal exudate.   Eyes:      Pupils: Pupils are equal, round, and reactive to light.   Cardiovascular:      Rate and Rhythm: Normal rate and regular rhythm.      Heart sounds: Normal heart sounds.   Pulmonary:      Effort: Pulmonary effort is normal. No respiratory distress.      Breath sounds: Normal breath sounds. No wheezing.   Abdominal:      General: Bowel sounds are normal. There is no distension.      Palpations: Abdomen is soft.      Tenderness: There is no abdominal tenderness.   Musculoskeletal:         General: No tenderness. Normal range of motion.   Skin:     General: Skin is warm and dry.      Capillary Refill: Capillary refill takes less than 2 seconds.      Findings: No rash.   Neurological:      Mental Status: She is alert.      Cranial Nerves: No cranial nerve deficit.      Sensory: No sensory deficit.      Motor: Weakness present.      Comments: Oriented x 4 on exam   Psychiatric:         Cognition and Memory: Memory is impaired.            CRANIAL NERVES     CN III, IV, VI   Pupils are equal, round, and reactive to light.     Significant Labs: All pertinent labs within the past 24 hours have been reviewed.  BMP:   Recent Labs   Lab 07/15/23  1929   *      K 4.0      CO2 20*   BUN 15   CREATININE 0.9   CALCIUM 10.2   MG 2.1     CBC:   Recent Labs   Lab 07/15/23  1929   WBC 14.02*   HGB 14.4   HCT 44.6   PLT 62*       Significant Imaging: I have reviewed all pertinent imaging  results/findings within the past 24 hours.    Imaging Results              CT Head Without Contrast (Final result)  Result time 07/15/23 20:35:57      Final result by Jose R Glass MD (07/15/23 20:35:57)                   Impression:      No acute intracranial abnormalities.    Senescent changes.      Electronically signed by: Jose R Glass MD  Date:    07/15/2023  Time:    20:35               Narrative:    EXAMINATION:  CT HEAD WITHOUT CONTRAST    CLINICAL HISTORY:  Head trauma, minor (Age >= 65y);    TECHNIQUE:  Low dose axial images were obtained through the head.  Coronal and sagittal reformations were also performed. Contrast was not administered.    COMPARISON:  None.    FINDINGS:  The brain parenchyma appears normal for age with good corticomedullary differentiation.  There is no evidence of acute infarct, hemorrhage, or mass.  There is ventricular and sulcal enlargement consistent with generalized atrophy.  Moderate confluent decreased supratentorial white matter attenuation most likely related to chronic nonspecific small vessel disease.   No mass-effect or midline shift.  There are no abnormal extra-axial fluid collections.  The paranasal sinuses and mastoid air cells are essentially clear .  The calvarium appears intact.  .                                       CT Cervical Spine Without Contrast (Final result)  Result time 07/15/23 21:21:15      Final result by Jose R Glass MD (07/15/23 21:21:15)                   Impression:      No acute fracture or traumatic malalignment of the cervical spine.  Multilevel degenerative changes as detailed above and most severe at C4-C5 and C5-C6 moderate to severe spinal canal stenosis.    Electronically signed by resident: Ruma Pereira  Date:    07/15/2023  Time:    20:49    Electronically signed by: Jose R Glass MD  Date:    07/15/2023  Time:    21:21               Narrative:    EXAMINATION:  CT CERVICAL SPINE WITHOUT CONTRAST    CLINICAL  HISTORY:  Neck trauma (Age >= 65y);    TECHNIQUE:  Low dose axial images, sagittal and coronal reformations were performed though the cervical spine.  Contrast was not administered.    COMPARISON:  CT head 07/15/2023.    FINDINGS:  Skull base and craniocervical junction (partially imaged): No significant abnormality.    Spinal alignment: Straightening of the normal cervical lordosis.  No significant spondylolisthesis..    Vertebrae: Vertebral body heights are well maintained.  There is no evidence of fracture or dislocation.  Mild diffuse osteopenia.  Osseous fusion of the right C2-C3 facets    Discs: Mild disc space height loss most notable at C5-C6 and C6-C7.    Degenerative changes:    Multilevel degenerative change with loss of disc height most notable at C5-C6 and C6-C7.  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  Mild anterior osteophyte formation at C4-C7.    C2-C3:Severe right facet arthropathy.  No significant spinal canal stenosis or neural foraminal narrowing.    C3-C4:Severe right facet arthropathy and mild left facet arthropathy, bilateral uncovertebral joint spurring, right greater than left and small posterior disc osteophyte complex.  Findings result in near total occlusion of the right neural foramen.    C4-C5:Bilateral facet arthropathy, uncovertebral joint spurring and right paracentral posterior disc osteophyte complex.  Findings result in moderate right and mild left neural foraminal narrowing and moderate spinal canal stenosis.    C5-C6:Mild facet arthropathy, uncovertebral joint spurring, and left paracentral posterior disc osteophyte complex.  Findings result in mild right and moderate left neural foraminal narrowing and severe left spinal canal stenosis.    C6-C7:Bilateral facet arthropathy and uncovertebral joint spurring.  Findings result in mild right neural foraminal narrowing.    C7-T1:No significant spinal canal stenosis or neural foraminal  narrowing.    Limited evaluation of the intraspinal contents demonstrates no hematoma or mass.    Paraspinal soft tissues exhibit no acute abnormalities.    The soft tissue structures visualized in the neck are unremarkable.    The airway is patent and the lung apices are unremarkable.  Significant atherosclerosis of the aortic arch.  The visualized portions of the brain demonstrate no significant abnormality.                                       X-Ray Chest AP Portable (Final result)  Result time 07/15/23 18:42:08      Final result by Lionel Braun DO (07/15/23 18:42:08)                   Impression:      Stable chronic background interstitial prominence.  No new focal consolidation.      Electronically signed by: Lionel Braun  Date:    07/15/2023  Time:    18:42               Narrative:    EXAMINATION:  XR CHEST AP PORTABLE    CLINICAL HISTORY:  Fever, unspecified    TECHNIQUE:  Single frontal view of the chest was performed.    COMPARISON:  11/29/2022.    FINDINGS:  The lungs are well expanded.  Mild bandlike opacities in the left mid lung, likely subsegmental atelectasis or scarring.  There is mild background interstitial prominence, chronic and stable.  No new focal consolidation.  The pleural spaces are clear.    The cardiac silhouette is borderline.    The visualized osseous structures are unremarkable.                                       X-Ray Hip 2 or 3 views Left (with Pelvis when performed) (Final result)  Result time 07/15/23 19:29:01      Final result by Lionel Braun DO (07/15/23 19:29:01)                   Impression:      No acute osseous abnormality.      Electronically signed by: Lionel Braun  Date:    07/15/2023  Time:    19:29               Narrative:    EXAMINATION:  XR HIP WITH PELVIS WHEN PERFORMED, 2 OR 3 VIEWS LEFT    CLINICAL HISTORY:  Unspecified fall, initial encounter    TECHNIQUE:  AP view of the pelvis and frog leg lateral view of the left hip were  performed.    COMPARISON:  11/29/2022.    FINDINGS:  There is diffuse osteopenia.  There is no evidence of acute fracture or dislocation.  Alignment is normal.  The femoral heads are well seated in the acetabula.  There are degenerative changes.                                        Assessment/Plan:     * AMS (altered mental status)  - pt awake, alert and oriented x 4 on my exam  - 2/4 SIRs: Temp and WBC  - Tmax 100.4, WBC 14K  - s/p IVFs in ED, will give additional 500 mL  - lactic pending  - UA infectious  - Blood cultures pending  - Urine culture pending  - continue rocephin  - PT/ OT consulted  - fall precautions    Acute cystitis with hematuria  - UA infectious  - continue rocephin  - follow urine culture    Amnestic MCI (mild cognitive impairment with memory loss)  - continue memantine, galantamine, and buproprion    Hypercholesteremia  - continue statin    Thyroid disease  - continue synthroid      VTE Risk Mitigation (From admission, onward)         Ordered     enoxaparin injection 40 mg  Daily         07/16/23 0220     IP VTE HIGH RISK PATIENT  Once         07/16/23 0220                     On 07/16/2023, patient should be placed in hospital observation services under my care in collaboration with Dr. Yayo Ryder.      Helga Herndon PA-C  Department of Hospital Medicine  [facility] - Emergency Dept

## 2023-07-16 NOTE — PROVIDER PROGRESS NOTES - EMERGENCY DEPT.
Encounter Date: 7/15/2023    ED Physician Progress Notes            ED Resident HAND-OFF NOTE:  Rosario Menendez is a 87 y.o. female who presented to the ED on 7/16/2023, patient C/O fever. I assumed care of patient from off-going ED physician team patient pending urinalysis, ambulation.    Briefly, patient was on field trip from nursing home yesterday where she had a fall.  Patient obtain CT head/CT cervical spine showing no acute pathology.  Patient having generalized weakness.  She has a history of UTI, will obtain urinalysis.    On my evaluation, Rosario Menendez appears well, hemodynamically stable and in NAD. Thus far, Rosario Menendez has received:  Medications   cefTRIAXone (ROCEPHIN) 1 g in dextrose 5 % in water (D5W) 5 % 100 mL IVPB (MB+) (has no administration in time range)   sodium chloride 0.9% flush 10 mL (has no administration in time range)   enoxaparin injection 40 mg (has no administration in time range)   melatonin tablet 6 mg (has no administration in time range)   ondansetron disintegrating tablet 8 mg (has no administration in time range)   promethazine tablet 25 mg (has no administration in time range)   polyethylene glycol packet 17 g (has no administration in time range)   bisacodyL suppository 10 mg (has no administration in time range)   glucose chewable tablet 16 g (has no administration in time range)   glucose chewable tablet 24 g (has no administration in time range)   glucagon (human recombinant) injection 1 mg (has no administration in time range)   dextrose 10% bolus 125 mL 125 mL (has no administration in time range)   dextrose 10% bolus 250 mL 250 mL (has no administration in time range)   buPROPion TB24 tablet 150 mg (150 mg Oral Given 7/16/23 0859)   galantamine 24 hr capsule 16 mg (has no administration in time range)   levothyroxine tablet 100 mcg (100 mcg Oral Given 7/16/23 0507)   memantine tablet 10 mg (10 mg Oral Given 7/16/23 0859)   pravastatin tablet 40 mg (40 mg  Oral Given 7/16/23 0859)   acetaminophen tablet 1,000 mg (has no administration in time range)   lactated ringers bolus 500 mL (0 mLs Intravenous Stopped 7/15/23 2058)   acetaminophen tablet 650 mg (650 mg Oral Given 7/15/23 1938)   acetaminophen tablet 1,000 mg (1,000 mg Oral Given 7/15/23 2352)   cefTRIAXone (ROCEPHIN) 1 g in dextrose 5 % in water (D5W) 5 % 100 mL IVPB (MB+) (0 g Intravenous Stopped 7/16/23 0022)   lactated ringers bolus 500 mL (0 mLs Intravenous Stopped 7/16/23 0907)       BP (!) 157/66 (BP Location: Right arm, Patient Position: Lying)   Pulse 92   Temp (!) 101.2 °F (38.4 °C) (Oral)   Resp 19   Ht 5' (1.524 m)   Wt 60.1 kg (132 lb 7.9 oz)   SpO2 (!) 92%   Breastfeeding No   BMI 25.88 kg/m²         Disposition: I anticipate patient will discharge if she is able to ambulate  ______________________  Swathi Mahoney MD   Emergency Medicine Resident      UPDATE:     Urinalysis appears to be consistent with acute cystitis.  Will give initial dose of Rocephin.      RN conducted ambulate shin test.  Patient unable to stand without significant amount of assistance.  This is a significant decrease from her baseline since she is usually able to ambulate with a walker.    Discussed this concern with patient and family member.  Patient is at severe fall risk due to fall yesterday and generalized weakness that has significantly limited her mobility compared to her baseline.  Patient would likely benefit from observation.  So that she may receive treatment for her UTI and possibly physical therapy.  Patient and family member is amenable to this plan.  Discussed patient's case with Hospital Medicine who agreed to admit patient to their service for further management.  She remains hemodynamically stable and is appropriate for transfer to the floor at this time.    :  Fever  Syncope  Fall  Altered mental status, unspecified altered mental status type (Primary)  Urinary tract infection without hematuria,  site unspecified  UTI (urinary tract infection)

## 2023-07-16 NOTE — PLAN OF CARE
Problem: Occupational Therapy  Goal: Occupational Therapy Goal  Description: Goals to be met by: 8/16/23 (1 month)     Patient will increase functional independence with ADLs by performing:    UE Dressing with Stewart.  LE Dressing with Stewart.  Grooming while standing with Contact Guard Assistance.  Toileting from toilet with Contact Guard Assistance for hygiene and clothing management.   Rolling to Bilateral with Stewart.   Supine to sit with Supervision.  Step transfer with Supervision  Toilet transfer to toilet with Stand-by Assistance.    Evaluated pt and established OT POC. Continue OT as tolerated.  Adelita Anderson OT.  7/16/2023    Outcome: Ongoing, Progressing

## 2023-07-16 NOTE — NURSING
Patient transported to unit via stretcher with family at bedside. Patient oriented to room. Safety and fall precautions initiated. Bed in lowest locked position. Bed alarm initiated. Call light within reach. Respirations even and unlabored. Patient on 2l nc. Will continue care as ordered.

## 2023-07-16 NOTE — DISCHARGE INSTRUCTIONS
Diagnosis: Urinary tract infection    Please take Keflex 4 times a day for the next 7 days.    Home Care Instructions:  - Take the prescribed antibiotic as directed  - Stay well hydrated  - For relief of burning and the feeling of urgency, take Azo over-the-counter according to packaging directions for up to two days. This medication may turn your urine yellow-orange, and may stain clothing.  - Continue taking your home medications as prescribed    Take ibuprofen (also called Advil, Motrin) for your pain. This medicine is available over-the-counter in 200 mg tablets.  - You may take 600 mg every 6 hours, or 800 mg every 8 hours as needed   - Do not take more than this amount, as it can cause kidney problems, bleeding in your stomach, and other serious problems.   - Do not also take naproxen (Aleve) at the same time or on the same day  - If you have heart problems or uncontrolled high blood pressure, you should not take ibuprofen for more than 3 days without discussing with your doctor    Follow-up plan:  - Follow-up with: Primary care doctor within 3 - 5 days  - Additional testing and/or evaluation as directed by your primary doctor    Return to the Emergency Department for symptoms including but not limited to: worsening symptoms, severe abdominal or back pain, shortness of breath or chest pain, vomiting with inability to hold down fluids, fevers greater than 100.4°F, dizziness, passing out/fainting/unconsciousness, or other concerning symptoms.

## 2023-07-16 NOTE — PT/OT/SLP EVAL
Physical Therapy Co-Evaluation with OT and Treatment     Patient Name:  Rosario Menendez  MRN: [id number]    Admit Date: 7/15/2023  Admitting Diagnosis:  Sepsis with encephalopathy without septic shock  Length of Stay: 0 days  Recent Surgery: * No surgery found *      Recommendations:     Discharge Recommendations: Skilled Nursing Facility   Equipment recommendations:  TBD at next level of care  Barriers to discharge: Increased level of skilled assistance required    Assessment:     Rosario Menendez is a 87 y.o. female admitted to [facility] on [place]15/2023 with medical diagnosis of Sepsis with encephalopathy without septic shock. Pt presents with weakness, impaired endurance, impaired self care skills, impaired functional mobility, gait instability, impaired balance, impaired cognition, decreased coordination, decreased safety awareness, pain, impaired coordination. These deficits effect their roles and responsibilities in which they were able to complete prior to admit.     Pt is agreeable to therapy evaluation and session. Pt's caregiver at bedside (Haab). Pt lives at an [facility] (unsure of name). Caregiver and children alternate visiting her (son comes Sun; daughter comes Tues; caregiver comes Wed and Fri). Staff assists with bathing. Pt uses RW for household distances and w/c for community distances.     Pt requires ModA for bed mobility (rolling and sup>sit). While sitting eob, requires Mod-CGA while performing ADLs. Attempted 1 stand with Modx2 with RW. Weight shifted posteriorly with feet sliding out. Unable to correct with max tactile and v/c. Returned to supine with bed alarm on and caregiver at bedside.    Rosario Menendez would benefit from acute PT intervention to improve quality of life, focus on recovery of impairments, provide patient/caregiver education, reduce fall risk, and maximize (I) and safety with functional mobility. Once medically stable, recommending pt discharge to Skilled Nursing Facility .   "    Rehab Prognosis: Good    Plan:     During this hospitalization, patient to be seen 3 x/week to address the identified rehab impairments via gait training, therapeutic activities, therapeutic exercises, neuromuscular re-education, wheelchair management/training and progress towards stated goals.     Plan of Care Expires:  08/15/23  Plan of Care reviewed with: patient, caregiver    This plan of care has been discussed with the patient/caregiver, who was included in its development and is in agreement with the identified goals and treatment plan.     Subjective     Communicated with RN prior to session.  Patient found supine upon PT entry to room, agreeable to evaluation. Pt's caregiver present during session.    Chief Complaint: L hip pain    Patient/Family Comments/goals: "my hips hurts"    Pain/Comfort:  Pain Rating 1:  (pt did not rate)  Location - Side 1: Left  Location - Orientation 1: generalized  Location 1: hip  Pain Addressed 1: Reposition, Cessation of Activity, Distraction  Pain Rating Post-Intervention 1:  (pt did not rate)    Patients cultural, spiritual, Mosque conflicts given the current situation: None identified     Patient History: information obtained from pt and caregiver     Living Environment: Pt lives at [facility]. Bathroom set-up: communal bathroom  Prior Level of Function: modified (I) for mobility and ADLs using RW as AD ; (I) toileting  DME owned: rolling walker and wheelchair  Support Available/Caregiver Assistance:  staff and caregiver    Objective:     Patient found with: bed alarm, telemetry, peripheral IV    Recent Surgery: * No surgery found *    General Precautions: Standard, fall   Orthopedic Precautions:N/A   Braces: N/A   Oxygen Device: room air      Exams:    Cognition:  Oriented X 3 (not to date)  Command following: Follows two-step verbal commands  Communication: clear/fluent    Sensation:   Light touch sensation: Intact BLEs    Gross Motor Coordination: No deficits noted " during functional mobility tasks     Edema/Skin Integrity: None noted; Visible skin intact    Postural examination/scapula alignment: Rounded shoulder and Head forward    Lower Extremity Range of Motion:  Right Lower Extremity: WFL  Left Lower Extremity: WFL    Lower Extremity Strength:  Right Lower Extremity:  unable to assess d/t decreased cognition  Left Lower Extremity:  unable to assess d/t decreased cognition    Functional Mobility:    Bed Mobility:  Rolling to right with moderate assistance  Rolling to left with moderate assistance  Supine > Sit with moderate assistance  Sit > Supine with moderate assistance    Transfers:   Sit <> Stand Transfer: Moderate Assistancex2 from eob with RW AD     Balance:  Dynamic Sitting: Mod-CGA  Standing:  Static: POOR: Needs MODERATE assist to maintain   Dynamic: 0: N/A    Outcome Measure: AM-PAC 6 CLICK MOBILITY  Total Score:10     Patient/Caregiver Education:   OT present for coeval due to pt's multiple medical comorbidities and functional/cognition deficits requiring two skilled therapists to appropriately progress pt's musculoskeletal strength, neuromuscular control, and endurance while taking into consideration medical acuity and pt safety.    Therapist educated pt/caregiver regarding:   PT POC and goals for therapy   Safety with mobility and fall risk   Instruction on use of call button and importance of calling nursing staff for assistance with mobility   Time provided for therapeutic counseling and discussion of current health disposition. All questions answered to satisfaction, within scope of PT practice     Patient/caregiver able to verbalize understanding and expressed no further questions this visit; will follow-up with pt/caregiver during current admit for additional questions/concerns within scope of practice.     White board updated.     Patient left supine with all lines intact, call button in reach, bed alarm on, and caregiver present.    GOALS:    Multidisciplinary Problems       Physical Therapy Goals          Problem: Physical Therapy    Goal Priority Disciplines Outcome Goal Variances Interventions   Physical Therapy Goal     PT, PT/OT Ongoing, Progressing     Description: Goals to be met by: 23     Patient will increase functional independence with mobility by performin. Supine to sit with MInimal Assistance  2. Sit to stand transfer with Minimal Assistance  3. Bed to chair transfer with Minimal Assistance using Rolling Walker  4. Gait  x 100 feet with Minimal Assistance using Rolling Walker.   4. Sitting at edge of bed x8 minutes with Contact Guard Assistance while performing ADLs.                           History:     Past Medical History:   Diagnosis Date    Arthritis     Depression     Hypercholesteremia     Thrombocytopenia     Thyroid disease        Past Surgical History:   Procedure Laterality Date    ANKLE SURGERY      COLONOSCOPY N/A 2021    Procedure: COLONOSCOPY;  Surgeon: Alfonso Haque MD;  Location: [address]);  Service: Endoscopy;  Laterality: N/A;  any crs  covid test -[facility]    HYSTERECTOMY      KNEE SURGERY      WRIST SURGERY         Time Tracking:     PT Received On: 23  PT Start Time: 1029     PT Stop Time: 1055  PT Total Time (min): 26 min     Billable Minutes: Evaluation 10 and Therapeutic Activity 16    2023

## 2023-07-16 NOTE — HPI
Rosario Qiu is a 87 y.o. female with a hx of osteoporosis, hypothyroidism, dementia, and depression presents to the ed for AMS. Patient is a poor historian. Daughter not at bedside during my exam. Family at bedside able to provide minimal hx. Per family, pt had an episode where she passed out yesterday. Was very weak following. He notes that at her assisted living she was noted to have a temp of 103, was given tylenol, then recheck was 101 before presenting to the ED. Per ED note: pt had a near syncopal episode at the bookstore while using her walker. She was weak and confused since the incident. Patient's daughter did not witness the incident though she believes the patient fell on her bottom and did not hit her head.  Daughter reports that the patient had dinner with her family the night before and was feeling well.  Daughter states that patient seems slightly more confused with slower mentation today. Patient awake and oriented x 4 on my exam. Endorses feeling very weak. Denies chills, chest pain, SOB, abdominal pain, dysuria, frequency, constipation and diarrhea.     ED: Temp 100.4, /60. WBC 14K. CMP largely unremarkable. UA infectious with 15 hyaline casts. CXR showed stable chronic background interstitial prominence.  No new focal consolidation. CTH showed no acute abnormalities. CT C spine showed no acute fracture. Given IVFs and rocephin in ED.

## 2023-07-17 PROBLEM — E83.39 HYPOPHOSPHATEMIA: Status: ACTIVE | Noted: 2023-07-17

## 2023-07-17 PROBLEM — L03.317 CELLULITIS OF BUTTOCK: Status: ACTIVE | Noted: 2023-07-17

## 2023-07-17 LAB
ALBUMIN SERPL BCP-MCNC: 2.6 G/DL (ref 3.5–5.2)
ALP SERPL-CCNC: 61 U/L (ref 55–135)
ALT SERPL W/O P-5'-P-CCNC: 10 U/L (ref 10–44)
ANION GAP SERPL CALC-SCNC: 8 MMOL/L (ref 8–16)
AST SERPL-CCNC: 12 U/L (ref 10–40)
BASOPHILS # BLD AUTO: 0.03 K/UL (ref 0–0.2)
BASOPHILS NFR BLD: 0.3 % (ref 0–1.9)
BILIRUB SERPL-MCNC: 0.7 MG/DL (ref 0.1–1)
BUN SERPL-MCNC: 16 MG/DL (ref 8–23)
CALCIUM SERPL-MCNC: 9 MG/DL (ref 8.7–10.5)
CHLORIDE SERPL-SCNC: 110 MMOL/L (ref 95–110)
CO2 SERPL-SCNC: 19 MMOL/L (ref 23–29)
CREAT SERPL-MCNC: 0.8 MG/DL (ref 0.5–1.4)
DIFFERENTIAL METHOD: ABNORMAL
EOSINOPHIL # BLD AUTO: 0.1 K/UL (ref 0–0.5)
EOSINOPHIL NFR BLD: 0.6 % (ref 0–8)
ERYTHROCYTE [DISTWIDTH] IN BLOOD BY AUTOMATED COUNT: 14.4 % (ref 11.5–14.5)
EST. GFR  (NO RACE VARIABLE): >60 ML/MIN/1.73 M^2
GLUCOSE SERPL-MCNC: 76 MG/DL (ref 70–110)
HCT VFR BLD AUTO: 39.8 % (ref 37–48.5)
HGB BLD-MCNC: 12.7 G/DL (ref 12–16)
IMM GRANULOCYTES # BLD AUTO: 0.07 K/UL (ref 0–0.04)
IMM GRANULOCYTES NFR BLD AUTO: 0.6 % (ref 0–0.5)
LACTATE SERPL-SCNC: 1.2 MMOL/L (ref 0.5–2.2)
LYMPHOCYTES # BLD AUTO: 1.8 K/UL (ref 1–4.8)
LYMPHOCYTES NFR BLD: 16.6 % (ref 18–48)
MAGNESIUM SERPL-MCNC: 2.8 MG/DL (ref 1.6–2.6)
MCH RBC QN AUTO: 31.3 PG (ref 27–31)
MCHC RBC AUTO-ENTMCNC: 31.9 G/DL (ref 32–36)
MCV RBC AUTO: 98 FL (ref 82–98)
MONOCYTES # BLD AUTO: 1.5 K/UL (ref 0.3–1)
MONOCYTES NFR BLD: 13.6 % (ref 4–15)
NEUTROPHILS # BLD AUTO: 7.5 K/UL (ref 1.8–7.7)
NEUTROPHILS NFR BLD: 68.3 % (ref 38–73)
NRBC BLD-RTO: 0 /100 WBC
PHOSPHATE SERPL-MCNC: 1.6 MG/DL (ref 2.7–4.5)
PLATELET # BLD AUTO: 90 K/UL (ref 150–450)
PMV BLD AUTO: 11.4 FL (ref 9.2–12.9)
POTASSIUM SERPL-SCNC: 3.9 MMOL/L (ref 3.5–5.1)
PROT SERPL-MCNC: 5.8 G/DL (ref 6–8.4)
RBC # BLD AUTO: 4.06 M/UL (ref 4–5.4)
SARS-COV-2 RNA RESP QL NAA+PROBE: NOT DETECTED
SODIUM SERPL-SCNC: 137 MMOL/L (ref 136–145)
WBC # BLD AUTO: 11.02 K/UL (ref 3.9–12.7)

## 2023-07-17 PROCEDURE — 86580 TB INTRADERMAL TEST: CPT | Mod: HCNC

## 2023-07-17 PROCEDURE — 63600175 PHARM REV CODE 636 W HCPCS: Mod: HCNC | Performed by: INTERNAL MEDICINE

## 2023-07-17 PROCEDURE — 99233 SBSQ HOSP IP/OBS HIGH 50: CPT | Mod: HCNC,,,

## 2023-07-17 PROCEDURE — 36415 COLL VENOUS BLD VENIPUNCTURE: CPT | Mod: HCNC

## 2023-07-17 PROCEDURE — 83735 ASSAY OF MAGNESIUM: CPT | Mod: HCNC

## 2023-07-17 PROCEDURE — 83605 ASSAY OF LACTIC ACID: CPT | Mod: HCNC

## 2023-07-17 PROCEDURE — G0378 HOSPITAL OBSERVATION PER HR: HCPCS | Mod: HCNC

## 2023-07-17 PROCEDURE — 94761 N-INVAS EAR/PLS OXIMETRY MLT: CPT | Mod: HCNC

## 2023-07-17 PROCEDURE — 99233 PR SUBSEQUENT HOSPITAL CARE,LEVL III: ICD-10-PCS | Mod: HCNC,,,

## 2023-07-17 PROCEDURE — 96366 THER/PROPH/DIAG IV INF ADDON: CPT

## 2023-07-17 PROCEDURE — 63600175 PHARM REV CODE 636 W HCPCS: Mod: HCNC

## 2023-07-17 PROCEDURE — 25000003 PHARM REV CODE 250: Mod: HCNC

## 2023-07-17 PROCEDURE — 30200315 PPD INTRADERMAL TEST REV CODE 302: Mod: HCNC

## 2023-07-17 PROCEDURE — 96367 TX/PROPH/DG ADDL SEQ IV INF: CPT

## 2023-07-17 PROCEDURE — 96365 THER/PROPH/DIAG IV INF INIT: CPT | Mod: 59

## 2023-07-17 PROCEDURE — 84100 ASSAY OF PHOSPHORUS: CPT | Mod: HCNC

## 2023-07-17 PROCEDURE — 25000003 PHARM REV CODE 250: Mod: HCNC | Performed by: INTERNAL MEDICINE

## 2023-07-17 PROCEDURE — 87635 SARS-COV-2 COVID-19 AMP PRB: CPT | Mod: HCNC

## 2023-07-17 PROCEDURE — 85025 COMPLETE CBC W/AUTO DIFF WBC: CPT | Mod: HCNC

## 2023-07-17 PROCEDURE — 80053 COMPREHEN METABOLIC PANEL: CPT | Mod: HCNC

## 2023-07-17 RX ORDER — POLYETHYLENE GLYCOL 3350 17 G/17G
17 POWDER, FOR SOLUTION ORAL 2 TIMES DAILY
Status: DISCONTINUED | OUTPATIENT
Start: 2023-07-17 | End: 2023-07-18 | Stop reason: HOSPADM

## 2023-07-17 RX ADMIN — MEMANTINE HYDROCHLORIDE 10 MG: 5 TABLET ORAL at 08:07

## 2023-07-17 RX ADMIN — BUPROPION HYDROCHLORIDE 150 MG: 150 TABLET, FILM COATED, EXTENDED RELEASE ORAL at 09:07

## 2023-07-17 RX ADMIN — CEFEPIME 1 G: 1 INJECTION, POWDER, FOR SOLUTION INTRAMUSCULAR; INTRAVENOUS at 05:07

## 2023-07-17 RX ADMIN — TUBERCULIN PURIFIED PROTEIN DERIVATIVE 5 UNITS: 5 INJECTION, SOLUTION INTRADERMAL at 03:07

## 2023-07-17 RX ADMIN — PRAVASTATIN SODIUM 40 MG: 40 TABLET ORAL at 09:07

## 2023-07-17 RX ADMIN — POLYETHYLENE GLYCOL 3350 17 G: 17 POWDER, FOR SOLUTION ORAL at 09:07

## 2023-07-17 RX ADMIN — CEFEPIME 1 G: 1 INJECTION, POWDER, FOR SOLUTION INTRAMUSCULAR; INTRAVENOUS at 08:07

## 2023-07-17 RX ADMIN — ACETAMINOPHEN 1000 MG: 500 TABLET ORAL at 05:07

## 2023-07-17 RX ADMIN — POLYETHYLENE GLYCOL 3350 17 G: 17 POWDER, FOR SOLUTION ORAL at 08:07

## 2023-07-17 RX ADMIN — SODIUM PHOSPHATE, MONOBASIC, MONOHYDRATE AND SODIUM PHOSPHATE, DIBASIC, ANHYDROUS 30 MMOL: 142; 276 INJECTION, SOLUTION INTRAVENOUS at 09:07

## 2023-07-17 RX ADMIN — MEMANTINE HYDROCHLORIDE 10 MG: 5 TABLET ORAL at 09:07

## 2023-07-17 RX ADMIN — ACETAMINOPHEN 1000 MG: 500 TABLET ORAL at 08:07

## 2023-07-17 RX ADMIN — VANCOMYCIN HYDROCHLORIDE 1000 MG: 1 INJECTION, POWDER, LYOPHILIZED, FOR SOLUTION INTRAVENOUS at 03:07

## 2023-07-17 RX ADMIN — LEVOTHYROXINE SODIUM 100 MCG: 100 TABLET ORAL at 05:07

## 2023-07-17 NOTE — PROGRESS NOTES
[facility] [address] [place] [address] Medicine  Progress Note    Patient Name: Rosario Menendez  MRN: [id number]  Patient Class: OP- Observation   Admission Date: 7/15/2023  Length of Stay: 0 days  Attending Physician: Ulises Reaves MD  Primary Care Provider: Shi Simon MD        Subjective:     Principal Problem:Sepsis with encephalopathy without septic shock        HPI:  Rosario Qiu is a 87 y.o. female with a hx of osteoporosis, hypothyroidism, dementia, and depression presents to the ed for AMS. Patient is a poor historian. Daughter not at bedside during my exam. Family at bedside able to provide minimal hx. Per family, pt had an episode where she passed out yesterday. Was very weak following. He notes that at her assisted living she was noted to have a temp of 103, was given tylenol, then recheck was 101 before presenting to the ED. Per ED note: pt had a near syncopal episode at the bookstore while using her walker. She was weak and confused since the incident. Patient's daughter did not witness the incident though she believes the patient fell on her bottom and did not hit her head.  Daughter reports that the patient had dinner with her family the night before and was feeling well.  Daughter states that patient seems slightly more confused with slower mentation today. Patient awake and oriented x 4 on my exam. Endorses feeling very weak. Denies chills, chest pain, SOB, abdominal pain, dysuria, frequency, constipation and diarrhea.     ED: Temp 100.4, /60. WBC 14K. CMP largely unremarkable. UA infectious with 15 hyaline casts. CXR showed stable chronic background interstitial prominence.  No new focal consolidation. CTH showed no acute abnormalities. CT C spine showed no acute fracture. Given IVFs and rocephin in ED.       Overview/Hospital Course:  Ms. Menendez was placed in observation for sepsis without acute organ dysfunction and acute encephalopathy in the setting of acute cystitis. The  patient was started on IV rocephin and subsequently broadened to IV cefepime given slow clinical response. Urine culture + GNR. Blood cultures NGTD. Soft tissue ultrasound pending for new L buttock wound concerning for cellulitis vs. abscess. PT/OT consulted and recommending SNF.       Interval History: Pt seen and examined by me this morning. BREA. JANICE. Pt reports new onset pain and itching to the lateral L buttock which started yesterday. She also endorses persistent fatigue, lethargy, dizziness, & lightheadedness. Mentation improving, AAO x3.    Review of Systems   Unable to perform ROS: Dementia   Constitutional:  Positive for fatigue.   Genitourinary:  Positive for pelvic pain. Negative for dysuria.   Skin:  Positive for color change and wound.   Neurological:  Positive for dizziness, weakness and light-headedness.   Objective:     Vital Signs (Most Recent):  Temp: 98.3 °F (36.8 °C) (07/17/23 1133)  Pulse: 65 (07/17/23 1133)  Resp: 18 (07/17/23 1133)  BP: 131/60 (07/17/23 1133)  SpO2: 95 % (07/17/23 1133) Vital Signs (24h Range):  Temp:  [98.3 °F (36.8 °C)-99.8 °F (37.7 °C)] 98.3 °F (36.8 °C)  Pulse:  [63-82] 65  Resp:  [16-20] 18  SpO2:  [94 %-95 %] 95 %  BP: ()/(52-62) 131/60     Weight: 59.9 kg (132 lb)  Body mass index is 25.78 kg/m².    Intake/Output Summary (Last 24 hours) at 7/17/2023 1308  Last data filed at 7/17/2023 0537  Gross per 24 hour   Intake 240 ml   Output 600 ml   Net -360 ml         Physical Exam  Vitals and nursing note reviewed.   Constitutional:       Appearance: She is well-developed. She is ill-appearing.   HENT:      Head: Normocephalic and atraumatic.      Mouth/Throat:      Mouth: Mucous membranes are moist.   Eyes:      Extraocular Movements: Extraocular movements intact.   Cardiovascular:      Rate and Rhythm: Normal rate and regular rhythm.      Heart sounds: Normal heart sounds.   Pulmonary:      Effort: Pulmonary effort is normal. No respiratory distress.      Breath  sounds: Normal breath sounds. No wheezing.   Abdominal:      General: Bowel sounds are normal. There is no distension.      Palpations: Abdomen is soft.      Tenderness: There is abdominal tenderness in the suprapubic area.   Musculoskeletal:         General: No tenderness. Normal range of motion.      Right lower leg: No edema.      Left lower leg: No edema.   Skin:     General: Skin is warm and dry.      Capillary Refill: Capillary refill takes less than 2 seconds.      Findings: Rash and wound present.      Comments: Left lateral buttocks with erythematous, warm, indurated wound with small center opening without purulent drainage, exquisitely tender to palpation (see below)   Neurological:      Mental Status: She is alert and oriented to person, place, and time.      Cranial Nerves: No cranial nerve deficit.      Sensory: No sensory deficit.      Motor: Weakness present.   Psychiatric:         Mood and Affect: Affect is not flat.         Speech: Speech is not delayed.         Behavior: Behavior is cooperative.         Cognition and Memory: Memory is impaired.               Significant Labs: All pertinent labs within the past 24 hours have been reviewed.    Significant Imaging: I have reviewed all pertinent imaging results/findings within the past 24 hours.      Assessment/Plan:      * Sepsis with encephalopathy without septic shock  Acute cystitis without hematuria   Acute encephalopathy - improving  - 87 y.o. F presenting with urosepsis and acute encephalopathy   - 2/4 SIRS on admission: Tmax 101.2, WBC 14K  - UA: 3+ leuks, positive nitrite, 27 WBCs, 1+ protein, 15 hyaline casts   - s/p IVFs & rocephin in the ED, given additional 500 mL at time of admission  - Lactic 1.5 at admission and downtrending   - Fevered on empiric ceftriaxone 1g -> broaden to IV cefepime   - Urine culture +GNR   - Previous history of UTI with e.coli with resistance to augmentin & ampicillin - UA infectious  - Blood cultures NGTD  - Fall  precautions  - Delirium precautions     Cellulitis of buttock  - Pt with new L lateral buttock pain & itching on 1/17  - PE with erythematous, warm, indurated wound with small center opening without purulent drainage, exquisitely tender to palpation concerning for cellulitis vs developing abscess  - Start IV vancomycin, pharmacy to dose  - U/s soft tissue ordered    Hypophosphatemia  - Phosphorus reviewed- Recent Labs   Lab 07/16/23  1235 07/17/23  0448   PHOS 1.3* 1.6*   - Will replace electrolytes and continue to monitor closely.    Debility  - PT/OT consulted at admission & recommending SNF    Amnestic MCI (mild cognitive impairment with memory loss)  - Continue memantine, galantamine, and buproprion  - Delirium precautions     Thrombocytopenia, unspecified  - Chronic and stable   - Continue to monitor with daily CBC    Hypercholesteremia  - continue statin    Hypothyroidism  - TSH 0.023, Free T4 1.42  - continue synthroid      VTE Risk Mitigation (From admission, onward)         Ordered     enoxaparin injection 40 mg  Daily         07/16/23 0220     IP VTE HIGH RISK PATIENT  Once         07/16/23 0220                Discharge Planning   AMARA: 7/19/2023     Code Status: Full Code   Is the patient medically ready for discharge?: No    Reason for patient still in hospital (select all that apply): Patient new problem, Laboratory test, Treatment and Pending disposition  Discharge Plan A: Home                  Swathi Morrison PA-C  Department of Hospital Medicine   [facility]

## 2023-07-17 NOTE — ASSESSMENT & PLAN NOTE
- Pt with new L lateral buttock pain & itching on 1/17  - PE with erythematous, warm, indurated wound with small center opening without purulent drainage, exquisitely tender to palpation concerning for cellulitis vs developing abscess  - Start IV vancomycin, pharmacy to dose  - U/s soft tissue ordered

## 2023-07-17 NOTE — PLAN OF CARE
07/17/23 1334   Post-Acute Status   Post-Acute Authorization Placement   Post-Acute Placement Status Referrals Sent     Discharge recommendations for pt are SNF. SW sent referrals via [email or web address] and is waiting for an accepting facility.     SCOTT completed LOCET and faxed PASRR to state. Waiting on 142.    PASRR uploaded to [employer].    SCOTT will submit for SNF authorization with Suzette for review.    SCOTT will continue to follow up.    UPDATE    [employer] Pre-Authorization# [id number]    4:10PM    142 received and uploaded to [employer].    Megan Saenz LMSW  [facility]  Ext. [phone]

## 2023-07-17 NOTE — CARE UPDATE
RAPID RESPONSE NURSE ROUND       Rounding completed with charge RNElyse. No concerns verbalized at this time. Instructed to call [phone] for further concerns or assistance.

## 2023-07-17 NOTE — CONSULTS
Hospital Medicine Pharmacy Consult Note    PharmD Consult Received For:     Pharmacy to perform an admission medication history and reconciliation  Medication history in-process by Pharmacy Technician     Thank you for the consult,  Dalia Palumbo  Extension [phone]    **Note: Consults are reviewed Monday-Friday 7:00am-3:30pm. The above recommendations are only suggested. The recommendations should be considered in conjunction with all patient factors.**

## 2023-07-17 NOTE — SUBJECTIVE & OBJECTIVE
Interval History: Pt seen and examined by me this morning. BRAYDON CAMACHO. Pt reports new onset pain and itching to the lateral L buttock which started yesterday. She also endorses persistent fatigue, lethargy, dizziness, & lightheadedness. Mentation improving, AAO x3.    Review of Systems   Unable to perform ROS: Dementia   Constitutional:  Positive for fatigue.   Genitourinary:  Positive for pelvic pain. Negative for dysuria.   Skin:  Positive for color change and wound.   Neurological:  Positive for dizziness, weakness and light-headedness.   Objective:     Vital Signs (Most Recent):  Temp: 98.3 °F (36.8 °C) (07/17/23 1133)  Pulse: 65 (07/17/23 1133)  Resp: 18 (07/17/23 1133)  BP: 131/60 (07/17/23 1133)  SpO2: 95 % (07/17/23 1133) Vital Signs (24h Range):  Temp:  [98.3 °F (36.8 °C)-99.8 °F (37.7 °C)] 98.3 °F (36.8 °C)  Pulse:  [63-82] 65  Resp:  [16-20] 18  SpO2:  [94 %-95 %] 95 %  BP: ()/(52-62) 131/60     Weight: 59.9 kg (132 lb)  Body mass index is 25.78 kg/m².    Intake/Output Summary (Last 24 hours) at 7/17/2023 1308  Last data filed at 7/17/2023 0537  Gross per 24 hour   Intake 240 ml   Output 600 ml   Net -360 ml         Physical Exam  Vitals and nursing note reviewed.   Constitutional:       Appearance: She is well-developed. She is ill-appearing.   HENT:      Head: Normocephalic and atraumatic.      Mouth/Throat:      Mouth: Mucous membranes are moist.   Eyes:      Extraocular Movements: Extraocular movements intact.   Cardiovascular:      Rate and Rhythm: Normal rate and regular rhythm.      Heart sounds: Normal heart sounds.   Pulmonary:      Effort: Pulmonary effort is normal. No respiratory distress.      Breath sounds: Normal breath sounds. No wheezing.   Abdominal:      General: Bowel sounds are normal. There is no distension.      Palpations: Abdomen is soft.      Tenderness: There is abdominal tenderness in the suprapubic area.   Musculoskeletal:         General: No tenderness. Normal range  of motion.      Right lower leg: No edema.      Left lower leg: No edema.   Skin:     General: Skin is warm and dry.      Capillary Refill: Capillary refill takes less than 2 seconds.      Findings: Rash and wound present.      Comments: Left lateral buttocks with erythematous, warm, indurated wound with small center opening without purulent drainage, exquisitely tender to palpation (see below)   Neurological:      Mental Status: She is alert and oriented to person, place, and time.      Cranial Nerves: No cranial nerve deficit.      Sensory: No sensory deficit.      Motor: Weakness present.   Psychiatric:         Mood and Affect: Affect is not flat.         Speech: Speech is not delayed.         Behavior: Behavior is cooperative.         Cognition and Memory: Memory is impaired.               Significant Labs: All pertinent labs within the past 24 hours have been reviewed.    Significant Imaging: I have reviewed all pertinent imaging results/findings within the past 24 hours.

## 2023-07-17 NOTE — ASSESSMENT & PLAN NOTE
Acute cystitis without hematuria   Acute encephalopathy - improving  - 87 y.o. F presenting with urosepsis and acute encephalopathy   - 2/4 SIRS on admission: Tmax 101.2, WBC 14K  - UA: 3+ leuks, positive nitrite, 27 WBCs, 1+ protein, 15 hyaline casts   - s/p IVFs & rocephin in the ED, given additional 500 mL at time of admission  - Lactic 1.5 at admission and downtrending   - Fevered on empiric ceftriaxone 1g -> broaden to IV cefepime   - Urine culture +GNR   - Previous history of UTI with e.coli with resistance to augmentin & ampicillin - UA infectious  - Blood cultures NGTD  - Fall precautions  - Delirium precautions

## 2023-07-17 NOTE — ASSESSMENT & PLAN NOTE
- Phosphorus reviewed- Recent Labs   Lab 07/16/23  1235 07/17/23  0448   PHOS 1.3* 1.6*   - Will replace electrolytes and continue to monitor closely.

## 2023-07-17 NOTE — PROGRESS NOTES
Pharmacokinetic Initial Assessment: IV Vancomycin    Assessment/Plan:    Initiate intravenous vancomycin with loading dose of 1000 mg once   Followed by a maintenance dose of vancomycin 1000 mg IV every 24 hours  Desired empiric serum trough concentration is 10 to 20 mcg/mL  Draw vancomycin trough level 60 min prior to third dose on 7/19 at approximately 1400  Pharmacy will continue to follow and monitor vancomycin.      Please contact pharmacy at extension [phone] with any questions regarding this assessment.     Thank you for the consult,   Toro Clark       Patient brief summary:  Rosario Menendez is a 87 y.o. female initiated on antimicrobial therapy with IV Vancomycin for treatment of suspected skin & soft tissue infection    Drug Allergies:   Review of patient's allergies indicates:   Allergen Reactions    Codeine Other (See Comments)       Actual Body Weight:   59.9 kg    Renal Function:   Estimated Creatinine Clearance: 40.1 mL/min (based on SCr of 0.8 mg/dL).,     Dialysis Method (if applicable):  N/A    CBC (last 72 hours):  Recent Labs   Lab Result Units 07/15/23  1929 07/16/23  0753 07/16/23  1738 07/17/23  0448   WBC K/uL 14.02* 12.96* 14.17* 11.02   Hemoglobin g/dL 14.4 14.5 13.0 12.7   Hematocrit % 44.6 46.7 39.7 39.8   Platelets K/uL 62* 67* 93* 90*   Gran % % 77.6* 73.9* 71.1 68.3   Lymph % % 10.3* 10.2* 13.9* 16.6*   Mono % % 11.1 14.8 14.0 13.6   Eosinophil % % 0.1 0.1 0.1 0.6   Basophil % % 0.3 0.4 0.3 0.3   Differential Method  Automated Automated Automated Automated       Metabolic Panel (last 72 hours):  Recent Labs   Lab Result Units 07/15/23  1929 07/15/23  2155 07/16/23  1235 07/17/23  0448   Sodium mmol/L 138  --  136 137   Potassium mmol/L 4.0  --  4.1 3.9   Chloride mmol/L 108  --  109 110   CO2 mmol/L 20*  --  20* 19*   Glucose mg/dL 137*  --  141* 76   Glucose, UA   --  Negative  --   --    BUN mg/dL 15  --  17 16   Creatinine mg/dL 0.9  --  0.9 0.8   Albumin g/dL 3.5  --  2.9* 2.6*    Total Bilirubin mg/dL 1.2*  --  0.7 0.7   Alkaline Phosphatase U/L 68  --  66 61   AST U/L 14  --  11 12   ALT U/L 13  --  11 10   Magnesium mg/dL 2.1  --  2.1 2.8*   Phosphorus mg/dL  --   --  1.3* 1.6*       Drug levels (last 3 results):  No results for input(s): VANCOMYCINRA, VANCORANDOM, VANCOMYCINPE, VANCOPEAK, VANCOMYCINTR, VANCOTROUGH in the last 72 hours.    Microbiologic Results:  Microbiology Results (last 7 days)       Procedure Component Value Units Date/Time    Blood culture [[id number]] Collected: 07/16/23 0617    Order Status: Completed Specimen: Blood Updated: 07/17/23 1012     Blood Culture, Routine No Growth to date      No Growth to date    Blood culture [[id number]] Collected: 07/16/23 0617    Order Status: Completed Specimen: Blood Updated: 07/17/23 1012     Blood Culture, Routine No Growth to date      No Growth to date    Urine culture [[id number]]  (Abnormal) Collected: 07/15/23 2155    Order Status: Completed Specimen: Urine Updated: 07/17/23 0754     Urine Culture, Routine GRAM NEGATIVE DIEGO  >100,000 cfu/ml  Identification and susceptibility pending      Narrative:      Specimen Source->Urine    Blood culture #1 **CANNOT BE ORDERED STAT** [[id number]]     Order Status: Canceled Specimen: Blood     Blood culture #2 **CANNOT BE ORDERED STAT** [[id number]]     Order Status: Canceled Specimen: Blood

## 2023-07-18 ENCOUNTER — HOSPITAL ENCOUNTER (INPATIENT)
Facility: HOSPITAL | Age: 88
LOS: 9 days | Discharge: HOME HEALTH | DRG: 690 | End: 2023-07-27
Attending: HOSPITALIST | Admitting: HOSPITALIST
Payer: MEDICARE

## 2023-07-18 VITALS
SYSTOLIC BLOOD PRESSURE: 116 MMHG | HEART RATE: 69 BPM | DIASTOLIC BLOOD PRESSURE: 64 MMHG | RESPIRATION RATE: 18 BRPM | BODY MASS INDEX: 25.91 KG/M2 | WEIGHT: 132 LBS | TEMPERATURE: 98 F | OXYGEN SATURATION: 95 % | HEIGHT: 60 IN

## 2023-07-18 DIAGNOSIS — G93.40 ACUTE ENCEPHALOPATHY: ICD-10-CM

## 2023-07-18 DIAGNOSIS — G31.84 AMNESTIC MCI (MILD COGNITIVE IMPAIRMENT WITH MEMORY LOSS): ICD-10-CM

## 2023-07-18 DIAGNOSIS — A41.9 SEVERE SEPSIS WITHOUT SEPTIC SHOCK: ICD-10-CM

## 2023-07-18 DIAGNOSIS — R65.20 SEVERE SEPSIS WITHOUT SEPTIC SHOCK: ICD-10-CM

## 2023-07-18 DIAGNOSIS — L03.317 CELLULITIS OF BUTTOCK: Primary | ICD-10-CM

## 2023-07-18 LAB
ALBUMIN SERPL BCP-MCNC: 2.4 G/DL (ref 3.5–5.2)
ALP SERPL-CCNC: 63 U/L (ref 55–135)
ALT SERPL W/O P-5'-P-CCNC: 14 U/L (ref 10–44)
ANION GAP SERPL CALC-SCNC: 9 MMOL/L (ref 8–16)
AST SERPL-CCNC: 21 U/L (ref 10–40)
BACTERIA UR CULT: ABNORMAL
BASOPHILS # BLD AUTO: 0.04 K/UL (ref 0–0.2)
BASOPHILS NFR BLD: 0.4 % (ref 0–1.9)
BILIRUB SERPL-MCNC: 0.5 MG/DL (ref 0.1–1)
BUN SERPL-MCNC: 13 MG/DL (ref 8–23)
CALCIUM SERPL-MCNC: 8.9 MG/DL (ref 8.7–10.5)
CHLORIDE SERPL-SCNC: 111 MMOL/L (ref 95–110)
CO2 SERPL-SCNC: 21 MMOL/L (ref 23–29)
CREAT SERPL-MCNC: 0.7 MG/DL (ref 0.5–1.4)
DIFFERENTIAL METHOD: ABNORMAL
EOSINOPHIL # BLD AUTO: 0.2 K/UL (ref 0–0.5)
EOSINOPHIL NFR BLD: 1.7 % (ref 0–8)
ERYTHROCYTE [DISTWIDTH] IN BLOOD BY AUTOMATED COUNT: 14.3 % (ref 11.5–14.5)
EST. GFR  (NO RACE VARIABLE): >60 ML/MIN/1.73 M^2
GLUCOSE SERPL-MCNC: 87 MG/DL (ref 70–110)
HCT VFR BLD AUTO: 36 % (ref 37–48.5)
HGB BLD-MCNC: 11.8 G/DL (ref 12–16)
IMM GRANULOCYTES # BLD AUTO: 0.06 K/UL (ref 0–0.04)
IMM GRANULOCYTES NFR BLD AUTO: 0.7 % (ref 0–0.5)
LYMPHOCYTES # BLD AUTO: 1.6 K/UL (ref 1–4.8)
LYMPHOCYTES NFR BLD: 17.6 % (ref 18–48)
MAGNESIUM SERPL-MCNC: 2.4 MG/DL (ref 1.6–2.6)
MCH RBC QN AUTO: 31 PG (ref 27–31)
MCHC RBC AUTO-ENTMCNC: 32.8 G/DL (ref 32–36)
MCV RBC AUTO: 95 FL (ref 82–98)
MONOCYTES # BLD AUTO: 1.3 K/UL (ref 0.3–1)
MONOCYTES NFR BLD: 14.6 % (ref 4–15)
NEUTROPHILS # BLD AUTO: 5.9 K/UL (ref 1.8–7.7)
NEUTROPHILS NFR BLD: 65 % (ref 38–73)
NRBC BLD-RTO: 0 /100 WBC
PHOSPHATE SERPL-MCNC: 1.9 MG/DL (ref 2.7–4.5)
PLATELET # BLD AUTO: 120 K/UL (ref 150–450)
PMV BLD AUTO: 11.1 FL (ref 9.2–12.9)
POTASSIUM SERPL-SCNC: 3.8 MMOL/L (ref 3.5–5.1)
PROT SERPL-MCNC: 5.6 G/DL (ref 6–8.4)
RBC # BLD AUTO: 3.81 M/UL (ref 4–5.4)
SODIUM SERPL-SCNC: 141 MMOL/L (ref 136–145)
WBC # BLD AUTO: 9.07 K/UL (ref 3.9–12.7)

## 2023-07-18 PROCEDURE — 25000003 PHARM REV CODE 250: Mod: HCNC

## 2023-07-18 PROCEDURE — 96366 THER/PROPH/DIAG IV INF ADDON: CPT

## 2023-07-18 PROCEDURE — 87075 CULTR BACTERIA EXCEPT BLOOD: CPT | Mod: HCNC

## 2023-07-18 PROCEDURE — 96367 TX/PROPH/DG ADDL SEQ IV INF: CPT

## 2023-07-18 PROCEDURE — 99239 HOSP IP/OBS DSCHRG MGMT >30: CPT | Mod: HCNC,,,

## 2023-07-18 PROCEDURE — 36415 COLL VENOUS BLD VENIPUNCTURE: CPT | Mod: HCNC

## 2023-07-18 PROCEDURE — 87186 SC STD MICRODIL/AGAR DIL: CPT | Mod: HCNC

## 2023-07-18 PROCEDURE — 97116 GAIT TRAINING THERAPY: CPT | Mod: HCNC

## 2023-07-18 PROCEDURE — 84100 ASSAY OF PHOSPHORUS: CPT | Mod: HCNC

## 2023-07-18 PROCEDURE — 96372 THER/PROPH/DIAG INJ SC/IM: CPT

## 2023-07-18 PROCEDURE — 25000003 PHARM REV CODE 250: Mod: HCNC | Performed by: INTERNAL MEDICINE

## 2023-07-18 PROCEDURE — 87077 CULTURE AEROBIC IDENTIFY: CPT | Mod: HCNC

## 2023-07-18 PROCEDURE — 83735 ASSAY OF MAGNESIUM: CPT | Mod: HCNC

## 2023-07-18 PROCEDURE — 25000003 PHARM REV CODE 250: Mod: HCNC | Performed by: HOSPITALIST

## 2023-07-18 PROCEDURE — 63600175 PHARM REV CODE 636 W HCPCS: Mod: HCNC

## 2023-07-18 PROCEDURE — 63600175 PHARM REV CODE 636 W HCPCS: Mod: HCNC | Performed by: INTERNAL MEDICINE

## 2023-07-18 PROCEDURE — 11000004 HC SNF PRIVATE: Mod: HCNC

## 2023-07-18 PROCEDURE — 25000242 PHARM REV CODE 250 ALT 637 W/ HCPCS: Mod: HCNC | Performed by: HOSPITALIST

## 2023-07-18 PROCEDURE — 80053 COMPREHEN METABOLIC PANEL: CPT | Mod: HCNC

## 2023-07-18 PROCEDURE — 85025 COMPLETE CBC W/AUTO DIFF WBC: CPT | Mod: HCNC

## 2023-07-18 PROCEDURE — 99239 PR HOSPITAL DISCHARGE DAY,>30 MIN: ICD-10-PCS | Mod: HCNC,,,

## 2023-07-18 PROCEDURE — 97535 SELF CARE MNGMENT TRAINING: CPT | Mod: HCNC

## 2023-07-18 PROCEDURE — 97530 THERAPEUTIC ACTIVITIES: CPT | Mod: HCNC

## 2023-07-18 PROCEDURE — 87070 CULTURE OTHR SPECIMN AEROBIC: CPT | Mod: HCNC

## 2023-07-18 PROCEDURE — G0378 HOSPITAL OBSERVATION PER HR: HCPCS | Mod: HCNC

## 2023-07-18 RX ORDER — DOXYCYCLINE 100 MG/1
100 CAPSULE ORAL EVERY 12 HOURS
Qty: 14 CAPSULE | Refills: 0 | Status: ON HOLD
Start: 2023-07-19 | End: 2023-07-26 | Stop reason: HOSPADM

## 2023-07-18 RX ORDER — LANOLIN ALCOHOL/MO/W.PET/CERES
1000 CREAM (GRAM) TOPICAL DAILY
Start: 2023-07-18

## 2023-07-18 RX ORDER — GALANTAMINE 8 MG/1
16 TABLET, FILM COATED ORAL
Status: DISCONTINUED | OUTPATIENT
Start: 2023-07-19 | End: 2023-07-19

## 2023-07-18 RX ORDER — LEVOTHYROXINE SODIUM 100 UG/1
100 TABLET ORAL
Status: DISCONTINUED | OUTPATIENT
Start: 2023-07-19 | End: 2023-07-27 | Stop reason: HOSPADM

## 2023-07-18 RX ORDER — MUPIROCIN 20 MG/G
OINTMENT TOPICAL 4 TIMES DAILY
Status: ON HOLD
Start: 2023-07-18 | End: 2023-07-26 | Stop reason: HOSPADM

## 2023-07-18 RX ORDER — MEMANTINE HYDROCHLORIDE 10 MG/1
10 TABLET ORAL 2 TIMES DAILY
Status: DISCONTINUED | OUTPATIENT
Start: 2023-07-18 | End: 2023-07-27 | Stop reason: HOSPADM

## 2023-07-18 RX ORDER — CALCIUM CARBONATE 200(500)MG
500 TABLET,CHEWABLE ORAL 2 TIMES DAILY PRN
Status: DISCONTINUED | OUTPATIENT
Start: 2023-07-18 | End: 2023-07-27 | Stop reason: HOSPADM

## 2023-07-18 RX ORDER — CEFPODOXIME PROXETIL 100 MG/1
100 TABLET, FILM COATED ORAL 2 TIMES DAILY
Qty: 20 TABLET | Refills: 0 | Status: ON HOLD
Start: 2023-07-19 | End: 2023-07-26 | Stop reason: HOSPADM

## 2023-07-18 RX ORDER — MUPIROCIN 20 MG/G
OINTMENT TOPICAL 4 TIMES DAILY
Status: DISPENSED | OUTPATIENT
Start: 2023-07-18 | End: 2023-07-22

## 2023-07-18 RX ORDER — ACETAMINOPHEN 500 MG
1000 TABLET ORAL EVERY 8 HOURS PRN
Status: DISCONTINUED | OUTPATIENT
Start: 2023-07-18 | End: 2023-07-27 | Stop reason: HOSPADM

## 2023-07-18 RX ORDER — LANOLIN ALCOHOL/MO/W.PET/CERES
1000 CREAM (GRAM) TOPICAL DAILY
Status: DISCONTINUED | OUTPATIENT
Start: 2023-07-19 | End: 2023-07-27 | Stop reason: HOSPADM

## 2023-07-18 RX ORDER — DOXYCYCLINE HYCLATE 100 MG
100 TABLET ORAL EVERY 12 HOURS
Status: DISCONTINUED | OUTPATIENT
Start: 2023-07-18 | End: 2023-07-19

## 2023-07-18 RX ORDER — SODIUM,POTASSIUM PHOSPHATES 280-250MG
2 POWDER IN PACKET (EA) ORAL
Status: COMPLETED | OUTPATIENT
Start: 2023-07-18 | End: 2023-07-18

## 2023-07-18 RX ORDER — PRAVASTATIN SODIUM 20 MG/1
40 TABLET ORAL DAILY
Status: DISCONTINUED | OUTPATIENT
Start: 2023-07-19 | End: 2023-07-27 | Stop reason: HOSPADM

## 2023-07-18 RX ORDER — ACETAMINOPHEN 500 MG
1000 TABLET ORAL EVERY 8 HOURS PRN
Refills: 0 | Status: ON HOLD
Start: 2023-07-18 | End: 2023-07-26 | Stop reason: HOSPADM

## 2023-07-18 RX ORDER — BUPROPION HYDROCHLORIDE 150 MG/1
150 TABLET ORAL DAILY
Status: DISCONTINUED | OUTPATIENT
Start: 2023-07-19 | End: 2023-07-27 | Stop reason: HOSPADM

## 2023-07-18 RX ORDER — ACETAMINOPHEN 325 MG/1
650 TABLET ORAL EVERY 6 HOURS PRN
Status: DISCONTINUED | OUTPATIENT
Start: 2023-07-18 | End: 2023-07-27 | Stop reason: HOSPADM

## 2023-07-18 RX ORDER — TALC
6 POWDER (GRAM) TOPICAL NIGHTLY PRN
Status: DISCONTINUED | OUTPATIENT
Start: 2023-07-18 | End: 2023-07-27 | Stop reason: HOSPADM

## 2023-07-18 RX ORDER — AMOXICILLIN 250 MG
1 CAPSULE ORAL 2 TIMES DAILY
Status: DISCONTINUED | OUTPATIENT
Start: 2023-07-18 | End: 2023-07-19

## 2023-07-18 RX ORDER — ALENDRONATE SODIUM 70 MG/1
70 TABLET ORAL
Qty: 4 TABLET | Refills: 11 | Status: SHIPPED | OUTPATIENT
Start: 2023-07-18 | End: 2023-10-03 | Stop reason: SDUPTHER

## 2023-07-18 RX ORDER — CEFPODOXIME PROXETIL 100 MG/1
100 TABLET, FILM COATED ORAL 2 TIMES DAILY
Status: DISCONTINUED | OUTPATIENT
Start: 2023-07-18 | End: 2023-07-19

## 2023-07-18 RX ADMIN — DOXYCYCLINE HYCLATE 100 MG: 100 TABLET, COATED ORAL at 08:07

## 2023-07-18 RX ADMIN — SENNOSIDES AND DOCUSATE SODIUM 1 TABLET: 50; 8.6 TABLET ORAL at 08:07

## 2023-07-18 RX ADMIN — POTASSIUM & SODIUM PHOSPHATES POWDER PACK 280-160-250 MG 2 PACKET: 280-160-250 PACK at 04:07

## 2023-07-18 RX ADMIN — MEMANTINE HYDROCHLORIDE 10 MG: 5 TABLET ORAL at 08:07

## 2023-07-18 RX ADMIN — BUPROPION HYDROCHLORIDE 150 MG: 150 TABLET, FILM COATED, EXTENDED RELEASE ORAL at 08:07

## 2023-07-18 RX ADMIN — CEFPODOXIME PROXETIL 100 MG: 100 TABLET, FILM COATED ORAL at 08:07

## 2023-07-18 RX ADMIN — POTASSIUM PHOSPHATE, MONOBASIC AND POTASSIUM PHOSPHATE, DIBASIC 30 MMOL: 224; 236 INJECTION, SOLUTION, CONCENTRATE INTRAVENOUS at 08:07

## 2023-07-18 RX ADMIN — POLYETHYLENE GLYCOL 3350 17 G: 17 POWDER, FOR SOLUTION ORAL at 08:07

## 2023-07-18 RX ADMIN — MUPIROCIN: 20 OINTMENT TOPICAL at 08:07

## 2023-07-18 RX ADMIN — LEVOTHYROXINE SODIUM 100 MCG: 100 TABLET ORAL at 05:07

## 2023-07-18 RX ADMIN — PRAVASTATIN SODIUM 40 MG: 40 TABLET ORAL at 08:07

## 2023-07-18 RX ADMIN — VANCOMYCIN HYDROCHLORIDE 1000 MG: 1 INJECTION, POWDER, LYOPHILIZED, FOR SOLUTION INTRAVENOUS at 04:07

## 2023-07-18 RX ADMIN — MEMANTINE 10 MG: 10 TABLET ORAL at 08:07

## 2023-07-18 RX ADMIN — POTASSIUM & SODIUM PHOSPHATES POWDER PACK 280-160-250 MG 2 PACKET: 280-160-250 PACK at 10:07

## 2023-07-18 RX ADMIN — CEFEPIME 1 G: 1 INJECTION, POWDER, FOR SOLUTION INTRAMUSCULAR; INTRAVENOUS at 09:07

## 2023-07-18 RX ADMIN — POTASSIUM & SODIUM PHOSPHATES POWDER PACK 280-160-250 MG 2 PACKET: 280-160-250 PACK at 12:07

## 2023-07-18 RX ADMIN — ENOXAPARIN SODIUM 40 MG: 40 INJECTION SUBCUTANEOUS at 04:07

## 2023-07-18 NOTE — CARE UPDATE
Pt was started on IV K Phos for phosphorus replacement, which was subsequently discontinued in favor of oral replacement since the patient can tolerate all PO intake. Plan to discharge to SNF today on PO antibiotics.       Swathi Morrison PA-C

## 2023-07-18 NOTE — PLAN OF CARE
Problem: Occupational Therapy  Goal: Occupational Therapy Goal  Description: Goals to be met by: 8/16/23 (1 month)     Patient will increase functional independence with ADLs by performing:    UE Dressing with [place].  LE Dressing with [place].  Grooming while standing with Contact Guard Assistance.  Toileting from toilet with Contact Guard Assistance for hygiene and clothing management.   Rolling to Bilateral with [place].   Supine to sit with Supervision.  Step transfer with Supervision  Toilet transfer to toilet with Stand-by Assistance.    Outcome: Ongoing, Progressing

## 2023-07-18 NOTE — PLAN OF CARE
NURSING HOME ORDERS    07/18/2023  [facility]  [facility]  [address]  [facility] [address]  Dept: [phone]  Loc: [phone]     Admit to Nursing Home:  Skilled Nursing Facility    Diagnoses:  Active Hospital Problems    Diagnosis  POA    *Sepsis with encephalopathy without septic shock [A41.9, R65.20, G93.40]  Yes    Hypophosphatemia [E83.39]  Yes    Cellulitis of buttock [L03.317]  Clinically Undetermined    Acute encephalopathy [G93.40]  Yes    Acute cystitis without hematuria [N30.00]  Yes    Debility [R53.81]  Yes    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       Patient is homebound due to:  Sepsis with encephalopathy without septic shock    Allergies:  Review of patient's allergies indicates:   Allergen Reactions    Codeine Other (See Comments)       Vitals:  Every shift    Diet: cardiac diet    Activities:   Up in a chair each morning as tolerated and Activity as tolerated    Goals of Care Treatment Preferences:  Code Status: Full Code      Labs: Per facility protocol     Nursing Precautions:  Fall and Pressure ulcer prevention    Consults:   PT to evaluate and treat- 5 times a week, OT to evaluate and treat- 5 times a week, Wound Care, and Nutrition to evaluate and recommend diet     Miscellaneous Care: Routine Skin for Bedridden Patients:  Apply moisture barrier cream to all  Wound Care: yes:  Clean the L buttock wound with Hibiclens & apply topical mupirocin QID for 14 days or until resolved           Medications: Discontinue all previous medication orders, if any. See new list below.     Medication List        START taking these medications      acetaminophen 500 MG tablet  Commonly known as: TYLENOL  Take 2 tablets (1,000 mg total) by mouth  every 8 (eight) hours as needed for Pain or Temperature greater than (100.4).     cefpodoxime 100 MG tablet  Commonly known as: VANTIN  Take 1 tablet (100 mg total) by mouth 2 (two) times daily. for 10 days  Start taking on: July 19, 2023     doxycycline 100 MG Cap  Commonly known as: VIBRAMYCIN  Take 1 capsule (100 mg total) by mouth every 12 (twelve) hours. for 7 days  Start taking on: July 19, 2023     Lactobacillus acidophilus 1 billion cell Cap  Take 1 capsule by mouth once daily.     mupirocin 2 % ointment  Commonly known as: BACTROBAN  Apply topically 4 (four) times daily. To L buttock cellulitis for 14 days            CHANGE how you take these medications      alendronate 70 MG tablet  Commonly known as: FOSAMAX  Take 1 tablet (70 mg total) by mouth every 7 days. HOLD while in [facility]  What changed: additional instructions     cyanocobalamin 1000 MCG tablet  Commonly known as: VITAMIN B-12  Take 1 tablet (1,000 mcg total) by mouth once daily.  What changed: how much to take            CONTINUE taking these medications      buPROPion 150 MG TB24 tablet  Commonly known as: WELLBUTRIN XL  TAKE 1 TABLET EVERY DAY     galantamine 16 MG 24 hr capsule  Commonly known as: RAZADYNE ER  TAKE 1 CAPSULE EVERY DAY WITH BREAKFAST     levothyroxine 100 MCG tablet  Commonly known as: SYNTHROID  Take 1 tablet (100 mcg total) by mouth before breakfast.     memantine 10 MG Tab  Commonly known as: NAMENDA  TAKE 1 TABLET TWICE DAILY     pravastatin 40 MG tablet  Commonly known as: PRAVACHOL  TAKE 1 TABLET EVERY DAY            STOP taking these medications      losartan 50 MG tablet  Commonly known as: COZAAR                Immunizations Administered as of 7/18/2023       Name Date Dose VIS Date Route Exp Date    COVID-19, MRNA, LN-S PF (Pfizer) (Purple Cap) 3/29/2021 -- -- Intramuscular --    Site: Right deltoid     : Pf[employer]     Lot: [id number]     COVID-19, MRNA, LN-S PF (Pfizer) (Purple Cap) 3/8/2021 -- --  Intramuscular --    Site: Left deltoid     : Pf[employer] Inc     Lot: [id number]             This patient has had both covid vaccinations    Some patients may experience side effects after vaccination.  These may include fever, headache, muscle or joint aches.  Most symptoms resolve with 24-48 hours and do not require urgent medical evaluation unless they persist for more than 72 hours or symptoms are concerning for an unrelated medical condition.            Swathi Morrison PA-C  07/18/2023

## 2023-07-18 NOTE — PLAN OF CARE
07/18/23 1053   Post-Acute Status   Post-Acute Authorization Placement   Post-Acute Placement Status Pending post-acute provider review/more information requested     SCOTT followed up on the [employer] authorization request that was placed and pt has been approved for SNF placement. Authorization# [id number]    SCOTT followed up on the SNF referrals previously sent. SCOTT was informed that [facility] may have a bed available for pt today.    SCOTT will continue to follow up.      Megan Saenz LMSW  [facility]  Ext. [phone]

## 2023-07-18 NOTE — PLAN OF CARE
07/18/23 1608   Post-Acute Status   Post-Acute Authorization Placement   Post-Acute Placement Status Set-up Complete/Auth obtained     Pt is discharging to [facility] located at [address] [place] [address]., [place] [address]. RN Eunice can call report to [phone].    SCOTT arranged wheelchair transport via [facility]. Requested  time is 5:00PM. Requested  time does not guarantee arrival time.    UPDATE    SW was informed by pt's nurse that she will require stretcher transport. SCOTT contacted [facility] to update.        Megan Saenz LMSW  [facility]  Ext. [phone]

## 2023-07-18 NOTE — PLAN OF CARE
Problem: Physical Therapy  Goal: Physical Therapy Goal  Description: Goals to be met by: 23     Patient will increase functional independence with mobility by performin. Supine to sit with MInimal Assistance  2. Sit to stand transfer with Minimal Assistance  3. Bed to chair transfer with Minimal Assistance using Rolling Walker  4. Gait  x 100 feet with Minimal Assistance using Rolling Walker.   4. Sitting at edge of bed x8 minutes with Contact Guard Assistance while performing ADLs.    Outcome: Ongoing, Progressing   Pt's goals remain appropriate and pt will continue to benefit from skilled PT services to work towards improved functional mobility including: bed mobility, transfers, and gait.   2023

## 2023-07-18 NOTE — PT/OT/SLP PROGRESS
"Physical Therapy Treatment/partial co treat with OT    Patient Name:  Rosario Menendez   MRN:  [id number]    Recommendations:     Discharge Recommendations: nursing facility, skilled  Discharge Equipment Recommendations: to be determined by next level of care  Barriers to discharge: Decreased caregiver support lives alone    Assessment:     Rosario Menendez is a 87 y.o. female admitted with a medical diagnosis of Sepsis with encephalopathy without septic shock.  She presents with the following impairments/functional limitations: weakness, impaired functional mobility, gait instability, impaired balance, decreased lower extremity function, pain, impaired self care skills . Pt is unsafe with functional mobility at this time due to pt requires moderate assist for bed mobility, moderate assist for transfers, and moderate assist for gait due to weakness and instability. Pt is motivated to progress with functional mobility.     Rehab Prognosis: Good; patient would benefit from acute skilled PT services to address these deficits and reach maximum level of function.    Recent Surgery: * No surgery found *      Plan:     During this hospitalization, patient to be seen 3 x/week to address the identified rehab impairments via gait training, therapeutic activities, therapeutic exercises, neuromuscular re-education and progress toward the following goals:    Plan of Care Expires:  08/15/23    Subjective   "I need to go to the bathroom"    Pain/Comfort:  Pain Rating 1: 3/10  Location - Side 1: Left  Location - Orientation 1: generalized  Location 1: hip (pt c/o L hip pain with movement)  Pain Addressed 1: Reposition, Cessation of Activity, Nurse notified  Pain Rating Post-Intervention 1: 3/10      Objective:     Communicated with nurse prior to session.  Patient found HOB elevated with bed alarm, peripheral IV, telemetry upon PT entry to room.     General Precautions: Standard, fall  Orthopedic Precautions: N/A  Braces: " N/A  Respiratory Status: Room air     Functional Mobility:  Bed Mobility:     Rolling Left:  moderate assistance  Supine to Sit: moderate assistance  Sit to Supine: minimum assistance  Transfers:     Sit to Stand:  moderate assistance with rolling walker  Bed to Chair: moderate assistance with  hand-held assist  using  Stand Pivot  Gait: 12ft then 8ft with RW with moderate assist. Pt performed gait with decreased step length, decreased clearance of B feet during swing phase, step to gait, and required manual cues to direct the walker when turning to sit on the commode and on the bed.    AM-PAC 6 CLICK MOBILITY  Turning over in bed (including adjusting bedclothes, sheets and blankets)?: 3  Sitting down on and standing up from a chair with arms (e.g., wheelchair, bedside commode, etc.): 2  Moving from lying on back to sitting on the side of the bed?: 2  Moving to and from a bed to a chair (including a wheelchair)?: 2  Need to walk in hospital room?: 2  Climbing 3-5 steps with a railing?: 1  Basic Mobility Total Score: 12     Treatment & Education:  Pt rolled to the bathroom in the w/c to perform ADLs at the sink. Pt noted to be soiled with urine. Pt stood to be cleaned and brief changed. Nurse notified. Pt has a red area noted on her L hip that was noted to drain creamy serosanguinous drainage on the commode when pt stood after toiletting. Nurse notified and shown the drainage before cleaning the toilet.     Patient left HOB elevated with all lines intact, call button in reach, bed alarm on, and nurse notified..    GOALS:   Multidisciplinary Problems       Physical Therapy Goals          Problem: Physical Therapy    Goal Priority Disciplines Outcome Goal Variances Interventions   Physical Therapy Goal     PT, PT/OT Ongoing, Progressing     Description: Goals to be met by: 23     Patient will increase functional independence with mobility by performin. Supine to sit with MInimal Assistance  2. Sit to stand  transfer with Minimal Assistance  3. Bed to chair transfer with Minimal Assistance using Rolling Walker  4. Gait  x 100 feet with Minimal Assistance using Rolling Walker.   4. Sitting at edge of bed x8 minutes with Contact Guard Assistance while performing ADLs.                         Time Tracking:     PT Received On: 07/18/23  PT Start Time: 0847     PT Stop Time: 0925  PT Total Time (min): 38 min     Billable Minutes: Gait Training 15 and Therapeutic Activity 23    Treatment Type: Treatment  PT/PTA: PT           07/18/2023

## 2023-07-18 NOTE — PT/OT/SLP PROGRESS
Occupational Therapy   Treatment    Name: Rosario Menendez  MRN: [id number]  Admitting Diagnosis:  Sepsis with encephalopathy without septic shock       Recommendations:     Discharge Recommendations: nursing facility, skilled  Discharge Equipment Recommendations:   (TBD next level of care)  Barriers to discharge:       Assessment:     Rosario Menendez is a 87 y.o. female with a medical diagnosis of Sepsis with encephalopathy without septic shock.  She presents with performance deficits affecting function including weakness, impaired endurance, impaired self care skills, impaired balance, gait instability, impaired functional mobility, decreased coordination, decreased upper extremity function, decreased lower extremity function, decreased safety awareness, pain.     Rehab Prognosis:  Good; patient would benefit from acute skilled OT services to address these deficits and reach maximum level of function.       Plan:     Patient to be seen 3 x/week to address the above listed problems via self-care/home management, therapeutic activities, therapeutic exercises  Plan of Care Expires:    Plan of Care Reviewed with: patient    Subjective     Chief Complaint: Hip pain  Patient/Family Comments/goals: Pt wants to return to PLOF  Pain/Comfort:  Pain Rating 1: 3/10  Location - Side 1: Left  Location - Orientation 1: generalized  Location 1: hip  Pain Addressed 1: Reposition, Distraction, Cessation of Activity, Nurse notified  Pain Rating Post-Intervention 1: 3/10    Objective:     Communicated with: nurse prior to session.  Patient found supine with bed alarm, peripheral IV, telemetry upon OT entry to room.    General Precautions: Standard, fall    Orthopedic Precautions:N/A  Braces: N/A  Respiratory Status: Room air     Occupational Performance:     Bed Mobility:    Patient completed Rolling/Turning to Right with contact guard assistance  Patient completed Scooting/Bridging with moderate assistance  Patient completed Supine  to Sit with moderate assistance     Functional Mobility/Transfers:  Patient completed Sit <> Stand Transfer with moderate assistance  with  rolling walker   Patient completed Bed <> Chair Transfer using Stand Pivot technique with moderate assistance with rolling walker  Functional Mobility: Pt ambulated with PT. See PT note for details.    Activities of Daily Living:  Grooming: stand by assistance seated sink side to perform all grooming increased time and V/Cs required.   Upper Body Dressing: minimum assistance donning hospital gown as a robe  Lower Body Dressing: moderate assistance with Pt seated EOB to don socks. Increased time required.      [facility] 6 Click ADL: 15    Treatment & Education:  Pt edu on POC, safety when performing self care tasks , benefit of performing OOB activity, and safety when performing functional transfers and mobility.    Patient left up in chair with  PT  present and continuing Tx session.    GOALS:   Multidisciplinary Problems       Occupational Therapy Goals          Problem: Occupational Therapy    Goal Priority Disciplines Outcome Interventions   Occupational Therapy Goal     OT, PT/OT Ongoing, Progressing    Description: Goals to be met by: 8/16/23 (1 month)     Patient will increase functional independence with ADLs by performing:    UE Dressing with [place].  LE Dressing with [place].  Grooming while standing with Contact Guard Assistance.  Toileting from toilet with Contact Guard Assistance for hygiene and clothing management.   Rolling to Bilateral with [place].   Supine to sit with Supervision.  Step transfer with Supervision  Toilet transfer to toilet with Stand-by Assistance.                         Time Tracking:     OT Date of Treatment: 07/18/23  OT Start Time: 0848  OT Stop Time: 0918  OT Total Time (min): 30 min    Billable Minutes:Self Care/Home Management 30               7/18/2023

## 2023-07-18 NOTE — PROGRESS NOTES
07/18/23 1300   WOCN Assessment   WOCN Total Time (mins) 30   Visit Date 07/18/23   Visit Time 1300   Consult Type New   WOCN Speciality Wound   Intervention assessed;changed;applied;chart review;coordination of care;orders        Altered Skin Integrity 07/18/23 1300 Left Buttocks   Date First Assessed/Time First Assessed: 07/18/23 1300   Side: Left  Location: Buttocks   Wound Image    Description of Altered Skin Integrity Intact skin with non-blanchable redness of localized area   Dressing Appearance Open to air   Drainage Amount Scant   Drainage Characteristics/Odor Creamy   Red (%), Wound Tissue Color 100 %   Periwound Area Indurated;Redness   Wound Length (cm) 0.3 cm   Wound Width (cm) 0 cm   Wound Depth (cm) 0.2 cm   Wound Volume (cm^3) 0 cm^3   Wound Surface Area (cm^2) 0 cm^2     [facility]  Wound Care    Patient Name:  Rosario Menendez   MRN:  [id number]  Date: 7/18/2023  Diagnosis: Sepsis with encephalopathy without septic shock    History:     Past Medical History:   Diagnosis Date    Arthritis     Depression     Hypercholesteremia     Thrombocytopenia     Thyroid disease        Social History     Socioeconomic History    Marital status:    Tobacco Use    Smoking status: Never    Smokeless tobacco: Never       Precautions:     Allergies as of 07/15/2023 - Reviewed 07/15/2023   Allergen Reaction Noted    Codeine Other (See Comments) 01/03/2023       [facility] Assessment Details/Treatment   Patient's consult for wound care to left buttocks abscess, that has red and harden area. The harden area measurement is 15.0x15.0x0.2 and the point 0.2 is the small opening that is draining creamy drainage. The treatment to the left buttocks to cleanse with normal saline apply Aquacel ag cover with dry gauze secure with a mepilex border daily and prn for saturation of drainage,    Recommendations made to primary team for  the above  Orders placed.     07/18/2023

## 2023-07-19 PROCEDURE — 25000242 PHARM REV CODE 250 ALT 637 W/ HCPCS: Mod: HCNC | Performed by: NURSE PRACTITIONER

## 2023-07-19 PROCEDURE — 11000004 HC SNF PRIVATE: Mod: HCNC

## 2023-07-19 PROCEDURE — 97535 SELF CARE MNGMENT TRAINING: CPT | Mod: HCNC

## 2023-07-19 PROCEDURE — 97165 OT EVAL LOW COMPLEX 30 MIN: CPT | Mod: HCNC

## 2023-07-19 PROCEDURE — 25000003 PHARM REV CODE 250: Mod: HCNC | Performed by: HOSPITALIST

## 2023-07-19 PROCEDURE — 25000003 PHARM REV CODE 250: Mod: HCNC | Performed by: NURSE PRACTITIONER

## 2023-07-19 PROCEDURE — 63600175 PHARM REV CODE 636 W HCPCS: Mod: HCNC | Performed by: NURSE PRACTITIONER

## 2023-07-19 PROCEDURE — 97162 PT EVAL MOD COMPLEX 30 MIN: CPT | Mod: HCNC

## 2023-07-19 PROCEDURE — 25000242 PHARM REV CODE 250 ALT 637 W/ HCPCS: Mod: HCNC | Performed by: HOSPITALIST

## 2023-07-19 PROCEDURE — 97110 THERAPEUTIC EXERCISES: CPT | Mod: HCNC

## 2023-07-19 RX ORDER — AMOXICILLIN 250 MG
1 CAPSULE ORAL 2 TIMES DAILY PRN
Status: DISCONTINUED | OUTPATIENT
Start: 2023-07-19 | End: 2023-07-27 | Stop reason: HOSPADM

## 2023-07-19 RX ORDER — GALANTAMINE 4 MG/1
8 TABLET, FILM COATED ORAL 2 TIMES DAILY WITH MEALS
Status: DISCONTINUED | OUTPATIENT
Start: 2023-07-19 | End: 2023-07-27 | Stop reason: HOSPADM

## 2023-07-19 RX ORDER — DOXYCYCLINE HYCLATE 100 MG
100 TABLET ORAL EVERY 12 HOURS
Status: DISCONTINUED | OUTPATIENT
Start: 2023-07-19 | End: 2023-07-21

## 2023-07-19 RX ORDER — CEFPODOXIME PROXETIL 100 MG/1
100 TABLET, FILM COATED ORAL EVERY 12 HOURS
Status: DISCONTINUED | OUTPATIENT
Start: 2023-07-19 | End: 2023-07-21

## 2023-07-19 RX ORDER — ENOXAPARIN SODIUM 100 MG/ML
40 INJECTION SUBCUTANEOUS EVERY 24 HOURS
Status: DISCONTINUED | OUTPATIENT
Start: 2023-07-19 | End: 2023-07-27 | Stop reason: HOSPADM

## 2023-07-19 RX ADMIN — LEVOTHYROXINE SODIUM 100 MCG: 100 TABLET ORAL at 05:07

## 2023-07-19 RX ADMIN — ENOXAPARIN SODIUM 40 MG: 40 INJECTION SUBCUTANEOUS at 04:07

## 2023-07-19 RX ADMIN — MEMANTINE 10 MG: 10 TABLET ORAL at 08:07

## 2023-07-19 RX ADMIN — CYANOCOBALAMIN TAB 1000 MCG 1000 MCG: 1000 TAB at 09:07

## 2023-07-19 RX ADMIN — Medication 6 MG: at 08:07

## 2023-07-19 RX ADMIN — Medication 1 CAPSULE: at 09:07

## 2023-07-19 RX ADMIN — MUPIROCIN: 20 OINTMENT TOPICAL at 09:07

## 2023-07-19 RX ADMIN — DOXYCYCLINE HYCLATE 100 MG: 100 TABLET, COATED ORAL at 08:07

## 2023-07-19 RX ADMIN — GALANTAMINE 8 MG: 4 TABLET, FILM COATED ORAL at 10:07

## 2023-07-19 RX ADMIN — CEFPODOXIME PROXETIL 100 MG: 100 TABLET, FILM COATED ORAL at 08:07

## 2023-07-19 RX ADMIN — DOXYCYCLINE HYCLATE 100 MG: 100 TABLET, COATED ORAL at 09:07

## 2023-07-19 RX ADMIN — PRAVASTATIN SODIUM 40 MG: 20 TABLET ORAL at 09:07

## 2023-07-19 RX ADMIN — GALANTAMINE 8 MG: 4 TABLET, FILM COATED ORAL at 04:07

## 2023-07-19 RX ADMIN — MUPIROCIN: 20 OINTMENT TOPICAL at 04:07

## 2023-07-19 RX ADMIN — SENNOSIDES AND DOCUSATE SODIUM 1 TABLET: 50; 8.6 TABLET ORAL at 09:07

## 2023-07-19 RX ADMIN — CEFPODOXIME PROXETIL 100 MG: 100 TABLET, FILM COATED ORAL at 09:07

## 2023-07-19 RX ADMIN — BUPROPION HYDROCHLORIDE 150 MG: 150 TABLET, FILM COATED, EXTENDED RELEASE ORAL at 09:07

## 2023-07-19 RX ADMIN — MUPIROCIN: 20 OINTMENT TOPICAL at 01:07

## 2023-07-19 RX ADMIN — MEMANTINE 10 MG: 10 TABLET ORAL at 09:07

## 2023-07-19 NOTE — PT/OT/SLP EVAL
Physical Therapy Evaluation    Patient Name:  Rosario Menendez   MRN:  [id number]  Admit Date: 7/18/2023  Recent Surgeries: N/A    General Precautions: Standard, fall   Orthopedic Precautions: N/A   Braces: N/A    Recommendations:     Discharge Recommendations: home health PT, assisted living facility   Level of Assistance Recommended: 24 hours light assistance  Discharge Equipment Recommendations: bedside commode   Barriers to discharge: Decreased caregiver support    Assessment:     Rosario Menendez is a 87 y.o. female admitted with a medical diagnosis of Sepsis with encephalopathy without septic shock.  Performance deficits affecting function  weakness, impaired endurance, impaired self care skills, impaired functional mobility, gait instability, impaired balance, decreased upper extremity function, decreased lower extremity function, impaired cardiopulmonary response to activity. Pt was Mod I with ADLs and functional mobility PTA and now requires Gagandeep/Mod A. Pt plans on returning to the [facility] and would therefore benefit from  continued SNF rehab to help decrease the burden of care on the pt and decrease pt's risk for falls.    Rehab Potential is good     Activity Tolerance: Fair    Plan:     Patient to be seen 5 x/week to address the above listed problems via gait training, therapeutic activities, therapeutic exercises, neuromuscular re-education, wheelchair management/training     Plan of Care Expires: 08/18/23  Plan of Care Reviewed with: patient    Subjective     Chief Complaint: fatigue  Patient/Family Comments/goals: none noted by pt  Pain/Comfort:  Pain Rating 1: 0/10  Pain Rating Post-Intervention 1: 0/10    Living Environment:     Pt lives at [facility]. Bathroom set-up: communal bathroom with shower bench  Prior Level of Function: modified (I) for mobility and ADLs using RW as AD ; (I) toileting  DME owned: rolling walker and wheelchair  Support Available/Caregiver Assistance:  staff and caregiver at [facility]        Patients cultural, spiritual, Roman Catholic conflicts given the current situation: no    Objective:     Communicated with pt's nurse prior to session.  Patient found up in chair with    upon PT entry to room.    Exams:  Cognitive Exam:  Patient is oriented to Person, Place, Time, and Situation  Gross Motor Coordination:  WFL  Sensation:    -       Intact  Skin Integrity/Edema:      -       Skin integrity: Visible skin intact  RLE ROM: WFL  RLE Strength: ghrossly 4/5  LLE ROM: WFL  LLE Strength: grossly 4/5    Functional Mobility:     07/19/23 1033   Prior Functioning: Everyday Activities   Self Care Independent   Indoor Mobility (Ambulation) Independent   Stairs Unknown   Functional Cognition Needed some help   Prior Device Use Walker   Roll Left and Right   Assistance Needed Supervision   Comment per OT eval   CARE Score - Roll Left and Right 4   Sit to Lying   Physical Assistance Level 25% or less   Comment per OT eval   CARE Score - Sit to Lying 3   Lying to Sitting on Side of Bed   Physical Assistance Level 25% or less   Comment per OT eval   CARE Score - Lying to Sitting on Side of Bed 3   Sit to Stand   Physical Assistance Level 26%-50%   Comment with RW from w/c. pt needing Gagandeep/ModA d.t posterior lean   CARE Score - Sit to Stand 3   Chair/Bed-to-Chair Transfer   Physical Assistance Level 26%-50%   Comment SPT with RW   CARE Score - Chair/Bed-to-Chair Transfer 3   Chair/Bed-to-Chair Transfer Discharge Goal   Discharge Goal 4   Car Transfer   Reason if not Attempted Environmental limitations   CARE Score - Car Transfer 10   Walk 10 Feet   Physical Assistance Level 25% or less   Comment pt ambulated 7ft +32 ft with RW and Gagandeep d.t pt c/o of fatigue. pt needed a seated rest break d.t generalized weakness and fatigue.   CARE Score - Walk 10 Feet 3   Walk 50 Feet with Two Turns   Reason if not Attempted Safety concerns   CARE Score - Walk 50 Feet with Two Turns 88   Walk 150 Feet   Reason if not Attempted Safety  concerns   CARE Score - Walk 150 Feet 88   Walking 10 Feet on Uneven Surfaces   Physical Assistance Level 25% or less   Comment with RW   CARE Score - Walking 10 Feet on Uneven Surfaces 3   1 Step (Curb)   Reason if not Attempted Safety concerns   CARE Score - 1 Step (Curb) 88   4 Steps   Reason if not Attempted Safety concerns   CARE Score - 4 Steps 88   12 Steps   Reason if not Attempted Safety concerns   CARE Score - 12 Steps 88   Picking Up Object   Reason if not Attempted Safety concerns   CARE Score - Picking Up Object 88   Uses a Wheelchair/Scooter?   Uses a Wheelchair/Scooter? Yes   Wheel 50 Feet with Two Turns   Physical Assistance Level 51%-75%   Comment pt became fatigued at 60ft   CARE Score - Wheel 50 Feet with Two Turns 2   Type of Wheelchair/Scooter Manual   Wheel 150 Feet   Reason if not Attempted Safety concerns   CARE Score - Wheel 150 Feet 88   Type of Wheelchair/Scooter Manual       Therapeutic Activities and Exercises: LBE at low resistance for 8mins. Pt not able to tolerate longer time d.t fatigue.    Education:  Patient provided with daily orientation and goals of this PT session.  Patient educated to transfer to bedside chair/bedside commode/bathroom with RN/PCT present.   Patient educated on Fall risk and plan of care by explanation.   Patient Verbalized Understanding.  Time provided for therapeutic counseling and discussion of current health disposition. All questions answered to satisfaction, within scope of PT practice; voiced no other concerns. White board updated in patient's room, RN notified of session.     AM-PAC 6 CLICK MOBILITY  Total Score:15     Patient left up in chair with call button in reach.    GOALS:   Multidisciplinary Problems       Physical Therapy Goals          Problem: Physical Therapy    Goal Priority Disciplines Outcome Goal Variances Interventions   Physical Therapy Goal     PT, PT/OT Ongoing, Progressing     Description: Goals to be met by: 8/18/23     Patient  will increase functional independence with mobility by performing:    . Supine to sit with Modified [place]  . Sit to supine with Modified [place]  . Rolling to Left and Right with Modified [place].  . Sit to stand transfer with SBA with RW  . Bed to chair transfer with Stand-by Assistance using Rolling Walker  . Gait  x 100 feet with Stand-by Assistance using Rolling Walker.   . Wheelchair propulsion x50 feet with Stand-by Assistance using bilateral uppper extremities  . Ascend/Descend 4 inch curb step with Contact Guard Assistance using Rolling Walker.                         History:     Past Medical History:   Diagnosis Date    Arthritis     Depression     Hypercholesteremia     Thrombocytopenia     Thyroid disease        Past Surgical History:   Procedure Laterality Date    ANKLE SURGERY      COLONOSCOPY N/A 6/2/2021    Procedure: COLONOSCOPY;  Surgeon: Alfonso Haque MD;  Location: [facility] ([address] [place] [address]);  Service: Endoscopy;  Laterality: N/A;  any crs  [facility]    HYSTERECTOMY      KNEE SURGERY      WRIST SURGERY         Time Tracking:     PT Received On: 07/19/23  PT Start Time: 0912     PT Stop Time: 0948  PT Total Time (min): 36 min     Billable Minutes: Evaluation 20 and Therapeutic Exercise 16      07/19/2023

## 2023-07-19 NOTE — PROGRESS NOTES
[facility]                                  Skilled Nursing Facility                   Progress Note     Admit Date: 7/18/2023  AMARA   Principal Problem:  Acute cystitis without hematuria   HPI obtained from patient interview and chart review     Chief Complaint: Establish Care/ Initial Visit     HPI:   Rosario Qiu is a 87 year old female PMHx of osteoporosis, hypothyroidism, dementia, and depression who presents to SNF following hospitalization for urosepsis.  Admission to SNF for secondary weakness and debility.    Patient originally presented to [facility] ED on 07/15 with altered mental status and fever.  Per [facility] notes, Family at bedside able to provide minimal hx. Per family, pt had an episode where she passed out yesterday. Was very weak following. He notes that at her assisted living she was noted to have a temp of 103, was given tylenol, then recheck was 101 before presenting to the ED. Per ED note: pt had a near syncopal episode at the bookstore while using her walker. She was weak and confused since the incident. Patient's daughter did not witness the incident though she believes the patient fell on her bottom and did not hit her head.  Daughter reports that the patient had dinner with her family the night before and was feeling well.  Daughter states that patient seems slightly more confused with slower mentation today. Patient awake and oriented x 4 on my exam. Endorses feeling very weak. In the ED: Temp 100.4, /60. WBC 14K. CMP largely unremarkable. UA infectious with 15 hyaline casts. CXR showed stable chronic background interstitial prominence.  No new focal consolidation. CTH showed no acute abnormalities. CT C spine showed no acute fracture. Given IVFs and rocephin in ED. Pt was placed in observation for sepsis without acute organ dysfunction and acute encephalopathy in the setting of acute cystitis. The patient was  "started on IV rocephin and subsequently broadened to IV cefepime given slow clinical response. Urine culture + GNR. Blood cultures NGTD. Soft tissue ultrasound pending for new L buttock wound concerning for cellulitis vs. abscess. PT/OT consulted and recommending SNF."     Today, patient  is without major complaints.  She endorses a decreased appetite and is unsure of when her last bowel movement was.    Patient will be treated at [facility] with PT and OT to improve functional status and ability to perform ADLs.     Past Medical History: Patient has a past medical history of Arthritis, Depression, Hypercholesteremia, Thrombocytopenia, and Thyroid disease.    Past Surgical History: Patient has a past surgical history that includes Hysterectomy; Knee surgery; Ankle surgery; Wrist surgery; and Colonoscopy (N/A, 6/2/2021).    Social History: Patient reports that she has never smoked. She has never used smokeless tobacco.    Family History: family history includes Cancer in her maternal uncle; Heart failure in her father and mother; Suicide in her son.    Allergies: Patient is allergic to codeine.    ROS  Constitutional: Negative for fever.  +decreased appetite   Eyes: Negative for blurred vision, double vision   Respiratory: Negative for cough, shortness of breath   Cardiovascular: Negative for chest pain, palpitations, and leg swelling.   Gastrointestinal: Negative for abdominal pain, constipation, diarrhea, nausea, vomiting.   Genitourinary: Negative for dysuria, frequency   Musculoskeletal:  + generalized weakness. Negative for back pain and myalgias.   Skin: Negative for itching and rash.   Neurological: Negative for dizziness, headaches.   Psychiatric/Behavioral: Negative for depression. The patient is not nervous/anxious.      24 hour Vital Sign Range   Temp:  [96.6 °F (35.9 °C)-99.1 °F (37.3 °C)]   Pulse:  [52-69]   Resp:  [16-18]   BP: (116-148)/(58-70)   SpO2:  [94 %-97 %]     Current BMI: Body mass index is " 23.59 kg/m².    PEx  Constitutional: Patient appears debilitated.  No distress noted  HENT:   Head: Normocephalic and atraumatic.   Eyes: Pupils are equal, round  Neck: Normal range of motion. Neck supple.   Cardiovascular: Normal rate, regular rhythm and normal heart sounds.    Pulmonary/Chest: Effort normal and breath sounds are clear  Abdominal: Soft. Bowel sounds are normal.   Musculoskeletal: Normal range of motion.   Neurological: Alert and oriented to person. Disoriented to place and time.  Higher level thinking impaired.  Psychiatric: Normal mood and affect. Behavior is normal.   Skin: Skin is warm and dry. Full skin assessment to be completed with next assessment.    No results for input(s): GLUCOSE, NA, K, CL, CO2, BUN, CREATININE, MG in the last 24 hours.    Invalid input(s):  CALCIUM    No results for input(s): WBC, RBC, HGB, HCT, PLT, MCV, MCH, MCHC in the last 24 hours.    No results for input(s): POCTGLUCOSE in the last 168 hours.     Assessment and Plan:    Sepsis with encephalopathy without septic shock  Acute cystitis without hematuria   Acute encephalopathy - improving  - UA: 3+ leuks, positive nitrite, 27 WBCs, 1+ protein, 15 hyaline casts   - Urine culture showing pansensitive E coli  - Blood cultures NGTD  - Fall precautions  - Delirium precautions   - continue cefpodoxime 100 mg BID, ending 7/28     Cellulitis of buttock  - Pt with new L lateral buttock pain & itching on 7/17  - PE with erythematous, warm, indurated wound with small center opening without purulent drainage, exquisitely tender to palpation concerning for cellulitis vs developing abscess  - U/s soft tissue ordered- likely a small developing abscess  - apply mupirocin QID  - continue doxycycline 100 mg BID, ending 7/25     Hypophosphatemia  - continue monitor twice weekly BMPs, replace PRN     Amnestic MCI (mild cognitive impairment with memory loss)  - Continue memantine 10 mg BID, galantamine 8mg BID, and buproprion 150 mg  daily  - Delirium precautions      Thrombocytopenia, unspecified  - Chronic and stable   - Continue to monitor twice weekly CBC     Hypercholesteremia  - continue pravastatin 40 mg daily     Hypothyroidism  - TSH 0.023, Free T4 1.42  - continue levothyroxine 100 mcg daily    Debility   - Continue with PT/OT for gait training and strengthening and restoration of ADL's   - Encourage mobility, OOB in chair, and early ambulation as appropriate  - Fall precautions   - Monitor for bowel and bladder dysfunction  - Monitor for and prevent skin breakdown and pressure ulcers  - initiated DVT prophylaxis with Lovenox 40 mg daily           Anticipate disposition:  Home with home health      Follow-up needed during SNF stay-    Follow-up needed after discharge from SNF: PCP    Future Appointments   Date Time Provider Department Center   9/20/2023 10:30 AM Shi Simon MD [facility]       I certify that SNF services are required to be given on an inpatient basis because Rosario Menendez needs for skilled nursing care and/or skilled rehabilitation are required on a daily basis and such services can only practically be provided in a skilled nursing facility setting and are for an ongoing condition for which she received inpatient care in the hospital.     Total time of the visit 129 minutes   Non physical exam/ non charting time: 71 minutes   Description of non physical exam/non charting time: counseling patient on clinical conditions and therapies provided.  Extensive chart review completed including all consultation notes.  All pertinent laboratory and radiographical images reviewed.        Nella Lacey NP  Department of Hospital Medicine   [facility]     DOS: 7/19/2023       Patient note was created using MModal Dictation.  Any errors in syntax or even information may not have been identified and edited on initial review prior to signing this note.

## 2023-07-19 NOTE — PT/OT/SLP EVAL
Occupational Therapy   Evaluation / Treatment    Name: Rosario Menendez  MRN: [id number]  Admit Date: 7/18/2023  Recent Surgeries: N/A     General Precautions: Standard, fall  Orthopedic Precautions:N/A   Braces: N/A    Recommendations:     Discharge Recommendations: home health OT  Level of Assistance Recommended: Intermittent assistance   Discharge Equipment Recommendations:   (TBD)  Barriers to discharge:  Decreased caregiver support    Assessment:     Rosario Menendez is a 87 y.o. female with a medical diagnosis of Sepsis unspecified organism. .  She presents with performance deficits affecting function including weakness, impaired endurance, impaired self care skills, impaired functional mobility, gait instability, impaired cognition, decreased upper extremity function, decreased coordination, decreased lower extremity function, decreased safety awareness, pain, impaired cardiopulmonary response to activity.    Pt tolerated Tx without incident but requires V/Cs and assist to perform self care tasks, functional mobility and functional transfers .  Pt is hard of hearing and is slow to initiate activities but follows commands appropriately. She would benefit from OT intervention to further her functional (I)ce and safety.     Rehab Potential is good    Activity Tolerance: Good    Plan:     Patient to be seen 5 x/week to address the above listed problems via self-care/home management, therapeutic activities, therapeutic exercises  Plan of Care Expires: 08/18/23  Plan of Care Reviewed with: patient    Subjective     Chief Complaint: Pt indicating that her (L) gluteal area is sore from an abscess.  Patient/Family Comments/goals: Pt wants to return to [facility].     Occupational Profile:  Living Environment: Pt lives in an [facility] . Pt has communal bathing setup.  Previous level of function: A with bathing from staff MWF. Manjitugher visits/A on Tuesdays. Friend assists pt on Wednesdays. Pt uses a RW for mobility within [facility],   for community. Can dressing and complete most basic ADLs without A  Roles and Routines: Pt enjoys outings with friends   Equipment Used at Home: walker, rolling, wheelchair, rollator, cane, straight  Assistance upon Discharge: [facility] staff    Pain/Comfort:  Pain Rating 1: 4/10  Location - Side 1: Left  Location - Orientation 1: generalized  Location 1: gluteal (Abscess hurts to the touch)  Pain Addressed 1: Reposition, Distraction, Cessation of Activity, Nurse notified  Pain Rating Post-Intervention 1: 3/10    Patients cultural, spiritual, Congregation conflicts given the current situation: no    Objective:     Communicated with: nurse prior to session.  Patient found supine with  (no active lines) upon OT entry to room.    Occupational Performance:       Eating   Assistance Needed Supervision   Physical Assistance Level No physical assistance  (V/Cs needed)   CARE Score - Eating 4   Oral Hygiene   Assistance Needed Incidental touching   Physical Assistance Level No physical assistance  (V/Cs needed to complete task.  Grooming performed seated sinkside.)   CARE Score - Oral Hygiene 4   Toileting Hygiene   Assistance Needed Physical assistance   Physical Assistance Level 26%-50%  (with (A) to clean rear, to steady when standing and to pull clothing up in back.)   CARE Score - Toileting Hygiene 3   Shower/Bathe Self   Assistance Needed Physical assistance   Physical Assistance Level 26%-50%  (with Pt sponge bathing and needing (A) to steady when standing, and cleaning lower legs /feet.)   CARE Score - Shower/Bathe Self 3   Upper Body Dressing   Assistance Needed Physical assistance   Physical Assistance Level 25% or less  (with Pt donning/doffing pullover shirt.)   CARE Score - Upper Body Dressing 3   Lower Body Dressing   Assistance Needed Physical assistance   Physical Assistance Level 51%-75%  (with (A) to don/dof socks/shoes, t othread (L) LE inot pant leg , and to pull pants up in back when standing.)   CARE Score -  Lower Body Dressing 2   Putting On/Taking Off Footwear   Assistance Needed Physical assistance   Physical Assistance Level 51%-75%   CARE Score - Putting On/Taking Off Footwear 2   Roll Left and Right   Assistance Needed Supervision   Physical Assistance Level No physical assistance  (with HOB flat and V/Cs needed)   CARE Score - Roll Left and Right 4   Sit to Lying   Assistance Needed Incidental touching   Physical Assistance Level 25% or less  (with HOB flat and V/Cs needed)   CARE Score - Sit to Lying 3   Lying to Sitting on Side of Bed   Assistance Needed Incidental touching   Physical Assistance Level 25% or less  (with HOB flat and V/Cs needed)   CARE Score - Lying to Sitting on Side of Bed 3       Cognitive/Visual Perceptual:  Cognitive/Psychosocial Skills:     -       Oriented to: Person, Place, and Situation   -       Follows Commands/attention:Follows one-step commands  -       Communication: clear/fluent  -       Memory: Poor immediate recall  -       Safety awareness/insight to disability: impaired   -       Mood/Affect/Coping skills/emotional control: Appropriate to situation  Visual/Perceptual:      -Intact      Physical Exam:  Balance: -    Pt demonstrated  good functional sitting balance as noted when performing self care tasks.  Fair [place] functional standing with RW and  CGA required for safety.   Postural examination/scapula alignment:    -       Rounded shoulders  Skin integrity:  Pt has abscesas (L) gluteal   Edema:  None noted  Sensation:    -       Intact  Motor Planning: -       WFLs  Dominant hand: -       Right  Upper Extremity Range of Motion:     -       Right Upper Extremity: WFL  -       Left Upper Extremity: WFL  Upper Extremity Strength:    -       Right Upper Extremity: WFL  -       Left Upper Extremity: WFL   Strength:    -       Right Upper Extremity: WFL  -       Left Upper Extremity: WFL  Fine Motor Coordination:    -       Intact    AMPAC 6 Click ADL:  AMPAC Total Score:  15    Treatment & Education:  Pt edu on role of OT, POC, safety when performing self care tasks , benefit of performing OOB activity, and safety when performing functional transfers and mobility.  - White board updated  - Self care tasks completed-- as noted above      Patient left up in chair with call button in reach    GOALS:   Multidisciplinary Problems       Occupational Therapy Goals          Problem: Occupational Therapy    Goal Priority Disciplines Outcome Interventions   Occupational Therapy Goal     OT, PT/OT Ongoing, Progressing    Description: Goals to be met by: 2 -3 weeks     Patient will increase functional independence with ADLs by performing:    Feeding with Modified [place].  UE Dressing with Set-up Assistance.  LE Dressing with Contact Guard Assistance with RW to steady when standing.  Grooming while seated at sink with Set-up Assistance.  Toileting from toilet or BSC with Contact Guard Assistance for hygiene and clothing management.   Bathing from  shower chair/bench with Contact Guard Assistance.  Supine to sit with Modified [place].  Step transfer with Stand-by Assistance using appropriate A/D  Upper extremity exercise tolerating up to 10 minutes on UBE with Mod resistance.    Pt will tolerate up to 5 minutes of functional standing activity with SBA  with A/D to steady when standing.                       History:     Past Medical History:   Diagnosis Date    Arthritis     Depression     Hypercholesteremia     Thrombocytopenia     Thyroid disease          Past Surgical History:   Procedure Laterality Date    ANKLE SURGERY      COLONOSCOPY N/A 6/2/2021    Procedure: COLONOSCOPY;  Surgeon: Alfonso Haque MD;  Location: [address];  Service: Endoscopy;  Laterality: N/A;  any crs  covid test 5/30-[facility]    HYSTERECTOMY      KNEE SURGERY      WRIST SURGERY         Time Tracking:     OT Date of Treatment: 07/19/23  OT Start Time: 0755  OT Stop Time: 0855  OT Total Time (min):  60 min    Billable Minutes:Evaluation 20  Self Care/Home Management 40    7/19/2023

## 2023-07-19 NOTE — PLAN OF CARE
[facility]  Discharge Final Note    Primary Care Provider: Shi Simon MD    Expected Discharge Date: 7/18/2023    Patient discharged to [facility] via ambulance transportation.     Patient's bedside nurse and patient/family notified of the above.      Final Discharge Note (most recent)       Final Note - 07/19/23 1037          Final Note    Assessment Type Final Discharge Note (P)      Anticipated Discharge Disposition Skilled Nursing Facility (P)         Post-Acute Status    Post-Acute Authorization Placement (P)      Post-Acute Placement Status Set-up Complete/Auth obtained (P)                      Important Message from Medicare                 Future Appointments   Date Time Provider Department Center   9/20/2023 10:30 AM Shi Simon MD [facility]        scheduled post-discharge follow-up appointment and information added to AVS.     Megan Saenz LMSW  [facility]  Ext. [phone]

## 2023-07-19 NOTE — CLINICAL REVIEW
Clinical Pharmacy Chart Review Note      Admit Date: 7/18/2023   LOS: 1 day       Rosario Menendez is a 87 y.o. female admitted to SNF for PT/OT after hospitalization for sepsis with encephalopathy without septic shock.    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    Hypothyroidism     Hypercholesteremia     Personal history of colonic polyps 06/02/2021    Thrombocytopenia, unspecified 04/11/2018       Scheduled Meds:    buPROPion  150 mg Oral Daily    cefpodoxime  100 mg Oral BID    cyanocobalamin  1,000 mcg Oral Daily    doxycycline  100 mg Oral Q12H    galantamine  8 mg Oral BID WM    Lactobacillus rhamnosus GG  1 capsule Oral Daily    levothyroxine  100 mcg Oral Before breakfast    memantine  10 mg Oral BID    mupirocin   Topical (Top) QID    pravastatin  40 mg Oral Daily    senna-docusate 8.6-50 mg  1 tablet Oral BID     Continuous Infusions:   PRN Meds: acetaminophen, acetaminophen, calcium carbonate, melatonin    OBJECTIVE:     Vital Signs (Last 24H)  Temp:  [96.6 °F (35.9 °C)-99.1 °F (37.3 °C)]   Pulse:  [52-69]   Resp:  [16-18]   BP: (116-148)/(58-70)   SpO2:  [94 %-97 %]     Laboratory:  CBC:   Recent Labs   Lab 07/16/23  1738 07/17/23  0448 07/18/23  0252   WBC 14.17* 11.02 9.07   RBC 4.10 4.06 3.81*   HGB 13.0 12.7 11.8*   HCT 39.7 39.8 36.0*   PLT 93* 90* 120*   MCV 97 98 95   MCH 31.7* 31.3* 31.0   MCHC 32.7 31.9* 32.8     BMP:   Recent Labs   Lab 07/16/23  1235 07/17/23  0448 07/18/23  0252    * 76 87    137 141   K 4.1 3.9 3.8    110 111*   CO2 20* 19* 21*   BUN 17 16 13   CREATININE 0.9 0.8 0.7   CALCIUM 9.8 9.0 8.9   MG 2.1 2.8* 2.4     CMP:   Recent Labs   Lab 07/16/23  1235 07/17/23  0448 07/18/23  0252   * 76 87   CALCIUM 9.8 9.0 8.9   ALBUMIN 2.9* 2.6* 2.4*   PROT 6.2 5.8* 5.6*    137 141   K 4.1 3.9 3.8   CO2 20* 19* 21*    110 111*   BUN 17 16 13   CREATININE 0.9 0.8 0.7   ALKPHOS 66 61 63   ALT 11 10 14   AST 11 12 21   BILITOT 0.7 0.7 0.5     LFTs:   Recent Labs   Lab 07/16/23  1235 07/17/23  0448 07/18/23  0252   ALT 11 10 14   AST 11 12 21   ALKPHOS 66 61 63   BILITOT 0.7 0.7 0.5   PROT 6.2 5.8* 5.6*   ALBUMIN 2.9* 2.6* 2.4*     Others:   Recent Labs   Lab 07/15/23  1929   TSH 0.023*           ASSESSMENT/PLAN:     I have reviewed the medications in compliance with CMS Regulation F756 of the [place]. No irregularities were noted at this time.    Medications reviewed by PharmD, please re-consult if needed.      Ximena Mendoza, Pharm. D.  Clinical Pharmacist  [facility]

## 2023-07-19 NOTE — NURSING
Pt arrived to floor for skilled services with admitting diagnosis of Sepsis, unspecified organism via wheelchair. Pt required 3 person assistance from stretcher to bed. Pt is AAOx3, incontinent of B/B. Pt is on a cardiac diet. Skin assessment done: Blanchable redness to buttocks, Abscess to left buttocks cheek, Medipore dressing in place.Daughter Aditi informed of arrival. Pt denies pain at this time and is educated to use call light and not get OOB w/o assistance

## 2023-07-19 NOTE — PLAN OF CARE
Problem: Physical Therapy  Goal: Physical Therapy Goal  Description: Goals to be met by: 8/18/23     Patient will increase functional independence with mobility by performing:    . Supine to sit with Modified [place]  . Sit to supine with Modified [place]  . Rolling to Left and Right with Modified [place].  . Sit to stand transfer with SBA with RW  . Bed to chair transfer with Stand-by Assistance using Rolling Walker  . Gait  x 100 feet with Stand-by Assistance using Rolling Walker.   . Wheelchair propulsion x50 feet with Stand-by Assistance using bilateral uppper extremities  . Ascend/Descend 4 inch curb step with Contact Guard Assistance using Rolling Walker.    Outcome: Ongoing, Progressing

## 2023-07-19 NOTE — DISCHARGE SUMMARY
[facility] [address] [place] [address] Medicine  Discharge Summary      Patient Name: Rosario Menendez  MRN: [id number]  CICI: [id number]  Patient Class: OP- Observation  Admission Date: 7/15/2023  Hospital Length of Stay: 0 days  Discharge Date and Time: 7/18/2023  6:14 PM  Attending Physician: No att. providers found   Discharging Provider: Swathi Morrison PA-C  Primary Care Provider: Shi Simon MD  [facility] Medicine Team: [facility] MED  Swathi Morrison PA-C  Primary Care Team: [facility] MED     HPI:   Rosario Qiu is a 87 y.o. female with a hx of osteoporosis, hypothyroidism, dementia, and depression presents to the ed for AMS. Patient is a poor historian. Daughter not at bedside during my exam. Family at bedside able to provide minimal hx. Per family, pt had an episode where she passed out yesterday. Was very weak following. He notes that at her assisted living she was noted to have a temp of 103, was given tylenol, then recheck was 101 before presenting to the ED. Per ED note: pt had a near syncopal episode at the bookstore while using her walker. She was weak and confused since the incident. Patient's daughter did not witness the incident though she believes the patient fell on her bottom and did not hit her head.  Daughter reports that the patient had dinner with her family the night before and was feeling well.  Daughter states that patient seems slightly more confused with slower mentation today. Patient awake and oriented x 4 on my exam. Endorses feeling very weak. Denies chills, chest pain, SOB, abdominal pain, dysuria, frequency, constipation and diarrhea.     ED: Temp 100.4, /60. WBC 14K. CMP largely unremarkable. UA infectious with 15 hyaline casts. CXR showed stable chronic background interstitial prominence.  No new focal consolidation. CTH showed no acute abnormalities. CT C spine showed no acute fracture. Given IVFs and rocephin in ED.       * No surgery found *      Hospital Course:    Ms. Menendez was placed in observation for sepsis without acute organ dysfunction and acute encephalopathy in the setting of pyelonephritis. The patient was started on IV rocephin and subsequently broadened to IV cefepime given slow clinical response. Urine culture + pansensitive e.coli. Blood cultures NGTD. Soft tissue ultrasound consistent with cellulitis vs small developing abscess, and the patient was started on IV vancomycin. PT/OT consulted and recommending SNF. On my evaluation this morning, the patient reports feeling well and is medically ready for SNF transfer. All questions were answered. Patient was discharged to [facility] on 7/18/2023 in stable condition. Education regarding condition provided and return precautions given.        Goals of Care Treatment Preferences:  Code Status: Full Code      Consults:     No new Assessment & Plan notes have been filed under this hospital service since the last note was generated.  Service: Hospital Medicine    Final Active Diagnoses:    Diagnosis Date Noted POA    PRINCIPAL PROBLEM:  Sepsis with encephalopathy without septic shock [A41.9, R65.20, G93.40] 07/16/2023 Yes    Hypophosphatemia [E83.39] 07/17/2023 Yes    Cellulitis of buttock [L03.317] 07/17/2023 Clinically Undetermined    Acute encephalopathy [G93.40] 07/16/2023 Yes    Acute cystitis without hematuria [N30.00] 07/16/2023 Yes    Debility [R53.81] 11/29/2022 Yes    Amnestic MCI (mild cognitive impairment with memory loss) [G31.84] 07/05/2021 Yes    Hypothyroidism [E03.9]  Yes    Hypercholesteremia [E78.00]  Yes    Thrombocytopenia, unspecified [D69.6] 04/11/2018 Yes      Problems Resolved During this Admission:       Discharged Condition: good    Disposition: Skilled Nursing Facility    Follow Up:    Patient Instructions:   No discharge procedures on file.    Significant Diagnostic Studies: N/A    Pending Diagnostic Studies:     None         Medications:  Reconciled Home Medications:       Medication List      START taking these medications    acetaminophen 500 MG tablet  Commonly known as: TYLENOL  Take 2 tablets (1,000 mg total) by mouth every 8 (eight) hours as needed for Pain or Temperature greater than (100.4).     cefpodoxime 100 MG tablet  Commonly known as: VANTIN  Take 1 tablet (100 mg total) by mouth 2 (two) times daily. for 10 days     doxycycline 100 MG Cap  Commonly known as: VIBRAMYCIN  Take 1 capsule (100 mg total) by mouth every 12 (twelve) hours. for 7 days     Lactobacillus acidophilus 1 billion cell Cap  Take 1 capsule by mouth once daily.     mupirocin 2 % ointment  Commonly known as: BACTROBAN  Apply topically 4 (four) times daily. To L buttock cellulitis for 14 days        CHANGE how you take these medications    alendronate 70 MG tablet  Commonly known as: FOSAMAX  Take 1 tablet (70 mg total) by mouth every 7 days. HOLD while in [facility]  What changed: additional instructions     cyanocobalamin 1000 MCG tablet  Commonly known as: VITAMIN B-12  Take 1 tablet (1,000 mcg total) by mouth once daily.  What changed: how much to take        CONTINUE taking these medications    buPROPion 150 MG TB24 tablet  Commonly known as: WELLBUTRIN XL  TAKE 1 TABLET EVERY DAY     galantamine 16 MG 24 hr capsule  Commonly known as: RAZADYNE ER  TAKE 1 CAPSULE EVERY DAY WITH BREAKFAST     levothyroxine 100 MCG tablet  Commonly known as: SYNTHROID  Take 1 tablet (100 mcg total) by mouth before breakfast.     memantine 10 MG Tab  Commonly known as: NAMENDA  TAKE 1 TABLET TWICE DAILY     pravastatin 40 MG tablet  Commonly known as: PRAVACHOL  TAKE 1 TABLET EVERY DAY        STOP taking these medications    losartan 50 MG tablet  Commonly known as: COZAAR            Indwelling Lines/Drains at time of discharge:   Lines/Drains/Airways     None                 Time spent on the discharge of patient: 36 minutes         Swathi Morrison PA-C  Department of Hospital Medicine  [facility]

## 2023-07-19 NOTE — PLAN OF CARE
Problem: Occupational Therapy  Goal: Occupational Therapy Goal  Description: Goals to be met by: 2-3  weeks     Patient will increase functional independence with ADLs by performing:    Feeding with Modified [place].  UE Dressing with Set-up Assistance.  LE Dressing with Contact Guard Assistance with RW to steady when standing.  Grooming while seated at sink with Set-up Assistance.  Toileting from toilet or BSC with Contact Guard Assistance for hygiene and clothing management.   Bathing from  shower chair/bench with Contact Guard Assistance.  Supine to sit with Modified [place].  Step transfer with Stand-by Assistance using appropriate A/D  Upper extremity exercise tolerating up to 10 minutes on UBE with Mod resistance.    Pt will tolerate up to 5 minutes of functional standing activity with SBA  with A/D to steady when standing.  Outcome: Ongoing, Progressing

## 2023-07-20 LAB
ANION GAP SERPL CALC-SCNC: 11 MMOL/L (ref 8–16)
BASOPHILS # BLD AUTO: 0.06 K/UL (ref 0–0.2)
BASOPHILS NFR BLD: 1 % (ref 0–1.9)
BUN SERPL-MCNC: 14 MG/DL (ref 8–23)
CALCIUM SERPL-MCNC: 9.3 MG/DL (ref 8.7–10.5)
CHLORIDE SERPL-SCNC: 111 MMOL/L (ref 95–110)
CO2 SERPL-SCNC: 20 MMOL/L (ref 23–29)
CREAT SERPL-MCNC: 0.7 MG/DL (ref 0.5–1.4)
DIFFERENTIAL METHOD: ABNORMAL
EOSINOPHIL # BLD AUTO: 0.2 K/UL (ref 0–0.5)
EOSINOPHIL NFR BLD: 3.8 % (ref 0–8)
ERYTHROCYTE [DISTWIDTH] IN BLOOD BY AUTOMATED COUNT: 14.6 % (ref 11.5–14.5)
EST. GFR  (NO RACE VARIABLE): >60 ML/MIN/1.73 M^2
GLUCOSE SERPL-MCNC: 70 MG/DL (ref 70–110)
HCT VFR BLD AUTO: 38.8 % (ref 37–48.5)
HGB BLD-MCNC: 12.2 G/DL (ref 12–16)
IMM GRANULOCYTES # BLD AUTO: 0.07 K/UL (ref 0–0.04)
IMM GRANULOCYTES NFR BLD AUTO: 1.2 % (ref 0–0.5)
LYMPHOCYTES # BLD AUTO: 1.9 K/UL (ref 1–4.8)
LYMPHOCYTES NFR BLD: 32.5 % (ref 18–48)
MAGNESIUM SERPL-MCNC: 2.3 MG/DL (ref 1.6–2.6)
MCH RBC QN AUTO: 30.5 PG (ref 27–31)
MCHC RBC AUTO-ENTMCNC: 31.4 G/DL (ref 32–36)
MCV RBC AUTO: 97 FL (ref 82–98)
MONOCYTES # BLD AUTO: 0.9 K/UL (ref 0.3–1)
MONOCYTES NFR BLD: 15.7 % (ref 4–15)
NEUTROPHILS # BLD AUTO: 2.6 K/UL (ref 1.8–7.7)
NEUTROPHILS NFR BLD: 45.8 % (ref 38–73)
NRBC BLD-RTO: 0 /100 WBC
PHOSPHATE SERPL-MCNC: 2.2 MG/DL (ref 2.7–4.5)
PLATELET # BLD AUTO: 180 K/UL (ref 150–450)
PMV BLD AUTO: 11.3 FL (ref 9.2–12.9)
POTASSIUM SERPL-SCNC: 4.5 MMOL/L (ref 3.5–5.1)
RBC # BLD AUTO: 4 M/UL (ref 4–5.4)
SODIUM SERPL-SCNC: 142 MMOL/L (ref 136–145)
WBC # BLD AUTO: 5.78 K/UL (ref 3.9–12.7)

## 2023-07-20 PROCEDURE — 80048 BASIC METABOLIC PNL TOTAL CA: CPT | Mod: HCNC | Performed by: HOSPITALIST

## 2023-07-20 PROCEDURE — 83735 ASSAY OF MAGNESIUM: CPT | Mod: HCNC | Performed by: HOSPITALIST

## 2023-07-20 PROCEDURE — 25000003 PHARM REV CODE 250: Mod: HCNC | Performed by: HOSPITALIST

## 2023-07-20 PROCEDURE — 84100 ASSAY OF PHOSPHORUS: CPT | Mod: HCNC | Performed by: HOSPITALIST

## 2023-07-20 PROCEDURE — 97110 THERAPEUTIC EXERCISES: CPT | Mod: HCNC

## 2023-07-20 PROCEDURE — 11000004 HC SNF PRIVATE: Mod: HCNC

## 2023-07-20 PROCEDURE — 85025 COMPLETE CBC W/AUTO DIFF WBC: CPT | Mod: HCNC | Performed by: HOSPITALIST

## 2023-07-20 PROCEDURE — 25000003 PHARM REV CODE 250: Mod: HCNC | Performed by: NURSE PRACTITIONER

## 2023-07-20 PROCEDURE — 97530 THERAPEUTIC ACTIVITIES: CPT | Mod: HCNC

## 2023-07-20 PROCEDURE — 36415 COLL VENOUS BLD VENIPUNCTURE: CPT | Mod: HCNC | Performed by: HOSPITALIST

## 2023-07-20 PROCEDURE — 97116 GAIT TRAINING THERAPY: CPT | Mod: HCNC

## 2023-07-20 PROCEDURE — 63600175 PHARM REV CODE 636 W HCPCS: Mod: HCNC | Performed by: NURSE PRACTITIONER

## 2023-07-20 PROCEDURE — 25000242 PHARM REV CODE 250 ALT 637 W/ HCPCS: Mod: HCNC | Performed by: NURSE PRACTITIONER

## 2023-07-20 RX ORDER — SODIUM BICARBONATE 650 MG/1
650 TABLET ORAL ONCE
Status: COMPLETED | OUTPATIENT
Start: 2023-07-20 | End: 2023-07-20

## 2023-07-20 RX ADMIN — MUPIROCIN: 20 OINTMENT TOPICAL at 09:07

## 2023-07-20 RX ADMIN — GALANTAMINE 8 MG: 4 TABLET, FILM COATED ORAL at 06:07

## 2023-07-20 RX ADMIN — MEMANTINE 10 MG: 10 TABLET ORAL at 09:07

## 2023-07-20 RX ADMIN — MUPIROCIN: 20 OINTMENT TOPICAL at 06:07

## 2023-07-20 RX ADMIN — PRAVASTATIN SODIUM 40 MG: 20 TABLET ORAL at 09:07

## 2023-07-20 RX ADMIN — DOXYCYCLINE HYCLATE 100 MG: 100 TABLET, COATED ORAL at 09:07

## 2023-07-20 RX ADMIN — Medication 1 CAPSULE: at 09:07

## 2023-07-20 RX ADMIN — SODIUM BICARBONATE 650 MG TABLET 650 MG: at 11:07

## 2023-07-20 RX ADMIN — CEFPODOXIME PROXETIL 100 MG: 100 TABLET, FILM COATED ORAL at 09:07

## 2023-07-20 RX ADMIN — Medication 2 TABLET: at 11:07

## 2023-07-20 RX ADMIN — LEVOTHYROXINE SODIUM 100 MCG: 100 TABLET ORAL at 05:07

## 2023-07-20 RX ADMIN — ACETAMINOPHEN 1000 MG: 500 TABLET ORAL at 09:07

## 2023-07-20 RX ADMIN — Medication 6 MG: at 09:07

## 2023-07-20 RX ADMIN — MUPIROCIN: 20 OINTMENT TOPICAL at 01:07

## 2023-07-20 RX ADMIN — GALANTAMINE 8 MG: 4 TABLET, FILM COATED ORAL at 09:07

## 2023-07-20 RX ADMIN — BUPROPION HYDROCHLORIDE 150 MG: 150 TABLET, FILM COATED, EXTENDED RELEASE ORAL at 09:07

## 2023-07-20 RX ADMIN — ENOXAPARIN SODIUM 40 MG: 40 INJECTION SUBCUTANEOUS at 06:07

## 2023-07-20 NOTE — PLAN OF CARE
Problem: Adult Inpatient Plan of Care  Goal: Plan of Care Review  Outcome: Ongoing, Progressing  Goal: Patient-Specific Goal (Individualized)  Outcome: Ongoing, Progressing  Goal: Absence of Hospital-Acquired Illness or Injury  Outcome: Ongoing, Progressing  Goal: Optimal Comfort and Wellbeing  Outcome: Ongoing, Progressing  Goal: Readiness for Transition of Care  Outcome: Ongoing, Progressing     Problem: Adjustment to Illness (Sepsis/Septic Shock)  Goal: Optimal Coping  Outcome: Ongoing, Progressing     Problem: Bleeding (Sepsis/Septic Shock)  Goal: Absence of Bleeding  Outcome: Ongoing, Progressing     Problem: Glycemic Control Impaired (Sepsis/Septic Shock)  Goal: Blood Glucose Level Within Desired Range  Outcome: Ongoing, Progressing     Problem: Infection Progression (Sepsis/Septic Shock)  Goal: Absence of Infection Signs and Symptoms  Outcome: Ongoing, Progressing     Problem: Nutrition Impaired (Sepsis/Septic Shock)  Goal: Optimal Nutrition Intake  Outcome: Ongoing, Progressing     Problem: Fall Injury Risk  Goal: Absence of Fall and Fall-Related Injury  Outcome: Ongoing, Progressing     Problem: Impaired Wound Healing  Goal: Optimal Wound Healing  Outcome: Ongoing, Progressing     Problem: Skin Injury Risk Increased  Goal: Skin Health and Integrity  Outcome: Ongoing, Progressing

## 2023-07-20 NOTE — PT/OT/SLP PROGRESS
Physical Therapy Treatment    Patient Name:  Rosario Menendez   MRN:  [id number]  Admit Date: 7/18/2023  Admitting Diagnosis: Acute cystitis without hematuria  Recent Surgeries: N/A    General Precautions: Standard, fall  Orthopedic Precautions: N/A  Braces: N/A    Recommendations:     Discharge Recommendations: home health PT, assisted living facility  Level of Assistance Recommended at Discharge: 24 hours light assistance  Discharge Equipment Recommendations: bedside commode  Barriers to discharge: Decreased caregiver support    Assessment:     Rosario Menendez is a 87 y.o. female admitted with a medical diagnosis of Acute cystitis without hematuria . Pt appears self limiting during GT and therex and therefore pt needing multiple seated rest breaks between GT trials.  Performance deficits affecting function: weakness, impaired endurance, impaired self care skills, impaired functional mobility, gait instability, impaired balance, decreased upper extremity function, decreased lower extremity function, impaired cardiopulmonary response to activity.    Rehab Potential is good    Activity Tolerance: Good    Plan:     Patient to be seen 5 x/week to address the above listed problems via gait training, therapeutic activities, therapeutic exercises, neuromuscular re-education, wheelchair management/training    Plan of Care Expires: 08/18/23  Plan of Care Reviewed with: patient    Subjective     Pt. Agreeable to work with PT.     Pain/Comfort:  Pain Rating 1: 0/10  Pain Rating Post-Intervention 1: 0/10    Patient's cultural, spiritual, Yazidi conflicts given the current situation:  no    Objective:     Communicated with pt's nurse prior to session.  Patient found up in chair with   upon PT entry to room.     Therapeutic Activities and Exercises: LBEx 10mins and Gagandeep d.t pt needing encouragement to complete    Functional Mobility:  Transfers:     Sit to Stand:  minimum assistance with rolling walker  Gait: 21ftx 2 trials  +20ft with RW and CGA for safety. Pt requested seated breaks d.t pt c/o fatigue    AM-PAC 6 CLICK MOBILITY  16    Patient left up in chair with call button in reach.    GOALS:   Multidisciplinary Problems       Physical Therapy Goals          Problem: Physical Therapy    Goal Priority Disciplines Outcome Goal Variances Interventions   Physical Therapy Goal     PT, PT/OT Ongoing, Progressing     Description: Goals to be met by: 8/18/23     Patient will increase functional independence with mobility by performing:    . Supine to sit with Modified [place]  . Sit to supine with Modified [place]  . Rolling to Left and Right with Modified [place].  . Sit to stand transfer with SBA with RW  . Bed to chair transfer with Stand-by Assistance using Rolling Walker  . Gait  x 100 feet with Stand-by Assistance using Rolling Walker.   . Wheelchair propulsion x50 feet with Stand-by Assistance using bilateral uppper extremities  . Ascend/Descend 4 inch curb step with Contact Guard Assistance using Rolling Walker.                         Time Tracking:     PT Received On: 07/20/23  PT Start Time: 0940  PT Stop Time: 1009  PT Total Time (min): 29 min    Billable Minutes: Gait Training 19 and Therapeutic Exercise 10    Treatment Type: Treatment  PT/PTA: PT           07/20/2023

## 2023-07-20 NOTE — PT/OT/SLP PROGRESS
Occupational Therapy   Treatment    Name: Rosario Menendez  MRN: [id number]  Admit Date: 7/18/2023  Admitting Diagnosis:  Acute cystitis without hematuria    General Precautions: Standard, fall   Orthopedic Precautions: N/A   Braces: N/A    Recommendations:     Discharge Recommendations:  home health OT  Level of Assistance Recommended at Discharge: 24 hours light assistance for ADL's and homemaking tasks  Discharge Equipment Recommendations:  (TBD)  Barriers to discharge:  Decreased caregiver support    Assessment:     Rosario Menendez is a 87 y.o. female with a medical diagnosis of Acute cystitis without hematuria .  She presents with performance deficits affecting function including weakness, impaired endurance, impaired self care skills, impaired functional mobility, gait instability, impaired cognition, decreased upper extremity function, decreased coordination, decreased lower extremity function, decreased safety awareness, pain, impaired cardiopulmonary response to activity.     Rehab Potential is good    Activity tolerance:  Good    Plan:     Patient to be seen 5 x/week to address the above listed problems via self-care/home management, therapeutic activities, therapeutic exercises    Plan of Care Expires: 08/18/23  Plan of Care Reviewed with: patient    Subjective     Communicated with: nurse prior to session.  .    Pain/Comfort:  Pain Rating 1: 0/10  Pain Rating Post-Intervention 1: 0/10    Patient's cultural, spiritual, Sikh conflicts given the current situation:  no    Objective:     Patient found up in chair with  (no active lines) upon OT entry to room.    Bed Mobility:    Pt seated in bedside chair at onset of therapy session.      Functional Mobility/Transfers:  Patient completed Sit <> Stand Transfer with contact guard assistance  with  rolling walker   Patient completed Bedside chair  <> Wheelchair Transfer using Stand Pivot technique with contact guard assistance with rolling  walker  Functional Mobility: Pt ambulated with RW from W/C to bedside chair with SBA a distance of 10 ft  with no loss of balance observed but V/Cs required for safety.      Pt worked on functional standing activity consisting of standing with RW while reaching in all planes and crossing of midline to facilitate (B) wt shifting and stability in standing in prep for performance of self care tasks and functional ADLS in standing.  Pt tolerated up to 7  Min. in standing with  (S) and RW  to steady.     [facility] 6 Click ADL: 16    OT Exercises: Pt performed UBE exercise for 8 minutes with Min resistance.  UE exercises performed to increase functional endurance and strength in order increase independence when performing self care tasks, functional ambulation, W/C propulsion, and functional standing activities .     Treatment & Education:  Pt edu on POC, safety when performing self care tasks , benefit of performing OOB activity, and safety when performing functional transfers and mobility.      Patient left up in chair with call button in reach    GOALS:   Multidisciplinary Problems       Occupational Therapy Goals          Problem: Occupational Therapy    Goal Priority Disciplines Outcome Interventions   Occupational Therapy Goal     OT, PT/OT Ongoing, Progressing    Description: Goals to be met by: 2 weeks     Patient will increase functional independence with ADLs by performing:    Feeding with Modified [place].  UE Dressing with Set-up Assistance.  LE Dressing with Contact Guard Assistance with RW to steady when standing.  Grooming while seated at sink with Set-up Assistance.  Toileting from toilet or BSC with Contact Guard Assistance for hygiene and clothing management.   Bathing from  shower chair/bench with Contact Guard Assistance.  Supine to sit with Modified [place].  Step transfer with Stand-by Assistance using appropriate A/D  Upper extremity exercise tolerating up to 10 minutes on UBE with Mod  resistance.    Pt will tolerate up to 5 minutes of functional standing activity with SBA  with A/D to steady when standing.                       Time Tracking:     OT Date of Treatment: 07/20/23  OT Start Time: 1430    OT Stop Time: 1500  OT Total Time (min): 30 min    Billable Minutes:Therapeutic Activity 20  Therapeutic Exercise 10    7/20/2023

## 2023-07-20 NOTE — PROGRESS NOTES
[facility]                                  Skilled Nursing Facility                   Progress Note     Admit Date: 7/18/2023  AMARA   Principal Problem:  Acute cystitis without hematuria   HPI obtained from patient interview and chart review     Chief Complaint: Re-evaluation of medical treatment and therapy status: Lab review    HPI:   Rosario Qiu is a 87 year old female PMHx of osteoporosis, hypothyroidism, dementia, and depression who presents to SNF following hospitalization for urosepsis.  Admission to SNF for secondary weakness and debility.    Interval history: All of today's labs reviewed and are listed below.  Phosphorus 2.2, bicarb 20.  24 hr vital sign ranges listed below.  Patient denies shortness of breath, abdominal discomfort, nausea, or vomiting.  Patient reports an inadequate appetite.  Patient denies dysuria.  Patient reports having regular bowel movements.  Patient progessing with PT/OT- ambulated 7ft +32 ft with RW and Gagandeep d.t pt c/o of fatigue. pt needed a seated rest break d.t generalized weakness and fatigue. Continuing to follow and treat all acute and chronic conditions.    Past Medical History: Patient has a past medical history of Arthritis, Depression, Hypercholesteremia, Thrombocytopenia, and Thyroid disease.    Past Surgical History: Patient has a past surgical history that includes Hysterectomy; Knee surgery; Ankle surgery; Wrist surgery; and Colonoscopy (N/A, 6/2/2021).    Social History: Patient reports that she has never smoked. She has never used smokeless tobacco.    Family History: family history includes Cancer in her maternal uncle; Heart failure in her father and mother; Suicide in her son.    Allergies: Patient is allergic to codeine.    ROS  Constitutional: Negative for fever.  +decreased appetite   Eyes: Negative for blurred vision, double vision   Respiratory: Negative for cough, shortness of  breath   Cardiovascular: Negative for chest pain, palpitations, and leg swelling.   Gastrointestinal: Negative for abdominal pain, constipation, diarrhea, nausea, vomiting.   Genitourinary: Negative for dysuria, frequency   Musculoskeletal:  + generalized weakness. Negative for back pain and myalgias.   Skin: Negative for itching and rash.   Neurological: Negative for dizziness, headaches.   Psychiatric/Behavioral: Negative for depression. The patient is not nervous/anxious.      24 hour Vital Sign Range   Temp:  [98.2 °F (36.8 °C)-98.5 °F (36.9 °C)]   Pulse:  [60-76]   Resp:  [16]   BP: (116-132)/(57-80)   SpO2:  [95 %-96 %]     Current BMI: Body mass index is 23.59 kg/m².    PEx  Constitutional: Patient appears debilitated.  No distress noted  HENT:   Head: Normocephalic and atraumatic.   Eyes: Pupils are equal, round  Neck: Normal range of motion. Neck supple.   Cardiovascular: Normal rate, regular rhythm and normal heart sounds.    Pulmonary/Chest: Effort normal and breath sounds are clear  Abdominal: Soft. Bowel sounds are normal.   Musculoskeletal: Normal range of motion.   Neurological: Alert and oriented to person. Disoriented to place and time.  Higher level thinking impaired.  Psychiatric: Normal mood and affect. Behavior is normal.   Skin: Skin is warm and dry.     Recent Labs   Lab 07/20/23  0411      K 4.5   *   CO2 20*   BUN 14   CREATININE 0.7   MG 2.3       Recent Labs   Lab 07/20/23  0411   WBC 5.78   RBC 4.00   HGB 12.2   HCT 38.8      MCV 97   MCH 30.5   MCHC 31.4*       No results for input(s): POCTGLUCOSE in the last 168 hours.     Assessment and Plan:      Metabolic acidosis, mild  - initiated sodium bicarbonate 650 mg x 1 dose    Hypophosphatemia  - initiated K-Phos 500 mg x 1 dose    Sepsis with encephalopathy without septic shock  Acute cystitis without hematuria   Acute encephalopathy - improving  - UA: 3+ leuks, positive nitrite, 27 WBCs, 1+ protein, 15 hyaline casts   -  Urine culture showing pansensitive E coli  - Blood cultures NGTD  - Fall precautions  - Delirium precautions   - stable, continue cefpodoxime 100 mg BID, ending 7/28     Cellulitis of buttock  - Pt with new L lateral buttock pain & itching on 7/17  - PE with erythematous, warm, indurated wound with small center opening without purulent drainage, exquisitely tender to palpation concerning for cellulitis vs developing abscess  - U/s soft tissue ordered- likely a small developing abscess  - apply mupirocin QID  - stable, continue doxycycline 100 mg BID, ending 7/25     Amnestic MCI (mild cognitive impairment with memory loss)  - Continue memantine 10 mg BID, galantamine 8mg BID, and buproprion 150 mg daily  - Delirium precautions      Thrombocytopenia, unspecified  - normalized,  Continue to monitor twice weekly CBC     Hypercholesteremia  - continue pravastatin 40 mg daily     Hypothyroidism  - TSH 0.023, Free T4 1.42  - continue levothyroxine 100 mcg daily    Debility   - Continue with PT/OT for gait training and strengthening and restoration of ADL's   - Encourage mobility, OOB in chair, and early ambulation as appropriate  - Fall precautions   - Monitor for bowel and bladder dysfunction  - Monitor for and prevent skin breakdown and pressure ulcers  - initiated DVT prophylaxis with Lovenox 40 mg daily           Anticipate disposition:  Home with home health      Follow-up needed during SNF stay-    Follow-up needed after discharge from SNF: PCP    Future Appointments   Date Time Provider Department Center   9/20/2023 10:30 AM Shi Simon MD [facility] Oc Lacey NP  Department of [facility]   [facility]- Skilled Nursing Facility     DOS: 7/20/2023       Patient note was created using MModal Dictation.  Any errors in syntax or even information may not have been identified and edited on initial review prior to signing this note.

## 2023-07-20 NOTE — PLAN OF CARE
Problem: Occupational Therapy  Goal: Occupational Therapy Goal  Description: Goals to be met by: 2 weeks     Patient will increase functional independence with ADLs by performing:    Feeding with Modified [place].  UE Dressing with Set-up Assistance.  LE Dressing with Contact Guard Assistance with RW to steady when standing.  Grooming while seated at sink with Set-up Assistance.  Toileting from toilet or BSC with Contact Guard Assistance for hygiene and clothing management.   Bathing from  shower chair/bench with Contact Guard Assistance.  Supine to sit with Modified [place].  Step transfer with Stand-by Assistance using appropriate A/D  Upper extremity exercise tolerating up to 10 minutes on UBE with Mod resistance.    Pt will tolerate up to 5 minutes of functional standing activity with SBA  with A/D to steady when standing.  Outcome: Ongoing, Progressing

## 2023-07-21 PROBLEM — R65.20 SEPSIS WITH ENCEPHALOPATHY WITHOUT SEPTIC SHOCK: Status: RESOLVED | Noted: 2023-07-16 | Resolved: 2023-07-21

## 2023-07-21 PROBLEM — A41.9 SEPSIS WITH ENCEPHALOPATHY WITHOUT SEPTIC SHOCK: Status: RESOLVED | Noted: 2023-07-16 | Resolved: 2023-07-21

## 2023-07-21 PROBLEM — G93.41 SEPSIS WITH ENCEPHALOPATHY WITHOUT SEPTIC SHOCK: Status: RESOLVED | Noted: 2023-07-16 | Resolved: 2023-07-21

## 2023-07-21 PROBLEM — D69.6 THROMBOCYTOPENIA, UNSPECIFIED: Status: RESOLVED | Noted: 2018-04-11 | Resolved: 2023-07-21

## 2023-07-21 LAB
BACTERIA BLD CULT: NORMAL
BACTERIA BLD CULT: NORMAL
BACTERIA SPEC AEROBE CULT: ABNORMAL

## 2023-07-21 PROCEDURE — 97530 THERAPEUTIC ACTIVITIES: CPT | Mod: HCNC,CQ

## 2023-07-21 PROCEDURE — 97110 THERAPEUTIC EXERCISES: CPT | Mod: HCNC,CQ

## 2023-07-21 PROCEDURE — 97116 GAIT TRAINING THERAPY: CPT | Mod: HCNC,CQ

## 2023-07-21 PROCEDURE — 99306 PR NURSING FACILITY CARE, INIT, HIGH SEVERITY: ICD-10-PCS | Mod: AI,HCNC,, | Performed by: HOSPITALIST

## 2023-07-21 PROCEDURE — 11000004 HC SNF PRIVATE: Mod: HCNC

## 2023-07-21 PROCEDURE — 99306 1ST NF CARE HIGH MDM 50: CPT | Mod: AI,HCNC,, | Performed by: HOSPITALIST

## 2023-07-21 PROCEDURE — 25000242 PHARM REV CODE 250 ALT 637 W/ HCPCS: Mod: HCNC | Performed by: NURSE PRACTITIONER

## 2023-07-21 PROCEDURE — 25000003 PHARM REV CODE 250: Mod: HCNC | Performed by: NURSE PRACTITIONER

## 2023-07-21 PROCEDURE — 97535 SELF CARE MNGMENT TRAINING: CPT | Mod: HCNC,CO

## 2023-07-21 PROCEDURE — 25000003 PHARM REV CODE 250: Mod: HCNC | Performed by: HOSPITALIST

## 2023-07-21 PROCEDURE — 63600175 PHARM REV CODE 636 W HCPCS: Mod: HCNC | Performed by: NURSE PRACTITIONER

## 2023-07-21 RX ORDER — SULFAMETHOXAZOLE AND TRIMETHOPRIM 800; 160 MG/1; MG/1
1 TABLET ORAL 2 TIMES DAILY
Status: DISCONTINUED | OUTPATIENT
Start: 2023-07-21 | End: 2023-07-27 | Stop reason: HOSPADM

## 2023-07-21 RX ADMIN — MUPIROCIN: 20 OINTMENT TOPICAL at 09:07

## 2023-07-21 RX ADMIN — LEVOTHYROXINE SODIUM 100 MCG: 100 TABLET ORAL at 05:07

## 2023-07-21 RX ADMIN — Medication 1 CAPSULE: at 10:07

## 2023-07-21 RX ADMIN — MEMANTINE 10 MG: 10 TABLET ORAL at 10:07

## 2023-07-21 RX ADMIN — DOXYCYCLINE HYCLATE 100 MG: 100 TABLET, COATED ORAL at 10:07

## 2023-07-21 RX ADMIN — SULFAMETHOXAZOLE AND TRIMETHOPRIM 1 TABLET: 800; 160 TABLET ORAL at 09:07

## 2023-07-21 RX ADMIN — CEFPODOXIME PROXETIL 100 MG: 100 TABLET, FILM COATED ORAL at 10:07

## 2023-07-21 RX ADMIN — MUPIROCIN: 20 OINTMENT TOPICAL at 05:07

## 2023-07-21 RX ADMIN — BUPROPION HYDROCHLORIDE 150 MG: 150 TABLET, FILM COATED, EXTENDED RELEASE ORAL at 10:07

## 2023-07-21 RX ADMIN — MEMANTINE 10 MG: 10 TABLET ORAL at 09:07

## 2023-07-21 RX ADMIN — GALANTAMINE 8 MG: 4 TABLET, FILM COATED ORAL at 05:07

## 2023-07-21 RX ADMIN — PRAVASTATIN SODIUM 40 MG: 20 TABLET ORAL at 10:07

## 2023-07-21 RX ADMIN — GALANTAMINE 8 MG: 4 TABLET, FILM COATED ORAL at 10:07

## 2023-07-21 RX ADMIN — CYANOCOBALAMIN TAB 1000 MCG 1000 MCG: 1000 TAB at 10:07

## 2023-07-21 RX ADMIN — ENOXAPARIN SODIUM 40 MG: 40 INJECTION SUBCUTANEOUS at 05:07

## 2023-07-21 RX ADMIN — ACETAMINOPHEN 1000 MG: 500 TABLET ORAL at 10:07

## 2023-07-21 RX ADMIN — MUPIROCIN: 20 OINTMENT TOPICAL at 10:07

## 2023-07-21 NOTE — PT/OT/SLP PROGRESS
Occupational Therapy   Treatment    Name: Rosario Menendez  MRN: [id number]  Admit Date: 7/18/2023  Admitting Diagnosis:  Acute cystitis without hematuria    General Precautions: Standard, fall   Orthopedic Precautions: N/A   Braces: N/A    Recommendations:     Discharge Recommendations:  home health OT  Level of Assistance Recommended at Discharge: 24 hours light assistance for ADL's and homemaking tasks  Discharge Equipment Recommendations:  (TBD)  Barriers to discharge:  Decreased caregiver support    Assessment:     Rosario Menendez is a 87 y.o. female with a medical diagnosis of Acute cystitis without hematuria.  She presents with limitations in performance of self-care, functional mobility, and ADLs. Performance deficits affecting function are weakness, impaired endurance, impaired self care skills, impaired functional mobility, gait instability, impaired cognition, decreased upper extremity function, decreased coordination, decreased lower extremity function, decreased safety awareness, pain, impaired cardiopulmonary response to activity. Pt with v/c for encouragement and sequencing throughout Tx. Pt tolerated Tx without incident and is making progress but continues to require assist to perform self care tasks, functional mobility and functional transfers. Pt would continue to benefit from OT intervention to further functional (I)ce and safety.     Rehab Potential is good    Activity tolerance:  Fair    Plan:     Patient to be seen 5 x/week to address the above listed problems via self-care/home management, therapeutic activities, therapeutic exercises    Plan of Care Expires: 08/18/23  Plan of Care Reviewed with: patient    Subjective     Communicated with: Nursing prior to session.     Pain/Comfort:  Pain Rating 1: 4/10  Location - Side 1: Left  Location - Orientation 1: generalized  Location 1: gluteal  Pain Addressed 1: Reposition, Distraction  Pain Rating Post-Intervention 1: 4/10    Patient's cultural,  spiritual, Anabaptism conflicts given the current situation:  no    Objective:     Patient found HOB elevated with  (no active lines) upon OT entry to room.    Bed Mobility:    Patient completed Rolling/Turning to Right with stand by assistance and with side rail  Patient completed Scooting/Bridging with stand by assistance and with side rail  Patient completed Supine to Sit with stand by assistance and with side rail     Functional Mobility/Transfers:  Patient completed Sit <> Stand Transfer with contact guard assistance  with  rolling walker   Patient completed Toilet Transfer Step Transfer technique with contact guard assistance with  rolling walker and BSC positioned over toilet  Functional Mobility: Pt ambulated functional distance from EOB to BSC over toilet with RW and CGA with increased completion time.    Activities of Daily Living:  Grooming: stand by assistance seated sinkside with set up with v/c for sequencing and initiating   Upper Body Dressing: Set Up Assistance to doff/lelia pull over shirt  Lower Body Dressing: contact guard assistance to doff/lelia pants with CGA in standing to manage over hips. Pt able to lelia B socks seated EOB.  Toileting: contact guard assistance when standing to perform becky care and manage pants with v/c for sequencing    AMPAC 6 Click ADL: 17    Treatment & Education:  Pt educated on:  - role of OT  - level of assistance  - energy conservation and task modification to maximized independence with ADL's and mobility   -  safety while performing functional transfers and self care tasks  - progress towards OT goals      Patient left up in chair with call button in reach and [employer] telesitter present    GOALS:   Multidisciplinary Problems       Occupational Therapy Goals          Problem: Occupational Therapy    Goal Priority Disciplines Outcome Interventions   Occupational Therapy Goal     OT, PT/OT Ongoing, Progressing    Description: Goals to be met by: 2 weeks     Patient will  increase functional independence with ADLs by performing:    Feeding with Modified [place].  UE Dressing with Set-up Assistance.  LE Dressing with Contact Guard Assistance with RW to steady when standing.  Grooming while seated at sink with Set-up Assistance.  Toileting from toilet or BSC with Contact Guard Assistance for hygiene and clothing management.   Bathing from  shower chair/bench with Contact Guard Assistance.  Supine to sit with Modified [place].  Step transfer with Stand-by Assistance using appropriate A/D  Upper extremity exercise tolerating up to 10 minutes on UBE with Mod resistance.    Pt will tolerate up to 5 minutes of functional standing activity with SBA  with A/D to steady when standing.                       Time Tracking:     OT Date of Treatment: 07/21/23  OT Start Time: 0754    OT Stop Time: 0824  OT Total Time (min): 30 min    Billable Minutes:Self Care/Home Management 30    7/21/2023  A client care conference was performed between the DNOAVON and HASSAN, prior to treatment by HASSAN, to discuss the patient's status, treatment plan and established goals.

## 2023-07-21 NOTE — CONSULTS
[facility] - Skilled Nursing  Adult Nutrition  Consult Note    SUMMARY   Recommendations  Recommend regular diet,RD following  Goals: PO to meet 85% of EEN/EPN by next RD follow up  Nutrition Goal Status: new  Communication of RD Recs: reviewed with physician    Assessment and Plan  No nutrition diagnosis at this time    Malnutrition Assessment 7/21     Eyes (Micronutrient): conjunctiva dull  Neck/Chest (Micronutrient): muscle wasting  Musculoskeletal/Lower Extremities: muscle wasting           Orbital Region (Subcutaneous Fat Loss): well nourished  Upper Arm Region (Subcutaneous Fat Loss): well nourished  Thoracic and Lumbar Region: well nourished   [place] Region (Muscle Loss): mild depletion  Clavicle and Acromion Bone Region (Muscle Loss): mild depletion  Dorsal Hand (Muscle Loss): mild depletion  Anterior Thigh Region (Muscle Loss): mild depletion                 Reason for Assessment  Reason For Assessment: consult  Diagnosis:  (Sepsis, acute Cystitis)  Relevant Medical History: HLD, Depression, debility, hypothyroid, arthritis, recent altered mental status    General Information Comments: resident of [facility]  50-75% chart review indicates slow weight loss over years. Two years ago she weighed 137#. Patient states she has been eating smaller portions, she follows no special diet at home and receives three meals per day at [facility]. She declines nutrition supplement at this time. No family in room.   Nutrition Discharge Planning: DC on regular diet    Nutrition/Diet History    Patient Reported Diet/Restrictions/Preferences: general  Typical Food/Fluid Intake: regular meals in facility  Spiritual, Cultural Beliefs, Pentecostalism Practices, Values that Affect Care: no  Food Allergies: NKFA  Factors Affecting Nutritional Intake: decreased appetite    Anthropometrics    Temp: 97.8 °F (36.6 °C)  Height Method: Stated  Height: 5' (152.4 cm)  Height (inches): 60 in  Weight Method: Bed Scale  Weight: 54.8 kg (120 lb 13  oz)  Weight (lb): 120.81 lb  Ideal Body Weight (IBW), Female: 100 lb  % Ideal Body Weight, Female (lb): 120.81 %  BMI (Calculated): 23.6  BMI Grade: 18.5-24.9 - normal  Weight Loss: unintentional  Usual Body Weight (UBW), k.54 kg  Weight Change Amount:  (5% loss in 6 months, 8% loss in nine months)  % Usual Body Weight: 92.23  % Weight Change From Usual Weight: -7.96 %       Lab/Procedures/Meds    Pertinent Labs Reviewed: reviewed  Pertinent Labs Comments: PO4 2.2  Pertinent Medications Reviewed: reviewed  Pertinent Medications Comments: memantine, Lovenox, statin, levothyroxine, Vit B12, Abx, Lactobacillus GG    Estimated/Assessed Needs    Weight Used For Calorie Calculations: 54.8 kg (120 lb 13 oz)  Energy Calorie Requirements (kcal): 1175+ 250 for wt gain= 1425  Energy Need Method: [place]-St Jeor (x 1.3(PAL))  Protein Requirements: 72g  Weight Used For Protein Calculations: 59.5 kg (131 lb 4.2 oz) (UBW kg x 1.2g/kg)  Fluid Requirements (mL): 1175 or per MD  Estimated Fluid Requirement Method: RDA Method  RDA Method (mL): 1175  CHO Requirement: -      Nutrition Prescription Ordered    Current Diet Order: Cardiac  Nutrition Order Comments: PO %    Evaluation of Received Nutrient/Fluid Intake    I/O: no data  Energy Calories Required: meeting needs  Protein Required: meeting needs  Fluid Required: meeting needs  Comments: LBM   Tolerance: tolerating  % Intake of Estimated Energy Needs: 75 - 100 %  % Meal Intake: 75 - 100 %    Nutrition Risk    Level of Risk/Frequency of Follow-up: low (one time per week)       Monitor and Evaluation    Food and Nutrient Intake: food and beverage intake  Food and Nutrient Adminstration: diet order  Physical Activity and Function: nutrition-related ADLs and IADLs  Anthropometric Measurements: weight change  Biochemical Data, Medical Tests and Procedures: gastrointestinal profile, electrolyte and renal panel  Nutrition-Focused Physical Findings: overall appearance        Nutrition Follow-Up    RD Follow-up?: Yes

## 2023-07-21 NOTE — H&P
"Hospital Medicine  Skilled Nursing Facility   History and Physical Exam      Date of Service: 07/21/2023      Patient Name: Rosario Menendez  MRN: [id number]  Admission Date: 7/18/2023  Attending Physician: Leo Bowen MD  Primary Care Provider: Shi Simon MD  Code Status: Full Code      Principal problem:  Acute cystitis without hematuria      Chief Complaint:   Chief Complaint   Patient presents with    Urinary Tract Infection     Admitted to [facility] for rehabilitation hospital stay for sepsis due to acute cystitis.           HPI:   "Rosario Qiu is a 87 year old female PMHx of osteoporosis, hypothyroidism, dementia, and depression who presents to SNF following hospitalization for urosepsis.  Admission to SNF for secondary weakness and debility.     Patient originally [place] [facility] [place] on [place]15 with altered mental status and fever.  Per [facility] notes, Family at bedside able to provide minimal hx. Per family, pt had an episode where she passed out yesterday. Was very weak following. He notes that at her assisted living she was noted to have a temp of 103, was given tylenol, then recheck was 101 before presenting to the ED. Per ED note: pt had a near syncopal episode at the bookstore while using her walker. She was weak and confused since the incident. Patient's daughter did not witness the incident though she believes the patient fell on her bottom and did not hit her head.  Daughter reports that the patient had dinner with her family the night before and was feeling well.  Daughter states that patient seems slightly more confused with slower mentation today. Patient awake and oriented x 4 on my exam. Endorses feeling very weak. In the ED: Temp 100.4, /60. WBC 14K. CMP largely unremarkable. UA infectious with 15 hyaline casts. CXR showed stable chronic background interstitial prominence.  No new focal consolidation. CTH showed no acute abnormalities. CT C spine showed no acute fracture. Given IVFs " "and rocephin in ED. Pt was placed in observation for sepsis without acute organ dysfunction and acute encephalopathy in the setting of acute cystitis. The patient was started on IV rocephin and subsequently broadened to IV cefepime given slow clinical response. Urine culture + GNR. Blood cultures NGTD. Soft tissue ultrasound pending for new L buttock wound concerning for cellulitis vs. abscess. PT/OT consulted and recommending SNF."      Today, patient  is without major complaints.  She endorses a decreased appetite and is unsure of when her last bowel movement was." Per Nella Lacey NP on 7/18/23.     She is doing okay today. She denies any dysuria or frequency. She has a decreased appetite, but denies any nausea. She had a regular BM yesterday. She is working more with therapy and walked 38 feet x 2 trials with RW and CGA today with PT.     Patient admitted with skilled services with PT and OT to improve functional status and ability to perform ADLs.       Past Medical History:   Past Medical History:   Diagnosis Date    Arthritis     Depression     Hypercholesteremia     Thrombocytopenia     Thyroid disease        Past Surgical History:   Past Surgical History:   Procedure Laterality Date    ANKLE SURGERY      COLONOSCOPY N/A 6/2/2021    Procedure: COLONOSCOPY;  Surgeon: Alfonso Haque MD;  Location: [facility] ([address] [place] [address]);  Service: Endoscopy;  Laterality: N/A;  any crs  covid test 5/30-[facility]    HYSTERECTOMY      KNEE SURGERY      WRIST SURGERY         Social History:   Tobacco Use    Smoking status: Never    Smokeless tobacco: Never   Substance and Sexual Activity    Alcohol use: Not on file    Drug use: Not on file    Sexual activity: Not on file       Family History:   Family History       Problem Relation (Age of Onset)    Cancer Maternal Uncle    Heart failure Mother, Father    Suicide Son            No current facility-administered medications on file prior to encounter.     Current Outpatient " Medications on File Prior to Encounter   Medication Sig    acetaminophen (TYLENOL) 500 MG tablet Take 2 tablets (1,000 mg total) by mouth every 8 (eight) hours as needed for Pain or Temperature greater than (100.4).    alendronate (FOSAMAX) 70 MG tablet Take 1 tablet (70 mg total) by mouth every 7 days. HOLD while in SNF    buPROPion (WELLBUTRIN XL) 150 MG TB24 tablet TAKE 1 TABLET EVERY DAY    cefpodoxime (VANTIN) 100 MG tablet Take 1 tablet (100 mg total) by mouth 2 (two) times daily. for 10 days    cyanocobalamin (VITAMIN B-12) 1000 MCG tablet Take 1 tablet (1,000 mcg total) by mouth once daily.    doxycycline (VIBRAMYCIN) 100 MG Cap Take 1 capsule (100 mg total) by mouth every 12 (twelve) hours. for 7 days    galantamine (RAZADYNE ER) 16 MG 24 hr capsule TAKE 1 CAPSULE EVERY DAY WITH BREAKFAST    Lactobacillus acidophilus 1 billion cell Cap Take 1 capsule by mouth once daily.    levothyroxine (SYNTHROID) 100 MCG tablet Take 1 tablet (100 mcg total) by mouth before breakfast.    memantine (NAMENDA) 10 MG Tab TAKE 1 TABLET TWICE DAILY    mupirocin (BACTROBAN) 2 % ointment Apply topically 4 (four) times daily. To L buttock cellulitis for 14 days    pravastatin (PRAVACHOL) 40 MG tablet TAKE 1 TABLET EVERY DAY       Allergies:   Review of patient's allergies indicates:   Allergen Reactions    Codeine Other (See Comments)       ROS:  Review of Systems   Constitutional:  Positive for appetite change. Negative for fever.   Respiratory:  Negative for cough and shortness of breath.    Cardiovascular:  Negative for chest pain and palpitations.   Gastrointestinal:  Negative for abdominal pain, nausea and vomiting.   Genitourinary:  Negative for difficulty urinating, dysuria and frequency.   Musculoskeletal:  Negative for arthralgias and back pain.   Neurological:  Positive for weakness. Negative for dizziness and light-headedness.   Psychiatric/Behavioral:  Negative for sleep disturbance. The patient is not nervous/anxious.         Objective:  Temp:  [97.8 °F (36.6 °C)-98 °F (36.7 °C)]   Pulse:  [56-71]   Resp:  [14-18]   BP: (104-117)/(56-59)   SpO2:  [94 %-97 %]     Body mass index is 23.59 kg/m².      Physical Exam  Vitals and nursing note reviewed.   Constitutional:       General: She is not in acute distress.     Appearance: She is well-developed.   Cardiovascular:      Rate and Rhythm: Normal rate and regular rhythm.      Heart sounds: S1 normal and S2 normal. No murmur heard.  Pulmonary:      Effort: Pulmonary effort is normal. No respiratory distress.      Breath sounds: Normal breath sounds. No wheezing or rales.   Abdominal:      General: Bowel sounds are normal. There is no distension.      Palpations: Abdomen is soft.      Tenderness: There is no abdominal tenderness.   Musculoskeletal:         General: No tenderness.      Right lower leg: No edema.      Left lower leg: No edema.   Skin:     General: Skin is warm and dry.      Findings: Bruising present.   Neurological:      Mental Status: She is alert and oriented to person, place, and time.   Psychiatric:         Mood and Affect: Mood normal.         Behavior: Behavior normal. Behavior is cooperative.         Thought Content: Thought content normal.       Significant Labs:   TSH:   Recent Labs   Lab 07/15/23  1929   TSH 0.023*     CBC:  Recent Labs   Lab 07/20/23  0411   WBC 5.78   HGB 12.2   HCT 38.8      MCV 97     BMP:  Recent Labs   Lab 07/20/23  0411   GLU 70      K 4.5   *   CO2 20*   BUN 14   CREATININE 0.7   CALCIUM 9.3   MG 2.3   PHOS 2.2*       Significant Imaging: I have reviewed all pertinent imaging results/findings completed during prior hospitalization.      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 Ending 7/28     Cellulitis/Abscess of buttock due to MRSA  - Pt with new L lateral buttock pain & itching on 7/17  - PE with erythematous, warm, indurated wound with small center opening without purulent drainage, exquisitely tender to palpation concerning for cellulitis vs developing abscess  - U/s soft tissue ordered- likely a small developing abscess  - Continue mupirocin q6 hrs.   - Transition to TMP-SMX DS BID based on sensitivities to end 7/28.      Amnestic MCI (mild cognitive impairment with memory loss)  - Continue memantine 10 mg BID, galantamine 8mg BID, and buproprion 150 mg daily  - Delirium precautions      Thrombocytopenia, unspecified  - normalized  - Continue to monitor twice weekly CBC     Hypercholesteremia  - continue pravastatin 40 mg daily     Hypothyroidism  Lab Results   Component Value Date    TSH 0.023 (L) 07/15/2023    FREET4 1.42 07/15/2023   - continue levothyroxine 100 mcg daily     Debility   - Continue with PT/OT for gait training and strengthening and restoration of ADL's   - Encourage mobility, OOB in chair, and early ambulation as appropriate  - Fall precautions   - Monitor for bowel and bladder dysfunction  - Monitor for and prevent skin breakdown and pressure ulcers  - initiated DVT prophylaxis with Lovenox 40 mg daily        IP [facility] RISK OF UNPLANNED READMISSION Model: Low      Anticipated Disposition:  Home  with home health      Future Appointments   Date Time Provider Department Center   9/20/2023 10:30 AM Shi [facility] MD [facility]       I certify that SNF services are required to be given on an inpatient basis because Rosario Menendez needs for skilled nursing care and/or skilled rehabilitation are required on a daily basis and such services can only practically be provided in a skilled nursing facility setting and are for an ongoing condition for which she received inpatient care in the hospital.       Leo Bowen MD  Department of Hospital Medicine   [facility] - Skilled Nursing

## 2023-07-21 NOTE — PLAN OF CARE
Problem: Occupational Therapy  Goal: Occupational Therapy Goal  Description: Goals to be met by: 2 weeks     Patient will increase functional independence with ADLs by performing:    Feeding with Modified [place].  UE Dressing with Set-up Assistance.  LE Dressing with Contact Guard Assistance with RW to steady when standing.  Grooming while seated at sink with Set-up Assistance.  Toileting from toilet or BSC with Contact Guard Assistance for hygiene and clothing management.   Bathing from  shower chair/bench with Contact Guard Assistance.  Supine to sit with Modified [place].  Step transfer with Stand-by Assistance using appropriate A/D  Upper extremity exercise tolerating up to 10 minutes on UBE with Mod resistance.    Pt will tolerate up to 5 minutes of functional standing activity with SBA  with A/D to steady when standing.  Outcome: Ongoing, Progressing

## 2023-07-21 NOTE — CARE UPDATE
Ms. Rosario Menendez's wound culture from the L buttock, which was obtained with recent admission, is positive for MRSA. Notified daughter, Aditi Menendez, via phone who reported she is still under skilled nursing care. I messaged Dr. Leo Bowen via secure that to notify him of the results and request that Ms. Menendez be placed on appropriate antibiotic coverage for her L buttock wound. Awaiting response.     Swathi Morrison PA-C

## 2023-07-21 NOTE — PT/OT/SLP PROGRESS
"Physical Therapy Treatment    Patient Name:  Rosario Menendez   MRN:  [id number]  Admit Date: 7/18/2023  Admitting Diagnosis: Acute cystitis without hematuria  Recent Surgeries:     General Precautions: Standard, fall  Orthopedic Precautions: N/A  Braces: N/A    Recommendations:     Discharge Recommendations: home health PT, assisted living facility  Level of Assistance Recommended at Discharge: 24 hours light assistance  Discharge Equipment Recommendations: bedside commode  Barriers to discharge: Decreased caregiver support    Assessment:     Rosario Menendez is a 87 y.o. female admitted with a medical diagnosis of Acute cystitis without hematuria . Pt tolerated fairly well, did require some education/encouragement for participation and the importance of PT services, pt would continue to benefit from skilled PT services to improve overall functional mobility, strength and endurance.  .      Performance deficits affecting function: weakness, impaired endurance, impaired self care skills, impaired functional mobility, gait instability, impaired balance, decreased upper extremity function, decreased lower extremity function, impaired cardiopulmonary response to activity.    Rehab Potential is good    Activity Tolerance: Fair    Plan:     Patient to be seen 5 x/week to address the above listed problems via gait training, therapeutic activities, therapeutic exercises, neuromuscular re-education, wheelchair management/training    Plan of Care Expires: 08/18/23  Plan of Care Reviewed with: patient    Subjective     "Tired" pt agreeable to therapy with encouragement.     Pain/Comfort:  Pain Rating 1: 3/10  Location - Side 1: Left  Location - Orientation 1: posterior  Location 1: gluteal  Pain Addressed 1: Reposition, Distraction (declined needing meds, ed on pressure relief in bed and in chair, pt verbalized understanding)  Pain Rating Post-Intervention 1: 3/10 (no further mentioned)    Patient's cultural, spiritual, " Advent conflicts given the current situation:  no    Objective:       Patient found with   upon PT entry to room.       Therapeutic Activities and Exercises: x10 reps AP,LAQ, hip flex, ed on tech/ROM  Patient Education: Education on the importance/benefits of PT services, Safety/proper tech with trfs/gait, On the importance of increasing OOB tolerance/prolong affects of bed rest, Use of the call button for A with OOB mobility/use of AD/fall risk.      Functional Mobility:  Transfers:     Sit to Stand:  minimum assistance with rolling walker vcs for tech/hand placement  Toilet Transfer: contact guard assistance and minimum assistance with grab bar  using  Stand Pivot  A with clothing mgmt, set up for becky care, set up for hand hygiene in WC at sink  Gait: amb with RW CGA ~ 38 ft x 2 trials seated rest break, slow ekta    AM-PAC 6 CLICK MOBILITY  16    Patient left up in chair with call button in reach, camera notified, camera  present, and belongings in reach.    GOALS:   Multidisciplinary Problems       Physical Therapy Goals          Problem: Physical Therapy    Goal Priority Disciplines Outcome Goal Variances Interventions   Physical Therapy Goal     PT, PT/OT Ongoing, Progressing     Description: Goals to be met by: 8/18/23     Patient will increase functional independence with mobility by performing:    . Supine to sit with Modified [place]  . Sit to supine with Modified [place]  . Rolling to Left and Right with Modified [place].  . Sit to stand transfer with SBA with RW  . Bed to chair transfer with Stand-by Assistance using Rolling Walker  . Gait  x 100 feet with Stand-by Assistance using Rolling Walker.   . Wheelchair propulsion x50 feet with Stand-by Assistance using bilateral uppper extremities  . Ascend/Descend 4 inch curb step with Contact Guard Assistance using Rolling Walker.                         Time Tracking:     PT Received On: 07/21/23  PT Start Time: 0931  PT Stop Time:  1010  PT Total Time (min): 39 min    Billable Minutes: Gait Training 15, Therapeutic Activity 14, and Therapeutic Exercise 10    Treatment Type: Treatment  PT/PTA: PTA     Number of PTA visits since last PT visit: 1 07/21/2023

## 2023-07-22 PROCEDURE — 25000003 PHARM REV CODE 250: Mod: HCNC | Performed by: HOSPITALIST

## 2023-07-22 PROCEDURE — 97110 THERAPEUTIC EXERCISES: CPT | Mod: HCNC,CO

## 2023-07-22 PROCEDURE — 97535 SELF CARE MNGMENT TRAINING: CPT | Mod: HCNC,CO

## 2023-07-22 PROCEDURE — 11000004 HC SNF PRIVATE: Mod: HCNC

## 2023-07-22 PROCEDURE — 63600175 PHARM REV CODE 636 W HCPCS: Mod: HCNC | Performed by: NURSE PRACTITIONER

## 2023-07-22 RX ADMIN — BUPROPION HYDROCHLORIDE 150 MG: 150 TABLET, FILM COATED, EXTENDED RELEASE ORAL at 08:07

## 2023-07-22 RX ADMIN — MUPIROCIN: 20 OINTMENT TOPICAL at 08:07

## 2023-07-22 RX ADMIN — CYANOCOBALAMIN TAB 1000 MCG 1000 MCG: 1000 TAB at 08:07

## 2023-07-22 RX ADMIN — GALANTAMINE 8 MG: 4 TABLET, FILM COATED ORAL at 08:07

## 2023-07-22 RX ADMIN — Medication 6 MG: at 08:07

## 2023-07-22 RX ADMIN — MEMANTINE 10 MG: 10 TABLET ORAL at 08:07

## 2023-07-22 RX ADMIN — PRAVASTATIN SODIUM 40 MG: 20 TABLET ORAL at 08:07

## 2023-07-22 RX ADMIN — Medication 6 MG: at 01:07

## 2023-07-22 RX ADMIN — Medication 1 CAPSULE: at 08:07

## 2023-07-22 RX ADMIN — SULFAMETHOXAZOLE AND TRIMETHOPRIM 1 TABLET: 800; 160 TABLET ORAL at 08:07

## 2023-07-22 RX ADMIN — ENOXAPARIN SODIUM 40 MG: 40 INJECTION SUBCUTANEOUS at 05:07

## 2023-07-22 RX ADMIN — GALANTAMINE 8 MG: 4 TABLET, FILM COATED ORAL at 05:07

## 2023-07-22 RX ADMIN — LEVOTHYROXINE SODIUM 100 MCG: 100 TABLET ORAL at 05:07

## 2023-07-22 NOTE — PT/OT/SLP PROGRESS
Occupational Therapy   Treatment    Name: Rosario Menendez  MRN: [id number]  Admit Date: 7/18/2023  Admitting Diagnosis:  Acute cystitis without hematuria    General Precautions: Standard, fall   Orthopedic Precautions: N/A   Braces: N/A    Recommendations:     Discharge Recommendations:  home health OT  Level of Assistance Recommended at Discharge: 24 hours light assistance for ADL's and homemaking tasks  Discharge Equipment Recommendations:  (TBD)  Barriers to discharge:  Decreased caregiver support    Assessment:     Rosario Menendez is a 87 y.o. female with a medical diagnosis of Acute cystitis without hematuria.  She presents with limitations in performance of self-care, functional mobility, and ADLs. Performance deficits affecting function are weakness, impaired endurance, impaired self care skills, impaired functional mobility, gait instability, impaired cognition, decreased upper extremity function, decreased coordination, decreased lower extremity function, decreased safety awareness, pain, impaired cardiopulmonary response to activity. Pt tolerated Tx without incident and is making progress but continues to require assist to perform self care tasks, functional mobility and functional transfers. Pt would continue to benefit from OT intervention to further functional (I)ce and safety.     Rehab Potential is good    Activity tolerance:  Fair    Plan:     Patient to be seen 5 x/week to address the above listed problems via self-care/home management, therapeutic activities, therapeutic exercises    Plan of Care Expires: 08/18/23  Plan of Care Reviewed with: patient    Subjective     Communicated with: Nursing prior to session.     Pain/Comfort:  Pain Rating 1:  (did not rate)  Pain Rating Post-Intervention 1:  (did not rate)    Patient's cultural, spiritual, Restorationism conflicts given the current situation:  no    Objective:     Patient found HOB elevated with  (no active lines) upon OT entry to room.    Bed  Mobility:    Patient completed Scooting/Bridging with stand by assistance and with side rail  Patient completed Supine to Sit with stand by assistance and with side rail     Functional Mobility/Transfers:  Patient completed Sit <> Stand Transfer with contact guard assistance  with  rolling walker and grab bars(s)   Patient completed Bed <> Chair Transfer using Stand Pivot technique with contact guard assistance with rolling walker  Patient completed Toilet Transfer Stand Pivot technique with contact guard assistance with  grab bars and BSC positioned over toilet    Activities of Daily Living:  Toileting: minimum assistance to steady in standing with grab bars when performing becky care and managing pants over rear with v/c for sequencing    AMPAC 6 Click ADL: 17    OT Exercises: Pt performed UBE exercise for 10 minutes with Min resistance.  UE exercises performed to increase functional endurance and strength in order increase independence when performing self care tasks, functional ambulation, W/C propulsion, and functional standing activities .      Treatment & Education:  Pt educated on:  - role of OT  - level of assistance  - energy conservation and task modification to maximized independence with ADL's and mobility   -  safety while performing functional transfers and self care tasks  - progress towards OT goals      Patient left up in chair with call button in reach and [employer] telesitter notified    GOALS:   Multidisciplinary Problems       Occupational Therapy Goals          Problem: Occupational Therapy    Goal Priority Disciplines Outcome Interventions   Occupational Therapy Goal     OT, PT/OT Ongoing, Progressing    Description: Goals to be met by: 2 weeks     Patient will increase functional independence with ADLs by performing:    Feeding with Modified [place].  UE Dressing with Set-up Assistance.  LE Dressing with Contact Guard Assistance with RW to steady when standing.  Grooming while seated at  sink with Set-up Assistance.  Toileting from toilet or BSC with Contact Guard Assistance for hygiene and clothing management.   Bathing from  shower chair/bench with Contact Guard Assistance.  Supine to sit with Modified [place].  Step transfer with Stand-by Assistance using appropriate A/D  Upper extremity exercise tolerating up to 10 minutes on UBE with Mod resistance.    Pt will tolerate up to 5 minutes of functional standing activity with SBA  with A/D to steady when standing.                       Time Tracking:     OT Date of Treatment: 07/22/23  OT Start Time: 1143    OT Stop Time: 1213  OT Total Time (min): 30 min    Billable Minutes:Self Care/Home Management 17  Therapeutic Exercise 13    7/22/2023

## 2023-07-23 PROCEDURE — 63600175 PHARM REV CODE 636 W HCPCS: Mod: HCNC | Performed by: NURSE PRACTITIONER

## 2023-07-23 PROCEDURE — 11000004 HC SNF PRIVATE: Mod: HCNC

## 2023-07-23 PROCEDURE — 25000003 PHARM REV CODE 250: Mod: HCNC | Performed by: HOSPITALIST

## 2023-07-23 RX ADMIN — BUPROPION HYDROCHLORIDE 150 MG: 150 TABLET, FILM COATED, EXTENDED RELEASE ORAL at 08:07

## 2023-07-23 RX ADMIN — GALANTAMINE 8 MG: 4 TABLET, FILM COATED ORAL at 08:07

## 2023-07-23 RX ADMIN — Medication 1 CAPSULE: at 08:07

## 2023-07-23 RX ADMIN — GALANTAMINE 8 MG: 4 TABLET, FILM COATED ORAL at 05:07

## 2023-07-23 RX ADMIN — ENOXAPARIN SODIUM 40 MG: 40 INJECTION SUBCUTANEOUS at 05:07

## 2023-07-23 RX ADMIN — Medication 6 MG: at 08:07

## 2023-07-23 RX ADMIN — PRAVASTATIN SODIUM 40 MG: 20 TABLET ORAL at 08:07

## 2023-07-23 RX ADMIN — ACETAMINOPHEN 1000 MG: 500 TABLET ORAL at 10:07

## 2023-07-23 RX ADMIN — CYANOCOBALAMIN TAB 1000 MCG 1000 MCG: 1000 TAB at 08:07

## 2023-07-23 RX ADMIN — SULFAMETHOXAZOLE AND TRIMETHOPRIM 1 TABLET: 800; 160 TABLET ORAL at 08:07

## 2023-07-23 RX ADMIN — LEVOTHYROXINE SODIUM 100 MCG: 100 TABLET ORAL at 05:07

## 2023-07-23 RX ADMIN — MEMANTINE 10 MG: 10 TABLET ORAL at 08:07

## 2023-07-24 ENCOUNTER — PATIENT MESSAGE (OUTPATIENT)
Dept: INTERNAL MEDICINE | Facility: CLINIC | Age: 88
End: 2023-07-24
Payer: MEDICARE

## 2023-07-24 LAB
ANION GAP SERPL CALC-SCNC: 6 MMOL/L (ref 8–16)
BACTERIA SPEC ANAEROBE CULT: NORMAL
BASOPHILS # BLD AUTO: 0.06 K/UL (ref 0–0.2)
BASOPHILS NFR BLD: 1.1 % (ref 0–1.9)
BUN SERPL-MCNC: 12 MG/DL (ref 8–23)
CALCIUM SERPL-MCNC: 9.9 MG/DL (ref 8.7–10.5)
CHLORIDE SERPL-SCNC: 108 MMOL/L (ref 95–110)
CO2 SERPL-SCNC: 23 MMOL/L (ref 23–29)
CREAT SERPL-MCNC: 1 MG/DL (ref 0.5–1.4)
DIFFERENTIAL METHOD: ABNORMAL
EOSINOPHIL # BLD AUTO: 0.1 K/UL (ref 0–0.5)
EOSINOPHIL NFR BLD: 2.5 % (ref 0–8)
ERYTHROCYTE [DISTWIDTH] IN BLOOD BY AUTOMATED COUNT: 14.6 % (ref 11.5–14.5)
EST. GFR  (NO RACE VARIABLE): 54.5 ML/MIN/1.73 M^2
GLUCOSE SERPL-MCNC: 79 MG/DL (ref 70–110)
HCT VFR BLD AUTO: 42.6 % (ref 37–48.5)
HGB BLD-MCNC: 13.7 G/DL (ref 12–16)
IMM GRANULOCYTES # BLD AUTO: 0.07 K/UL (ref 0–0.04)
IMM GRANULOCYTES NFR BLD AUTO: 1.3 % (ref 0–0.5)
LYMPHOCYTES # BLD AUTO: 1.8 K/UL (ref 1–4.8)
LYMPHOCYTES NFR BLD: 32.5 % (ref 18–48)
MAGNESIUM SERPL-MCNC: 2.4 MG/DL (ref 1.6–2.6)
MCH RBC QN AUTO: 31.2 PG (ref 27–31)
MCHC RBC AUTO-ENTMCNC: 32.2 G/DL (ref 32–36)
MCV RBC AUTO: 97 FL (ref 82–98)
MONOCYTES # BLD AUTO: 0.7 K/UL (ref 0.3–1)
MONOCYTES NFR BLD: 13.3 % (ref 4–15)
NEUTROPHILS # BLD AUTO: 2.8 K/UL (ref 1.8–7.7)
NEUTROPHILS NFR BLD: 49.3 % (ref 38–73)
NRBC BLD-RTO: 0 /100 WBC
PHOSPHATE SERPL-MCNC: 3.2 MG/DL (ref 2.7–4.5)
PLATELET # BLD AUTO: 233 K/UL (ref 150–450)
PMV BLD AUTO: 10.5 FL (ref 9.2–12.9)
POTASSIUM SERPL-SCNC: 4.3 MMOL/L (ref 3.5–5.1)
RBC # BLD AUTO: 4.39 M/UL (ref 4–5.4)
SODIUM SERPL-SCNC: 137 MMOL/L (ref 136–145)
WBC # BLD AUTO: 5.57 K/UL (ref 3.9–12.7)

## 2023-07-24 PROCEDURE — 25000003 PHARM REV CODE 250: Mod: HCNC | Performed by: NURSE PRACTITIONER

## 2023-07-24 PROCEDURE — 85025 COMPLETE CBC W/AUTO DIFF WBC: CPT | Mod: HCNC | Performed by: HOSPITALIST

## 2023-07-24 PROCEDURE — 97116 GAIT TRAINING THERAPY: CPT | Mod: HCNC,CQ

## 2023-07-24 PROCEDURE — 36415 COLL VENOUS BLD VENIPUNCTURE: CPT | Mod: HCNC | Performed by: HOSPITALIST

## 2023-07-24 PROCEDURE — 97535 SELF CARE MNGMENT TRAINING: CPT | Mod: HCNC,CO

## 2023-07-24 PROCEDURE — 83735 ASSAY OF MAGNESIUM: CPT | Mod: HCNC | Performed by: HOSPITALIST

## 2023-07-24 PROCEDURE — 80048 BASIC METABOLIC PNL TOTAL CA: CPT | Mod: HCNC | Performed by: HOSPITALIST

## 2023-07-24 PROCEDURE — 11000004 HC SNF PRIVATE: Mod: HCNC

## 2023-07-24 PROCEDURE — 25000003 PHARM REV CODE 250: Mod: HCNC | Performed by: HOSPITALIST

## 2023-07-24 PROCEDURE — 84100 ASSAY OF PHOSPHORUS: CPT | Mod: HCNC | Performed by: HOSPITALIST

## 2023-07-24 PROCEDURE — 63600175 PHARM REV CODE 636 W HCPCS: Mod: HCNC | Performed by: NURSE PRACTITIONER

## 2023-07-24 PROCEDURE — 27000207 HC ISOLATION: Mod: HCNC

## 2023-07-24 PROCEDURE — 97530 THERAPEUTIC ACTIVITIES: CPT | Mod: HCNC,CQ

## 2023-07-24 RX ADMIN — MEMANTINE 10 MG: 10 TABLET ORAL at 09:07

## 2023-07-24 RX ADMIN — LEVOTHYROXINE SODIUM 100 MCG: 100 TABLET ORAL at 05:07

## 2023-07-24 RX ADMIN — Medication 6 MG: at 09:07

## 2023-07-24 RX ADMIN — CYANOCOBALAMIN TAB 1000 MCG 1000 MCG: 1000 TAB at 09:07

## 2023-07-24 RX ADMIN — ENOXAPARIN SODIUM 40 MG: 40 INJECTION SUBCUTANEOUS at 05:07

## 2023-07-24 RX ADMIN — GALANTAMINE 8 MG: 4 TABLET, FILM COATED ORAL at 05:07

## 2023-07-24 RX ADMIN — ACETAMINOPHEN 1000 MG: 500 TABLET ORAL at 10:07

## 2023-07-24 RX ADMIN — BUPROPION HYDROCHLORIDE 150 MG: 150 TABLET, FILM COATED, EXTENDED RELEASE ORAL at 09:07

## 2023-07-24 RX ADMIN — SULFAMETHOXAZOLE AND TRIMETHOPRIM 1 TABLET: 800; 160 TABLET ORAL at 09:07

## 2023-07-24 RX ADMIN — PRAVASTATIN SODIUM 40 MG: 20 TABLET ORAL at 09:07

## 2023-07-24 RX ADMIN — GALANTAMINE 8 MG: 4 TABLET, FILM COATED ORAL at 08:07

## 2023-07-24 RX ADMIN — ACETAMINOPHEN 1000 MG: 500 TABLET ORAL at 09:07

## 2023-07-24 RX ADMIN — Medication 1 CAPSULE: at 09:07

## 2023-07-24 RX ADMIN — SENNOSIDES AND DOCUSATE SODIUM 1 TABLET: 50; 8.6 TABLET ORAL at 09:07

## 2023-07-24 NOTE — PT/OT/SLP PROGRESS
"Occupational Therapy   Treatment    Name: Rosario Menendez  MRN: [id number]  Admit Date: 7/18/2023  Admitting Diagnosis:  Acute cystitis without hematuria    General Precautions: Standard, fall, contact   Orthopedic Precautions: N/A   Braces: N/A    Recommendations:     Discharge Recommendations:  home health OT  Level of Assistance Recommended at Discharge: 24 hours light assistance for ADL's and homemaking tasks  Discharge Equipment Recommendations:  (TBD)  Barriers to discharge:  Decreased caregiver support    Assessment:     Rosario Menendez is a 87 y.o. female with a medical diagnosis of Acute cystitis without hematuria.  She presents with limitations in performance of self-care, functional mobility, and ADLs. Performance deficits affecting function are weakness, impaired endurance, impaired self care skills, impaired functional mobility, gait instability, impaired cognition, decreased upper extremity function, decreased coordination, decreased lower extremity function, decreased safety awareness, pain, impaired cardiopulmonary response to activity. This patient was treated, per MD order, according to the Contact and/or Special Contact Isolation Operational Standard inside of their skilled nursing room.  Pt tolerated Tx without incident and is making progress but continues to require assist to perform self care tasks, functional mobility and functional transfers. Pt would continue to benefit from OT intervention to further functional (I)ce and safety.     Rehab Potential is good    Activity tolerance:  Fair    Plan:     Patient to be seen 5 x/week to address the above listed problems via self-care/home management, therapeutic activities, therapeutic exercises    Plan of Care Expires: 08/18/23  Plan of Care Reviewed with: patient    Subjective     "I'm in quarantine. I'm starting to be depressed because of all this sickness." Nursing notified.    Communicated with: Nursing prior to session. "     Pain/Comfort:  Pain Rating 1: 4/10  Location - Side 1: Left  Location - Orientation 1: posterior  Location 1: gluteal  Pain Addressed 1: Reposition, Distraction  Pain Rating Post-Intervention 1: 4/10    Patient's cultural, spiritual, Rastafari conflicts given the current situation:  no    Objective:     Patient found  up in bedside chair  with  (no active lines) upon OT entry to room.    Bed Mobility:    Pt seated in chair at onset of treatment session.     Functional Mobility/Transfers:  Patient completed Sit <> Stand Transfer with contact guard assistance and minimum assistance  with  rolling walker   Patient completed Bedside Chair <> Chair Transfer using Step Transfer technique with minimum assistance with rolling walker with increased completion time with v/c for technique  Patient completed Chair <> Bedside Chair Step Transfer technique with contact guard assistance with rolling walker    Activities of Daily Living:  Grooming: stand by assistance seated sinkside with set up with v/c for sequencing   Bathing: contact guard assistance to perform seated sponge bath with RW and CGA when standing to perform becky care with v/c for sequencing  Upper Body Dressing: Set Up Assistance to lelia pull over shirt with Gagandeep to lelia jacket with (A) to pass behind back    Lower Body Dressing: contact guard assistance to doff/lelia pants with RW when standing to manage pants over hips. Set up (A) to doff/lelia B socks    AMPA 6 Click ADL: 18    Treatment & Education:  Pt educated on:  - role of OT  - level of assistance  - energy conservation and task modification to maximized independence with ADL's and mobility   -  safety while performing functional transfers and self care tasks  - progress towards OT goals    Patient left  Bedside Chair  with call button in reach    GOALS:   Multidisciplinary Problems       Occupational Therapy Goals          Problem: Occupational Therapy    Goal Priority Disciplines Outcome Interventions    Occupational Therapy Goal     OT, PT/OT Ongoing, Progressing    Description: Goals to be met by: 2 weeks     Patient will increase functional independence with ADLs by performing:    Feeding with Modified [place].  UE Dressing with Set-up Assistance.  LE Dressing with Contact Guard Assistance with RW to steady when standing.  Grooming while seated at sink with Set-up Assistance.  Toileting from toilet or BSC with Contact Guard Assistance for hygiene and clothing management.   Bathing from  shower chair/bench with Contact Guard Assistance.  Supine to sit with Modified [place].  Step transfer with Stand-by Assistance using appropriate A/D  Upper extremity exercise tolerating up to 10 minutes on UBE with Mod resistance.    Pt will tolerate up to 5 minutes of functional standing activity with SBA  with A/D to steady when standing.                       Time Tracking:     OT Date of Treatment: 07/24/23  OT Start Time: 0845    OT Stop Time: 0923  OT Total Time (min): 38 min    Billable Minutes:Self Care/Home Management 38    7/24/2023

## 2023-07-24 NOTE — PLAN OF CARE
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       Follow Up Appointments:  Future Appointments   Date Time Provider Department Center   9/20/2023 10:30 AM Shi Simon MD Replaced by [facility] PCW       Allergies:  Review of patient's allergies indicates:   Allergen Reactions    Codeine Other (See Comments)       Medications: Review discharge medications with patient and family and provide education.      I have seen and examined this patient within the last 30 days. My clinical findings that support the need for the home health skilled services and home bound status are the following:no   Weakness/numbness causing balance and gait disturbance due to Infection making it taxing to leave home.     Referrals/ Consults  Physical Therapy to evaluate and treat. Evaluate for home safety and equipment needs; Establish/upgrade home exercise program. Perform / instruct on therapeutic exercises, gait training, transfer training, and Range of Motion.  Occupational Therapy to evaluate and treat. Evaluate home environment for safety and equipment needs. Perform/Instruct on transfers, ADL training, ROM, and therapeutic exercises.  Aide to provide assistance with personal care, ADLs, and vital  signs.    Activities:   activity as tolerated    Nursing:   Agency to admit patient within 24 hours of hospital discharge unless specified on physician order or at patient request    SN to complete comprehensive assessment including routine vital signs. Instruct on disease process and s/s of complications to report to MD. Review/verify medication list sent home with the patient at time of discharge  and instruct patient/caregiver as needed. Frequency may be adjusted depending on start of care date.     Skilled nurse to perform up to 3 visits PRN for symptoms related to diagnosis    Notify MD if SBP > 160 or < 90; DBP > 90 or < 50; HR > 120 or < 50; Temp > 101; O2 < 88%    Ok to schedule additional visits based on staff availability and patient request on consecutive days within the home health episode.    Home Health Aide:  Nursing Three times weekly, Physical Therapy Three times weekly, Occupational Therapy Three times weekly, and Home Health Aide Three times weekly    Wound Care Orders    Clean left buttock wound w/ Hibiclens and apply topical mupirocin QID for 14 days- ending 7/29    I certify that this patient is confined to her home and needs intermittent skilled nursing care, physical therapy, and occupational therapy.

## 2023-07-24 NOTE — PROGRESS NOTES
[facility]                                  Skilled Nursing Facility                   Progress Note     Admit Date: 7/18/2023  AMARA   Principal Problem:  Acute cystitis without hematuria   HPI obtained from patient interview and chart review     Chief Complaint: Re-evaluation of medical treatment and therapy status: Lab review    HPI:   Rosario Qiu is a 87 year old female PMHx of osteoporosis, hypothyroidism, dementia, and depression who presents to SNF following hospitalization for urosepsis.  Admission to SNF for secondary weakness and debility.    Interval history: All of today's labs reviewed and are listed below.  24 hr vital sign ranges listed below. HR 24 hr range- 56-66. C&S came back on buttocks abscess- resistant to tetracycline- PO Bactrim started.  she has Dementia so I think IV meds would be difficult for her.  Contact isolation ordered. Patient denies shortness of breath, abdominal discomfort, nausea, or vomiting.  Patient reports an adequate appetite.  Patient denies dysuria.  Patient reports having regular bowel movements.  Patient progressing with PT/OT- Gait: amb with RW CGA ~ 38 ft x 2 trials seated rest break, slow ekta. Continuing to follow and treat all acute and chronic conditions.    Past Medical History: Patient has a past medical history of Arthritis, Depression, Hypercholesteremia, Thrombocytopenia, and Thyroid disease.    Past Surgical History: Patient has a past surgical history that includes Hysterectomy; Knee surgery; Ankle surgery; Wrist surgery; and Colonoscopy (N/A, 6/2/2021).    Social History: Patient reports that she has never smoked. She has never used smokeless tobacco.    Family History: family history includes Cancer in her maternal uncle; Heart failure in her father and mother; Suicide in her son.    Allergies: Patient is allergic to codeine.    ROS  Constitutional: Negative for fever.   +decreased appetite   Eyes: Negative for blurred vision, double vision   Respiratory: Negative for cough, shortness of breath   Cardiovascular: Negative for chest pain, palpitations, and leg swelling.   Gastrointestinal: Negative for abdominal pain, constipation, diarrhea, nausea, vomiting.   Genitourinary: Negative for dysuria, frequency   Musculoskeletal:  + generalized weakness. Negative for back pain and myalgias.   Skin: Negative for itching and rash.   Neurological: Negative for dizziness, headaches.   Psychiatric/Behavioral: Negative for depression. The patient is not nervous/anxious.      24 hour Vital Sign Range   Temp:  [98.6 °F (37 °C)-98.9 °F (37.2 °C)]   Pulse:  [56-66]   Resp:  [16]   BP: (124-128)/(63-64)   SpO2:  [91 %-97 %]     Current BMI: Body mass index is 23.59 kg/m².    PEx  Constitutional: Patient appears debilitated.  No distress noted  HENT:   Head: Normocephalic and atraumatic.   Eyes: Pupils are equal, round  Neck: Normal range of motion. Neck supple.   Cardiovascular: Normal rate, regular rhythm and normal heart sounds.    Pulmonary/Chest: Effort normal and breath sounds are clear  Abdominal: Soft. Bowel sounds are normal.   Musculoskeletal: Normal range of motion.   Neurological: Alert and oriented to person. Disoriented to place and time.  Higher level thinking impaired.  Psychiatric: Normal mood and affect. Behavior is normal.   Skin: Skin is warm and dry.     No results for input(s): GLUCOSE, NA, K, CL, CO2, BUN, CREATININE, MG in the last 24 hours.    Invalid input(s):  CALCIUM      No results for input(s): WBC, RBC, HGB, HCT, PLT, MCV, MCH, MCHC in the last 24 hours.      No results for input(s): POCTGLUCOSE in the last 168 hours.     Assessment and Plan:    Cellulitis of buttock  - Pt with new L lateral buttock pain & itching on 7/17  - PE with erythematous, warm, indurated wound with small center opening without purulent drainage, exquisitely tender to palpation concerning for  cellulitis vs developing abscess  - U/s soft tissue ordered- likely a small developing abscess  - apply mupirocin QID  - initially treated with doxycycline 100 mg BID, ending 7/25  - culture and resulted with resistance to tetracycline- patient was placed on Bactrim BID    Sepsis with encephalopathy without septic shock  Acute cystitis without hematuria   Acute encephalopathy - improving  - UA: 3+ leuks, positive nitrite, 27 WBCs, 1+ protein, 15 hyaline casts   - Urine culture showing pansensitive E coli  - Blood cultures NGTD  - Fall precautions  - Delirium precautions   - stable, continue cefpodoxime 100 mg BID, ending 7/28     Amnestic MCI (mild cognitive impairment with memory loss)  - Continue memantine 10 mg BID, galantamine 8mg BID, and buproprion 150 mg daily  - Delirium precautions      Thrombocytopenia, unspecified  - normalized,  Continue to monitor twice weekly CBC     Hypercholesteremia  - continue pravastatin 40 mg daily     Hypothyroidism  - TSH 0.023, Free T4 1.42  - continue levothyroxine 100 mcg daily    Debility   - Continue with PT/OT for gait training and strengthening and restoration of ADL's   - Encourage mobility, OOB in chair, and early ambulation as appropriate  - Fall precautions   - Monitor for bowel and bladder dysfunction  - Monitor for and prevent skin breakdown and pressure ulcers  - initiated DVT prophylaxis with Lovenox 40 mg daily           Anticipate disposition:  Home with home health      Follow-up needed during SNF stay-    Follow-up needed after discharge from SNF: PCP    Future Appointments   Date Time Provider Department Center   9/20/2023 10:30 AM Shi Simon MD [facility] PCW       I spent 46 minutes reviewing patient records, examining, and counseling the patient/ patient's family with greater than 50% of the time spent in direct patient care and coordination. Lengthy discussion held with pts daughter, see epic for details     Nella Lacey NP  Department of  Hospital Medicine [facility]     DOS: 7/24/2023       Patient note was created using MModal Dictation.  Any errors in syntax or even information may not have been identified and edited on initial review prior to signing this note.

## 2023-07-24 NOTE — PLAN OF CARE
Problem: Adult Inpatient Plan of Care  Goal: Plan of Care Review  Outcome: Ongoing, Progressing  Goal: Patient-Specific Goal (Individualized)  Outcome: Ongoing, Progressing  Goal: Absence of Hospital-Acquired Illness or Injury  Outcome: Ongoing, Progressing  Goal: Optimal Comfort and Wellbeing  Outcome: Ongoing, Progressing  Goal: Readiness for Transition of Care  Outcome: Ongoing, Progressing     Problem: Adjustment to Illness (Sepsis/Septic Shock)  Goal: Optimal Coping  Outcome: Ongoing, Progressing     Problem: Bleeding (Sepsis/Septic Shock)  Goal: Absence of Bleeding  Outcome: Ongoing, Progressing     Problem: Glycemic Control Impaired (Sepsis/Septic Shock)  Goal: Blood Glucose Level Within Desired Range  Outcome: Ongoing, Progressing     Problem: Infection Progression (Sepsis/Septic Shock)  Goal: Absence of Infection Signs and Symptoms  Outcome: Ongoing, Progressing     Problem: Nutrition Impaired (Sepsis/Septic Shock)  Goal: Optimal Nutrition Intake  Outcome: Ongoing, Progressing     Problem: Fall Injury Risk  Goal: Absence of Fall and Fall-Related Injury  Outcome: Ongoing, Progressing     Problem: Impaired Wound Healing  Goal: Optimal Wound Healing  Outcome: Ongoing, Progressing     Problem: Skin Injury Risk Increased  Goal: Skin Health and Integrity  Outcome: Ongoing, Progressing     Problem: Infection  Goal: Absence of Infection Signs and Symptoms  Outcome: Ongoing, Progressing

## 2023-07-24 NOTE — PT/OT/SLP PROGRESS
"Physical Therapy Treatment    Patient Name:  Rosario Menendez   MRN:  [id number]  Admit Date: 7/18/2023  Admitting Diagnosis: Acute cystitis without hematuria    General Precautions: Standard, fall, contact  Orthopedic Precautions: N/A  Braces: N/A    Recommendations:     Discharge Recommendations: home health PT, assisted living facility  Level of Assistance Recommended at Discharge: 24 hours light assistance  Discharge Equipment Recommendations: bedside commode  Barriers to discharge: Decreased caregiver support    Assessment:     Rosario Menendez is a 87 y.o. female admitted with a medical diagnosis of Acute cystitis without hematuria .   Pt was agreeable to therapy with mild encouragement. Pt is most limited by fatigued and feeling tired. Pt completed 1 lap around the room with 1 seated rest break. Pt required Ted for trunk and LE management for bed mobility. Pt is progressing very slowly with therapy. Pt continues to benefit to improve functional endurance, strength, and mobility.       Performance deficits affecting function: weakness, impaired endurance, impaired self care skills, impaired functional mobility, gait instability, impaired balance, decreased upper extremity function, decreased lower extremity function, impaired cardiopulmonary response to activity.    Rehab Potential is good    Activity Tolerance: Fair    Plan:     Patient to be seen 5 x/week to address the above listed problems via gait training, therapeutic activities, therapeutic exercises, neuromuscular re-education, wheelchair management/training    Plan of Care Expires: 08/18/23  Plan of Care Reviewed with: patient    Subjective     "I'm tired".     Pain/Comfort:  Pain Rating 1: 4/10  Location - Side 1: Left  Location - Orientation 1: posterior  Location 1: gluteal  Pain Addressed 1: Reposition  Pain Rating Post-Intervention 1: 3/10    Patient's cultural, spiritual, Hinduism conflicts given the current situation:  no    Objective: "     Patient found up in chair with  (no active lines) upon PT entry to room.     Therapeutic Activities and Exercises:   Patient educated on role of therapy, goals of session, and benefits of out of bed mobility.   Instructed on use of call button and importance of calling nursing staff for assistance with mobility   Questions/concerns addressed within PTA scope of practice  Pt verbalized understanding.    Functional Mobility:  Bed Mobility:   Rolling to Left/Right: contact guard assistance  Scooting to EOB: minimum assistance  Supine to Sit: minimum assistance; HOB flat  Assisted with LE management   Sit to Supine: minimum assistance  Assisted with trunk management   Transfers:   Sit <> Stand Transfer: contact guard assistance and minimum assistance with rolling walker   Cues to push off from chair for safety   Gait:  Pt ambulated ~5+35 ft with contact guard assistance and rolling walker.  1 seated rest breaks & no LOB  Most limited by fatigued.   Gait Deviation(s): occasional unsteady gait with slight flexed posture, decrease ekta, decrease heel strike, decrease step length  Verbal/tactile cues for pacing, upright posture, and increase step length.     AM-PAC 6 CLICK MOBILITY  16    Patient left up in chair with call button in reach.    GOALS:   Multidisciplinary Problems       Physical Therapy Goals          Problem: Physical Therapy    Goal Priority Disciplines Outcome Goal Variances Interventions   Physical Therapy Goal     PT, PT/OT Ongoing, Progressing     Description: Goals to be met by: 8/18/23     Patient will increase functional independence with mobility by performing:    . Supine to sit with Modified [place]  . Sit to supine with Modified [place]  . Rolling to Left and Right with Modified [place].  . Sit to stand transfer with SBA with RW  . Bed to chair transfer with Stand-by Assistance using Rolling Walker  . Gait  x 100 feet with Stand-by Assistance using Rolling Walker.   .  Wheelchair propulsion x50 feet with Stand-by Assistance using bilateral uppper extremities  . Ascend/Descend 4 inch curb step with Contact Guard Assistance using Rolling Walker.                         Time Tracking:     PT Received On: 07/24/23  PT Start Time: 0927  PT Stop Time: 0957  PT Total Time (min): 30 min    Billable Minutes: Gait Training 12 and Therapeutic Activity 18    Treatment Type: Treatment  PT/PTA: PTA     Number of PTA visits since last PT visit: 2     07/24/2023

## 2023-07-24 NOTE — PROGRESS NOTES
"Daughter is very upset regarding MRSA infection to buttocks. Daughter states that a provider from [facility] called her Tuesday 7/18 and told her that the "abx they placed pt on would not cover her infection"; however the tissue sample was obtained on 7/18- so there is no possible way the C&S could have resulted on 7/18. The C&S resulted on 7/21, note from [facility] on 7/21 states, "Ms. Rosario Menendez's wound culture from the L buttock, which was obtained with recent admission, is positive for MRSA. Notified daughter, Aditi Menendez, via phone who reported she is still under skilled nursing care. I messaged Dr. Leo Bowen via secure that to notify him of the results and request that Ms. Menendez be placed on appropriate antibiotic coverage for her L buttock wound."- tx changed to bactrim on 7/21.  Isolation ordered on 7/24 by IC. Of note the daughter states pt has had this "pimple" on her buttocks for weeks. Daughter also upset reguarding care at [facility]. She does not want her mother using a pur wick and wants her walked more than just with PT/OT. nurse marcel Cleveland made aware.     Nella Lacey NP  Post Acute Care- Skilled Nursing Facility (SNF)  [facility]    "

## 2023-07-25 PROCEDURE — 97110 THERAPEUTIC EXERCISES: CPT | Mod: HCNC,CQ

## 2023-07-25 PROCEDURE — 11000004 HC SNF PRIVATE: Mod: HCNC

## 2023-07-25 PROCEDURE — 25000003 PHARM REV CODE 250: Mod: HCNC | Performed by: NURSE PRACTITIONER

## 2023-07-25 PROCEDURE — 97116 GAIT TRAINING THERAPY: CPT | Mod: HCNC,CQ

## 2023-07-25 PROCEDURE — 97530 THERAPEUTIC ACTIVITIES: CPT | Mod: HCNC,CQ

## 2023-07-25 PROCEDURE — 25000003 PHARM REV CODE 250: Mod: HCNC | Performed by: HOSPITALIST

## 2023-07-25 PROCEDURE — 63600175 PHARM REV CODE 636 W HCPCS: Mod: HCNC | Performed by: NURSE PRACTITIONER

## 2023-07-25 RX ADMIN — LEVOTHYROXINE SODIUM 100 MCG: 100 TABLET ORAL at 05:07

## 2023-07-25 RX ADMIN — BUPROPION HYDROCHLORIDE 150 MG: 150 TABLET, FILM COATED, EXTENDED RELEASE ORAL at 09:07

## 2023-07-25 RX ADMIN — Medication 1 CAPSULE: at 09:07

## 2023-07-25 RX ADMIN — PRAVASTATIN SODIUM 40 MG: 20 TABLET ORAL at 09:07

## 2023-07-25 RX ADMIN — MEMANTINE 10 MG: 10 TABLET ORAL at 08:07

## 2023-07-25 RX ADMIN — ACETAMINOPHEN 1000 MG: 500 TABLET ORAL at 04:07

## 2023-07-25 RX ADMIN — GALANTAMINE 8 MG: 4 TABLET, FILM COATED ORAL at 04:07

## 2023-07-25 RX ADMIN — ACETAMINOPHEN 650 MG: 325 TABLET ORAL at 08:07

## 2023-07-25 RX ADMIN — MEMANTINE 10 MG: 10 TABLET ORAL at 09:07

## 2023-07-25 RX ADMIN — CYANOCOBALAMIN TAB 1000 MCG 1000 MCG: 1000 TAB at 10:07

## 2023-07-25 RX ADMIN — SULFAMETHOXAZOLE AND TRIMETHOPRIM 1 TABLET: 800; 160 TABLET ORAL at 10:07

## 2023-07-25 RX ADMIN — ENOXAPARIN SODIUM 40 MG: 40 INJECTION SUBCUTANEOUS at 04:07

## 2023-07-25 RX ADMIN — GALANTAMINE 8 MG: 4 TABLET, FILM COATED ORAL at 09:07

## 2023-07-25 RX ADMIN — SULFAMETHOXAZOLE AND TRIMETHOPRIM 1 TABLET: 800; 160 TABLET ORAL at 08:07

## 2023-07-25 NOTE — PLAN OF CARE
Problem: Adult Inpatient Plan of Care  Goal: Plan of Care Review  7/25/2023 0150 by Lenora Arvizu, LPN  Outcome: Ongoing, Progressing  7/25/2023 0150 by Lenora Arvizu, LPN  Outcome: Ongoing, Progressing  Goal: Patient-Specific Goal (Individualized)  7/25/2023 0150 by Lenora Arvizu, LPN  Outcome: Ongoing, Progressing  7/25/2023 0150 by Lenora Arvizu, LPN  Outcome: Ongoing, Progressing  Goal: Absence of Hospital-Acquired Illness or Injury  7/25/2023 0150 by Lenora Arvizu, LPN  Outcome: Ongoing, Progressing  7/25/2023 0150 by Lenora Arvizu, LPN  Outcome: Ongoing, Progressing  Goal: Optimal Comfort and Wellbeing  7/25/2023 0150 by Lenora Arvizu, LPN  Outcome: Ongoing, Progressing  7/25/2023 0150 by Lenora Arvizu, LPN  Outcome: Ongoing, Progressing  Goal: Readiness for Transition of Care  7/25/2023 0150 by Lenora Arvizu, LPN  Outcome: Ongoing, Progressing  7/25/2023 0150 by Lenora Arvizu, LPN  Outcome: Ongoing, Progressing     Problem: Adjustment to Illness (Sepsis/Septic Shock)  Goal: Optimal Coping  7/25/2023 0150 by Lenora Arvizu, LPN  Outcome: Ongoing, Progressing  7/25/2023 0150 by Lenora Arvizu, LPN  Outcome: Ongoing, Progressing     Problem: Bleeding (Sepsis/Septic Shock)  Goal: Absence of Bleeding  7/25/2023 0150 by Lenora Arvizu, LPN  Outcome: Ongoing, Progressing  7/25/2023 0150 by Lenora Arvizu, LPN  Outcome: Ongoing, Progressing     Problem: Glycemic Control Impaired (Sepsis/Septic Shock)  Goal: Blood Glucose Level Within Desired Range  7/25/2023 0150 by Lenora Arvizu, LPN  Outcome: Ongoing, Progressing  7/25/2023 0150 by Lenora Arvizu, LPN  Outcome: Ongoing, Progressing     Problem: Infection Progression (Sepsis/Septic Shock)  Goal: Absence of Infection Signs and Symptoms  7/25/2023 0150 by Lenora Arvizu, LPN  Outcome: Ongoing, Progressing  7/25/2023 0150 by Lenora Arvizu, LPN  Outcome: Ongoing, Progressing     Problem: Nutrition Impaired (Sepsis/Septic Shock)  Goal: Optimal Nutrition Intake  7/25/2023 0150 by Lenora Arvizu,  LPN  Outcome: Ongoing, Progressing  7/25/2023 0150 by Lenora Arvizu LPN  Outcome: Ongoing, Progressing     Problem: Fall Injury Risk  Goal: Absence of Fall and Fall-Related Injury  7/25/2023 0150 by Lenora Arvizu LPN  Outcome: Ongoing, Progressing  7/25/2023 0150 by Lenora Arvizu LPN  Outcome: Ongoing, Progressing     Problem: Impaired Wound Healing  Goal: Optimal Wound Healing  Outcome: Ongoing, Progressing     Problem: Skin Injury Risk Increased  Goal: Skin Health and Integrity  Outcome: Ongoing, Progressing     Problem: Infection  Goal: Absence of Infection Signs and Symptoms  Outcome: Ongoing, Progressing

## 2023-07-25 NOTE — PT/OT/SLP PROGRESS
"Physical Therapy Treatment    Patient Name:  Rosario Menendez   MRN:  [id number]  Admit Date: 7/18/2023  Admitting Diagnosis: Acute cystitis without hematuria  Recent Surgeries:     General Precautions: Standard, fall (no longer on contact per NSg)  Orthopedic Precautions: N/A  Braces: N/A    Recommendations:     Discharge Recommendations: home health PT, assisted living facility  Level of Assistance Recommended at Discharge: 24 hours light assistance  Discharge Equipment Recommendations: bedside commode  Barriers to discharge: Decreased caregiver support    Assessment:     Rosario Menendez is a 87 y.o. female admitted with a medical diagnosis of Acute cystitis without hematuria . Pt tolerated well, demo good effort, despite subj c/o fatigue, pt would continue to benefit from skilled PT services to improve overall functional mobility, strength and endurance.  .      Performance deficits affecting function: weakness, impaired endurance, impaired self care skills, impaired functional mobility, gait instability, impaired balance, decreased upper extremity function, decreased lower extremity function, impaired cardiopulmonary response to activity.    Rehab Potential is good    Activity Tolerance: Fair    Plan:     Patient to be seen 5 x/week to address the above listed problems via gait training, therapeutic activities, therapeutic exercises, neuromuscular re-education, wheelchair management/training    Plan of Care Expires: 08/18/23  Plan of Care Reviewed with: patient    Subjective     "Alright" "tired" .     Pain/Comfort:  Pain Rating 1: 2/10  Location - Side 1: Bilateral  Location - Orientation 1: generalized  Location 1: hand (hands/finger, "arthritis" ?)  Pain Addressed 1: Reposition, Distraction, Cessation of Activity, Nurse notified (requested to get tylenol)  Pain Rating Post-Intervention 1: 2/10 (no further mentioned)    Patient's cultural, spiritual, Baptist conflicts given the current " "situation:  no    Objective:     .  Patient found in bed pure wick with PureWick (in bed) upon PT entry to room. A to lelia pants while sitting/standing EOB    Therapeutic Activities and Exercises: mini elliptical x 10 min    Functional Mobility:  Bed Mobility:     Supine to Sit: supervision, stand by assistance, and HOB elev and rail  Transfers:     Sit to Stand:  contact guard assistance with rolling walker and vcs for tech/handplacement  Bed to Chair: contact guard assistance with  no AD  using  Stand Pivot and and WC to [facility] post session  Toilet Transfer: contact guard assistance with  no AD and grab bars  using  Stand Pivot  A with clothing mgmt/ CGA for ant pericare in standing with set up, set up for hand hygiene at sink in WC  Gait: amb with RW CGA ~ 86 ft vcs for erect posture, staying closer to RW  Gait to include CGA with RW using reacher to  3 items and amb over 1 " foam mat with RW CGA vcs for safety  Stairs:  asc/seth 4" curb with RW CGA x 2 trials vcs/demo for safety/tech  Wheelchair Propulsion: ~ 50 ft with BUE veers to L CG/min A inc time    AM-PAC 6 CLICK MOBILITY  16    Patient left up in chair with call button in reach, camera notified, camera present, and belonging sin reach .    GOALS:   Multidisciplinary Problems       Physical Therapy Goals          Problem: Physical Therapy    Goal Priority Disciplines Outcome Goal Variances Interventions   Physical Therapy Goal     PT, PT/OT Ongoing, Progressing     Description: Goals to be met by: 8/18/23     Patient will increase functional independence with mobility by performing:    . Supine to sit with Modified [place]  . Sit to supine with Modified [place]  . Rolling to Left and Right with Modified [place].  . Sit to stand transfer with SBA with RW  . Bed to chair transfer with Stand-by Assistance using Rolling Walker  . Gait  x 100 feet with Stand-by Assistance using Rolling Walker.   . Wheelchair propulsion x50 feet with Stand-by " Assistance using bilateral uppper extremities  . Ascend/Descend 4 inch curb step with Contact Guard Assistance using Rolling Walker. met                         Time Tracking:     PT Received On: 07/25/23  PT Start Time: 1522  PT Stop Time: 1619  PT Total Time (min): 57 min    Billable Minutes: Gait Training 12, Therapeutic Activity 35, and Therapeutic Exercise 10    Treatment Type: Treatment  PT/PTA: PTA     Number of PTA visits since last PT visit: 3     07/25/2023

## 2023-07-25 NOTE — TREATMENT PLAN
"Rehab Services' DME recommendations    Rosario Menendez  MRN: [id number]    Equipment Used at Home: walker, rolling, wheelchair, rollator, cane, straight    [x] Walker Héctor (4'4"-5'2")    Accessories N/A    Wheels Yes     [x] 3 in 1 commode Standard    [x] Home health PT and OT      NICCI Hooper 7/25/2023          "

## 2023-07-25 NOTE — PLAN OF CARE
Discharge plan for Thursday 7/27/2023 Spoke to daughter Aditi. Will need a new walker her old walker is broken. [facility] has been set up. Will see patient Saturday. Patient will be going to [facility] [address] Phone # [phone]. Daughter will pick her up for 11 am.

## 2023-07-25 NOTE — PT/OT/SLP PROGRESS
Occupational Therapy    Not Seen    Rosario Menendez  MRN: [id number]  Room/Bed: [id number] A    Pt not seen today by OT secondary to pt refusal. Attempted 2x in AM and 1x in PM. Will follow up per plan of care.     NICCI Hooper  7/25/2023

## 2023-07-25 NOTE — PLAN OF CARE
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       Follow Up Appointments:  Future Appointments   Date Time Provider Department Center   9/20/2023 10:30 AM Shi Simon MD [facility] PCW       Allergies:  Review of patient's allergies indicates:   Allergen Reactions    Codeine Other (See Comments)       Medications: Review discharge medications with patient and family and provide education.      I have seen and examined this patient within the last 30 days. My clinical findings that support the need for the home health skilled services and home bound status are the following:no   Weakness/numbness causing balance and gait disturbance due to Infection making it taxing to leave home.     Referrals/ Consults  Physical Therapy to evaluate and treat. Evaluate for home safety and equipment needs; Establish/upgrade home exercise program. Perform / instruct on therapeutic exercises, gait training, transfer training, and Range of Motion.  Occupational Therapy to evaluate and treat. Evaluate home environment for safety and equipment needs. Perform/Instruct on transfers, ADL training, ROM, and therapeutic exercises.  Aide to provide assistance with personal care, ADLs, and vital  signs.    Activities:   activity as tolerated    Nursing:   Agency to admit patient within 24 hours of hospital discharge unless specified on physician order or at patient request    SN to complete comprehensive assessment including routine vital signs. Instruct on disease process and s/s of complications to report to MD. Review/verify medication list sent home with the patient at time of discharge  and instruct patient/caregiver as needed. Frequency may be adjusted depending on start of care date.     Skilled nurse to perform up to 3 visits PRN for symptoms related to diagnosis    Notify MD if SBP > 160 or < 90; DBP > 90 or < 50; HR > 120 or < 50; Temp > 101; O2 < 88%    Ok to schedule additional visits based on staff availability and patient request on consecutive days within the home health episode.    Home Health Aide:  Nursing Three times weekly, Physical Therapy Three times weekly, Occupational Therapy Three times weekly, and Home Health Aide Three times weekly    Wound Care Orders    Clean left buttock closed infection site daily w/ Hibiclens and apply topical mupirocin QID for 14 days, ending 7/28    I certify that this patient is confined to her home and needs intermittent skilled nursing care, physical therapy, and occupational therapy.

## 2023-07-25 NOTE — PROGRESS NOTES
[facility]                                  Skilled Nursing Facility                   Progress Note     Admit Date: 7/18/2023  AMARA 7/29/2023  Principal Problem:  Acute cystitis without hematuria   HPI obtained from patient interview and chart review     Chief Complaint: Re-evaluation of medical treatment and therapy status    HPI:   Rosario Qiu is a 87 year old female PMHx of osteoporosis, hypothyroidism, dementia, and depression who presents to SNF following hospitalization for urosepsis.  Admission to SNF for secondary weakness and debility.    Interval history:   24 hr vital sign ranges listed below. HR 24 hr range- better today- 61-66.  Upon further review by infection control, since patient's MRSA infection is closed, she will not require isolation.  Daughter to be updated by nurse manager, Megha.  Patient denies shortness of breath, abdominal discomfort, nausea, or vomiting.  Patient reports an adequate appetite.  Patient denies dysuria.  Patient reports having regular bowel movements.  Patient progressing with PT/OT- ambulated ~5+35 ft with contact guard assistance and rolling walker,1 seated rest breaks & no LOB, Most limited by fatigued. Continuing to follow and treat all acute and chronic conditions.    Past Medical History: Patient has a past medical history of Arthritis, Depression, Hypercholesteremia, Thrombocytopenia, and Thyroid disease.    Past Surgical History: Patient has a past surgical history that includes Hysterectomy; Knee surgery; Ankle surgery; Wrist surgery; and Colonoscopy (N/A, 6/2/2021).    Social History: Patient reports that she has never smoked. She has never used smokeless tobacco.    Family History: family history includes Cancer in her maternal uncle; Heart failure in her father and mother; Suicide in her son.    Allergies: Patient is allergic to codeine.    ROS  Constitutional: Negative for fever.   +decreased appetite   Eyes: Negative for blurred vision, double vision   Respiratory: Negative for cough, shortness of breath   Cardiovascular: Negative for chest pain, palpitations, and leg swelling.   Gastrointestinal: Negative for abdominal pain, constipation, diarrhea, nausea, vomiting.   Genitourinary: Negative for dysuria, frequency   Musculoskeletal:  + generalized weakness. Negative for back pain and myalgias.   Skin: Negative for itching and rash.   Neurological: Negative for dizziness, headaches.   Psychiatric/Behavioral: Negative for depression. The patient is not nervous/anxious.      24 hour Vital Sign Range   Temp:  [97.3 °F (36.3 °C)]   Pulse:  [61]   Resp:  [16]   BP: (123)/(59)   SpO2:  [93 %]     Current BMI: Body mass index is 23.59 kg/m².    PEx  Constitutional: Patient appears debilitated.  No distress noted  HENT:   Head: Normocephalic and atraumatic.   Eyes: Pupils are equal, round  Neck: Normal range of motion. Neck supple.   Cardiovascular: Normal rate, regular rhythm and normal heart sounds.    Pulmonary/Chest: Effort normal and breath sounds are clear  Abdominal: Soft. Bowel sounds are normal.   Musculoskeletal: Normal range of motion.   Neurological: Alert and oriented to person. Disoriented to place and time.  Higher level thinking impaired.  Psychiatric: Normal mood and affect. Behavior is normal.   Skin: Skin is warm and dry.     No results for input(s): GLUCOSE, NA, K, CL, CO2, BUN, CREATININE, MG in the last 24 hours.    Invalid input(s):  CALCIUM      No results for input(s): WBC, RBC, HGB, HCT, PLT, MCV, MCH, MCHC in the last 24 hours.      No results for input(s): POCTGLUCOSE in the last 168 hours.     Assessment and Plan:    MRSA infection  Cellulitis of buttock  - Pt with new L lateral buttock pain & itching on 7/17  - PE with erythematous, warm, indurated wound with small center opening without purulent drainage, exquisitely tender to palpation concerning for cellulitis vs  developing abscess  - U/s soft tissue ordered- likely a small developing abscess  - apply mupirocin QID  - initially treated with doxycycline 100 mg BID, ending 7/25  - culture and resulted on 7/21 with resistance to tetracycline- patient was placed on Bactrim BID on 7/21  - 7/25 contact isolation discontinued by infection control since infection is enclosed, continue on Bactrim ending 7/28    Sepsis with encephalopathy without septic shock  Acute cystitis without hematuria   Acute encephalopathy - improving  - UA: 3+ leuks, positive nitrite, 27 WBCs, 1+ protein, 15 hyaline casts   - Urine culture showing pansensitive E coli  - Blood cultures NGTD  - Fall precautions  - Delirium precautions   - initially tx with IV abx, then switiched to cefpodoxime 100 mg BID on 7/18, ending 7/28-   - tx now switched to bactrim on 7/21 to cover MRSA and remainder of UTI tx course     Amnestic MCI (mild cognitive impairment with memory loss)  - Continue memantine 10 mg BID, galantamine 8mg BID, and buproprion 150 mg daily  - Delirium precautions      Thrombocytopenia, unspecified  - normalized,  Continue to monitor twice weekly CBC     Hypercholesteremia  - continue pravastatin 40 mg daily     Hypothyroidism  - TSH 0.023, Free T4 1.42  - stable, continue levothyroxine 100 mcg daily    Debility   - Continue with PT/OT for gait training and strengthening and restoration of ADL's   - Encourage mobility, OOB in chair, and early ambulation as appropriate  - Fall precautions   - Monitor for bowel and bladder dysfunction  - Monitor for and prevent skin breakdown and pressure ulcers  - initiated DVT prophylaxis with Lovenox 40 mg daily           Anticipate disposition:  Home with home health      Follow-up needed during SNF stay-    Follow-up needed after discharge from SNF: PCP    Future Appointments   Date Time Provider Department Center   9/20/2023 10:30 AM Shi Simon MD [facility] Oc Lacey NP  Department of  Hospital Medicine [facility]     DOS: 7/25/2023       Patient note was created using MModal Dictation.  Any errors in syntax or even information may not have been identified and edited on initial review prior to signing this note.

## 2023-07-26 PROCEDURE — 97110 THERAPEUTIC EXERCISES: CPT | Mod: HCNC

## 2023-07-26 PROCEDURE — 25000003 PHARM REV CODE 250: Mod: HCNC | Performed by: NURSE PRACTITIONER

## 2023-07-26 PROCEDURE — 97535 SELF CARE MNGMENT TRAINING: CPT | Mod: HCNC

## 2023-07-26 PROCEDURE — 11000004 HC SNF PRIVATE: Mod: HCNC

## 2023-07-26 PROCEDURE — 25000003 PHARM REV CODE 250: Mod: HCNC | Performed by: HOSPITALIST

## 2023-07-26 PROCEDURE — 63600175 PHARM REV CODE 636 W HCPCS: Mod: HCNC | Performed by: NURSE PRACTITIONER

## 2023-07-26 PROCEDURE — 97116 GAIT TRAINING THERAPY: CPT | Mod: HCNC

## 2023-07-26 RX ORDER — ACETAMINOPHEN 500 MG
1000 TABLET ORAL EVERY 8 HOURS PRN
Refills: 0 | COMMUNITY
Start: 2023-07-26 | End: 2024-01-19

## 2023-07-26 RX ORDER — SULFAMETHOXAZOLE AND TRIMETHOPRIM 800; 160 MG/1; MG/1
1 TABLET ORAL 2 TIMES DAILY
Qty: 2 TABLET | Refills: 0 | Status: SHIPPED | OUTPATIENT
Start: 2023-07-27 | End: 2023-07-26 | Stop reason: SDUPTHER

## 2023-07-26 RX ORDER — SULFAMETHOXAZOLE AND TRIMETHOPRIM 800; 160 MG/1; MG/1
1 TABLET ORAL 2 TIMES DAILY
Qty: 16 TABLET | Refills: 0 | Status: SHIPPED | OUTPATIENT
Start: 2023-07-27 | End: 2023-08-04

## 2023-07-26 RX ADMIN — GALANTAMINE 8 MG: 4 TABLET, FILM COATED ORAL at 09:07

## 2023-07-26 RX ADMIN — MEMANTINE 10 MG: 10 TABLET ORAL at 08:07

## 2023-07-26 RX ADMIN — GALANTAMINE 8 MG: 4 TABLET, FILM COATED ORAL at 06:07

## 2023-07-26 RX ADMIN — MEMANTINE 10 MG: 10 TABLET ORAL at 09:07

## 2023-07-26 RX ADMIN — ENOXAPARIN SODIUM 40 MG: 40 INJECTION SUBCUTANEOUS at 06:07

## 2023-07-26 RX ADMIN — SULFAMETHOXAZOLE AND TRIMETHOPRIM 1 TABLET: 800; 160 TABLET ORAL at 09:07

## 2023-07-26 RX ADMIN — SULFAMETHOXAZOLE AND TRIMETHOPRIM 1 TABLET: 800; 160 TABLET ORAL at 08:07

## 2023-07-26 RX ADMIN — PRAVASTATIN SODIUM 40 MG: 20 TABLET ORAL at 09:07

## 2023-07-26 RX ADMIN — LEVOTHYROXINE SODIUM 100 MCG: 100 TABLET ORAL at 05:07

## 2023-07-26 RX ADMIN — Medication 6 MG: at 08:07

## 2023-07-26 RX ADMIN — CYANOCOBALAMIN TAB 1000 MCG 1000 MCG: 1000 TAB at 09:07

## 2023-07-26 RX ADMIN — ACETAMINOPHEN 1000 MG: 500 TABLET ORAL at 03:07

## 2023-07-26 RX ADMIN — BUPROPION HYDROCHLORIDE 150 MG: 150 TABLET, FILM COATED, EXTENDED RELEASE ORAL at 09:07

## 2023-07-26 RX ADMIN — Medication 1 CAPSULE: at 09:07

## 2023-07-26 NOTE — PLAN OF CARE
Problem: Physical Therapy  Goal: Physical Therapy Goal  Description: Goals to be met by: 8/18/23     Patient will increase functional independence with mobility by performing:    . Supine to sit with Modified [place]  . Sit to supine with Modified [place]  . Rolling to Left and Right with Modified [place].  . Sit to stand transfer with SBA with RW-not met  . Bed to chair transfer with Stand-by Assistance using Rolling Walker-not met  . Gait  x 100 feet with Stand-by Assistance using Rolling Walker.-not met   . Wheelchair propulsion x50 feet with Stand-by Assistance using bilateral uppper extremities-not met  . Ascend/Descend 4 inch curb step with Contact Guard Assistance using Rolling Walker. Met  Updated 7/26/2023 Ascend/Descend 4 inch curb step with Stand By Assistance using Rolling Walker    Outcome: Ongoing, Progressing

## 2023-07-26 NOTE — PLAN OF CARE
Problem: Occupational Therapy  Goal: Occupational Therapy Goal  Description: Goals to be met by: 2 weeks     Patient will increase functional independence with ADLs by performing:    Feeding with Modified [place].  UE Dressing with Set-up Assistance.  LE Dressing with Contact Guard Assistance with RW to steady when standing.  Grooming while seated at sink with Set-up Assistance.  Toileting from toilet or BSC with Contact Guard Assistance for hygiene and clothing management.   Bathing from  shower chair/bench with Contact Guard Assistance.  Supine to sit with Modified [place].  Step transfer with Stand-by Assistance using appropriate A/D  Upper extremity exercise tolerating up to 10 minutes on UBE with Mod resistance.    Pt will tolerate up to 5 minutes of functional standing activity with SBA  with A/D to steady when standing.  Outcome: Ongoing, Progressing

## 2023-07-26 NOTE — PLAN OF CARE
CHW delivered DME equipment ([employer]) to patients room. CHW called christophe Pennington equipment was delivered to room and a copy will be lefted at patients bedside. She thanked CHW and stated she will  walker in am.

## 2023-07-26 NOTE — PT/OT/SLP PROGRESS
Occupational Therapy   Treatment    Name: Rosario Menendez  MRN: [id number]  Admit Date: 7/18/2023  Admitting Diagnosis:  Acute cystitis without hematuria    General Precautions: Standard, fall   Orthopedic Precautions: N/A   Braces: N/A    Recommendations:     Discharge Recommendations:  home health OT  Level of Assistance Recommended at Discharge: 24 hours light assistance for ADL's and homemaking tasks  Discharge Equipment Recommendations:  (TBD)  Barriers to discharge:  Decreased caregiver support    Assessment:     Rosario Menendez is a 87 y.o. female with a medical diagnosis of Acute cystitis without hematuria .  She presents with performance deficits affecting function including weakness, impaired endurance, impaired self care skills, impaired functional mobility, gait instability, impaired cognition, decreased upper extremity function, decreased coordination, decreased lower extremity function, decreased safety awareness, pain, impaired cardiopulmonary response to activity.   Pt tolerated Tx without incident and is making progress but continues to require SBA to CGA to perform self care tasks, functional mobility and functional transfers .  She would continue to benefit from OT intervention to further her functional (I)ce and safety.     Rehab Potential is good    Activity tolerance:  Good    Plan:     Patient to be seen 5 x/week to address the above listed problems via self-care/home management, therapeutic activities, therapeutic exercises    Plan of Care Expires: 08/18/23  Plan of Care Reviewed with: patient    Subjective     Communicated with: nurse prior to session.  .    Pain/Comfort:  Pain Rating 1: 2/10  Location - Side 1: Bilateral  Location 1: hand  Pain Addressed 1: Reposition, Distraction, Cessation of Activity  Pain Rating Post-Intervention 1: 2/10    Patient's cultural, spiritual, Spiritism conflicts given the current situation:  no    Objective:     Patient found supine with PureWick upon OT  entry to room.    Bed Mobility:    Patient completed Rolling/Turning to Right with supervision  Patient completed Scooting/Bridging with supervision  Patient completed Supine to Sit with stand by assistance     Functional Mobility/Transfers:  Patient completed Sit <> Stand Transfer with contact guard assistance  with  rolling walker   Patient completed Bed <> Chair Transfer using Stand Pivot technique with contact guard assistance with rolling walker  Patient completed Toilet Transfer Stand Pivot technique with contact guard assistance with  rolling walker  Functional Mobility: Pt ambulated with RW from EOB to restroom  12 ft  with CGA  with no loss of balance observed but V/Cs required for safety.      Activities of Daily Living:  Grooming: supervision seated sinkside.  Bathing: contact guard assistance when standing to perform becky care.  Pt sponge bathing seated on toilet.   Upper Body Dressing: supervision donning pullover shirt.   Lower Body Dressing: minimum assistance with (A) to don socks/shoes. Pt donning pants with CGA whn standing.  Toileting: minimum assistance with (A) to thoroughly clean rear.     [facility] 6 Click ADL: 18    OT Exercises: Pt performed UBE exercise for 10 minutes with Min  resistance.  UE exercises performed to increase functional endurance and strength in order increase independence when performing self care tasks, functional ambulation, W/C propulsion, and functional standing activities .     Treatment & Education:  Pt edu on POC, safety when performing self care tasks , benefit of performing OOB activity, and safety when performing functional transfers and mobility.    Patient left up in chair with  PT present    GOALS:   Multidisciplinary Problems       Occupational Therapy Goals          Problem: Occupational Therapy    Goal Priority Disciplines Outcome Interventions   Occupational Therapy Goal     OT, PT/OT Ongoing, Progressing    Description: Goals to be met by: 2 weeks     Patient  will increase functional independence with ADLs by performing:    Feeding with Modified [place].  UE Dressing with Set-up Assistance.  LE Dressing with Contact Guard Assistance with RW to steady when standing.  Grooming while seated at sink with Set-up Assistance.  Toileting from toilet or BSC with Contact Guard Assistance for hygiene and clothing management.   Bathing from  shower chair/bench with Contact Guard Assistance.  Supine to sit with Modified [place].  Step transfer with Stand-by Assistance using appropriate A/D  Upper extremity exercise tolerating up to 10 minutes on UBE with Mod resistance.    Pt will tolerate up to 5 minutes of functional standing activity with SBA  with A/D to steady when standing.                       Time Tracking:     OT Date of Treatment: 07/26/23  OT Start Time: 0910    OT Stop Time: 0950  OT Total Time (min): 40 min    Billable Minutes:Self Care/Home Management 30  Therapeutic Exercise 10    7/26/2023

## 2023-07-26 NOTE — PROGRESS NOTES
[facility]                                  Skilled Nursing Facility                   Progress Note     Admit Date: 7/18/2023  AMARA 7/27/2023  Principal Problem:  Acute cystitis without hematuria   HPI obtained from patient interview and chart review     Chief Complaint: Re-evaluation of medical treatment and therapy status    HPI:   Rosario Qiu is a 87 year old female PMHx of osteoporosis, hypothyroidism, dementia, and depression who presents to SNF following hospitalization for urosepsis.  Admission to SNF for secondary weakness and debility.    Interval history:   pt doing much better with therapy.  Patient's memory also improved today.  24 hr vital sign ranges listed below. Patient denies shortness of breath, abdominal discomfort, nausea, or vomiting.  Patient reports an adequate appetite.  Patient denies dysuria.  Patient reports having regular bowel movements.  Patient progressing with PT/OT- Gait: amb with RW CGA ~ 86 ft vcs for erect posture, staying closer to RWContinuing to follow and treat all acute and chronic conditions.    Past Medical History: Patient has a past medical history of Arthritis, Depression, Hypercholesteremia, Thrombocytopenia, and Thyroid disease.    Past Surgical History: Patient has a past surgical history that includes Hysterectomy; Knee surgery; Ankle surgery; Wrist surgery; and Colonoscopy (N/A, 6/2/2021).    Social History: Patient reports that she has never smoked. She has never used smokeless tobacco.    Family History: family history includes Cancer in her maternal uncle; Heart failure in her father and mother; Suicide in her son.    Allergies: Patient is allergic to codeine.    ROS  Constitutional: Negative for fever.  +decreased appetite   Eyes: Negative for blurred vision, double vision   Respiratory: Negative for cough, shortness of breath   Cardiovascular: Negative for chest pain, palpitations,  and leg swelling.   Gastrointestinal: Negative for abdominal pain, constipation, diarrhea, nausea, vomiting.   Genitourinary: Negative for dysuria, frequency   Musculoskeletal:  + generalized weakness. Negative for back pain and myalgias.   Skin: Negative for itching and rash.   Neurological: Negative for dizziness, headaches.   Psychiatric/Behavioral: Negative for depression. The patient is not nervous/anxious.      24 hour Vital Sign Range   Temp:  [97.2 °F (36.2 °C)-98.1 °F (36.7 °C)]   Pulse:  [56-72]   Resp:  [18]   BP: (120-140)/(60-68)   SpO2:  [96 %]     Current BMI: Body mass index is 23.59 kg/m².    PEx  Constitutional: Patient appears debilitated.  No distress noted  HENT:   Head: Normocephalic and atraumatic.   Eyes: Pupils are equal, round  Neck: Normal range of motion. Neck supple.   Cardiovascular: Normal rate, regular rhythm and normal heart sounds.    Pulmonary/Chest: Effort normal and breath sounds are clear  Abdominal: Soft. Bowel sounds are normal.   Musculoskeletal: Normal range of motion.   Neurological: Alert and oriented to person. Disoriented to place and time.  Higher level thinking impaired.  Psychiatric: Normal mood and affect. Behavior is normal.   Skin: Skin is warm and dry.     No results for input(s): GLUCOSE, NA, K, CL, CO2, BUN, CREATININE, MG in the last 24 hours.    Invalid input(s):  CALCIUM      No results for input(s): WBC, RBC, HGB, HCT, PLT, MCV, MCH, MCHC in the last 24 hours.      No results for input(s): POCTGLUCOSE in the last 168 hours.     Assessment and Plan:    MRSA infection  Cellulitis of buttock  - Pt with new L lateral buttock pain & itching on 7/17  - PE with erythematous, warm, indurated wound with small center opening without purulent drainage, exquisitely tender to palpation concerning for cellulitis vs developing abscess  - U/s soft tissue ordered- likely a small developing abscess  - apply mupirocin QID  - initially treated with doxycycline 100 mg BID,  ending 7/25  - culture and resulted on 7/21 with resistance to tetracycline- patient was placed on Bactrim BID on 7/21  - 7/25 contact isolation discontinued by infection control since infection is enclosed, continue on Bactrim   - 7/26 extending Bactrim for a full 14 day treatment to now end on 8/4 since patient is pain/sensitivity to the buttocks is still present    Sepsis with encephalopathy without septic shock  Acute cystitis without hematuria   Acute encephalopathy - improving  - UA: 3+ leuks, positive nitrite, 27 WBCs, 1+ protein, 15 hyaline casts   - Urine culture showing pansensitive E coli  - Blood cultures NGTD  - Fall precautions  - Delirium precautions   - initially tx with IV abx, then switiched to cefpodoxime 100 mg BID on 7/18, ending 7/28-   - tx now switched to bactrim on 7/21 to cover MRSA and remainder of UTI tx course     Amnestic MCI (mild cognitive impairment with memory loss)  - Continue memantine 10 mg BID, galantamine 8mg BID, and buproprion 150 mg daily  - Delirium precautions      Thrombocytopenia, unspecified  - normalized,  Continue to monitor twice weekly CBC     Hypercholesteremia  - continue pravastatin 40 mg daily     Hypothyroidism  - TSH 0.023, Free T4 1.42  - stable, continue levothyroxine 100 mcg daily    Debility   - Continue with PT/OT for gait training and strengthening and restoration of ADL's   - Encourage mobility, OOB in chair, and early ambulation as appropriate  - Fall precautions   - Monitor for bowel and bladder dysfunction  - Monitor for and prevent skin breakdown and pressure ulcers  - initiated DVT prophylaxis with Lovenox 40 mg daily           Anticipate disposition:  Home with home health      Follow-up needed during SNF stay-    Follow-up needed after discharge from SNF: PCP    Future Appointments   Date Time Provider Department Center   9/20/2023 10:30 AM Shi Simon MD [facility] CAREN Lacey NP  Department of Hospital Medicine    [facility]     DOS: 7/26/2023       Patient note was created using MModal Dictation.  Any errors in syntax or even information may not have been identified and edited on initial review prior to signing this note.

## 2023-07-26 NOTE — PT/OT/SLP PROGRESS
Physical Therapy Treatment/ Discharge Summary    Patient Name:  Rosario Menendez   MRN:  [id number]  Admit Date: 7/18/2023  Admitting Diagnosis: Acute cystitis without hematuria  Recent Surgeries: N/A    General Precautions: Standard, fall (no longer on contact per NSg)  Orthopedic Precautions: N/A  Braces: N/A    Recommendations:     Discharge Recommendations: home health PT, assisted living facility  Level of Assistance Recommended at Discharge: 24 hours light assistance  Discharge Equipment Recommendations: bedside commode  Barriers to discharge: Decreased caregiver support    Assessment:     Rosario Menendez is a 87 y.o. female admitted with a medical diagnosis of Acute cystitis without hematuria . Pt appropriate for D/c tomorrow with continued CGA for transfers and GT.    Performance deficits affecting function: weakness, impaired endurance, impaired self care skills, impaired functional mobility, gait instability, impaired balance, decreased upper extremity function, decreased lower extremity function, impaired cardiopulmonary response to activity.    Rehab Potential is good    Activity Tolerance: Fair    Plan:     Patient to be seen 5 x/week to address the above listed problems via gait training, therapeutic activities, therapeutic exercises, neuromuscular re-education, wheelchair management/training    Plan of Care Expires: 08/18/23  Plan of Care Reviewed with: patient    Subjective     Pt agreeable to work with PT.     Pain/Comfort:  Pain Rating 1: 0/10  Pain Rating Post-Intervention 1: 0/10    Patient's cultural, spiritual, Catholic conflicts given the current situation:  no    Objective:     Communicated with pt's nurse prior to session.  Patient found up in chair with   upon PT entry to room.     Therapeutic Activities and Exercises: LBEx 10mins to help improve B L/e MMT and endurance.    Functional Mobility:  Transfers:     Sit to Stand:  contact guard assistance with rolling walker  Gait: 26ft +18ft  +29ft with RW and CGA for safety. Pt needed seated rest breaks d.t fatigue.    AM-PAC 6 CLICK MOBILITY  16    Patient left up in chair with   .    GOALS:   Multidisciplinary Problems       Physical Therapy Goals          Problem: Physical Therapy    Goal Priority Disciplines Outcome Goal Variances Interventions   Physical Therapy Goal     PT, PT/OT Ongoing, Progressing     Description: Goals to be met by: 8/18/23     Patient will increase functional independence with mobility by performing:    . Supine to sit with Modified [place]  . Sit to supine with Modified [place]  . Rolling to Left and Right with Modified [place].  . Sit to stand transfer with SBA with RW-not met  . Bed to chair transfer with Stand-by Assistance using Rolling Walker-not met  . Gait  x 100 feet with Stand-by Assistance using Rolling Walker.-not met   . Wheelchair propulsion x50 feet with Stand-by Assistance using bilateral uppper extremities-not met  . Ascend/Descend 4 inch curb step with Contact Guard Assistance using Rolling Walker. Met  Updated 7/26/2023 Ascend/Descend 4 inch curb step with Stand By Assistance using Rolling Walker                         Time Tracking:     PT Received On: 07/26/23  PT Start Time: 0955  PT Stop Time: 1018  PT Total Time (min): 23 min    Billable Minutes: Gait Training 13 and Therapeutic Exercise 10    Treatment Type: Treatment  PT/PTA: PT     Number of PTA visits since last PT visit: 0     07/26/2023

## 2023-07-27 VITALS
OXYGEN SATURATION: 96 % | RESPIRATION RATE: 18 BRPM | BODY MASS INDEX: 23.72 KG/M2 | SYSTOLIC BLOOD PRESSURE: 122 MMHG | WEIGHT: 120.81 LBS | TEMPERATURE: 98 F | DIASTOLIC BLOOD PRESSURE: 67 MMHG | HEIGHT: 60 IN | HEART RATE: 81 BPM

## 2023-07-27 PROCEDURE — 25000003 PHARM REV CODE 250: Mod: HCNC | Performed by: NURSE PRACTITIONER

## 2023-07-27 PROCEDURE — 25000003 PHARM REV CODE 250: Mod: HCNC | Performed by: HOSPITALIST

## 2023-07-27 PROCEDURE — 97530 THERAPEUTIC ACTIVITIES: CPT | Mod: HCNC

## 2023-07-27 RX ADMIN — Medication 1 CAPSULE: at 09:07

## 2023-07-27 RX ADMIN — MEMANTINE 10 MG: 10 TABLET ORAL at 09:07

## 2023-07-27 RX ADMIN — LEVOTHYROXINE SODIUM 100 MCG: 100 TABLET ORAL at 05:07

## 2023-07-27 RX ADMIN — GALANTAMINE 8 MG: 4 TABLET, FILM COATED ORAL at 07:07

## 2023-07-27 RX ADMIN — BUPROPION HYDROCHLORIDE 150 MG: 150 TABLET, FILM COATED, EXTENDED RELEASE ORAL at 09:07

## 2023-07-27 RX ADMIN — SULFAMETHOXAZOLE AND TRIMETHOPRIM 1 TABLET: 800; 160 TABLET ORAL at 09:07

## 2023-07-27 RX ADMIN — PRAVASTATIN SODIUM 40 MG: 20 TABLET ORAL at 09:07

## 2023-07-27 NOTE — HOSPITAL COURSE
Patient progressed well with PT and OT- last PT note states that patient ambulated 26ft +18ft +29ft with RW and CGA for safety. Pt needed seated rest breaks d.t fatigue.. Patient had no significant events during their stay at SNF.  Patient's mentation improved throughout SNF stay.  Culture and sensitivity to left buttocks abscess resulted on 07/21, antibiotics tailored in switched to Bactrim for MRSA coverage.  Patient to complete a 14 day treatment due to still being symptomatic buttocks pain on 07/26.  Home health was set up. DME was ordered if needed. Follow up appointment to be made by patient within one week. All prescriptions and discharge instructions were ordered to be given to the patient prior to discharge.

## 2023-07-27 NOTE — NURSING
Admission Diagnosis: Sepsis, unspecified organism     POC reviewed with pt, pt verbalized understanding. Safety maintained throughout shift, bed locked and in lowest position, call light in reach. Pt remained free of fall/trauma. VSS, afebrile this shift.    Pt pain  Last Documentation = Comfort/Acceptable Pain Level: 0 (07/26/23 1710)    Last Documentation = Pain Rating (0-10): Activity: 4 (07/26/23 0708)    Last Documentation = Pain Rating (0-10): Rest: 0 (07/27/23 0956)    Skin assessment completed prior to discharge   Location: N/A    Description: N/A  Recommendation: Follow up with PCP     AVS reviewed with patient prior to discharge. Discharge instruction, follow up appointments and medication instructions given to pt, pt verbalized understanding. Patient IV lines have been removed prior to discharge. Rx sent to patients pharmacy.       * No surgery found *      OUTCOME: Condition has improved and patient is now ready for discharge.    DISPOSITION: Home-Health Care [facility] [place]    FOLLOWUP: In clinic

## 2023-07-27 NOTE — DISCHARGE SUMMARY
[facility] Medicine  Discharge Summary      Patient Name: Rosario Menendez  MRN: [id number]  CICI: [id number]  Patient Class: IP- SNF  Admission Date: 7/18/2023  Hospital Length of Stay: 9 days  Discharge Date: 7/27/2023  Attending Physician: Leo Bowen MD   Discharging Provider: Nella Lacey NP  Primary Care Provider: Shi Simon MD    Primary Care Team: LT[facility] 8 NELLA LACEY     HPI:   Rosario Qiu is a 87 year old female PMHx of osteoporosis, hypothyroidism, dementia, and depression who presents to SNF following hospitalization for urosepsis.  Admission to SNF for secondary weakness and debility.     Patient originally presented to [facility] ED on 07/15 with altered mental status and fever.  Per [facility] notes, Family at bedside able to provide minimal hx. Per family, pt had an episode where she passed out yesterday. Was very weak following. He notes that at her assisted living she was noted to have a temp of 103, was given tylenol, then recheck was 101 before presenting to the ED. Per ED note: pt had a near syncopal episode at the bookstore while using her walker. She was weak and confused since the incident. Patient's daughter did not witness the incident though she believes the patient fell on her bottom and did not hit her head.  Daughter reports that the patient had dinner with her family the night before and was feeling well.  Daughter states that patient seems slightly more confused with slower mentation today. Patient awake and oriented x 4 on my exam. Endorses feeling very weak. In the ED: Temp 100.4, /60. WBC 14K. CMP largely unremarkable. UA infectious with 15 hyaline casts. CXR showed stable chronic background interstitial prominence.  No new focal consolidation. CTH showed no acute abnormalities. CT C spine showed no acute fracture. Given IVFs and rocephin in ED. Pt was placed in observation for sepsis without acute organ dysfunction and acute encephalopathy  "in the setting of acute cystitis. The patient was started on IV rocephin and subsequently broadened to IV cefepime given slow clinical response. Urine culture + GNR. Blood cultures NGTD. Soft tissue ultrasound pending for new L buttock wound concerning for cellulitis vs. abscess. PT/OT consulted and recommending SNF."      Today, patient  is without major complaints.  She endorses a decreased appetite and is unsure of when her last bowel movement was.     Patient will be treated at [facility] with PT and OT to improve functional status and ability to perform ADLs.     Hospital Course:   Patient progressed well with PT and OT- last PT note states that patient ambulated 26ft +18ft +29ft with RW and CGA for safety. Pt needed seated rest breaks d.t fatigue. Patient had no significant events during their stay at [facility].  Patient's mentation improved throughout SNF stay.  Culture and sensitivity to left buttocks abscess resulted on 07/21, antibiotics tailored and switched to Bactrim for MRSA coverage and to complete UTI treatment.  Patient to complete a 14 day treatment due to still being symptomatic with buttocks pain on 07/26.  Home health was set up. DME was ordered if needed. Follow up appointment to be made by patient within one week. All prescriptions and discharge instructions were ordered to be given to the patient prior to discharge.       PEx  Constitutional: Patient appears debilitated.  No distress noted  HENT:   Head: Normocephalic and atraumatic.   Eyes: Pupils are equal, round  Neck: Normal range of motion. Neck supple.   Cardiovascular: Normal rate, regular rhythm and normal heart sounds.    Pulmonary/Chest: Effort normal and breath sounds are clear  Abdominal: Soft. Bowel sounds are normal.   Musculoskeletal: Normal range of motion.   Neurological: Alert and oriented to person, place and time.  Higher level thinking impaired.  Psychiatric: Normal mood and affect. Behavior is normal.   Skin: Skin is warm and " "dry.     Goals of Care Treatment Preferences:  Code Status: Full Code      Consults:   Consults (From admission, onward)        Status Ordering Provider     Inpatient consult to Registered Dietitian/Nutritionist  Once        Provider:  (Not yet assigned)    JAY Adames          No new Assessment & Plan notes have been filed under this hospital service since the last note was generated.  Service: Hospital Medicine    Final Active Diagnoses:    Diagnosis Date Noted POA    PRINCIPAL PROBLEM:  Acute cystitis without hematuria [N30.00] 07/16/2023 Yes    Cellulitis of buttock [L03.317] 07/17/2023 Yes    Hypophosphatemia [E83.39] 07/17/2023 Yes    Acute encephalopathy [G93.40] 07/16/2023 Yes    Debility [R53.81] 11/29/2022 Yes    Amnestic MCI (mild cognitive impairment with memory loss) [G31.84] 07/05/2021 Yes    Hypercholesteremia [E78.00]  Yes    Hypothyroidism [E03.9]  Yes      Problems Resolved During this Admission:       Discharged Condition: good    Disposition: [place] [place] [facility]    Follow Up:    Patient Instructions:      WALKER FOR HOME USE     Order Specific Question Answer Comments   Type of Walker: Héctor (4'4"-5'6")    With wheels? Yes    Height: 5' (1.524 m)    Weight: 54.8 kg (120 lb 13 oz)    Length of need (1-99 months): 99    Does patient have medical equipment at home? walker, rolling    Please check all that apply: Patient's condition impairs ambulation.    Please check all that apply: Walker will be used for gait training.    Please check all that apply: Patient is unable to safely ambulate without equipment.      No driving until:   Order Comments: Cleared by PCP     Notify your health care provider if you experience any of the following:  temperature >100.4     Notify your health care provider if you experience any of the following:  persistent nausea and vomiting or diarrhea     Notify your health care provider if you experience any of the following:  severe uncontrolled " pain     Notify your health care provider if you experience any of the following:  redness, tenderness, or signs of infection (pain, swelling, redness, odor or green/yellow discharge around incision site)     Notify your health care provider if you experience any of the following:  difficulty breathing or increased cough     Notify your health care provider if you experience any of the following:  severe persistent headache     Notify your health care provider if you experience any of the following:  worsening rash     Notify your health care provider if you experience any of the following:  persistent dizziness, light-headedness, or visual disturbances     Notify your health care provider if you experience any of the following:  increased confusion or weakness     Activity as tolerated       Significant Diagnostic Studies: N/A    Pending Diagnostic Studies:     None         Medications:  Reconciled Home Medications:      Medication List      START taking these medications    sulfamethoxazole-trimethoprim 800-160mg 800-160 mg Tab  Commonly known as: BACTRIM DS  Take 1 tablet by mouth 2 (two) times daily. for 8 days        CHANGE how you take these medications    acetaminophen 500 MG tablet  Commonly known as: TYLENOL  Take 2 tablets (1,000 mg total) by mouth every 8 (eight) hours as needed for Pain.  What changed: reasons to take this        CONTINUE taking these medications    alendronate 70 MG tablet  Commonly known as: FOSAMAX  Take 1 tablet (70 mg total) by mouth every 7 days. HOLD while in SNF     buPROPion 150 MG TB24 tablet  Commonly known as: WELLBUTRIN XL  TAKE 1 TABLET EVERY DAY     cyanocobalamin 1000 MCG tablet  Commonly known as: VITAMIN B-12  Take 1 tablet (1,000 mcg total) by mouth once daily.     galantamine 16 MG 24 hr capsule  Commonly known as: RAZADYNE ER  TAKE 1 CAPSULE EVERY DAY WITH BREAKFAST     Lactobacillus acidophilus 1 billion cell Cap  Take 1 capsule by mouth once daily.      levothyroxine 100 MCG tablet  Commonly known as: SYNTHROID  Take 1 tablet (100 mcg total) by mouth before breakfast.     memantine 10 MG Tab  Commonly known as: NAMENDA  TAKE 1 TABLET TWICE DAILY     pravastatin 40 MG tablet  Commonly known as: PRAVACHOL  TAKE 1 TABLET EVERY DAY        STOP taking these medications    cefpodoxime 100 MG tablet  Commonly known as: VANTIN     doxycycline 100 MG Cap  Commonly known as: VIBRAMYCIN     mupirocin 2 % ointment  Commonly known as: BACTROBAN            Indwelling Lines/Drains at time of discharge:   Lines/Drains/Airways     Drain  Duration           Female External Urinary Catheter 07/23/23 0730 4 days                Time spent on the discharge of patient: 38 minutes         Nella Lacey NP  Department of [facility] Medicine  [facility] - Skilled Nursing

## 2023-07-28 ENCOUNTER — PATIENT OUTREACH (OUTPATIENT)
Dept: ADMINISTRATIVE | Facility: CLINIC | Age: 88
End: 2023-07-28
Payer: MEDICARE

## 2023-07-28 PROCEDURE — G0180 PR HOME HEALTH MD CERTIFICATION: ICD-10-PCS | Mod: ,,, | Performed by: NURSE PRACTITIONER

## 2023-07-28 PROCEDURE — G0180 MD CERTIFICATION HHA PATIENT: HCPCS | Mod: ,,, | Performed by: NURSE PRACTITIONER

## 2023-08-14 ENCOUNTER — PES CALL (OUTPATIENT)
Dept: ADMINISTRATIVE | Facility: CLINIC | Age: 88
End: 2023-08-14
Payer: MEDICARE

## 2023-09-07 RX ORDER — GALANTAMINE HYDROBROMIDE 16 MG/1
CAPSULE, EXTENDED RELEASE ORAL
Qty: 90 CAPSULE | Refills: 0 | Status: SHIPPED | OUTPATIENT
Start: 2023-09-07 | End: 2024-01-18 | Stop reason: SDUPTHER

## 2023-09-20 ENCOUNTER — OFFICE VISIT (OUTPATIENT)
Dept: INTERNAL MEDICINE | Facility: CLINIC | Age: 88
End: 2023-09-20
Payer: MEDICARE

## 2023-09-20 ENCOUNTER — LAB VISIT (OUTPATIENT)
Dept: LAB | Facility: HOSPITAL | Age: 88
End: 2023-09-20
Attending: INTERNAL MEDICINE
Payer: MEDICARE

## 2023-09-20 ENCOUNTER — IMMUNIZATION (OUTPATIENT)
Dept: INTERNAL MEDICINE | Facility: CLINIC | Age: 88
End: 2023-09-20
Payer: MEDICARE

## 2023-09-20 DIAGNOSIS — M81.0 OSTEOPOROSIS, UNSPECIFIED OSTEOPOROSIS TYPE, UNSPECIFIED PATHOLOGICAL FRACTURE PRESENCE: ICD-10-CM

## 2023-09-20 DIAGNOSIS — E03.9 HYPOTHYROIDISM, UNSPECIFIED TYPE: ICD-10-CM

## 2023-09-20 DIAGNOSIS — I10 HYPERTENSION, UNSPECIFIED TYPE: ICD-10-CM

## 2023-09-20 DIAGNOSIS — Z00.00 PREVENTATIVE HEALTH CARE: ICD-10-CM

## 2023-09-20 DIAGNOSIS — I10 HYPERTENSION, UNSPECIFIED TYPE: Primary | ICD-10-CM

## 2023-09-20 DIAGNOSIS — N39.0 URINARY TRACT INFECTION WITHOUT HEMATURIA, SITE UNSPECIFIED: ICD-10-CM

## 2023-09-20 LAB
ALBUMIN SERPL BCP-MCNC: 3.8 G/DL (ref 3.5–5.2)
ALP SERPL-CCNC: 72 U/L (ref 55–135)
ALT SERPL W/O P-5'-P-CCNC: 17 U/L (ref 10–44)
ANION GAP SERPL CALC-SCNC: 4 MMOL/L (ref 8–16)
AST SERPL-CCNC: 15 U/L (ref 10–40)
BILIRUB SERPL-MCNC: 0.5 MG/DL (ref 0.1–1)
BUN SERPL-MCNC: 11 MG/DL (ref 8–23)
CALCIUM SERPL-MCNC: 10.2 MG/DL (ref 8.7–10.5)
CHLORIDE SERPL-SCNC: 111 MMOL/L (ref 95–110)
CO2 SERPL-SCNC: 26 MMOL/L (ref 23–29)
CREAT SERPL-MCNC: 0.8 MG/DL (ref 0.5–1.4)
EST. GFR  (NO RACE VARIABLE): >60 ML/MIN/1.73 M^2
GLUCOSE SERPL-MCNC: 82 MG/DL (ref 70–110)
POTASSIUM SERPL-SCNC: 4 MMOL/L (ref 3.5–5.1)
PROT SERPL-MCNC: 6.6 G/DL (ref 6–8.4)
SODIUM SERPL-SCNC: 141 MMOL/L (ref 136–145)
T4 FREE SERPL-MCNC: 1.34 NG/DL (ref 0.71–1.51)
TSH SERPL DL<=0.005 MIU/L-ACNC: 0.02 UIU/ML (ref 0.4–4)

## 2023-09-20 PROCEDURE — 1159F MED LIST DOCD IN RCRD: CPT | Mod: HCNC,CPTII,S$GLB, | Performed by: INTERNAL MEDICINE

## 2023-09-20 PROCEDURE — 99999 PR PBB SHADOW E&M-EST. PATIENT-LVL IV: CPT | Mod: PBBFAC,HCNC,, | Performed by: INTERNAL MEDICINE

## 2023-09-20 PROCEDURE — 99397 PER PM REEVAL EST PAT 65+ YR: CPT | Mod: HCNC,S$GLB,, | Performed by: INTERNAL MEDICINE

## 2023-09-20 PROCEDURE — 1159F PR MEDICATION LIST DOCUMENTED IN MEDICAL RECORD: ICD-10-PCS | Mod: HCNC,CPTII,S$GLB, | Performed by: INTERNAL MEDICINE

## 2023-09-20 PROCEDURE — 36415 COLL VENOUS BLD VENIPUNCTURE: CPT | Mod: HCNC | Performed by: INTERNAL MEDICINE

## 2023-09-20 PROCEDURE — 99999 PR PBB SHADOW E&M-EST. PATIENT-LVL IV: ICD-10-PCS | Mod: PBBFAC,HCNC,, | Performed by: INTERNAL MEDICINE

## 2023-09-20 PROCEDURE — 1126F PR PAIN SEVERITY QUANTIFIED, NO PAIN PRESENT: ICD-10-PCS | Mod: HCNC,CPTII,S$GLB, | Performed by: INTERNAL MEDICINE

## 2023-09-20 PROCEDURE — 84443 ASSAY THYROID STIM HORMONE: CPT | Mod: HCNC | Performed by: INTERNAL MEDICINE

## 2023-09-20 PROCEDURE — 80053 COMPREHEN METABOLIC PANEL: CPT | Mod: HCNC | Performed by: INTERNAL MEDICINE

## 2023-09-20 PROCEDURE — 1101F PR PT FALLS ASSESS DOC 0-1 FALLS W/OUT INJ PAST YR: ICD-10-PCS | Mod: HCNC,CPTII,S$GLB, | Performed by: INTERNAL MEDICINE

## 2023-09-20 PROCEDURE — 3288F FALL RISK ASSESSMENT DOCD: CPT | Mod: HCNC,CPTII,S$GLB, | Performed by: INTERNAL MEDICINE

## 2023-09-20 PROCEDURE — G0008 ADMIN INFLUENZA VIRUS VAC: HCPCS | Mod: HCNC,S$GLB,, | Performed by: INTERNAL MEDICINE

## 2023-09-20 PROCEDURE — 3288F PR FALLS RISK ASSESSMENT DOCUMENTED: ICD-10-PCS | Mod: HCNC,CPTII,S$GLB, | Performed by: INTERNAL MEDICINE

## 2023-09-20 PROCEDURE — 1101F PT FALLS ASSESS-DOCD LE1/YR: CPT | Mod: HCNC,CPTII,S$GLB, | Performed by: INTERNAL MEDICINE

## 2023-09-20 PROCEDURE — 1126F AMNT PAIN NOTED NONE PRSNT: CPT | Mod: HCNC,CPTII,S$GLB, | Performed by: INTERNAL MEDICINE

## 2023-09-20 PROCEDURE — G0008 FLU VACCINE - QUADRIVALENT - ADJUVANTED: ICD-10-PCS | Mod: HCNC,S$GLB,, | Performed by: INTERNAL MEDICINE

## 2023-09-20 PROCEDURE — 99397 PR PREVENTIVE VISIT,EST,65 & OVER: ICD-10-PCS | Mod: HCNC,S$GLB,, | Performed by: INTERNAL MEDICINE

## 2023-09-20 PROCEDURE — 84439 ASSAY OF FREE THYROXINE: CPT | Mod: HCNC | Performed by: INTERNAL MEDICINE

## 2023-09-20 PROCEDURE — 90694 FLU VACCINE - QUADRIVALENT - ADJUVANTED: ICD-10-PCS | Mod: HCNC,S$GLB,, | Performed by: INTERNAL MEDICINE

## 2023-09-20 PROCEDURE — 90694 VACC AIIV4 NO PRSRV 0.5ML IM: CPT | Mod: HCNC,S$GLB,, | Performed by: INTERNAL MEDICINE

## 2023-09-20 RX ORDER — MEMANTINE HYDROCHLORIDE 10 MG/1
10 TABLET ORAL 2 TIMES DAILY
Qty: 180 TABLET | Refills: 1 | Status: SHIPPED | OUTPATIENT
Start: 2023-09-20 | End: 2024-01-18 | Stop reason: SDUPTHER

## 2023-09-20 RX ORDER — LEVOTHYROXINE SODIUM 100 UG/1
100 TABLET ORAL
Qty: 90 TABLET | Refills: 2 | Status: SHIPPED | OUTPATIENT
Start: 2023-09-20 | End: 2023-09-25 | Stop reason: ALTCHOICE

## 2023-09-20 RX ORDER — DICLOFENAC SODIUM 10 MG/G
2 GEL TOPICAL DAILY PRN
Status: ON HOLD
Start: 2023-09-20 | End: 2023-12-23

## 2023-09-20 RX ORDER — MUPIROCIN 20 MG/G
OINTMENT TOPICAL 2 TIMES DAILY PRN
Status: ON HOLD
Start: 2023-09-20 | End: 2023-12-23

## 2023-09-21 ENCOUNTER — EXTERNAL HOME HEALTH (OUTPATIENT)
Dept: HOME HEALTH SERVICES | Facility: HOSPITAL | Age: 88
End: 2023-09-21
Payer: MEDICARE

## 2023-09-22 ENCOUNTER — TELEPHONE (OUTPATIENT)
Dept: INTERNAL MEDICINE | Facility: CLINIC | Age: 88
End: 2023-09-22
Payer: MEDICARE

## 2023-09-22 RX ORDER — AMOXICILLIN 875 MG/1
875 TABLET, FILM COATED ORAL 2 TIMES DAILY
Qty: 14 TABLET | Refills: 0 | Status: SHIPPED | OUTPATIENT
Start: 2023-09-22 | End: 2023-09-29

## 2023-09-23 ENCOUNTER — TELEPHONE (OUTPATIENT)
Dept: INTERNAL MEDICINE | Facility: CLINIC | Age: 88
End: 2023-09-23
Payer: MEDICARE

## 2023-09-23 VITALS
OXYGEN SATURATION: 95 % | DIASTOLIC BLOOD PRESSURE: 74 MMHG | HEIGHT: 60 IN | HEART RATE: 73 BPM | TEMPERATURE: 99 F | WEIGHT: 130.5 LBS | BODY MASS INDEX: 25.62 KG/M2 | SYSTOLIC BLOOD PRESSURE: 128 MMHG

## 2023-09-23 NOTE — TELEPHONE ENCOUNTER
Please contact patient and inform her that her lab work indicates I need to decrease the dose of her synthroid. Please ask her what pharmacy she would like that sent to

## 2023-09-23 NOTE — PROGRESS NOTES
Subjective:       Patient ID: Rosario Menendez is a 87 y.o. female.    Chief Complaint: Annual Exam    HPI  She is here for annual exam  Review of Systems   Constitutional:  Negative for chills, fatigue, fever and unexpected weight change.   Respiratory:  Negative for chest tightness and shortness of breath.    Cardiovascular:  Negative for chest pain and palpitations.   Gastrointestinal:  Negative for abdominal pain and blood in stool.   Neurological:  Negative for dizziness, syncope, numbness and headaches.       Objective:      Physical Exam  HENT:      Right Ear: External ear normal.      Left Ear: External ear normal.      Nose: Nose normal.      Mouth/Throat:      Mouth: Mucous membranes are moist.      Pharynx: Oropharynx is clear.   Eyes:      Pupils: Pupils are equal, round, and reactive to light.   Cardiovascular:      Rate and Rhythm: Normal rate and regular rhythm.      Heart sounds: No murmur heard.  Pulmonary:      Breath sounds: Normal breath sounds.   Abdominal:      General: There is no distension.      Palpations: There is no hepatomegaly or splenomegaly.      Tenderness: There is no abdominal tenderness.   Musculoskeletal:      Cervical back: Normal range of motion.   Lymphadenopathy:      Cervical: No cervical adenopathy.      Upper Body:      Right upper body: No axillary adenopathy.      Left upper body: No axillary adenopathy.   Neurological:      Cranial Nerves: No cranial nerve deficit.      Sensory: No sensory deficit.      Motor: Motor function is intact.      Deep Tendon Reflexes: Reflexes are normal and symmetric.         Assessment/Plan       Assessment and plan:  Annual exam.  Check CMP, TSH, urine sent for urinalysis and culture.  Schedule bone density.  Discussed mammogram and breast exam.  She declined all

## 2023-09-25 ENCOUNTER — PATIENT MESSAGE (OUTPATIENT)
Dept: INTERNAL MEDICINE | Facility: CLINIC | Age: 88
End: 2023-09-25
Payer: MEDICARE

## 2023-09-25 RX ORDER — LEVOTHYROXINE SODIUM 75 UG/1
75 TABLET ORAL
Qty: 30 TABLET | Refills: 3 | Status: SHIPPED | OUTPATIENT
Start: 2023-09-25 | End: 2023-10-03 | Stop reason: SDUPTHER

## 2023-09-25 NOTE — TELEPHONE ENCOUNTER
Called and spoke to patients daughter.Relayed Dr. Garibay's message. Daughter verbalized understanding. Would like this prescription to be sent to [facility] and further prescriptions sent to [facility].

## 2023-09-26 ENCOUNTER — TELEPHONE (OUTPATIENT)
Dept: INTERNAL MEDICINE | Facility: CLINIC | Age: 88
End: 2023-09-26
Payer: MEDICARE

## 2023-09-26 NOTE — TELEPHONE ENCOUNTER
Attempted to call pt back about synthroid but no response at this time. Left VM to call office when available.

## 2023-10-03 RX ORDER — LEVOTHYROXINE SODIUM 75 UG/1
75 TABLET ORAL
Qty: 30 TABLET | Refills: 5 | Status: SHIPPED | OUTPATIENT
Start: 2023-10-03 | End: 2024-01-18 | Stop reason: SDUPTHER

## 2023-10-03 RX ORDER — ALENDRONATE SODIUM 70 MG/1
70 TABLET ORAL
Qty: 4 TABLET | Refills: 5 | Status: ON HOLD | OUTPATIENT
Start: 2023-10-03 | End: 2024-01-16

## 2023-10-03 NOTE — TELEPHONE ENCOUNTER
No care due was identified.  [facility] Embedded Care Due Messages. Reference number: [id number].   10/03/2023 1:46:49 PM CDT

## 2023-10-03 NOTE — TELEPHONE ENCOUNTER
----- Message from Marah Urena sent at 10/3/2023  1:43 PM CDT -----  Pharmacy is calling to clarify an RX.    RX name:    levothyroxine (SYNTHROID) 75 MCG tablet  alendronate (FOSAMAX) 70 MG tablet        What do they need to clarify:  requesting a refill     Comments:      [facility] - [place], [place] - [address]  [address] [address]  Phone: [phone] Fax: [phone]

## 2023-10-24 ENCOUNTER — HOSPITAL ENCOUNTER (OUTPATIENT)
Dept: RADIOLOGY | Facility: CLINIC | Age: 88
Discharge: HOME | End: 2023-10-24
Attending: INTERNAL MEDICINE
Payer: MEDICARE

## 2023-10-24 DIAGNOSIS — M81.0 OSTEOPOROSIS, UNSPECIFIED OSTEOPOROSIS TYPE, UNSPECIFIED PATHOLOGICAL FRACTURE PRESENCE: ICD-10-CM

## 2023-10-24 PROCEDURE — 77080 DXA BONE DENSITY AXIAL SKELETON 1 OR MORE SITES: ICD-10-PCS | Mod: 26,HCNC,, | Performed by: INTERNAL MEDICINE

## 2023-10-24 PROCEDURE — 77080 DXA BONE DENSITY AXIAL: CPT | Mod: TC,HCNC

## 2023-10-24 PROCEDURE — 77080 DXA BONE DENSITY AXIAL: CPT | Mod: 26,HCNC,, | Performed by: INTERNAL MEDICINE

## 2023-11-04 ENCOUNTER — TELEPHONE (OUTPATIENT)
Dept: INTERNAL MEDICINE | Facility: CLINIC | Age: 88
End: 2023-11-04
Payer: MEDICARE

## 2023-11-04 DIAGNOSIS — M81.0 OSTEOPOROSIS, UNSPECIFIED OSTEOPOROSIS TYPE, UNSPECIFIED PATHOLOGICAL FRACTURE PRESENCE: Primary | ICD-10-CM

## 2023-11-04 NOTE — TELEPHONE ENCOUNTER
Please contact patient and inform her that her bone density reveals she has osteoporosis.  The alendronate does not seem to be working for her.  I would like for her to see endocrinology for additional recommendations.  Order in.  Please schedule

## 2023-11-07 ENCOUNTER — TELEPHONE (OUTPATIENT)
Dept: INTERNAL MEDICINE | Facility: CLINIC | Age: 88
End: 2023-11-07
Payer: MEDICARE

## 2023-11-07 NOTE — TELEPHONE ENCOUNTER
Spoke with pts daughter and she is wondering if seeing endocrinology is really necessary since pt is 87 yrs old and has very limited mobility. She stated pt has been on and off Alendronate over the past few years. And is now on the fosamax 70mg again.

## 2023-11-07 NOTE — TELEPHONE ENCOUNTER
----- Message from Linda Parker sent at 11/7/2023  8:55 AM CST -----  Contact: [phone]  Patient is returning a phone call.  Who left a message for the patient: Dr Simon's office  Does patient know what this is regarding:  no  Would you like a call back, or a response through your [facility] portal?:   phone  Comments:

## 2023-11-28 ENCOUNTER — TELEPHONE (OUTPATIENT)
Dept: INTERNAL MEDICINE | Facility: CLINIC | Age: 88
End: 2023-11-28
Payer: MEDICARE

## 2023-11-28 NOTE — TELEPHONE ENCOUNTER
Called and spoke to Aditi, patients daughter. High blood pressure started yesterday mainly. Daughter concerned as to should they restart blood pressure medicine. Patient is not complaining of headaches or dizziness.Encouraged facility to monitor and start taking it twice daily to see if there is a pattern and notify us if it keeps going up daily.Patient is having a cough but not cold like symptoms. No fevers no chills. Patient started on Mucinex by daughter for the cough. No sign if working or not.

## 2023-11-29 ENCOUNTER — TELEPHONE (OUTPATIENT)
Dept: INTERNAL MEDICINE | Facility: CLINIC | Age: 88
End: 2023-11-29
Payer: MEDICARE

## 2023-11-29 DIAGNOSIS — N39.0 URINARY TRACT INFECTION WITHOUT HEMATURIA, SITE UNSPECIFIED: Primary | ICD-10-CM

## 2023-11-29 NOTE — TELEPHONE ENCOUNTER
----- Message from Ana Maria Olivo sent at 11/29/2023 12:16 PM CST -----  Contact: Aditi [phone]  Returning a phone call.    Who left a message for the patient:  Kirsten Mejia LPN    Do they know what this is regarding:  yes    Would they like a phone call back or a response via [facility]: call back      Aditi pt's daughter is calling to speak to the nurse again on behalf of the pt's UTI  and fever. Please call Aditi back for advice.

## 2023-11-29 NOTE — TELEPHONE ENCOUNTER
Called and spoke to daughter Aditi about UTI symptoms . Daughter states her mom is confused, AF did a urine dipstick and patient tested positive per daughter. Patient not running a fever at this time.BP stable. Cough better. Daughter asking for a urine collect at home test to be done. She will come  the testing supplies collect and bring back. States mom is to weak to bring in for the test.

## 2023-11-30 ENCOUNTER — LAB VISIT (OUTPATIENT)
Dept: LAB | Facility: HOSPITAL | Age: 88
End: 2023-11-30
Attending: INTERNAL MEDICINE
Payer: MEDICARE

## 2023-11-30 ENCOUNTER — TELEPHONE (OUTPATIENT)
Dept: INTERNAL MEDICINE | Facility: CLINIC | Age: 88
End: 2023-11-30
Payer: MEDICARE

## 2023-11-30 DIAGNOSIS — N39.0 URINARY TRACT INFECTION WITHOUT HEMATURIA, SITE UNSPECIFIED: ICD-10-CM

## 2023-11-30 LAB
BACTERIA #/AREA URNS HPF: ABNORMAL /HPF
BILIRUB UR QL STRIP: NEGATIVE
CLARITY UR: ABNORMAL
COLOR UR: YELLOW
GLUCOSE UR QL STRIP: NEGATIVE
HGB UR QL STRIP: NEGATIVE
HYALINE CASTS #/AREA URNS LPF: 1 /LPF
KETONES UR QL STRIP: NEGATIVE
LEUKOCYTE ESTERASE UR QL STRIP: ABNORMAL
MICROSCOPIC COMMENT: ABNORMAL
NITRITE UR QL STRIP: NEGATIVE
NON-SQ EPI CELLS #/AREA URNS HPF: 2 /HPF
PH UR STRIP: 6 [PH] (ref 5–8)
PROT UR QL STRIP: ABNORMAL
RBC #/AREA URNS HPF: 6 /HPF (ref 0–4)
SP GR UR STRIP: 1.02 (ref 1–1.03)
SQUAMOUS #/AREA URNS HPF: 10 /HPF
UNIDENT CRYS URNS QL MICRO: 20
URN SPEC COLLECT METH UR: ABNORMAL
UROBILINOGEN UR STRIP-ACNC: NEGATIVE EU/DL
WBC #/AREA URNS HPF: >100 /HPF (ref 0–5)

## 2023-11-30 PROCEDURE — 81000 URINALYSIS NONAUTO W/SCOPE: CPT | Mod: HCNC | Performed by: INTERNAL MEDICINE

## 2023-11-30 PROCEDURE — 87086 URINE CULTURE/COLONY COUNT: CPT | Mod: HCNC | Performed by: INTERNAL MEDICINE

## 2023-12-02 LAB — BACTERIA UR CULT: NORMAL

## 2023-12-02 NOTE — PLAN OF CARE
Patient is set to discharge on 12/21/22 at 11AM. Patient discharging to [facility] with daughter via personal car. Patient is set up with [facility]. Patient and daughter declined all recommended DME equipment. Patient has substituted her PCP follow-up visit [facility] follow up visit.    English

## 2023-12-23 ENCOUNTER — ANESTHESIA (OUTPATIENT)
Dept: SURGERY | Facility: HOSPITAL | Age: 88
DRG: 481 | End: 2023-12-23
Payer: MEDICARE

## 2023-12-23 ENCOUNTER — HOSPITAL ENCOUNTER (INPATIENT)
Facility: HOSPITAL | Age: 88
LOS: 3 days | Discharge: NURSING, REHAB OR LONG-TERM CARE | DRG: 481 | End: 2023-12-26
Attending: EMERGENCY MEDICINE | Admitting: STUDENT IN AN ORGANIZED HEALTH CARE EDUCATION/TRAINING PROGRAM
Payer: MEDICARE

## 2023-12-23 ENCOUNTER — ANESTHESIA EVENT (OUTPATIENT)
Dept: SURGERY | Facility: HOSPITAL | Age: 88
DRG: 481 | End: 2023-12-23
Payer: MEDICARE

## 2023-12-23 DIAGNOSIS — R07.9 CHEST PAIN: ICD-10-CM
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
## 2023-12-23 DIAGNOSIS — S72.002A CLOSED LEFT HIP FRACTURE: ICD-10-CM

## 2023-12-23 DIAGNOSIS — S72.142A CLOSED DISPLACED INTERTROCHANTERIC FRACTURE OF LEFT FEMUR, INITIAL ENCOUNTER: Primary | ICD-10-CM

## 2023-12-23 LAB
ABO + RH BLD: NORMAL
ALBUMIN SERPL BCP-MCNC: 3.6 G/DL (ref 3.5–5.2)
ALP SERPL-CCNC: 75 U/L (ref 55–135)
ALT SERPL W/O P-5'-P-CCNC: 20 U/L (ref 10–44)
ANION GAP SERPL CALC-SCNC: 10 MMOL/L (ref 8–16)
AST SERPL-CCNC: 19 U/L (ref 10–40)
BACTERIA #/AREA URNS AUTO: NORMAL /HPF
BASOPHILS # BLD AUTO: 0.05 K/UL (ref 0–0.2)
BASOPHILS NFR BLD: 0.6 % (ref 0–1.9)
BILIRUB SERPL-MCNC: 0.3 MG/DL (ref 0.1–1)
BILIRUB UR QL STRIP: NEGATIVE
BLD GP AB SCN CELLS X3 SERPL QL: NORMAL
BUN SERPL-MCNC: 15 MG/DL (ref 8–23)
CALCIUM SERPL-MCNC: 10.3 MG/DL (ref 8.7–10.5)
CHLORIDE SERPL-SCNC: 111 MMOL/L (ref 95–110)
CLARITY UR REFRACT.AUTO: CLEAR
CO2 SERPL-SCNC: 23 MMOL/L (ref 23–29)
COLOR UR AUTO: YELLOW
CREAT SERPL-MCNC: 0.9 MG/DL (ref 0.5–1.4)
DIFFERENTIAL METHOD: ABNORMAL
EOSINOPHIL # BLD AUTO: 0.1 K/UL (ref 0–0.5)
EOSINOPHIL NFR BLD: 1.5 % (ref 0–8)
ERYTHROCYTE [DISTWIDTH] IN BLOOD BY AUTOMATED COUNT: 13.9 % (ref 11.5–14.5)
EST. GFR  (NO RACE VARIABLE): >60 ML/MIN/1.73 M^2
ESTIMATED AVG GLUCOSE: 108 MG/DL (ref 68–131)
GLUCOSE SERPL-MCNC: 99 MG/DL (ref 70–110)
GLUCOSE UR QL STRIP: NEGATIVE
HBA1C MFR BLD: 5.4 % (ref 4–5.6)
HCT VFR BLD AUTO: 41.4 % (ref 37–48.5)
HCV AB SERPL QL IA: NORMAL
HGB BLD-MCNC: 13.7 G/DL (ref 12–16)
HGB UR QL STRIP: NEGATIVE
HIV 1+2 AB+HIV1 P24 AG SERPL QL IA: NORMAL
IMM GRANULOCYTES # BLD AUTO: 0.04 K/UL (ref 0–0.04)
IMM GRANULOCYTES NFR BLD AUTO: 0.4 % (ref 0–0.5)
INR PPP: 1 (ref 0.8–1.2)
KETONES UR QL STRIP: NEGATIVE
LEUKOCYTE ESTERASE UR QL STRIP: ABNORMAL
LYMPHOCYTES # BLD AUTO: 1.4 K/UL (ref 1–4.8)
LYMPHOCYTES NFR BLD: 15.1 % (ref 18–48)
MAGNESIUM SERPL-MCNC: 2.1 MG/DL (ref 1.6–2.6)
MAGNESIUM SERPL-MCNC: 2.2 MG/DL (ref 1.6–2.6)
MCH RBC QN AUTO: 30.7 PG (ref 27–31)
MCHC RBC AUTO-ENTMCNC: 33.1 G/DL (ref 32–36)
MCV RBC AUTO: 93 FL (ref 82–98)
MICROSCOPIC COMMENT: NORMAL
MONOCYTES # BLD AUTO: 0.9 K/UL (ref 0.3–1)
MONOCYTES NFR BLD: 9.8 % (ref 4–15)
NEUTROPHILS # BLD AUTO: 6.6 K/UL (ref 1.8–7.7)
NEUTROPHILS NFR BLD: 72.6 % (ref 38–73)
NITRITE UR QL STRIP: NEGATIVE
NRBC BLD-RTO: 0 /100 WBC
PH UR STRIP: 7 [PH] (ref 5–8)
PHOSPHATE SERPL-MCNC: 2.6 MG/DL (ref 2.7–4.5)
PLATELET # BLD AUTO: 83 K/UL (ref 150–450)
PMV BLD AUTO: 12.9 FL (ref 9.2–12.9)
POTASSIUM SERPL-SCNC: 4.2 MMOL/L (ref 3.5–5.1)
PREALB SERPL-MCNC: 19 MG/DL (ref 20–43)
PROT SERPL-MCNC: 6.8 G/DL (ref 6–8.4)
PROT UR QL STRIP: NEGATIVE
PROTHROMBIN TIME: 10.3 SEC (ref 9–12.5)
RBC # BLD AUTO: 4.46 M/UL (ref 4–5.4)
RBC #/AREA URNS AUTO: 2 /HPF (ref 0–4)
SODIUM SERPL-SCNC: 144 MMOL/L (ref 136–145)
SP GR UR STRIP: 1.02 (ref 1–1.03)
SPECIMEN OUTDATE: NORMAL
SQUAMOUS #/AREA URNS AUTO: 0 /HPF
TRANSFERRIN SERPL-MCNC: 242 MG/DL (ref 200–375)
URN SPEC COLLECT METH UR: ABNORMAL
WBC # BLD AUTO: 9.07 K/UL (ref 3.9–12.7)
WBC #/AREA URNS AUTO: 4 /HPF (ref 0–5)

## 2023-12-23 PROCEDURE — 93005 ELECTROCARDIOGRAM TRACING: CPT | Mod: HCNC

## 2023-12-23 PROCEDURE — 83735 ASSAY OF MAGNESIUM: CPT | Mod: 91,HCNC | Performed by: STUDENT IN AN ORGANIZED HEALTH CARE EDUCATION/TRAINING PROGRAM

## 2023-12-23 PROCEDURE — 27245 TREAT THIGH FRACTURE: CPT | Mod: HCNC,LT,, | Performed by: ORTHOPAEDIC SURGERY

## 2023-12-23 PROCEDURE — D9220A PRA ANESTHESIA: ICD-10-PCS | Mod: HCNC,CRNA,, | Performed by: NURSE ANESTHETIST, CERTIFIED REGISTERED

## 2023-12-23 PROCEDURE — 25000003 PHARM REV CODE 250: Mod: HCNC | Performed by: ANESTHESIOLOGY

## 2023-12-23 PROCEDURE — 36000711: Mod: HCNC | Performed by: ORTHOPAEDIC SURGERY

## 2023-12-23 PROCEDURE — 84134 ASSAY OF PREALBUMIN: CPT | Mod: HCNC | Performed by: STUDENT IN AN ORGANIZED HEALTH CARE EDUCATION/TRAINING PROGRAM

## 2023-12-23 PROCEDURE — 25000003 PHARM REV CODE 250: Mod: HCNC | Performed by: STUDENT IN AN ORGANIZED HEALTH CARE EDUCATION/TRAINING PROGRAM

## 2023-12-23 PROCEDURE — 87389 HIV-1 AG W/HIV-1&-2 AB AG IA: CPT | Mod: HCNC | Performed by: PHYSICIAN ASSISTANT

## 2023-12-23 PROCEDURE — 86900 BLOOD TYPING SEROLOGIC ABO: CPT | Mod: HCNC | Performed by: STUDENT IN AN ORGANIZED HEALTH CARE EDUCATION/TRAINING PROGRAM

## 2023-12-23 PROCEDURE — 85610 PROTHROMBIN TIME: CPT | Mod: HCNC | Performed by: STUDENT IN AN ORGANIZED HEALTH CARE EDUCATION/TRAINING PROGRAM

## 2023-12-23 PROCEDURE — 84100 ASSAY OF PHOSPHORUS: CPT | Mod: HCNC | Performed by: STUDENT IN AN ORGANIZED HEALTH CARE EDUCATION/TRAINING PROGRAM

## 2023-12-23 PROCEDURE — 86803 HEPATITIS C AB TEST: CPT | Mod: HCNC | Performed by: PHYSICIAN ASSISTANT

## 2023-12-23 PROCEDURE — 27245 PR OPEN FIX INTER/SUBTROCH FX,IMPLNT: ICD-10-PCS | Mod: HCNC,LT,, | Performed by: ORTHOPAEDIC SURGERY

## 2023-12-23 PROCEDURE — 83036 HEMOGLOBIN GLYCOSYLATED A1C: CPT | Mod: HCNC | Performed by: STUDENT IN AN ORGANIZED HEALTH CARE EDUCATION/TRAINING PROGRAM

## 2023-12-23 PROCEDURE — 96374 THER/PROPH/DIAG INJ IV PUSH: CPT | Mod: HCNC

## 2023-12-23 PROCEDURE — 63600175 PHARM REV CODE 636 W HCPCS: Mod: HCNC | Performed by: STUDENT IN AN ORGANIZED HEALTH CARE EDUCATION/TRAINING PROGRAM

## 2023-12-23 PROCEDURE — 63600175 PHARM REV CODE 636 W HCPCS: Mod: HCNC | Performed by: EMERGENCY MEDICINE

## 2023-12-23 PROCEDURE — 99285 EMERGENCY DEPT VISIT HI MDM: CPT | Mod: 25,HCNC

## 2023-12-23 PROCEDURE — 27201423 OPTIME MED/SURG SUP & DEVICES STERILE SUPPLY: Mod: HCNC | Performed by: ORTHOPAEDIC SURGERY

## 2023-12-23 PROCEDURE — D9220A PRA ANESTHESIA: Mod: HCNC,ANES,, | Performed by: STUDENT IN AN ORGANIZED HEALTH CARE EDUCATION/TRAINING PROGRAM

## 2023-12-23 PROCEDURE — D9220A PRA ANESTHESIA: Mod: HCNC,CRNA,, | Performed by: NURSE ANESTHETIST, CERTIFIED REGISTERED

## 2023-12-23 PROCEDURE — 36415 COLL VENOUS BLD VENIPUNCTURE: CPT | Mod: HCNC | Performed by: STUDENT IN AN ORGANIZED HEALTH CARE EDUCATION/TRAINING PROGRAM

## 2023-12-23 PROCEDURE — 83735 ASSAY OF MAGNESIUM: CPT | Mod: HCNC | Performed by: EMERGENCY MEDICINE

## 2023-12-23 PROCEDURE — 99223 1ST HOSP IP/OBS HIGH 75: CPT | Mod: 57,HCNC,, | Performed by: ORTHOPAEDIC SURGERY

## 2023-12-23 PROCEDURE — 99223 PR INITIAL HOSPITAL CARE,LEVL III: ICD-10-PCS | Mod: 57,HCNC,, | Performed by: ORTHOPAEDIC SURGERY

## 2023-12-23 PROCEDURE — 71000015 HC POSTOP RECOV 1ST HR: Mod: HCNC | Performed by: ORTHOPAEDIC SURGERY

## 2023-12-23 PROCEDURE — 71000033 HC RECOVERY, INTIAL HOUR: Mod: HCNC | Performed by: ORTHOPAEDIC SURGERY

## 2023-12-23 PROCEDURE — 86920 COMPATIBILITY TEST SPIN: CPT | Mod: HCNC

## 2023-12-23 PROCEDURE — 37000009 HC ANESTHESIA EA ADD 15 MINS: Mod: HCNC | Performed by: ORTHOPAEDIC SURGERY

## 2023-12-23 PROCEDURE — 80053 COMPREHEN METABOLIC PANEL: CPT | Mod: HCNC | Performed by: EMERGENCY MEDICINE

## 2023-12-23 PROCEDURE — 36000710: Mod: HCNC | Performed by: ORTHOPAEDIC SURGERY

## 2023-12-23 PROCEDURE — 84466 ASSAY OF TRANSFERRIN: CPT | Mod: HCNC | Performed by: STUDENT IN AN ORGANIZED HEALTH CARE EDUCATION/TRAINING PROGRAM

## 2023-12-23 PROCEDURE — C1769 GUIDE WIRE: HCPCS | Mod: HCNC | Performed by: ORTHOPAEDIC SURGERY

## 2023-12-23 PROCEDURE — 99900035 HC TECH TIME PER 15 MIN (STAT): Mod: HCNC

## 2023-12-23 PROCEDURE — 21400001 HC TELEMETRY ROOM: Mod: HCNC

## 2023-12-23 PROCEDURE — 25000003 PHARM REV CODE 250: Mod: HCNC | Performed by: NURSE ANESTHETIST, CERTIFIED REGISTERED

## 2023-12-23 PROCEDURE — 37000008 HC ANESTHESIA 1ST 15 MINUTES: Mod: HCNC | Performed by: ORTHOPAEDIC SURGERY

## 2023-12-23 PROCEDURE — 63600175 PHARM REV CODE 636 W HCPCS: Mod: HCNC | Performed by: ORTHOPAEDIC SURGERY

## 2023-12-23 PROCEDURE — 85025 COMPLETE CBC W/AUTO DIFF WBC: CPT | Mod: HCNC | Performed by: EMERGENCY MEDICINE

## 2023-12-23 PROCEDURE — C1713 ANCHOR/SCREW BN/BN,TIS/BN: HCPCS | Mod: HCNC | Performed by: ORTHOPAEDIC SURGERY

## 2023-12-23 PROCEDURE — 81001 URINALYSIS AUTO W/SCOPE: CPT | Mod: HCNC | Performed by: EMERGENCY MEDICINE

## 2023-12-23 PROCEDURE — 93010 ELECTROCARDIOGRAM REPORT: CPT | Mod: HCNC,,, | Performed by: INTERNAL MEDICINE

## 2023-12-23 PROCEDURE — 93010 EKG 12-LEAD: ICD-10-PCS | Mod: HCNC,,, | Performed by: INTERNAL MEDICINE

## 2023-12-23 PROCEDURE — D9220A PRA ANESTHESIA: ICD-10-PCS | Mod: HCNC,ANES,, | Performed by: STUDENT IN AN ORGANIZED HEALTH CARE EDUCATION/TRAINING PROGRAM

## 2023-12-23 PROCEDURE — 63600175 PHARM REV CODE 636 W HCPCS: Mod: HCNC | Performed by: NURSE ANESTHETIST, CERTIFIED REGISTERED

## 2023-12-23 DEVICE — SCREW STRDRV REC T25 5X42 TTNM: Type: IMPLANTABLE DEVICE | Site: FEMUR | Status: FUNCTIONAL

## 2023-12-23 DEVICE — NAIL IM CANN 130 DEG 11X360 L: Type: IMPLANTABLE DEVICE | Site: FEMUR | Status: FUNCTIONAL

## 2023-12-23 DEVICE — SCREW FEM NECK PERF GOLD 85MM: Type: IMPLANTABLE DEVICE | Site: FEMUR | Status: FUNCTIONAL

## 2023-12-23 RX ORDER — HALOPERIDOL 5 MG/ML
0.5 INJECTION INTRAMUSCULAR EVERY 10 MIN PRN
Status: DISCONTINUED | OUTPATIENT
Start: 2023-12-23 | End: 2023-12-26 | Stop reason: HOSPADM

## 2023-12-23 RX ORDER — MEMANTINE HYDROCHLORIDE 10 MG/1
10 TABLET ORAL 2 TIMES DAILY
Status: DISCONTINUED | OUTPATIENT
Start: 2023-12-23 | End: 2023-12-26 | Stop reason: HOSPADM

## 2023-12-23 RX ORDER — DEXMEDETOMIDINE HYDROCHLORIDE 100 UG/ML
INJECTION, SOLUTION INTRAVENOUS
Status: DISCONTINUED | OUTPATIENT
Start: 2023-12-23 | End: 2023-12-23

## 2023-12-23 RX ORDER — ACETAMINOPHEN 500 MG
1000 TABLET ORAL EVERY 6 HOURS
Status: DISCONTINUED | OUTPATIENT
Start: 2023-12-23 | End: 2023-12-23

## 2023-12-23 RX ORDER — IBUPROFEN 200 MG
16 TABLET ORAL
Status: DISCONTINUED | OUTPATIENT
Start: 2023-12-23 | End: 2023-12-26 | Stop reason: HOSPADM

## 2023-12-23 RX ORDER — METHOCARBAMOL 500 MG/1
500 TABLET, FILM COATED ORAL 3 TIMES DAILY
Status: DISCONTINUED | OUTPATIENT
Start: 2023-12-23 | End: 2023-12-26 | Stop reason: HOSPADM

## 2023-12-23 RX ORDER — LIDOCAINE HYDROCHLORIDE 10 MG/ML
1 INJECTION, SOLUTION EPIDURAL; INFILTRATION; INTRACAUDAL; PERINEURAL
Status: DISCONTINUED | OUTPATIENT
Start: 2023-12-23 | End: 2023-12-26 | Stop reason: HOSPADM

## 2023-12-23 RX ORDER — FENTANYL CITRATE 50 UG/ML
25 INJECTION, SOLUTION INTRAMUSCULAR; INTRAVENOUS EVERY 5 MIN PRN
Status: DISCONTINUED | OUTPATIENT
Start: 2023-12-24 | End: 2023-12-26 | Stop reason: HOSPADM

## 2023-12-23 RX ORDER — ROCURONIUM BROMIDE 10 MG/ML
INJECTION, SOLUTION INTRAVENOUS
Status: DISCONTINUED | OUTPATIENT
Start: 2023-12-23 | End: 2023-12-23

## 2023-12-23 RX ORDER — TRAMADOL HYDROCHLORIDE 50 MG/1
50 TABLET ORAL EVERY 4 HOURS PRN
Status: DISCONTINUED | OUTPATIENT
Start: 2023-12-23 | End: 2023-12-26 | Stop reason: HOSPADM

## 2023-12-23 RX ORDER — ACETAMINOPHEN 500 MG
1000 TABLET ORAL EVERY 6 HOURS
Status: COMPLETED | OUTPATIENT
Start: 2023-12-23 | End: 2023-12-25

## 2023-12-23 RX ORDER — DEXAMETHASONE SODIUM PHOSPHATE 4 MG/ML
INJECTION, SOLUTION INTRA-ARTICULAR; INTRALESIONAL; INTRAMUSCULAR; INTRAVENOUS; SOFT TISSUE
Status: DISCONTINUED | OUTPATIENT
Start: 2023-12-23 | End: 2023-12-23

## 2023-12-23 RX ORDER — PREGABALIN 75 MG/1
75 CAPSULE ORAL
Status: DISCONTINUED | OUTPATIENT
Start: 2023-12-23 | End: 2023-12-23

## 2023-12-23 RX ORDER — CLINDAMYCIN PHOSPHATE 900 MG/50ML
900 INJECTION, SOLUTION INTRAVENOUS
Status: CANCELLED | OUTPATIENT
Start: 2023-12-23 | End: 2023-12-24

## 2023-12-23 RX ORDER — EPHEDRINE SULFATE 50 MG/ML
INJECTION, SOLUTION INTRAVENOUS
Status: COMPLETED
Start: 2023-12-23 | End: 2023-12-23

## 2023-12-23 RX ORDER — SODIUM CHLORIDE 0.9 % (FLUSH) 0.9 %
3 SYRINGE (ML) INJECTION EVERY 4 HOURS PRN
Status: DISCONTINUED | OUTPATIENT
Start: 2023-12-23 | End: 2023-12-26 | Stop reason: HOSPADM

## 2023-12-23 RX ORDER — CLINDAMYCIN PHOSPHATE 900 MG/50ML
INJECTION, SOLUTION INTRAVENOUS
Status: DISCONTINUED | OUTPATIENT
Start: 2023-12-23 | End: 2023-12-23

## 2023-12-23 RX ORDER — SODIUM CHLORIDE 9 MG/ML
INJECTION, SOLUTION INTRAVENOUS
Status: DISCONTINUED | OUTPATIENT
Start: 2023-12-23 | End: 2023-12-26

## 2023-12-23 RX ORDER — CEFTRIAXONE 2 G/1
INJECTION, POWDER, FOR SOLUTION INTRAMUSCULAR; INTRAVENOUS
Status: DISCONTINUED | OUTPATIENT
Start: 2023-12-23 | End: 2023-12-23

## 2023-12-23 RX ORDER — SODIUM CHLORIDE 0.9 % (FLUSH) 0.9 %
10 SYRINGE (ML) INJECTION EVERY 12 HOURS PRN
Status: DISCONTINUED | OUTPATIENT
Start: 2023-12-23 | End: 2023-12-26 | Stop reason: HOSPADM

## 2023-12-23 RX ORDER — MUPIROCIN 20 MG/G
OINTMENT TOPICAL
Status: CANCELLED | OUTPATIENT
Start: 2023-12-23

## 2023-12-23 RX ORDER — LEVOTHYROXINE SODIUM 75 UG/1
75 TABLET ORAL
Status: DISCONTINUED | OUTPATIENT
Start: 2023-12-24 | End: 2023-12-26 | Stop reason: HOSPADM

## 2023-12-23 RX ORDER — PREGABALIN 50 MG/1
50 CAPSULE ORAL NIGHTLY
Status: DISCONTINUED | OUTPATIENT
Start: 2023-12-23 | End: 2023-12-26 | Stop reason: HOSPADM

## 2023-12-23 RX ORDER — MORPHINE SULFATE 4 MG/ML
4 INJECTION, SOLUTION INTRAMUSCULAR; INTRAVENOUS
Status: COMPLETED | OUTPATIENT
Start: 2023-12-23 | End: 2023-12-23

## 2023-12-23 RX ORDER — HYDROMORPHONE HYDROCHLORIDE 1 MG/ML
0.2 INJECTION, SOLUTION INTRAMUSCULAR; INTRAVENOUS; SUBCUTANEOUS EVERY 5 MIN PRN
Status: DISCONTINUED | OUTPATIENT
Start: 2023-12-23 | End: 2023-12-24

## 2023-12-23 RX ORDER — BISACODYL 10 MG
10 SUPPOSITORY, RECTAL RECTAL DAILY PRN
Status: DISCONTINUED | OUTPATIENT
Start: 2023-12-24 | End: 2023-12-26 | Stop reason: HOSPADM

## 2023-12-23 RX ORDER — METHOCARBAMOL 500 MG/1
500 TABLET, FILM COATED ORAL EVERY 6 HOURS PRN
Status: DISCONTINUED | OUTPATIENT
Start: 2023-12-24 | End: 2023-12-23

## 2023-12-23 RX ORDER — PROCHLORPERAZINE EDISYLATE 5 MG/ML
5 INJECTION INTRAMUSCULAR; INTRAVENOUS EVERY 6 HOURS PRN
Status: DISCONTINUED | OUTPATIENT
Start: 2023-12-24 | End: 2023-12-26 | Stop reason: HOSPADM

## 2023-12-23 RX ORDER — BUPROPION HYDROCHLORIDE 150 MG/1
150 TABLET ORAL DAILY
Status: DISCONTINUED | OUTPATIENT
Start: 2023-12-24 | End: 2023-12-26 | Stop reason: HOSPADM

## 2023-12-23 RX ORDER — IBUPROFEN 200 MG
24 TABLET ORAL
Status: DISCONTINUED | OUTPATIENT
Start: 2023-12-23 | End: 2023-12-26 | Stop reason: HOSPADM

## 2023-12-23 RX ORDER — EPHEDRINE SULFATE 50 MG/ML
INJECTION, SOLUTION INTRAVENOUS
Status: DISCONTINUED | OUTPATIENT
Start: 2023-12-23 | End: 2023-12-23

## 2023-12-23 RX ORDER — LIDOCAINE HYDROCHLORIDE 20 MG/ML
INJECTION, SOLUTION EPIDURAL; INFILTRATION; INTRACAUDAL; PERINEURAL
Status: DISCONTINUED | OUTPATIENT
Start: 2023-12-23 | End: 2023-12-23

## 2023-12-23 RX ORDER — SODIUM CHLORIDE 0.9 % (FLUSH) 0.9 %
10 SYRINGE (ML) INJECTION
Status: DISCONTINUED | OUTPATIENT
Start: 2023-12-24 | End: 2023-12-26 | Stop reason: HOSPADM

## 2023-12-23 RX ORDER — ACETAMINOPHEN 10 MG/ML
INJECTION, SOLUTION INTRAVENOUS
Status: DISCONTINUED | OUTPATIENT
Start: 2023-12-23 | End: 2023-12-23

## 2023-12-23 RX ORDER — MUPIROCIN 20 MG/G
1 OINTMENT TOPICAL 2 TIMES DAILY
Status: DISCONTINUED | OUTPATIENT
Start: 2023-12-25 | End: 2023-12-26 | Stop reason: HOSPADM

## 2023-12-23 RX ORDER — SODIUM CHLORIDE 0.9 % (FLUSH) 0.9 %
10 SYRINGE (ML) INJECTION
Status: CANCELLED | OUTPATIENT
Start: 2023-12-23

## 2023-12-23 RX ORDER — ONDANSETRON 2 MG/ML
4 INJECTION INTRAMUSCULAR; INTRAVENOUS EVERY 12 HOURS PRN
Status: DISCONTINUED | OUTPATIENT
Start: 2023-12-24 | End: 2023-12-26 | Stop reason: HOSPADM

## 2023-12-23 RX ORDER — ROPIVACAINE HYDROCHLORIDE 2 MG/ML
0.1 INJECTION, SOLUTION EPIDURAL; INFILTRATION; PERINEURAL CONTINUOUS
Status: DISCONTINUED | OUTPATIENT
Start: 2023-12-23 | End: 2023-12-23

## 2023-12-23 RX ORDER — GLUCAGON 1 MG
1 KIT INJECTION
Status: DISCONTINUED | OUTPATIENT
Start: 2023-12-23 | End: 2023-12-26 | Stop reason: HOSPADM

## 2023-12-23 RX ORDER — VANCOMYCIN HYDROCHLORIDE 1 G/20ML
INJECTION, POWDER, LYOPHILIZED, FOR SOLUTION INTRAVENOUS
Status: DISCONTINUED | OUTPATIENT
Start: 2023-12-23 | End: 2023-12-23 | Stop reason: HOSPADM

## 2023-12-23 RX ORDER — ONDANSETRON 2 MG/ML
INJECTION INTRAMUSCULAR; INTRAVENOUS
Status: DISCONTINUED | OUTPATIENT
Start: 2023-12-23 | End: 2023-12-23

## 2023-12-23 RX ORDER — ACETAMINOPHEN 500 MG
1000 TABLET ORAL EVERY 8 HOURS PRN
Status: DISCONTINUED | OUTPATIENT
Start: 2023-12-23 | End: 2023-12-23

## 2023-12-23 RX ORDER — PROPOFOL 10 MG/ML
VIAL (ML) INTRAVENOUS
Status: DISCONTINUED | OUTPATIENT
Start: 2023-12-23 | End: 2023-12-23

## 2023-12-23 RX ORDER — TRANEXAMIC ACID 100 MG/ML
INJECTION, SOLUTION INTRAVENOUS
Status: DISCONTINUED | OUTPATIENT
Start: 2023-12-23 | End: 2023-12-23

## 2023-12-23 RX ORDER — NALOXONE HCL 0.4 MG/ML
0.02 VIAL (ML) INJECTION
Status: DISCONTINUED | OUTPATIENT
Start: 2023-12-23 | End: 2023-12-26 | Stop reason: HOSPADM

## 2023-12-23 RX ORDER — TALC
6 POWDER (GRAM) TOPICAL NIGHTLY PRN
Status: DISCONTINUED | OUTPATIENT
Start: 2023-12-24 | End: 2023-12-26 | Stop reason: HOSPADM

## 2023-12-23 RX ORDER — FENTANYL CITRATE 50 UG/ML
INJECTION, SOLUTION INTRAMUSCULAR; INTRAVENOUS
Status: DISCONTINUED | OUTPATIENT
Start: 2023-12-23 | End: 2023-12-23

## 2023-12-23 RX ORDER — MUPIROCIN 20 MG/G
1 OINTMENT TOPICAL
Status: DISCONTINUED | OUTPATIENT
Start: 2023-12-23 | End: 2023-12-26 | Stop reason: HOSPADM

## 2023-12-23 RX ORDER — GALANTAMINE HYDROBROMIDE 16 MG/1
16 CAPSULE, EXTENDED RELEASE ORAL
Status: DISCONTINUED | OUTPATIENT
Start: 2023-12-24 | End: 2023-12-26 | Stop reason: HOSPADM

## 2023-12-23 RX ORDER — AMOXICILLIN 250 MG
1 CAPSULE ORAL 2 TIMES DAILY
Status: DISCONTINUED | OUTPATIENT
Start: 2023-12-24 | End: 2023-12-26 | Stop reason: HOSPADM

## 2023-12-23 RX ORDER — POLYETHYLENE GLYCOL 3350 17 G/17G
17 POWDER, FOR SOLUTION ORAL DAILY
Status: DISCONTINUED | OUTPATIENT
Start: 2023-12-24 | End: 2023-12-26 | Stop reason: HOSPADM

## 2023-12-23 RX ORDER — ROPIVACAINE HYDROCHLORIDE 2 MG/ML
0.1 INJECTION, SOLUTION EPIDURAL; INFILTRATION; PERINEURAL CONTINUOUS
Status: DISCONTINUED | OUTPATIENT
Start: 2023-12-24 | End: 2023-12-24

## 2023-12-23 RX ADMIN — DEXAMETHASONE SODIUM PHOSPHATE 4 MG: 4 INJECTION INTRA-ARTICULAR; INTRALESIONAL; INTRAMUSCULAR; INTRAVENOUS; SOFT TISSUE at 02:12

## 2023-12-23 RX ADMIN — ACETAMINOPHEN 1000 MG: 500 TABLET ORAL at 08:12

## 2023-12-23 RX ADMIN — ACETAMINOPHEN 1000 MG: 10 INJECTION, SOLUTION INTRAVENOUS at 03:12

## 2023-12-23 RX ADMIN — PROPOFOL 110 MG: 10 INJECTION, EMULSION INTRAVENOUS at 02:12

## 2023-12-23 RX ADMIN — ONDANSETRON 4 MG: 2 INJECTION INTRAMUSCULAR; INTRAVENOUS at 03:12

## 2023-12-23 RX ADMIN — CEFTRIAXONE 2 G: 2 INJECTION, POWDER, FOR SOLUTION INTRAMUSCULAR; INTRAVENOUS at 02:12

## 2023-12-23 RX ADMIN — ROPIVACAINE HYDROCHLORIDE 0.1 ML/HR: 2 INJECTION, SOLUTION EPIDURAL; INFILTRATION at 11:12

## 2023-12-23 RX ADMIN — MEMANTINE 10 MG: 10 TABLET ORAL at 08:12

## 2023-12-23 RX ADMIN — LIDOCAINE HYDROCHLORIDE 80 MG: 20 INJECTION, SOLUTION EPIDURAL; INFILTRATION; INTRACAUDAL; PERINEURAL at 02:12

## 2023-12-23 RX ADMIN — DEXMEDETOMIDINE 8 MCG: 100 INJECTION, SOLUTION, CONCENTRATE INTRAVENOUS at 03:12

## 2023-12-23 RX ADMIN — TRANEXAMIC ACID 1000 MG: 100 INJECTION INTRAVENOUS at 02:12

## 2023-12-23 RX ADMIN — ROPIVACAINE HYDROCHLORIDE 20 ML: 5 INJECTION EPIDURAL; INFILTRATION; PERINEURAL at 02:12

## 2023-12-23 RX ADMIN — ROPIVACAINE HYDROCHLORIDE 0.1 ML/HR: 2 INJECTION, SOLUTION EPIDURAL; INFILTRATION at 04:12

## 2023-12-23 RX ADMIN — FENTANYL CITRATE 25 MCG: 50 INJECTION, SOLUTION INTRAMUSCULAR; INTRAVENOUS at 02:12

## 2023-12-23 RX ADMIN — SODIUM CHLORIDE: 0.9 INJECTION, SOLUTION INTRAVENOUS at 03:12

## 2023-12-23 RX ADMIN — SUGAMMADEX 200 MG: 100 INJECTION, SOLUTION INTRAVENOUS at 03:12

## 2023-12-23 RX ADMIN — SODIUM CHLORIDE: 0.9 INJECTION, SOLUTION INTRAVENOUS at 01:12

## 2023-12-23 RX ADMIN — MORPHINE SULFATE 4 MG: 4 INJECTION INTRAVENOUS at 02:12

## 2023-12-23 RX ADMIN — FENTANYL CITRATE 25 MCG: 50 INJECTION, SOLUTION INTRAMUSCULAR; INTRAVENOUS at 03:12

## 2023-12-23 RX ADMIN — METHOCARBAMOL 500 MG: 500 TABLET ORAL at 08:12

## 2023-12-23 RX ADMIN — CEFAZOLIN 2 G: 2 INJECTION, POWDER, FOR SOLUTION INTRAMUSCULAR; INTRAVENOUS at 11:12

## 2023-12-23 RX ADMIN — PREGABALIN 50 MG: 50 CAPSULE ORAL at 08:12

## 2023-12-23 RX ADMIN — ROCURONIUM BROMIDE 50 MG: 10 INJECTION, SOLUTION INTRAVENOUS at 02:12

## 2023-12-23 RX ADMIN — TRANEXAMIC ACID 1000 MG: 100 INJECTION INTRAVENOUS at 03:12

## 2023-12-23 RX ADMIN — CLINDAMYCIN IN 5 PERCENT DEXTROSE 900 MG: 18 INJECTION, SOLUTION INTRAVENOUS at 02:12

## 2023-12-23 RX ADMIN — EPHEDRINE SULFATE 10 MG: 50 INJECTION INTRAVENOUS at 04:12

## 2023-12-23 NOTE — H&P
[facility]  History & Physical    Patient Name: Rosario Menendez  MRN: [id number]  Patient Class: IP- Inpatient  Admission Date: 12/23/2023  Attending Physician: Rylie Agee MD   Primary Care Provider: Shi Simon MD         Patient information was obtained from relative(s), past medical records, and ER records.     Subjective:     Principal Problem:Closed intertrochanteric fracture of left femur    Chief Complaint:   Chief Complaint   Patient presents with    Fall     Trip and fall, denies head injury. L hip pain, no deformity. Normally walks with walker        HPI: Ms. Menendez is a 88 y.o. female with PMH of osteoporosis, dementia, thrombocytopenia and hypothyroidism  presenting with left hip pain. Patient was walking at home and fell onto left hip.  Most of the history was obtained for son who was at the bedside.  Patient has dementia does not remember how she fell.  Immediate pain and difficulty bearing weight. Denies head injury or LOC. On no blood thinners. Denies other MSK pains. Denies numbness, tingling, or paresthesias to the LLE. Lives at an assisted living facility. Uses a walker for ambulation at baseline.    On presentation patient was afebrile, hypertensive with blood pressure of 157/107 mm of Hg, tachycardic with heart rate of 124, saturating well on room air.  Blood work including CBC, CMP unremarkable, HIV negative, hep C negative.  UA positive for trace leukocyte esterase.  CT cervical spine showed multi level degenerative changes.  CT head showed chronic microvascular changes.  Chest x-ray was unremarkable.  X-ray hip  showed irregular lucency transversing the left greater trochanter extending into the intertrochanteric portion of the proximal left femur.  A nondisplaced fracture.  Patient received ceftriaxone, 1 dose of morphine and admitted to inpatient service.    Past Medical History:   Diagnosis Date    Arthritis     Depression      Hypercholesteremia     Thrombocytopenia     Thyroid disease        Past Surgical History:   Procedure Laterality Date    ANKLE SURGERY      COLONOSCOPY N/A 6/2/2021    Procedure: COLONOSCOPY;  Surgeon: Alfonso Haque MD;  Location: [address]);  Service: Endoscopy;  Laterality: N/A;  any crs  covid test 5/30-[place]    HYSTERECTOMY      KNEE SURGERY      WRIST SURGERY         Review of patient's allergies indicates:   Allergen Reactions    Codeine Other (See Comments)       No current facility-administered medications on file prior to encounter.     Current Outpatient Medications on File Prior to Encounter   Medication Sig    acetaminophen (TYLENOL) 500 MG tablet Take 2 tablets (1,000 mg total) by mouth every 8 (eight) hours as needed for Pain.    alendronate (FOSAMAX) 70 MG tablet Take 1 tablet (70 mg total) by mouth every 7 days. HOLD while in SNF    buPROPion (WELLBUTRIN XL) 150 MG TB24 tablet TAKE 1 TABLET EVERY DAY    cyanocobalamin (VITAMIN B-12) 1000 MCG tablet Take 1 tablet (1,000 mcg total) by mouth once daily.    galantamine (RAZADYNE ER) 16 MG 24 hr capsule TAKE 1 CAPSULE EVERY DAY WITH BREAKFAST    levothyroxine (SYNTHROID) 75 MCG tablet Take 1 tablet (75 mcg total) by mouth before breakfast.    memantine (NAMENDA) 10 MG Tab Take 1 tablet (10 mg total) by mouth 2 (two) times daily.    [DISCONTINUED] diclofenac sodium (VOLTAREN) 1 % Gel Apply 2 g topically daily as needed.    [DISCONTINUED] mupirocin (BACTROBAN) 2 % ointment Apply topically 2 (two) times daily as needed.     Family History       Problem Relation (Age of Onset)    Cancer Maternal Uncle    Heart failure Mother, Father    Suicide Son          Tobacco Use    Smoking status: Never    Smokeless tobacco: Never   Substance and Sexual Activity    Alcohol use: Not on file    Drug use: Not on file    Sexual activity: Not on file     Review of Systems   Constitutional:  Negative for chills and fever.   HENT:  Negative for rhinorrhea and  sore throat.    Respiratory:  Negative for cough, shortness of breath and wheezing.    Cardiovascular:  Negative for chest pain, palpitations and leg swelling.   Gastrointestinal:  Negative for abdominal pain, nausea and vomiting.   Endocrine: Negative for cold intolerance, polydipsia and polyphagia.   Genitourinary:  Negative for dysuria and hematuria.   Musculoskeletal:         Pain in the left hip joint   Neurological: Negative.    Psychiatric/Behavioral: Negative.       Objective:     Vital Signs (Most Recent):  Temp: 96.7 °F (35.9 °C) (12/23/23 1241)  Pulse: 70 (12/23/23 1241)  Resp: 20 (12/23/23 1241)  BP: (!) 160/74 (12/23/23 1338)  SpO2: 96 % (12/23/23 1241) Vital Signs (24h Range):  Temp:  [96.7 °F (35.9 °C)-98.7 °F (37.1 °C)] 96.7 °F (35.9 °C)  Pulse:  [60-71] 70  Resp:  [14-23] 20  SpO2:  [94 %-98 %] 96 %  BP: (153-194)/() 160/74     Weight: 61.5 kg (135 lb 9.3 oz)  Body mass index is 26.48 kg/m².     Physical Exam  Constitutional:       Appearance: Normal appearance.   HENT:      Mouth/Throat:      Mouth: Mucous membranes are moist.      Pharynx: Oropharynx is clear.   Eyes:      Conjunctiva/sclera: Conjunctivae normal.      Pupils: Pupils are equal, round, and reactive to light.   Cardiovascular:      Rate and Rhythm: Normal rate and regular rhythm.   Pulmonary:      Effort: Pulmonary effort is normal.      Breath sounds: Normal breath sounds.   Abdominal:      General: Bowel sounds are normal.      Palpations: Abdomen is soft.   Musculoskeletal:         General: Tenderness present. No swelling.      Cervical back: Normal range of motion and neck supple.      Comments: Tender over left hip joint   Neurological:      General: No focal deficit present.      Mental Status: She is alert and oriented to person, place, and time.   Psychiatric:         Mood and Affect: Mood normal.         Behavior: Behavior normal.              CRANIAL NERVES     CN III, IV, VI   Pupils are equal, round, and reactive to  light.       Significant Labs:   Recent Lab Results         12/23/23  1306   12/23/23  1305   12/23/23  0547   12/23/23  0205        Albumin       3.6       ALP       75       ALT       20       Anion Gap       10       Appearance, UA     Clear         AST       19  Comment: *Result may be interfered by visible hemolysis       Bacteria, UA     Occasional         Baso #       0.05       Basophil %       0.6       Bilirubin (UA)     Negative         BILIRUBIN TOTAL       0.3  Comment: For infants and newborns, interpretation of results should be based  on gestational age, weight and in agreement with clinical  observations.    Premature Infant recommended reference ranges:  Up to 24 hours.............<8.0 mg/dL  Up to 48 hours............<12.0 mg/dL  3-5 days..................<15.0 mg/dL  6-29 days.................<15.0 mg/dL         BUN       15       Calcium       10.3       Chloride       111       CO2       23       Color, UA     Yellow         Creatinine       0.9       Differential Method       Automated       eGFR       >60.0       Eos #       0.1       Eosinophil %       1.5       Estimated Avg Glucose 108             Glucose       99       Glucose, UA     Negative         Gran # (ANC)       6.6       Gran %       72.6       Group & Rh   A POS           Hematocrit       41.4       Hemoglobin       13.7       Hemoglobin A1C External 5.4  Comment: ADA Screening Guidelines:  5.7-6.4%  Consistent with prediabetes  >or=6.5%  Consistent with diabetes    High levels of fetal hemoglobin interfere with the HbA1C  assay. Heterozygous hemoglobin variants (HbS, HgC, etc)do  not significantly interfere with this assay.   However, presence of multiple variants may affect accuracy.               Hepatitis C Ab       Non-reactive       HIV 1/2 Ag/Ab       Non-reactive       Immature Grans (Abs)       0.04  Comment: Mild elevation in immature granulocytes is non specific and   can be seen in a variety of conditions including  stress response,   acute inflammation, trauma and pregnancy. Correlation with other   laboratory and clinical findings is essential.         Immature Granulocytes       0.4       INDIRECT CARLOS   NEG           INR 1.0  Comment: Coumadin Therapy:  2.0 - 3.0 for INR for all indicators except mechanical heart valves  and antiphospholipid syndromes which should use 2.5 - 3.5.               Ketones, UA     Negative         Leukocytes, UA     Trace         Lymph #       1.4       Lymph %       15.1       Magnesium    2.1     2.2       MCH       30.7       MCHC       33.1       MCV       93       Microscopic Comment     SEE COMMENT  Comment: Other formed elements not mentioned in the report are not   present in the microscopic examination.            Mono #       0.9       Mono %       9.8       MPV       12.9       NITRITE UA     Negative         nRBC       0       Occult Blood UA     Negative         pH, UA     7.0         Phosphorus Level   2.6           Platelet Count       83       Potassium       4.2       Prealbumin   19           PROTEIN TOTAL       6.8       Protein, UA     Negative  Comment: Recommend a 24 hour urine protein or a urine   protein/creatinine ratio if globulin induced proteinuria is  clinically suspected.           Protime 10.3             RBC       4.46       RBC, UA     2         RDW       13.9       Sodium       144       Specific [place], UA     1.025         Specimen Outdate   12/26/2023 23:59           Specimen UA     Urine, Clean Catch         Squam Epithel, UA     0         Transferrin   242           WBC, UA     4         WBC       9.07               Significant Imaging:   X-Ray Hip 2 or 3 views Left (with Pelvis when performed)  Order: [id number]  Status: Final result       Visible to patient: Yes (seen)       Next appt: 03/21/2024 at 11:00 AM in Internal Medicine (Shi Simon MD)    0 Result Notes  Details    Reading Physician Reading Date Result Priority   Alfonso Singh,  MD  [phone]  [phone] 12/23/2023 STAT     Narrative & Impression  EXAMINATION:  XR HIP WITH PELVIS WHEN PERFORMED, 2 OR 3 VIEWS LEFT     CLINICAL HISTORY:  fall w TTP over greater trochanter;     TECHNIQUE:  AP view of the pelvis and frog leg lateral view of the left hip were performed.     COMPARISON:  07/15/2023, 11/28/2022     FINDINGS:  There is diffuse osteopenia.  There is a subtle irregular lucency transversing the left greater trochanter extending into the intertrochanteric portion of the proximal left femur.  A nondisplaced fracture cannot be excluded on the basis of this study.  Consider further evaluation with dedicated CT or MRI for more sensitive assessment.  The bilateral femoral heads appear appropriately seated within the acetabula.  The ilioischial and iliopectineal lines are maintained.  The sacrum and pubic symphysis are obscured by overlying bowel gas.     Impression:     As above.        Electronically signed by: Alfonso Singh MD  Date:                                            12/23/2023  Time:                                           04:25     X-Ray Chest AP Portable  Order: [id number]  Status: Final result       Visible to patient: Yes (seen)       Next appt: 03/21/2024 at 11:00 AM in Internal Medicine (Shi Simon MD)       Dx: Fall    0 Result Notes  Details    Reading Physician Reading Date Result Priority   Alfonso Singh MD  [phone]  [phone] 12/23/2023 STAT     Narrative & Impression  EXAMINATION:  XR CHEST AP PORTABLE     CLINICAL HISTORY:  Unspecified fall, initial encounter     TECHNIQUE:  Single frontal view of the chest was performed.     COMPARISON:  07/15/2023     FINDINGS:  Cardiac monitoring leads overlie the chest.  The cardiac silhouette appears within normal limits.  There is atherosclerotic calcification of the thoracic aorta.  Lungs demonstrate chronic coarse interstitial attenuation.  No large confluent airspace consolidation identified.   No significant volume of pleural fluid or pneumothorax identified.  Osseous structures demonstrate mild degenerative changes.     Impression:     Coarse interstitial attenuation.  No convincing radiographic evidence of acute intrathoracic process on this single view.        Electronically signed by: Alfonso Singh MD  Date:                                            12/23/2023  Time:                                           04:27           Exam Ended: 12/23/23 03:28 CST Last Resulted: 12/23/23 04:27 CST           CT Head Without Contrast  Order: [id number]  Status: Final result       Visible to patient: Yes (seen)       Next appt: 03/21/2024 at 11:00 AM in Internal Medicine (Shi Simon MD)    0 Result Notes  Details    Reading Physician Reading Date Result Priority   Jonathan Keen MD  [phone] 12/23/2023 STAT   Sapna Negrete MD  [phone] 12/23/2023      Narrative & Impression  EXAMINATION:  CT HEAD WITHOUT CONTRAST; CT CERVICAL SPINE WITHOUT CONTRAST     CLINICAL HISTORY:  Head trauma, minor (Age >= 65y);; Neck trauma (Age >= 65y);     Additional history, from today's ED provider note: Ground level fall     TECHNIQUE:  Low dose axial CT images obtained throughout the head and cervical spine without the use of intravenous contrast.  Axial, sagittal and coronal reconstructions were performed.     COMPARISON:  HEAD CT AND CERVICAL SPINE CT, BOTH DATED 07/15/2023     FINDINGS:  HEAD:     Generalized cerebral volume loss and compensatory enlargement of the ventricles and sulci.  Ventricles are stable in size and configuration when compared to prior exam.  No hydrocephalus.     Mild patchy hypoattenuation in the supratentorial white matter, nonspecific but most likely reflecting chronic small vessel ischemic changes. No parenchymal mass effect, hemorrhage, edema or major vascular distribution infarct.     No extra-axial blood or fluid collections.     Bilateral pseudophakia.     No displaced calvarial  fracture.  Mild nasal septal deviation to the left.  The mastoid air cells and visualized paranasal sinuses are essentially clear.     CERVICAL SPINE:     Alignment: Straightening of normal cervical lordosis.  No spondylolisthesis or spondylolysis.     Vertebrae: Diffuse osteopenia.  Vertebral body heights are maintained.  No acute fractures.  No lytic or blastic lesions.  Stable fusion of the right C2-C3 facets.     Discs: Multilevel disc height loss, most pronounced at C4-C7.     C1-2: Dens is intact.  Pre-dens space is mildly narrowed, likely from degenerative changes.     Degenerative findings:     C2-C3: Bilateral facet arthropathy, right greater than left.  No spinal canal stenosis or neural foraminal narrowing.     C3-C4: Severe right-sided neural foraminal narrowing, uncovertebral spurring, and posterior disc osteophyte complex.  Severe right neural foraminal narrowing.  Mild spinal canal stenosis.     C4-C5: Severe bilateral neural foraminal narrowing, uncovertebral spurring, and right paracentral posterior disc osteophyte complex.  Moderate to severe bilateral neural foraminal narrowing.  Moderate spinal canal stenosis.     C5-C6: Severe bilateral neural foraminal narrowing, intervertebral spurring, and left paracentral posterior disc osteophyte complex.  Moderate bilateral neural foraminal narrowing.  Severe spinal canal stenosis.     C6-C7: Bilateral facet arthropathy and uncovertebral spurring.  Mild to moderate right neural foraminal narrowing.  No spinal canal stenosis.     C7-T1: No spinal canal stenosis or neural foraminal narrowing.     Paraspinal muscles & soft tissues: No cervical or supraclavicular lymphadenopathy.  Scattered vascular calcifications.  Atrophic thyroid.  Visualized lung apices demonstrate mild mosaic parenchymal attenuation, nonspecific.  No periapical pneumothorax.     Impression:     HEAD CT AND CERVICAL SPINE CT:     No evidence of acute intracranial pathology.     No acute  fracture or acute traumatic malalignment of the cervical spine.     Generalized cerebral volume loss and chronic microvascular ischemic changes.     Multilevel degenerative changes of the cervical spine, most pronounced at C4-C6 with moderate to severe spinal canal stenosis.  Correlate clinically.     Additional findings as above.     Electronically signed by resident: Sapna Negrete  Date:                                            12/23/2023  Time:                                           04:07     Electronically signed by: Jonathan Keen  Date:                                            12/23/2023  Time:                                           04:49           Exam Ended: 12/23/23 03:38 CST Last Resulted: 12/23/23 04:49 CST               CT Cervical Spine Without Contrast  Order: [id number]  Status: Final result       Visible to patient: Yes (seen)       Next appt: 03/21/2024 at 11:00 AM in Internal Medicine (Shi Simon MD)    0 Result Notes  Details    Reading Physician Reading Date Result Priority   Jonathan Keen MD  [phone] 12/23/2023    Sapna Negrete MD  [phone] 12/23/2023      Narrative & Impression  EXAMINATION:  CT HEAD WITHOUT CONTRAST; CT CERVICAL SPINE WITHOUT CONTRAST     CLINICAL HISTORY:  Head trauma, minor (Age >= 65y);; Neck trauma (Age >= 65y);     Additional history, from today's ED provider note: Ground level fall     TECHNIQUE:  Low dose axial CT images obtained throughout the head and cervical spine without the use of intravenous contrast.  Axial, sagittal and coronal reconstructions were performed.     COMPARISON:  HEAD CT AND CERVICAL SPINE CT, BOTH DATED 07/15/2023     FINDINGS:  HEAD:     Generalized cerebral volume loss and compensatory enlargement of the ventricles and sulci.  Ventricles are stable in size and configuration when compared to prior exam.  No hydrocephalus.     Mild patchy hypoattenuation in the supratentorial white matter, nonspecific but most likely  reflecting chronic small vessel ischemic changes. No parenchymal mass effect, hemorrhage, edema or major vascular distribution infarct.     No extra-axial blood or fluid collections.     Bilateral pseudophakia.     No displaced calvarial fracture.  Mild nasal septal deviation to the left.  The mastoid air cells and visualized paranasal sinuses are essentially clear.     CERVICAL SPINE:     Alignment: Straightening of normal cervical lordosis.  No spondylolisthesis or spondylolysis.     Vertebrae: Diffuse osteopenia.  Vertebral body heights are maintained.  No acute fractures.  No lytic or blastic lesions.  Stable fusion of the right C2-C3 facets.     Discs: Multilevel disc height loss, most pronounced at C4-C7.     C1-2: Dens is intact.  Pre-dens space is mildly narrowed, likely from degenerative changes.     Degenerative findings:     C2-C3: Bilateral facet arthropathy, right greater than left.  No spinal canal stenosis or neural foraminal narrowing.     C3-C4: Severe right-sided neural foraminal narrowing, uncovertebral spurring, and posterior disc osteophyte complex.  Severe right neural foraminal narrowing.  Mild spinal canal stenosis.     C4-C5: Severe bilateral neural foraminal narrowing, uncovertebral spurring, and right paracentral posterior disc osteophyte complex.  Moderate to severe bilateral neural foraminal narrowing.  Moderate spinal canal stenosis.     C5-C6: Severe bilateral neural foraminal narrowing, intervertebral spurring, and left paracentral posterior disc osteophyte complex.  Moderate bilateral neural foraminal narrowing.  Severe spinal canal stenosis.     C6-C7: Bilateral facet arthropathy and uncovertebral spurring.  Mild to moderate right neural foraminal narrowing.  No spinal canal stenosis.     C7-T1: No spinal canal stenosis or neural foraminal narrowing.     Paraspinal muscles & soft tissues: No cervical or supraclavicular lymphadenopathy.  Scattered vascular calcifications.  Atrophic  thyroid.  Visualized lung apices demonstrate mild mosaic parenchymal attenuation, nonspecific.  No periapical pneumothorax.     Impression:     HEAD CT AND CERVICAL SPINE CT:     No evidence of acute intracranial pathology.     No acute fracture or acute traumatic malalignment of the cervical spine.     Generalized cerebral volume loss and chronic microvascular ischemic changes.     Multilevel degenerative changes of the cervical spine, most pronounced at C4-C6 with moderate to severe spinal canal stenosis.  Correlate clinically.     Additional findings as above.     Electronically signed by resident: Sapna Negrete  Date:                                            12/23/2023  Time:                                           04:07     Electronically signed by: Jonathan Keen  Date:                                            12/23/2023  Time:                                           04:49           Exam Ended: 12/23/23 03:38 CST Last Resulted: 12/23/23 04:49 CST             CT Hip Without Contrast Left  Order: [id number]  Status: Final result       Visible to patient: Yes (seen)       Next appt: 03/21/2024 at 11:00 AM in Internal Medicine (Shi Simon MD)    0 Result Notes  Details    Reading Physician Reading Date Result Priority   Phoenix Tam MD  [phone] 12/23/2023 STAT   Ridge Cespedes MD  [phone]  [phone] 12/23/2023      Narrative & Impression  EXAMINATION:  CT HIP WITHOUT CONTRAST LEFT     CLINICAL HISTORY:  Fracture, hip;     TECHNIQUE:  Axial images of the left hip obtained without intravenous contrast.  Data submitted for coronal and sagittal reformats.     3D reconstructions were created at an independent workstation.     COMPARISON:  Hip radiograph 12/23/2023     FINDINGS:  There is a minimally displaced fracture extending from greater trochanter to the proximal diaphysis.  Left femoroacetabular joint is maintained.  No large joint effusion or hematoma.  Minimal stranding  overlying the posterolateral left hip.     Visualized aspects of the pelvis and sacrum are intact.  Degenerative changes of the lower lumbar spine.  Unchanged grade 2 anterolisthesis of L4-L5 with bilateral L4 pars defect.     Visualized abdominopelvic contents demonstrate scattered colonic diverticuli without evidence of inflammatory changes or obstruction.  Mild stool burden within the rectum.  Hysterectomy.  Scattered phleboliths within the pelvis.  Atherosclerosis.     Impression:     Minimally displaced fracture of the proximal left femur and greater trochanter..  Left femoroacetabular joint is maintained.  No large joint effusion or soft tissue hematoma.     Electronically signed by resident: Ridge Cespedes  Date:                                            12/23/2023  Time:                                           08:18     Electronically signed by: Phoenix Tam MD  Date:                                            12/23/2023  Time:                                           08:52           Exam Ended: 12/23/23 06:07 CST Last Resulted: 12/23/23 08:52 CST           Assessment/Plan:     * Closed intertrochanteric fracture of left femur      Assessment and Plan:    Ms. Rosario Menendez is a 88 y.o. female who presented to [facility] on 12/23/2023 with a <principal problem not specified>     Fracture  Hip Fracture Pathway initiated  Orthopedics consulted.  Plan to go to the OR on today.  Ok to proceed to Surgery.  For high risk of post-op delirium, minimize CNS-active meds (opiatess, benzos, anticholinergics) and sleep hygiene with windowshades open during day, no daytime naps, frequent re-orientation.  DVT prophylaxis for 28 days post-op to start POD 1 with Eliquis 2.5mg PO BID  PT/OT to start on POD 1  Zamarripa to be removed on POD 1  Pain control:  per ortho service  VTE PPx:   No pharmacologic prophylaxis for now since upcoming surgery - SCDs; start anticoagulation on POD 2 as above      Debility  PT/OT consulted      Amnestic MCI (mild cognitive impairment with memory loss)   Continue memantine, galantamine, and buproprion  - Delirium precautions       Hypercholesteremia  - continue statin         Hypothyroidism   continue synthroid         VTE Risk Mitigation (From admission, onward)           Ordered     IP VTE HIGH RISK PATIENT  Once         12/23/23 1039     Place sequential compression device  Until discontinued         12/23/23 1039                                    Rylie Agee MD  Department of Hospital Medicine  [facility] - Surgery

## 2023-12-23 NOTE — ED NOTES
Ortho at bedside consenting pt for surgery. Telephone consent obtained from pt's healthcare proxy. 2 nurse signatures obtain on consent. Orthopedic surgeon has consents with him for procedure.

## 2023-12-23 NOTE — ASSESSMENT & PLAN NOTE
Rosario Menendez is a 88 y.o. female with a left intertrochanteric femur fracture, closed, NVI. They take no anticoagulation at home. They required a walker for ambulation prior to this injury.     -Admitted to medicine hip fracture service for pre-operative clearance and medical evaluation  -To OR when medically stable for operative fixation of left femur fracture, plan for this afternoon at time of note  -Pt marked, booked, and consented for surgery  - DVT PPx: Hold anticoagulation  - Abx: Preop abx ordered  - Labs: As above  - Platelets held  - Bed rest, shields, NPO   - Iv: ordered for contralateral arm    Patient and her family was explained in detail the severity of the injury that was suffered. Patient and family were explained the risks/benefits/and alternatives to operative management in detail including infection, bleeding, pain, nerve and vascular damage, heterotopic ossification, leg length discrepancies, rotational deformities and they express full understanding.  We discussed the 30% national first year mortality rate with hip fractures, the need for early mobilization, and the expected rehab course.  She and family express full understanding of the condition and expresses that they want to proceed with surgery. The patient is admitted to the medicine hip fracture service for optimization of medical comorbidities. Will plan for OR when medically stable. No guarantees were made, informed consent was obtained. All questions were answered to patient's and family's satisfaction.

## 2023-12-23 NOTE — ANESTHESIA POSTPROCEDURE EVALUATION
Anesthesia Post Evaluation    Patient: Rosario Menendez    Procedure(s) Performed: Procedure(s) (LRB):  INSERTION, INTRAMEDULLARY DIEGO, FEMUR (Left)    Final Anesthesia Type: general      Patient location during evaluation: PACU  Patient participation: Yes- Able to Participate  Level of consciousness: awake and alert  Post-procedure vital signs: reviewed and stable  Pain management: adequate  Airway patency: patent    PONV status at discharge: No PONV  Anesthetic complications: no      Cardiovascular status: blood pressure returned to baseline  Respiratory status: unassisted  Hydration status: euvolemic  Follow-up not needed.              Vitals Value Taken Time   /59 12/23/23 1617   Temp 36.2 °C (97.2 °F) 12/23/23 1602   Pulse 74 12/23/23 1617   Resp 28 12/23/23 1617   SpO2 95 % 12/23/23 1617   Vitals shown include unvalidated device data.      No case tracking events are documented in the log.      Pain/Jhony Score: Pain Rating Prior to Med Admin: 0 (12/23/2023  4:08 PM)  Pain Rating Post Med Admin: 3 (12/23/2023 12:23 PM)  Jhony Score: 8 (12/23/2023  4:10 PM)

## 2023-12-23 NOTE — PLAN OF CARE
Surgical Risk Assessment:    Active Cardiac Issues:  Active decompensated heart failure? No   Unstable angina?  No   Significant uncontrolled arrhythmias? No   Severe valvular heart disease-Aortic or Mitral Stenosis? No   Recent MI or coronary revascularization < 30 days? No     Cardiac Risk Factors:  High risk surgery? No   History of CAD/ischemic heart disease? No   History of cerebrovascular disease? No   History of compensated heart failure? No   Type 2 diabetes requiring insulin? No   Serum Creatinine > 2? No   Total cardiac risk factors 3     Functional mets 4    < 4 METs -unable to walk > 2 blocks on level ground without stopping due to symptoms  - eating, dressing, toileting, walking indoors, light housework. POOR   > 4 METs -climbing > 1 flight of stairs without stopping  -walking up hill > 1-2 blocks  -scrubbing floors  -moving furniture  - golf, bowling, dancing or tennis  -running short distance MODERATE to EXCELLENT     Perioperative Risk Assessment:  RCRI Score 0, Class 1 risk with 3.9% risk of cardiac complications.      Patient is at low risk for perioperative cardiac complications.  Recommend proceed to surgery as planned.

## 2023-12-23 NOTE — CONSULTS
[facility] - Emergency Dept  Orthopedics  Consult Note    Patient Name: Rosario Menendez  MRN: [id number]  Admission Date: 12/23/2023  Hospital Length of Stay: 0 days  Attending Provider: Rylie Agee MD  Primary Care Provider: Shi Simon MD    Patient information was obtained from patient, relative(s), past medical records, and ER records.     Inpatient consult to Orthopedic Surgery  Consult performed by: VIKTOR Olsen MD  Consult ordered by: Leon Fracno MD        Subjective:     Principal Problem:<principal problem not specified>    Chief Complaint:   Chief Complaint   Patient presents with    Fall     Trip and fall, denies head injury. L hip pain, no deformity. Normally walks with walker        HPI: Rosario Menendez is a 88 y.o. female with PMH of osteoporosis, dementia, thrombocytopenia and hypothyroidism  presenting with left hip pain. Patient was walking at home and fell onto left hip. Immediate pain and difficulty bearing weight. Denies head injury or LOC. On no blood thinners. Denies other MSK pains. Denies numbness, tingling, or paresthesias to the LLE. Lives at an assisted living facility. Uses a walker for ambulation at baseline. NPO since midnight.  They deny IV drug use.  They deny tobacco use.   They deny alcohol use.   They deny immunosuppressant medications.  They deny chemotherapy.  They deny radiation therapy.         Past Medical History:   Diagnosis Date    Arthritis     Depression     Hypercholesteremia     Thrombocytopenia     Thyroid disease        Past Surgical History:   Procedure Laterality Date    ANKLE SURGERY      COLONOSCOPY N/A 6/2/2021    Procedure: COLONOSCOPY;  Surgeon: Alfonso Haque MD;  Location: [address];  Service: Endoscopy;  Laterality: N/A;  any crs  [facility]    HYSTERECTOMY      KNEE SURGERY      WRIST SURGERY         Review of patient's allergies indicates:   Allergen Reactions    Codeine Other (See Comments)        Current Facility-Administered Medications   Medication    acetaminophen tablet 1,000 mg    [START ON 12/24/2023] buPROPion TB24 tablet 150 mg    cefTRIAXone (ROCEPHIN) 2 g in dextrose 5 % in water (D5W) 100 mL IVPB (MB+)    dextrose 10% bolus 125 mL 125 mL    dextrose 10% bolus 250 mL 250 mL    [START ON 12/24/2023] galantamine 24 hr capsule 16 mg    glucagon (human recombinant) injection 1 mg    glucose chewable tablet 16 g    glucose chewable tablet 24 g    [START ON 12/24/2023] levothyroxine tablet 75 mcg    memantine tablet 10 mg    naloxone 0.4 mg/mL injection 0.02 mg    sodium chloride 0.9% flush 10 mL     Current Outpatient Medications   Medication Sig    acetaminophen (TYLENOL) 500 MG tablet Take 2 tablets (1,000 mg total) by mouth every 8 (eight) hours as needed for Pain.    alendronate (FOSAMAX) 70 MG tablet Take 1 tablet (70 mg total) by mouth every 7 days. HOLD while in SNF    buPROPion (WELLBUTRIN XL) 150 MG TB24 tablet TAKE 1 TABLET EVERY DAY    cyanocobalamin (VITAMIN B-12) 1000 MCG tablet Take 1 tablet (1,000 mcg total) by mouth once daily.    diclofenac sodium (VOLTAREN) 1 % Gel Apply 2 g topically daily as needed.    galantamine (RAZADYNE ER) 16 MG 24 hr capsule TAKE 1 CAPSULE EVERY DAY WITH BREAKFAST    levothyroxine (SYNTHROID) 75 MCG tablet Take 1 tablet (75 mcg total) by mouth before breakfast.    memantine (NAMENDA) 10 MG Tab Take 1 tablet (10 mg total) by mouth 2 (two) times daily.    mupirocin (BACTROBAN) 2 % ointment Apply topically 2 (two) times daily as needed.     Family History       Problem Relation (Age of Onset)    Cancer Maternal Uncle    Heart failure Mother, Father    Suicide Son          Tobacco Use    Smoking status: Never    Smokeless tobacco: Never   Substance and Sexual Activity    Alcohol use: Not on file    Drug use: Not on file    Sexual activity: Not on file     Review of Systems   Unable to perform ROS: Dementia       Objective:     Vital Signs (Most  Recent):  Temp: 98.5 °F (36.9 °C) (12/23/23 0241)  Pulse: 68 (12/23/23 1010)  Resp: 20 (12/23/23 1010)  BP: (!) 168/75 (12/23/23 1010)  SpO2: 95 % (12/23/23 1010) Vital Signs (24h Range):  Temp:  [98.5 °F (36.9 °C)-98.7 °F (37.1 °C)] 98.5 °F (36.9 °C)  Pulse:  [60-71] 68  Resp:  [14-23] 20  SpO2:  [94 %-98 %] 95 %  BP: (153-180)/() 168/75     Weight: 59 kg (130 lb)     Body mass index is 25.39 kg/m².    No intake or output data in the 24 hours ending 12/23/23 1057     Ortho/SPM Exam  General:  no acute distress, appears stated age   Neuro: No focal deficit  Psych: normal mood  Head: normocephalic, atraumatic.  Eyes: no scleral icterus  Mouth: moist mucous membranes  Cardiovascular: extremities warm and well perfused  Lungs: breathing comfortably, equal chest rise bilat  Skin: clean, dry, intact (any exceptions noted in below musculoskeletal exam)    MSK:  RUE:  - Skin intact throughout, no open wounds  - No swelling  - No ecchymosis, erythema, or signs of cellulitis  - NonTTP throughout  - AROM and PROM of the shoulder, elbow, wrist, and hand intact without pain  - Axillary/AIN/PIN/Radial/Median/Ulnar Nerves assessed in isolation without deficit  - SILT throughout  - Compartments soft  - Radial artery palpated   - Capillary Refill <3s    LUE:  - Skin intact throughout, no open wounds  - No swelling  - No ecchymosis, erythema, or signs of cellulitis  - NonTTP throughout  - AROM and PROM of the shoulder, elbow, wrist, and hand intact without pain  - Axillary/AIN/PIN/Radial/Median/Ulnar Nerves assessed in isolation without deficit  - SILT throughout  - Compartments soft  - Radial artery palpated   - Capillary Refill <3s    RLE:  - Skin intact throughout, no open wounds  - No swelling  - No ecchymosis, erythema, or signs of cellulitis  - NonTTP throughout  - AROM and PROM of the hip, knee, ankle, and foot intact without pain  - TA/EHL/Gastroc/FHL assessed in isolation without deficit  - SILT throughout  -  Compartments soft  - DP palpated  - Capillary Refill <3s  - Negative Log roll  - Negative [facility]    LLE:  - Skin intact throughout, no scars present  - No swelling  - No ecchymosis, erythema, or signs of cellulitis  - Severely TTP at hip/proximal femur  - Moderately tender to palpation of middle or distal femur  - No tenderness to palpation of proximal, middle, or distal aspects of tibia or fibula  - No tenderness to palpation of foot  - Pain with any ROM at hip  - Full painless ROM of knee and ankle  - Compartments soft  - SILT Sa/Garvey/DP/SP/T  - Motor intact EHL/FHL/TA/Gastroc  - 2+ DP  - Brisk capillary refill       Spine/pelvis/axial body:  No pain with compression of pelvis           Significant Labs: CBC:   Recent Labs   Lab 12/23/23  0205   WBC 9.07   HGB 13.7   HCT 41.4   PLT 83*     CMP:   Recent Labs   Lab 12/23/23  0205      K 4.2   *   CO2 23   GLU 99   BUN 15   CREATININE 0.9   CALCIUM 10.3   PROT 6.8   ALBUMIN 3.6   BILITOT 0.3   ALKPHOS 75   AST 19   ALT 20   ANIONGAP 10       Urine Studies:   Recent Labs   Lab 12/23/23  0547   COLORU Yellow   APPEARANCEUA Clear   PHUR 7.0   SPECGRAV 1.025   PROTEINUA Negative   GLUCUA Negative   KETONESU Negative   BILIRUBINUA Negative   OCCULTUA Negative   NITRITE Negative   LEUKOCYTESUR Trace*   RBCUA 2   WBCUA 4   BACTERIA Occasional   SQUAMEPITHEL 0     All pertinent labs within the past 24 hours have been reviewed.    Significant Imaging: I have reviewed and interpreted all pertinent imaging results/findings.  XR left hip: nondisplaced left intertrochanteric femur fracture  Assessment/Plan:     Closed intertrochanteric fracture of left femur  Rosario Menendez is a 88 y.o. female with a left intertrochanteric femur fracture, closed, NVI. They take no anticoagulation at home. They required a walker for ambulation prior to this injury.     -Admitted to medicine hip fracture service for pre-operative clearance and medical evaluation  -To OR when  medically stable for operative fixation of left femur fracture, plan for this afternoon at time of note  -Pt marked, booked, and consented for surgery  - DVT PPx: Hold anticoagulation  - Abx: Preop abx ordered  - Labs: As above  - Platelets held  - Bed rest, shields, NPO   - Iv: ordered for contralateral arm    Patient and her family was explained in detail the severity of the injury that was suffered. Patient and family were explained the risks/benefits/and alternatives to operative management in detail including infection, bleeding, pain, nerve and vascular damage, heterotopic ossification, leg length discrepancies, rotational deformities and they express full understanding.  We discussed the 30% national first year mortality rate with hip fractures, the need for early mobilization, and the expected rehab course.  She and family express full understanding of the condition and expresses that they want to proceed with surgery. The patient is admitted to the medicine hip fracture service for optimization of medical comorbidities. Will plan for OR when medically stable. No guarantees were made, informed consent was obtained. All questions were answered to patient's and family's satisfaction.             VIKTOR Olsen MD  Orthopedics  [facility] - Emergency Dept

## 2023-12-23 NOTE — NURSING
Patient timex watch with red band and gold chain with gold cross and metal removed prior to leaving floor giving to brandy Sol RN

## 2023-12-23 NOTE — SUBJECTIVE & OBJECTIVE
Past Medical History:   Diagnosis Date    Arthritis     Depression     Hypercholesteremia     Thrombocytopenia     Thyroid disease        Past Surgical History:   Procedure Laterality Date    ANKLE SURGERY      COLONOSCOPY N/A 6/2/2021    Procedure: COLONOSCOPY;  Surgeon: Alfonso Haque MD;  Location: [address];  Service: Endoscopy;  Laterality: N/A;  any crs  covid test 5/30-[facility]    HYSTERECTOMY      KNEE SURGERY      WRIST SURGERY         Review of patient's allergies indicates:   Allergen Reactions    Codeine Other (See Comments)       No current facility-administered medications on file prior to encounter.     Current Outpatient Medications on File Prior to Encounter   Medication Sig    acetaminophen (TYLENOL) 500 MG tablet Take 2 tablets (1,000 mg total) by mouth every 8 (eight) hours as needed for Pain.    alendronate (FOSAMAX) 70 MG tablet Take 1 tablet (70 mg total) by mouth every 7 days. HOLD while in SNF    buPROPion (WELLBUTRIN XL) 150 MG TB24 tablet TAKE 1 TABLET EVERY DAY    cyanocobalamin (VITAMIN B-12) 1000 MCG tablet Take 1 tablet (1,000 mcg total) by mouth once daily.    galantamine (RAZADYNE ER) 16 MG 24 hr capsule TAKE 1 CAPSULE EVERY DAY WITH BREAKFAST    levothyroxine (SYNTHROID) 75 MCG tablet Take 1 tablet (75 mcg total) by mouth before breakfast.    memantine (NAMENDA) 10 MG Tab Take 1 tablet (10 mg total) by mouth 2 (two) times daily.    [DISCONTINUED] diclofenac sodium (VOLTAREN) 1 % Gel Apply 2 g topically daily as needed.    [DISCONTINUED] mupirocin (BACTROBAN) 2 % ointment Apply topically 2 (two) times daily as needed.     Family History       Problem Relation (Age of Onset)    Cancer Maternal Uncle    Heart failure Mother, Father    Suicide Son          Tobacco Use    Smoking status: Never    Smokeless tobacco: Never   Substance and Sexual Activity    Alcohol use: Not on file    Drug use: Not on file    Sexual activity: Not on file     Review of Systems   Constitutional:   Negative for chills and fever.   HENT:  Negative for rhinorrhea and sore throat.    Respiratory:  Negative for cough, shortness of breath and wheezing.    Cardiovascular:  Negative for chest pain, palpitations and leg swelling.   Gastrointestinal:  Negative for abdominal pain, nausea and vomiting.   Endocrine: Negative for cold intolerance, polydipsia and polyphagia.   Genitourinary:  Negative for dysuria and hematuria.   Musculoskeletal:         Pain in the left hip joint   Neurological: Negative.    Psychiatric/Behavioral: Negative.       Objective:     Vital Signs (Most Recent):  Temp: 96.7 °F (35.9 °C) (12/23/23 1241)  Pulse: 70 (12/23/23 1241)  Resp: 20 (12/23/23 1241)  BP: (!) 160/74 (12/23/23 1338)  SpO2: 96 % (12/23/23 1241) Vital Signs (24h Range):  Temp:  [96.7 °F (35.9 °C)-98.7 °F (37.1 °C)] 96.7 °F (35.9 °C)  Pulse:  [60-71] 70  Resp:  [14-23] 20  SpO2:  [94 %-98 %] 96 %  BP: (153-194)/() 160/74     Weight: 61.5 kg (135 lb 9.3 oz)  Body mass index is 26.48 kg/m².     Physical Exam  Constitutional:       Appearance: Normal appearance.   HENT:      Mouth/Throat:      Mouth: Mucous membranes are moist.      Pharynx: Oropharynx is clear.   Eyes:      Conjunctiva/sclera: Conjunctivae normal.      Pupils: Pupils are equal, round, and reactive to light.   Cardiovascular:      Rate and Rhythm: Normal rate and regular rhythm.   Pulmonary:      Effort: Pulmonary effort is normal.      Breath sounds: Normal breath sounds.   Abdominal:      General: Bowel sounds are normal.      Palpations: Abdomen is soft.   Musculoskeletal:         General: Tenderness present. No swelling.      Cervical back: Normal range of motion and neck supple.      Comments: Tender over left hip joint   Neurological:      General: No focal deficit present.      Mental Status: She is alert and oriented to person, place, and time.   Psychiatric:         Mood and Affect: Mood normal.         Behavior: Behavior normal.              CRANIAL  NERVES     CN III, IV, VI   Pupils are equal, round, and reactive to light.       Significant Labs:   Recent Lab Results         12/23/23  1306   12/23/23  1305   12/23/23  0547   12/23/23  0205        Albumin       3.6       ALP       75       ALT       20       Anion Gap       10       Appearance, UA     Clear         AST       19  Comment: *Result may be interfered by visible hemolysis       Bacteria, UA     Occasional         Baso #       0.05       Basophil %       0.6       Bilirubin (UA)     Negative         BILIRUBIN TOTAL       0.3  Comment: For infants and newborns, interpretation of results should be based  on gestational age, weight and in agreement with clinical  observations.    Premature Infant recommended reference ranges:  Up to 24 hours.............<8.0 mg/dL  Up to 48 hours............<12.0 mg/dL  3-5 days..................<15.0 mg/dL  6-29 days.................<15.0 mg/dL         BUN       15       Calcium       10.3       Chloride       111       CO2       23       Color, UA     Yellow         Creatinine       0.9       Differential Method       Automated       eGFR       >60.0       Eos #       0.1       Eosinophil %       1.5       Estimated Avg Glucose 108             Glucose       99       Glucose, UA     Negative         Gran # (ANC)       6.6       Gran %       72.6       Group & Rh   A POS           Hematocrit       41.4       Hemoglobin       13.7       Hemoglobin A1C External 5.4  Comment: ADA Screening Guidelines:  5.7-6.4%  Consistent with prediabetes  >or=6.5%  Consistent with diabetes    High levels of fetal hemoglobin interfere with the HbA1C  assay. Heterozygous hemoglobin variants (HbS, HgC, etc)do  not significantly interfere with this assay.   However, presence of multiple variants may affect accuracy.               Hepatitis C Ab       Non-reactive       HIV 1/2 Ag/Ab       Non-reactive       Immature Grans (Abs)       0.04  Comment: Mild elevation in immature granulocytes is  non specific and   can be seen in a variety of conditions including stress response,   acute inflammation, trauma and pregnancy. Correlation with other   laboratory and clinical findings is essential.         Immature Granulocytes       0.4       INDIRECT CARLOS   NEG           INR 1.0  Comment: Coumadin Therapy:  2.0 - 3.0 for INR for all indicators except mechanical heart valves  and antiphospholipid syndromes which should use 2.5 - 3.5.               Ketones, UA     Negative         Leukocytes, UA     Trace         Lymph #       1.4       Lymph %       15.1       Magnesium    2.1     2.2       MCH       30.7       MCHC       33.1       MCV       93       Microscopic Comment     SEE COMMENT  Comment: Other formed elements not mentioned in the report are not   present in the microscopic examination.            Mono #       0.9       Mono %       9.8       MPV       12.9       NITRITE UA     Negative         nRBC       0       Occult Blood UA     Negative         pH, UA     7.0         Phosphorus Level   2.6           Platelet Count       83       Potassium       4.2       Prealbumin   19           PROTEIN TOTAL       6.8       Protein, UA     Negative  Comment: Recommend a 24 hour urine protein or a urine   protein/creatinine ratio if globulin induced proteinuria is  clinically suspected.           Protime 10.3             RBC       4.46       RBC, UA     2         RDW       13.9       Sodium       144       Specific [place], UA     1.025         Specimen Outdate   12/26/2023 23:59           Specimen UA     Urine, Clean Catch         Squam Epithel, UA     0         Transferrin   242           WBC, UA     4         WBC       9.07               Significant Imaging:   X-Ray Hip 2 or 3 views Left (with Pelvis when performed)  Order: [id number]  Status: Final result       Visible to patient: Yes (seen)       Next appt: 03/21/2024 at 11:00 AM in Internal Medicine (Shi Simon MD)    0 Result  Notes  Details    Reading Physician Reading Date Result Priority   Alfonso Singh MD  [phone]  [phone] 12/23/2023 STAT     Narrative & Impression  EXAMINATION:  XR HIP WITH PELVIS WHEN PERFORMED, 2 OR 3 VIEWS LEFT     CLINICAL HISTORY:  fall w TTP over greater trochanter;     TECHNIQUE:  AP view of the pelvis and frog leg lateral view of the left hip were performed.     COMPARISON:  07/15/2023, 11/28/2022     FINDINGS:  There is diffuse osteopenia.  There is a subtle irregular lucency transversing the left greater trochanter extending into the intertrochanteric portion of the proximal left femur.  A nondisplaced fracture cannot be excluded on the basis of this study.  Consider further evaluation with dedicated CT or MRI for more sensitive assessment.  The bilateral femoral heads appear appropriately seated within the acetabula.  The ilioischial and iliopectineal lines are maintained.  The sacrum and pubic symphysis are obscured by overlying bowel gas.     Impression:     As above.        Electronically signed by: Alfonso Singh MD  Date:                                            12/23/2023  Time:                                           04:25     X-Ray Chest AP Portable  Order: [id number]  Status: Final result       Visible to patient: Yes (seen)       Next appt: 03/21/2024 at 11:00 AM in Internal Medicine (Shi Simon MD)       Dx: Fall    0 Result Notes  Details    Reading Physician Reading Date Result Priority   Alfonso Singh MD  [phone]  [phone] 12/23/2023 STAT     Narrative & Impression  EXAMINATION:  XR CHEST AP PORTABLE     CLINICAL HISTORY:  Unspecified fall, initial encounter     TECHNIQUE:  Single frontal view of the chest was performed.     COMPARISON:  07/15/2023     FINDINGS:  Cardiac monitoring leads overlie the chest.  The cardiac silhouette appears within normal limits.  There is atherosclerotic calcification of the thoracic aorta.  Lungs demonstrate chronic  coarse interstitial attenuation.  No large confluent airspace consolidation identified.  No significant volume of pleural fluid or pneumothorax identified.  Osseous structures demonstrate mild degenerative changes.     Impression:     Coarse interstitial attenuation.  No convincing radiographic evidence of acute intrathoracic process on this single view.        Electronically signed by: Alfonso Singh MD  Date:                                            12/23/2023  Time:                                           04:27           Exam Ended: 12/23/23 03:28 CST Last Resulted: 12/23/23 04:27 CST           CT Head Without Contrast  Order: [id number]  Status: Final result       Visible to patient: Yes (seen)       Next appt: 03/21/2024 at 11:00 AM in Internal Medicine (Shi Simon MD)    0 Result Notes  Details    Reading Physician Reading Date Result Priority   Jonathan Keen MD  [phone] 12/23/2023 STAT   Sapna Negrete MD  [phone] 12/23/2023      Narrative & Impression  EXAMINATION:  CT HEAD WITHOUT CONTRAST; CT CERVICAL SPINE WITHOUT CONTRAST     CLINICAL HISTORY:  Head trauma, minor (Age >= 65y);; Neck trauma (Age >= 65y);     Additional history, from today's ED provider note: Ground level fall     TECHNIQUE:  Low dose axial CT images obtained throughout the head and cervical spine without the use of intravenous contrast.  Axial, sagittal and coronal reconstructions were performed.     COMPARISON:  HEAD CT AND CERVICAL SPINE CT, BOTH DATED 07/15/2023     FINDINGS:  HEAD:     Generalized cerebral volume loss and compensatory enlargement of the ventricles and sulci.  Ventricles are stable in size and configuration when compared to prior exam.  No hydrocephalus.     Mild patchy hypoattenuation in the supratentorial white matter, nonspecific but most likely reflecting chronic small vessel ischemic changes. No parenchymal mass effect, hemorrhage, edema or major vascular distribution infarct.     No  extra-axial blood or fluid collections.     Bilateral pseudophakia.     No displaced calvarial fracture.  Mild nasal septal deviation to the left.  The mastoid air cells and visualized paranasal sinuses are essentially clear.     CERVICAL SPINE:     Alignment: Straightening of normal cervical lordosis.  No spondylolisthesis or spondylolysis.     Vertebrae: Diffuse osteopenia.  Vertebral body heights are maintained.  No acute fractures.  No lytic or blastic lesions.  Stable fusion of the right C2-C3 facets.     Discs: Multilevel disc height loss, most pronounced at C4-C7.     C1-2: Dens is intact.  Pre-dens space is mildly narrowed, likely from degenerative changes.     Degenerative findings:     C2-C3: Bilateral facet arthropathy, right greater than left.  No spinal canal stenosis or neural foraminal narrowing.     C3-C4: Severe right-sided neural foraminal narrowing, uncovertebral spurring, and posterior disc osteophyte complex.  Severe right neural foraminal narrowing.  Mild spinal canal stenosis.     C4-C5: Severe bilateral neural foraminal narrowing, uncovertebral spurring, and right paracentral posterior disc osteophyte complex.  Moderate to severe bilateral neural foraminal narrowing.  Moderate spinal canal stenosis.     C5-C6: Severe bilateral neural foraminal narrowing, intervertebral spurring, and left paracentral posterior disc osteophyte complex.  Moderate bilateral neural foraminal narrowing.  Severe spinal canal stenosis.     C6-C7: Bilateral facet arthropathy and uncovertebral spurring.  Mild to moderate right neural foraminal narrowing.  No spinal canal stenosis.     C7-T1: No spinal canal stenosis or neural foraminal narrowing.     Paraspinal muscles & soft tissues: No cervical or supraclavicular lymphadenopathy.  Scattered vascular calcifications.  Atrophic thyroid.  Visualized lung apices demonstrate mild mosaic parenchymal attenuation, nonspecific.  No periapical pneumothorax.     Impression:      HEAD CT AND CERVICAL SPINE CT:     No evidence of acute intracranial pathology.     No acute fracture or acute traumatic malalignment of the cervical spine.     Generalized cerebral volume loss and chronic microvascular ischemic changes.     Multilevel degenerative changes of the cervical spine, most pronounced at C4-C6 with moderate to severe spinal canal stenosis.  Correlate clinically.     Additional findings as above.     Electronically signed by resident: Sapna Negrete  Date:                                            12/23/2023  Time:                                           04:07     Electronically signed by: Jonathan Keen  Date:                                            12/23/2023  Time:                                           04:49           Exam Ended: 12/23/23 03:38 CST Last Resulted: 12/23/23 04:49 CST               CT Cervical Spine Without Contrast  Order: [id number]  Status: Final result       Visible to patient: Yes (seen)       Next appt: 03/21/2024 at 11:00 AM in Internal Medicine (Shi Simon MD)    0 Result Notes  Details    Reading Physician Reading Date Result Priority   Jonathan Keen MD  [phone] 12/23/2023    Sapna Negrete MD  [phone] 12/23/2023      Narrative & Impression  EXAMINATION:  CT HEAD WITHOUT CONTRAST; CT CERVICAL SPINE WITHOUT CONTRAST     CLINICAL HISTORY:  Head trauma, minor (Age >= 65y);; Neck trauma (Age >= 65y);     Additional history, from today's ED provider note: Ground level fall     TECHNIQUE:  Low dose axial CT images obtained throughout the head and cervical spine without the use of intravenous contrast.  Axial, sagittal and coronal reconstructions were performed.     COMPARISON:  HEAD CT AND CERVICAL SPINE CT, BOTH DATED 07/15/2023     FINDINGS:  HEAD:     Generalized cerebral volume loss and compensatory enlargement of the ventricles and sulci.  Ventricles are stable in size and configuration when compared to prior exam.  No hydrocephalus.      Mild patchy hypoattenuation in the supratentorial white matter, nonspecific but most likely reflecting chronic small vessel ischemic changes. No parenchymal mass effect, hemorrhage, edema or major vascular distribution infarct.     No extra-axial blood or fluid collections.     Bilateral pseudophakia.     No displaced calvarial fracture.  Mild nasal septal deviation to the left.  The mastoid air cells and visualized paranasal sinuses are essentially clear.     CERVICAL SPINE:     Alignment: Straightening of normal cervical lordosis.  No spondylolisthesis or spondylolysis.     Vertebrae: Diffuse osteopenia.  Vertebral body heights are maintained.  No acute fractures.  No lytic or blastic lesions.  Stable fusion of the right C2-C3 facets.     Discs: Multilevel disc height loss, most pronounced at C4-C7.     C1-2: Dens is intact.  Pre-dens space is mildly narrowed, likely from degenerative changes.     Degenerative findings:     C2-C3: Bilateral facet arthropathy, right greater than left.  No spinal canal stenosis or neural foraminal narrowing.     C3-C4: Severe right-sided neural foraminal narrowing, uncovertebral spurring, and posterior disc osteophyte complex.  Severe right neural foraminal narrowing.  Mild spinal canal stenosis.     C4-C5: Severe bilateral neural foraminal narrowing, uncovertebral spurring, and right paracentral posterior disc osteophyte complex.  Moderate to severe bilateral neural foraminal narrowing.  Moderate spinal canal stenosis.     C5-C6: Severe bilateral neural foraminal narrowing, intervertebral spurring, and left paracentral posterior disc osteophyte complex.  Moderate bilateral neural foraminal narrowing.  Severe spinal canal stenosis.     C6-C7: Bilateral facet arthropathy and uncovertebral spurring.  Mild to moderate right neural foraminal narrowing.  No spinal canal stenosis.     C7-T1: No spinal canal stenosis or neural foraminal narrowing.     Paraspinal muscles & soft tissues:  No cervical or supraclavicular lymphadenopathy.  Scattered vascular calcifications.  Atrophic thyroid.  Visualized lung apices demonstrate mild mosaic parenchymal attenuation, nonspecific.  No periapical pneumothorax.     Impression:     HEAD CT AND CERVICAL SPINE CT:     No evidence of acute intracranial pathology.     No acute fracture or acute traumatic malalignment of the cervical spine.     Generalized cerebral volume loss and chronic microvascular ischemic changes.     Multilevel degenerative changes of the cervical spine, most pronounced at C4-C6 with moderate to severe spinal canal stenosis.  Correlate clinically.     Additional findings as above.     Electronically signed by resident: Sapna Negrete  Date:                                            12/23/2023  Time:                                           04:07     Electronically signed by: Jonathan Keen  Date:                                            12/23/2023  Time:                                           04:49           Exam Ended: 12/23/23 03:38 CST Last Resulted: 12/23/23 04:49 CST             CT Hip Without Contrast Left  Order: [id number]  Status: Final result       Visible to patient: Yes (seen)       Next appt: 03/21/2024 at 11:00 AM in Internal Medicine (Shi Simon MD)    0 Result Notes  Details    Reading Physician Reading Date Result Priority   Phoenix Tam MD  [phone] 12/23/2023 STAT   Ridge Cespedes MD  [phone]  [phone] 12/23/2023      Narrative & Impression  EXAMINATION:  CT HIP WITHOUT CONTRAST LEFT     CLINICAL HISTORY:  Fracture, hip;     TECHNIQUE:  Axial images of the left hip obtained without intravenous contrast.  Data submitted for coronal and sagittal reformats.     3D reconstructions were created at an independent workstation.     COMPARISON:  Hip radiograph 12/23/2023     FINDINGS:  There is a minimally displaced fracture extending from greater trochanter to the proximal diaphysis.  Left  femoroacetabular joint is maintained.  No large joint effusion or hematoma.  Minimal stranding overlying the posterolateral left hip.     Visualized aspects of the pelvis and sacrum are intact.  Degenerative changes of the lower lumbar spine.  Unchanged grade 2 anterolisthesis of L4-L5 with bilateral L4 pars defect.     Visualized abdominopelvic contents demonstrate scattered colonic diverticuli without evidence of inflammatory changes or obstruction.  Mild stool burden within the rectum.  Hysterectomy.  Scattered phleboliths within the pelvis.  Atherosclerosis.     Impression:     Minimally displaced fracture of the proximal left femur and greater trochanter..  Left femoroacetabular joint is maintained.  No large joint effusion or soft tissue hematoma.     Electronically signed by resident: Ridge Cespedes  Date:                                            12/23/2023  Time:                                           08:18     Electronically signed by: Phoenix Tam MD  Date:                                            12/23/2023  Time:                                           08:52           Exam Ended: 12/23/23 06:07 CST Last Resulted: 12/23/23 08:52 CST

## 2023-12-23 NOTE — OP NOTE
OPERATIVE NOTE    DOS:  12/23/2023    Preop Dx: Left intertrochanteric femur fracture    Postop Dx: Left intertrochanteric femur fracture    Procedure: Cephalomedullary nail fixation of left intertrochanteric femur fracture - 17248    Surgeon: Troy Mancilla M.D.    Asst:  Dylan Looney M.D    Anesthesia: GETA    EBL:  100cc    IVF:  1000cc crystalloid    Implants: Synthes  x 11 mm with 85 mm hip screw  Implant Name Type Inv. Item Serial No.  Lot No. LRB No. Used Action   WIRE GUIDE 3.2MM 400MM - [id number]  WIRE GUIDE 3.2MM 400MM  SYNTHES  Left 2 Implanted and Explanted   NAIL IM KAREN 130 DEG 11X360 L - [id number]  NAIL IM KAREN 130 DEG 11X360 L  [employer] [id number] Left 1 Implanted   SCREW FEM NECK PERF GOLD 85MM - [id number]  SCREW FEM NECK PERF GOLD 85MM  SYNTHES [id number] Left 1 Implanted   SCREW STRDRV REC T25 5X42 TTNM - [id number]  SCREW STRDRV REC T25 5X42 TTNM  SYNTHES [id number] Left 1 Implanted         Specimens: None    Findings: Very good purchase of screw and femoral head.  Calcar position.  Compressed.  Stable pattern.  WBAT.    Dispo:  To PACU extubated/stable      Indications for Procedure:    The patient is a 88-year-old female who sustained a ground level fall resulting in a left intertrochanteric femur fracture.  We are proceeding to the operating room for cephalomedullary nail fixation.  The risks, benefits and alternatives to surgery were discussed with the patient and family at length prior to going to the operating room.  Informed consent was obtained for fixation.  The patient was optimized by Internal Medicine prior to going to the operating room.    Procedure in Detail:    The patient was identified in the preoperative holding area and the site was marked.  Regional analgesia was performed in the form of super inguinal fascia iliaca catheter.  Patient was wheeled into the operating room and general endotracheal anesthesia was induced on the patient's hospital bed.  Preoperative  antibiotics were administered.  The patient was placed onto the [place] fracture table with all bony prominences well padded and both lower extremities in traction boots.  A time-out was undertaken to confirm patient, side, site, surgery, surgeon and the administration of preoperative antibiotics.  All agreed and we proceeded.    Closed reduction maneuvers were performed on the table gaining good alignment.  The left hip and lower extremity were prepped and draped in sterile fashion.  A starting incision was made proximal to the greater trochanter and the guidewire for the entry reamer was placed in the appropriate position.  Entry reamer was then used.  A guide mina was passed distally and measurement undertaken.  The canal was then reamed with a 12.5 mm reamer through the isthmus.  A 360 x 11 mm nail was placed in direct distal lateral visualization to ensure good central position in the canal.    Attention was then turned proximally to the hip and the nail seated at its final position.  A guidewire for the hip screw was then placed in a center center position in the femoral head under fluoroscopic guidance.  This was then reamed and measured and an 85 mm hip screw was placed gaining good compression through the insertion handle.  Final position was confirmed under fluoroscopy and insertion handle was removed.    Attention was then turned distally and a single distal interlocking screw was placed without difficulty.  The wounds were then copiously irrigated with normal saline solution and the proximal wound closed with 0 Vicryl suture the fascia, and all wounds closed with 3-0 Vicryl suture in the subcutaneous tissue, followed by 3-0 Monocryl suture and Dermabond in the skin.  Sterile dressings were applied.    All instrument and sponge counts were reported correct at the end of the case.  There were no complications.  The patient was returned to the hospital bed, extubated, awakened and taken to the recovery room in  stable condition.    Plan for the Patient:    Immediate physical and occupational therapy with WBAT.  Multimodal pain management limiting narcotics.  Anticoagulation x1 month.    Troy Mancilla MD

## 2023-12-23 NOTE — PROGRESS NOTES
Vital signs stable. Afebrile. Drowsy, oriented and following commands. Denies pain/nausea. Dressings remain CDI. Zamarripa to gravity. Ropivacaine infusing per MD order. Post procedure xrays completed. POC reviewed and understanding verbalized. Daughter updated via text

## 2023-12-23 NOTE — HPI
Rosario Menendez is a 88 y.o. female with PMH of osteoporosis, dementia, thrombocytopenia and hypothyroidism  presenting with left hip pain. Patient was walking at home and fell onto left hip. Immediate pain and difficulty bearing weight. Denies head injury or LOC. On no blood thinners. Denies other MSK pains. Denies numbness, tingling, or paresthesias to the LLE. Lives at an assisted living facility. Uses a walker for ambulation at baseline. NPO since midnight.  They deny IV drug use.  They deny tobacco use.   They deny alcohol use.   They deny immunosuppressant medications.  They deny chemotherapy.  They deny radiation therapy.

## 2023-12-23 NOTE — ASSESSMENT & PLAN NOTE
Assessment and Plan:    Ms. Rosario Menendez is a 88 y.o. female who presented to [facility] on 12/23/2023 with a <principal problem not specified>     Fracture  Hip Fracture Pathway initiated  Orthopedics consulted.  Plan to go to the OR on today.  Ok to proceed to Surgery.  For high risk of post-op delirium, minimize CNS-active meds (opiatess, benzos, anticholinergics) and sleep hygiene with windowshades open during day, no daytime naps, frequent re-orientation.  DVT prophylaxis for 28 days post-op to start POD 1 with Eliquis 2.5mg PO BID  PT/OT to start on POD 1  Zamarripa to be removed on POD 1  Pain control:  per ortho service  VTE PPx:   No pharmacologic prophylaxis for now since upcoming surgery - SCDs; start anticoagulation on POD 2 as above

## 2023-12-23 NOTE — ANESTHESIA PREPROCEDURE EVALUATION
Pre-operative evaluation for Procedure(s) (LRB):  INSERTION, INTRAMEDULLARY DIEGO, FEMUR (Left)    Rosario Menendez is a 88 y.o. female with pmh of hypothyroid, osteopetrosis, dementia who presents with a femur fracture. Plan for the above procedure.     2D Echo:  7/16/23:  The left ventricle is normal in size with concentric remodeling and hyperdynamic systolic function.  The estimated ejection fraction is 70%.  Normal left ventricular diastolic function.  Normal right ventricular size with normal right ventricular systolic function.  The estimated PA systolic pressure is 30 mmHg.  Normal central venous pressure (3 mmHg).    Patient Active Problem List   Diagnosis    Personal history of colonic polyps    Hypothyroidism    Hypercholesteremia    Amnestic MCI (mild cognitive impairment with memory loss)    Debility    Closed compression fracture of body of L1 vertebra    Aortic atherosclerosis    Acute encephalopathy    Acute cystitis without hematuria    Hypophosphatemia    Cellulitis of buttock    Closed intertrochanteric fracture of left femur        No current facility-administered medications on file prior to encounter.     Current Outpatient Medications on File Prior to Encounter   Medication Sig Dispense Refill    acetaminophen (TYLENOL) 500 MG tablet Take 2 tablets (1,000 mg total) by mouth every 8 (eight) hours as needed for Pain.  0    alendronate (FOSAMAX) 70 MG tablet Take 1 tablet (70 mg total) by mouth every 7 days. HOLD while in SNF 4 tablet 5    buPROPion (WELLBUTRIN XL) 150 MG TB24 tablet TAKE 1 TABLET EVERY DAY 90 tablet 3    cyanocobalamin (VITAMIN B-12) 1000 MCG tablet Take 1 tablet (1,000 mcg total) by mouth once daily.      diclofenac sodium (VOLTAREN) 1 % Gel Apply 2 g topically daily as needed.      galantamine (RAZADYNE ER) 16 MG 24 hr capsule TAKE 1 CAPSULE EVERY DAY WITH BREAKFAST 90 capsule 0    levothyroxine (SYNTHROID) 75 MCG tablet Take 1 tablet (75 mcg  total) by mouth before breakfast. 30 tablet 5    memantine (NAMENDA) 10 MG Tab Take 1 tablet (10 mg total) by mouth 2 (two) times daily. 180 tablet 1    mupirocin (BACTROBAN) 2 % ointment Apply topically 2 (two) times daily as needed.         Past Surgical History:   Procedure Laterality Date    ANKLE SURGERY      COLONOSCOPY N/A 6/2/2021    Procedure: COLONOSCOPY;  Surgeon: Alfonso Haque MD;  Location: [address];  Service: Endoscopy;  Laterality: N/A;  any crs  covid test 5/30-[facility]    HYSTERECTOMY      KNEE SURGERY      WRIST SURGERY             Pre-op Assessment    I have reviewed the Patient Summary Reports.     I have reviewed the Nursing Notes. I have reviewed the NPO Status.   I have reviewed the Medications.     Review of Systems  Anesthesia Hx:    System negative unless otherwise specified in the HPI or problem list above           Denies Family Hx of Anesthesia complications.    Denies Personal Hx of Anesthesia complications.                    Hematology/Oncology:                   Hematology Comments: System negative unless otherwise specified in the HPI or problem list above                Oncology Comments: System negative unless otherwise specified in the HPI or problem list above     EENT/Dental:   System negative unless otherwise specified in the HPI or problem list above          Cardiovascular:                    System negative unless otherwise specified in the HPI or problem list above                         Pulmonary:         System negative unless otherwise specified in the HPI or problem list above               Renal/:     System negative unless otherwise specified in the HPI or problem list above             Hepatic/GI:        System negative unless otherwise specified in the HPI or problem list above          Musculoskeletal:     System negative unless otherwise specified in the HPI or problem list above            OB/GYN/PEDS:          System negative unless otherwise  specified in the HPI or problem list above   Neurological:           System negative unless otherwise specified in the HPI or problem list above                            Endocrine:     System negative unless otherwise specified in the HPI or problem list above        Dermatological:  System negative unless otherwise specified in the HPI or problem list above   Psych:     System negative unless otherwise specified in the HPI or problem list above               Physical Exam  General: Well nourished, Cooperative, Alert and Oriented    Airway:  Mouth Opening: Normal  TM Distance: Normal  Tongue: Normal  Neck ROM: Normal ROM    Chest/Lungs:  Clear to auscultation, Normal Respiratory Rate    Heart:  Rate: Normal  Rhythm: Regular Rhythm  Sounds: Normal        Anesthesia Plan  Type of Anesthesia, risks & benefits discussed:    Anesthesia Type: Gen ETT  Intra-op Monitoring Plan: Standard ASA Monitors  Post Op Pain Control Plan: multimodal analgesia, IV/PO Opioids PRN and peripheral nerve block  Induction:  IV  Airway Plan: Direct, Post-Induction  Informed Consent: Informed consent signed with the Patient and all parties understand the risks and agree with anesthesia plan.  All questions answered.   ASA Score: 3  Day of Surgery Review of History & Physical: H&P Update referred to the surgeon/provider.    Ready For Surgery From Anesthesia Perspective.     .

## 2023-12-23 NOTE — SUBJECTIVE & OBJECTIVE
Past Medical History:   Diagnosis Date    Arthritis     Depression     Hypercholesteremia     Thrombocytopenia     Thyroid disease        Past Surgical History:   Procedure Laterality Date    ANKLE SURGERY      COLONOSCOPY N/A 6/2/2021    Procedure: COLONOSCOPY;  Surgeon: Alfonso Haque MD;  Location: [address];  Service: Endoscopy;  Laterality: N/A;  any crs  covid test 5/30-[facility]    HYSTERECTOMY      KNEE SURGERY      WRIST SURGERY         Review of patient's allergies indicates:   Allergen Reactions    Codeine Other (See Comments)       Current Facility-Administered Medications   Medication    acetaminophen tablet 1,000 mg    [START ON 12/24/2023] buPROPion TB24 tablet 150 mg    cefTRIAXone (ROCEPHIN) 2 g in dextrose 5 % in water (D5W) 100 mL IVPB (MB+)    dextrose 10% bolus 125 mL 125 mL    dextrose 10% bolus 250 mL 250 mL    [START ON 12/24/2023] galantamine 24 hr capsule 16 mg    glucagon (human recombinant) injection 1 mg    glucose chewable tablet 16 g    glucose chewable tablet 24 g    [START ON 12/24/2023] levothyroxine tablet 75 mcg    memantine tablet 10 mg    naloxone 0.4 mg/mL injection 0.02 mg    sodium chloride 0.9% flush 10 mL     Current Outpatient Medications   Medication Sig    acetaminophen (TYLENOL) 500 MG tablet Take 2 tablets (1,000 mg total) by mouth every 8 (eight) hours as needed for Pain.    alendronate (FOSAMAX) 70 MG tablet Take 1 tablet (70 mg total) by mouth every 7 days. HOLD while in SNF    buPROPion (WELLBUTRIN XL) 150 MG TB24 tablet TAKE 1 TABLET EVERY DAY    cyanocobalamin (VITAMIN B-12) 1000 MCG tablet Take 1 tablet (1,000 mcg total) by mouth once daily.    diclofenac sodium (VOLTAREN) 1 % Gel Apply 2 g topically daily as needed.    galantamine (RAZADYNE ER) 16 MG 24 hr capsule TAKE 1 CAPSULE EVERY DAY WITH BREAKFAST    levothyroxine (SYNTHROID) 75 MCG tablet Take 1 tablet (75 mcg total) by mouth before breakfast.    memantine (NAMENDA) 10 MG Tab Take 1 tablet  (10 mg total) by mouth 2 (two) times daily.    mupirocin (BACTROBAN) 2 % ointment Apply topically 2 (two) times daily as needed.     Family History       Problem Relation (Age of Onset)    Cancer Maternal Uncle    Heart failure Mother, Father    Suicide Son          Tobacco Use    Smoking status: Never    Smokeless tobacco: Never   Substance and Sexual Activity    Alcohol use: Not on file    Drug use: Not on file    Sexual activity: Not on file     Review of Systems   Unable to perform ROS: Dementia       Objective:     Vital Signs (Most Recent):  Temp: 98.5 °F (36.9 °C) (12/23/23 0241)  Pulse: 68 (12/23/23 1010)  Resp: 20 (12/23/23 1010)  BP: (!) 168/75 (12/23/23 1010)  SpO2: 95 % (12/23/23 1010) Vital Signs (24h Range):  Temp:  [98.5 °F (36.9 °C)-98.7 °F (37.1 °C)] 98.5 °F (36.9 °C)  Pulse:  [60-71] 68  Resp:  [14-23] 20  SpO2:  [94 %-98 %] 95 %  BP: (153-180)/() 168/75     Weight: 59 kg (130 lb)     Body mass index is 25.39 kg/m².    No intake or output data in the 24 hours ending 12/23/23 1057     Ortho/SPM Exam  General:  no acute distress, appears stated age   Neuro: No focal deficit  Psych: normal mood  Head: normocephalic, atraumatic.  Eyes: no scleral icterus  Mouth: moist mucous membranes  Cardiovascular: extremities warm and well perfused  Lungs: breathing comfortably, equal chest rise bilat  Skin: clean, dry, intact (any exceptions noted in below musculoskeletal exam)    MSK:  RUE:  - Skin intact throughout, no open wounds  - No swelling  - No ecchymosis, erythema, or signs of cellulitis  - NonTTP throughout  - AROM and PROM of the shoulder, elbow, wrist, and hand intact without pain  - Axillary/AIN/PIN/Radial/Median/Ulnar Nerves assessed in isolation without deficit  - SILT throughout  - Compartments soft  - Radial artery palpated   - Capillary Refill <3s    LUE:  - Skin intact throughout, no open wounds  - No swelling  - No ecchymosis, erythema, or signs of cellulitis  - NonTTP throughout  - AROM  and PROM of the shoulder, elbow, wrist, and hand intact without pain  - Axillary/AIN/PIN/Radial/Median/Ulnar Nerves assessed in isolation without deficit  - SILT throughout  - Compartments soft  - Radial artery palpated   - Capillary Refill <3s    RLE:  - Skin intact throughout, no open wounds  - No swelling  - No ecchymosis, erythema, or signs of cellulitis  - NonTTP throughout  - AROM and PROM of the hip, knee, ankle, and foot intact without pain  - TA/EHL/Gastroc/FHL assessed in isolation without deficit  - SILT throughout  - Compartments soft  - DP palpated  - Capillary Refill <3s  - Negative [facility]  - Negative [facility]    LLE:  - Skin intact throughout, no scars present  - No swelling  - No ecchymosis, erythema, or signs of cellulitis  - Severely TTP at hip/proximal femur  - Moderately tender to palpation of middle or distal femur  - No tenderness to palpation of proximal, middle, or distal aspects of tibia or fibula  - No tenderness to palpation of foot  - Pain with any ROM at hip  - Full painless ROM of knee and ankle  - Compartments soft  - SILT Sa/Garvey/DP/SP/T  - Motor intact EHL/FHL/TA/Gastroc  - 2+ DP  - Brisk capillary refill       Spine/pelvis/axial body:  No pain with compression of pelvis           Significant Labs: CBC:   Recent Labs   Lab 12/23/23  0205   WBC 9.07   HGB 13.7   HCT 41.4   PLT 83*     CMP:   Recent Labs   Lab 12/23/23  0205      K 4.2   *   CO2 23   GLU 99   BUN 15   CREATININE 0.9   CALCIUM 10.3   PROT 6.8   ALBUMIN 3.6   BILITOT 0.3   ALKPHOS 75   AST 19   ALT 20   ANIONGAP 10       Urine Studies:   Recent Labs   Lab 12/23/23  0547   COLORU Yellow   APPEARANCEUA Clear   PHUR 7.0   SPECGRAV 1.025   PROTEINUA Negative   GLUCUA Negative   KETONESU Negative   BILIRUBINUA Negative   OCCULTUA Negative   NITRITE Negative   LEUKOCYTESUR Trace*   RBCUA 2   WBCUA 4   BACTERIA Occasional   SQUAMEPITHEL 0     All pertinent labs within the past 24 hours have been  reviewed.    Significant Imaging: I have reviewed and interpreted all pertinent imaging results/findings.  XR left hip: nondisplaced left intertrochanteric femur fracture

## 2023-12-23 NOTE — HPI
Ms. Menendez is a 88 y.o. female with PMH of osteoporosis, dementia, thrombocytopenia and hypothyroidism  presenting with left hip pain. Patient was walking at home and fell onto left hip.  Most of the history was obtained for son who was at the bedside.  Patient has dementia does not remember how she fell.  Immediate pain and difficulty bearing weight. Denies head injury or LOC. On no blood thinners. Denies other MSK pains. Denies numbness, tingling, or paresthesias to the LLE. Lives at an assisted living facility. Uses a walker for ambulation at baseline.    On presentation patient was afebrile, hypertensive with blood pressure of 157/107 mm of Hg, tachycardic with heart rate of 124, saturating well on room air.  Blood work including CBC, CMP unremarkable, HIV negative, hep C negative.  UA positive for trace leukocyte esterase.  CT cervical spine showed multi level degenerative changes.  CT head showed chronic microvascular changes.  Chest x-ray was unremarkable.  X-ray hip  showed irregular lucency transversing the left greater trochanter extending into the intertrochanteric portion of the proximal left femur.  A nondisplaced fracture.  Patient received ceftriaxone, 1 dose of morphine and admitted to inpatient service.

## 2023-12-23 NOTE — TRANSFER OF CARE
Anesthesia Transfer of Care Note    Patient: Rosario Menendez    Procedure(s) Performed: Procedure(s) (LRB):  INSERTION, INTRAMEDULLARY DIEGO, FEMUR (Left)    Patient location: PACU    Anesthesia Type: general    Transport from OR: Transported from OR on 100% O2 by closed face mask with adequate spontaneous ventilation    Post pain: adequate analgesia    Post assessment: no apparent anesthetic complications and tolerated procedure well    Post vital signs: stable    Level of consciousness: awake, alert and oriented    Nausea/Vomiting: no nausea/vomiting    Complications: none    Transfer of care protocol was followed      Last vitals: Visit Vitals  BP (!) 160/74 (BP Location: Right arm, Patient Position: Lying)   Pulse 70   Temp 35.9 °C (96.7 °F) (Oral)   Resp 20   Ht 5' (1.524 m)   Wt 61.5 kg (135 lb 9.3 oz)   SpO2 96%   Breastfeeding No   BMI 26.48 kg/m²

## 2023-12-23 NOTE — PHARMACY MED REC
"Admission Medication History     The home medication history was taken by Jie Morrison.    You may go to "Admission" then "Reconcile Home Medications" tabs to review and/or act upon these items.     The home medication list has been updated by the Pharmacy department.   Please read ALL comments highlighted in yellow.   Please address this information as you see fit.    Feel free to contact us if you have any questions or require assistance.      The medications listed below were removed from the home medication list. Please reorder if appropriate:  Patient reports no longer taking the following medication(s):  DICLOFENAC 1 % GEL  MUPIROCIN 2 % OINTMENT      Medications listed below were obtained from: Patient/family and Analytic software- [employer] Medications   Medication Sig    acetaminophen (TYLENOL) 500 MG tablet   Take 2 tablets (1,000 mg total) by mouth every 8 (eight) hours as needed for pain.    alendronate (FOSAMAX) 70 MG tablet   Take 1 tablet (70 mg total) by mouth every 7 days. HOLD while in SNF    buPROPion (WELLBUTRIN XL) 150 MG TB24 tablet   Take 1 tablet by mouth once daily.    cyanocobalamin (VITAMIN B-12) 1000 MCG tablet   Take 1 tablet (1,000 mcg total) by mouth once daily.    galantamine (RAZADYNE ER) 16 MG 24 hr capsule   Take 1 capsule by mouth daily with breakfast.    levothyroxine (SYNTHROID) 75 MCG tablet   Take 1 tablet (75 mcg total) by mouth before breakfast.    memantine (NAMENDA) 10 MG Tab   Take 1 tablet (10 mg total) by mouth 2 (two) times daily.           Jie Morrison  EXT [id number]                .          "

## 2023-12-23 NOTE — ED NOTES
Patient arrived via EMS complaining of having a fall. Denies hitting her head or being on blood thinners. Ambulates with a walker, lives at home with family who takes care of her.  Alert x 4. Hard hearing . Patient has been changed into a gown and has been connected to cycling blood pressure and pulse ox. Family at bedside.

## 2023-12-23 NOTE — NURSING TRANSFER
Nursing Transfer Note      12/23/2023   4:40 PM    Nurse giving handoff:EDUARDO Rivera RN  Nurse receiving handoff:DAVID Brown    Reason patient is being transferred: per md order    Transfer To: [id number]    Transfer via bed    Transfer with cardiac monitoring    Transported by PCT    Order for Tele Monitor? Yes    Additional Lines: Zamarripa Catheter    Medicines sent: ropivacaine infusing    Any special needs or follow-up needed: n/a    Patient belongings transferred with patient:  n/a    Chart send with patient: Yes    Notified: daughter

## 2023-12-23 NOTE — ED NOTES
Patient identifiers for Rosario Menendez 88 y.o. female checked and correct.  Chief Complaint   Patient presents with    Fall     Trip and fall, denies head injury. L hip pain, no deformity. Normally walks with walker     Past Medical History:   Diagnosis Date    Arthritis     Depression     Hypercholesteremia     Thrombocytopenia     Thyroid disease      Allergies reported:   Review of patient's allergies indicates:   Allergen Reactions    Codeine Other (See Comments)         HEENT: Denies vision changes. Denies ear drainage or hearing loss. No c/o nasal drainage. Denies dysphagia or voice changes.   Appearance: Pt awake, alert & oriented to person, place & time. Pt in no acute distress at present time. Pt is clean and well groomed with clothes appropriately fastened.   Skin: Skin warm, dry & intact. Color consistent with ethnicity. Mucous membranes moist. No breakdown or brusing noted.   Musculoskeletal: Patient moving all extremities well, no obvious swelling or deformities noted.   Respiratory: Respirations spontaneous, even, and non-labored. Visible chest rise noted. Airway is open and patent. No accessory muscle use noted.   Neurologic: Sensation is intact. Speech is clear and appropriate. Eyes open spontaneously, behavior appropriate to situation, follows commands, facial expression symmetrical, bilateral hand grasp equal and even, purposeful motor response noted.  Cardiac: All peripheral pulses present. No Bilateral lower extremity edema. Cap refill is <3 seconds.  Abdomen: Abdomen soft, non distended, non tender to palpation.   : Pt voids independently, denies dysuria, hematuria, frequency.

## 2023-12-23 NOTE — ANESTHESIA PROCEDURE NOTES
Intubation    Date/Time: 12/23/2023 2:17 PM    Performed by: Angie Kaufman CRNA  Authorized by: Scar Garcia MD    Intubation:     Induction:  Intravenous    Intubated:  Postinduction    Mask Ventilation:  Easy mask    Attempts:  1    Attempted By:  CRNA    Method of Intubation:  Video laryngoscopy    Blade:  Mukherjee 3    Laryngeal View Grade: Grade I - full view of cords      Difficult Airway Encountered?: No      Complications:  None    Airway Device:  Oral endotracheal tube    Airway Device Size:  7.0    Style/Cuff Inflation:  Cuffed    Inflation Amount (mL):  7    Tube secured:  21    Secured at:  The lips    Placement Verified By:  Capnometry    Complicating Factors:  None    Findings Post-Intubation:  BS equal bilateral and atraumatic/condition of teeth unchanged

## 2023-12-23 NOTE — ED PROVIDER NOTES
Encounter Date: 2023       History     Chief Complaint   Patient presents with    Fall     Trip and fall, denies head injury. L hip pain, no deformity. Normally walks with walker     88-year-old female presents with left hip pain after fall out of bed tonight.  She has a history of hypertension, hyperlipidemia, short-term memory deficits, hearing difficulty.  She reports that she was able to ambulate after.  However, EMS crew and family members do not corroborate this.  She denies headache or neck pain or other injury.  She denies numbness or weakness to her extremities.  She reports intermittent spasming pain to her left hip.  She points to her greater trochanter over the area of greatest pain.  However, she was also occasionally holding her hand over her left inguinal region and complaining of pain.      Review of patient's allergies indicates:   Allergen Reactions    Codeine Other (See Comments)     Past Medical History:   Diagnosis Date    Arthritis     Depression     Hypercholesteremia     Thrombocytopenia     Thyroid disease      Past Surgical History:   Procedure Laterality Date    ANKLE SURGERY      COLONOSCOPY N/A 2021    Procedure: COLONOSCOPY;  Surgeon: Alfonso Haque MD;  Location: [facility] ([address] [place] [address]);  Service: Endoscopy;  Laterality: N/A;  any crs  covid test -[place]    HYSTERECTOMY      KNEE SURGERY      WRIST SURGERY       Family History   Problem Relation Age of Onset    Heart failure Mother          at 64    Heart failure Father          at 93    Suicide Son          at 34    Cancer Maternal Uncle         colon     Social History     Tobacco Use    Smoking status: Never    Smokeless tobacco: Never     Review of Systems  All other systems reviewed and negative except as noted in HPI    Physical Exam     Initial Vitals [23 0015]   BP Pulse Resp Temp SpO2   (!) 160/90 68 20 98.7 °F (37.1 °C) 98 %      MAP       --         Physical Exam  General:  Alert.  Oriented  to person and place, NAD. Well nourished. Well Developed  Head: Normocephalic and Atraumatic  HEENT: PERRLA. EOMI. OP Clear  Neck: Supple, Nontender in midline.  Cardiovascular: RRR. No m/r/g. 2+ Distal Pulses  Pulm/Chest: Chest nontender. Lungs clear to auscultation. No respiratory distress.  Abdomen: Nontender. Nondistended. No rigidity, rebound, or guarding  MSK:  Limited range of motion left hip.  Patient lying in ramp left lateral decubitus position for comfort.  Unable to range left hip at all.  Tenderness to palpation over greater trochanter of left femur.  No tenderness to palpation left knee, left ankle, left foot  Right hip normal   Right knee normal   Right ankle normal     Ext: no clubbing, cyanosis, or edema  Neuro: GCS 15. CN II-XII intact. Intact symmetric sensation, strength, DTR x 4 extremities  Skin: no bullae, petechiae, or purpura. Warm, dry, and intact.  Psych: normal mood and affect.    ED Course   Procedures  Labs Reviewed   CBC W/ AUTO DIFFERENTIAL - Abnormal; Notable for the following components:       Result Value    Platelets 83 (*)     Lymph % 15.1 (*)     All other components within normal limits   COMPREHENSIVE METABOLIC PANEL - Abnormal; Notable for the following components:    Chloride 111 (*)     All other components within normal limits   URINALYSIS, REFLEX TO URINE CULTURE - Abnormal; Notable for the following components:    Leukocytes, UA Trace (*)     All other components within normal limits    Narrative:     Specimen Source->Urine   HIV 1 / 2 ANTIBODY    Narrative:     Release to patient->Immediate   HEPATITIS C ANTIBODY    Narrative:     Release to patient->Immediate   MAGNESIUM   URINALYSIS MICROSCOPIC    Narrative:     Specimen Source->Urine        ECG Results              EKG 12-lead (Final result)  Result time 12/23/23 10:30:33      Final result by Interface, Lab In [facility] (12/23/23 10:30:33)                   Narrative:    Test Reason : W19.XXXA,    Vent. Rate : 076 BPM      Atrial Rate : 076 BPM     P-R Int : 174 ms          QRS Dur : 078 ms      QT Int : 400 ms       P-R-T Axes : 073 032 061 degrees     QTc Int : 450 ms    Normal sinus rhythm  Low voltage QRS  Borderline Abnormal ECG  When compared with ECG of 15-JUL-2023 19:38,  No significant change was found  Confirmed by Sixto STEPHENS MD ([id number]) on 12/23/2023 10:30:19 AM    Referred By: [email or web address]   SELF           Confirmed By:Sixto STEPHENS MD                                  Imaging Results              SURG FL Surgery Fluoro Usage (Final result)  Result time 12/23/23 15:31:34      Final result by Access, Silent  (12/23/23 15:31:34)                   Narrative:    See OP Notes for results.     IMPRESSION: See OP Notes for results.             This procedure was auto-finalized by: Virtual Radiologist                                     X-Ray Femur 2 View Left (Final result)  Result time 12/23/23 12:10:43      Final result by Ra Mckeon Jr., MD (12/23/23 12:10:43)                   Impression:      See above      Electronically signed by: Ra Armenta Jr  Date:    12/23/2023  Time:    12:10               Narrative:    EXAMINATION:  XR FEMUR 2 VIEW LEFT    CLINICAL HISTORY:  fx;    TECHNIQUE:  XR FEMUR 2 VIEW LEFT    COMPARISON:  None    FINDINGS:  The femur appears normal without definite fracture.  There is a healed patellar fracture status post double lag screw internal fixation.  Soft tissues are unremarkable.                                       CT Hip Without Contrast Left (Final result)  Result time 12/23/23 08:52:26      Final result by Phoenix Tam MD (12/23/23 08:52:26)                   Impression:      Minimally displaced fracture of the proximal left femur and greater trochanter..  Left femoroacetabular joint is maintained.  No large joint effusion or soft tissue hematoma.    Electronically signed by resident: Ridge Cespedes  Date:    12/23/2023  Time:    08:18    Electronically signed  by: Phoenix Tam MD  Date:    12/23/2023  Time:    08:52               Narrative:    EXAMINATION:  CT HIP WITHOUT CONTRAST LEFT    CLINICAL HISTORY:  Fracture, hip;    TECHNIQUE:  Axial images of the left hip obtained without intravenous contrast.  Data submitted for coronal and sagittal reformats.    3D reconstructions were created at an independent workstation.    COMPARISON:  Hip radiograph 12/23/2023    FINDINGS:  There is a minimally displaced fracture extending from greater trochanter to the proximal diaphysis.  Left femoroacetabular joint is maintained.  No large joint effusion or hematoma.  Minimal stranding overlying the posterolateral left hip.    Visualized aspects of the pelvis and sacrum are intact.  Degenerative changes of the lower lumbar spine.  Unchanged grade 2 anterolisthesis of L4-L5 with bilateral L4 pars defect.    Visualized abdominopelvic contents demonstrate scattered colonic diverticuli without evidence of inflammatory changes or obstruction.  Mild stool burden within the rectum.  Hysterectomy.  Scattered phleboliths within the pelvis.  Atherosclerosis.                                       CT 3D RECON WITHOUT INDEPENDENT WS (Final result)  Result time 12/23/23 10:13:41      Final result by Phoenix Tam MD (12/23/23 10:13:41)                   Narrative:      Study was not linked to CT hip without contrast left, performed 12/23/2023 (ACC# [id number]).  Please see report for details.    Electronically signed by resident: Ridge Cespedes  Date:    12/23/2023  Time:    09:55    Electronically signed by: Phoenix Tam MD  Date:    12/23/2023  Time:    10:13                                     CT Head Without Contrast (Final result)  Result time 12/23/23 04:49:17      Final result by Jonathan Keen MD (12/23/23 04:49:17)                   Impression:      HEAD CT AND CERVICAL SPINE CT:    No evidence of acute intracranial pathology.    No acute fracture or acute traumatic malalignment of  the cervical spine.    Generalized cerebral volume loss and chronic microvascular ischemic changes.    Multilevel degenerative changes of the cervical spine, most pronounced at C4-C6 with moderate to severe spinal canal stenosis.  Correlate clinically.    Additional findings as above.    Electronically signed by resident: Sapna Negrete  Date:    12/23/2023  Time:    04:07    Electronically signed by: Jonathan Keen  Date:    12/23/2023  Time:    04:49               Narrative:    EXAMINATION:  CT HEAD WITHOUT CONTRAST; CT CERVICAL SPINE WITHOUT CONTRAST    CLINICAL HISTORY:  Head trauma, minor (Age >= 65y);; Neck trauma (Age >= 65y);    Additional history, from today's ED provider note: Ground level fall    TECHNIQUE:  Low dose axial CT images obtained throughout the head and cervical spine without the use of intravenous contrast.  Axial, sagittal and coronal reconstructions were performed.    COMPARISON:  HEAD CT AND CERVICAL SPINE CT, BOTH DATED 07/15/2023    FINDINGS:  HEAD:    Generalized cerebral volume loss and compensatory enlargement of the ventricles and sulci.  Ventricles are stable in size and configuration when compared to prior exam.  No hydrocephalus.    Mild patchy hypoattenuation in the supratentorial white matter, nonspecific but most likely reflecting chronic small vessel ischemic changes. No parenchymal mass effect, hemorrhage, edema or major vascular distribution infarct.    No extra-axial blood or fluid collections.    Bilateral pseudophakia.    No displaced calvarial fracture.  Mild nasal septal deviation to the left.  The mastoid air cells and visualized paranasal sinuses are essentially clear.    CERVICAL SPINE:    Alignment: Straightening of normal cervical lordosis.  No spondylolisthesis or spondylolysis.    Vertebrae: Diffuse osteopenia.  Vertebral body heights are maintained.  No acute fractures.  No lytic or blastic lesions.  Stable fusion of the right C2-C3 facets.    Discs: Multilevel  disc height loss, most pronounced at C4-C7.    C1-2: Dens is intact.  Pre-dens space is mildly narrowed, likely from degenerative changes.    Degenerative findings:    C2-C3: Bilateral facet arthropathy, right greater than left.  No spinal canal stenosis or neural foraminal narrowing.    C3-C4: Severe right-sided neural foraminal narrowing, uncovertebral spurring, and posterior disc osteophyte complex.  Severe right neural foraminal narrowing.  Mild spinal canal stenosis.    C4-C5: Severe bilateral neural foraminal narrowing, uncovertebral spurring, and right paracentral posterior disc osteophyte complex.  Moderate to severe bilateral neural foraminal narrowing.  Moderate spinal canal stenosis.    C5-C6: Severe bilateral neural foraminal narrowing, intervertebral spurring, and left paracentral posterior disc osteophyte complex.  Moderate bilateral neural foraminal narrowing.  Severe spinal canal stenosis.    C6-C7: Bilateral facet arthropathy and uncovertebral spurring.  Mild to moderate right neural foraminal narrowing.  No spinal canal stenosis.    C7-T1: No spinal canal stenosis or neural foraminal narrowing.    Paraspinal muscles & soft tissues: No cervical or supraclavicular lymphadenopathy.  Scattered vascular calcifications.  Atrophic thyroid.  Visualized lung apices demonstrate mild mosaic parenchymal attenuation, nonspecific.  No periapical pneumothorax.                                       CT Cervical Spine Without Contrast (Final result)  Result time 12/23/23 04:49:17      Final result by Jonathan Keen MD (12/23/23 04:49:17)                   Impression:      HEAD CT AND CERVICAL SPINE CT:    No evidence of acute intracranial pathology.    No acute fracture or acute traumatic malalignment of the cervical spine.    Generalized cerebral volume loss and chronic microvascular ischemic changes.    Multilevel degenerative changes of the cervical spine, most pronounced at C4-C6 with moderate to severe spinal  canal stenosis.  Correlate clinically.    Additional findings as above.    Electronically signed by resident: Sapna Negrete  Date:    12/23/2023  Time:    04:07    Electronically signed by: Jonatahn Keen  Date:    12/23/2023  Time:    04:49               Narrative:    EXAMINATION:  CT HEAD WITHOUT CONTRAST; CT CERVICAL SPINE WITHOUT CONTRAST    CLINICAL HISTORY:  Head trauma, minor (Age >= 65y);; Neck trauma (Age >= 65y);    Additional history, from today's ED provider note: Ground level fall    TECHNIQUE:  Low dose axial CT images obtained throughout the head and cervical spine without the use of intravenous contrast.  Axial, sagittal and coronal reconstructions were performed.    COMPARISON:  HEAD CT AND CERVICAL SPINE CT, BOTH DATED 07/15/2023    FINDINGS:  HEAD:    Generalized cerebral volume loss and compensatory enlargement of the ventricles and sulci.  Ventricles are stable in size and configuration when compared to prior exam.  No hydrocephalus.    Mild patchy hypoattenuation in the supratentorial white matter, nonspecific but most likely reflecting chronic small vessel ischemic changes. No parenchymal mass effect, hemorrhage, edema or major vascular distribution infarct.    No extra-axial blood or fluid collections.    Bilateral pseudophakia.    No displaced calvarial fracture.  Mild nasal septal deviation to the left.  The mastoid air cells and visualized paranasal sinuses are essentially clear.    CERVICAL SPINE:    Alignment: Straightening of normal cervical lordosis.  No spondylolisthesis or spondylolysis.    Vertebrae: Diffuse osteopenia.  Vertebral body heights are maintained.  No acute fractures.  No lytic or blastic lesions.  Stable fusion of the right C2-C3 facets.    Discs: Multilevel disc height loss, most pronounced at C4-C7.    C1-2: Dens is intact.  Pre-dens space is mildly narrowed, likely from degenerative changes.    Degenerative findings:    C2-C3: Bilateral facet arthropathy, right  greater than left.  No spinal canal stenosis or neural foraminal narrowing.    C3-C4: Severe right-sided neural foraminal narrowing, uncovertebral spurring, and posterior disc osteophyte complex.  Severe right neural foraminal narrowing.  Mild spinal canal stenosis.    C4-C5: Severe bilateral neural foraminal narrowing, uncovertebral spurring, and right paracentral posterior disc osteophyte complex.  Moderate to severe bilateral neural foraminal narrowing.  Moderate spinal canal stenosis.    C5-C6: Severe bilateral neural foraminal narrowing, intervertebral spurring, and left paracentral posterior disc osteophyte complex.  Moderate bilateral neural foraminal narrowing.  Severe spinal canal stenosis.    C6-C7: Bilateral facet arthropathy and uncovertebral spurring.  Mild to moderate right neural foraminal narrowing.  No spinal canal stenosis.    C7-T1: No spinal canal stenosis or neural foraminal narrowing.    Paraspinal muscles & soft tissues: No cervical or supraclavicular lymphadenopathy.  Scattered vascular calcifications.  Atrophic thyroid.  Visualized lung apices demonstrate mild mosaic parenchymal attenuation, nonspecific.  No periapical pneumothorax.                                       X-Ray Chest AP Portable (Final result)  Result time 12/23/23 04:27:34      Final result by Alfonso Singh MD (12/23/23 04:27:34)                   Impression:      Coarse interstitial attenuation.  No convincing radiographic evidence of acute intrathoracic process on this single view.      Electronically signed by: Alfonso Singh MD  Date:    12/23/2023  Time:    04:27               Narrative:    EXAMINATION:  XR CHEST AP PORTABLE    CLINICAL HISTORY:  Unspecified fall, initial encounter    TECHNIQUE:  Single frontal view of the chest was performed.    COMPARISON:  07/15/2023    FINDINGS:  Cardiac monitoring leads overlie the chest.  The cardiac silhouette appears within normal limits.  There is atherosclerotic  calcification of the thoracic aorta.  Lungs demonstrate chronic coarse interstitial attenuation.  No large confluent airspace consolidation identified.  No significant volume of pleural fluid or pneumothorax identified.  Osseous structures demonstrate mild degenerative changes.                                       X-Ray Hip 2 or 3 views Left (with Pelvis when performed) (Final result)  Result time 12/23/23 04:25:36      Final result by Alfonso Singh MD (12/23/23 04:25:36)                   Impression:      As above.      Electronically signed by: Alfonso Singh MD  Date:    12/23/2023  Time:    04:25               Narrative:    EXAMINATION:  XR HIP WITH PELVIS WHEN PERFORMED, 2 OR 3 VIEWS LEFT    CLINICAL HISTORY:  fall w TTP over greater trochanter;    TECHNIQUE:  AP view of the pelvis and frog leg lateral view of the left hip were performed.    COMPARISON:  07/15/2023, 11/28/2022    FINDINGS:  There is diffuse osteopenia.  There is a subtle irregular lucency transversing the left greater trochanter extending into the intertrochanteric portion of the proximal left femur.  A nondisplaced fracture cannot be excluded on the basis of this study.  Consider further evaluation with dedicated CT or MRI for more sensitive assessment.  The bilateral femoral heads appear appropriately seated within the acetabula.  The ilioischial and iliopectineal lines are maintained.  The sacrum and pubic symphysis are obscured by overlying bowel gas.                                       Medications   cefTRIAXone (ROCEPHIN) 2 g in dextrose 5 % in water (D5W) 100 mL IVPB (MB+) (has no administration in time range)   sodium chloride 0.9% flush 10 mL (has no administration in time range)   naloxone 0.4 mg/mL injection 0.02 mg (has no administration in time range)   glucose chewable tablet 16 g (has no administration in time range)   glucose chewable tablet 24 g (has no administration in time range)   glucagon (human recombinant)  injection 1 mg (has no administration in time range)   dextrose 10% bolus 125 mL 125 mL (has no administration in time range)   dextrose 10% bolus 250 mL 250 mL (has no administration in time range)   buPROPion TB24 tablet 150 mg (has no administration in time range)   levothyroxine tablet 75 mcg (has no administration in time range)   memantine tablet 10 mg (10 mg Oral Given 12/23/23 2039)   galantamine 24 hr capsule 16 mg (has no administration in time range)   haloperidol lactate injection 0.5 mg (has no administration in time range)   sodium chloride 0.9% flush 3 mL (has no administration in time range)   HYDROmorphone injection 0.2 mg (has no administration in time range)   0.9%  NaCl infusion (has no administration in time range)   mupirocin 2 % ointment 1 g (has no administration in time range)   LIDOcaine (PF) 10 mg/ml (1%) injection 10 mg (has no administration in time range)   tranexamic acid (CYKLOKAPRON) 3,000 mg in sodium chloride 0.9% SolP 100 mL (has no administration in time range)   sodium chloride 0.9% flush 10 mL (has no administration in time range)   ceFAZolin 2 g in dextrose 5 % in water (D5W) 50 mL IVPB (MB+) (0 g Intravenous Stopped 12/24/23 0024)   fentaNYL 50 mcg/mL injection 25 mcg (has no administration in time range)   ROPIvacaine (PF) 2 mg/ml (0.2%) solution (0.1 mL/hr Perineural New Bag 12/23/23 1123)   ondansetron injection 4 mg (has no administration in time range)   prochlorperazine injection Soln 5 mg (has no administration in time range)   melatonin tablet 6 mg (has no administration in time range)   polyethylene glycol packet 17 g (has no administration in time range)   senna-docusate 8.6-50 mg per tablet 1 tablet (has no administration in time range)   bisacodyL suppository 10 mg (has no administration in time range)   mupirocin 2 % ointment 1 g (has no administration in time range)   apixaban tablet 2.5 mg (has no administration in time range)   methocarbamoL tablet 500 mg (500  "mg Oral Given 12/23/23 2039)   traMADoL tablet 50 mg (has no administration in time range)   pregabalin capsule 50 mg (50 mg Oral Given 12/23/23 2039)   acetaminophen tablet 1,000 mg (1,000 mg Oral Given 12/23/23 2039)   morphine injection 4 mg (4 mg Intravenous Given 12/23/23 0217)   ceFEPIme (MAXIPIME) 2 g IVPB (MB+) (2 g Intravenous New Bag 12/23/23 1415)   ePHEDrine sulfate 50 mg/mL (  Override pull for Anesthesia 12/23/23 1611)     Medical Decision Making  Greatest concern is for left hip fracture and possible left pubic ramus fracture.  Will obtain x-ray of pelvis and left hip.  Can not rule out intracranial hemorrhage and skull fracture or cervical spine fracture based on established clinical criteria.  Will obtain CT brain and cervical spine.  Patient will require admission for ORIF if hip fracture present    Amount and/or Complexity of Data Reviewed  External Data Reviewed: labs, radiology, ECG and notes.  Labs: ordered. Decision-making details documented in ED Course.  Radiology: ordered and independent interpretation performed. Decision-making details documented in ED Course.  ECG/medicine tests: ordered and independent interpretation performed. Decision-making details documented in ED Course.    Risk  Prescription drug management.  Parenteral controlled substances.  Decision regarding hospitalization.  Emergency major surgery.               ED Course as of 12/24/23 0359   Sat Dec 23, 2023   0431 Independent review of left hip x-ray is concerning for possible subtle intertrochanteric fracture.  Given patient's severe paroxysmal pain and inability to ambulate, will obtain CT left hip   [DS]   0585 Patient reports that her hip "feels fine at this time.  I notified her of her equivocal x-ray findings and plan to obtain a CT of her hip.  Now that she is received some pain medication attempted to range  hip and she could not range it at all due to pain.  Will continue with plan for CT hip. [DS]   2445 Final " assessment of CT scan of the pelvis reveals left hip fracture level of the greater trochanter.  Patient and son alerted to these findings and ultimate disposition.  They are in agreement with this.  Orthopedics notified and evaluated the patient, with a plan to probably take her to the OR this afternoon. [ST]      ED Course User Index  [DS] Timmy Tomas MD  [ST] Leon Franco MD                           Clinical Impression:  Final diagnoses:  [W19.XXXA] Fall  [S72.002A] Closed left hip fracture          ED Disposition Condition    Admit                 Timmy Tomas MD  12/24/23 0359

## 2023-12-24 PROBLEM — D62 ACUTE BLOOD LOSS AS CAUSE OF POSTOPERATIVE ANEMIA: Status: ACTIVE | Noted: 2023-12-24

## 2023-12-24 LAB
ANION GAP SERPL CALC-SCNC: 10 MMOL/L (ref 8–16)
BASOPHILS # BLD AUTO: 0.01 K/UL (ref 0–0.2)
BASOPHILS NFR BLD: 0.1 % (ref 0–1.9)
BUN SERPL-MCNC: 14 MG/DL (ref 8–23)
CALCIUM SERPL-MCNC: 8.3 MG/DL (ref 8.7–10.5)
CHLORIDE SERPL-SCNC: 108 MMOL/L (ref 95–110)
CO2 SERPL-SCNC: 20 MMOL/L (ref 23–29)
CREAT SERPL-MCNC: 0.8 MG/DL (ref 0.5–1.4)
DIFFERENTIAL METHOD: ABNORMAL
EOSINOPHIL # BLD AUTO: 0 K/UL (ref 0–0.5)
EOSINOPHIL NFR BLD: 0 % (ref 0–8)
ERYTHROCYTE [DISTWIDTH] IN BLOOD BY AUTOMATED COUNT: 14.2 % (ref 11.5–14.5)
EST. GFR  (NO RACE VARIABLE): >60 ML/MIN/1.73 M^2
GLUCOSE SERPL-MCNC: 126 MG/DL (ref 70–110)
HCT VFR BLD AUTO: 32.9 % (ref 37–48.5)
HGB BLD-MCNC: 10.9 G/DL (ref 12–16)
IMM GRANULOCYTES # BLD AUTO: 0.03 K/UL (ref 0–0.04)
IMM GRANULOCYTES NFR BLD AUTO: 0.4 % (ref 0–0.5)
LYMPHOCYTES # BLD AUTO: 0.7 K/UL (ref 1–4.8)
LYMPHOCYTES NFR BLD: 9.7 % (ref 18–48)
MAGNESIUM SERPL-MCNC: 2.1 MG/DL (ref 1.6–2.6)
MCH RBC QN AUTO: 31.1 PG (ref 27–31)
MCHC RBC AUTO-ENTMCNC: 33.1 G/DL (ref 32–36)
MCV RBC AUTO: 94 FL (ref 82–98)
MONOCYTES # BLD AUTO: 0.6 K/UL (ref 0.3–1)
MONOCYTES NFR BLD: 7.7 % (ref 4–15)
NEUTROPHILS # BLD AUTO: 6.3 K/UL (ref 1.8–7.7)
NEUTROPHILS NFR BLD: 82.1 % (ref 38–73)
NRBC BLD-RTO: 0 /100 WBC
PHOSPHATE SERPL-MCNC: 3.3 MG/DL (ref 2.7–4.5)
PLATELET # BLD AUTO: 42 K/UL (ref 150–450)
PMV BLD AUTO: 13.1 FL (ref 9.2–12.9)
POTASSIUM SERPL-SCNC: 4.1 MMOL/L (ref 3.5–5.1)
RBC # BLD AUTO: 3.51 M/UL (ref 4–5.4)
SODIUM SERPL-SCNC: 138 MMOL/L (ref 136–145)
WBC # BLD AUTO: 7.62 K/UL (ref 3.9–12.7)

## 2023-12-24 PROCEDURE — 64448 NJX AA&/STRD FEM NRV NFS IMG: CPT | Mod: 59,HCNC,LT, | Performed by: ANESTHESIOLOGY

## 2023-12-24 PROCEDURE — 25000003 PHARM REV CODE 250: Mod: HCNC | Performed by: STUDENT IN AN ORGANIZED HEALTH CARE EDUCATION/TRAINING PROGRAM

## 2023-12-24 PROCEDURE — 97530 THERAPEUTIC ACTIVITIES: CPT | Mod: HCNC

## 2023-12-24 PROCEDURE — 25000003 PHARM REV CODE 250: Mod: HCNC | Performed by: ANESTHESIOLOGY

## 2023-12-24 PROCEDURE — 36415 COLL VENOUS BLD VENIPUNCTURE: CPT | Mod: HCNC | Performed by: STUDENT IN AN ORGANIZED HEALTH CARE EDUCATION/TRAINING PROGRAM

## 2023-12-24 PROCEDURE — 25000003 PHARM REV CODE 250: Mod: HCNC | Performed by: HOSPITALIST

## 2023-12-24 PROCEDURE — 97161 PT EVAL LOW COMPLEX 20 MIN: CPT | Mod: HCNC

## 2023-12-24 PROCEDURE — 21400001 HC TELEMETRY ROOM: Mod: HCNC

## 2023-12-24 PROCEDURE — 99232 PR SUBSEQUENT HOSPITAL CARE,LEVL II: ICD-10-PCS | Mod: HCNC,25,, | Performed by: ANESTHESIOLOGY

## 2023-12-24 PROCEDURE — 97112 NEUROMUSCULAR REEDUCATION: CPT | Mod: HCNC

## 2023-12-24 PROCEDURE — 83735 ASSAY OF MAGNESIUM: CPT | Mod: HCNC | Performed by: STUDENT IN AN ORGANIZED HEALTH CARE EDUCATION/TRAINING PROGRAM

## 2023-12-24 PROCEDURE — 80048 BASIC METABOLIC PNL TOTAL CA: CPT | Mod: HCNC | Performed by: STUDENT IN AN ORGANIZED HEALTH CARE EDUCATION/TRAINING PROGRAM

## 2023-12-24 PROCEDURE — 85025 COMPLETE CBC W/AUTO DIFF WBC: CPT | Mod: HCNC | Performed by: STUDENT IN AN ORGANIZED HEALTH CARE EDUCATION/TRAINING PROGRAM

## 2023-12-24 PROCEDURE — 84100 ASSAY OF PHOSPHORUS: CPT | Mod: HCNC | Performed by: STUDENT IN AN ORGANIZED HEALTH CARE EDUCATION/TRAINING PROGRAM

## 2023-12-24 PROCEDURE — 63600175 PHARM REV CODE 636 W HCPCS: Mod: HCNC | Performed by: STUDENT IN AN ORGANIZED HEALTH CARE EDUCATION/TRAINING PROGRAM

## 2023-12-24 PROCEDURE — 97165 OT EVAL LOW COMPLEX 30 MIN: CPT | Mod: HCNC

## 2023-12-24 PROCEDURE — 99232 SBSQ HOSP IP/OBS MODERATE 35: CPT | Mod: HCNC,25,, | Performed by: ANESTHESIOLOGY

## 2023-12-24 PROCEDURE — 64448 LEFT SIFI CATHETER: ICD-10-PCS | Mod: 59,HCNC,LT, | Performed by: ANESTHESIOLOGY

## 2023-12-24 RX ORDER — ASPIRIN 81 MG/1
81 TABLET ORAL 2 TIMES DAILY
Status: DISCONTINUED | OUTPATIENT
Start: 2023-12-24 | End: 2023-12-26 | Stop reason: HOSPADM

## 2023-12-24 RX ORDER — ROPIVACAINE HYDROCHLORIDE 5 MG/ML
INJECTION, SOLUTION EPIDURAL; INFILTRATION; PERINEURAL
Status: DISCONTINUED | OUTPATIENT
Start: 2023-12-23 | End: 2023-12-24

## 2023-12-24 RX ADMIN — METHOCARBAMOL 500 MG: 500 TABLET ORAL at 08:12

## 2023-12-24 RX ADMIN — ACETAMINOPHEN 1000 MG: 500 TABLET ORAL at 05:12

## 2023-12-24 RX ADMIN — APIXABAN 2.5 MG: 2.5 TABLET, FILM COATED ORAL at 07:12

## 2023-12-24 RX ADMIN — ASPIRIN 81 MG: 81 TABLET, COATED ORAL at 08:12

## 2023-12-24 RX ADMIN — SENNOSIDES AND DOCUSATE SODIUM 1 TABLET: 8.6; 5 TABLET ORAL at 07:12

## 2023-12-24 RX ADMIN — MEMANTINE 10 MG: 10 TABLET ORAL at 07:12

## 2023-12-24 RX ADMIN — BUPROPION HYDROCHLORIDE 150 MG: 150 TABLET, FILM COATED, EXTENDED RELEASE ORAL at 07:12

## 2023-12-24 RX ADMIN — SENNOSIDES AND DOCUSATE SODIUM 1 TABLET: 8.6; 5 TABLET ORAL at 08:12

## 2023-12-24 RX ADMIN — PREGABALIN 50 MG: 50 CAPSULE ORAL at 08:12

## 2023-12-24 RX ADMIN — ASPIRIN 81 MG: 81 TABLET, COATED ORAL at 10:12

## 2023-12-24 RX ADMIN — Medication 6 MG: at 08:12

## 2023-12-24 RX ADMIN — METHOCARBAMOL 500 MG: 500 TABLET ORAL at 02:12

## 2023-12-24 RX ADMIN — METHOCARBAMOL 500 MG: 500 TABLET ORAL at 07:12

## 2023-12-24 RX ADMIN — POLYETHYLENE GLYCOL 3350 17 G: 17 POWDER, FOR SOLUTION ORAL at 07:12

## 2023-12-24 RX ADMIN — MEMANTINE 10 MG: 10 TABLET ORAL at 08:12

## 2023-12-24 RX ADMIN — ACETAMINOPHEN 1000 MG: 500 TABLET ORAL at 12:12

## 2023-12-24 RX ADMIN — CEFAZOLIN 2 G: 2 INJECTION, POWDER, FOR SOLUTION INTRAMUSCULAR; INTRAVENOUS at 07:12

## 2023-12-24 RX ADMIN — LEVOTHYROXINE SODIUM 75 MCG: 75 TABLET ORAL at 05:12

## 2023-12-24 NOTE — SUBJECTIVE & OBJECTIVE
Principal Problem:Closed intertrochanteric fracture of left femur    Principal Orthopedic Problem: s/p CMN 12/23    Interval History: Patient seen and examined at bedside. NAEON. Patient doing well. No complaints. Pain well controlled. PNC in place. Hgb 10.9.       Review of patient's allergies indicates:   Allergen Reactions    Codeine Other (See Comments)       Current Facility-Administered Medications   Medication    0.9%  NaCl infusion    acetaminophen tablet 1,000 mg    apixaban tablet 2.5 mg    bisacodyL suppository 10 mg    buPROPion TB24 tablet 150 mg    ceFAZolin 2 g in dextrose 5 % in water (D5W) 50 mL IVPB (MB+)    cefTRIAXone (ROCEPHIN) 2 g in dextrose 5 % in water (D5W) 100 mL IVPB (MB+)    dextrose 10% bolus 125 mL 125 mL    dextrose 10% bolus 250 mL 250 mL    fentaNYL 50 mcg/mL injection 25 mcg    galantamine 24 hr capsule 16 mg    glucagon (human recombinant) injection 1 mg    glucose chewable tablet 16 g    glucose chewable tablet 24 g    haloperidol lactate injection 0.5 mg    HYDROmorphone injection 0.2 mg    levothyroxine tablet 75 mcg    LIDOcaine (PF) 10 mg/ml (1%) injection 10 mg    melatonin tablet 6 mg    memantine tablet 10 mg    methocarbamoL tablet 500 mg    mupirocin 2 % ointment 1 g    [START ON 12/25/2023] mupirocin 2 % ointment 1 g    naloxone 0.4 mg/mL injection 0.02 mg    ondansetron injection 4 mg    polyethylene glycol packet 17 g    pregabalin capsule 50 mg    prochlorperazine injection Soln 5 mg    ROPIvacaine (PF) 2 mg/ml (0.2%) solution    senna-docusate 8.6-50 mg per tablet 1 tablet    sodium chloride 0.9% flush 10 mL    sodium chloride 0.9% flush 10 mL    sodium chloride 0.9% flush 3 mL    traMADoL tablet 50 mg    tranexamic acid (CYKLOKAPRON) 3,000 mg in sodium chloride 0.9% SolP 100 mL     Objective:     Vital Signs (Most Recent):  Temp: 97.7 °F (36.5 °C) (12/24/23 0414)  Pulse: 60 (12/24/23 0414)  Resp: 16 (12/24/23 0414)  BP: 112/65 (12/24/23 0414)  SpO2: (!) 92 %  (12/24/23 0414) Vital Signs (24h Range):  Temp:  [96.1 °F (35.6 °C)-98.5 °F (36.9 °C)] 97.7 °F (36.5 °C)  Pulse:  [56-75] 60  Resp:  [14-23] 16  SpO2:  [90 %-97 %] 92 %  BP: ()/() 112/65     Weight: 61.5 kg (135 lb 9.3 oz)  Height: 5' (152.4 cm)  Body mass index is 26.48 kg/m².      Intake/Output Summary (Last 24 hours) at 12/24/2023 0621  Last data filed at 12/23/2023 1800  Gross per 24 hour   Intake 1270 ml   Output 450 ml   Net 820 ml        Ortho/SPM Exam  Awake and alert  Regular Rate  Non labored respirations  Surgical dressings c/d/I  Fires quad/TA/EHL/GSC  SILT  Brisk cap refill       Significant Labs: CBC:   Recent Labs   Lab 12/23/23  0205 12/24/23 0333   WBC 9.07 7.62   HGB 13.7 10.9*   HCT 41.4 32.9*   PLT 83* 42*     CMP:   Recent Labs   Lab 12/23/23  0205 12/24/23 0333    138   K 4.2 4.1   * 108   CO2 23 20*   GLU 99 126*   BUN 15 14   CREATININE 0.9 0.8   CALCIUM 10.3 8.3*   PROT 6.8  --    ALBUMIN 3.6  --    BILITOT 0.3  --    ALKPHOS 75  --    AST 19  --    ALT 20  --    ANIONGAP 10 10     All pertinent labs within the past 24 hours have been reviewed.    Significant Imaging: I have reviewed and interpreted all pertinent imaging results/findings.

## 2023-12-24 NOTE — ANESTHESIA POST-OP PAIN MANAGEMENT
Acute Pain Service Progress Note    Rosario Menendez is a 88 y.o., female, with pmh of hypothyroid, osteopetrosis, dementia who presents with a femur fracture.    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       Subjective:     General appearance of alert, oriented, no complaints   Pain with rest: 3    Numbers   Pain with movement: 5    Numbers   Side Effects    1. Pruritis No    2. Nausea No    3. Motor Blockade No, 2=Inability to bend knees    4. Sedation No, 1=awake and alert    Objective:     Catheter site clean, dry, intact      Vitals   Vitals:    12/24/23 0716   BP: (!) 108/54   Pulse: (!) 54   Resp:    Temp: 35.8 °C (96.5 °F)        Labs    Admission on 12/23/2023   Component Date Value Ref Range Status    HIV 1/2 Ag/Ab 12/23/2023 Non-reactive  Non-reactive Final    Hepatitis C Ab 12/23/2023 Non-reactive  Non-reactive Final    WBC 12/23/2023 9.07  3.90 - 12.70 K/uL Final    RBC 12/23/2023 4.46  4.00 - 5.40 M/uL Final    Hemoglobin 12/23/2023 13.7  12.0 - 16.0 g/dL Final    Hematocrit 12/23/2023 41.4  37.0 - 48.5 % Final    MCV 12/23/2023 93  82 - 98 fL Final    MCH 12/23/2023 30.7  27.0 - 31.0 pg Final    MCHC 12/23/2023 33.1  32.0 - 36.0 g/dL Final    RDW 12/23/2023 13.9  11.5 - 14.5 % Final    Platelets 12/23/2023 83 (L)  150 - 450 K/uL Final    MPV 12/23/2023 12.9  9.2 - 12.9 fL Final    Immature Granulocytes 12/23/2023 0.4  0.0 - 0.5 % Final    Gran # (ANC) 12/23/2023 6.6  1.8 - 7.7 K/uL Final    Immature Grans (Abs)  12/23/2023 0.04  0.00 - 0.04 K/uL Final    Lymph # 12/23/2023 1.4  1.0 - 4.8 K/uL Final    Mono # 12/23/2023 0.9  0.3 - 1.0 K/uL Final    Eos # 12/23/2023 0.1  0.0 - 0.5 K/uL Final    Baso # 12/23/2023 0.05  0.00 - 0.20 K/uL Final    nRBC 12/23/2023 0  0 /100 WBC Final    Gran % 12/23/2023 72.6  38.0 - 73.0 % Final    Lymph % 12/23/2023 15.1 (L)  18.0 - 48.0 % Final    Mono % 12/23/2023 9.8  4.0 - 15.0 % Final    Eosinophil % 12/23/2023 1.5  0.0 - 8.0 % Final    Basophil % 12/23/2023 0.6  0.0 - 1.9 % Final    Differential Method 12/23/2023 Automated   Final    Sodium 12/23/2023 144  136 - 145 mmol/L Final    Potassium 12/23/2023 4.2  3.5 - 5.1 mmol/L Final    Chloride 12/23/2023 111 (H)  95 - 110 mmol/L Final    CO2 12/23/2023 23  23 - 29 mmol/L Final    Glucose 12/23/2023 99  70 - 110 mg/dL Final    BUN 12/23/2023 15  8 - 23 mg/dL Final    Creatinine 12/23/2023 0.9  0.5 - 1.4 mg/dL Final    Calcium 12/23/2023 10.3  8.7 - 10.5 mg/dL Final    Total Protein 12/23/2023 6.8  6.0 - 8.4 g/dL Final    Albumin 12/23/2023 3.6  3.5 - 5.2 g/dL Final    Total Bilirubin 12/23/2023 0.3  0.1 - 1.0 mg/dL Final    Alkaline Phosphatase 12/23/2023 75  55 - 135 U/L Final    AST 12/23/2023 19  10 - 40 U/L Final    ALT 12/23/2023 20  10 - 44 U/L Final    eGFR 12/23/2023 >60.0  >60 mL/min/1.73 m^2 Final    Anion Gap 12/23/2023 10  8 - 16 mmol/L Final    Magnesium 12/23/2023 2.2  1.6 - 2.6 mg/dL Final    Specimen UA 12/23/2023 Urine, Clean Catch   Final    Color, UA 12/23/2023 Yellow  Yellow, Straw, Bhavya Final    Appearance, UA 12/23/2023 Clear  Clear Final    pH, UA 12/23/2023 7.0  5.0 - 8.0 Final    Specific Gravity, UA 12/23/2023 1.025  1.005 - 1.030 Final    Protein, UA 12/23/2023 Negative  Negative Final    Glucose, UA 12/23/2023 Negative  Negative Final    Ketones, UA 12/23/2023 Negative  Negative Final    Bilirubin (UA) 12/23/2023 Negative  Negative Final    Occult Blood UA 12/23/2023 Negative  Negative Final    Nitrite, UA  12/23/2023 Negative  Negative Final    Leukocytes, UA 12/23/2023 Trace (A)  Negative Final    RBC, UA 12/23/2023 2  0 - 4 /hpf Final    WBC, UA 12/23/2023 4  0 - 5 /hpf Final    Bacteria 12/23/2023 Occasional  None-Occ /hpf Final    Squam Epithel, UA 12/23/2023 0  /hpf Final    Microscopic Comment 12/23/2023 SEE COMMENT   Final    Hemoglobin A1C 12/23/2023 5.4  4.0 - 5.6 % Final    Estimated Avg Glucose 12/23/2023 108  68 - 131 mg/dL Final    Magnesium 12/23/2023 2.1  1.6 - 2.6 mg/dL Final    Phosphorus 12/23/2023 2.6 (L)  2.7 - 4.5 mg/dL Final    Prealbumin 12/23/2023 19 (L)  20 - 43 mg/dL Final    Prothrombin Time 12/23/2023 10.3  9.0 - 12.5 sec Final    INR 12/23/2023 1.0  0.8 - 1.2 Final    Transferrin 12/23/2023 242  200 - 375 mg/dL Final    Group & Rh 12/23/2023 A POS   Final    Indirect David 12/23/2023 NEG   Final    Specimen Outdate 12/23/2023 12/26/2023 23:59   Final    UNIT NUMBER 12/23/2023 [id number]   Preliminary    Product Code 12/23/2023 [id number]   Preliminary    DISPENSE STATUS 12/23/2023 CROSSMATCHED   Preliminary    CODING SYSTEM 12/23/2023 [id number]   Preliminary    Unit Blood Type Code 12/23/2023 6200   Preliminary    Unit Blood Type 12/23/2023 A POS   Preliminary    Unit Expiration 12/23/2023 [id number]   Preliminary    CROSSMATCH INTERPRETATION 12/23/2023 Compatible   Preliminary    UNIT NUMBER 12/23/2023 [id number]   Preliminary    Product Code 12/23/2023 [id number]   Preliminary    DISPENSE STATUS 12/23/2023 CROSSMATCHED   Preliminary    CODING SYSTEM 12/23/2023 [id number]   Preliminary    Unit Blood Type Code 12/23/2023 6200   Preliminary    Unit Blood Type 12/23/2023 A POS   Preliminary    Unit Expiration 12/23/2023 [id number]   Preliminary    CROSSMATCH INTERPRETATION 12/23/2023 Compatible   Preliminary    WBC 12/24/2023 7.62  3.90 - 12.70 K/uL Final    RBC 12/24/2023 3.51 (L)  4.00 - 5.40 M/uL Final    Hemoglobin 12/24/2023 10.9 (L)  12.0 - 16.0 g/dL Final    Hematocrit 12/24/2023  32.9 (L)  37.0 - 48.5 % Final    MCV 12/24/2023 94  82 - 98 fL Final    MCH 12/24/2023 31.1 (H)  27.0 - 31.0 pg Final    MCHC 12/24/2023 33.1  32.0 - 36.0 g/dL Final    RDW 12/24/2023 14.2  11.5 - 14.5 % Final    Platelets 12/24/2023 42 (L)  150 - 450 K/uL Final    MPV 12/24/2023 13.1 (H)  9.2 - 12.9 fL Final    Immature Granulocytes 12/24/2023 0.4  0.0 - 0.5 % Final    Gran # (ANC) 12/24/2023 6.3  1.8 - 7.7 K/uL Final    Immature Grans (Abs) 12/24/2023 0.03  0.00 - 0.04 K/uL Final    Lymph # 12/24/2023 0.7 (L)  1.0 - 4.8 K/uL Final    Mono # 12/24/2023 0.6  0.3 - 1.0 K/uL Final    Eos # 12/24/2023 0.0  0.0 - 0.5 K/uL Final    Baso # 12/24/2023 0.01  0.00 - 0.20 K/uL Final    nRBC 12/24/2023 0  0 /100 WBC Final    Gran % 12/24/2023 82.1 (H)  38.0 - 73.0 % Final    Lymph % 12/24/2023 9.7 (L)  18.0 - 48.0 % Final    Mono % 12/24/2023 7.7  4.0 - 15.0 % Final    Eosinophil % 12/24/2023 0.0  0.0 - 8.0 % Final    Basophil % 12/24/2023 0.1  0.0 - 1.9 % Final    Differential Method 12/24/2023 Automated   Final    Sodium 12/24/2023 138  136 - 145 mmol/L Final    Potassium 12/24/2023 4.1  3.5 - 5.1 mmol/L Final    Chloride 12/24/2023 108  95 - 110 mmol/L Final    CO2 12/24/2023 20 (L)  23 - 29 mmol/L Final    Glucose 12/24/2023 126 (H)  70 - 110 mg/dL Final    BUN 12/24/2023 14  8 - 23 mg/dL Final    Creatinine 12/24/2023 0.8  0.5 - 1.4 mg/dL Final    Calcium 12/24/2023 8.3 (L)  8.7 - 10.5 mg/dL Final    Anion Gap 12/24/2023 10  8 - 16 mmol/L Final    eGFR 12/24/2023 >60.0  >60 mL/min/1.73 m^2 Final    Magnesium 12/24/2023 2.1  1.6 - 2.6 mg/dL Final    Phosphorus 12/24/2023 3.3  2.7 - 4.5 mg/dL Final        Meds   Current Facility-Administered Medications   Medication Dose Route Frequency Provider Last Rate Last Admin    0.9%  NaCl infusion   Intravenous On Call Procedure Dylan Looney MD        acetaminophen tablet 1,000 mg  1,000 mg Oral Q6H Rylie Agee MD   1,000 mg at 12/24/23 0557    aspirin EC tablet 81  mg  81 mg Oral BID Lucia Bates MD        bisacodyL suppository 10 mg  10 mg Rectal Daily PRN Dylan Looney MD        buPROPion TB24 tablet 150 mg  150 mg Oral Daily Rylie Agee MD   150 mg at 12/24/23 0756    cefTRIAXone (ROCEPHIN) 2 g in dextrose 5 % in water (D5W) 100 mL IVPB (MB+)  2 g Intravenous Once Taras Braun MD        dextrose 10% bolus 125 mL 125 mL  12.5 g Intravenous PRN Rylie Agee MD        dextrose 10% bolus 250 mL 250 mL  25 g Intravenous PRN Rylie Agee MD        fentaNYL 50 mcg/mL injection 25 mcg  25 mcg Intravenous Q5 Min PRN Dylan Looney MD        galantamine 24 hr capsule 16 mg  16 mg Oral Daily with breakfast Rylie Agee MD        glucagon (human recombinant) injection 1 mg  1 mg Intramuscular PRN Rylie Agee MD        glucose chewable tablet 16 g  16 g Oral PRN Rylie Agee MD        glucose chewable tablet 24 g  24 g Oral PRN Rylie Agee MD        haloperidol lactate injection 0.5 mg  0.5 mg Intravenous Q10 Min PRN Scar Garcia MD        levothyroxine tablet 75 mcg  75 mcg Oral Before breakfast Rylie Agee MD   75 mcg at 12/24/23 0558    LIDOcaine (PF) 10 mg/ml (1%) injection 10 mg  1 mL Intradermal On Call Procedure Dylan Looney MD        melatonin tablet 6 mg  6 mg Oral Nightly PRN Dylan Looney MD        memantine tablet 10 mg  10 mg Oral BID Rylie Agee MD   10 mg at 12/24/23 0756    methocarbamoL tablet 500 mg  500 mg Oral TID Jose Angel Kauffman MD   500 mg at 12/24/23 0756    mupirocin 2 % ointment 1 g  1 g Nasal On Call Procedure Dylan Looney MD        [START ON 12/25/2023] mupirocin 2 % ointment 1 g  1 g Nasal BID Dylan Looney MD        naloxone 0.4 mg/mL injection 0.02 mg  0.02 mg Intravenous PRN Rylie Agee MD        ondansetron injection 4 mg  4 mg Intravenous Q12H PRN Dylan Looney MD        polyethylene  glycol packet 17 g  17 g Oral Daily Dylan Looney MD   17 g at 12/24/23 0756    pregabalin capsule 50 mg  50 mg Oral QHS Jose Angel Kauffman MD   50 mg at 12/23/23 2039    prochlorperazine injection Soln 5 mg  5 mg Intravenous Q6H PRN Dylan Looney MD        ROPIvacaine (PF) 2 mg/ml (0.2%) solution  0.1 mL/hr Perineural Continuous Dylan Looney MD 0.1 mL/hr at 12/23/23 2354 0.1 mL/hr at 12/23/23 2354    senna-docusate 8.6-50 mg per tablet 1 tablet  1 tablet Oral BID Dylan Looney MD   1 tablet at 12/24/23 0757    sodium chloride 0.9% flush 10 mL  10 mL Intravenous Q12H PRN Rylie Agee MD        sodium chloride 0.9% flush 10 mL  10 mL Intravenous PRN Dylan Looney MD        sodium chloride 0.9% flush 3 mL  3 mL Intravenous Q4H PRN Scar Garcia MD        traMADoL tablet 50 mg  50 mg Oral Q4H PRN Jose Angel Kauffman MD        tranexamic acid (CYKLOKAPRON) 3,000 mg in sodium chloride 0.9% SolP 100 mL  3,000 mg Irrigation On Call Procedure Dylan Looney MD         Assessment:    Pain control adequate    Plan:    Patient doing well, continue present treatment.    Pain: Continue SIFI PNC. Will titrate basal, bolus as needed.  MMPC: Continue tylenol 1g q6, methocarbamol 500mg TID, pregabalin 50mg  PRN: tramadol 50mg for moderate to severe pain      Case discussed with staff, Dr. Kauffman;  final recommendations per attestation above.      Thank you for your consult and allowing us to participate in the care of this patient. We will continue to follow along. Please call the Acute Pain Service/Anesthesia if you have any questions or concerns.    Brad Leslie MD  PGY2 Anesthesiology  12/24/23

## 2023-12-24 NOTE — SUBJECTIVE & OBJECTIVE
Interval History: No acute events overnight.  Reports pain is 3/10.  Discussed plan for PT/OT eval today to determine further hospital course.    Review of Systems   Constitutional:  Positive for activity change. Negative for chills, fatigue and fever.   Respiratory:  Negative for cough and shortness of breath.    Cardiovascular:  Negative for chest pain, palpitations and leg swelling.   Gastrointestinal:  Negative for abdominal pain, diarrhea, nausea and vomiting.   Genitourinary:  Negative for dysuria and urgency.   Musculoskeletal:  Positive for arthralgias and gait problem.   Neurological:  Negative for dizziness and headaches.   All other systems reviewed and are negative.    Objective:     Vital Signs (Most Recent):  Temp: 96.9 °F (36.1 °C) (12/24/23 1117)  Pulse: 65 (12/24/23 1117)  Resp: 16 (12/24/23 1117)  BP: (!) 112/55 (12/24/23 1117)  SpO2: (!) 93 % (12/24/23 1117) Vital Signs (24h Range):  Temp:  [96.1 °F (35.6 °C)-98.5 °F (36.9 °C)] 96.9 °F (36.1 °C)  Pulse:  [54-75] 65  Resp:  [14-20] 16  SpO2:  [90 %-97 %] 93 %  BP: ()/(47-88) 112/55     Weight: 61.5 kg (135 lb 9.3 oz)  Body mass index is 26.48 kg/m².    Intake/Output Summary (Last 24 hours) at 12/24/2023 1222  Last data filed at 12/24/2023 0628  Gross per 24 hour   Intake 1589.49 ml   Output 850 ml   Net 739.49 ml         Physical Exam  Constitutional:       Appearance: Normal appearance. She is well-developed.   HENT:      Head: Normocephalic and atraumatic.   Cardiovascular:      Rate and Rhythm: Normal rate and regular rhythm.      Heart sounds: No murmur heard.  Pulmonary:      Effort: Pulmonary effort is normal. No respiratory distress.      Breath sounds: Normal breath sounds. No wheezing or rales.   Abdominal:      General: There is no distension.      Palpations: Abdomen is soft.      Tenderness: There is no abdominal tenderness.   Musculoskeletal:         General: No deformity.      Comments: Postop dressing to left femur C/D/I    Skin:     General: Skin is warm.   Neurological:      General: No focal deficit present.      Mental Status: She is alert and oriented to person, place, and time. Mental status is at baseline.             Significant Labs: All pertinent labs within the past 24 hours have been reviewed.  CBC:   Recent Labs   Lab 12/23/23 0205 12/24/23 0333   WBC 9.07 7.62   HGB 13.7 10.9*   HCT 41.4 32.9*   PLT 83* 42*     CMP:   Recent Labs   Lab 12/23/23 0205 12/24/23 0333    138   K 4.2 4.1   * 108   CO2 23 20*   GLU 99 126*   BUN 15 14   CREATININE 0.9 0.8   CALCIUM 10.3 8.3*   PROT 6.8  --    ALBUMIN 3.6  --    BILITOT 0.3  --    ALKPHOS 75  --    AST 19  --    ALT 20  --    ANIONGAP 10 10       Significant Imaging: I have reviewed all pertinent imaging results/findings within the past 24 hours.

## 2023-12-24 NOTE — ASSESSMENT & PLAN NOTE
Patient's anemia is currently uncontrolled. Has not received any PRBCs to date. Etiology likely d/t acute blood loss which was from surgery  Current CBC reviewed-   Lab Results   Component Value Date    HGB 10.9 (L) 12/24/2023    HCT 32.9 (L) 12/24/2023     Monitor serial CBC and transfuse if patient becomes hemodynamically unstable, symptomatic or H/H drops below 7/21.

## 2023-12-24 NOTE — NURSING
Zamarripa catheter removed. Bedside commode at bedside. Pt instructed to call if she needs to void.

## 2023-12-24 NOTE — HOSPITAL COURSE
Ms. Menendez was admitted to Hospital Medicine for management of a closed intertrochanteric fracture of the left femur.  Ortho was consulted.  She went to the OR on 12/23 for a left CMN with Dr. Mnacilla.  She was started on PNC postop under Anesthesia Pain which patient pulled on POD2.  She was initially started on Eliquis 2.5mg BID for DVT prophylaxis, but given Thrombocytopenia <50K, she was changed to Aspirin 81mg BID.  PT/OT were consulted who suggest moderate therapy.  She was discharged to [facility].

## 2023-12-24 NOTE — PLAN OF CARE
Problem: Adult Inpatient Plan of Care  Goal: Plan of Care Review  Outcome: Ongoing, Progressing  Goal: Patient-Specific Goal (Individualized)  Outcome: Ongoing, Progressing  Goal: Absence of Hospital-Acquired Illness or Injury  Outcome: Ongoing, Progressing  Goal: Optimal Comfort and Wellbeing  Outcome: Ongoing, Progressing  Goal: Readiness for Transition of Care  Outcome: Ongoing, Progressing     Problem: Infection  Goal: Absence of Infection Signs and Symptoms  Outcome: Ongoing, Progressing     Problem: Adjustment to Injury (Hip Fracture Medical Management)  Goal: Optimal Coping with Change in Health Status  Outcome: Ongoing, Progressing     Problem: Bleeding (Hip Fracture Medical Management)  Goal: Absence of Bleeding  Outcome: Ongoing, Progressing     Problem: Bowel Elimination Impaired (Hip Fracture Medical Management)  Goal: Effective Bowel Elimination  Outcome: Ongoing, Progressing     Problem: Cognitive Decline Risk (Hip Fracture Medical Management)  Goal: Baseline Cognitive Function Maintained  Outcome: Ongoing, Progressing     Problem: Embolism (Hip Fracture Medical Management)  Goal: Absence of Embolism  Outcome: Ongoing, Progressing     Problem: Fracture Stabilization and Management (Hip Fracture Medical Management)  Goal: Fracture Stability  Outcome: Ongoing, Progressing     Problem: Functional Ability Impaired (Hip Fracture Medical Management)  Goal: Optimal Functional Performance  Outcome: Ongoing, Progressing     Problem: Pain (Hip Fracture Medical Management)  Goal: Acceptable Pain Level  Outcome: Ongoing, Progressing     Problem: Urinary Elimination Impaired (Hip Fracture Medical Management)  Goal: Effective Urinary Elimination  Outcome: Ongoing, Progressing     Problem: Bleeding (Surgery Nonspecified)  Goal: Absence of Bleeding  Outcome: Ongoing, Progressing

## 2023-12-24 NOTE — ASSESSMENT & PLAN NOTE
Hip Fracture Pathway initiated  Orthopedics consulted  S/p left CMN 12/23 with Dr. Mancilla  DVT prophylaxis for 28 days post-op to start POD 1 - was given Eliquis initially, but changed to Aspirin 81mg BID given Thrombocytopenia <50K  PT/OT to start on POD 1  Zamarripa to be removed on POD 1  Pain control:  Anesthesia pain monitoring PNC to be removed POD3

## 2023-12-24 NOTE — PROGRESS NOTES
[facility] - [facility]  Orthopedics  Progress Note    Patient Name: Rosario Menendez  MRN: [id number]  Admission Date: 12/23/2023  Hospital Length of Stay: 1 days  Attending Provider: Lucia Bates MD  Primary Care Provider: Shi Simon MD  Follow-up For: Procedure(s) (LRB):  INSERTION, INTRAMEDULLARY DIEGO, FEMUR (Left)    Post-Operative Day: 1 Day Post-Op  Subjective:     Principal Problem:Closed intertrochanteric fracture of left femur    Principal Orthopedic Problem: s/p CMN 12/23    Interval History: Patient seen and examined at bedside. NAEON. Patient doing well. No complaints. Pain well controlled. PNC in place. Hgb 10.9.       Review of patient's allergies indicates:   Allergen Reactions    Codeine Other (See Comments)       Current Facility-Administered Medications   Medication    0.9%  NaCl infusion    acetaminophen tablet 1,000 mg    apixaban tablet 2.5 mg    bisacodyL suppository 10 mg    buPROPion TB24 tablet 150 mg    ceFAZolin 2 g in dextrose 5 % in water (D5W) 50 mL IVPB (MB+)    cefTRIAXone (ROCEPHIN) 2 g in dextrose 5 % in water (D5W) 100 mL IVPB (MB+)    dextrose 10% bolus 125 mL 125 mL    dextrose 10% bolus 250 mL 250 mL    fentaNYL 50 mcg/mL injection 25 mcg    galantamine 24 hr capsule 16 mg    glucagon (human recombinant) injection 1 mg    glucose chewable tablet 16 g    glucose chewable tablet 24 g    haloperidol lactate injection 0.5 mg    HYDROmorphone injection 0.2 mg    levothyroxine tablet 75 mcg    LIDOcaine (PF) 10 mg/ml (1%) injection 10 mg    melatonin tablet 6 mg    memantine tablet 10 mg    methocarbamoL tablet 500 mg    mupirocin 2 % ointment 1 g    [START ON 12/25/2023] mupirocin 2 % ointment 1 g    naloxone 0.4 mg/mL injection 0.02 mg    ondansetron injection 4 mg    polyethylene glycol packet 17 g    pregabalin capsule 50 mg    prochlorperazine injection Soln 5 mg    ROPIvacaine (PF) 2 mg/ml (0.2%) solution    senna-docusate 8.6-50 mg per tablet 1 tablet    sodium chloride  0.9% flush 10 mL    sodium chloride 0.9% flush 10 mL    sodium chloride 0.9% flush 3 mL    traMADoL tablet 50 mg    tranexamic acid (CYKLOKAPRON) 3,000 mg in sodium chloride 0.9% SolP 100 mL     Objective:     Vital Signs (Most Recent):  Temp: 97.7 °F (36.5 °C) (12/24/23 0414)  Pulse: 60 (12/24/23 0414)  Resp: 16 (12/24/23 0414)  BP: 112/65 (12/24/23 0414)  SpO2: (!) 92 % (12/24/23 0414) Vital Signs (24h Range):  Temp:  [96.1 °F (35.6 °C)-98.5 °F (36.9 °C)] 97.7 °F (36.5 °C)  Pulse:  [56-75] 60  Resp:  [14-23] 16  SpO2:  [90 %-97 %] 92 %  BP: ()/() 112/65     Weight: 61.5 kg (135 lb 9.3 oz)  Height: 5' (152.4 cm)  Body mass index is 26.48 kg/m².      Intake/Output Summary (Last 24 hours) at 12/24/2023 0621  Last data filed at 12/23/2023 1800  Gross per 24 hour   Intake 1270 ml   Output 450 ml   Net 820 ml        Ortho/SPM Exam  Awake and alert  Regular Rate  Non labored respirations  Surgical dressings c/d/I  Fires quad/TA/EHL/GSC  SILT  Brisk cap refill       Significant Labs: CBC:   Recent Labs   Lab 12/23/23  0205 12/24/23 0333   WBC 9.07 7.62   HGB 13.7 10.9*   HCT 41.4 32.9*   PLT 83* 42*     CMP:   Recent Labs   Lab 12/23/23  0205 12/24/23 0333    138   K 4.2 4.1   * 108   CO2 23 20*   GLU 99 126*   BUN 15 14   CREATININE 0.9 0.8   CALCIUM 10.3 8.3*   PROT 6.8  --    ALBUMIN 3.6  --    BILITOT 0.3  --    ALKPHOS 75  --    AST 19  --    ALT 20  --    ANIONGAP 10 10     All pertinent labs within the past 24 hours have been reviewed.    Significant Imaging: I have reviewed and interpreted all pertinent imaging results/findings.  Assessment/Plan:     * Closed intertrochanteric fracture of left femur  Rosario Menendez is a 88 y.o. female with a left intertrochanteric femur fracture, closed, NVI. They take no anticoagulation at home. They required a walker for ambulation prior to this injury. S/p CMN on 12/23.    - Multimodal pain control  - PNC per anesthesia  - WBAT, ROMAT LLE  - DVT  PPx: Eliquis 2.5 mg BID  - Abx: Post op ancef  - Hgb 10.9  - Dc alyson      Dispo: F/u PT recs          VIKTOR Olsen MD  Orthopedics  [facility] - Surgery

## 2023-12-24 NOTE — PROGRESS NOTES
[facility] Medicine  Progress Note    Patient Name: Rosario Menendez  MRN: [id number]  Patient Class: IP- Inpatient   Admission Date: 12/23/2023  Length of Stay: 1 days  Attending Physician: Lucia Bates MD  Primary Care Provider: Shi Simon MD        Subjective:     Principal Problem:Closed intertrochanteric fracture of left femur        HPI:  Ms. Menendez is a 88 y.o. female with PMH of osteoporosis, dementia, thrombocytopenia and hypothyroidism  presenting with left hip pain. Patient was walking at home and fell onto left hip.  Most of the history was obtained for son who was at the bedside.  Patient has dementia does not remember how she fell.  Immediate pain and difficulty bearing weight. Denies head injury or LOC. On no blood thinners. Denies other MSK pains. Denies numbness, tingling, or paresthesias to the LLE. Lives at an assisted living facility. Uses a walker for ambulation at baseline.    On presentation patient was afebrile, hypertensive with blood pressure of 157/107 mm of Hg, tachycardic with heart rate of 124, saturating well on room air.  Blood work including CBC, CMP unremarkable, HIV negative, hep C negative.  UA positive for trace leukocyte esterase.  CT cervical spine showed multi level degenerative changes.  CT head showed chronic microvascular changes.  Chest x-ray was unremarkable.  X-ray hip  showed irregular lucency transversing the left greater trochanter extending into the intertrochanteric portion of the proximal left femur.  A nondisplaced fracture.  Patient received ceftriaxone, 1 dose of morphine and admitted to inpatient service.    Overview/Hospital Course:  Ms. Menendez was admitted to Hospital Medicine for management of a closed intertrochanteric fracture of the left femur.  Ortho was consulted.  She went to the OR on 12/23 for a left CMN with Dr. Mancilla.  She was started on PNC postop under Anesthesia Pain.  She was initially started on Eliquis 2.5mg  BID for DVT prophylaxis, but given Thrombocytopenia <50K, she was changed to Aspirin 81mg BID.  PT/OT were consulted.    Interval History: No acute events overnight.  Reports pain is 3/10.  Discussed plan for PT/OT eval today to determine further hospital course.    Review of Systems   Constitutional:  Positive for activity change. Negative for chills, fatigue and fever.   Respiratory:  Negative for cough and shortness of breath.    Cardiovascular:  Negative for chest pain, palpitations and leg swelling.   Gastrointestinal:  Negative for abdominal pain, diarrhea, nausea and vomiting.   Genitourinary:  Negative for dysuria and urgency.   Musculoskeletal:  Positive for arthralgias and gait problem.   Neurological:  Negative for dizziness and headaches.   All other systems reviewed and are negative.    Objective:     Vital Signs (Most Recent):  Temp: 96.9 °F (36.1 °C) (12/24/23 1117)  Pulse: 65 (12/24/23 1117)  Resp: 16 (12/24/23 1117)  BP: (!) 112/55 (12/24/23 1117)  SpO2: (!) 93 % (12/24/23 1117) Vital Signs (24h Range):  Temp:  [96.1 °F (35.6 °C)-98.5 °F (36.9 °C)] 96.9 °F (36.1 °C)  Pulse:  [54-75] 65  Resp:  [14-20] 16  SpO2:  [90 %-97 %] 93 %  BP: ()/(47-88) 112/55     Weight: 61.5 kg (135 lb 9.3 oz)  Body mass index is 26.48 kg/m².    Intake/Output Summary (Last 24 hours) at 12/24/2023 1222  Last data filed at 12/24/2023 0628  Gross per 24 hour   Intake 1589.49 ml   Output 850 ml   Net 739.49 ml         Physical Exam  Constitutional:       Appearance: Normal appearance. She is well-developed.   HENT:      Head: Normocephalic and atraumatic.   Cardiovascular:      Rate and Rhythm: Normal rate and regular rhythm.      Heart sounds: No murmur heard.  Pulmonary:      Effort: Pulmonary effort is normal. No respiratory distress.      Breath sounds: Normal breath sounds. No wheezing or rales.   Abdominal:      General: There is no distension.      Palpations: Abdomen is soft.      Tenderness: There is no  abdominal tenderness.   Musculoskeletal:         General: No deformity.      Comments: Postop dressing to left femur C/D/I   Skin:     General: Skin is warm.   Neurological:      General: No focal deficit present.      Mental Status: She is alert and oriented to person, place, and time. Mental status is at baseline.             Significant Labs: All pertinent labs within the past 24 hours have been reviewed.  CBC:   Recent Labs   Lab 12/23/23  0205 12/24/23  0333   WBC 9.07 7.62   HGB 13.7 10.9*   HCT 41.4 32.9*   PLT 83* 42*     CMP:   Recent Labs   Lab 12/23/23  0205 12/24/23  0333    138   K 4.2 4.1   * 108   CO2 23 20*   GLU 99 126*   BUN 15 14   CREATININE 0.9 0.8   CALCIUM 10.3 8.3*   PROT 6.8  --    ALBUMIN 3.6  --    BILITOT 0.3  --    ALKPHOS 75  --    AST 19  --    ALT 20  --    ANIONGAP 10 10       Significant Imaging: I have reviewed all pertinent imaging results/findings within the past 24 hours.    Assessment/Plan:      * Closed intertrochanteric fracture of left femur  Hip Fracture Pathway initiated  Orthopedics consulted  S/p left CMN 12/23 with Dr. Mancilla  DVT prophylaxis for 28 days post-op to start POD 1 - was given Eliquis initially, but changed to Aspirin 81mg BID given Thrombocytopenia <50K  PT/OT to start on POD 1  Zamarripa to be removed on POD 1  Pain control:  Anesthesia pain monitoring PNC to be removed POD3    Acute blood loss as cause of postoperative anemia  Patient's anemia is currently uncontrolled. Has not received any PRBCs to date. Etiology likely d/t acute blood loss which was from surgery  Current CBC reviewed-   Lab Results   Component Value Date    HGB 10.9 (L) 12/24/2023    HCT 32.9 (L) 12/24/2023     Monitor serial CBC and transfuse if patient becomes hemodynamically unstable, symptomatic or H/H drops below 7/21.    Debility  PT/OT consulted     Amnestic MCI (mild cognitive impairment with memory loss)  Continue memantine, galantamine, and buproprion  Delirium  precautions       Thrombocytopenia  Patient was found to have thrombocytopenia, which is chronic.  Will monitor the platelets Daily. Will transfuse if platelet count is <10k. Hold DVT prophylaxis if platelets are <50k. The patient's platelet results have been reviewed and are listed below.  Recent Labs   Lab 12/24/23  0333   PLT 42*         Hypercholesteremia  Chronic and stable  Continue Statin    Hypothyroidism  Chronic and stable  Continue synthroid         VTE Risk Mitigation (From admission, onward)           Ordered     IP VTE HIGH RISK PATIENT  Once         12/23/23 1603     Place sequential compression device  Until discontinued         12/23/23 1603     Place sequential compression device  Until discontinued         12/23/23 1039                    Discharge Planning   AMARA: 12/28/2023     Code Status: Full Code   Is the patient medically ready for discharge?: No    Reason for patient still in hospital (select all that apply): Patient trending condition, Consult recommendations, and PT / OT recommendations                     Lucia Bates MD  Department of Hospital Medicine   [facility] - Surgery

## 2023-12-24 NOTE — ASSESSMENT & PLAN NOTE
Patient was found to have thrombocytopenia, which is chronic.  Will monitor the platelets Daily. Will transfuse if platelet count is <10k. Hold DVT prophylaxis if platelets are <50k. The patient's platelet results have been reviewed and are listed below.  Recent Labs   Lab 12/24/23  0333   PLT 42*

## 2023-12-24 NOTE — PT/OT/SLP EVAL
Occupational Therapy   Evaluation    Name: Rosario Menendez  MRN: [id number]  Admitting Diagnosis: Closed intertrochanteric fracture of left femur  Recent Surgery: Procedure(s) (LRB):  INSERTION, INTRAMEDULLARY DIEGO, FEMUR (Left) 1 Day Post-Op    Recommendations:     Discharge Recommendations: Moderate Intensity Therapy  Discharge Equipment Recommendations:  bedside commode  Barriers to discharge:  Decreased caregiver support    Assessment:     Rosario Menendez is a 88 y.o. female with a medical diagnosis of Closed intertrochanteric fracture of left femur. She presents with no c/o pain however tolerated standing at EOB with a RW. Pt was A&Ox4 however still appeared confused, and anxious of falling. Performance deficits affecting function: weakness, edema, impaired endurance, decreased lower extremity function, impaired functional mobility, decreased coordination, decreased safety awareness, impaired balance, orthopedic precautions, impaired cognition.Patient currently demonstrates a need for low intensity therapy on a scheduled basis secondary to a decline in functional status due to surgical procedure        Rehab Prognosis: Good; patient would benefit from acute skilled OT services to address these deficits and reach maximum level of function.       Plan:     Patient to be seen 4 x/week (Hip path 12/24- 12/26 then 4x/wk) to address the above listed problems via self-care/home management, therapeutic exercises, therapeutic activities  Plan of Care Expires: 01/23/24  Plan of Care Reviewed with: patient    Subjective     Chief Complaint: LLE pain when standing for weight shifting   Patient/Family Comments/goals: Pt.report she wishes she had on the yellow socks today    Occupational Profile:  Living Environment: Lives at an [facility], assisted living facility    Previous level of function: Assistance from [facility] staff for bathing with   Roles and Routines: Enjoys outing and a dog owner   Equipment Used at Home: wheelchair,  walker, rolling  Assistance upon Discharge: [facility] staff    Pain/Comfort:  Pain Rating 1: 0/10  Location - Side 1: Left  Location - Orientation 1: generalized  Location 1: hip  Pain Addressed 1: Reposition, Distraction  Pain Rating Post-Intervention 1:  (did not rate)    Patients cultural, spiritual, Voodoo conflicts given the current situation: no    Objective:     Communicated with: Nurse prior to session.  Patient found HOB elevated with peripheral IV, perineural catheter, shields catheter, FCD upon OT entry to room.    General Precautions: Standard, fall, hearing impaired  Orthopedic Precautions: LLE weight bearing as tolerated (ROMAT LLE)  Braces:    Respiratory Status: Room air    Occupational Performance:    Bed Mobility:    Patient completed Rolling/Turning to Right with minimum assistance  Patient completed Supine to Sit with maximal assistance  Patient completed Sit to Supine with maximal assistance and 2 persons    Functional Mobility/Transfers:  Patient completed Sit <> Stand Transfer with moderate assistance and of 2 persons  with  rolling walker 2 trials at EOB  Pt. stood at EOB ~3 mins (1st trial) with Mod A x 2 with RW max verbal and tactile cues for upright posture  Bilateral Lateral weight shifting with Max A x 2 , to promote WB in prep for fxl mob  Functional Mobility: Attempted fxl mob unable to achieve a shuffle or foot clearance     Activities of Daily Living:  Lower Body Dressing: maximal assistance don and doff posterior gown at EOB  LBD: Adjust sock at EOB, total A    Cognitive/Visual Perceptual:  Cognitive/Psychosocial Skills:     -       Oriented to: Person, Place, Time, and Situation   -       Follows Commands/attention:Follows one-step commands  -       Communication: clear/fluent  -       Memory: Impaired   -       Safety awareness/insight to disability: impaired   -       Mood/Affect/Coping skills/emotional control: Appropriate to situation    Physical Exam:  Balance:    Static Sitting:   Fair  Upper Extremity Range of Motion:     -       Right Upper Extremity: WFL  -       Left Upper Extremity: WFL  Upper Extremity Strength:    -       Right Upper Extremity: WFL  -       Left Upper Extremity: WFL   Strength:    -       Right Upper Extremity: WFL  -       Left Upper Extremity: WFL  Gross motor coordination:   WFL    AMPAC 6 Click ADL:  AMPAC Total Score: 16    Treatment & Education:  Educated on the importance of grooming and hygiene, postioning for skin integrity and maintaining strength in UE to engage in ADLs   Pt educated on role of occupational therapy, POC, and safety during ADLs and functional mobility. Pt and OT discussed importance of safe, continued mobility to optimize daily living skills. Pt verbalized understanding. Pt given instruction to call for medical staff/nurse for assistance.   PT present for coeval due to pt's multiple medical comorbidities and functional/cognition deficits requiring two skilled therapists to appropriately progress pt's musculoskeletal strength, neuromuscular control, and endurance while taking into consideration medical acuity and pt safety.         Patient left HOB elevated with all lines intact and call button in reach    GOALS:   Multidisciplinary Problems       Occupational Therapy Goals          Problem: Occupational Therapy    Goal Priority Disciplines Outcome Interventions   Occupational Therapy Goal     OT, PT/OT Ongoing, Progressing    Description: Goals to be met by: 1/14/24     Patient will increase functional independence with ADLs by performing:    LE Dressing with Minimal Assistance.  Grooming while standing at sink with Supervision.  Toileting from bedside commode with Stand-by Assistance for hygiene and clothing management.   Supine to sit with Supervision.  Toilet transfer to bedside commode with Stand-by Assistance.                         History:     Past Medical History:   Diagnosis Date    Arthritis     Depression      Hypercholesteremia     Thrombocytopenia     Thyroid disease          Past Surgical History:   Procedure Laterality Date    ANKLE SURGERY      COLONOSCOPY N/A 6/2/2021    Procedure: COLONOSCOPY;  Surgeon: Alfonso Haque MD;  Location: [address]);  Service: Endoscopy;  Laterality: N/A;  any crs  covid test 5/30-[place]    HYSTERECTOMY      KNEE SURGERY      WRIST SURGERY         Time Tracking:     OT Date of Treatment: 12/24/23  OT Start Time: 0932  OT Stop Time: 1003  OT Total Time (min): 31 min    Billable Minutes:Evaluation 8  Therapeutic Activity 13  Neuromuscular Re-education 10    12/24/2023

## 2023-12-24 NOTE — ASSESSMENT & PLAN NOTE
Rosario Menendez is a 88 y.o. female with a left intertrochanteric femur fracture, closed, NVI. They take no anticoagulation at home. They required a walker for ambulation prior to this injury. S/p CMN on 12/23.    - Multimodal pain control  - PNC per anesthesia  - WBAT, ROMAT LLE  - DVT PPx: Eliquis 2.5 mg BID  - Abx: Post op ancef  - Hgb 10.9  - Dc shields      Dispo: F/u PT recs

## 2023-12-24 NOTE — PLAN OF CARE
Pt. Evaluated and goals established     Problem: Occupational Therapy  Goal: Occupational Therapy Goal  Description: Goals to be met by: 1/14/24     Patient will increase functional independence with ADLs by performing:    LE Dressing with Minimal Assistance.  Grooming while standing at sink with Supervision.  Toileting from bedside commode with Stand-by Assistance for hygiene and clothing management.   Supine to sit with Supervision.  Toilet transfer to bedside commode with Stand-by Assistance.    Outcome: Ongoing, Progressing

## 2023-12-24 NOTE — NURSING
Patient return from PACU in her bed . Alert oriented to name,place confused to time and situation . Oxygen at 2 liters per nc . Respiration even and unlabored @17 . Noted 18 g right f orearm saline lock and  20 mg left ac saline lock . Ropivaine 2mg/ml at 0.1 ml/hr Perineural catheter to left side abdomen with tegaderm intact . Noted dressing to left hip x 2 gauze and betadine color  tegaderm . Scd applied to right leg . Pedal present +2. Bed alarm applied at this time . No noted distress. Bed rails up x 3

## 2023-12-24 NOTE — PLAN OF CARE
PT eval completed- see note for details, goals and POC established.     Problem: Physical Therapy  Goal: Physical Therapy Goal  Description: Goals to be met by: 23     Patient will increase functional independence with mobility by performin. Supine to sit with Contact Guard Assistance  2. Sit to stand transfer with Contact Guard Assistance  3. Bed to chair transfer with Contact Guard Assistance using Rolling Walker  4. Gait  x 40 feet with Minimal Assistance using Rolling Walker.   5. Stand for 3 minutes with Stand-by Assistance using Rolling Walker    Outcome: Ongoing, Progressing     2023

## 2023-12-24 NOTE — PT/OT/SLP EVAL
Physical Therapy Co-Evaluation and Treatment    OT present for coeval due to pt's multiple medical comorbidities and functional/cognition deficits requiring two skilled therapists to appropriately progress pt's musculoskeletal strength, neuromuscular control, and endurance while taking into consideration medical acuity and pt safety.    Patient Name: Rosario Menendez   MRN: [id number]  Recent Surgery: Procedure(s) (LRB):  INSERTION, INTRAMEDULLARY DIEGO, FEMUR (Left) 1 Day Post-Op    Recommendations:     Discharge Recommendations: Moderate Intensity Therapy   Discharge Equipment Recommendations: bedside commode   Barriers to discharge: None    Assessment:     Rosario Menendez is a 88 y.o. female admitted with a medical diagnosis of Closed intertrochanteric fracture of left femur. She presents with the following impairments/functional limitations: weakness, impaired endurance, impaired self care skills, impaired functional mobility, gait instability, impaired balance, impaired cognition, decreased lower extremity function, decreased coordination, decreased safety awareness, orthopedic precautions.     Pt receptive and tolerated PT co-eval with OT fairly. Pt educated on WBAT: left lower extremity precautions prior to start of treatment with pt verbalizing understanding. Pt AAOx4 but easily distracted and needed required frequent redirection to task. Pt able to perform sit <> stand with RW and mod A of 2 persons but unable to take steps this session. Pt currently functioning well below PLOF and not safe to return home at this time. Patient currently demonstrates a need for moderate intensity therapy on a daily basis post acute secondary to a decline in functional status due to surgical procedure.    Rehab Prognosis: Good; patient would benefit from acute PT services to address these deficits and reach maximum level of function.    Plan:     During this hospitalization, patient to be seen  (Hip pathway 12/24, 12/25,  12,26, then 4x/week after) to address the above listed problems via gait training, therapeutic activities, therapeutic exercises, neuromuscular re-education    Plan of Care Expires: 24    Subjective     Chief Complaint: LLE pain when standing  Patient Comments/Goals: Pt agreeable to PT  Pain/Comfort:  Pain Rating 1: 0/10  Location - Side 1: Left  Location - Orientation 1: generalized  Location 1: hip  Pain Addressed 1: Reposition, Distraction  Pain Rating Post-Intervention 1:  (Pt did not rate)    Patient History:     Living Environment: Pt lives at an assisted living facility   Prior Level of Function: Mod I for amb using RW  DME owned: [employer], w/c  Caregiver Assistance: limited assistance from staff at [facility]      Objective:     Communicated with RN prior to session. Patient found HOB elevated with peripheral IV, perineural catheter, shields catheter upon PT entry to room.    General Precautions: Standard, fall   Orthopedic Precautions: LLE weight bearing as tolerated (ROMAT)   Braces: N/A    Respiratory Status: Room air    Exams:  RLE ROM: WFL  RLE Strength:  grossly 3+/5  LLE ROM: WFL  LLE Strength:  grossly 3/5  Cognitive: Patient is oriented to Person, Place, Time, Situation  Sensation:    -       Intact    Functional Mobility:  Gait belt applied - Yes  Bed Mobility  Rolling Right: minimum assistance  Supine to Sit: maximal assistance for trunk management  Sit to Supine: maximal assistance and of 2 persons for LE management and trunk management    Transfers  Sit to Stand: moderate assistance and of 2 persons with rolling walker and with cues for hand placement x2 Trials    Gait  Attempted taking steps but pt unable to demonstrated foot clearance to step on own. Maximal assistance given from OT to shuffle R foot and then L foot to the right.    Balance  Sitting: FAIR: Cannot move trunk without losing balance  Pt sat EOB with CGA to min A due to intermittent posterior lean  Standin: N/A  Pt needed mod A of 2  persons with RW for static standing  Pt stood ~3 mins on 1st sit <> stand trial  Verbal and tactile cues given for upright posture  Weight shifting performed with max A x2 persons to L and R to promote weight bearing      Therapeutic Activities and Exercises:  Weight shifting in standing performed due to pt inability to lift BLE for foot clearance for stepping. Pt needed vc and tactile cues for upright posture in standing.   Patient educated on role of acute care PT and PT POC, safety while in hospital including calling nurse for mobility, and call light usage.  Educated about weightbearing precautions and provided cuing for adherence as appropriate during session.  All questions answered within the scope of PT.  White board updated accordingly.      AM-PAC 6 CLICK MOBILITY  Total Score:10    Patient left HOB elevated with all lines intact and call button in reach.    GOALS:   Multidisciplinary Problems       Physical Therapy Goals          Problem: Physical Therapy    Goal Priority Disciplines Outcome Goal Variances Interventions   Physical Therapy Goal     PT, PT/OT Ongoing, Progressing     Description: Goals to be met by: 23     Patient will increase functional independence with mobility by performin. Supine to sit with Contact Guard Assistance  2. Sit to stand transfer with Contact Guard Assistance  3. Bed to chair transfer with Contact Guard Assistance using Rolling Walker  4. Gait  x 40 feet with Minimal Assistance using Rolling Walker.   5. Stand for 3 minutes with Stand-by Assistance using Rolling Walker                         History:     Past Medical History:   Diagnosis Date    Arthritis     Depression     Hypercholesteremia     Thrombocytopenia     Thyroid disease        Past Surgical History:   Procedure Laterality Date    ANKLE SURGERY      COLONOSCOPY N/A 2021    Procedure: COLONOSCOPY;  Surgeon: Alfonso Haque MD;  Location: [address]);  Service: Endoscopy;  Laterality:  N/A;  any crs  covid test 5/30-[facility]    HYSTERECTOMY      KNEE SURGERY      WRIST SURGERY         Time Tracking:     PT Received On: 12/24/23  PT Start Time: 0932  PT Stop Time: 1003  PT Total Time (min): 31 min     Billable Minutes: Evaluation 8, Therapeutic Activity 10, and Neuromuscular Re-education 13    12/24/2023   buddist

## 2023-12-25 LAB
ANION GAP SERPL CALC-SCNC: 8 MMOL/L (ref 8–16)
BASOPHILS # BLD AUTO: 0.02 K/UL (ref 0–0.2)
BASOPHILS NFR BLD: 0.2 % (ref 0–1.9)
BUN SERPL-MCNC: 20 MG/DL (ref 8–23)
CALCIUM SERPL-MCNC: 8.6 MG/DL (ref 8.7–10.5)
CHLORIDE SERPL-SCNC: 111 MMOL/L (ref 95–110)
CO2 SERPL-SCNC: 20 MMOL/L (ref 23–29)
CREAT SERPL-MCNC: 0.8 MG/DL (ref 0.5–1.4)
DIFFERENTIAL METHOD: ABNORMAL
EOSINOPHIL # BLD AUTO: 0.2 K/UL (ref 0–0.5)
EOSINOPHIL NFR BLD: 2.1 % (ref 0–8)
ERYTHROCYTE [DISTWIDTH] IN BLOOD BY AUTOMATED COUNT: 14.5 % (ref 11.5–14.5)
EST. GFR  (NO RACE VARIABLE): >60 ML/MIN/1.73 M^2
GLUCOSE SERPL-MCNC: 89 MG/DL (ref 70–110)
HCT VFR BLD AUTO: 30.4 % (ref 37–48.5)
HGB BLD-MCNC: 10 G/DL (ref 12–16)
IMM GRANULOCYTES # BLD AUTO: 0.04 K/UL (ref 0–0.04)
IMM GRANULOCYTES NFR BLD AUTO: 0.5 % (ref 0–0.5)
LYMPHOCYTES # BLD AUTO: 2 K/UL (ref 1–4.8)
LYMPHOCYTES NFR BLD: 25.3 % (ref 18–48)
MAGNESIUM SERPL-MCNC: 2.3 MG/DL (ref 1.6–2.6)
MCH RBC QN AUTO: 30.7 PG (ref 27–31)
MCHC RBC AUTO-ENTMCNC: 32.9 G/DL (ref 32–36)
MCV RBC AUTO: 93 FL (ref 82–98)
MONOCYTES # BLD AUTO: 1.1 K/UL (ref 0.3–1)
MONOCYTES NFR BLD: 13.7 % (ref 4–15)
NEUTROPHILS # BLD AUTO: 4.7 K/UL (ref 1.8–7.7)
NEUTROPHILS NFR BLD: 58.2 % (ref 38–73)
NRBC BLD-RTO: 0 /100 WBC
PHOSPHATE SERPL-MCNC: 2.4 MG/DL (ref 2.7–4.5)
PLATELET # BLD AUTO: 55 K/UL (ref 150–450)
PMV BLD AUTO: 12.5 FL (ref 9.2–12.9)
POTASSIUM SERPL-SCNC: 4 MMOL/L (ref 3.5–5.1)
RBC # BLD AUTO: 3.26 M/UL (ref 4–5.4)
SARS-COV-2 RNA RESP QL NAA+PROBE: NOT DETECTED
SODIUM SERPL-SCNC: 139 MMOL/L (ref 136–145)
WBC # BLD AUTO: 8.01 K/UL (ref 3.9–12.7)

## 2023-12-25 PROCEDURE — 25000003 PHARM REV CODE 250: Mod: HCNC | Performed by: ANESTHESIOLOGY

## 2023-12-25 PROCEDURE — 94761 N-INVAS EAR/PLS OXIMETRY MLT: CPT | Mod: HCNC

## 2023-12-25 PROCEDURE — 87635 SARS-COV-2 COVID-19 AMP PRB: CPT | Mod: HCNC | Performed by: HOSPITALIST

## 2023-12-25 PROCEDURE — 30200315 PPD INTRADERMAL TEST REV CODE 302: Mod: HCNC | Performed by: HOSPITALIST

## 2023-12-25 PROCEDURE — 36415 COLL VENOUS BLD VENIPUNCTURE: CPT | Mod: HCNC | Performed by: STUDENT IN AN ORGANIZED HEALTH CARE EDUCATION/TRAINING PROGRAM

## 2023-12-25 PROCEDURE — 94799 UNLISTED PULMONARY SVC/PX: CPT | Mod: HCNC,XB

## 2023-12-25 PROCEDURE — 86580 TB INTRADERMAL TEST: CPT | Mod: HCNC | Performed by: HOSPITALIST

## 2023-12-25 PROCEDURE — 85025 COMPLETE CBC W/AUTO DIFF WBC: CPT | Mod: HCNC | Performed by: STUDENT IN AN ORGANIZED HEALTH CARE EDUCATION/TRAINING PROGRAM

## 2023-12-25 PROCEDURE — 25000003 PHARM REV CODE 250: Mod: HCNC | Performed by: STUDENT IN AN ORGANIZED HEALTH CARE EDUCATION/TRAINING PROGRAM

## 2023-12-25 PROCEDURE — 83735 ASSAY OF MAGNESIUM: CPT | Mod: HCNC | Performed by: STUDENT IN AN ORGANIZED HEALTH CARE EDUCATION/TRAINING PROGRAM

## 2023-12-25 PROCEDURE — 80048 BASIC METABOLIC PNL TOTAL CA: CPT | Mod: HCNC | Performed by: STUDENT IN AN ORGANIZED HEALTH CARE EDUCATION/TRAINING PROGRAM

## 2023-12-25 PROCEDURE — 97530 THERAPEUTIC ACTIVITIES: CPT | Mod: HCNC

## 2023-12-25 PROCEDURE — 25000003 PHARM REV CODE 250: Mod: HCNC | Performed by: HOSPITALIST

## 2023-12-25 PROCEDURE — 84100 ASSAY OF PHOSPHORUS: CPT | Mod: HCNC | Performed by: STUDENT IN AN ORGANIZED HEALTH CARE EDUCATION/TRAINING PROGRAM

## 2023-12-25 PROCEDURE — 97116 GAIT TRAINING THERAPY: CPT | Mod: HCNC

## 2023-12-25 PROCEDURE — 21400001 HC TELEMETRY ROOM: Mod: HCNC

## 2023-12-25 RX ORDER — PREGABALIN 50 MG/1
50 CAPSULE ORAL NIGHTLY
Qty: 30 CAPSULE | Refills: 0
Start: 2023-12-25 | End: 2024-01-18

## 2023-12-25 RX ORDER — ASPIRIN 81 MG/1
81 TABLET ORAL 2 TIMES DAILY
Qty: 60 TABLET | Refills: 0 | Status: ON HOLD
Start: 2023-12-25 | End: 2024-01-16

## 2023-12-25 RX ORDER — METHOCARBAMOL 500 MG/1
500 TABLET, FILM COATED ORAL 3 TIMES DAILY
Qty: 30 TABLET | Refills: 0 | Status: ON HOLD
Start: 2023-12-25 | End: 2024-01-16 | Stop reason: HOSPADM

## 2023-12-25 RX ADMIN — ASPIRIN 81 MG: 81 TABLET, COATED ORAL at 09:12

## 2023-12-25 RX ADMIN — ACETAMINOPHEN 1000 MG: 500 TABLET ORAL at 12:12

## 2023-12-25 RX ADMIN — Medication 6 MG: at 09:12

## 2023-12-25 RX ADMIN — TUBERCULIN PURIFIED PROTEIN DERIVATIVE 5 UNITS: 5 INJECTION, SOLUTION INTRADERMAL at 12:12

## 2023-12-25 RX ADMIN — METHOCARBAMOL 500 MG: 500 TABLET ORAL at 09:12

## 2023-12-25 RX ADMIN — MEMANTINE 10 MG: 10 TABLET ORAL at 09:12

## 2023-12-25 RX ADMIN — TRAMADOL HYDROCHLORIDE 50 MG: 50 TABLET, COATED ORAL at 09:12

## 2023-12-25 RX ADMIN — METHOCARBAMOL 500 MG: 500 TABLET ORAL at 04:12

## 2023-12-25 RX ADMIN — MUPIROCIN 1 G: 20 OINTMENT TOPICAL at 09:12

## 2023-12-25 RX ADMIN — BUPROPION HYDROCHLORIDE 150 MG: 150 TABLET, FILM COATED, EXTENDED RELEASE ORAL at 09:12

## 2023-12-25 RX ADMIN — SENNOSIDES AND DOCUSATE SODIUM 1 TABLET: 8.6; 5 TABLET ORAL at 09:12

## 2023-12-25 RX ADMIN — PREGABALIN 50 MG: 50 CAPSULE ORAL at 09:12

## 2023-12-25 RX ADMIN — LEVOTHYROXINE SODIUM 75 MCG: 75 TABLET ORAL at 05:12

## 2023-12-25 RX ADMIN — ACETAMINOPHEN 1000 MG: 500 TABLET ORAL at 05:12

## 2023-12-25 RX ADMIN — POLYETHYLENE GLYCOL 3350 17 G: 17 POWDER, FOR SOLUTION ORAL at 09:12

## 2023-12-25 NOTE — PLAN OF CARE
Problem: Adult Inpatient Plan of Care  Goal: Plan of Care Review  12/25/2023 0122 by Varun De La Vega LPN  Outcome: Ongoing, Progressing  12/25/2023 0122 by Varun De La Vega LPN  Outcome: Ongoing, Progressing  Goal: Patient-Specific Goal (Individualized)  12/25/2023 0122 by Varun De La Vega LPN  Outcome: Ongoing, Progressing  12/25/2023 0122 by Varun De La Vega LPN  Outcome: Ongoing, Progressing  Goal: Absence of Hospital-Acquired Illness or Injury  12/25/2023 0122 by Varun De La Vega LPN  Outcome: Ongoing, Progressing  12/25/2023 0122 by Varun De La Vega LPN  Outcome: Ongoing, Progressing  Goal: Optimal Comfort and Wellbeing  12/25/2023 0122 by Varun De La Vega LPN  Outcome: Ongoing, Progressing  12/25/2023 0122 by Varun De La Vega LPN  Outcome: Ongoing, Progressing  Goal: Readiness for Transition of Care  12/25/2023 0122 by Varun De La Vega LPN  Outcome: Ongoing, Progressing  12/25/2023 0122 by Varun De La Vega LPN  Outcome: Ongoing, Progressing     Problem: Infection  Goal: Absence of Infection Signs and Symptoms  12/25/2023 0122 by Varun De La Vega LPN  Outcome: Ongoing, Progressing  12/25/2023 0122 by Varun De La Vega LPN  Outcome: Ongoing, Progressing     Problem: Adjustment to Injury (Hip Fracture Medical Management)  Goal: Optimal Coping with Change in Health Status  12/25/2023 0122 by Varun De La Vega LPN  Outcome: Ongoing, Progressing  12/25/2023 0122 by Varun De La Vega LPN  Outcome: Ongoing, Progressing     Problem: Cognitive Decline Risk (Hip Fracture Medical Management)  Goal: Baseline Cognitive Function Maintained  Outcome: Ongoing, Progressing     Problem: Fracture Stabilization and Management (Hip Fracture Medical Management)  Goal: Fracture Stability  Outcome: Ongoing, Progressing     Problem: Pain (Hip Fracture Medical Management)  Goal: Acceptable Pain Level  Outcome: Ongoing, Progressing

## 2023-12-25 NOTE — ASSESSMENT & PLAN NOTE
Patient was found to have thrombocytopenia, which is chronic.  Will monitor the platelets Daily. Will transfuse if platelet count is <10k. Hold DVT prophylaxis if platelets are <50k. The patient's platelet results have been reviewed and are listed below.  Recent Labs   Lab 12/25/23  0213   PLT 55*

## 2023-12-25 NOTE — PLAN OF CARE
Problem: Adult Inpatient Plan of Care  Goal: Plan of Care Review  Outcome: Ongoing, Progressing  Goal: Patient-Specific Goal (Individualized)  Outcome: Ongoing, Progressing  Goal: Absence of Hospital-Acquired Illness or Injury  Outcome: Ongoing, Progressing  Goal: Optimal Comfort and Wellbeing  Outcome: Ongoing, Progressing  Goal: Readiness for Transition of Care  Outcome: Ongoing, Progressing     Problem: Infection  Goal: Absence of Infection Signs and Symptoms  Outcome: Ongoing, Progressing     Problem: Adjustment to Injury (Hip Fracture Medical Management)  Goal: Optimal Coping with Change in Health Status  Outcome: Ongoing, Progressing     Problem: Bleeding (Hip Fracture Medical Management)  Goal: Absence of Bleeding  Outcome: Ongoing, Progressing     Problem: Bowel Elimination Impaired (Hip Fracture Medical Management)  Goal: Effective Bowel Elimination  Outcome: Ongoing, Progressing     Problem: Cognitive Decline Risk (Hip Fracture Medical Management)  Goal: Baseline Cognitive Function Maintained  Outcome: Ongoing, Progressing     Problem: Embolism (Hip Fracture Medical Management)  Goal: Absence of Embolism  Outcome: Ongoing, Progressing     Problem: Fracture Stabilization and Management (Hip Fracture Medical Management)  Goal: Fracture Stability  Outcome: Ongoing, Progressing     Problem: Functional Ability Impaired (Hip Fracture Medical Management)  Goal: Optimal Functional Performance  Outcome: Ongoing, Progressing     Problem: Pain (Hip Fracture Medical Management)  Goal: Acceptable Pain Level  Outcome: Ongoing, Progressing     Problem: Urinary Elimination Impaired (Hip Fracture Medical Management)  Goal: Effective Urinary Elimination  Outcome: Ongoing, Progressing     Problem: Bleeding (Surgery Nonspecified)  Goal: Absence of Bleeding  Outcome: Ongoing, Progressing     Problem: Bowel Motility Impaired (Surgery Nonspecified)  Goal: Effective Bowel Elimination  Outcome: Ongoing, Progressing     Problem:  Fluid and Electrolyte Imbalance (Surgery Nonspecified)  Goal: Fluid and Electrolyte Balance  Outcome: Ongoing, Progressing     Problem: Glycemic Control Impaired (Surgery Nonspecified)  Goal: Blood Glucose Level Within Targeted Range  Outcome: Ongoing, Progressing     Problem: Infection (Surgery Nonspecified)  Goal: Absence of Infection Signs and Symptoms  Outcome: Ongoing, Progressing     Problem: Ongoing Anesthesia Effects (Surgery Nonspecified)  Goal: Anesthesia/Sedation Recovery  Outcome: Ongoing, Progressing     Problem: Pain (Surgery Nonspecified)  Goal: Acceptable Pain Control  Outcome: Ongoing, Progressing     Problem: Postoperative Nausea and Vomiting (Surgery Nonspecified)  Goal: Nausea and Vomiting Relief  Outcome: Ongoing, Progressing     Problem: Postoperative Urinary Retention (Surgery Nonspecified)  Goal: Effective Urinary Elimination  Outcome: Ongoing, Progressing     Problem: Respiratory Compromise (Surgery Nonspecified)  Goal: Effective Oxygenation and Ventilation  Outcome: Ongoing, Progressing     Problem: Device-Related Complication Risk (Anesthesia/Analgesia, Neuraxial)  Goal: Safe Infusion Delivery Completion  Outcome: Ongoing, Progressing     Problem: Infection (Anesthesia/Analgesia, Neuraxial)  Goal: Absence of Infection Signs and Symptoms  Outcome: Ongoing, Progressing     Problem: Nausea and Vomiting (Anesthesia/Analgesia, Neuraxial)  Goal: Nausea and Vomiting Relief  Outcome: Ongoing, Progressing     Problem: Pain (Anesthesia/Analgesia, Neuraxial)  Goal: Effective Pain Control  Outcome: Ongoing, Progressing     Problem: Respiratory Compromise (Anesthesia/Analgesia, Neuraxial)  Goal: Effective Oxygenation and Ventilation  Outcome: Ongoing, Progressing     Problem: Sensorimotor Impairment (Anesthesia/Analgesia, Neuraxial)  Goal: Baseline Motor Function  Outcome: Ongoing, Progressing     Problem: Urinary Retention (Anesthesia/Analgesia, Neuraxial)  Goal: Effective Urinary  Elimination  Outcome: Ongoing, Progressing     Problem: Fall Injury Risk  Goal: Absence of Fall and Fall-Related Injury  Outcome: Ongoing, Progressing     Problem: Skin Injury Risk Increased  Goal: Skin Health and Integrity  Outcome: Ongoing, Progressing     Pt A&Ox2 self and place, free from falls/injury, and able to make needs known during shift. VSS. Pt had no c/o pain during shift, no acute distress noted. Bed locked and in lowest position. Bedside table and call light in reach.

## 2023-12-25 NOTE — PT/OT/SLP PROGRESS
"Occupational Therapy   Treatment    Name: Rosario Menendez  MRN: [id number]  Admitting Diagnosis:  Closed intertrochanteric fracture of left femur  2 Days Post-Op    Recommendations:     Discharge Recommendations: Moderate Intensity Therapy  Discharge Equipment Recommendations:  bedside commode  Barriers to discharge:  Decreased caregiver support    Assessment:     Rosario Menendez is a 88 y.o. female with a medical diagnosis of Closed intertrochanteric fracture of left femur.  She presents with the following performance deficits affecting function are impaired endurance, impaired balance, impaired cognition, decreased coordination, impaired functional mobility, decreased lower extremity function, gait instability.     Rehab Prognosis:  Good; patient would benefit from acute skilled OT services to address these deficits and reach maximum level of function.       Plan:     Patient to be seen 4 x/week to address the above listed problems via self-care/home management, therapeutic activities, therapeutic exercises, neuromuscular re-education  Plan of Care Expires: 01/23/24  Plan of Care Reviewed with: patient    Subjective     Chief Complaint: "It's like this leg won't move."  Patient/Family Comments/goals:   Pain/Comfort:  Pain Rating 1: 0/10  Location - Side 1: Left  Location - Orientation 1: generalized  Location 1: hip  Pain Addressed 1: Reposition, Distraction  Pain Rating Post-Intervention 1: 0/10    Objective:     Communicated with: Nursing prior to session.  Patient found HOB elevated with SCD, FCD, PureWick upon OT entry to room.    General Precautions: Standard, fall, hearing impaired    Orthopedic Precautions:LLE weight bearing as tolerated  Braces: N/A  Respiratory Status: Room air     Occupational Performance:     Bed Mobility:    Patient completed Rolling/Turning to Right with maximal assistance  Patient completed Scooting/Bridging with maximal assistance  Patient completed Supine to Sit with maximal " assistance     Functional Mobility/Transfers:  Patient completed Sit <> Stand Transfer with minimum assistance  with  rolling walker   Functional Mobility: CGA with rolling walker; required max v/t cues for step sequence and weight shift     Activities of Daily Living:  Grooming: stand by assistance seated edge of bed  Upper Body Dressing: minimum assistance seated edge of bed  Lower Body Dressing: total assistance to adjust B socks seated edge of bed     AMPAC 6 Click ADL: 16    Treatment & Education:  -Patient and family educated on roles/goals of OT and POC.  -White board updated.  -Therapist provided time for questions and answered within scope of practice.  -Patient educated on importance of EOB/OOB activity to maximize recovery.    Patient left up in chair with all lines intact and call button in reach    GOALS:   Multidisciplinary Problems       Occupational Therapy Goals          Problem: Occupational Therapy    Goal Priority Disciplines Outcome Interventions   Occupational Therapy Goal     OT, PT/OT Ongoing, Progressing    Description: Goals to be met by: 1/14/24     Patient will increase functional independence with ADLs by performing:    LE Dressing with Minimal Assistance.  Grooming while standing at sink with Supervision.  Toileting from bedside commode with Stand-by Assistance for hygiene and clothing management.   Supine to sit with Supervision.  Toilet transfer to bedside commode with Stand-by Assistance.                         Time Tracking:     OT Date of Treatment: 12/25/23  OT Start Time: 0904  OT Stop Time: 0918  OT Total Time (min): 14 min    Billable Minutes:Therapeutic Activity 14               12/25/2023

## 2023-12-25 NOTE — ANESTHESIA POST-OP PAIN MANAGEMENT
Acute Pain Service Progress Note    Rosario Menendez is a 88 y.o., female, with pmh of hypothyroid, osteopetrosis, dementia who presents with a femur fracture. PNC pulled overnight by patient.     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       Subjective:     General appearance of alert, oriented, no complaints   Pain with rest: 3    Numbers   Pain with movement: 5    Numbers   Side Effects    1. Pruritis No    2. Nausea No    3. Motor Blockade No, 2=Inability to bend knees    4. Sedation No, 1=awake and alert    Objective:     Catheter site clean, dry, intact      Vitals   Vitals:    12/25/23 0424   BP: 126/60   Pulse: 60   Resp: 18   Temp: 36.6 °C (97.8 °F)        Labs    Admission on 12/23/2023   Component Date Value Ref Range Status    HIV 1/2 Ag/Ab 12/23/2023 Non-reactive  Non-reactive Final    Hepatitis C Ab 12/23/2023 Non-reactive  Non-reactive Final    WBC 12/23/2023 9.07  3.90 - 12.70 K/uL Final    RBC 12/23/2023 4.46  4.00 - 5.40 M/uL Final    Hemoglobin 12/23/2023 13.7  12.0 - 16.0 g/dL Final    Hematocrit 12/23/2023 41.4  37.0 - 48.5 % Final    MCV 12/23/2023 93  82 - 98 fL Final    MCH 12/23/2023 30.7  27.0 - 31.0 pg Final    MCHC 12/23/2023 33.1  32.0 - 36.0 g/dL Final    RDW 12/23/2023 13.9  11.5 - 14.5 % Final    Platelets 12/23/2023 83 (L)  150 - 450 K/uL Final    MPV 12/23/2023 12.9  9.2 - 12.9 fL Final    Immature  Granulocytes 12/23/2023 0.4  0.0 - 0.5 % Final    Gran # (ANC) 12/23/2023 6.6  1.8 - 7.7 K/uL Final    Immature Grans (Abs) 12/23/2023 0.04  0.00 - 0.04 K/uL Final    Lymph # 12/23/2023 1.4  1.0 - 4.8 K/uL Final    Mono # 12/23/2023 0.9  0.3 - 1.0 K/uL Final    Eos # 12/23/2023 0.1  0.0 - 0.5 K/uL Final    Baso # 12/23/2023 0.05  0.00 - 0.20 K/uL Final    nRBC 12/23/2023 0  0 /100 WBC Final    Gran % 12/23/2023 72.6  38.0 - 73.0 % Final    Lymph % 12/23/2023 15.1 (L)  18.0 - 48.0 % Final    Mono % 12/23/2023 9.8  4.0 - 15.0 % Final    Eosinophil % 12/23/2023 1.5  0.0 - 8.0 % Final    Basophil % 12/23/2023 0.6  0.0 - 1.9 % Final    Differential Method 12/23/2023 Automated   Final    Sodium 12/23/2023 144  136 - 145 mmol/L Final    Potassium 12/23/2023 4.2  3.5 - 5.1 mmol/L Final    Chloride 12/23/2023 111 (H)  95 - 110 mmol/L Final    CO2 12/23/2023 23  23 - 29 mmol/L Final    Glucose 12/23/2023 99  70 - 110 mg/dL Final    BUN 12/23/2023 15  8 - 23 mg/dL Final    Creatinine 12/23/2023 0.9  0.5 - 1.4 mg/dL Final    Calcium 12/23/2023 10.3  8.7 - 10.5 mg/dL Final    Total Protein 12/23/2023 6.8  6.0 - 8.4 g/dL Final    Albumin 12/23/2023 3.6  3.5 - 5.2 g/dL Final    Total Bilirubin 12/23/2023 0.3  0.1 - 1.0 mg/dL Final    Alkaline Phosphatase 12/23/2023 75  55 - 135 U/L Final    AST 12/23/2023 19  10 - 40 U/L Final    ALT 12/23/2023 20  10 - 44 U/L Final    eGFR 12/23/2023 >60.0  >60 mL/min/1.73 m^2 Final    Anion Gap 12/23/2023 10  8 - 16 mmol/L Final    Magnesium 12/23/2023 2.2  1.6 - 2.6 mg/dL Final    Specimen UA 12/23/2023 Urine, Clean Catch   Final    Color, UA 12/23/2023 Yellow  Yellow, Straw, Bhavya Final    Appearance, UA 12/23/2023 Clear  Clear Final    pH, UA 12/23/2023 7.0  5.0 - 8.0 Final    Specific Gravity, UA 12/23/2023 1.025  1.005 - 1.030 Final    Protein, UA 12/23/2023 Negative  Negative Final    Glucose, UA 12/23/2023 Negative  Negative Final    Ketones, UA 12/23/2023 Negative  Negative Final     Bilirubin (UA) 12/23/2023 Negative  Negative Final    Occult Blood UA 12/23/2023 Negative  Negative Final    Nitrite, UA 12/23/2023 Negative  Negative Final    Leukocytes, UA 12/23/2023 Trace (A)  Negative Final    RBC, UA 12/23/2023 2  0 - 4 /hpf Final    WBC, UA 12/23/2023 4  0 - 5 /hpf Final    Bacteria 12/23/2023 Occasional  None-Occ /hpf Final    Squam Epithel, UA 12/23/2023 0  /hpf Final    Microscopic Comment 12/23/2023 SEE COMMENT   Final    Hemoglobin A1C 12/23/2023 5.4  4.0 - 5.6 % Final    Estimated Avg Glucose 12/23/2023 108  68 - 131 mg/dL Final    Magnesium 12/23/2023 2.1  1.6 - 2.6 mg/dL Final    Phosphorus 12/23/2023 2.6 (L)  2.7 - 4.5 mg/dL Final    Prealbumin 12/23/2023 19 (L)  20 - 43 mg/dL Final    Prothrombin Time 12/23/2023 10.3  9.0 - 12.5 sec Final    INR 12/23/2023 1.0  0.8 - 1.2 Final    Transferrin 12/23/2023 242  200 - 375 mg/dL Final    Group & Rh 12/23/2023 A POS   Final    Indirect David 12/23/2023 NEG   Final    Specimen Outdate 12/23/2023 12/26/2023 23:59   Final    UNIT NUMBER 12/23/2023 [id number]   Preliminary    Product Code 12/23/2023 G5615H08   Preliminary    DISPENSE STATUS 12/23/2023 CROSSMATCHED   Preliminary    CODING SYSTEM 12/23/2023 [id number]   Preliminary    Unit Blood Type Code 12/23/2023 6200   Preliminary    Unit Blood Type 12/23/2023 A POS   Preliminary    Unit Expiration 12/23/2023 [id number]   Preliminary    CROSSMATCH INTERPRETATION 12/23/2023 Compatible   Preliminary    UNIT NUMBER 12/23/2023 [id number]   Preliminary    Product Code 12/23/2023 V1077C87   Preliminary    DISPENSE STATUS 12/23/2023 CROSSMATCHED   Preliminary    CODING SYSTEM 12/23/2023 [id number]   Preliminary    Unit Blood Type Code 12/23/2023 6200   Preliminary    Unit Blood Type 12/23/2023 A POS   Preliminary    Unit Expiration 12/23/2023 [id number]   Preliminary    CROSSMATCH INTERPRETATION 12/23/2023 Compatible   Preliminary    WBC 12/24/2023 7.62  3.90 - 12.70 K/uL Final    RBC  12/24/2023 3.51 (L)  4.00 - 5.40 M/uL Final    Hemoglobin 12/24/2023 10.9 (L)  12.0 - 16.0 g/dL Final    Hematocrit 12/24/2023 32.9 (L)  37.0 - 48.5 % Final    MCV 12/24/2023 94  82 - 98 fL Final    MCH 12/24/2023 31.1 (H)  27.0 - 31.0 pg Final    MCHC 12/24/2023 33.1  32.0 - 36.0 g/dL Final    RDW 12/24/2023 14.2  11.5 - 14.5 % Final    Platelets 12/24/2023 42 (L)  150 - 450 K/uL Final    MPV 12/24/2023 13.1 (H)  9.2 - 12.9 fL Final    Immature Granulocytes 12/24/2023 0.4  0.0 - 0.5 % Final    Gran # (ANC) 12/24/2023 6.3  1.8 - 7.7 K/uL Final    Immature Grans (Abs) 12/24/2023 0.03  0.00 - 0.04 K/uL Final    Lymph # 12/24/2023 0.7 (L)  1.0 - 4.8 K/uL Final    Mono # 12/24/2023 0.6  0.3 - 1.0 K/uL Final    Eos # 12/24/2023 0.0  0.0 - 0.5 K/uL Final    Baso # 12/24/2023 0.01  0.00 - 0.20 K/uL Final    nRBC 12/24/2023 0  0 /100 WBC Final    Gran % 12/24/2023 82.1 (H)  38.0 - 73.0 % Final    Lymph % 12/24/2023 9.7 (L)  18.0 - 48.0 % Final    Mono % 12/24/2023 7.7  4.0 - 15.0 % Final    Eosinophil % 12/24/2023 0.0  0.0 - 8.0 % Final    Basophil % 12/24/2023 0.1  0.0 - 1.9 % Final    Differential Method 12/24/2023 Automated   Final    Sodium 12/24/2023 138  136 - 145 mmol/L Final    Potassium 12/24/2023 4.1  3.5 - 5.1 mmol/L Final    Chloride 12/24/2023 108  95 - 110 mmol/L Final    CO2 12/24/2023 20 (L)  23 - 29 mmol/L Final    Glucose 12/24/2023 126 (H)  70 - 110 mg/dL Final    BUN 12/24/2023 14  8 - 23 mg/dL Final    Creatinine 12/24/2023 0.8  0.5 - 1.4 mg/dL Final    Calcium 12/24/2023 8.3 (L)  8.7 - 10.5 mg/dL Final    Anion Gap 12/24/2023 10  8 - 16 mmol/L Final    eGFR 12/24/2023 >60.0  >60 mL/min/1.73 m^2 Final    Magnesium 12/24/2023 2.1  1.6 - 2.6 mg/dL Final    Phosphorus 12/24/2023 3.3  2.7 - 4.5 mg/dL Final    WBC 12/25/2023 8.01  3.90 - 12.70 K/uL Final    RBC 12/25/2023 3.26 (L)  4.00 - 5.40 M/uL Final    Hemoglobin 12/25/2023 10.0 (L)  12.0 - 16.0 g/dL Final    Hematocrit 12/25/2023 30.4 (L)  37.0 - 48.5  % Final    MCV 12/25/2023 93  82 - 98 fL Final    MCH 12/25/2023 30.7  27.0 - 31.0 pg Final    MCHC 12/25/2023 32.9  32.0 - 36.0 g/dL Final    RDW 12/25/2023 14.5  11.5 - 14.5 % Final    Platelets 12/25/2023 55 (L)  150 - 450 K/uL Final    MPV 12/25/2023 12.5  9.2 - 12.9 fL Final    Immature Granulocytes 12/25/2023 0.5  0.0 - 0.5 % Final    Gran # (ANC) 12/25/2023 4.7  1.8 - 7.7 K/uL Final    Immature Grans (Abs) 12/25/2023 0.04  0.00 - 0.04 K/uL Final    Lymph # 12/25/2023 2.0  1.0 - 4.8 K/uL Final    Mono # 12/25/2023 1.1 (H)  0.3 - 1.0 K/uL Final    Eos # 12/25/2023 0.2  0.0 - 0.5 K/uL Final    Baso # 12/25/2023 0.02  0.00 - 0.20 K/uL Final    nRBC 12/25/2023 0  0 /100 WBC Final    Gran % 12/25/2023 58.2  38.0 - 73.0 % Final    Lymph % 12/25/2023 25.3  18.0 - 48.0 % Final    Mono % 12/25/2023 13.7  4.0 - 15.0 % Final    Eosinophil % 12/25/2023 2.1  0.0 - 8.0 % Final    Basophil % 12/25/2023 0.2  0.0 - 1.9 % Final    Differential Method 12/25/2023 Automated   Final    Sodium 12/25/2023 139  136 - 145 mmol/L Final    Potassium 12/25/2023 4.0  3.5 - 5.1 mmol/L Final    Chloride 12/25/2023 111 (H)  95 - 110 mmol/L Final    CO2 12/25/2023 20 (L)  23 - 29 mmol/L Final    Glucose 12/25/2023 89  70 - 110 mg/dL Final    BUN 12/25/2023 20  8 - 23 mg/dL Final    Creatinine 12/25/2023 0.8  0.5 - 1.4 mg/dL Final    Calcium 12/25/2023 8.6 (L)  8.7 - 10.5 mg/dL Final    Anion Gap 12/25/2023 8  8 - 16 mmol/L Final    eGFR 12/25/2023 >60.0  >60 mL/min/1.73 m^2 Final    Magnesium 12/25/2023 2.3  1.6 - 2.6 mg/dL Final    Phosphorus 12/25/2023 2.4 (L)  2.7 - 4.5 mg/dL Final        Meds   Current Facility-Administered Medications   Medication Dose Route Frequency Provider Last Rate Last Admin    0.9%  NaCl infusion   Intravenous On Call Procedure Dylan Looney MD        acetaminophen tablet 1,000 mg  1,000 mg Oral Q6H Rylie Agee MD   1,000 mg at 12/25/23 0544    aspirin EC tablet 81 mg  81 mg Oral BID Lucia Bates  MD MESHA   81 mg at 12/24/23 2010    bisacodyL suppository 10 mg  10 mg Rectal Daily PRN Dylan Looney MD        buPROPion TB24 tablet 150 mg  150 mg Oral Daily Rylie Agee MD   150 mg at 12/24/23 0756    cefTRIAXone (ROCEPHIN) 2 g in dextrose 5 % in water (D5W) 100 mL IVPB (MB+)  2 g Intravenous Once Taras Braun MD        dextrose 10% bolus 125 mL 125 mL  12.5 g Intravenous PRN Rylie Agee MD        dextrose 10% bolus 250 mL 250 mL  25 g Intravenous PRN Rylie Agee MD        fentaNYL 50 mcg/mL injection 25 mcg  25 mcg Intravenous Q5 Min PRN Dylan Looney MD        galantamine 24 hr capsule 16 mg  16 mg Oral Daily with breakfast Rylie Agee MD        glucagon (human recombinant) injection 1 mg  1 mg Intramuscular PRN Rylie Agee MD        glucose chewable tablet 16 g  16 g Oral PRN Rylie Agee MD        glucose chewable tablet 24 g  24 g Oral PRN Rylie Agee MD        haloperidol lactate injection 0.5 mg  0.5 mg Intravenous Q10 Min PRN Scar Garcia MD        levothyroxine tablet 75 mcg  75 mcg Oral Before breakfast Rylie Agee MD   75 mcg at 12/25/23 0544    LIDOcaine (PF) 10 mg/ml (1%) injection 10 mg  1 mL Intradermal On Call Procedure Dylan Looney MD        melatonin tablet 6 mg  6 mg Oral Nightly PRN Dylan Looney MD   6 mg at 12/24/23 2010    memantine tablet 10 mg  10 mg Oral BID Rylie Agee MD   10 mg at 12/24/23 2010    methocarbamoL tablet 500 mg  500 mg Oral TID Jose Angel Kauffman MD   500 mg at 12/24/23 2009    mupirocin 2 % ointment 1 g  1 g Nasal On Call Procedure Dylan Looney MD        mupirocin 2 % ointment 1 g  1 g Nasal BID Dylan Looney MD        naloxone 0.4 mg/mL injection 0.02 mg  0.02 mg Intravenous PRN Rylie Agee MD        ondansetron injection 4 mg  4 mg Intravenous Q12H PRN Dylan Looney MD        polyethylene glycol packet 17 g   17 g Oral Daily Dylan Looney MD   17 g at 12/24/23 0756    pregabalin capsule 50 mg  50 mg Oral QHS Jose Angel Kauffman MD   50 mg at 12/24/23 2010    prochlorperazine injection Soln 5 mg  5 mg Intravenous Q6H PRN Dylan Looney MD        senna-docusate 8.6-50 mg per tablet 1 tablet  1 tablet Oral BID Dylan Looney MD   1 tablet at 12/24/23 2009    sodium chloride 0.9% flush 10 mL  10 mL Intravenous Q12H PRN Rylie Agee MD        sodium chloride 0.9% flush 10 mL  10 mL Intravenous PRN Dylan Looney MD        sodium chloride 0.9% flush 3 mL  3 mL Intravenous Q4H PRN Scar Garcia MD        traMADoL tablet 50 mg  50 mg Oral Q4H PRN Jose Angel Kauffman MD        tranexamic acid (CYKLOKAPRON) 3,000 mg in sodium chloride 0.9% SolP 100 mL  3,000 mg Irrigation On Call Procedure Dylan Looney MD         Assessment:    Pain control adequate    Plan:    Patient doing well, continue present treatment.    Pain: PNC pulled by patient. Will sign off at this time.   MMPC: Continue tylenol 1g q6, methocarbamol 500mg TID, pregabalin 50mg  PRN: tramadol 50mg for moderate to severe pain, titrate down as neccessary     APS will sign off at this time. Please contact for any questions.          Ramiro Contreras  PGY-2

## 2023-12-25 NOTE — PROGRESS NOTES
[facility]  Progress Note    Patient Name: Rosario Menendez  MRN: [id number]  Patient Class: IP- Inpatient   Admission Date: 12/23/2023  Length of Stay: 2 days  Attending Physician: Lucia Bates MD  Primary Care Provider: Shi Simon MD        Subjective:     Principal Problem:Closed intertrochanteric fracture of left femur        HPI:  Ms. Menendez is a 88 y.o. female with PMH of osteoporosis, dementia, thrombocytopenia and hypothyroidism  presenting with left hip pain. Patient was walking at home and fell onto left hip.  Most of the history was obtained for son who was at the bedside.  Patient has dementia does not remember how she fell.  Immediate pain and difficulty bearing weight. Denies head injury or LOC. On no blood thinners. Denies other MSK pains. Denies numbness, tingling, or paresthesias to the LLE. Lives at an assisted living facility. Uses a walker for ambulation at baseline.    On presentation patient was afebrile, hypertensive with blood pressure of 157/107 mm of Hg, tachycardic with heart rate of 124, saturating well on room air.  Blood work including CBC, CMP unremarkable, HIV negative, hep C negative.  UA positive for trace leukocyte esterase.  CT cervical spine showed multi level degenerative changes.  CT head showed chronic microvascular changes.  Chest x-ray was unremarkable.  X-ray hip  showed irregular lucency transversing the left greater trochanter extending into the intertrochanteric portion of the proximal left femur.  A nondisplaced fracture.  Patient received ceftriaxone, 1 dose of morphine and admitted to inpatient service.    Overview/Hospital Course:  Ms. Menendez was admitted to Hospital Medicine for management of a closed intertrochanteric fracture of the left femur.  Ortho was consulted.  She went to the OR on 12/23 for a left CMN with Dr. Mancilla.  She was started on PNC postop under Anesthesia Pain which patient pulled on POD2.  She was  initially started on Eliquis 2.5mg BID for DVT prophylaxis, but given Thrombocytopenia <50K, she was changed to Aspirin 81mg BID.  PT/OT were consulted who suggest moderate therapy.    Interval History: No acute events overnight.  Reports pain is controlled.  She pulled her PNC herself.  PT/OT say moderate therapy.    Review of Systems   Constitutional:  Positive for activity change. Negative for chills, fatigue and fever.   Respiratory:  Negative for cough and shortness of breath.    Cardiovascular:  Negative for chest pain, palpitations and leg swelling.   Gastrointestinal:  Negative for abdominal pain, diarrhea, nausea and vomiting.   Genitourinary:  Negative for dysuria and urgency.   Musculoskeletal:  Positive for arthralgias and gait problem.   Neurological:  Negative for dizziness and headaches.   All other systems reviewed and are negative.    Objective:     Vital Signs (Most Recent):  Temp: 97.4 °F (36.3 °C) (12/25/23 0747)  Pulse: (!) 58 (12/25/23 0853)  Resp: 16 (12/25/23 0853)  BP: 122/61 (12/25/23 0747)  SpO2: 96 % (12/25/23 0853) Vital Signs (24h Range):  Temp:  [96.8 °F (36 °C)-98 °F (36.7 °C)] 97.4 °F (36.3 °C)  Pulse:  [58-82] 58  Resp:  [16-18] 16  SpO2:  [92 %-96 %] 96 %  BP: (110-133)/(55-61) 122/61     Weight: 61.5 kg (135 lb 9.3 oz)  Body mass index is 26.48 kg/m².    Intake/Output Summary (Last 24 hours) at 12/25/2023 1100  Last data filed at 12/25/2023 0551  Gross per 24 hour   Intake --   Output 400 ml   Net -400 ml           Physical Exam  Constitutional:       Appearance: Normal appearance. She is well-developed.   HENT:      Head: Normocephalic and atraumatic.   Cardiovascular:      Rate and Rhythm: Regular rhythm. Bradycardia present.      Heart sounds: No murmur heard.  Pulmonary:      Effort: Pulmonary effort is normal. No respiratory distress.      Breath sounds: Normal breath sounds. No wheezing or rales.   Abdominal:      General: There is no distension.      Palpations: Abdomen is  soft.      Tenderness: There is no abdominal tenderness.   Musculoskeletal:         General: No deformity.      Comments: Postop dressing to left femur C/D/I   Skin:     General: Skin is warm.   Neurological:      General: No focal deficit present.      Mental Status: She is alert and oriented to person, place, and time. Mental status is at baseline.             Significant Labs: All pertinent labs within the past 24 hours have been reviewed.  CBC:   Recent Labs   Lab 12/24/23  0333 12/25/23 0213   WBC 7.62 8.01   HGB 10.9* 10.0*   HCT 32.9* 30.4*   PLT 42* 55*       CMP:   Recent Labs   Lab 12/24/23  0333 12/25/23  0213    139   K 4.1 4.0    111*   CO2 20* 20*   * 89   BUN 14 20   CREATININE 0.8 0.8   CALCIUM 8.3* 8.6*   ANIONGAP 10 8         Significant Imaging: I have reviewed all pertinent imaging results/findings within the past 24 hours.    Assessment/Plan:      * Closed intertrochanteric fracture of left femur  Hip Fracture Pathway initiated  Orthopedics consulted  S/p left CMN 12/23 with Dr. Mancilla  DVT prophylaxis for 28 days post-op to start POD 1 - was given Eliquis initially, but changed to Aspirin 81mg BID given Thrombocytopenia <50K  PT/OT to start on POD 1 - Suggest moderate therapy with SNF  Zamarripa to be removed on POD 1  Pain control:  Anesthesia pain monitored PNC, which patient self removed on POD2    Acute blood loss as cause of postoperative anemia  Patient's anemia is currently uncontrolled. Has not received any PRBCs to date. Etiology likely d/t acute blood loss which was from surgery  Current CBC reviewed-   Lab Results   Component Value Date    HGB 10.0 (L) 12/25/2023    HCT 30.4 (L) 12/25/2023     Monitor serial CBC and transfuse if patient becomes hemodynamically unstable, symptomatic or H/H drops below 7/21.    Debility  PT/OT consulted     Amnestic MCI (mild cognitive impairment with memory loss)  Continue memantine, galantamine, and buproprion  Delirium precautions        Thrombocytopenia  Patient was found to have thrombocytopenia, which is chronic.  Will monitor the platelets Daily. Will transfuse if platelet count is <10k. Hold DVT prophylaxis if platelets are <50k. The patient's platelet results have been reviewed and are listed below.  Recent Labs   Lab 12/25/23  0213   PLT 55*           Hypercholesteremia  Chronic and stable  Continue Statin    Hypothyroidism  Chronic and stable  Continue synthroid         VTE Risk Mitigation (From admission, onward)           Ordered     IP VTE HIGH RISK PATIENT  Once         12/23/23 1603     Place sequential compression device  Until discontinued         12/23/23 1603     Place sequential compression device  Until discontinued         12/23/23 1039                    Discharge Planning   AMARA: 12/28/2023     Code Status: Full Code   Is the patient medically ready for discharge?: No    Reason for patient still in hospital (select all that apply): Patient trending condition                     Lucia Bates MD  Department of [facility]   [facility] - Surgery

## 2023-12-25 NOTE — SUBJECTIVE & OBJECTIVE
Principal Problem:Closed intertrochanteric fracture of left femur    Principal Orthopedic Problem: S/p L CMN 12/23    Interval History: Patient seen and examined at bedside. NAEON. Patient doing well. No complaints. Pain well controlled. Hgb 10.      Review of patient's allergies indicates:   Allergen Reactions    Codeine Other (See Comments)       Current Facility-Administered Medications   Medication    0.9%  NaCl infusion    acetaminophen tablet 1,000 mg    aspirin EC tablet 81 mg    bisacodyL suppository 10 mg    buPROPion TB24 tablet 150 mg    cefTRIAXone (ROCEPHIN) 2 g in dextrose 5 % in water (D5W) 100 mL IVPB (MB+)    dextrose 10% bolus 125 mL 125 mL    dextrose 10% bolus 250 mL 250 mL    fentaNYL 50 mcg/mL injection 25 mcg    galantamine 24 hr capsule 16 mg    glucagon (human recombinant) injection 1 mg    glucose chewable tablet 16 g    glucose chewable tablet 24 g    haloperidol lactate injection 0.5 mg    levothyroxine tablet 75 mcg    LIDOcaine (PF) 10 mg/ml (1%) injection 10 mg    melatonin tablet 6 mg    memantine tablet 10 mg    methocarbamoL tablet 500 mg    mupirocin 2 % ointment 1 g    mupirocin 2 % ointment 1 g    naloxone 0.4 mg/mL injection 0.02 mg    ondansetron injection 4 mg    polyethylene glycol packet 17 g    pregabalin capsule 50 mg    prochlorperazine injection Soln 5 mg    senna-docusate 8.6-50 mg per tablet 1 tablet    sodium chloride 0.9% flush 10 mL    sodium chloride 0.9% flush 10 mL    sodium chloride 0.9% flush 3 mL    traMADoL tablet 50 mg    tranexamic acid (CYKLOKAPRON) 3,000 mg in sodium chloride 0.9% SolP 100 mL     Objective:     Vital Signs (Most Recent):  Temp: 97.8 °F (36.6 °C) (12/25/23 0424)  Pulse: 60 (12/25/23 0424)  Resp: 18 (12/25/23 0424)  BP: 126/60 (12/25/23 0424)  SpO2: (!) 93 % (12/25/23 0424) Vital Signs (24h Range):  Temp:  [96.8 °F (36 °C)-98 °F (36.7 °C)] 97.8 °F (36.6 °C)  Pulse:  [60-82] 60  Resp:  [16-18] 18  SpO2:  [92 %-93 %] 93 %  BP:  (110-133)/(55-60) 126/60     Weight: 61.5 kg (135 lb 9.3 oz)  Height: 5' (152.4 cm)  Body mass index is 26.48 kg/m².      Intake/Output Summary (Last 24 hours) at 12/25/2023 0734  Last data filed at 12/25/2023 0551  Gross per 24 hour   Intake --   Output 400 ml   Net -400 ml        Ortho/SPM Exam  Awake and alert  Regular Rate  Non labored respirations  Surgical dressings c/d/I  Fires quad/TA/EHL/GSC  SILT  Compartments soft, compressible  Brisk cap refill     Significant Labs: CBC:   Recent Labs   Lab 12/24/23  0333 12/25/23  0213   WBC 7.62 8.01   HGB 10.9* 10.0*   HCT 32.9* 30.4*   PLT 42* 55*     CMP:   Recent Labs   Lab 12/24/23  0333 12/25/23  0213    139   K 4.1 4.0    111*   CO2 20* 20*   * 89   BUN 14 20   CREATININE 0.8 0.8   CALCIUM 8.3* 8.6*   ANIONGAP 10 8     All pertinent labs within the past 24 hours have been reviewed.    Significant Imaging: I have reviewed and interpreted all pertinent imaging results/findings.

## 2023-12-25 NOTE — ASSESSMENT & PLAN NOTE
Hip Fracture Pathway initiated  Orthopedics consulted  S/p left CMN 12/23 with Dr. Mancilla  DVT prophylaxis for 28 days post-op to start POD 1 - was given Eliquis initially, but changed to Aspirin 81mg BID given Thrombocytopenia <50K  PT/OT to start on POD 1 - Suggest moderate therapy with SNF  Zamarripa to be removed on POD 1  Pain control:  Anesthesia pain monitored PNC, which patient self removed on POD2

## 2023-12-25 NOTE — ASSESSMENT & PLAN NOTE
Patient's anemia is currently uncontrolled. Has not received any PRBCs to date. Etiology likely d/t acute blood loss which was from surgery  Current CBC reviewed-   Lab Results   Component Value Date    HGB 10.0 (L) 12/25/2023    HCT 30.4 (L) 12/25/2023     Monitor serial CBC and transfuse if patient becomes hemodynamically unstable, symptomatic or H/H drops below 7/21.

## 2023-12-25 NOTE — ASSESSMENT & PLAN NOTE
Rosario Menendez is a 88 y.o. female with a left intertrochanteric femur fracture, closed, NVI. They take no anticoagulation at home. They required a walker for ambulation prior to this injury. S/p CMN on 12/23.    - Multimodal pain control  - PNC per anesthesia  - WBAT, ROMAT LLE  - DVT PPx: Eliquis 2.5 mg BID  - Abx: Post op ancef  - Hgb 10  - Dc shields      Dispo: F/u PT recs

## 2023-12-25 NOTE — PROGRESS NOTES
During rounding patient noted to holding her Ropivacaine line in hand. Notifed Dr. Juan of above. D/c'd Ropivacaine order. Care ongoing.

## 2023-12-25 NOTE — SUBJECTIVE & OBJECTIVE
Interval History: No acute events overnight.  Reports pain is controlled.  She pulled her PNC herself.  PT/OT say moderate therapy.    Review of Systems   Constitutional:  Positive for activity change. Negative for chills, fatigue and fever.   Respiratory:  Negative for cough and shortness of breath.    Cardiovascular:  Negative for chest pain, palpitations and leg swelling.   Gastrointestinal:  Negative for abdominal pain, diarrhea, nausea and vomiting.   Genitourinary:  Negative for dysuria and urgency.   Musculoskeletal:  Positive for arthralgias and gait problem.   Neurological:  Negative for dizziness and headaches.   All other systems reviewed and are negative.    Objective:     Vital Signs (Most Recent):  Temp: 97.4 °F (36.3 °C) (12/25/23 0747)  Pulse: (!) 58 (12/25/23 0853)  Resp: 16 (12/25/23 0853)  BP: 122/61 (12/25/23 0747)  SpO2: 96 % (12/25/23 0853) Vital Signs (24h Range):  Temp:  [96.8 °F (36 °C)-98 °F (36.7 °C)] 97.4 °F (36.3 °C)  Pulse:  [58-82] 58  Resp:  [16-18] 16  SpO2:  [92 %-96 %] 96 %  BP: (110-133)/(55-61) 122/61     Weight: 61.5 kg (135 lb 9.3 oz)  Body mass index is 26.48 kg/m².    Intake/Output Summary (Last 24 hours) at 12/25/2023 1100  Last data filed at 12/25/2023 0551  Gross per 24 hour   Intake --   Output 400 ml   Net -400 ml           Physical Exam  Constitutional:       Appearance: Normal appearance. She is well-developed.   HENT:      Head: Normocephalic and atraumatic.   Cardiovascular:      Rate and Rhythm: Regular rhythm. Bradycardia present.      Heart sounds: No murmur heard.  Pulmonary:      Effort: Pulmonary effort is normal. No respiratory distress.      Breath sounds: Normal breath sounds. No wheezing or rales.   Abdominal:      General: There is no distension.      Palpations: Abdomen is soft.      Tenderness: There is no abdominal tenderness.   Musculoskeletal:         General: No deformity.      Comments: Postop dressing to left femur C/D/I   Skin:     General: Skin  is warm.   Neurological:      General: No focal deficit present.      Mental Status: She is alert and oriented to person, place, and time. Mental status is at baseline.             Significant Labs: All pertinent labs within the past 24 hours have been reviewed.  CBC:   Recent Labs   Lab 12/24/23 0333 12/25/23 0213   WBC 7.62 8.01   HGB 10.9* 10.0*   HCT 32.9* 30.4*   PLT 42* 55*       CMP:   Recent Labs   Lab 12/24/23 0333 12/25/23 0213    139   K 4.1 4.0    111*   CO2 20* 20*   * 89   BUN 14 20   CREATININE 0.8 0.8   CALCIUM 8.3* 8.6*   ANIONGAP 10 8         Significant Imaging: I have reviewed all pertinent imaging results/findings within the past 24 hours.

## 2023-12-25 NOTE — PROGRESS NOTES
[facility] - [facility]  Orthopedics  Progress Note    Patient Name: Rosario Menendez  MRN: [id number]  Admission Date: 12/23/2023  Hospital Length of Stay: 2 days  Attending Provider: Lucia Bates MD  Primary Care Provider: Shi Simon MD  Follow-up For: Procedure(s) (LRB):  INSERTION, INTRAMEDULLARY DIEGO, FEMUR (Left)    Post-Operative Day: 2 Days Post-Op  Subjective:     Principal Problem:Closed intertrochanteric fracture of left femur    Principal Orthopedic Problem: S/p L CMN 12/23    Interval History: Patient seen and examined at bedside. NAEON. Patient doing well. No complaints. Pain well controlled. Hgb 10.      Review of patient's allergies indicates:   Allergen Reactions    Codeine Other (See Comments)       Current Facility-Administered Medications   Medication    0.9%  NaCl infusion    acetaminophen tablet 1,000 mg    aspirin EC tablet 81 mg    bisacodyL suppository 10 mg    buPROPion TB24 tablet 150 mg    cefTRIAXone (ROCEPHIN) 2 g in dextrose 5 % in water (D5W) 100 mL IVPB (MB+)    dextrose 10% bolus 125 mL 125 mL    dextrose 10% bolus 250 mL 250 mL    fentaNYL 50 mcg/mL injection 25 mcg    galantamine 24 hr capsule 16 mg    glucagon (human recombinant) injection 1 mg    glucose chewable tablet 16 g    glucose chewable tablet 24 g    haloperidol lactate injection 0.5 mg    levothyroxine tablet 75 mcg    LIDOcaine (PF) 10 mg/ml (1%) injection 10 mg    melatonin tablet 6 mg    memantine tablet 10 mg    methocarbamoL tablet 500 mg    mupirocin 2 % ointment 1 g    mupirocin 2 % ointment 1 g    naloxone 0.4 mg/mL injection 0.02 mg    ondansetron injection 4 mg    polyethylene glycol packet 17 g    pregabalin capsule 50 mg    prochlorperazine injection Soln 5 mg    senna-docusate 8.6-50 mg per tablet 1 tablet    sodium chloride 0.9% flush 10 mL    sodium chloride 0.9% flush 10 mL    sodium chloride 0.9% flush 3 mL    traMADoL tablet 50 mg    tranexamic acid (CYKLOKAPRON) 3,000 mg in sodium chloride 0.9%  SolP 100 mL     Objective:     Vital Signs (Most Recent):  Temp: 97.8 °F (36.6 °C) (12/25/23 0424)  Pulse: 60 (12/25/23 0424)  Resp: 18 (12/25/23 0424)  BP: 126/60 (12/25/23 0424)  SpO2: (!) 93 % (12/25/23 0424) Vital Signs (24h Range):  Temp:  [96.8 °F (36 °C)-98 °F (36.7 °C)] 97.8 °F (36.6 °C)  Pulse:  [60-82] 60  Resp:  [16-18] 18  SpO2:  [92 %-93 %] 93 %  BP: (110-133)/(55-60) 126/60     Weight: 61.5 kg (135 lb 9.3 oz)  Height: 5' (152.4 cm)  Body mass index is 26.48 kg/m².      Intake/Output Summary (Last 24 hours) at 12/25/2023 0734  Last data filed at 12/25/2023 0551  Gross per 24 hour   Intake --   Output 400 ml   Net -400 ml        Ortho/SPM Exam  Awake and alert  Regular Rate  Non labored respirations  Surgical dressings c/d/I  Fires quad/TA/EHL/GSC  SILT  Compartments soft, compressible  Brisk cap refill     Significant Labs: CBC:   Recent Labs   Lab 12/24/23  0333 12/25/23 0213   WBC 7.62 8.01   HGB 10.9* 10.0*   HCT 32.9* 30.4*   PLT 42* 55*     CMP:   Recent Labs   Lab 12/24/23 0333 12/25/23 0213    139   K 4.1 4.0    111*   CO2 20* 20*   * 89   BUN 14 20   CREATININE 0.8 0.8   CALCIUM 8.3* 8.6*   ANIONGAP 10 8     All pertinent labs within the past 24 hours have been reviewed.    Significant Imaging: I have reviewed and interpreted all pertinent imaging results/findings.  Assessment/Plan:     * Closed intertrochanteric fracture of left femur  Rosario Menendez is a 88 y.o. female with a left intertrochanteric femur fracture, closed, NVI. They take no anticoagulation at home. They required a walker for ambulation prior to this injury. S/p CMN on 12/23.    - Multimodal pain control  - PNC per anesthesia  - WBAT, ROMAT LLE  - DVT PPx: Eliquis 2.5 mg BID  - Abx: Post op ancef  - Hgb 10  - Dc alyson      Dispo: F/u PT recs          VIKTOR Olsen MD  Orthopedics  [facility] - Surgery

## 2023-12-25 NOTE — PLAN OF CARE
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         Patient is homebound due to:  Closed intertrochanteric fracture of left femur      Allergies:  Review of patient's allergies indicates:   Allergen Reactions    Codeine Other (See Comments)         Code Status:    Code Status: Full Code      Vitals:   Every shift (Skilled Nursing patients)      Diet: Regular      Referrals/ Consults     Physical Therapy to evaluate and treat 5x/week     Occupational Therapy to evaluate and treat 5x/week      Activities:   WBAT ROMAT RLE      Nursing Precautions:    Fall and Pressure ulcer prevention      Postop Dressing:  Remove it at 5 days post op then do daily dressing changes.  Ok for dressing to get wet but do not submerge under water        Medications: Discontinue all previous medication orders, if any. See new list below.    Current Discharge Medication List        START taking these medications    Details   aspirin (ECOTRIN) 81 MG EC tablet Take 1 tablet (81 mg total) by mouth 2 (two) times a day.  Qty: 60 tablet, Refills: 0      methocarbamoL (ROBAXIN) 500 MG Tab Take 1 tablet (500 mg total) by mouth 3 (three) times daily. for 10 days  Qty: 30 tablet, Refills: 0      pregabalin (LYRICA) 50 MG capsule Take 1 capsule (50 mg total) by mouth every evening.  Qty: 30 capsule, Refills: 0           CONTINUE these medications  which have NOT CHANGED    Details   acetaminophen (TYLENOL) 500 MG tablet Take 2 tablets (1,000 mg total) by mouth every 8 (eight) hours as needed for Pain.  Refills: 0      alendronate (FOSAMAX) 70 MG tablet Take 1 tablet (70 mg total) by mouth every 7 days. HOLD while in SNF  Qty: 4 tablet, Refills: 5  Hold while at [facility].      buPROPion (WELLBUTRIN XL) 150 MG TB24 tablet TAKE 1 TABLET EVERY DAY  Qty: 90 tablet, Refills: 3      cyanocobalamin (VITAMIN B-12) 1000 MCG tablet Take 1 tablet (1,000 mcg total) by mouth once daily.      galantamine (RAZADYNE ER) 16 MG 24 hr capsule TAKE 1 CAPSULE EVERY DAY WITH BREAKFAST  Qty: 90 capsule, Refills: 0  Ok to take 2 capsules of the 8mg Razadyne      levothyroxine (SYNTHROID) 75 MCG tablet Take 1 tablet (75 mcg total) by mouth before breakfast.  Qty: 30 tablet, Refills: 5      memantine (NAMENDA) 10 MG Tab Take 1 tablet (10 mg total) by mouth 2 (two) times daily.  Qty: 180 tablet, Refills: 1               Future Appointments   Date Time Provider Department Center   3/21/2024 11:00 AM Shi Simon MD [facility]           Lucia Bates MD  [facility]

## 2023-12-25 NOTE — PT/OT/SLP PROGRESS
Physical Therapy  Co-Treatment with OT    Patient Name:  Rosario Menendez   MRN:  [id number]    Recommendations:     Discharge Recommendations: Moderate Intensity Therapy  Discharge Equipment Recommendations: bedside commode  Barriers to discharge: Decreased caregiver support    Assessment:     Rosario Menendez is a 88 y.o. female admitted with a medical diagnosis of Closed intertrochanteric fracture of left femur.  She presents with the following impairments/functional limitations: gait instability, impaired endurance, impaired balance, decreased safety awareness, impaired coordination, impaired cognition, impaired functional mobility, decreased coordination pt tolerated treatment better needing less assistance for mobility and beginning to gait train. Pt will benefit from cont skilled PT per pathway then 4x/wk to progress physically. Patient continues to demonstrate the need for moderate intensity therapy on a daily basis post acute exhibited by decreased independence with functional mobility. Pt is s/p cephalomedulary nail L femur 12/23/23 and is WBAT.       Rehab Prognosis: Good; patient would benefit from acute skilled PT services to address these deficits and reach maximum level of function.    Recent Surgery: Procedure(s) (LRB):  INSERTION, INTRAMEDULLARY DIEGO, FEMUR (Left) 2 Days Post-Op    Plan:     During this hospitalization, patient to be seen  (12/26 then 4x/wk) to address the identified rehab impairments via gait training, therapeutic activities, neuromuscular re-education and progress toward the following goals:    Plan of Care Expires:  01/23/24    Subjective     Chief Complaint: pt stated that it was hard to make her LLE work.   Patient/Family Comments/goals: to be able to walk better.   Pain/Comfort:  Pain Rating 1: 0/10  Pain Rating Post-Intervention 1: 0/10      Objective:     Communicated with nurse  prior to session.  Patient found supine with SCD, FCD, PureWick (hep lock IV) , bed alarm upon PT  entry to room.     General Precautions: Standard, fall, hearing impaired  Orthopedic Precautions: LLE weight bearing as tolerated  Braces: N/A  Respiratory Status: Room air     Functional Mobility:  Bed Mobility:   pt needed verbal cues for hand placement and sequencing for functional mobility.   Rolling Right: maximal assistance  Supine to Sit: maximal assistance    Transfers:     Sit to Stand:  minimum assistance with rolling walker    Gait: pt received gait training ~ 3 step with CGA and RW. Pt was WBAT LLE. Pt had decreased step length on R. Pt needed constant verbal cues for sequencing for BLE advancement and RW usage.     Balance: pt sat on EOB with SBA.     Due to pt complex medical condition, the skill of 2 licensed therapists is needed to maximize treatment session and progression towards goals  Pt white board updated with current therapists name and level of mobility assistance needed.         AM-PAC 6 CLICK MOBILITY  Turning over in bed (including adjusting bedclothes, sheets and blankets)?: 2  Moving from lying on back to sitting on the side of the bed?: 2  Moving to and from a bed to a chair (including a wheelchair)?: 2  Need to walk in hospital room?: 3  Climbing 3-5 steps with a railing?: 1       Treatment & Education:  Pt received verbal instructions in PT POC and verbally explanation in PT POC. Pt verbally expressed understanding of such.     Patient left up in chair with all lines intact, call button in reach, and SCD on RLE and FCD on LLE ..    GOALS:   Multidisciplinary Problems       Physical Therapy Goals          Problem: Physical Therapy    Goal Priority Disciplines Outcome Goal Variances Interventions   Physical Therapy Goal     PT, PT/OT Ongoing, Progressing     Description: Goals to be met by: 24     Patient will increase functional independence with mobility by performin. Supine to sit with Contact Guard Assistance  2. Sit to stand transfer with Contact Guard Assistance  3. Bed  to chair transfer with Contact Guard Assistance using Rolling Walker  4. Gait  x 40 feet with Minimal Assistance using Rolling Walker.   5. Stand for 3 minutes with Stand-by Assistance using Rolling Walker                         Time Tracking:     PT Received On: 12/25/23  PT Start Time: 0904     PT Stop Time: 0918  PT Total Time (min): 14 min     Billable Minutes: Gait Training 14 min        PT/PTA: PT     Number of PTA visits since last PT visit: 0     12/25/2023

## 2023-12-25 NOTE — PLAN OF CARE
Problem: Physical Therapy  Goal: Physical Therapy Goal  Description: Goals to be met by: 24     Patient will increase functional independence with mobility by performin. Supine to sit with Contact Guard Assistance  2. Sit to stand transfer with Contact Guard Assistance  3. Bed to chair transfer with Contact Guard Assistance using Rolling Walker  4. Gait  x 40 feet with Minimal Assistance using Rolling Walker.   5. Stand for 3 minutes with Stand-by Assistance using Rolling Walker    Outcome: Ongoing, Progressing   Goals remain appropriate. 2023

## 2023-12-26 ENCOUNTER — HOSPITAL ENCOUNTER (INPATIENT)
Facility: HOSPITAL | Age: 88
LOS: 22 days | Discharge: HOME HEALTH | DRG: 560 | End: 2024-01-17
Attending: HOSPITALIST | Admitting: HOSPITALIST
Payer: MEDICARE

## 2023-12-26 VITALS
OXYGEN SATURATION: 93 % | WEIGHT: 135.56 LBS | TEMPERATURE: 98 F | BODY MASS INDEX: 26.61 KG/M2 | HEART RATE: 69 BPM | SYSTOLIC BLOOD PRESSURE: 137 MMHG | HEIGHT: 60 IN | DIASTOLIC BLOOD PRESSURE: 64 MMHG | RESPIRATION RATE: 16 BRPM

## 2023-12-26 DIAGNOSIS — F03.90 DEMENTIA, UNSPECIFIED DEMENTIA SEVERITY, UNSPECIFIED DEMENTIA TYPE, UNSPECIFIED WHETHER BEHAVIORAL, PSYCHOTIC, OR MOOD DISTURBANCE OR ANXIETY: Primary | ICD-10-CM

## 2023-12-26 DIAGNOSIS — S72.142A CLOSED INTERTROCHANTERIC FRACTURE OF LEFT FEMUR: ICD-10-CM

## 2023-12-26 LAB
ANION GAP SERPL CALC-SCNC: 6 MMOL/L (ref 8–16)
BASOPHILS # BLD AUTO: 0.03 K/UL (ref 0–0.2)
BASOPHILS NFR BLD: 0.6 % (ref 0–1.9)
BUN SERPL-MCNC: 12 MG/DL (ref 8–23)
CALCIUM SERPL-MCNC: 8.8 MG/DL (ref 8.7–10.5)
CHLORIDE SERPL-SCNC: 109 MMOL/L (ref 95–110)
CO2 SERPL-SCNC: 23 MMOL/L (ref 23–29)
CREAT SERPL-MCNC: 0.8 MG/DL (ref 0.5–1.4)
DIFFERENTIAL METHOD: ABNORMAL
EOSINOPHIL # BLD AUTO: 0.1 K/UL (ref 0–0.5)
EOSINOPHIL NFR BLD: 2.5 % (ref 0–8)
ERYTHROCYTE [DISTWIDTH] IN BLOOD BY AUTOMATED COUNT: 14.5 % (ref 11.5–14.5)
EST. GFR  (NO RACE VARIABLE): >60 ML/MIN/1.73 M^2
GLUCOSE SERPL-MCNC: 78 MG/DL (ref 70–110)
HCT VFR BLD AUTO: 31.3 % (ref 37–48.5)
HGB BLD-MCNC: 10.2 G/DL (ref 12–16)
IMM GRANULOCYTES # BLD AUTO: 0.02 K/UL (ref 0–0.04)
IMM GRANULOCYTES NFR BLD AUTO: 0.4 % (ref 0–0.5)
LYMPHOCYTES # BLD AUTO: 1.5 K/UL (ref 1–4.8)
LYMPHOCYTES NFR BLD: 30.5 % (ref 18–48)
MAGNESIUM SERPL-MCNC: 2.1 MG/DL (ref 1.6–2.6)
MCH RBC QN AUTO: 31.2 PG (ref 27–31)
MCHC RBC AUTO-ENTMCNC: 32.6 G/DL (ref 32–36)
MCV RBC AUTO: 96 FL (ref 82–98)
MONOCYTES # BLD AUTO: 0.7 K/UL (ref 0.3–1)
MONOCYTES NFR BLD: 14.8 % (ref 4–15)
NEUTROPHILS # BLD AUTO: 2.5 K/UL (ref 1.8–7.7)
NEUTROPHILS NFR BLD: 51.2 % (ref 38–73)
NRBC BLD-RTO: 0 /100 WBC
PHOSPHATE SERPL-MCNC: 1.8 MG/DL (ref 2.7–4.5)
PLATELET # BLD AUTO: 88 K/UL (ref 150–450)
PMV BLD AUTO: 11.7 FL (ref 9.2–12.9)
POTASSIUM SERPL-SCNC: 4.3 MMOL/L (ref 3.5–5.1)
RBC # BLD AUTO: 3.27 M/UL (ref 4–5.4)
SODIUM SERPL-SCNC: 138 MMOL/L (ref 136–145)
WBC # BLD AUTO: 4.79 K/UL (ref 3.9–12.7)

## 2023-12-26 PROCEDURE — 97535 SELF CARE MNGMENT TRAINING: CPT | Mod: HCNC

## 2023-12-26 PROCEDURE — 11000004 HC SNF PRIVATE: Mod: HCNC

## 2023-12-26 PROCEDURE — 25000003 PHARM REV CODE 250: Mod: HCNC | Performed by: STUDENT IN AN ORGANIZED HEALTH CARE EDUCATION/TRAINING PROGRAM

## 2023-12-26 PROCEDURE — 84100 ASSAY OF PHOSPHORUS: CPT | Mod: HCNC | Performed by: STUDENT IN AN ORGANIZED HEALTH CARE EDUCATION/TRAINING PROGRAM

## 2023-12-26 PROCEDURE — 97116 GAIT TRAINING THERAPY: CPT | Mod: HCNC,CQ

## 2023-12-26 PROCEDURE — 80048 BASIC METABOLIC PNL TOTAL CA: CPT | Mod: HCNC | Performed by: STUDENT IN AN ORGANIZED HEALTH CARE EDUCATION/TRAINING PROGRAM

## 2023-12-26 PROCEDURE — 83735 ASSAY OF MAGNESIUM: CPT | Mod: HCNC | Performed by: STUDENT IN AN ORGANIZED HEALTH CARE EDUCATION/TRAINING PROGRAM

## 2023-12-26 PROCEDURE — 36415 COLL VENOUS BLD VENIPUNCTURE: CPT | Mod: HCNC | Performed by: STUDENT IN AN ORGANIZED HEALTH CARE EDUCATION/TRAINING PROGRAM

## 2023-12-26 PROCEDURE — 25000003 PHARM REV CODE 250: Mod: HCNC | Performed by: ANESTHESIOLOGY

## 2023-12-26 PROCEDURE — 25000003 PHARM REV CODE 250: Mod: HCNC | Performed by: HOSPITALIST

## 2023-12-26 PROCEDURE — 85025 COMPLETE CBC W/AUTO DIFF WBC: CPT | Mod: HCNC | Performed by: STUDENT IN AN ORGANIZED HEALTH CARE EDUCATION/TRAINING PROGRAM

## 2023-12-26 PROCEDURE — 97530 THERAPEUTIC ACTIVITIES: CPT | Mod: HCNC

## 2023-12-26 RX ORDER — PREGABALIN 50 MG/1
50 CAPSULE ORAL NIGHTLY
Status: DISCONTINUED | OUTPATIENT
Start: 2023-12-26 | End: 2024-01-17 | Stop reason: HOSPADM

## 2023-12-26 RX ORDER — GALANTAMINE 8 MG/1
16 TABLET, FILM COATED ORAL DAILY
Status: DISCONTINUED | OUTPATIENT
Start: 2023-12-27 | End: 2023-12-28

## 2023-12-26 RX ORDER — MEMANTINE HYDROCHLORIDE 10 MG/1
10 TABLET ORAL 2 TIMES DAILY
Status: DISCONTINUED | OUTPATIENT
Start: 2023-12-26 | End: 2024-01-17 | Stop reason: HOSPADM

## 2023-12-26 RX ORDER — ACETAMINOPHEN 500 MG
1000 TABLET ORAL EVERY 8 HOURS PRN
Status: DISCONTINUED | OUTPATIENT
Start: 2023-12-26 | End: 2024-01-17 | Stop reason: HOSPADM

## 2023-12-26 RX ORDER — CALCIUM CARBONATE 200(500)MG
500 TABLET,CHEWABLE ORAL 2 TIMES DAILY PRN
Status: DISCONTINUED | OUTPATIENT
Start: 2023-12-26 | End: 2024-01-17 | Stop reason: HOSPADM

## 2023-12-26 RX ORDER — BISACODYL 10 MG
10 SUPPOSITORY, RECTAL RECTAL ONCE
Status: DISCONTINUED | OUTPATIENT
Start: 2023-12-26 | End: 2023-12-26

## 2023-12-26 RX ORDER — METHOCARBAMOL 500 MG/1
500 TABLET, FILM COATED ORAL 3 TIMES DAILY
Status: DISCONTINUED | OUTPATIENT
Start: 2023-12-26 | End: 2024-01-02

## 2023-12-26 RX ORDER — BUPROPION HYDROCHLORIDE 150 MG/1
150 TABLET ORAL DAILY
Status: DISCONTINUED | OUTPATIENT
Start: 2023-12-27 | End: 2024-01-17 | Stop reason: HOSPADM

## 2023-12-26 RX ORDER — AMOXICILLIN 250 MG
1 CAPSULE ORAL 2 TIMES DAILY
Status: DISCONTINUED | OUTPATIENT
Start: 2023-12-26 | End: 2024-01-17 | Stop reason: HOSPADM

## 2023-12-26 RX ORDER — SODIUM,POTASSIUM PHOSPHATES 280-250MG
2 POWDER IN PACKET (EA) ORAL ONCE
Status: COMPLETED | OUTPATIENT
Start: 2023-12-26 | End: 2023-12-26

## 2023-12-26 RX ORDER — ACETAMINOPHEN 325 MG/1
650 TABLET ORAL EVERY 6 HOURS PRN
Status: DISCONTINUED | OUTPATIENT
Start: 2023-12-26 | End: 2024-01-17 | Stop reason: HOSPADM

## 2023-12-26 RX ORDER — ASPIRIN 81 MG/1
81 TABLET ORAL 2 TIMES DAILY
Status: DISCONTINUED | OUTPATIENT
Start: 2023-12-26 | End: 2024-01-17 | Stop reason: HOSPADM

## 2023-12-26 RX ORDER — TALC
6 POWDER (GRAM) TOPICAL NIGHTLY PRN
Status: DISCONTINUED | OUTPATIENT
Start: 2023-12-26 | End: 2024-01-17 | Stop reason: HOSPADM

## 2023-12-26 RX ORDER — LANOLIN ALCOHOL/MO/W.PET/CERES
1000 CREAM (GRAM) TOPICAL DAILY
Status: DISCONTINUED | OUTPATIENT
Start: 2023-12-27 | End: 2024-01-17 | Stop reason: HOSPADM

## 2023-12-26 RX ORDER — LEVOTHYROXINE SODIUM 75 UG/1
75 TABLET ORAL
Status: DISCONTINUED | OUTPATIENT
Start: 2023-12-27 | End: 2024-01-17 | Stop reason: HOSPADM

## 2023-12-26 RX ADMIN — MEMANTINE 10 MG: 10 TABLET ORAL at 08:12

## 2023-12-26 RX ADMIN — MUPIROCIN 1 G: 20 OINTMENT TOPICAL at 08:12

## 2023-12-26 RX ADMIN — ASPIRIN 81 MG: 81 TABLET, COATED ORAL at 08:12

## 2023-12-26 RX ADMIN — MEMANTINE 10 MG: 10 TABLET ORAL at 09:12

## 2023-12-26 RX ADMIN — ASPIRIN 81 MG: 81 TABLET, COATED ORAL at 09:12

## 2023-12-26 RX ADMIN — POTASSIUM & SODIUM PHOSPHATES POWDER PACK 280-160-250 MG 2 PACKET: 280-160-250 PACK at 12:12

## 2023-12-26 RX ADMIN — SENNOSIDES AND DOCUSATE SODIUM 1 TABLET: 8.6; 5 TABLET ORAL at 09:12

## 2023-12-26 RX ADMIN — POLYETHYLENE GLYCOL 3350 17 G: 17 POWDER, FOR SOLUTION ORAL at 08:12

## 2023-12-26 RX ADMIN — METHOCARBAMOL 500 MG: 500 TABLET ORAL at 04:12

## 2023-12-26 RX ADMIN — SENNOSIDES AND DOCUSATE SODIUM 1 TABLET: 8.6; 5 TABLET ORAL at 08:12

## 2023-12-26 RX ADMIN — BUPROPION HYDROCHLORIDE 150 MG: 150 TABLET, FILM COATED, EXTENDED RELEASE ORAL at 08:12

## 2023-12-26 RX ADMIN — PREGABALIN 50 MG: 50 CAPSULE ORAL at 09:12

## 2023-12-26 RX ADMIN — LEVOTHYROXINE SODIUM 75 MCG: 75 TABLET ORAL at 05:12

## 2023-12-26 RX ADMIN — TRAMADOL HYDROCHLORIDE 50 MG: 50 TABLET, COATED ORAL at 06:12

## 2023-12-26 RX ADMIN — BISACODYL 10 MG: 10 SUPPOSITORY RECTAL at 09:12

## 2023-12-26 RX ADMIN — METHOCARBAMOL 500 MG: 500 TABLET ORAL at 09:12

## 2023-12-26 RX ADMIN — METHOCARBAMOL 500 MG: 500 TABLET ORAL at 08:12

## 2023-12-26 NOTE — PROGRESS NOTES
[facility] Medicine  Progress Note    Patient Name: Rosario Menendez  MRN: [id number]  Patient Class: IP- Inpatient   Admission Date: 12/23/2023  Length of Stay: 3 days  Attending Physician: Lucia Bates MD  Primary Care Provider: Shi Simon MD        Subjective:     Principal Problem:Closed intertrochanteric fracture of left femur        HPI:  Ms. Menendez is a 88 y.o. female with PMH of osteoporosis, dementia, thrombocytopenia and hypothyroidism  presenting with left hip pain. Patient was walking at home and fell onto left hip.  Most of the history was obtained for son who was at the bedside.  Patient has dementia does not remember how she fell.  Immediate pain and difficulty bearing weight. Denies head injury or LOC. On no blood thinners. Denies other MSK pains. Denies numbness, tingling, or paresthesias to the LLE. Lives at an assisted living facility. Uses a walker for ambulation at baseline.    On presentation patient was afebrile, hypertensive with blood pressure of 157/107 mm of Hg, tachycardic with heart rate of 124, saturating well on room air.  Blood work including CBC, CMP unremarkable, HIV negative, hep C negative.  UA positive for trace leukocyte esterase.  CT cervical spine showed multi level degenerative changes.  CT head showed chronic microvascular changes.  Chest x-ray was unremarkable.  X-ray hip  showed irregular lucency transversing the left greater trochanter extending into the intertrochanteric portion of the proximal left femur.  A nondisplaced fracture.  Patient received ceftriaxone, 1 dose of morphine and admitted to inpatient service.    Overview/Hospital Course:  Ms. Menendez was admitted to Hospital Medicine for management of a closed intertrochanteric fracture of the left femur.  Ortho was consulted.  She went to the OR on 12/23 for a left CMN with Dr. Mancilla.  She was started on PNC postop under Anesthesia Pain which patient pulled on POD2.  She was  initially started on Eliquis 2.5mg BID for DVT prophylaxis, but given Thrombocytopenia <50K, she was changed to Aspirin 81mg BID.  PT/OT were consulted who suggest moderate therapy.    Interval History: No acute events overnight.  Reports pain is controlled.  Accepted to [facility].  Plan to DC this afternoon after BP.  Low oxygen sats overnight but improved when sitting up in bed and CXR negative.    Review of Systems   Constitutional:  Positive for activity change. Negative for chills, fatigue and fever.   Respiratory:  Negative for cough and shortness of breath.    Cardiovascular:  Negative for chest pain, palpitations and leg swelling.   Gastrointestinal:  Negative for abdominal pain, diarrhea, nausea and vomiting.   Genitourinary:  Negative for dysuria and urgency.   Musculoskeletal:  Positive for arthralgias and gait problem.   Neurological:  Negative for dizziness and headaches.   All other systems reviewed and are negative.    Objective:     Vital Signs (Most Recent):  Temp: 98.1 °F (36.7 °C) (12/26/23 0803)  Pulse: 60 (12/26/23 0803)  Resp: 20 (12/26/23 0803)  BP: (!) 145/64 (12/26/23 0803)  SpO2: (!) 93 % (12/26/23 0803) Vital Signs (24h Range):  Temp:  [97.1 °F (36.2 °C)-99.6 °F (37.6 °C)] 98.1 °F (36.7 °C)  Pulse:  [60-73] 60  Resp:  [16-20] 20  SpO2:  [89 %-94 %] 93 %  BP: (134-164)/(61-73) 145/64     Weight: 61.5 kg (135 lb 9.3 oz)  Body mass index is 26.48 kg/m².    Intake/Output Summary (Last 24 hours) at 12/26/2023 1122  Last data filed at 12/25/2023 2202  Gross per 24 hour   Intake --   Output 600 ml   Net -600 ml           Physical Exam  Constitutional:       Appearance: Normal appearance. She is well-developed.   HENT:      Head: Normocephalic and atraumatic.   Cardiovascular:      Rate and Rhythm: Regular rhythm. Bradycardia present.      Heart sounds: No murmur heard.  Pulmonary:      Effort: Pulmonary effort is normal. No respiratory distress.      Breath sounds: Normal breath sounds. No  wheezing or rales.   Abdominal:      General: There is no distension.      Palpations: Abdomen is soft.      Tenderness: There is no abdominal tenderness.   Musculoskeletal:         General: No deformity.      Comments: Postop dressing to left femur C/D/I   Skin:     General: Skin is warm.   Neurological:      General: No focal deficit present.      Mental Status: She is alert and oriented to person, place, and time. Mental status is at baseline.             Significant Labs: All pertinent labs within the past 24 hours have been reviewed.  CBC:   Recent Labs   Lab 12/25/23 0213 12/26/23  0514   WBC 8.01 4.79   HGB 10.0* 10.2*   HCT 30.4* 31.3*   PLT 55* 88*       CMP:   Recent Labs   Lab 12/25/23 0213 12/26/23 0514    138   K 4.0 4.3   * 109   CO2 20* 23   GLU 89 78   BUN 20 12   CREATININE 0.8 0.8   CALCIUM 8.6* 8.8   ANIONGAP 8 6*         Significant Imaging: I have reviewed all pertinent imaging results/findings within the past 24 hours.    Assessment/Plan:      * Closed intertrochanteric fracture of left femur  Hip Fracture Pathway initiated  Orthopedics consulted  S/p left CMN 12/23 with Dr. Mancilla  DVT prophylaxis for 28 days post-op to start POD 1 - was given Eliquis initially, but changed to Aspirin 81mg BID given Thrombocytopenia <50K  PT/OT to start on POD 1 - Suggest moderate therapy with SNF  Zamarripa to be removed on POD 1  Pain control:  Anesthesia pain monitored PNC, which patient self removed on POD2    Acute blood loss as cause of postoperative anemia  Patient's anemia is currently uncontrolled. Has not received any PRBCs to date. Etiology likely d/t acute blood loss which was from surgery  Current CBC reviewed-   Lab Results   Component Value Date    HGB 10.2 (L) 12/26/2023    HCT 31.3 (L) 12/26/2023     Monitor serial CBC and transfuse if patient becomes hemodynamically unstable, symptomatic or H/H drops below 7/21.    Hypophosphatemia  Patient has Abnormal Phosphorus:  hypophosphatemia. Will continue to monitor electrolytes closely. Will replace the affected electrolytes and repeat labs to be done after interventions completed. The patient's phosphorus results have been reviewed and are listed below.  Recent Labs   Lab 12/26/23  0514   PHOS 1.8*        Debility  PT/OT consulted     Amnestic MCI (mild cognitive impairment with memory loss)  Continue memantine, galantamine, and buproprion  Delirium precautions       Thrombocytopenia  Patient was found to have thrombocytopenia, which is chronic.  Will monitor the platelets Daily. Will transfuse if platelet count is <10k. Hold DVT prophylaxis if platelets are <50k. The patient's platelet results have been reviewed and are listed below.  Recent Labs   Lab 12/26/23  0514   PLT 88*           Hypercholesteremia  Chronic and stable  Continue Statin    Hypothyroidism  Chronic and stable  Continue synthroid         VTE Risk Mitigation (From admission, onward)           Ordered     IP VTE HIGH RISK PATIENT  Once         12/23/23 1603     Place sequential compression device  Until discontinued         12/23/23 1603     Place sequential compression device  Until discontinued         12/23/23 1039                    Discharge Planning   AMARA: 12/26/2023     Code Status: Full Code   Is the patient medically ready for discharge?: Yes    Reason for patient still in hospital (select all that apply): Pending disposition  Discharge Plan A: Skilled Nursing Facility                  Lucia Bates MD  Department of Hospital Medicine   [facility] - Surgery     normal...

## 2023-12-26 NOTE — NURSING
Pt arrived to floor for skilled services with admitting diagnosis of Closed intertrochanteric fracture of the left femur via wheelchair. Pt required x1 pereson assistance from wheelchair to bed. Pt is AAOx3, confused at times, incontinent of B/B. Pt is on a regular diet. Skin assessment done: surgical dressing to informed  left femur x3 and generalized bruising.Family informed of arrival. POC reviewed with patient. Pt denies pain at this time and is educated to use call light and not get OOB w/o assistance

## 2023-12-26 NOTE — PLAN OF CARE
[facility]  Initial Discharge Assessment       Primary Care Provider: Shi Simon MD    Admission Diagnosis: Fall [W19.XXXA]  Chest pain [R07.9]  Closed left hip fracture [S72.002A]  Closed displaced intertrochanteric fracture of left femur, initial encounter [S72.142A]    Admission Date: 12/23/2023  Expected Discharge Date: 12/26/2023    Transition of Care Barriers: None    Payor: HU[employer] MANAGED MEDICARE / Plan: [employer] MEDICARE HMO / Product Type: Capitation /     Extended Emergency Contact Information  Primary Emergency Contact: Aditi Qiu  Address: [address] [place] [address] [place]  Work Phone: [phone]  Mobile Phone: [phone]  Relation: Daughter   needed? No  Secondary Emergency Contact: Matt Hernandez  Mobile Phone: [phone]  Relation: Relative   needed? No    Discharge Plan A: Skilled Nursing Facility  Discharge Plan B: Home Health, Home with [facility] - [facility] [address] [facility] [address]  [address] [facility] [address]  Phone: [phone] Fax: [phone]    [facility] - [place] - [address]   [address] [place] [address]  Phone: [phone] Fax: [phone]      Initial Assessment (most recent)       Adult Discharge Assessment - 12/26/23 0935          Discharge Assessment    Assessment Type Discharge Planning Assessment     Confirmed/corrected address, phone number and insurance Yes     Confirmed Demographics Correct on Facesheet     Source of Information patient;family     Communicated AMARA with patient/caregiver Yes     People in Home child(emigdio), adult     Do you expect to return to your current living situation? Yes     Do you have help at home or someone to help you manage your care at home? Yes     Prior to hospitilization cognitive status: Unable to Assess     Current cognitive status: Alert/Oriented     Walking or Climbing Stairs  Difficulty no     Do you have any problems with: Errands/Grocery     Home Accessibility wheelchair accessible     Home Layout Able to live on 1st floor     Equipment Currently Used at Home walker, rolling;cane, straight;commode     Readmission within 30 days? No     Patient currently being followed by outpatient case management? No     Do you currently have service(s) that help you manage your care at home? No     Do you take prescription medications? Yes     Do you have prescription coverage? Yes     Coverage [employer]     Do you have any problems affording any of your prescribed medications? No     Is the patient taking medications as prescribed? yes     How do you get to doctors appointments? family or friend will provide     Are you on dialysis? No     Do you take coumadin? No     Discharge Plan A Skilled Nursing Facility     Discharge Plan B Home Health;Home with family     DME Needed Upon Discharge  none     Discharge Plan discussed with: Patient;Adult children     Transition of Care Barriers None        Physical Activity    On average, how many days per week do you engage in moderate to strenuous exercise (like a brisk walk)? 2 days     On average, how many minutes do you engage in exercise at this level? 20 min        Financial Resource Strain    How hard is it for you to pay for the very basics like food, housing, medical care, and heating? Not hard at all        Housing Stability    In the last 12 months, was there a time when you were not able to pay the mortgage or rent on time? No     In the last 12 months, was there a time when you did not have a steady place to sleep or slept in a shelter (including now)? No        Transportation Needs    In the past 12 months, has lack of transportation kept you from medical appointments or from getting medications? No     In the past 12 months, has lack of transportation kept you from meetings, work, or from getting things needed for daily living? No        Food  Insecurity    Within the past 12 months, you worried that your food would run out before you got the money to buy more. Never true     Within the past 12 months, the food you bought just didn't last and you didn't have money to get more. Never true        Stress    Do you feel stress - tense, restless, nervous, or anxious, or unable to sleep at night because your mind is troubled all the time - these days? Not at all        Social Connections    In a typical week, how many times do you talk on the phone with family, friends, or neighbors? Three times a week     How often do you get together with friends or relatives? Once a week     How often do you attend Pentecostal or Congregational services? Never     Do you belong to any clubs or organizations such as Pentecostal groups, unions, fraternal or athletic groups, or school groups? No     How often do you attend meetings of the clubs or organizations you belong to? Never                         SCOTT completed discharge planning assessment with the patient at bedside and Pt's daughter (Aditi) via phone call. SCOTT verified demographic information listed on the pt.'s Face sheet. Patient lives with her daughter (Aditi) and her spouse (Matt) in a single story home, 2 steps to enter. Pt able to return upon d/c. Pt was lives at Assisted living facility, unsure if returning, depending on mobility progress,. Pt's daughter is agreeable to SNF placement, 1st choice ([facility]).    Pt Accepted to [facility] pending authorization and BM.     SCOTT submitted authorization request to [employer] via [employer] for [facility].    11:45 AM  Authorization approved # [id number]).    Candy Simmons, TATIANA  Case Management   [facility]   Ext. [phone]

## 2023-12-26 NOTE — NURSING
Nurses Note -- 4 Eyes      12/26/2023   12:19 PM      Skin assessed during: Daily Assessment      [x] No Altered Skin Integrity Present    []Prevention Measures Documented      [] Yes- Altered Skin Integrity Present or Discovered   [] LDA Added if Not in Epic (Describe Wound)   [] New Altered Skin Integrity was Present on Admit and Documented in LDA   [] Wound Image Taken    Wound Care Consulted? No    Attending Nurse:  Radha Jauregui RN/Staff Member:   Joya

## 2023-12-26 NOTE — SUBJECTIVE & OBJECTIVE
Interval History: No acute events overnight.  Reports pain is controlled.  Accepted to [facility].  Plan to DC this afternoon after BP.  Low oxygen sats overnight but improved when sitting up in bed and CXR negative.    Review of Systems   Constitutional:  Positive for activity change. Negative for chills, fatigue and fever.   Respiratory:  Negative for cough and shortness of breath.    Cardiovascular:  Negative for chest pain, palpitations and leg swelling.   Gastrointestinal:  Negative for abdominal pain, diarrhea, nausea and vomiting.   Genitourinary:  Negative for dysuria and urgency.   Musculoskeletal:  Positive for arthralgias and gait problem.   Neurological:  Negative for dizziness and headaches.   All other systems reviewed and are negative.    Objective:     Vital Signs (Most Recent):  Temp: 98.1 °F (36.7 °C) (12/26/23 0803)  Pulse: 60 (12/26/23 0803)  Resp: 20 (12/26/23 0803)  BP: (!) 145/64 (12/26/23 0803)  SpO2: (!) 93 % (12/26/23 0803) Vital Signs (24h Range):  Temp:  [97.1 °F (36.2 °C)-99.6 °F (37.6 °C)] 98.1 °F (36.7 °C)  Pulse:  [60-73] 60  Resp:  [16-20] 20  SpO2:  [89 %-94 %] 93 %  BP: (134-164)/(61-73) 145/64     Weight: 61.5 kg (135 lb 9.3 oz)  Body mass index is 26.48 kg/m².    Intake/Output Summary (Last 24 hours) at 12/26/2023 1122  Last data filed at 12/25/2023 2202  Gross per 24 hour   Intake --   Output 600 ml   Net -600 ml           Physical Exam  Constitutional:       Appearance: Normal appearance. She is well-developed.   HENT:      Head: Normocephalic and atraumatic.   Cardiovascular:      Rate and Rhythm: Regular rhythm. Bradycardia present.      Heart sounds: No murmur heard.  Pulmonary:      Effort: Pulmonary effort is normal. No respiratory distress.      Breath sounds: Normal breath sounds. No wheezing or rales.   Abdominal:      General: There is no distension.      Palpations: Abdomen is soft.      Tenderness: There is no abdominal tenderness.   Musculoskeletal:          General: No deformity.      Comments: Postop dressing to left femur C/D/I   Skin:     General: Skin is warm.   Neurological:      General: No focal deficit present.      Mental Status: She is alert and oriented to person, place, and time. Mental status is at baseline.             Significant Labs: All pertinent labs within the past 24 hours have been reviewed.  CBC:   Recent Labs   Lab 12/25/23 0213 12/26/23 0514   WBC 8.01 4.79   HGB 10.0* 10.2*   HCT 30.4* 31.3*   PLT 55* 88*       CMP:   Recent Labs   Lab 12/25/23 0213 12/26/23  0514    138   K 4.0 4.3   * 109   CO2 20* 23   GLU 89 78   BUN 20 12   CREATININE 0.8 0.8   CALCIUM 8.6* 8.8   ANIONGAP 8 6*         Significant Imaging: I have reviewed all pertinent imaging results/findings within the past 24 hours.

## 2023-12-26 NOTE — PLAN OF CARE
Problem: Adult Inpatient Plan of Care  Goal: Plan of Care Review  Outcome: Ongoing, Progressing  Goal: Patient-Specific Goal (Individualized)  Outcome: Ongoing, Progressing  Goal: Absence of Hospital-Acquired Illness or Injury  Outcome: Ongoing, Progressing  Goal: Optimal Comfort and Wellbeing  Outcome: Ongoing, Progressing  Goal: Readiness for Transition of Care  Outcome: Ongoing, Progressing     Problem: Infection  Goal: Absence of Infection Signs and Symptoms  Outcome: Ongoing, Progressing     Problem: Adjustment to Injury (Hip Fracture Medical Management)  Goal: Optimal Coping with Change in Health Status  Outcome: Ongoing, Progressing     Problem: Bleeding (Hip Fracture Medical Management)  Goal: Absence of Bleeding  Outcome: Ongoing, Progressing     Problem: Bowel Elimination Impaired (Hip Fracture Medical Management)  Goal: Effective Bowel Elimination  Outcome: Ongoing, Progressing     Problem: Cognitive Decline Risk (Hip Fracture Medical Management)  Goal: Baseline Cognitive Function Maintained  Outcome: Ongoing, Progressing     Problem: Embolism (Hip Fracture Medical Management)  Goal: Absence of Embolism  Outcome: Ongoing, Progressing     Problem: Fracture Stabilization and Management (Hip Fracture Medical Management)  Goal: Fracture Stability  Outcome: Ongoing, Progressing     Problem: Functional Ability Impaired (Hip Fracture Medical Management)  Goal: Optimal Functional Performance  Outcome: Ongoing, Progressing

## 2023-12-26 NOTE — SUBJECTIVE & OBJECTIVE
Principal Problem:Closed intertrochanteric fracture of left femur    Principal Orthopedic Problem: S/p L CMN 12/23    Interval History: Patient seen and examined at bedside. NAEON. Patient doing well. No complaints. Pain well controlled. Hgb 10.2. Patient ambulated 3 steps with PT yesterday.      Review of patient's allergies indicates:   Allergen Reactions    Codeine Other (See Comments)       Current Facility-Administered Medications   Medication    aspirin EC tablet 81 mg    bisacodyL suppository 10 mg    buPROPion TB24 tablet 150 mg    cefTRIAXone (ROCEPHIN) 2 g in dextrose 5 % in water (D5W) 100 mL IVPB (MB+)    dextrose 10% bolus 125 mL 125 mL    dextrose 10% bolus 250 mL 250 mL    fentaNYL 50 mcg/mL injection 25 mcg    galantamine 24 hr capsule 16 mg    glucagon (human recombinant) injection 1 mg    glucose chewable tablet 16 g    glucose chewable tablet 24 g    haloperidol lactate injection 0.5 mg    levothyroxine tablet 75 mcg    LIDOcaine (PF) 10 mg/ml (1%) injection 10 mg    melatonin tablet 6 mg    memantine tablet 10 mg    methocarbamoL tablet 500 mg    mupirocin 2 % ointment 1 g    mupirocin 2 % ointment 1 g    naloxone 0.4 mg/mL injection 0.02 mg    ondansetron injection 4 mg    polyethylene glycol packet 17 g    pregabalin capsule 50 mg    prochlorperazine injection Soln 5 mg    senna-docusate 8.6-50 mg per tablet 1 tablet    sodium chloride 0.9% flush 10 mL    sodium chloride 0.9% flush 10 mL    sodium chloride 0.9% flush 3 mL    traMADoL tablet 50 mg    tranexamic acid (CYKLOKAPRON) 3,000 mg in sodium chloride 0.9% SolP 100 mL     Objective:     Vital Signs (Most Recent):  Temp: 98.1 °F (36.7 °C) (12/26/23 0803)  Pulse: 60 (12/26/23 0803)  Resp: 20 (12/26/23 0803)  BP: (!) 145/64 (12/26/23 0803)  SpO2: (!) 93 % (12/26/23 0803) Vital Signs (24h Range):  Temp:  [97.1 °F (36.2 °C)-99.6 °F (37.6 °C)] 98.1 °F (36.7 °C)  Pulse:  [60-73] 60  Resp:  [16-20] 20  SpO2:  [89 %-94 %] 93 %  BP:  (134-164)/(61-73) 145/64     Weight: 61.5 kg (135 lb 9.3 oz)  Height: 5' (152.4 cm)  Body mass index is 26.48 kg/m².      Intake/Output Summary (Last 24 hours) at 12/26/2023 0900  Last data filed at 12/25/2023 2202  Gross per 24 hour   Intake --   Output 600 ml   Net -600 ml        Ortho/SPM Exam  Awake and alert  Regular Rate  Non labored respirations  Surgical dressings c/d/I  Fires quad/TA/EHL/GSC  SILT  Compartments soft, compressible  Brisk cap refill     Significant Labs: CBC:   Recent Labs   Lab 12/25/23  0213 12/26/23  0514   WBC 8.01 4.79   HGB 10.0* 10.2*   HCT 30.4* 31.3*   PLT 55* 88*     CMP:   Recent Labs   Lab 12/25/23  0213 12/26/23  0514    138   K 4.0 4.3   * 109   CO2 20* 23   GLU 89 78   BUN 20 12   CREATININE 0.8 0.8   CALCIUM 8.6* 8.8   ANIONGAP 8 6*     All pertinent labs within the past 24 hours have been reviewed.    Significant Imaging: I have reviewed and interpreted all pertinent imaging results/findings.

## 2023-12-26 NOTE — DISCHARGE SUMMARY
[facility] - Surgery  [facility] Medicine  Discharge Summary      Patient Name: Rosario Menendez  MRN: [id number]  CICI: [id number]  Patient Class: IP- Inpatient  Admission Date: 12/23/2023  Hospital Length of Stay: 3 days  Discharge Date and Time:  12/26/2023 1:54 PM  Attending Physician: Lucia Bates MD   Discharging Provider: Lucia Bates MD  Primary Care Provider: Shi Simon MD  [facility] Medicine Team: [facility] MED  Lucia Bates MD  Primary Care Team: [facility]    HPI:   Ms. Menendez is a 88 y.o. female with PMH of osteoporosis, dementia, thrombocytopenia and hypothyroidism  presenting with left hip pain. Patient was walking at home and fell onto left hip.  Most of the history was obtained for son who was at the bedside.  Patient has dementia does not remember how she fell.  Immediate pain and difficulty bearing weight. Denies head injury or LOC. On no blood thinners. Denies other MSK pains. Denies numbness, tingling, or paresthesias to the LLE. Lives at an assisted living facility. Uses a walker for ambulation at baseline.    On presentation patient was afebrile, hypertensive with blood pressure of 157/107 mm of Hg, tachycardic with heart rate of 124, saturating well on room air.  Blood work including CBC, CMP unremarkable, HIV negative, hep C negative.  UA positive for trace leukocyte esterase.  CT cervical spine showed multi level degenerative changes.  CT head showed chronic microvascular changes.  Chest x-ray was unremarkable.  X-ray hip  showed irregular lucency transversing the left greater trochanter extending into the intertrochanteric portion of the proximal left femur.  A nondisplaced fracture.  Patient received ceftriaxone, 1 dose of morphine and admitted to inpatient service.    Procedure(s) (LRB):  INSERTION, INTRAMEDULLARY DIEGO, FEMUR (Left)      Hospital Course:   Ms. Menendez was admitted to Hospital Medicine for management of a closed intertrochanteric fracture of the left  femur.  Ortho was consulted.  She went to the OR on 12/23 for a left CMN with Dr. Mancilla.  She was started on PNC postop under Anesthesia Pain which patient pulled on POD2.  She was initially started on Eliquis 2.5mg BID for DVT prophylaxis, but given Thrombocytopenia <50K, she was changed to Aspirin 81mg BID.  PT/OT were consulted who suggest moderate therapy.  [facility] was [facility]     Goals of Care Treatment Preferences:  Code Status: Full Code      Consults:   Consults (From admission, onward)          Status Ordering Provider     Inpatient consult to Social Work/Case Management  Once        Provider:  (Not yet assigned)    Acknowledged FABIANA NEWMAN     Inpatient consult to Social Work/Case Management (Discharge Planning)  Once        Provider:  (Not yet assigned)    Acknowledged FABIANA NEWMAN     Inpatient consult to Orthopedic Surgery  Once        Provider:  (Not yet assigned)    Completed TYLER WILKINS            No new Assessment & Plan notes have been filed under this hospital service since the last note was generated.  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     methocarbamoL 500 MG Tab  Commonly known as: ROBAXIN  Take 1 tablet (500 mg total) by mouth 3 (three) times daily. for 10 days     pregabalin 50 MG capsule  Commonly known as: LYRICA  Take 1 capsule (50 mg total) by mouth every evening.            CONTINUE taking these medications      acetaminophen 500 MG tablet  Commonly known as: TYLENOL  Take 2 tablets (1,000 mg total) by mouth every 8 (eight) hours as needed for Pain.     alendronate 70 MG tablet  Commonly known as: FOSAMAX  Take 1 tablet (70 mg total) by mouth every 7 days. HOLD while in SNF     buPROPion 150 MG TB24 tablet  Commonly known as: WELLBUTRIN XL  TAKE 1 TABLET EVERY DAY     cyanocobalamin 1000 MCG tablet  Commonly known as: VITAMIN B-12  Take 1 tablet (1,000 mcg total) by mouth once daily.     galantamine 16 MG 24 hr capsule  Commonly known as: RAZADYNE ER  TAKE 1 CAPSULE EVERY DAY WITH BREAKFAST     levothyroxine 75 MCG tablet  Commonly known as: SYNTHROID  Take 1 tablet (75 mcg total) by mouth before breakfast.     memantine 10 MG Tab  Commonly known as: NAMENDA  Take 1 tablet (10 mg total) by mouth 2 (two) times daily.              Indwelling Lines/Drains at time of discharge:   Lines/Drains/Airways       Drain  Duration             Female External Urinary Catheter w/ Suction -- days                    Time spent on the discharge of patient: 35 minutes         Lucia Bates MD  Department of Hospital Medicine  [facility] - Surgery

## 2023-12-26 NOTE — NURSING
Patient is Aox 2-3 with periods of forgetfulness. Pleasantly confused and very easy to redirect.  Took AM meds with no issues. PRN suppository given, tolerated well.

## 2023-12-26 NOTE — PROGRESS NOTES
[facility]  Orthopedics  Progress Note    Attg Note:  I agree with the resident's assessment and plan.    Troy Mancilla MD      Patient Name: Rosario Menendez  MRN: [id number]  Admission Date: 12/23/2023  Hospital Length of Stay: 3 days  Attending Provider: Lucia Bates MD  Primary Care Provider: Shi Simon MD  Follow-up For: Procedure(s) (LRB):  INSERTION, INTRAMEDULLARY DIEGO, FEMUR (Left)    Post-Operative Day: 3 Days Post-Op  Subjective:     Principal Problem:Closed intertrochanteric fracture of left femur    Principal Orthopedic Problem: S/p L CMN 12/23    Interval History: Patient seen and examined at bedside. NAEON. Patient doing well. No complaints. Pain well controlled. Hgb 10.2. Patient ambulated 3 steps with PT yesterday.      Review of patient's allergies indicates:   Allergen Reactions    Codeine Other (See Comments)       Current Facility-Administered Medications   Medication    aspirin EC tablet 81 mg    bisacodyL suppository 10 mg    buPROPion TB24 tablet 150 mg    cefTRIAXone (ROCEPHIN) 2 g in dextrose 5 % in water (D5W) 100 mL IVPB (MB+)    dextrose 10% bolus 125 mL 125 mL    dextrose 10% bolus 250 mL 250 mL    fentaNYL 50 mcg/mL injection 25 mcg    galantamine 24 hr capsule 16 mg    glucagon (human recombinant) injection 1 mg    glucose chewable tablet 16 g    glucose chewable tablet 24 g    haloperidol lactate injection 0.5 mg    levothyroxine tablet 75 mcg    LIDOcaine (PF) 10 mg/ml (1%) injection 10 mg    melatonin tablet 6 mg    memantine tablet 10 mg    methocarbamoL tablet 500 mg    mupirocin 2 % ointment 1 g    mupirocin 2 % ointment 1 g    naloxone 0.4 mg/mL injection 0.02 mg    ondansetron injection 4 mg    polyethylene glycol packet 17 g    pregabalin capsule 50 mg    prochlorperazine injection Soln 5 mg    senna-docusate 8.6-50 mg per tablet 1 tablet    sodium chloride 0.9% flush 10 mL    sodium chloride 0.9% flush 10 mL    sodium chloride 0.9% flush 3 mL    traMADoL  tablet 50 mg    tranexamic acid (CYKLOKAPRON) 3,000 mg in sodium chloride 0.9% SolP 100 mL     Objective:     Vital Signs (Most Recent):  Temp: 98.1 °F (36.7 °C) (12/26/23 0803)  Pulse: 60 (12/26/23 0803)  Resp: 20 (12/26/23 0803)  BP: (!) 145/64 (12/26/23 0803)  SpO2: (!) 93 % (12/26/23 0803) Vital Signs (24h Range):  Temp:  [97.1 °F (36.2 °C)-99.6 °F (37.6 °C)] 98.1 °F (36.7 °C)  Pulse:  [60-73] 60  Resp:  [16-20] 20  SpO2:  [89 %-94 %] 93 %  BP: (134-164)/(61-73) 145/64     Weight: 61.5 kg (135 lb 9.3 oz)  Height: 5' (152.4 cm)  Body mass index is 26.48 kg/m².      Intake/Output Summary (Last 24 hours) at 12/26/2023 0900  Last data filed at 12/25/2023 2202  Gross per 24 hour   Intake --   Output 600 ml   Net -600 ml        Ortho/SPM Exam  Awake and alert  Regular Rate  Non labored respirations  Surgical dressings c/d/I  Fires quad/TA/EHL/GSC  SILT  Compartments soft, compressible  Brisk cap refill     Significant Labs: CBC:   Recent Labs   Lab 12/25/23 0213 12/26/23 0514   WBC 8.01 4.79   HGB 10.0* 10.2*   HCT 30.4* 31.3*   PLT 55* 88*     CMP:   Recent Labs   Lab 12/25/23 0213 12/26/23  0514    138   K 4.0 4.3   * 109   CO2 20* 23   GLU 89 78   BUN 20 12   CREATININE 0.8 0.8   CALCIUM 8.6* 8.8   ANIONGAP 8 6*     All pertinent labs within the past 24 hours have been reviewed.    Significant Imaging: I have reviewed and interpreted all pertinent imaging results/findings.  Assessment/Plan:     * Closed intertrochanteric fracture of left femur  Rosario Menendez is a 88 y.o. female with a left intertrochanteric femur fracture, closed, NVI. They take no anticoagulation at home. They required a walker for ambulation prior to this injury. S/p CMN on 12/23.    - Multimodal pain control  - PNC per anesthesia  - WBAT, ROMAT LLE  - DVT PPx: ASA 81 BID  - Abx: Post op ancef  - Hgb 10.2  - Dc shields      Dispo: F/u PT recs          VIKTOR Olsen MD  Orthopedics  [facility] - Surgery

## 2023-12-26 NOTE — ASSESSMENT & PLAN NOTE
Patient has Abnormal Phosphorus: hypophosphatemia. Will continue to monitor electrolytes closely. Will replace the affected electrolytes and repeat labs to be done after interventions completed. The patient's phosphorus results have been reviewed and are listed below.  Recent Labs   Lab 12/26/23  0514   PHOS 1.8*

## 2023-12-26 NOTE — ASSESSMENT & PLAN NOTE
Patient's anemia is currently uncontrolled. Has not received any PRBCs to date. Etiology likely d/t acute blood loss which was from surgery  Current CBC reviewed-   Lab Results   Component Value Date    HGB 10.2 (L) 12/26/2023    HCT 31.3 (L) 12/26/2023     Monitor serial CBC and transfuse if patient becomes hemodynamically unstable, symptomatic or H/H drops below 7/21.

## 2023-12-26 NOTE — NURSING
Nurses Note -- 4 Eyes      12/26/2023   3:29 PM      Skin assessed during: Admit      [x] No Altered Skin Integrity Present    []Prevention Measures Documented      [] Yes- Altered Skin Integrity Present or Discovered   [] LDA Added if Not in Epic (Describe Wound)   [] New Altered Skin Integrity was Present on Admit and Documented in LDA   [] Wound Image Taken    Wound Care Consulted? No    Attending Nurse:   Dang Jauregui RN/Staff Member:   Melva

## 2023-12-26 NOTE — PT/OT/SLP PROGRESS
Occupational Therapy   Co-Treatment    Name: Rosario Menendez  MRN: [id number]  Admitting Diagnosis:  Closed intertrochanteric fracture of left femur  3 Days Post-Op    Recommendations:     Discharge Recommendations: Moderate Intensity Therapy  Discharge Equipment Recommendations:  bedside commode  Barriers to discharge:  Decreased caregiver support    Assessment:     Rosario Menendez is a 88 y.o. female with a medical diagnosis of Closed intertrochanteric fracture of left femur. Performance deficits affecting function are impaired endurance, impaired balance, decreased coordination, gait instability, pain.     Rehab Prognosis:  Good; patient would benefit from acute skilled OT services to address these deficits and reach maximum level of function.       Plan:     Patient to be seen 4 x/week to address the above listed problems via neuromuscular re-education, self-care/home management, therapeutic activities, therapeutic exercises  Plan of Care Expires: 01/23/24  Plan of Care Reviewed with: patient    Subjective     Chief Complaint: c/o pain with mobility  Patient/Family Comments/goals: Pt. Reports that hurts   Pain/Comfort:  Pain Rating 1:  (did not rate)  Location - Side 1: Left  Location - Orientation 1: generalized  Pain Addressed 1: Reposition, Distraction    Objective:     Communicated with: RN prior to session.  Patient found HOB elevated with SCD, FCD, PureWick upon OT entry to room.    General Precautions: Standard, fall, hearing impaired    Orthopedic Precautions:LLE weight bearing as tolerated  Braces: N/A  Respiratory Status: Room air     Occupational Performance:     Bed Mobility:    Patient completed Supine to Sit with moderate assistance  Patient completed Sit to Supine with moderate assistance     Functional Mobility/Transfers:  Patient completed Sit <> Stand Transfer with minimum assistance  with  rolling walker   Functional Mobility: ~4 steps with Min A from bed to chair with max verbal and tactile  cues for safety and AD management 2/2 cognition     Activities of Daily Living:  Feeding:  set up (A) set up snacks on tray  UBD: Adjusted person landon top, at EOB Min A  Grooming : Min A, oral care and facial hygiene seated at EOB    [facility] 6 Click ADL: 16    Treatment & Education:    Pt educated on role of occupational therapy, POC, and safety during ADLs and functional mobility. Pt and OT discussed importance of safe, continued mobility to optimize daily living skills. Pt verbalized understanding. Pt given instruction to call for medical staff/nurse for assistance.       Patient left up in chair with all lines intact and call button in reach    GOALS:   Multidisciplinary Problems       Occupational Therapy Goals          Problem: Occupational Therapy    Goal Priority Disciplines Outcome Interventions   Occupational Therapy Goal     OT, PT/OT Ongoing, Progressing    Description: Goals to be met by: 1/14/24     Patient will increase functional independence with ADLs by performing:    LE Dressing with Minimal Assistance.  Grooming while standing at sink with Supervision.  Toileting from bedside commode with Stand-by Assistance for hygiene and clothing management.   Supine to sit with Supervision.  Toilet transfer to bedside commode with Stand-by Assistance.                         Time Tracking:     OT Date of Treatment: 12/26/23  OT Start Time: 1004  OT Stop Time: 1035  OT Total Time (min): 31 min    Billable Minutes:Self Care/Home Management 15  Therapeutic Activity 16    OT/HERNESTO: OT          12/26/2023

## 2023-12-26 NOTE — PLAN OF CARE
Problem: Adult Inpatient Plan of Care  Goal: Plan of Care Review  Outcome: Ongoing, Progressing  Goal: Patient-Specific Goal (Individualized)  Outcome: Ongoing, Progressing  Goal: Absence of Hospital-Acquired Illness or Injury  Outcome: Ongoing, Progressing  Goal: Optimal Comfort and Wellbeing  Outcome: Ongoing, Progressing  Goal: Readiness for Transition of Care  Outcome: Ongoing, Progressing     Problem: Skin Injury Risk Increased  Goal: Skin Health and Integrity  Outcome: Ongoing, Progressing  Intervention: Promote and Optimize Oral Intake  Flowsheets (Taken 12/26/2023 1535)  Oral Nutrition Promotion:   medicated   physical activity promoted

## 2023-12-26 NOTE — ASSESSMENT & PLAN NOTE
Patient was found to have thrombocytopenia, which is chronic.  Will monitor the platelets Daily. Will transfuse if platelet count is <10k. Hold DVT prophylaxis if platelets are <50k. The patient's platelet results have been reviewed and are listed below.  Recent Labs   Lab 12/26/23  0514   PLT 88*

## 2023-12-26 NOTE — PT/OT/SLP PROGRESS
"Physical Therapy Treatment    Patient Name:  Rosario Menendez   MRN:  [id number]    Recommendations:     Discharge Recommendations: Moderate Intensity Therapy  Discharge Equipment Recommendations: bedside commode  Barriers to discharge: Decreased caregiver support    Assessment:     Rosario Menendez is a 88 y.o. female admitted with a medical diagnosis of Closed intertrochanteric fracture of left femur.  She presents with the following impairments/functional limitations: impaired endurance, impaired cognition, impaired balance, decreased coordination, decreased safety awareness, pain.    Rehab Prognosis: Good; patient would benefit from acute skilled PT services to address these deficits and reach maximum level of function.    Recent Surgery: Procedure(s) (LRB):  INSERTION, INTRAMEDULLARY DIEGO, FEMUR (Left) 3 Days Post-Op    Plan:     During this hospitalization, patient to be seen  (12/26 then 4x/wk) to address the identified rehab impairments via gait training, therapeutic activities, neuromuscular re-education and progress toward the following goals:    Plan of Care Expires:  01/23/24    Subjective     Chief Complaint: "ouch, that hurts" during mobility   Patient/Family Comments/goals: none   Pain/Comfort:  Pain Rating 1:  (unrated)      Objective:     Communicated with RN prior to session.  Patient found HOB elevated with FCD, SCD, telemetry upon PT entry to room.     General Precautions: Standard, fall, hearing impaired  Orthopedic Precautions: LLE weight bearing as tolerated  Braces: N/A  Respiratory Status: Room air     Functional Mobility:  Bed Mobility:     Supine to Sit: moderate assistance  Sit to Supine: moderate assistance  Transfers:     Sit to Stand:  minimum assistance with rolling walker  Gait: ~ 4 steps to chair with min A and <AX vc's for technique, sequencing, AD management       AM-PAC 6 CLICK MOBILITY  Turning over in bed (including adjusting bedclothes, sheets and blankets)?: 2  Sitting down on " and standing up from a chair with arms (e.g., wheelchair, bedside commode, etc.): 2  Moving from lying on back to sitting on the side of the bed?: 2  Moving to and from a bed to a chair (including a wheelchair)?: 2  Need to walk in hospital room?: 3  Climbing 3-5 steps with a railing?: 1  Basic Mobility Total Score: 12       Treatment & Education:  Pt required max cueing throughout session due to baseline confusion. Pt also unsafe due to confusion I.e trying to sit when not appropriate.   Bedside table in front of patient and area set up for function, convenience, and safety. RN aware of patient's mobility needs and status. Questions/concerns addressed within PTA scope of practice; patient  with no further questions. Time was provided for active listening, discussion of health disposition, and discussion of safe discharge.  OT present for cotreat due to pt's multiple medical comorbidities and functional/cognition deficits requiring two skilled therapists to appropriately progress pt's musculoskeletal strength, neuromuscular control, and endurance while taking into consideration medical acuity and pt safety.    Patient left up in chair with all lines intact and call button in reach..    GOALS:   Multidisciplinary Problems       Physical Therapy Goals          Problem: Physical Therapy    Goal Priority Disciplines Outcome Goal Variances Interventions   Physical Therapy Goal     PT, PT/OT Ongoing, Progressing     Description: Goals to be met by: 24     Patient will increase functional independence with mobility by performin. Supine to sit with Contact Guard Assistance  2. Sit to stand transfer with Contact Guard Assistance  3. Bed to chair transfer with Contact Guard Assistance using Rolling Walker  4. Gait  x 40 feet with Minimal Assistance using Rolling Walker.   5. Stand for 3 minutes with Stand-by Assistance using Rolling Walker                         Time Tracking:     PT Received On: 23  PT  Start Time: 1004     PT Stop Time: 1035  PT Total Time (min): 31 min     Billable Minutes: Gait Training 31    Treatment Type: Treatment  PT/PTA: PTA     Number of PTA visits since last PT visit: 0     12/26/2023

## 2023-12-26 NOTE — PLAN OF CARE
12/26/23 1354   Post-Acute Status   Post-Acute Authorization Placement   Post-Acute Placement Status Set-up Complete/Auth obtained   Discharge Plan   Discharge Plan A Skilled Nursing Facility     Patient's set-up has been completed.SCOTT scheduled d/c transportation to [facility]. Patient is scheduled to be picked up at 3:00 pm. SCOTT provided patient's nurse with report number #[phone]; ask for the nurse for the patient. Requested  time does not guarantee arrival time.      SCOTT called pt's daughter (Aditi) received no answer left voice message.    Candy Simmons LMSW  Case Management   [facility]   Ext. [phone]

## 2023-12-26 NOTE — ASSESSMENT & PLAN NOTE
Rosario Menendez is a 88 y.o. female with a left intertrochanteric femur fracture, closed, NVI. They take no anticoagulation at home. They required a walker for ambulation prior to this injury. S/p CMN on 12/23.    - Multimodal pain control  - PNC per anesthesia  - WBAT, ROMAT LLE  - DVT PPx: ASA 81 BID  - Abx: Post op ancef  - Hgb 10.2  - Dc shields      Dispo: F/u PT recs

## 2023-12-27 LAB
BLD PROD TYP BPU: NORMAL
BLD PROD TYP BPU: NORMAL
BLOOD UNIT EXPIRATION DATE: NORMAL
BLOOD UNIT EXPIRATION DATE: NORMAL
BLOOD UNIT TYPE CODE: 6200
BLOOD UNIT TYPE CODE: 6200
BLOOD UNIT TYPE: NORMAL
BLOOD UNIT TYPE: NORMAL
CODING SYSTEM: NORMAL
CODING SYSTEM: NORMAL
CROSSMATCH INTERPRETATION: NORMAL
CROSSMATCH INTERPRETATION: NORMAL
DISPENSE STATUS: NORMAL
DISPENSE STATUS: NORMAL
TRANS ERYTHROCYTES VOL PATIENT: NORMAL ML
TRANS ERYTHROCYTES VOL PATIENT: NORMAL ML

## 2023-12-27 PROCEDURE — 97110 THERAPEUTIC EXERCISES: CPT | Mod: HCNC

## 2023-12-27 PROCEDURE — 25000003 PHARM REV CODE 250: Mod: HCNC | Performed by: HOSPITALIST

## 2023-12-27 PROCEDURE — 97165 OT EVAL LOW COMPLEX 30 MIN: CPT | Mod: HCNC

## 2023-12-27 PROCEDURE — 97162 PT EVAL MOD COMPLEX 30 MIN: CPT | Mod: HCNC

## 2023-12-27 PROCEDURE — 97530 THERAPEUTIC ACTIVITIES: CPT | Mod: HCNC

## 2023-12-27 PROCEDURE — 11000004 HC SNF PRIVATE: Mod: HCNC

## 2023-12-27 PROCEDURE — 97535 SELF CARE MNGMENT TRAINING: CPT | Mod: HCNC

## 2023-12-27 RX ADMIN — SENNOSIDES AND DOCUSATE SODIUM 1 TABLET: 8.6; 5 TABLET ORAL at 08:12

## 2023-12-27 RX ADMIN — METHOCARBAMOL 500 MG: 500 TABLET ORAL at 08:12

## 2023-12-27 RX ADMIN — ASPIRIN 81 MG: 81 TABLET, COATED ORAL at 08:12

## 2023-12-27 RX ADMIN — MEMANTINE 10 MG: 10 TABLET ORAL at 08:12

## 2023-12-27 RX ADMIN — LEVOTHYROXINE SODIUM 75 MCG: 75 TABLET ORAL at 05:12

## 2023-12-27 RX ADMIN — BUPROPION HYDROCHLORIDE 150 MG: 150 TABLET, EXTENDED RELEASE ORAL at 08:12

## 2023-12-27 RX ADMIN — CYANOCOBALAMIN TAB 1000 MCG 1000 MCG: 1000 TAB at 08:12

## 2023-12-27 RX ADMIN — PREGABALIN 50 MG: 50 CAPSULE ORAL at 08:12

## 2023-12-27 RX ADMIN — ACETAMINOPHEN 1000 MG: 500 TABLET ORAL at 05:12

## 2023-12-27 RX ADMIN — METHOCARBAMOL 500 MG: 500 TABLET ORAL at 02:12

## 2023-12-27 NOTE — PLAN OF CARE
Problem: Adult Inpatient Plan of Care  Goal: Plan of Care Review  Outcome: Ongoing, Progressing  Flowsheets (Taken 12/27/2023 0109)  Plan of Care Reviewed With: patient  Goal: Patient-Specific Goal (Individualized)  Outcome: Ongoing, Progressing  Goal: Absence of Hospital-Acquired Illness or Injury  Outcome: Ongoing, Progressing  Goal: Optimal Comfort and Wellbeing  Outcome: Ongoing, Progressing  Goal: Readiness for Transition of Care  Outcome: Ongoing, Progressing     Problem: Fall Injury Risk  Goal: Absence of Fall and Fall-Related Injury  Outcome: Ongoing, Progressing  Intervention: Promote Injury-Free Environment  Flowsheets (Taken 12/27/2023 0109)  Safety Promotion/Fall Prevention:   assistive device/personal item within reach   Fall Risk reviewed with patient/family   medications reviewed   lighting adjusted   nonskid shoes/socks when out of bed   instructed to call staff for mobility     Problem: Skin Injury Risk Increased  Goal: Skin Health and Integrity  Outcome: Ongoing, Progressing

## 2023-12-27 NOTE — PLAN OF CARE
Problem: Occupational Therapy  Goal: Occupational Therapy Goal  Description: Goals to be met by: 1/25/2024     Patient will increase functional independence with ADLs by performing:    UE Dressing with Supervision.  LE Dressing with Moderate Assistance and Assistive Devices as needed.    Personal Hygiene while seated at sink with Supervision.    Oral Hygiene while seated at sink with Supervision.    Toileting from toilet with Moderate Assistance for hygiene and clothing management and AE as needed    Bathing from  sitting at sink with Minimal Assistance.    Toilet transfer to toilet with Contact Guard Assistance /c AE/LRAD as needed    Footwear /c Mod A and AE as needed    Tolerate standing functional tasks /c CGA and AE/LRAD as needed    Shower t/f /c CGA and AE/LRAD as needed      Outcome: Ongoing, Progressing    Pt evaluated and goals set based on performance at admission.

## 2023-12-27 NOTE — NURSING
Patient had one episode of small bloody mucous like discharge in brief.  Nurse assessed patient. Continued monitoring in process.

## 2023-12-27 NOTE — PT/OT/SLP EVAL
Occupational Therapy   Evaluation    Name: Rosario Menendez  MRN: [id number]  Admit Date: 12/26/2023  Recent Surgeries: INSERTION, INTRAMEDULLARY DIEGO, FEMUR      General Precautions: Standard, hearing impaired, fall  Orthopedic Precautions:LLE weight bearing as tolerated   Braces: N/A    Recommendations:     Discharge Recommendations: low intensity therapy   Level of Assistance Recommended: 24 hours significant assistance  Discharge Equipment Recommendations:  hip kit, 3-in-1 commode, shower chair (SCvs TTB questionable 2/2 pt poor historian)  Barriers to discharge:   (increased need for skilled assistance for performance of functional tasks)    Assessment:     Rosario Menendez is a 88 y.o. female with a medical diagnosis of Left intertrochanteric femur fracture s/p Cephalomedullary nail fixation of left intertrochanteric femur fracture. Pt tolerated session well and without incident and shows excellent motivation and potential to improve, but the pt continues to require assistance to perform self-care tasks and mobility. Pt noted to req significant vcs throughout session. Pt strengths include  Motivation and Willingness to participate. However, pt would continue to benefit from cont'd OT services in the SNF setting to improve safety and independence /c functional tasks and ADLs upon discharge.   Performance deficits affecting function: weakness, impaired endurance, impaired self care skills, impaired functional mobility, gait instability, impaired balance, impaired cognition, decreased lower extremity function.      Rehab Potential is good    Activity Tolerance: Good    Plan:     Patient to be seen 5 x/week to address the above listed problems via self-care/home management, community/work re-entry, therapeutic activities, therapeutic exercises, neuromuscular re-education  Plan of Care Expires: 01/25/24  Plan of Care Reviewed with: patient    Subjective     Chief Complaint: Pain /c movement of LLE  Patient/Family  "Comments/goals: return to [facility]    Occupational Profile- Pt noted to be poor historian; history taken during session and corroborated via chart review; will continue to assess  Living Environment: Pt lives in [facility] /c [facility] and no shower chair (pt stated that this was what she "thought, but was unsure")  Previous level of function: (A) /c bathing; Mod I for all other ADLs/FM /c RW  Roles and Routines: Enjoys spending time with others; A from [facility] staff for IADLs  Equipment Used at Home: walker, rolling, wheelchair  Assistance upon Discharge: A available from [facility] staff    Pain/Comfort:  Pain Rating 1: 0/10 (in supine)  Location - Side 1: Left  Location - Orientation 1: generalized  Location 1: hip  Pain Addressed 1: Distraction, Reposition  Pain Rating Post-Intervention 1: 3/10 (after movement)    Patients cultural, spiritual, Temple conflicts given the current situation: no    Objective:     Communicated with: NSLILIA prior to session.  Patient found supine with Other (comments) (no line) upon OT entry to room.    Occupational Performance:   Functional Limitation in Range of Motion   Upper Extremity No impairment   Mobility Devices Walker;Wheelchair (manual or electric)   Eating   Was the activity attempted? Yes  (prior to OT session)   Was the activity done independently? Yes   Was adaptive equipment used? No   CARE Score - Eating 6   Oral Hygiene   Was the activity attempted? Yes   Was the activity done independently? No   Assistance Needed Setup / clean-up;Supervision;Verbal cues  (seated in WC at sink; set up to open cap of toothpaste and vcs for sequencing)   Was adaptive equipment used? Yes   CARE Score - Oral Hygiene 4   Toileting Hygiene   Was the activity attempted? Yes   Was the activity done independently? No   Assistance Needed Physical assistance   Physical Assistance Level More than half  (Total A standing at EOB /c RW for clothing mgmt and perirectal care 2/2 soiled brief)   Was adaptive equipment used? Yes "   CARE Score - Toileting Hygiene 2   Toileting Hygiene Discharge Goal   Discharge Goal 3   Shower/Bathe Self   Was the activity attempted? Yes   Was the activity done independently? No   Assistance Needed Physical assistance;Verbal cues   Physical Assistance Level Less than half  (Mod A seated/standing /c RW at EOB. (A) given to clean b/l feet seated and perirectal area in standing; vcs for sequencing and attention to task)   Was adaptive equipment used? Yes   CARE Score - Shower/Bathe Self 3   Upper Body Dressing   Was the activity attempted? Yes   Was the activity done independently? No   Assistance Needed Physical assistance   Physical Assistance Level Less than half  (Min A to doff/don pullover and button up shirt; (A) given to don button up shirt around back 2/2 fatigue at end of session)   Was adaptive equipment used? No   CARE Score - Upper Body Dressing 3   Lower Body Dressing   Was the activity attempted? Yes   Was the activity done independently? No   Assistance Needed Physical assistance   Physical Assistance Level Total assistance  (Total A to don pants seated at EOB /c BLE threaded while seated and managed over hips in standing /c RW)   Was adaptive equipment used? Yes   CARE Score - Lower Body Dressing 1   Putting On/Taking Off Footwear   Was the activity attempted? Yes   Was the activity done independently? No   Assistance Needed Physical assistance   Physical Assistance Level Total assistance  (Total A to doff/don socks)   Was adaptive equipment used? No   CARE Score - Putting On/Taking Off Footwear 1   Personal Hygiene   Was the activity attempted? Yes   Was the activity done independently? No   Assistance Needed Supervision  (SPV to wash face seated at sink)   Was adaptive equipment used? Yes   CARE Score - Personal Hygiene 4   Lying to Sitting on Side of Bed   Was the activity attempted? Yes   Was the activity done independently? No   Assistance Needed Physical assistance   Physical Assistance  Level More than half  (Max A /c HOB flat and no handrail)   Was adaptive equipment used? No   CARE Score - Lying to Sitting on Side of Bed 2   Sit to Stand   Was the activity attempted? Yes   Was the activity done independently? No   Assistance Needed Physical assistance   Physical Assistance Level More than half  (Mod A /c RW from EOB)   Was adaptive equipment used? Yes   CARE Score - Sit to Stand 2   Chair/Bed-to-Chair Transfer   Was the activity attempted? Yes   Was the activity done independently? No   Assistance Needed Physical assistance;Verbal cues   Physical Assistance Level More than half  (Mod A /c RW and max vcs for movement of feet and use of RW)   Was adaptive equipment used? Yes   CARE Score - Chair/Bed-to-Chair Transfer 2   Toilet Transfer   Was the activity attempted? No   Reason if not Attempted Safety concerns   CARE Score - Toilet Transfer 88   Toilet Transfer Discharge Goal   Discharge Goal 3   Tub/Shower Transfer   Was the activity attempted? No   Reason if not Attempted Safety concerns   CARE Score - Tub/Shower Transfer 88         Cognitive/Visual Perceptual:  Cognitive/Psychosocial Skills:  -       Oriented to: Person and Situation   -       Follows Commands/attention:Follows one-step commands and req frequent repetition  -       Communication: clear/fluent  -       Memory: Impaired STM, Impaired LTM, and Poor immediate recall  -       Safety awareness/insight to disability: intact   -       Mood/Affect/Coping skills/emotional control: Appropriate to situation  Visual/Perceptual:  -Intact at baseline    Physical Exam:  Balance: -       Static sitting balance- Fair; Dynamic sitting balance- Fair; Static/Dynamic standing balance- Poor  Postural examination/scapula alignment: -       No postural abnormalities identified  Skin integrity: Visible skin intact  Edema:  None noted  Sensation: -       Intact  Motor Planning: -       WFL  Dominant hand: -       R  Upper Extremity Range of Motion:  -        Right Upper Extremity: WFL  -       Left Upper Extremity: WFL  Upper Extremity Strength: -       Right Upper Extremity: WFL  -       Left Upper Extremity: WFL   Strength: -       Right Upper Extremity: WFL except impaired for some ADLs  -       Left Upper Extremity: WFL except impaired for some ADLs  Fine Motor Coordination: -       Impaired  Left hand, manipulation of objects   and Right hand, manipulation of objects    Gross motor coordination: WFL  Functional Endurance- Fair  AMPAC 6 Click ADL:  AMPAC Total Score: 16    Treatment & Education:  Pt educated on POC, role of OT, and safety. Pt performed tasks to improve safety and independence in functional tasks and ADLs as mentioned above.       Patient left up in chair with call button in reach and all needs met    GOALS:   Multidisciplinary Problems       Occupational Therapy Goals          Problem: Occupational Therapy    Goal Priority Disciplines Outcome Interventions   Occupational Therapy Goal     OT, PT/OT Ongoing, Progressing    Description: Goals to be met by: 1/25/2024     Patient will increase functional independence with ADLs by performing:    UE Dressing with Supervision.  LE Dressing with Moderate Assistance and Assistive Devices as needed.  Personal Hygiene while seated at sink with Supervision.  Oral Hygiene while seated at sink with Supervision.  Toileting from toilet with Moderate Assistance for hygiene and clothing management and AE as needed  Bathing from  sitting at sink with Minimal Assistance.  Toilet transfer to toilet with Contact Guard Assistance /c AE/LRAD as needed  Footwear /c Mod A and AE as needed  Tolerate standing functional tasks /c CGA and AE/LRAD as needed  Shower t/f /c CGA and AE/LRAD as needed                           History:     Past Medical History:   Diagnosis Date    Arthritis     Depression     Hypercholesteremia     Thrombocytopenia     Thyroid disease          Past Surgical History:   Procedure Laterality Date     ANKLE SURGERY      COLONOSCOPY N/A 6/2/2021    Procedure: COLONOSCOPY;  Surgeon: Alfonso Haque MD;  Location: [facility] (4[facility]LR);  Service: Endoscopy;  Laterality: N/A;  any crs  covid test 5/30-[place]    HYSTERECTOMY      INTRAMEDULLARY RODDING OF FEMUR Left 12/23/2023    Procedure: INSERTION, INTRAMEDULLARY DIEGO, FEMUR;  Surgeon: Troy Mancilla MD;  [facility]: [facility] OR [facility]ND [facility];  Service: Orthopedics;  Laterality: Left;    KNEE SURGERY      WRIST SURGERY         Time Tracking:     OT Date of Treatment: 12/27/23  OT Start Time: 0847  OT Stop Time: 0943  OT Total Time (min): 56 min    Billable Minutes:Evaluation 15  Self Care/Home Management 30  Therapeutic Activity 11    12/27/2023

## 2023-12-27 NOTE — PLAN OF CARE
Problem: Physical Therapy  Goal: Physical Therapy Goal  Description: Goals to be met by: 1/27/23     Patient will increase functional independence with mobility by performing:    . Supine to sit with MInimal Assistance  . Sit to supine with MInimal Assistance  . Rolling to Left and Right with Minimal Assistance.  . Sit to stand transfer with Contact Guard Assistance  . Bed to chair transfer with Contact Guard Assistance using Rolling Walker  . Gait  x 50 feet with Contact Guard Assistance using Rolling Walker.   . Wheelchair propulsion x100 feet with Supervision using bilateral uppper extremities    Outcome: Ongoing, Progressing

## 2023-12-27 NOTE — PT/OT/SLP EVAL
Physical Therapy Evaluation    Patient Name:  Rosario Menendez   MRN:  [id number]  Admit Date: 12/26/2023  Recent Surgeries: Procedure(s) (LRB):  INSERTION, INTRAMEDULLARY DIEGO, FEMUR (Left)    General Precautions: Standard, fall, hearing impaired   Orthopedic Precautions: LLE weight bearing as tolerated   Braces: N/A    Recommendations:     Discharge Recommendations: home health PT   Level of Assistance Recommended: 24 hours significant assistance  Discharge Equipment Recommendations: wheelchair, bedside commode   Barriers to discharge: Decreased caregiver support    Assessment:     Rosario Menendez is a 88 y.o. female admitted with a medical diagnosis of Closed intertrochanteric fracture of left femur .  Performance deficits affecting function  weakness, impaired endurance, impaired self care skills, impaired functional mobility, gait instability, impaired balance, decreased upper extremity function, decreased lower extremity function, pain, impaired cardiopulmonary response to activity, orthopedic precautions, decreased ROM. Pt was Mod I using a RW for all functional mobility and ADLs PTA and pt now requires MaxA for bed mobility, and Mod A for transfers and very limited GT distance. Pt plans on returning to the assisted living center where she was residing before her new diagnosis. Pt will therefore continue to benefit from skilled PT services during this hospital admit to continue to improve transfer ability and efficiency as well as continue to progress pt's ambulation distance and cardiopulmonary endurance to maximize pt's functional independence and return to OF.       Rehab Potential is good     Activity Tolerance: Good    Plan:     Patient to be seen 5 x/week to address the above listed problems via gait training, therapeutic activities, therapeutic exercises, wheelchair management/training     Plan of Care Expires: 01/26/24  Plan of Care Reviewed with: patient    Subjective     Chief Complaint: pain and  fatigue  Patient/Family Comments/goals: none noted by pt.  Pain/Comfort:  Pain Rating 1: 4/10  Location - Side 1: Left  Location - Orientation 1: generalized  Location 1: hip  Pain Addressed 1: Pre-medicate for activity, Reposition, Distraction  Pain Rating Post-Intervention 1: 4/10    Living Environment:   Pt lives at an assisted living facility   Prior Level of Function: Mod I for amb using RW  DME owned: [employer], w/c  Caregiver Assistance: limited assistance from staff at [facility]    Patients cultural, spiritual, Ep[employer]opalian conflicts given the current situation: no    Objective:     Communicated with pt's nurse prior to session.  Patient found up in chair with    upon PT entry to room.    Exams:  Cognitive Exam:  Patient is oriented to Person, Place, Time, and Situation  Gross Motor Coordination:  WFL  Sensation:    -       Intact  Skin Integrity/Edema:      -       Skin integrity: Visible skin intact  RLE ROM: WFL except hip limited d.t pain from surgery  RLE Strength: WFL except knee 3/5 and hip 2-/5  LLE ROM: WFL  LLE Strength: WFL    Functional Mobility:     12/27/23 1125   Prior Functioning: Everyday Activities   Self Care Independent   Indoor Mobility (Ambulation) Independent   Stairs Unknown   Functional Cognition Independent   Prior Device Use Walker   Roll Left and Right   Was the activity attempted? Yes   Was the activity done independently? No   Assistance Needed Physical assistance   Physical Assistance Level More than half  (maxA with HOB flat and no rail)   Was adaptive equipment used? No   CARE Score - Roll Left and Right 2   Sit to Lying   Was the activity attempted? Yes   Was the activity done independently? No   Assistance Needed Physical assistance   Physical Assistance Level More than half  (maxA with HOB flat and no rail)   Was adaptive equipment used? No   CARE Score - Sit to Lying 2   Lying to Sitting on Side of Bed   Was the activity attempted? Yes   Was the activity done independently? No    Assistance Needed Physical assistance   Physical Assistance Level More than half  (maxA with HOB flat and no rail)   Was adaptive equipment used? No   CARE Score - Lying to Sitting on Side of Bed 2   Sit to Stand   Was the activity attempted? Yes   Was the activity done independently? No   Assistance Needed Physical assistance   Physical Assistance Level More than half  (Mod A with RW from w/c and gym mat)   Was adaptive equipment used? Yes   CARE Score - Sit to Stand 2   Chair/Bed-to-Chair Transfer   Was the activity attempted? Yes   Was the activity done independently? No   Assistance Needed Physical assistance   Physical Assistance Level More than half  (SPT with RW and ModA)   Was adaptive equipment used? Yes   CARE Score - Chair/Bed-to-Chair Transfer 2   Chair/Bed-to-Chair Transfer Discharge Goal   Discharge Goal 3   Car Transfer   Reason if not Attempted Environmental limitations   CARE Score - Car Transfer 10   Walk 10 Feet   Reason if not Attempted Safety concerns  (pt ambulated 5ft with RW and ModA with pt fearful of advancing B L/E's, R >L, short step length, very slow ekta and for walker management.)   CARE Score - Walk 10 Feet 88   Walk 50 Feet with Two Turns   Reason if not Attempted Safety concerns   CARE Score - Walk 50 Feet with Two Turns 88   Walk 150 Feet   Reason if not Attempted Safety concerns   CARE Score - Walk 150 Feet 88   Walking 10 Feet on Uneven Surfaces   Reason if not Attempted Safety concerns   CARE Score - Walking 10 Feet on Uneven Surfaces 88   1 Step (Curb)   Reason if not Attempted Safety concerns   CARE Score - 1 Step (Curb) 88   4 Steps   Reason if not Attempted Safety concerns   CARE Score - 4 Steps 88   12 Steps   Reason if not Attempted Safety concerns   CARE Score - 12 Steps 88   Picking Up Object   Reason if not Attempted Safety concerns   CARE Score - Picking Up Object 88   OTHER   Uses a Wheelchair/Scooter? Yes   Wheel 50 Feet with Two Turns   Reason if not Attempted  Safety concerns  (pt needed Mod A especially with turns to propel w/c 40ft before becoming fatigued.)   CARE Score - Wheel 50 Feet with Two Turns 88   Wheel 150 Feet   Reason if not Attempted Safety concerns   CARE Score - Wheel 150 Feet 88       Therapeutic Activities and Exercises: B l/e supine active/assist therex x 20reps: AP, GS, SAQs, HS, Hip Abd/Add    Education:  Patient provided with daily orientation and goals of this PT session.  Patient educated to transfer to bedside chair/bedside commode/bathroom with RN/PCT present.   Patient educated on Fall risk and plan of care by explanation.   Patient Verbalized Understanding.  Time provided for therapeutic counseling and discussion of current health disposition. All questions answered to satisfaction, within scope of PT practice; voiced no other concerns. White board updated in patient's room, RN notified of session.     AM-PAC 6 CLICK MOBILITY  Total Score:10     Patient left up in chair with call button in reach.    GOALS:   Multidisciplinary Problems       Physical Therapy Goals          Problem: Physical Therapy    Goal Priority Disciplines Outcome Goal Variances Interventions   Physical Therapy Goal     PT, PT/OT Ongoing, Progressing     Description: Goals to be met by: 1/27/23     Patient will increase functional independence with mobility by performing:    . Supine to sit with MInimal Assistance  . Sit to supine with MInimal Assistance  . Rolling to Left and Right with Minimal Assistance.  . Sit to stand transfer with Contact Guard Assistance  . Bed to chair transfer with Contact Guard Assistance using Rolling Walker  . Gait  x 50 feet with Contact Guard Assistance using Rolling Walker.   . Wheelchair propulsion x100 feet with Supervision using bilateral uppper extremities                         History:     Past Medical History:   Diagnosis Date    Arthritis     Depression     Hypercholesteremia     Thrombocytopenia     Thyroid disease        Past  Surgical History:   Procedure Laterality Date    ANKLE SURGERY      COLONOSCOPY N/A 6/2/2021    Procedure: COLONOSCOPY;  Surgeon: Alfonso Haque MD;  [facility]  Service: Endoscopy;  Laterality: N/A;  any crs  covid test 5/30-[place]    HYSTERECTOMY      INTRAMEDULLARY RODDING OF FEMUR Left 12/23/2023    Procedure: INSERTION, INTRAMEDULLARY DIEGO, FEMUR;  Surgeon: Troy Mancilla MD;  Location: [facility] OR [address] [place] [address];  Service: Orthopedics;  Laterality: Left;    KNEE SURGERY      WRIST SURGERY         Time Tracking:     PT Received On: 12/27/23  PT Start Time: 1008     PT Stop Time: 1041  PT Total Time (min): 33 min     Billable Minutes: Evaluation 21 and Therapeutic Exercise 12 12/27/2023

## 2023-12-27 NOTE — CONSULTS
[facility] - Skilled Nursing  Adult Nutrition  Consult Note    SUMMARY   Recommendations recommend MVI  Continue regular diet, assist with set up,RD following  Goals: PO to meet 75% of EEN/EPN by next RD follow up  Nutrition Goal Status: new  Communication of RD Recs: other (comment) (POC)    Assessment and Plan    Increased protein needs for healing as evidenced by s/p L femur repair.    New    Plan  Collaboration with other providers  General diet  Vitamin supplement therapy, Vit B12, recommend MVI      Malnutrition Assessment pending fu                                       Reason for Assessment    Reason For Assessment: consult  Diagnosis:  (L hip fracture, s/p IMN)  Relevant Medical History: Osteoporosis, dementia, hypothyroid, hypophosphatemia, blood loss anemia at present,  Interdisciplinary Rounds: did not attend  General Information Comments: Now lives in assited living, [facility] with patient from admit here one year ago, poor dietary habits and diet prior to admit to [facility]. no weight loss in last year months, slow weight loss in first half of year. She use a walker at home and had a fall. Assessment being completed remotely so NFPE scheduled for 12/29.PO suspected to be poor < 50% with no data at this time. RD contacted nursing for input on PO.In the past patient had declined oral nutrition supplements.  Nutrition Discharge Planning: DC on regular diet  Nutrition/Diet History    Patient Reported Diet/Restrictions/Preferences: general  Typical Food/Fluid Intake: regular margie  Spiritual, Cultural Beliefs, Christianity Practices, Values that Affect Care: no  Food Allergies: NKFA  Factors Affecting Nutritional Intake: impaired cognitive status/motor control    Anthropometrics    Temp: 97.2 °F (36.2 °C)  Height Method: Stated  Height: 5' (152.4 cm)  Height (inches): 60 in  Weight Method: Bed Scale  Weight: 60.7 kg (133 lb 13.1 oz)  Weight (lb): 133.82 lb  Ideal Body Weight (IBW), Female: 100 lb  % Ideal Body  Weight, Female (lb): 133.82 %  BMI (Calculated): 26.1  BMI Grade: 25 - 29.9 - overweight  Usual Body Weight (UBW), k kg  % Usual Body Weight: 103.1  % Weight Change From Usual Weight: 2.88 %       Lab/Procedures/Meds    Pertinent Labs Reviewed: reviewed  Pertinent Labs Comments: Hg 10.2, Hct 31.3, PO4 1.8, PAB 19. HgA1c 5.4  Pertinent Medications Reviewed: reviewed  Pertinent Medications Comments: ASA, Vit B12, Levothyroxine, senna-docusate, memantine       Estimated/Assessed Needs    Weight Used For Calorie Calculations: 60.7 kg (133 lb 13.1 oz)  Energy Calorie Requirements (kcal): 1341  Energy Need Method: [place]-St Jeor (x 1..4(PAL))  Protein Requirements: 73-80  Weight Used For Protein Calculations: 60.7 kg (133 lb 13.1 oz) (x 1.2-1.3g/kg)  Fluid Requirements (mL): 1341 or per MD  Estimated Fluid Requirement Method: RDA Method  RDA Method (mL): 1341  CHO Requirement: -      Nutrition Prescription Ordered    Current Diet Order: Regular  Oral Nutrition Supplement: refuses    Evaluation of Received Nutrient/Fluid Intake    I/O: no data  Energy Calories Required: not meeting needs  Protein Required: not meeting needs  Fluid Required: meeting needs  Comments: LBM   Tolerance: tolerating  % Intake of Estimated Energy Needs: 25 - 50 %  % Meal Intake: 25 - 50 %    Nutrition Risk    Level of Risk/Frequency of Follow-up: high (two times per week)       Monitor and Evaluation    Food and Nutrient Intake: food and beverage intake  Food and Nutrient Adminstration: diet order  Physical Activity and Function: nutrition-related ADLs and IADLs  Anthropometric Measurements: weight change  Biochemical Data, Medical Tests and Procedures: electrolyte and renal panel, gastrointestinal profile  Nutrition-Focused Physical Findings: overall appearance, skin       Nutrition Follow-Up    RD Follow-up?: Yes

## 2023-12-28 LAB
ANION GAP SERPL CALC-SCNC: 6 MMOL/L (ref 8–16)
BASOPHILS # BLD AUTO: 0.05 K/UL (ref 0–0.2)
BASOPHILS NFR BLD: 0.9 % (ref 0–1.9)
BUN SERPL-MCNC: 14 MG/DL (ref 8–23)
CALCIUM SERPL-MCNC: 8.8 MG/DL (ref 8.7–10.5)
CHLORIDE SERPL-SCNC: 114 MMOL/L (ref 95–110)
CO2 SERPL-SCNC: 20 MMOL/L (ref 23–29)
CREAT SERPL-MCNC: 0.7 MG/DL (ref 0.5–1.4)
DIFFERENTIAL METHOD BLD: ABNORMAL
EOSINOPHIL # BLD AUTO: 0.2 K/UL (ref 0–0.5)
EOSINOPHIL NFR BLD: 4.2 % (ref 0–8)
ERYTHROCYTE [DISTWIDTH] IN BLOOD BY AUTOMATED COUNT: 14.7 % (ref 11.5–14.5)
EST. GFR  (NO RACE VARIABLE): >60 ML/MIN/1.73 M^2
GLUCOSE SERPL-MCNC: 89 MG/DL (ref 70–110)
HCT VFR BLD AUTO: 31.1 % (ref 37–48.5)
HGB BLD-MCNC: 10.2 G/DL (ref 12–16)
IMM GRANULOCYTES # BLD AUTO: 0.02 K/UL (ref 0–0.04)
IMM GRANULOCYTES NFR BLD AUTO: 0.4 % (ref 0–0.5)
LYMPHOCYTES # BLD AUTO: 1.8 K/UL (ref 1–4.8)
LYMPHOCYTES NFR BLD: 32.9 % (ref 18–48)
MAGNESIUM SERPL-MCNC: 2.1 MG/DL (ref 1.6–2.6)
MCH RBC QN AUTO: 31.2 PG (ref 27–31)
MCHC RBC AUTO-ENTMCNC: 32.8 G/DL (ref 32–36)
MCV RBC AUTO: 95 FL (ref 82–98)
MONOCYTES # BLD AUTO: 0.7 K/UL (ref 0.3–1)
MONOCYTES NFR BLD: 13.5 % (ref 4–15)
NEUTROPHILS # BLD AUTO: 2.7 K/UL (ref 1.8–7.7)
NEUTROPHILS NFR BLD: 48.1 % (ref 38–73)
NRBC BLD-RTO: 0 /100 WBC
PHOSPHATE SERPL-MCNC: 2.2 MG/DL (ref 2.7–4.5)
PLATELET # BLD AUTO: 159 K/UL (ref 150–450)
PMV BLD AUTO: 11.1 FL (ref 9.2–12.9)
POTASSIUM SERPL-SCNC: 4 MMOL/L (ref 3.5–5.1)
RBC # BLD AUTO: 3.27 M/UL (ref 4–5.4)
SODIUM SERPL-SCNC: 140 MMOL/L (ref 136–145)
WBC # BLD AUTO: 5.5 K/UL (ref 3.9–12.7)

## 2023-12-28 PROCEDURE — 63700000 PHARM REV CODE 250 ALT 637 W/O HCPCS: Mod: HCNC | Performed by: NURSE PRACTITIONER

## 2023-12-28 PROCEDURE — 97116 GAIT TRAINING THERAPY: CPT | Mod: HCNC

## 2023-12-28 PROCEDURE — 97110 THERAPEUTIC EXERCISES: CPT | Mod: HCNC

## 2023-12-28 PROCEDURE — 25000003 PHARM REV CODE 250: Mod: HCNC | Performed by: HOSPITALIST

## 2023-12-28 PROCEDURE — 36415 COLL VENOUS BLD VENIPUNCTURE: CPT | Mod: HCNC | Performed by: HOSPITALIST

## 2023-12-28 PROCEDURE — 80048 BASIC METABOLIC PNL TOTAL CA: CPT | Mod: HCNC | Performed by: HOSPITALIST

## 2023-12-28 PROCEDURE — 11000004 HC SNF PRIVATE: Mod: HCNC

## 2023-12-28 PROCEDURE — 85025 COMPLETE CBC W/AUTO DIFF WBC: CPT | Mod: HCNC | Performed by: HOSPITALIST

## 2023-12-28 PROCEDURE — 83735 ASSAY OF MAGNESIUM: CPT | Mod: HCNC | Performed by: HOSPITALIST

## 2023-12-28 PROCEDURE — 97535 SELF CARE MNGMENT TRAINING: CPT | Mod: HCNC

## 2023-12-28 PROCEDURE — 97530 THERAPEUTIC ACTIVITIES: CPT | Mod: HCNC

## 2023-12-28 PROCEDURE — 84100 ASSAY OF PHOSPHORUS: CPT | Mod: HCNC | Performed by: HOSPITALIST

## 2023-12-28 RX ORDER — GALANTAMINE HYDROBROMIDE 16 MG/1
16 CAPSULE, EXTENDED RELEASE ORAL
Status: DISCONTINUED | OUTPATIENT
Start: 2023-12-28 | End: 2024-01-17 | Stop reason: HOSPADM

## 2023-12-28 RX ADMIN — MEMANTINE 10 MG: 10 TABLET ORAL at 08:12

## 2023-12-28 RX ADMIN — METHOCARBAMOL 500 MG: 500 TABLET ORAL at 08:12

## 2023-12-28 RX ADMIN — SENNOSIDES AND DOCUSATE SODIUM 1 TABLET: 8.6; 5 TABLET ORAL at 08:12

## 2023-12-28 RX ADMIN — PREGABALIN 50 MG: 50 CAPSULE ORAL at 08:12

## 2023-12-28 RX ADMIN — METHOCARBAMOL 500 MG: 500 TABLET ORAL at 02:12

## 2023-12-28 RX ADMIN — ASPIRIN 81 MG: 81 TABLET, COATED ORAL at 08:12

## 2023-12-28 RX ADMIN — LEVOTHYROXINE SODIUM 75 MCG: 75 TABLET ORAL at 06:12

## 2023-12-28 RX ADMIN — CYANOCOBALAMIN TAB 1000 MCG 1000 MCG: 1000 TAB at 08:12

## 2023-12-28 RX ADMIN — GALANTAMINE 16 MG: 16 CAPSULE, EXTENDED RELEASE ORAL at 12:12

## 2023-12-28 NOTE — PHYSICIAN QUERY
Name: Rosario Menendez  MR #: [id number]    DOCUMENTATION CLARIFICATION     CDS/: JOSAFAT Valenzuela, RN               Contact information: [email or web address]    This form is a permanent document in the medical record.    Query Date: December 28, 2023    By submitting this query, we are merely seeking further clarification of documentation. Please utilize your independent clinical judgment when addressing the question(s) below.    The Medical Record contains the following:   Indicators Supporting Clinical Findings Location in Medical Record    x Fracture documented Patient with left intertrochanteric femur fracture.  Orthopedic Surgery Consults Note 12/23/2023      x Osteoporosis, malignancy documented 88 y.o. female with PMH of osteoporosis  Orthopedic Surgery Consults Note 12/23/2023      x Radiology Findings XR Hip With Pelvis When Performed, 2 or 3 Views    There is diffuse osteopenia.There is a subtle irregular lucency transversing the left greater trochanter extending into the intertrochanteric portion of the proximal left femur.     CT Hip Without Contrast  There is a minimally displaced fracture extending from greater trochanter to the proximal diaphysis.  Left femoroacetabular joint is maintained.  No large joint effusion or hematoma.  Minimal stranding overlying the posterolateral left hip.     Visualized aspects of the pelvis and sacrum are intact.  Degenerative changes of the lower lumbar spine.  Unchanged grade 2 anterolisthesis of L4-L5 with bilateral L4 pars defect.   Hospital Medicine H&P 12/23/2023    x Treatment/Medication Current Outpatient Medications   alendronate (FOSAMAX) 70 MG tablet  Take 1 tablet (70 mg total) by mouth every 7 days. HOLD while in SNF     operative fixation of left femur fracture     Cephalomedullary nail fixation of left intertrochanteric femur fracture  Orthopedic Surgery Consults Note 12/23/2023            Orthopedic Surgery Op Note 12/23/2023    Other       Provider,  please clarify if there is any clinical correlation between Osteoporosis and Left intertrochanteric femur fracture.    [ x  ] Due to or associated with each other     [   ] Unrelated to each other     [   ] Other explanation (please specify): _________         [  ] Clinically undetermined       Please document in your progress notes daily for the duration of treatment, until resolved, and include in your discharge summary.    Form No. [id number]

## 2023-12-28 NOTE — PT/OT/SLP PROGRESS
Occupational Therapy   Treatment    Name: Rosario Menendez  MRN: [id number]  Admit Date: 12/26/2023  Admitting Diagnosis:     Cephalomedullary nail fixation of left intertrochanteric femur fracture - 51687     General Precautions: Standard, fall, hearing impaired   Orthopedic Precautions: LLE weight bearing as tolerated   Braces: N/A    Recommendations:     Discharge Recommendations:  low intensity therapy   Level of Assistance Recommended at Discharge: 24 hours physical assistance for all ADL's and home management tasks  Discharge Equipment Recommendations: hip kit, 3-in-1 commode, shower chair (SCvs TTB questionable 2/2 pt poor historian)  Barriers to discharge:   (increased need for skilled assistance for performance of functional tasks)    Assessment:     Rosario Menendez is a 88 y.o. female with a medical diagnosis of    Cephalomedullary nail fixation of left intertrochanteric femur fracture. Pt tolerated session well and without incident and shows excellent motivation and potential to improve, but the pt continues to require assistance to perform self-care tasks and mobility. Pt strengths include  Willingness to participate. Pt improved UBD, LBD, and Toilet transfers. However, pt would continue to benefit from cont'd OT services in the SNF setting to improve safety and independence /c functional tasks and ADLs upon discharge. Performance deficits affecting function are weakness, impaired endurance, impaired self care skills, impaired functional mobility, gait instability, impaired balance, impaired cognition, decreased lower extremity function.     Rehab Potential is good    Activity tolerance:  Good    Plan:     Patient to be seen 5 x/week to address the above listed problems via self-care/home management, community/work re-entry, therapeutic activities, therapeutic exercises, neuromuscular re-education    Plan of Care Expires: 01/25/24  Plan of Care Reviewed with: patient    Subjective     Communicated with:  "NSG prior to session.     "I don't want to, but I will do it." .    Pain/Comfort:  Pain Rating 1: other (see comments) (did not rate, minor pain when supine)  Location - Side 1: Left  Location - Orientation 1: generalized  Location 1: hip  Pain Addressed 1: Reposition, Distraction  Pain Rating Post-Intervention 1:  (did not rate, increased pain /c movement OOB)    Patient's cultural, spiritual, Congregation conflicts given the current situation:  no    Objective:     Patient found supine with Other (comments) (no line) upon OT entry to room. Pt alert and agreeable to therapy.       Bed Mobility:    Patient completed Supine to Sit with maximal assistance and use of bed rail. Pt req extended time and maximal vcs.     Functional Mobility/Transfers:  Patient completed Sit <> Stand Transfer with moderate assistance  with  rolling walker   Patient completed Toilet Transfer Stand Pivot technique with minimum assistance with  rolling walker  Pt req maximal vcs for all transfers for proper use of RW, foot placement, attention to task, and safety.     Activities of Daily Living- Pt req extended time and maximal vcs for completion of all ADL tasks  Oral Hygiene: supervision seated at sink  Upper Body Dressing: supervision seated in WC to doff/don pullover shirt  Lower Body Dressing: maximal assistance to doff/don pants /c BLE threaded seated on toilet and managed over hips standing /c RW. Pt able to help don over R foot and assist with pulling over knees while seated, req assistance for all other aspects of task. Pt given max vcs throughout to assist therapist but unable to complete 2/2 fear of falling  Toileting: total assistance seated on raised toilet seat. Pt unable to assist /c perirectal hygiene/clothing mgmt 2/2 fear of falling  Footwear: Max A to doff/don slip on shoes. Pt only able to assist /c doffing R shoe.     [facility] 6 Click ADL: 16    Treatment & Education:  Pt educated on POC, role of OT, and safety. Pt performed " tasks to improve safety and independence in functional tasks and ADLs as mentioned above.       Patient left up in chair with call button in reach and all needs met    GOALS:   Multidisciplinary Problems       Occupational Therapy Goals          Problem: Occupational Therapy    Goal Priority Disciplines Outcome Interventions   Occupational Therapy Goal     OT, PT/OT Ongoing, Progressing    Description: Goals to be met by: 1/25/2024     Patient will increase functional independence with ADLs by performing:    UE Dressing with Supervision- Ongoing  LE Dressing with Moderate Assistance and Assistive Devices as needed- Ongoing  Personal Hygiene while seated at sink with Supervision- Ongoing  Oral Hygiene while seated at sink with Supervision- Ongoing  Toileting from toilet with Moderate Assistance for hygiene and clothing management and AE as needed- Ongoing  Bathing from  sitting at sink with Minimal Assistance.  Toilet transfer to toilet with Contact Guard Assistance /c AE/LRAD as needed- Ongoing  Footwear /c Mod A and AE as needed  Tolerate standing functional tasks /c CGA and AE/LRAD as needed  Shower t/f /c CGA and AE/LRAD as needed                           Time Tracking:     OT Date of Treatment: 12/28/23  OT Start Time: 0956    OT Stop Time: 1036  OT Total Time (min): 40 min    Billable Minutes:Self Care/Home Management 40    12/28/2023

## 2023-12-28 NOTE — PT/OT/SLP PROGRESS
Physical Therapy Treatment    Patient Name:  Rosario Menendez   MRN:  [id number]  Admit Date: 12/26/2023  Admitting Diagnosis:  Closed intertrochanteric fracture of left femur   Recent Surgeries: INSERTION, INTRAMEDULLARY DIEGO, FEMUR (Left)     General Precautions: Standard, fall, hearing impaired  Orthopedic Precautions: LLE weight bearing as tolerated  Braces: N/A    Recommendations:     Discharge Recommendations: home health PT  Level of Assistance Recommended at Discharge: 24 hours significant assistance  Discharge Equipment Recommendations: wheelchair, bedside commode  Barriers to discharge: Decreased caregiver support    Assessment:     Rosario Menendez is a 88 y.o. female admitted with a medical diagnosis of  Closed intertrochanteric fracture of left femur. Pt requires max encouragement to participate with PT and at times appears self limiting. Pt will continue to benefit from skilled PT services during this hospital admit to continue to improve transfer ability and efficiency as well as continue to progress pt's ambulation distance and cardiopulmonary endurance to maximize pt's functional independence and return to PLOF.     Performance deficits affecting function: weakness, impaired endurance, impaired self care skills, impaired functional mobility, gait instability, impaired balance, decreased upper extremity function, decreased lower extremity function, decreased ROM, impaired skin, impaired cardiopulmonary response to activity, orthopedic precautions.    Rehab Potential is good    Activity Tolerance: Fair    Plan:     Patient to be seen 5 x/week to address the above listed problems via gait training, therapeutic activities, therapeutic exercises, wheelchair management/training    Plan of Care Expires: 01/26/24  Plan of Care Reviewed with: patient    Subjective     Pt. Required max encouragement to participate with PT.     Pain/Comfort:  Pain Rating 1: 0/10  Pain Rating Post-Intervention 1:  0/10    Patient's cultural, spiritual, Roman Catholic conflicts given the current situation:  no    Objective:     Communicated with pt's nurse prior to session.  Patient found up in chair with   upon PT entry to room.     Therapeutic Activities and Exercises: UBEx 7mins to help improve BU/E and cardiovascular endurance.  B l/E seated therex x 20reps: AP, LAQs, Hip Flex    Functional Mobility:  Transfers:     Sit to Stand:  moderate assistance with rolling walker  Gait: 6ft +10ft with RW and Mod A d.t pt needing assistance to advance LLE and increase time to initiate task and stay focused as pt needing max encouragement to participate.    AM-PAC 6 CLICK MOBILITY  10    Patient left up in chair with call button in reach.    GOALS:   Multidisciplinary Problems       Physical Therapy Goals          Problem: Physical Therapy    Goal Priority Disciplines Outcome Goal Variances Interventions   Physical Therapy Goal     PT, PT/OT Ongoing, Progressing     Description: Goals to be met by: 1/27/23     Patient will increase functional independence with mobility by performing:    . Supine to sit with MInimal Assistance  . Sit to supine with MInimal Assistance  . Rolling to Left and Right with Minimal Assistance.  . Sit to stand transfer with Contact Guard Assistance  . Bed to chair transfer with Contact Guard Assistance using Rolling Walker  . Gait  x 50 feet with Contact Guard Assistance using Rolling Walker.   . Wheelchair propulsion x100 feet with Supervision using bilateral uppper extremities                         Time Tracking:     PT Received On: 12/28/23  PT Start Time: 1117  PT Stop Time: 1157  PT Total Time (min): 40 min    Billable Minutes: Gait Training 15, Therapeutic Activity 10, and Therapeutic Exercise 15    Treatment Type: Treatment  PT/PTA: PT     Number of PTA visits since last PT visit: 0     12/28/2023

## 2023-12-28 NOTE — PLAN OF CARE
Problem: Adult Inpatient Plan of Care  Goal: Plan of Care Review  Outcome: Ongoing, Progressing  Flowsheets (Taken 12/27/2023 5724)  Plan of Care Reviewed With:   patient   son  Goal: Patient-Specific Goal (Individualized)  Outcome: Ongoing, Progressing  Goal: Absence of Hospital-Acquired Illness or Injury  Outcome: Ongoing, Progressing  Goal: Optimal Comfort and Wellbeing  Outcome: Ongoing, Progressing  Goal: Readiness for Transition of Care  Outcome: Ongoing, Progressing     Problem: Fall Injury Risk  Goal: Absence of Fall and Fall-Related Injury  Outcome: Ongoing, Progressing     Problem: Skin Injury Risk Increased  Goal: Skin Health and Integrity  Outcome: Ongoing, Progressing

## 2023-12-28 NOTE — CLINICAL REVIEW
Clinical Pharmacy Chart Review Note      Admit Date: 12/26/2023   LOS: 2 days       Rosario Menendez is a 88 y.o. female admitted to SNF for PT/OT after hospitalization for closed intertrochanteric fracture of left femur.    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    Hypothyroidism     Hypercholesteremia     Personal history of colonic polyps 06/02/2021    Thrombocytopenia 04/11/2018       Scheduled Meds:    aspirin  81 mg Oral BID    buPROPion  150 mg Oral Daily    cyanocobalamin  1,000 mcg Oral Daily    galantamine  16 mg Oral Daily    levothyroxine  75 mcg Oral Before breakfast    memantine  10 mg Oral BID    methocarbamoL  500 mg Oral TID    pregabalin  50 mg Oral QHS    senna-docusate 8.6-50 mg  1 tablet Oral BID     Continuous Infusions:   PRN Meds: acetaminophen, acetaminophen, calcium carbonate, melatonin    OBJECTIVE:     Vital Signs (Last 24H)  Temp:  [98.5 °F (36.9 °C)-98.7 °F (37.1 °C)]   Pulse:  [64-65]   Resp:  [17-18]   BP: (133-136)/(68-82)   SpO2:  [94 %-95 %]     Laboratory:  CBC:   Recent Labs   Lab 12/25/23 0213 12/26/23  0514 12/28/23  0422   WBC 8.01 4.79 5.50   RBC 3.26* 3.27* 3.27*   HGB 10.0* 10.2* 10.2*   HCT 30.4* 31.3* 31.1*   PLT 55* 88* 159   MCV 93 96 95   MCH 30.7 31.2* 31.2*   MCHC 32.9 32.6 32.8     BMP:   Recent Labs   Lab 12/25/23 0213 12/26/23  0514 12/28/23  0422   GLU 89 78 89    138 140   K 4.0  4.3 4.0   * 109 114*   CO2 20* 23 20*   BUN 20 12 14   CREATININE 0.8 0.8 0.7   CALCIUM 8.6* 8.8 8.8   MG 2.3 2.1 2.1     CMP:   Recent Labs   Lab 12/23/23  0205 12/24/23  0333 12/25/23  0213 12/26/23  0514 12/28/23  0422   GLU 99   < > 89 78 89   CALCIUM 10.3   < > 8.6* 8.8 8.8   ALBUMIN 3.6  --   --   --   --    PROT 6.8  --   --   --   --       < > 139 138 140   K 4.2   < > 4.0 4.3 4.0   CO2 23   < > 20* 23 20*   *   < > 111* 109 114*   BUN 15   < > 20 12 14   CREATININE 0.9   < > 0.8 0.8 0.7   ALKPHOS 75  --   --   --   --    ALT 20  --   --   --   --    AST 19  --   --   --   --    BILITOT 0.3  --   --   --   --     < > = values in this interval not displayed.     LFTs:   Recent Labs   Lab 12/23/23  0205   ALT 20   AST 19   ALKPHOS 75   BILITOT 0.3   PROT 6.8   ALBUMIN 3.6     Lab Results   Component Value Date    TSH 0.021 (L) 09/20/2023    FREET4 1.34 09/20/2023         ASSESSMENT/PLAN:     I have reviewed the medications in compliance with CMS Regulation F756 of the [place]. Based on information gathered, the following items may need to be addressed:    **Patient is taking bupropion (home med) for depression. A gradual dose reduction is not recommended at this time. Patient to follow-up with prescribing MD as outpatient.  Monitor: mental status for depression, suicide ideation, anxiety, headache, dizziness, drowsiness, somnolence    Medications reviewed by PharmD, please re-consult if needed.      Ximena Mendoza, Pharm. D.  Clinical Pharmacist  [facility]

## 2023-12-28 NOTE — PROGRESS NOTES
[facility]                                  Skilled Nursing Facility                   Progress Note     Patient Name: Rosario Menendez  YOB: 1935  MRN: [id number]  Room: QICN432/[id number] A     Admit Date: 12/26/2023   AMARA:      Principal Problem: Closed intertrochanteric fracture of left femur    HPI obtained from patient interview and chart review     Chief Complaint:   Establish Care/ Initial Visit      HPI:   Rosario Menendez is a 88 y.o. female with PMH of osteoporosis, dementia, thrombocytopenia and hypothyroidism presented with left hip pain after a fall sustaining a closed intertrochanteric fracture of the left femur. S/p left CMN on 12/23 with Dr. Mancilla. Initially started on Eliquis 2.5mg BID for DVT prophylaxis, but given Thrombocytopenia <50K, she was changed to Aspirin 81mg BID.  Continue WBAT ROMAT LLE.       Patient will be treated at [facility] with PT and OT to improve functional status and ability to perform ADLs.     Past Medical History: Patient has a past medical history of Arthritis, Depression, Hypercholesteremia, Thrombocytopenia, and Thyroid disease.    Past Surgical History: Patient has a past surgical history that includes Hysterectomy; Knee surgery; Ankle surgery; Wrist surgery; Colonoscopy (N/A, 6/2/2021); and Intramedullary rodding of femur (Left, 12/23/2023).    Social History: Patient reports that she has never smoked. She has never used smokeless tobacco.    Family History: family history includes Cancer in her maternal uncle; Heart failure in her father and mother; Suicide in her son.    Allergies: Patient is allergic to codeine.        Review of Systems   Constitutional:  Positive for malaise/fatigue. Negative for chills and fever.   HENT:  Negative for congestion, hearing loss and sore throat.    Eyes:  Negative for blurred vision and double vision.   Respiratory:  Negative for cough,  sputum production, shortness of breath and wheezing.    Cardiovascular:  Negative for chest pain, palpitations and leg swelling.   Gastrointestinal:  Negative for abdominal pain, constipation, diarrhea, heartburn, nausea and vomiting.   Genitourinary:  Negative for dysuria and urgency.   Musculoskeletal:  Negative for back pain.   Skin:  Negative for rash.        Left leg surgical incisions   Neurological:  Positive for weakness. Negative for dizziness and headaches.   Endo/Heme/Allergies:  Negative for polydipsia. Does not bruise/bleed easily.   Psychiatric/Behavioral:  Negative for depression and hallucinations. The patient is not nervous/anxious.          24 hour Vital Sign Range   Temp:  [97.2 °F (36.2 °C)-98.5 °F (36.9 °C)]   Pulse:  [64-67]   Resp:  [17]   BP: (136-161)/(68-70)   SpO2:  [94 %-95 %]       Physical Exam  Vitals and nursing note reviewed.   Constitutional:       General: She is not in acute distress.     Appearance: Normal appearance. She is not ill-appearing.   HENT:      Head: Normocephalic and atraumatic.      Nose: Nose normal. No congestion.      Mouth/Throat:      Mouth: Mucous membranes are moist.      Pharynx: Oropharynx is clear.   Eyes:      Extraocular Movements: Extraocular movements intact.      Conjunctiva/sclera: Conjunctivae normal.      Pupils: Pupils are equal, round, and reactive to light.   Cardiovascular:      Rate and Rhythm: Normal rate and regular rhythm.      Pulses: Normal pulses.      Heart sounds: Normal heart sounds. No murmur heard.  Pulmonary:      Effort: Pulmonary effort is normal. No respiratory distress.      Breath sounds: Normal breath sounds. No wheezing or rhonchi.   Abdominal:      General: Bowel sounds are normal. There is no distension.      Palpations: Abdomen is soft. There is no mass.      Tenderness: There is no abdominal tenderness.   Musculoskeletal:         General: No swelling or tenderness.      Cervical back: Normal range of motion and neck  "supple. No tenderness.      Right lower leg: No edema.      Left lower leg: No edema.   Skin:     General: Skin is warm and dry.      Capillary Refill: Capillary refill takes less than 2 seconds.      Findings: No erythema or rash.      Comments: Post Op dressing to thigh and leg clean, dry, intact   Neurological:      Mental Status: She is alert and oriented to person, place, and time. Mental status is at baseline.      Motor: Weakness present.      Gait: Gait abnormal.   Psychiatric:         Mood and Affect: Mood normal.         Behavior: Behavior normal.         Thought Content: Thought content normal.         Judgment: Judgment normal.       Full skin assessment completed by this NP on 12/27/23          Incision/Site 12/23/23 1541 Left Leg (Active)      Present Prior to Hospital Arrival?: yes   Side: Left   Location: Leg   Dressing Appearance Dry;Intact;Clean 12/27/23 1913   Drainage Amount None 12/27/23 1913   Drainage Characteristics/Odor No odor 12/27/23 1913           Labs:  Recent Labs   Lab 12/24/23  0333 12/25/23  0213 12/26/23  0514   WBC 7.62 8.01 4.79   HGB 10.9* 10.0* 10.2*   HCT 32.9* 30.4* 31.3*   PLT 42* 55* 88*     Recent Labs   Lab 12/24/23  0333 12/25/23  0213 12/26/23  0514    139 138   K 4.1 4.0 4.3    111* 109   CO2 20* 20* 23   BUN 14 20 12   CREATININE 0.8 0.8 0.8   * 89 78   CALCIUM 8.3* 8.6* 8.8   MG 2.1 2.3 2.1   PHOS 3.3 2.4* 1.8*     Recent Labs   Lab 12/23/23  0205 12/23/23  1306   ALKPHOS 75  --    ALT 20  --    AST 19  --    ALBUMIN 3.6  --    PROT 6.8  --    BILITOT 0.3  --    INR  --  1.0     No results for input(s): "POCTGLUCOSE" in the last 72 hours.    Meds Scheduled:   aspirin  81 mg Oral BID    buPROPion  150 mg Oral Daily    cyanocobalamin  1,000 mcg Oral Daily    galantamine  16 mg Oral Daily    levothyroxine  75 mcg Oral Before breakfast    memantine  10 mg Oral BID    methocarbamoL  500 mg Oral TID    pregabalin  50 mg Oral QHS    senna-docusate 8.6-50 " mg  1 tablet Oral BID       PRN:   acetaminophen, acetaminophen, calcium carbonate, melatonin      Assessment and Plan:     * Closed intertrochanteric fracture of left femur  S/p left CMN 12/23 with Dr. Mancilla  DVT prophylaxis for 28 days post-op to start POD 1 - was given Eliquis initially, but changed to Aspirin 81mg BID given Thrombocytopenia <50K  Consult PT/OT   Pain control      Hypophosphatemia  Replace PRN      Debility  PT/OT consulted      Amnestic MCI (mild cognitive impairment with memory loss)  Continue memantine, galantamine, and buproprion  Delirium precautions      Thrombocytopenia  Chronic.    Monitor platelets daily.   Transfuse if platelet count <10k.   Hold DVT prophylaxis if platelets are <50k.      Hypercholesteremia  Chronic and stable  Continue Statin     Hypothyroidism  Chronic and stable  Continue synthroid        Anticipate disposition:    Home with home health      Follow-up needed during SNF admission:   Orthopedics     Follow-up needed after discharge from SNF:   - PCP within 1-2 weeks  - See appt scheduled below     Future Appointments   Date Time Provider Department Center   1/8/2024 12:15 PM Lauren Cosme PA-C [facility] [address]   2/5/2024 10:30 AM Lauren Cosme PA-C [facility] [address]   3/21/2024 11:00 AM Shi Simon MD [facility]         I certify that SNF services are required to be given on an inpatient basis because Rosario Menendez needs for skilled nursing care and/or skilled rehabilitation are required on a daily basis and such services can only practically be provided in a skilled nursing facility setting and are for an ongoing condition for which she received inpatient care in the hospital.       Extended Visit:   Total time spent: 128 minutes  Description of Time: counseling provided on clinical condition, therapies provided, plan of care, emotional support, coordinating patient care with other care team members, reviewing and interpreting  labs and imaging, collaboration with physician, initiating new orders, chart review, and documentation. See interval hx.       Mariposa Shirley NP  Department of Hospital Medicine   [facility]     DOS: 12/27/2023

## 2023-12-28 NOTE — PLAN OF CARE
Problem: Occupational Therapy  Goal: Occupational Therapy Goal  Description: Goals to be met by: 1/25/2024     Patient will increase functional independence with ADLs by performing:    UE Dressing with Supervision- Ongoing  LE Dressing with Moderate Assistance and Assistive Devices as needed- Ongoing  Personal Hygiene while seated at sink with Supervision- Ongoing  Oral Hygiene while seated at sink with Supervision- Ongoing  Toileting from toilet with Moderate Assistance for hygiene and clothing management and AE as needed- Ongoing  Bathing from  sitting at sink with Minimal Assistance.  Toilet transfer to toilet with Contact Guard Assistance /c AE/LRAD as needed- Ongoing  Footwear /c Mod A and AE as needed  Tolerate standing functional tasks /c CGA and AE/LRAD as needed  Shower t/f /c CGA and AE/LRAD as needed      12/28/2023 1550 by Maryann Mccartney OT  Outcome: Ongoing, Progressing

## 2023-12-29 PROCEDURE — 11000004 HC SNF PRIVATE: Mod: HCNC

## 2023-12-29 PROCEDURE — 97535 SELF CARE MNGMENT TRAINING: CPT | Mod: HCNC

## 2023-12-29 PROCEDURE — 63700000 PHARM REV CODE 250 ALT 637 W/O HCPCS: Mod: HCNC | Performed by: NURSE PRACTITIONER

## 2023-12-29 PROCEDURE — 25000003 PHARM REV CODE 250: Mod: HCNC | Performed by: HOSPITALIST

## 2023-12-29 PROCEDURE — 94761 N-INVAS EAR/PLS OXIMETRY MLT: CPT | Mod: HCNC

## 2023-12-29 PROCEDURE — 97116 GAIT TRAINING THERAPY: CPT | Mod: HCNC,CQ

## 2023-12-29 PROCEDURE — 97530 THERAPEUTIC ACTIVITIES: CPT | Mod: HCNC

## 2023-12-29 PROCEDURE — 97110 THERAPEUTIC EXERCISES: CPT | Mod: HCNC,CQ

## 2023-12-29 RX ADMIN — ASPIRIN 81 MG: 81 TABLET, COATED ORAL at 08:12

## 2023-12-29 RX ADMIN — SENNOSIDES AND DOCUSATE SODIUM 1 TABLET: 8.6; 5 TABLET ORAL at 08:12

## 2023-12-29 RX ADMIN — SENNOSIDES AND DOCUSATE SODIUM 1 TABLET: 8.6; 5 TABLET ORAL at 09:12

## 2023-12-29 RX ADMIN — PREGABALIN 50 MG: 50 CAPSULE ORAL at 09:12

## 2023-12-29 RX ADMIN — CYANOCOBALAMIN TAB 1000 MCG 1000 MCG: 1000 TAB at 08:12

## 2023-12-29 RX ADMIN — BUPROPION HYDROCHLORIDE 150 MG: 150 TABLET, EXTENDED RELEASE ORAL at 08:12

## 2023-12-29 RX ADMIN — LEVOTHYROXINE SODIUM 75 MCG: 75 TABLET ORAL at 05:12

## 2023-12-29 RX ADMIN — MEMANTINE 10 MG: 10 TABLET ORAL at 09:12

## 2023-12-29 RX ADMIN — MEMANTINE 10 MG: 10 TABLET ORAL at 08:12

## 2023-12-29 RX ADMIN — ACETAMINOPHEN 1000 MG: 500 TABLET ORAL at 10:12

## 2023-12-29 RX ADMIN — METHOCARBAMOL 500 MG: 500 TABLET ORAL at 09:12

## 2023-12-29 RX ADMIN — GALANTAMINE 16 MG: 16 CAPSULE, EXTENDED RELEASE ORAL at 08:12

## 2023-12-29 RX ADMIN — METHOCARBAMOL 500 MG: 500 TABLET ORAL at 08:12

## 2023-12-29 RX ADMIN — ASPIRIN 81 MG: 81 TABLET, COATED ORAL at 09:12

## 2023-12-29 RX ADMIN — METHOCARBAMOL 500 MG: 500 TABLET ORAL at 02:12

## 2023-12-29 NOTE — PT/OT/SLP PROGRESS
Occupational Therapy   Treatment    Other Staff present: DAVID Deluca    Name: Rosario Menendez  MRN: [id number]  Admit Date: 12/26/2023  Admitting Diagnosis:  Closed intertrochanteric fracture of left femur    General Precautions: Standard, fall, hearing impaired   Orthopedic Precautions: LLE weight bearing as tolerated   Braces: N/A    Recommendations:     Discharge Recommendations:     Level of Assistance Recommended at Discharge: 24 hours physical assistance for all ADL's and home management tasks  Discharge Equipment Recommendations: hip kit, 3-in-1 commode, shower chair (SCvs TTB questionable 2/2 pt poor historian)  Barriers to discharge:   (increased need for skilled therapist for ADLs/functional mobility)    Assessment:     Rosario Menendez is a 88 y.o. female with a medical diagnosis of Closed intertrochanteric fracture of left femur. She presents with performance deficits affecting function are weakness, impaired endurance, impaired self care skills, impaired functional mobility, gait instability, impaired balance, decreased lower extremity function, decreased upper extremity function, decreased safety awareness, pain, impaired cardiopulmonary response to activity, orthopedic precautions, decreased ROM, impaired cognition.  Pt engaged well in therapy this date, though limited by pain and confusion and needed additional encouragement to initiate therapy session.  Pt educated in importance of therapy in SNF setting to progress in functional mobility. Pt able to complete bed mobility, transfers, and ADLs with significant assistance from writing OT and nurse tech who was refreshing linens.     Rehab Potential is fair    Activity tolerance:  Fair    Plan:     Patient to be seen 5 x/week to address the above listed problems via self-care/home management, therapeutic activities, therapeutic exercises, neuromuscular re-education, community/work re-entry    Plan of Care Expires: 01/25/24  Plan of Care Reviewed  with: patient    Subjective     Communicated with: RN prior to session .    Pain/Comfort:  Pain Rating 1:  did not pain, groaned and perseverated on LLE hip pain during transfers, anxiety re: pain noted  Location - Side 1: Left  Location - Orientation 1: generalized  Location 1: hip  Pain Addressed 1: Reposition, Distraction  Pain Rating Post-Intervention 1:  did not rate, mentioned pain in L hip    Patient's cultural, spiritual, Yazidism conflicts given the current situation:  no    Objective:     Patient found HOB elevated with  (no lines) upon OT entry to room.    Bed Mobility:    Patient completed Rolling/Turning to Left with  maximal assistance  Patient completed Rolling/Turning to Right with maximal assistance  Patient completed Scooting/Bridging   To EOB with maximal assistance  To rear of wc with mod A  Patient completed Supine to Sit with maximal assistance   Pt sat upright EOB with CGA for posterior lean ~5min    Functional Mobility/Transfers:  Patient completed Sit <> Stand Transfer across multiple trials:   From EOB with maximal assistance  with  hand-held assist to don pants  From EOB with max A/HHA to initiate transfer to wc, requiring multisensory cues for safe hand placement and BLE mobility  From WC with max A/HHA and use of grabbar in bathroom  From toilet with max A x2/HHA to complete ADLs listed below  From wheelchair with max A/HHA while tech placed pillow under pt  Pt completed transfers across multiple trials:   From EOB to wc with max A/HHA and multiple multisensory cues to move legs  From WC to raised toilet with max A x 2 persons/HHA with multiple multisensory cues  From raised toilet to wheelchair with max A x2 persons to wc/HHA with multiple multisensory cues    Activities of Daily Living:  Grooming: minimum assistance brushing teeth while seated in wc at sink, washing face with washcloth, combing hair  Lower Body Dressing:   While EOB, maximal assistance threading L foot through pants,  pulling pants over legs and over hips in standing   While standing at raised toilet: total A doffing soiled briefs and donning fresh briefs; max A donning R sock  Toileting: total assistance completing pericare while pt in standing    [facility] 6 Click ADL: 16    Treatment & Education:  Pt educated on role of OT, POC, and goals for therapy.    POC was dicussed with patient/caregiver, who was included in its development and is in agreement with the identified goals and treatment plan.   Patient and family aware of patient's deficits and therapy progression.   Time provided for therapeutic counseling and discussion of health disposition.   Educated on importance of EOB/OOB mobility, maintaining routine, sitting up in chair, and maximizing independence with ADLs during admission   Pt completed ADLs and functional mobility for treatment session as noted above   Pt/caregiver verbalized understanding and expressed no further concerns/questions.  Updated communication board with level of assist required       Patient left up in chair with call button in reach and RN notified    GOALS:   Multidisciplinary Problems       Occupational Therapy Goals          Problem: Occupational Therapy    Goal Priority Disciplines Outcome Interventions   Occupational Therapy Goal     OT, PT/OT Ongoing, Progressing    Description: Goals to be met by: 1/25/2024     Patient will increase functional independence with ADLs by performing:    UE Dressing with Supervision- Ongoing  LE Dressing with Moderate Assistance and Assistive Devices as needed- Ongoing  Personal Hygiene while seated at sink with Supervision- Ongoing  Oral Hygiene while seated at sink with Supervision- Ongoing  Toileting from toilet with Moderate Assistance for hygiene and clothing management and AE as needed- Ongoing  Bathing from  sitting at sink with Minimal Assistance.  Toilet transfer to toilet with Contact Guard Assistance /c AE/LRAD as needed- Ongoing  Footwear /c Mod A and  AE as needed  Tolerate standing functional tasks /c CGA and AE/LRAD as needed  Shower t/f /c CGA and AE/LRAD as needed                           Time Tracking:     OT Date of Treatment: 12/29/23  OT Start Time: 0928    OT Stop Time: 1006  OT Total Time (min): 38 min    Billable Minutes:Self Care/Home Management 23  Therapeutic Activity 15    12/29/2023

## 2023-12-29 NOTE — NURSING
Pt is lying in bed.Tolerates medications well. No acute distress noted. Able to voice needs to staff.Bed in lowest position call light within reach. tm

## 2023-12-29 NOTE — PT/OT/SLP PROGRESS
"Physical Therapy Treatment    Patient Name:  Rosario Menendez   MRN:  [id number]  Admit Date: 12/26/2023  Admitting Diagnosis: Closed intertrochanteric fracture of left femur  Recent Surgeries:     General Precautions: Standard, fall, hearing impaired  Orthopedic Precautions: LLE weight bearing as tolerated  Braces: N/A    Recommendations:     Discharge Recommendations: home health PT  Level of Assistance Recommended at Discharge: 24 hours significant assistance  Discharge Equipment Recommendations: wheelchair, bedside commode  Barriers to discharge: Decreased caregiver support    Assessment:     Rosario Menendez is a 88 y.o. female admitted with a medical diagnosis of Closed intertrochanteric fracture of left femur . Pt tolerated fairly well, very pleasant, however not feeling up to par today, pt would continue to benefit from skilled PT services to improve overall functional mobility, strength and endurance.  .      Performance deficits affecting function: weakness, impaired endurance, impaired self care skills, impaired functional mobility, gait instability, impaired balance, decreased upper extremity function, decreased lower extremity function, decreased ROM, impaired skin, impaired cardiopulmonary response to activity, orthopedic precautions.    Rehab Potential is good    Activity Tolerance: Fair    Plan:     Patient to be seen 5 x/week to address the above listed problems via gait training, therapeutic activities, therapeutic exercises, wheelchair management/training    Plan of Care Expires: 01/26/24  Plan of Care Reviewed with: patient    Subjective     "I feel depressed" about situation, able to reassure of healing process, nsg notified, also taking meds for depression. Pt agreeable to therapy    Pain/Comfort:  Pain Rating 1: 0/10 (0 at rest inc with mobility, did not rate)  Location - Side 1: Left  Location - Orientation 1: upper  Location 1: leg  Pain Addressed 1: Pre-medicate for activity, Reposition, " Distraction, Cessation of Activity, Nurse notified  Pain Rating Post-Intervention 1:  (did not rate, inc with mobility, dec with rest)    Patient's cultural, spiritual, Pentec[employer]alism conflicts given the current situation:  no    Objective:     C Patient found  with  (in wc) upon PT entry to room.     Therapeutic Activities and Exercises: 2x10 reps AP,GS,LAQ,hip flex A/AA LLE within limited tolerable ROM    Functional Mobility:  Transfers:     Sit to Stand:  moderate assistance with rolling walker and vcs for tech  Gait: amb with RW mod/min ~ 5 ft with vcs for tech to inc BUE support to off load LEs, vcs for sequencing steps and RW mgmt, declined second trial 2* to reports of feeling goofy/weak /76 HR 98 somewhat sub sided with rest nsg notified    AM-PAC 6 CLICK MOBILITY  11    Patient left up in chair with call button in reach, family  present, and belongings in reach .    GOALS:   Multidisciplinary Problems       Physical Therapy Goals          Problem: Physical Therapy    Goal Priority Disciplines Outcome Goal Variances Interventions   Physical Therapy Goal     PT, PT/OT Ongoing, Progressing     Description: Goals to be met by: 1/27/23     Patient will increase functional independence with mobility by performing:    . Supine to sit with MInimal Assistance  . Sit to supine with MInimal Assistance  . Rolling to Left and Right with Minimal Assistance.  . Sit to stand transfer with Contact Guard Assistance  . Bed to chair transfer with Contact Guard Assistance using Rolling Walker  . Gait  x 50 feet with Contact Guard Assistance using Rolling Walker.   . Wheelchair propulsion x100 feet with Supervision using bilateral uppper extremities                         Time Tracking:     PT Received On: 12/29/23  PT Start Time: 1137  PT Stop Time: 1208  PT Total Time (min): 31 min    Billable Minutes: Gait Training 10 and Therapeutic Exercise 21    Treatment Type: Treatment  PT/PTA: PTA     Number of PTA visits since last  PT visit: 1     12/29/2023

## 2023-12-29 NOTE — PROGRESS NOTES
[facility]                                  Skilled Nursing Facility                   Progress Note     Patient Name: Rosairo Menendez  YOB: 1935  MRN: [id number]  Room: [place] [id number] A     Admit Date: 12/26/2023   AMARA:      Principal Problem: Closed intertrochanteric fracture of left femur    HPI obtained from patient interview and chart review     Chief Complaint:   Re-evaluation of medical treatment and therapy status Lab review     HPI:   Rosario Menendez is a 88 y.o. female with PMH of osteoporosis, dementia, thrombocytopenia and hypothyroidism presented with left hip pain after a fall sustaining a closed intertrochanteric fracture of the left femur. S/p left CMN on 12/23 with Dr. Mancilla. Initially started on Eliquis 2.5mg BID for DVT prophylaxis, but given Thrombocytopenia <50K, she was changed to Aspirin 81mg BID.  Continue WBAT ROMAT LLE.       Patient will be treated at [facility] with PT and OT to improve functional status and ability to perform ADLs.     Interval History   Patient sitting in chair. No acute issues reports  Denies sob, chest pain, abdominal pain  Vital signs stable  Labs reviewed no critical results  Per PT/OT patient ambulated 6ft +10ft with RW and Mod A d.t pt needing assistance to advance LLE and increase time to initiate task and stay focused as pt needing max encouragement to participate.       Past Medical History: Patient has a past medical history of Arthritis, Depression, Hypercholesteremia, Thrombocytopenia, and Thyroid disease.    Past Surgical History: Patient has a past surgical history that includes Hysterectomy; Knee surgery; Ankle surgery; Wrist surgery; Colonoscopy (N/A, 6/2/2021); and Intramedullary rodding of femur (Left, 12/23/2023).    Social History: Patient reports that she has never smoked. She has never used smokeless tobacco.    Family History: family history includes  Cancer in her maternal uncle; Heart failure in her father and mother; Suicide in her son.    Allergies: Patient is allergic to codeine.        Review of Systems   Constitutional:  Positive for malaise/fatigue. Negative for chills and fever.   Respiratory:  Negative for cough, sputum production, shortness of breath and wheezing.    Cardiovascular:  Negative for chest pain, palpitations and leg swelling.   Gastrointestinal:  Negative for abdominal pain, constipation, diarrhea, heartburn, nausea and vomiting.   Musculoskeletal:  Negative for back pain.   Skin:  Negative for rash.        Left leg surgical incisions   Neurological:  Positive for weakness. Negative for dizziness and headaches.   Psychiatric/Behavioral:  Negative for depression and hallucinations. The patient is not nervous/anxious.          24 hour Vital Sign Range   Temp:  [98.4 °F (36.9 °C)-98.7 °F (37.1 °C)]   Pulse:  [65-73]   Resp:  [18-19]   BP: (133-160)/(82-83)   SpO2:  [94 %]       Physical Exam  Vitals and nursing note reviewed.   Constitutional:       General: She is not in acute distress.     Appearance: Normal appearance. She is not ill-appearing.   Eyes:      Extraocular Movements: Extraocular movements intact.      Conjunctiva/sclera: Conjunctivae normal.      Pupils: Pupils are equal, round, and reactive to light.   Cardiovascular:      Rate and Rhythm: Normal rate and regular rhythm.      Pulses: Normal pulses.      Heart sounds: Normal heart sounds. No murmur heard.  Pulmonary:      Effort: Pulmonary effort is normal. No respiratory distress.      Breath sounds: Normal breath sounds. No wheezing or rhonchi.   Abdominal:      General: Bowel sounds are normal. There is no distension.      Palpations: Abdomen is soft. There is no mass.      Tenderness: There is no abdominal tenderness.   Musculoskeletal:         General: No swelling or tenderness.      Right lower leg: No edema.      Left lower leg: No edema.   Skin:     General: Skin is  "warm and dry.      Capillary Refill: Capillary refill takes less than 2 seconds.      Findings: No erythema or rash.      Comments: Post Op dressing to thigh and leg clean, dry, intact   Neurological:      Mental Status: She is alert and oriented to person, place, and time. Mental status is at baseline.      Motor: Weakness present.      Gait: Gait abnormal.   Psychiatric:         Mood and Affect: Mood normal.         Behavior: Behavior normal.       Full skin assessment completed by this NP on 12/27/23          Incision/Site 12/23/23 1541 Left Leg (Active)      Present Prior to Hospital Arrival?: yes   Side: Left   Location: Leg   Dressing Appearance Dry;Intact;Clean    Drainage Amount None    Drainage Characteristics/Odor No odor            Labs:  Recent Labs   Lab 12/25/23 0213 12/26/23  0514 12/28/23  0422   WBC 8.01 4.79 5.50   HGB 10.0* 10.2* 10.2*   HCT 30.4* 31.3* 31.1*   PLT 55* 88* 159       Recent Labs   Lab 12/25/23 0213 12/26/23  0514 12/28/23  0422    138 140   K 4.0 4.3 4.0   * 109 114*   CO2 20* 23 20*   BUN 20 12 14   CREATININE 0.8 0.8 0.7   GLU 89 78 89   CALCIUM 8.6* 8.8 8.8   MG 2.3 2.1 2.1   PHOS 2.4* 1.8* 2.2*       Recent Labs   Lab 12/23/23  0205 12/23/23  1306   ALKPHOS 75  --    ALT 20  --    AST 19  --    ALBUMIN 3.6  --    PROT 6.8  --    BILITOT 0.3  --    INR  --  1.0       No results for input(s): "POCTGLUCOSE" in the last 72 hours.    Meds Scheduled:   aspirin  81 mg Oral BID    buPROPion  150 mg Oral Daily    cyanocobalamin  1,000 mcg Oral Daily    galantamine  16 mg Oral Daily with breakfast    levothyroxine  75 mcg Oral Before breakfast    memantine  10 mg Oral BID    methocarbamoL  500 mg Oral TID    pregabalin  50 mg Oral QHS    senna-docusate 8.6-50 mg  1 tablet Oral BID       PRN:   acetaminophen, acetaminophen, calcium carbonate, melatonin      Assessment and Plan:     * Closed intertrochanteric fracture of left femur  S/p left CMN 12/23 with Dr. Mancilla  DVT " prophylaxis for 28 days post-op to start POD 1 - was given Eliquis initially, but changed to Aspirin 81mg BID given Thrombocytopenia <50K  PT/OT   Pain control      Hypophosphatemia  Replace PRN      Debility  PT/OT     Amnestic MCI (mild cognitive impairment with memory loss)  Continue memantine, galantamine, and buproprion  Delirium precautions      Thrombocytopenia  Chronic.    Monitor platelets daily.   Transfuse if platelet count <10k.   Hold DVT prophylaxis if platelets are <50k.      Hypercholesteremia  Chronic and stable  Continue Statin     Hypothyroidism  Chronic and stable  Continue synthroid        Anticipate disposition:    Home with home health      Follow-up needed during SNF admission:   Orthopedics     Follow-up needed after discharge from SNF:   - PCP within 1-2 weeks  - See appt scheduled below     Future Appointments   Date Time Provider Department Center   1/8/2024 12:15 PM Lauren Cosme PA-C [facility] [address]   2/5/2024 10:30 AM Lauren Cosme PA-C [facility] [address] [facility]   3/21/2024 11:00 AM Shi Simon MD [facility]         I certify that SNF services are required to be given on an inpatient basis because Rosario Menendez needs for skilled nursing care and/or skilled rehabilitation are required on a daily basis and such services can only practically be provided in a skilled nursing facility setting and are for an ongoing condition for which she received inpatient care in the hospital.       Mariposa Shirley NP  Department of Hospital Medicine   [facility]- Skilled [facility] Facility     DOS: 12/28/2023

## 2023-12-29 NOTE — H&P
"Hospital Medicine  Skilled Nursing Facility   History and Physical Exam      Date of Service: 12/29/2023      Patient Name: Rosario Menendez  MRN: [id number]  Admission Date: 12/26/2023  Attending Physician: Leo Bowen MD  Primary Care Provider: Shi Simon MD  Code Status: Full Code      Principal problem:  Closed intertrochanteric fracture of left femur      Chief Complaint:   Chief Complaint   Patient presents with    Hip Injury     Admitted to [facility] for rehabilitation following hospital stay for left intertrochanteric femur fracture s/p CMN fixation.           HPI:   "Rosario Menendez is a 88 y.o. female with PMH of osteoporosis, dementia, thrombocytopenia and hypothyroidism presented with left hip pain after a fall sustaining a closed intertrochanteric fracture of the left femur. S/p left CMN on 12/23 with Dr. Mancilla. Initially started on Eliquis 2.5mg BID for DVT prophylaxis, but given Thrombocytopenia <50K, she was changed to Aspirin 81mg BID.  Continue WBAT ROMAT LLE." Per Mariposa Shirley NP on 12/27/23.     She is doing okay today. Says that her pain is controlled and she is working well with therapy. She walked feet with RW and mod/Ted. She felt weak after the first attempt and declined a second. Her BP as checked by PT was 141/76 with HR of 98. Her symptoms improved/resolved with sitting. She reports a good appetite and is having regular BMs.     Patient admitted with skilled services with PT and OT to improve functional status and ability to perform ADLs.       Past Medical History:   Past Medical History:   Diagnosis Date    Arthritis     Depression     Hypercholesteremia     Thrombocytopenia     Thyroid disease        Past Surgical History:   Past Surgical History:   Procedure Laterality Date    ANKLE SURGERY      COLONOSCOPY N/A 6/2/2021    Procedure: COLONOSCOPY;  Surgeon: Alfonso Haque MD;  Location: [facility] ([address] [place] [address]);  Service: Endoscopy;  Laterality: N/A;  any crs  covid " test 5/30-[place]    HYSTERECTOMY      INTRAMEDULLARY RODDING OF FEMUR Left 12/23/2023    Procedure: INSERTION, INTRAMEDULLARY DIEGO, FEMUR;  Surgeon: Troy Mancilla MD;  Location: [facility] OR [address] [place] [address];  Service: Orthopedics;  Laterality: Left;    KNEE SURGERY      WRIST SURGERY         Social History:   Tobacco Use    Smoking status: Never    Smokeless tobacco: Never   Substance and Sexual Activity    Alcohol use: Not on file    Drug use: Not on file    Sexual activity: Not on file       Family History:   Family History       Problem Relation (Age of Onset)    Cancer Maternal Uncle    Heart failure Mother, Father    Suicide Son            No current facility-administered medications on file prior to encounter.     Current Outpatient Medications on File Prior to Encounter   Medication Sig    acetaminophen (TYLENOL) 500 MG tablet Take 2 tablets (1,000 mg total) by mouth every 8 (eight) hours as needed for Pain.    aspirin (ECOTRIN) 81 MG EC tablet Take 1 tablet (81 mg total) by mouth 2 (two) times a day.    buPROPion (WELLBUTRIN XL) 150 MG TB24 tablet TAKE 1 TABLET EVERY DAY    galantamine (RAZADYNE ER) 16 MG 24 hr capsule TAKE 1 CAPSULE EVERY DAY WITH BREAKFAST    levothyroxine (SYNTHROID) 75 MCG tablet Take 1 tablet (75 mcg total) by mouth before breakfast.    memantine (NAMENDA) 10 MG Tab Take 1 tablet (10 mg total) by mouth 2 (two) times daily.    methocarbamoL (ROBAXIN) 500 MG Tab Take 1 tablet (500 mg total) by mouth 3 (three) times daily. for 10 days    pregabalin (LYRICA) 50 MG capsule Take 1 capsule (50 mg total) by mouth every evening.    alendronate (FOSAMAX) 70 MG tablet Take 1 tablet (70 mg total) by mouth every 7 days. HOLD while in SNF    cyanocobalamin (VITAMIN B-12) 1000 MCG tablet Take 1 tablet (1,000 mcg total) by mouth once daily.       Allergies:   Review of patient's allergies indicates:   Allergen Reactions    Codeine Other (See Comments)       ROS:  Review of Systems   Constitutional:  Negative  for appetite change and fever.   Respiratory:  Negative for cough and shortness of breath.    Cardiovascular:  Negative for chest pain and palpitations.   Gastrointestinal:  Negative for abdominal pain, nausea and vomiting.   Genitourinary:  Negative for difficulty urinating and dysuria.   Musculoskeletal:  Positive for arthralgias. Negative for back pain.   Neurological:  Positive for weakness. Negative for dizziness and light-headedness.   Psychiatric/Behavioral:  Negative for sleep disturbance. The patient is not nervous/anxious.      Objective:  Temp:  [98.3 °F (36.8 °C)-98.4 °F (36.9 °C)]   Pulse:  [56-73]   Resp:  [18-19]   BP: (160)/(70-83)   SpO2:  [91 %-94 %]     Body mass index is 26.13 kg/m².      Physical Exam  Vitals and nursing note reviewed.   Constitutional:       General: She is not in acute distress.     Appearance: She is well-developed.   Cardiovascular:      Rate and Rhythm: Normal rate and regular rhythm.      Heart sounds: S1 normal and S2 normal. Murmur heard.      Systolic murmur is present.   Pulmonary:      Effort: Pulmonary effort is normal. No respiratory distress.      Breath sounds: Normal breath sounds. No wheezing or rales.   Abdominal:      General: Bowel sounds are normal. There is no distension.      Palpations: Abdomen is soft.      Tenderness: There is no abdominal tenderness.   Musculoskeletal:      Left hip: Tenderness present.      Right lower leg: No edema.      Left lower leg: No edema.   Skin:     General: Skin is warm and dry.      Findings: Bruising present.   Neurological:      Mental Status: She is alert and oriented to person, place, and time.   Psychiatric:         Mood and Affect: Mood normal.         Behavior: Behavior normal. Behavior is cooperative.         Thought Content: Thought content normal.       Significant Labs:   A1C:   Recent Labs   Lab 12/23/23  1306   HGBA1C 5.4     TSH:   Recent Labs   Lab 09/20/23  1155   TSH 0.021*     CBC:  Recent Labs   Lab  12/28/23  0422   WBC 5.50   HGB 10.2*   HCT 31.1*      MCV 95     BMP:  Recent Labs   Lab 12/28/23 0422   GLU 89      K 4.0   *   CO2 20*   BUN 14   CREATININE 0.7   CALCIUM 8.8   MG 2.1   PHOS 2.2*       Significant Imaging: I have reviewed all pertinent imaging results/findings completed during prior hospitalization.      Assessment and Plan:  Active Diagnoses:    Diagnosis Date Noted POA    PRINCIPAL PROBLEM:  Closed intertrochanteric fracture of left femur [S72.142A] 12/23/2023 Yes    Acute blood loss as cause of postoperative anemia [D62] 12/24/2023 Yes    Hypophosphatemia [E83.39] 07/17/2023 Yes    Debility [R53.81] 11/29/2022 Yes    Amnestic MCI (mild cognitive impairment with memory loss) [G31.84] 07/05/2021 Yes    Hypothyroidism [E03.9]  Yes    Hypercholesteremia [E78.00]  Yes    Thrombocytopenia [D69.6] 04/11/2018 Yes      Problems Resolved During this Admission:       Closed intertrochanteric fracture of left femur  S/p cephalomedullary nail fixation on 12/23  - PT/OT, WBAT  - DVT PPX with ASA 81 mg BID x 30 days from surgery.  - maintain surgical dressing until removed by Orthopedics  - Ortho JOSE ANGEL to see patient weekly at SNF  - follow-up with Ortho after discharge  - continue acetaminophen 1000 mg q.8 hours PRN, pregabalin 50 mg qHS, methocarbamol 500 mg TID.       Acute Blood loss anemia  - expected postoperatively  - continue monitor twice weekly CBC  - transfuse for hemoglobin < 7     Amnestic MCI (mild cognitive impairment with memory loss)  - Continue memantine 10 mg BID, galantamine 16 mg daily, and buproprion 150 mg BID, B12 1000 mcg daily.  Delirium precautions:  - Avoid antihistamines, anticholinergics, hypnotics, and minimize opiates while controlling for pain as these medications may exacerbate delirium. Cues for day/night will assist with keeping patient calm and oriented   - during daytime, please keep adequate light in room (open windows, lights on) and please keep room  dim at night-time to encourage normal sleep-wake cycles. Continuing to have nursing and family reorient the patient and encourage family to visit     Thrombocytopenia  - transfuse if platelet count is <10k.   - Hold DVT prophylaxis if platelets are <50k.  - platelet count currently up to 159 yesterday, which is highest it has been since admission.   - Continue to monitor twice weekly CBCs     Hypothyroidism  Lab Results   Component Value Date    TSH 0.021 (L) 09/20/2023   - Continue levothyroxine 75 mcg daily     Debility   - Continue with PT/OT for gait training and strengthening and restoration of ADL's   - Encourage mobility, OOB in chair, and early ambulation as appropriate  - Fall precautions   - Monitor for bowel and bladder dysfunction  - Monitor for and prevent skin breakdown and pressure ulcers  - Continue DVT prophylaxis with ASA 81 mg BID      IP [facility] RISK OF UNPLANNED READMISSION Model: Moderate      Anticipated Disposition:  Home with home health      Future Appointments   Date Time Provider Department Center   1/8/2024 12:15 PM Lauren Cosme PA-C [facility] [address]   2/5/2024 10:30 AM Lauren Cosme PA-C [facility] [address]   3/21/2024 11:00 AM Shi Simon MD [facility]       I certify that SNF services are required to be given on an inpatient basis because Rosario Menendez needs for skilled nursing care and/or skilled rehabilitation are required on a daily basis and such services can only practically be provided in a skilled nursing facility setting and are for an ongoing condition for which she received inpatient care in the hospital.       Leo Bowen MD  Department of Hospital Medicine   [facility] - Skilled Nursing

## 2023-12-29 NOTE — PLAN OF CARE
Pt engaged well in therapy this date, though limited by pain and some confusion.    Problem: Occupational Therapy  Goal: Occupational Therapy Goal  Description: Goals to be met by: 1/25/2024     Patient will increase functional independence with ADLs by performing:    UE Dressing with Supervision- Ongoing  LE Dressing with Moderate Assistance and Assistive Devices as needed- Ongoing  Personal Hygiene while seated at sink with Supervision- Ongoing  Oral Hygiene while seated at sink with Supervision- Ongoing  Toileting from toilet with Moderate Assistance for hygiene and clothing management and AE as needed- Ongoing  Bathing from  sitting at sink with Minimal Assistance.  Toilet transfer to toilet with Contact Guard Assistance /c AE/LRAD as needed- Ongoing  Footwear /c Mod A and AE as needed  Tolerate standing functional tasks /c CGA and AE/LRAD as needed  Shower t/f /c CGA and AE/LRAD as needed      Outcome: Ongoing, Progressing

## 2023-12-30 PROCEDURE — 25000003 PHARM REV CODE 250: Mod: HCNC | Performed by: HOSPITALIST

## 2023-12-30 PROCEDURE — 63700000 PHARM REV CODE 250 ALT 637 W/O HCPCS: Mod: HCNC | Performed by: NURSE PRACTITIONER

## 2023-12-30 PROCEDURE — 97110 THERAPEUTIC EXERCISES: CPT | Mod: HCNC,CQ

## 2023-12-30 PROCEDURE — 11000004 HC SNF PRIVATE: Mod: HCNC

## 2023-12-30 PROCEDURE — 97116 GAIT TRAINING THERAPY: CPT | Mod: HCNC,CQ

## 2023-12-30 RX ADMIN — BUPROPION HYDROCHLORIDE 150 MG: 150 TABLET, EXTENDED RELEASE ORAL at 09:12

## 2023-12-30 RX ADMIN — SENNOSIDES AND DOCUSATE SODIUM 1 TABLET: 8.6; 5 TABLET ORAL at 08:12

## 2023-12-30 RX ADMIN — SENNOSIDES AND DOCUSATE SODIUM 1 TABLET: 8.6; 5 TABLET ORAL at 09:12

## 2023-12-30 RX ADMIN — METHOCARBAMOL 500 MG: 500 TABLET ORAL at 02:12

## 2023-12-30 RX ADMIN — PREGABALIN 50 MG: 50 CAPSULE ORAL at 08:12

## 2023-12-30 RX ADMIN — CYANOCOBALAMIN TAB 1000 MCG 1000 MCG: 1000 TAB at 09:12

## 2023-12-30 RX ADMIN — LEVOTHYROXINE SODIUM 75 MCG: 75 TABLET ORAL at 06:12

## 2023-12-30 RX ADMIN — ASPIRIN 81 MG: 81 TABLET, COATED ORAL at 09:12

## 2023-12-30 RX ADMIN — METHOCARBAMOL 500 MG: 500 TABLET ORAL at 08:12

## 2023-12-30 RX ADMIN — ASPIRIN 81 MG: 81 TABLET, COATED ORAL at 08:12

## 2023-12-30 RX ADMIN — MEMANTINE 10 MG: 10 TABLET ORAL at 08:12

## 2023-12-30 RX ADMIN — ACETAMINOPHEN 1000 MG: 500 TABLET ORAL at 02:12

## 2023-12-30 RX ADMIN — METHOCARBAMOL 500 MG: 500 TABLET ORAL at 09:12

## 2023-12-30 RX ADMIN — MEMANTINE 10 MG: 10 TABLET ORAL at 09:12

## 2023-12-30 RX ADMIN — GALANTAMINE 16 MG: 16 CAPSULE, EXTENDED RELEASE ORAL at 09:12

## 2023-12-30 NOTE — PLAN OF CARE
Problem: Adult Inpatient Plan of Care  Goal: Plan of Care Review  Outcome: Ongoing, Progressing  Flowsheets (Taken 12/29/2023 1905)  Plan of Care Reviewed With: patient  Goal: Patient-Specific Goal (Individualized)  Outcome: Ongoing, Progressing  Goal: Absence of Hospital-Acquired Illness or Injury  Outcome: Ongoing, Progressing  Goal: Optimal Comfort and Wellbeing  Outcome: Ongoing, Progressing  Goal: Readiness for Transition of Care  Outcome: Ongoing, Progressing     Problem: Fall Injury Risk  Goal: Absence of Fall and Fall-Related Injury  Outcome: Ongoing, Progressing     Problem: Skin Injury Risk Increased  Goal: Skin Health and Integrity  Outcome: Ongoing, Progressing

## 2023-12-30 NOTE — PROGRESS NOTES
[facility] - Skilled Nursing  Adult Nutrition  Progress Note    SUMMARY   Recommendations   Continue regular diet, assist with set up,RD following recs for MVI not accepted at this time.  Goals: PO to meet 75% of EEN/EPN by next RD follow up  Nutrition Goal Status: progressing towards goal  Communication of RD Recs: other (comment) (POC)    Assessment and Plan  Increased protein needs for healing as evidenced by s/p L femur repair.     New     Plan  Collaboration with other providers  General diet  Vitamin supplement therapy, Vit B12, recommend MVI      Malnutrition Assessment 12/29     Skin (Micronutrient): bruised, edema, dry, wounds unhealed  Hair/Scalp (Micronutrient): dry  Teeth (Micronutrient): broken dentition  Neck/Chest (Micronutrient): muscle wasting  Musculoskeletal/Lower Extremities: muscle wasting           Orbital Region (Subcutaneous Fat Loss): moderate depletion  Upper Arm Region (Subcutaneous Fat Loss): mild depletion  Thoracic and Lumbar Region: well nourished   Congregational Region (Muscle Loss): moderate depletion  Clavicle Bone Region (Muscle Loss): mild depletion  Clavicle and Acromion Bone Region (Muscle Loss): mild depletion  Dorsal Hand (Muscle Loss): moderate depletion                 Reason for Assessment    Reason For Assessment: RD follow-up  Diagnosis:  (L hip fracture, s/p IMN)  Relevant Medical History: Osteoporosis, dementia, hypothyroid, hypophosphatemia, blood loss anemia at present,  Interdisciplinary Rounds: did not attend  General Information Comments: NFPE completed, patient states her AL is residential, did not want to answer many questions, appeared tearful. she still declines oral nutriton supplements, no chewing or swallowing problems, only missing two teeth. outside food at bedside, shake and candy  Nutrition Discharge Planning: DC on regular diet    Nutrition/Diet History    Patient Reported Diet/Restrictions/Preferences: general  Typical Food/Fluid Intake: regular  margie  Spiritual, Cultural Beliefs, Latter-day Practices, Values that Affect Care: no  Food Allergies: NKFA  Factors Affecting Nutritional Intake: impaired cognitive status/motor control    Anthropometrics    Temp: 98.3 °F (36.8 °C)  Height Method: Stated  Height: 5' (152.4 cm)  Height (inches): 60 in  Weight Method: Bed Scale  Weight: 60.7 kg (133 lb 13.1 oz)  Weight (lb): 133.82 lb  Ideal Body Weight (IBW), Female: 100 lb  % Ideal Body Weight, Female (lb): 133.82 %  BMI (Calculated): 26.1  BMI Grade: 25 - 29.9 - overweight  Usual Body Weight (UBW), k kg  % Usual Body Weight: 103.1  % Weight Change From Usual Weight: 2.88 %       Lab/Procedures/Meds    Pertinent Labs Reviewed: reviewed  Pertinent Labs Comments: Hg 10.2, Hct 31.1, Cl 114, PO4 2.2  Pertinent Medications Reviewed: reviewed  Pertinent Medications Comments: ASA, Vit B12, Levothyroxine, senna-docusate, memantine    Estimated/Assessed Needs    Weight Used For Calorie Calculations: 60.7 kg (133 lb 13.1 oz)  Energy Calorie Requirements (kcal): 1341  Energy Need Method: Towner-St Jeor (x 1..4(PAL))  Protein Requirements: 73-80  Weight Used For Protein Calculations: 60.7 kg (133 lb 13.1 oz) (x 1.2-1.3g/kg)  Fluid Requirements (mL): 1341 or per MD  Estimated Fluid Requirement Method: RDA Method  RDA Method (mL): 1341  CHO Requirement: -      Nutrition Prescription Ordered    Current Diet Order: Regular  Nutrition Order Comments: PO 60%  Oral Nutrition Supplement: refuses    Evaluation of Received Nutrient/Fluid Intake    I/O: no data  Energy Calories Required: not meeting needs  Protein Required: not meeting needs  Fluid Required: meeting needs  Comments: LBM   Tolerance: tolerating  % Intake of Estimated Energy Needs: 50 - 75 %  % Meal Intake: 50 - 75 %    Nutrition Risk    Level of Risk/Frequency of Follow-up: low (one time per week)     Monitor and Evaluation    Food and Nutrient Intake: food and beverage intake  Food and Nutrient Adminstration:  diet order  Physical Activity and Function: nutrition-related ADLs and IADLs  Anthropometric Measurements: weight change  Biochemical Data, Medical Tests and Procedures: electrolyte and renal panel, gastrointestinal profile  Nutrition-Focused Physical Findings: overall appearance, skin     Nutrition Follow-Up    RD Follow-up?: Yes

## 2023-12-30 NOTE — PT/OT/SLP PROGRESS
"Physical Therapy Treatment    Patient Name:  Rosario Menendez   MRN:  [id number]  Admit Date: 12/26/2023  Admitting Diagnosis: Closed intertrochanteric fracture of left femur  Recent Surgeries:     General Precautions: Standard, fall, hearing impaired  Orthopedic Precautions: LLE weight bearing as tolerated  Braces: N/A    Recommendations:     Discharge Recommendations: home health PT  Level of Assistance Recommended at Discharge: 24 hours significant assistance  Discharge Equipment Recommendations: wheelchair, bedside commode  Barriers to discharge: Decreased caregiver support    Assessment:     Rosario Menendez is a 88 y.o. female admitted with a medical diagnosis of Closed intertrochanteric fracture of left femur . Pt tolerated well, pt would continue to benefit from skilled PT services to improve overall functional mobility, strength and endurance.  .      Performance deficits affecting function: weakness, impaired endurance, impaired self care skills, impaired functional mobility, gait instability, impaired balance, decreased upper extremity function, decreased lower extremity function, decreased ROM, impaired skin, impaired cardiopulmonary response to activity, orthopedic precautions.    Rehab Potential is good    Activity Tolerance: Fair    Plan:     Patient to be seen 5 x/week to address the above listed problems via gait training, therapeutic activities, therapeutic exercises, wheelchair management/training    Plan of Care Expires: 01/26/24  Plan of Care Reviewed with: patient    Subjective     "I'm cold" pt agreeable to therapy. Pt smiling upon greeting    Pain/Comfort:  Pain Rating 1: 3/10  Location - Side 1: Left  Location - Orientation 1: upper  Location 1: leg  Pain Addressed 1: Reposition, Distraction, Cessation of Activity (unsure if pre meds, declined needing)  Pain Rating Post-Intervention 1: 3/10 (no further mentioned)    Patient's cultural, spiritual, Samaritan conflicts given the current " situation:  no    Objective:     .  Patient found with  (in wc) upon PT entry to room.     Therapeutic Activities and Exercises: 2x10 reps AP,GS,LAQ,hip flex A/AA LLE as needed witin tolerance mini elliptical x 5 minutes, back and forth and  complete rotations tolerated well    Functional Mobility:  Transfers:     Sit to Stand:  minimum assistance with rolling walker and vcs for tech  Gait: amb with RW min A vcs for sequencing RW and step to include inc BUE support to off load LEs ~ 9 ft and 7 ft seated rest break slow ekta, wc follow    AM-PAC 6 CLICK MOBILITY  14    Patient left up in chair with call button in reach and belongin gsin reach .    GOALS:   Multidisciplinary Problems       Physical Therapy Goals          Problem: Physical Therapy    Goal Priority Disciplines Outcome Goal Variances Interventions   Physical Therapy Goal     PT, PT/OT Ongoing, Progressing     Description: Goals to be met by: 1/27/23     Patient will increase functional independence with mobility by performing:    . Supine to sit with MInimal Assistance  . Sit to supine with MInimal Assistance  . Rolling to Left and Right with Minimal Assistance.  . Sit to stand transfer with Contact Guard Assistance  . Bed to chair transfer with Contact Guard Assistance using Rolling Walker  . Gait  x 50 feet with Contact Guard Assistance using Rolling Walker.   . Wheelchair propulsion x100 feet with Supervision using bilateral uppper extremities                         Time Tracking:     PT Received On: 12/30/23  PT Start Time: 1131  PT Stop Time: 1200  PT Total Time (min): 29 min    Billable Minutes: Gait Training 10 and Therapeutic Exercise 19    Treatment Type: Treatment  PT/PTA: PTA     Number of PTA visits since last PT visit: 2     12/30/2023

## 2023-12-31 PROCEDURE — 63700000 PHARM REV CODE 250 ALT 637 W/O HCPCS: Mod: HCNC | Performed by: NURSE PRACTITIONER

## 2023-12-31 PROCEDURE — 25000003 PHARM REV CODE 250: Mod: HCNC | Performed by: NURSE PRACTITIONER

## 2023-12-31 PROCEDURE — 25000003 PHARM REV CODE 250: Mod: HCNC | Performed by: HOSPITALIST

## 2023-12-31 PROCEDURE — 11000004 HC SNF PRIVATE: Mod: HCNC

## 2023-12-31 RX ORDER — DOCUSATE SODIUM 283 MG/5ML
1 LIQUID RECTAL ONCE
Status: COMPLETED | OUTPATIENT
Start: 2023-12-31 | End: 2023-12-31

## 2023-12-31 RX ADMIN — SENNOSIDES AND DOCUSATE SODIUM 1 TABLET: 8.6; 5 TABLET ORAL at 08:12

## 2023-12-31 RX ADMIN — CYANOCOBALAMIN TAB 1000 MCG 1000 MCG: 1000 TAB at 08:12

## 2023-12-31 RX ADMIN — METHOCARBAMOL 500 MG: 500 TABLET ORAL at 08:12

## 2023-12-31 RX ADMIN — PREGABALIN 50 MG: 50 CAPSULE ORAL at 09:12

## 2023-12-31 RX ADMIN — MEMANTINE 10 MG: 10 TABLET ORAL at 08:12

## 2023-12-31 RX ADMIN — ACETAMINOPHEN 1000 MG: 500 TABLET ORAL at 04:12

## 2023-12-31 RX ADMIN — GALANTAMINE 16 MG: 16 CAPSULE, EXTENDED RELEASE ORAL at 08:12

## 2023-12-31 RX ADMIN — DOCUSATE SODIUM 1 ENEMA: 283 LIQUID RECTAL at 11:12

## 2023-12-31 RX ADMIN — METHOCARBAMOL 500 MG: 500 TABLET ORAL at 09:12

## 2023-12-31 RX ADMIN — MEMANTINE 10 MG: 10 TABLET ORAL at 09:12

## 2023-12-31 RX ADMIN — ASPIRIN 81 MG: 81 TABLET, COATED ORAL at 09:12

## 2023-12-31 RX ADMIN — BUPROPION HYDROCHLORIDE 150 MG: 150 TABLET, EXTENDED RELEASE ORAL at 08:12

## 2023-12-31 RX ADMIN — METHOCARBAMOL 500 MG: 500 TABLET ORAL at 03:12

## 2023-12-31 RX ADMIN — SENNOSIDES AND DOCUSATE SODIUM 1 TABLET: 8.6; 5 TABLET ORAL at 09:12

## 2023-12-31 RX ADMIN — ASPIRIN 81 MG: 81 TABLET, COATED ORAL at 08:12

## 2023-12-31 RX ADMIN — LEVOTHYROXINE SODIUM 75 MCG: 75 TABLET ORAL at 06:12

## 2023-12-31 NOTE — PLAN OF CARE
Problem: Adult Inpatient Plan of Care  Goal: Plan of Care Review  Outcome: Ongoing, Progressing  Flowsheets (Taken 12/30/2023 1905)  Plan of Care Reviewed With: patient  Goal: Patient-Specific Goal (Individualized)  Outcome: Ongoing, Progressing  Goal: Absence of Hospital-Acquired Illness or Injury  Outcome: Ongoing, Progressing  Goal: Optimal Comfort and Wellbeing  Outcome: Ongoing, Progressing  Goal: Readiness for Transition of Care  Outcome: Ongoing, Progressing     Problem: Fall Injury Risk  Goal: Absence of Fall and Fall-Related Injury  Outcome: Ongoing, Progressing     Problem: Skin Injury Risk Increased  Goal: Skin Health and Integrity  Outcome: Ongoing, Progressing

## 2023-12-31 NOTE — PT/OT/SLP PROGRESS
Occupational Therapy      Patient Name:  Rosario Menendez   MRN:  [id number]    Patient not seen today secondary to patient c/o stomach discomfort and fatigue, nsg notified  Per nsg, hold off on therapy on today. Will follow-up POC.    12/31/2023

## 2024-01-01 PROCEDURE — 25000003 PHARM REV CODE 250: Mod: HCNC | Performed by: HOSPITALIST

## 2024-01-01 PROCEDURE — 63700000 PHARM REV CODE 250 ALT 637 W/O HCPCS: Mod: HCNC | Performed by: NURSE PRACTITIONER

## 2024-01-01 PROCEDURE — 11000004 HC SNF PRIVATE: Mod: HCNC

## 2024-01-01 RX ADMIN — LEVOTHYROXINE SODIUM 75 MCG: 75 TABLET ORAL at 06:01

## 2024-01-01 RX ADMIN — ASPIRIN 81 MG: 81 TABLET, COATED ORAL at 09:01

## 2024-01-01 RX ADMIN — MEMANTINE 10 MG: 10 TABLET ORAL at 09:01

## 2024-01-01 RX ADMIN — Medication 6 MG: at 09:01

## 2024-01-01 RX ADMIN — METHOCARBAMOL 500 MG: 500 TABLET ORAL at 03:01

## 2024-01-01 RX ADMIN — METHOCARBAMOL 500 MG: 500 TABLET ORAL at 09:01

## 2024-01-01 RX ADMIN — CYANOCOBALAMIN TAB 1000 MCG 1000 MCG: 1000 TAB at 09:01

## 2024-01-01 RX ADMIN — GALANTAMINE 16 MG: 16 CAPSULE, EXTENDED RELEASE ORAL at 07:01

## 2024-01-01 RX ADMIN — PREGABALIN 50 MG: 50 CAPSULE ORAL at 09:01

## 2024-01-01 RX ADMIN — SENNOSIDES AND DOCUSATE SODIUM 1 TABLET: 8.6; 5 TABLET ORAL at 09:01

## 2024-01-01 RX ADMIN — BUPROPION HYDROCHLORIDE 150 MG: 150 TABLET, EXTENDED RELEASE ORAL at 09:01

## 2024-01-01 NOTE — PLAN OF CARE
Problem: Adult Inpatient Plan of Care  Goal: Plan of Care Review  Outcome: Ongoing, Progressing  Goal: Patient-Specific Goal (Individualized)  Outcome: Ongoing, Progressing  Goal: Absence of Hospital-Acquired Illness or Injury  Outcome: Ongoing, Progressing  Goal: Optimal Comfort and Wellbeing  Outcome: Ongoing, Progressing  Intervention: Provide Person-Centered Care  Flowsheets (Taken 12/31/2023 2109)  Trust Relationship/Rapport:   care explained   choices provided   emotional support provided   questions answered

## 2024-01-02 LAB
ANION GAP SERPL CALC-SCNC: 7 MMOL/L (ref 8–16)
BASOPHILS # BLD AUTO: 0.06 K/UL (ref 0–0.2)
BASOPHILS NFR BLD: 1 % (ref 0–1.9)
BUN SERPL-MCNC: 12 MG/DL (ref 8–23)
CALCIUM SERPL-MCNC: 9.5 MG/DL (ref 8.7–10.5)
CHLORIDE SERPL-SCNC: 112 MMOL/L (ref 95–110)
CO2 SERPL-SCNC: 21 MMOL/L (ref 23–29)
CREAT SERPL-MCNC: 0.8 MG/DL (ref 0.5–1.4)
DIFFERENTIAL METHOD BLD: ABNORMAL
EOSINOPHIL # BLD AUTO: 0.2 K/UL (ref 0–0.5)
EOSINOPHIL NFR BLD: 2.7 % (ref 0–8)
ERYTHROCYTE [DISTWIDTH] IN BLOOD BY AUTOMATED COUNT: 15.3 % (ref 11.5–14.5)
EST. GFR  (NO RACE VARIABLE): >60 ML/MIN/1.73 M^2
GLUCOSE SERPL-MCNC: 79 MG/DL (ref 70–110)
HCT VFR BLD AUTO: 32.4 % (ref 37–48.5)
HGB BLD-MCNC: 10.6 G/DL (ref 12–16)
IMM GRANULOCYTES # BLD AUTO: 0.05 K/UL (ref 0–0.04)
IMM GRANULOCYTES NFR BLD AUTO: 0.8 % (ref 0–0.5)
LYMPHOCYTES # BLD AUTO: 1.7 K/UL (ref 1–4.8)
LYMPHOCYTES NFR BLD: 27.7 % (ref 18–48)
MAGNESIUM SERPL-MCNC: 2.2 MG/DL (ref 1.6–2.6)
MCH RBC QN AUTO: 31.5 PG (ref 27–31)
MCHC RBC AUTO-ENTMCNC: 32.7 G/DL (ref 32–36)
MCV RBC AUTO: 96 FL (ref 82–98)
MONOCYTES # BLD AUTO: 0.9 K/UL (ref 0.3–1)
MONOCYTES NFR BLD: 15.3 % (ref 4–15)
NEUTROPHILS # BLD AUTO: 3.2 K/UL (ref 1.8–7.7)
NEUTROPHILS NFR BLD: 52.5 % (ref 38–73)
NRBC BLD-RTO: 0 /100 WBC
PHOSPHATE SERPL-MCNC: 2.8 MG/DL (ref 2.7–4.5)
PLATELET # BLD AUTO: 234 K/UL (ref 150–450)
PMV BLD AUTO: 10.6 FL (ref 9.2–12.9)
POTASSIUM SERPL-SCNC: 4.1 MMOL/L (ref 3.5–5.1)
RBC # BLD AUTO: 3.37 M/UL (ref 4–5.4)
SODIUM SERPL-SCNC: 140 MMOL/L (ref 136–145)
WBC # BLD AUTO: 6 K/UL (ref 3.9–12.7)

## 2024-01-02 PROCEDURE — 97116 GAIT TRAINING THERAPY: CPT | Mod: HCNC,CQ

## 2024-01-02 PROCEDURE — 85025 COMPLETE CBC W/AUTO DIFF WBC: CPT | Mod: HCNC | Performed by: NURSE PRACTITIONER

## 2024-01-02 PROCEDURE — 94761 N-INVAS EAR/PLS OXIMETRY MLT: CPT | Mod: HCNC

## 2024-01-02 PROCEDURE — 80048 BASIC METABOLIC PNL TOTAL CA: CPT | Mod: HCNC | Performed by: NURSE PRACTITIONER

## 2024-01-02 PROCEDURE — 97530 THERAPEUTIC ACTIVITIES: CPT | Mod: HCNC,CQ

## 2024-01-02 PROCEDURE — 97110 THERAPEUTIC EXERCISES: CPT | Mod: HCNC,CQ

## 2024-01-02 PROCEDURE — 25000003 PHARM REV CODE 250: Mod: HCNC | Performed by: NURSE PRACTITIONER

## 2024-01-02 PROCEDURE — 25000003 PHARM REV CODE 250: Mod: HCNC | Performed by: HOSPITALIST

## 2024-01-02 PROCEDURE — 36415 COLL VENOUS BLD VENIPUNCTURE: CPT | Mod: HCNC | Performed by: NURSE PRACTITIONER

## 2024-01-02 PROCEDURE — 84100 ASSAY OF PHOSPHORUS: CPT | Mod: HCNC | Performed by: NURSE PRACTITIONER

## 2024-01-02 PROCEDURE — 97530 THERAPEUTIC ACTIVITIES: CPT | Mod: HCNC

## 2024-01-02 PROCEDURE — 83735 ASSAY OF MAGNESIUM: CPT | Mod: HCNC | Performed by: NURSE PRACTITIONER

## 2024-01-02 PROCEDURE — 11000004 HC SNF PRIVATE: Mod: HCNC

## 2024-01-02 RX ORDER — METHOCARBAMOL 500 MG/1
500 TABLET, FILM COATED ORAL 3 TIMES DAILY
Status: DISCONTINUED | OUTPATIENT
Start: 2024-01-02 | End: 2024-01-17 | Stop reason: HOSPADM

## 2024-01-02 RX ORDER — SODIUM BICARBONATE 650 MG/1
650 TABLET ORAL ONCE
Status: COMPLETED | OUTPATIENT
Start: 2024-01-02 | End: 2024-01-02

## 2024-01-02 RX ADMIN — MEMANTINE 10 MG: 10 TABLET ORAL at 08:01

## 2024-01-02 RX ADMIN — ASPIRIN 81 MG: 81 TABLET, COATED ORAL at 08:01

## 2024-01-02 RX ADMIN — SENNOSIDES AND DOCUSATE SODIUM 1 TABLET: 8.6; 5 TABLET ORAL at 08:01

## 2024-01-02 RX ADMIN — SODIUM BICARBONATE 650 MG TABLET 650 MG: at 02:01

## 2024-01-02 RX ADMIN — LEVOTHYROXINE SODIUM 75 MCG: 75 TABLET ORAL at 06:01

## 2024-01-02 RX ADMIN — BUPROPION HYDROCHLORIDE 150 MG: 150 TABLET, EXTENDED RELEASE ORAL at 08:01

## 2024-01-02 RX ADMIN — METHOCARBAMOL 500 MG: 500 TABLET ORAL at 08:01

## 2024-01-02 RX ADMIN — CYANOCOBALAMIN TAB 1000 MCG 1000 MCG: 1000 TAB at 08:01

## 2024-01-02 RX ADMIN — PREGABALIN 50 MG: 50 CAPSULE ORAL at 08:01

## 2024-01-02 RX ADMIN — Medication 6 MG: at 08:01

## 2024-01-02 RX ADMIN — METHOCARBAMOL 500 MG: 500 TABLET ORAL at 02:01

## 2024-01-02 NOTE — PT/OT/SLP PROGRESS
"Occupational Therapy   Treatment    Name: Rosario Menendez  MRN: [id number]  Admit Date: 12/26/2023  Admitting Diagnosis:  Closed intertrochanteric fracture of left femur    General Precautions: Standard, fall, hearing impaired   Orthopedic Precautions: LLE weight bearing as tolerated   Braces: N/A    Recommendations:     Discharge Recommendations:  home health OT  Level of Assistance Recommended at Discharge: 24 hours physical assistance for all ADL's and home management tasks  Discharge Equipment Recommendations: hip kit, 3-in-1 commode, shower chair (SC vs TTB questionable 2/2 pt poor historian)  Barriers to discharge:  Other (Comment) (increased assistance required for ADLs and mobility)    Assessment:     Rosario Menendez is a 88 y.o. female with a medical diagnosis of Closed intertrochanteric fracture of left femur.   Pt tolerated session fairly well due to fatigue.  She continues to require assistance to perform self-care tasks and mobility.  She would continue to benefit from skilled OT services at SNF to maximize her gains in functional independence.  She presents with the following.  Performance deficits affecting function are weakness, impaired endurance, impaired self care skills, impaired functional mobility, gait instability, impaired balance, orthopedic precautions, pain, decreased lower extremity function, impaired cognition, decreased safety awareness, decreased ROM.     Rehab Potential is good    Activity tolerance:  Good and Fair    Plan:     Patient to be seen 5 x/week to address the above listed problems via self-care/home management, therapeutic exercises, therapeutic activities, neuromuscular re-education    Plan of Care Expires: 01/25/24  Plan of Care Reviewed with: patient, friend, other (see comments) (son-in-law)    Subjective   "I'm tired.  I want to go back to bed."  Communicated with: nurse prior to session.    Pain/Comfort:  Pain Rating 1: other (see comments) (not rated)  Location - " Side 1: Left  Location - Orientation 1: generalized  Location 1: hip  Pain Addressed 1: Cessation of Activity, Distraction, Reposition  Pain Rating Post-Intervention 1: other (see comments) (not rated)    Patient's cultural, spiritual, Gnosticist conflicts given the current situation:  no    Objective:     Patient found up in chair with Other (comments) (no lines attached) with her friend present upon OT entry to room.    Bed Mobility:    N/A due to pt sitting in w/c at beginning of session and in bedside chair at end of session     Functional Mobility/Transfers:  Patient completed Sit <> Stand Transfer from w/c x 2 trials with minimum assistance  with rolling walker   Patient completed Wheelchair <> Bedside Chair Transfer using Step Transfer technique with minimum assistance with rolling walker, requiring cues for RW management and sequencing for safe descent.  Pt required cues to attend to task and was easily distracted.   Functional Mobility: Pt ambulated ~6 ft from w/c to the bedside chair with Min A with RW.     Activities of Daily Living:  Feeding:  Setup assistance to drink cup of water while seated in w/c     [facility] 6 Click ADL: 16    OT Exercises: UE ergometer set for 10 min on minimal resistance to increase functional endurance and strength in order increase independence when performing self care tasks, functional ambulation, W/C propulsion, and functional standing activities.  She took a rest break after 3 min and 45 sec and ceased activity after 5 min due to fatigue.      Treatment & Education:  Pt performed matching activity in standing to address her standing endurance that's required for daily activities, such as ADLs and functional transfers.  Activity facilitated BUE use, functional reach, and weight shifting in multiple directions.  She stood ~2 min and 25 sec with CGA with RW at the rehab gym counter before needing to sit down due to L hip fatigue.     Pt edu on role of OT, POC, safety when  performing self care tasks, benefit of performing OOB activity, and safety when performing functional transfers and mobility.    - Self care tasks completed-- as noted above      Patient left up in chair with call button in reach, nursing notified, and her son-in-law present    GOALS:   Multidisciplinary Problems       Occupational Therapy Goals          Problem: Occupational Therapy    Goal Priority Disciplines Outcome Interventions   Occupational Therapy Goal     OT, PT/OT Ongoing, Progressing    Description: Goals to be met by: 1/25/2024     Patient will increase functional independence with ADLs by performing:    UE Dressing with Supervision- Ongoing  LE Dressing with Moderate Assistance and Assistive Devices as needed- Ongoing  Personal Hygiene while seated at sink with Supervision- Ongoing  Oral Hygiene while seated at sink with Supervision- Ongoing  Toileting from toilet with Moderate Assistance for hygiene and clothing management and AE as needed- Ongoing  Bathing from  sitting at sink with Minimal Assistance.  Toilet transfer to toilet with Contact Guard Assistance /c AE/LRAD as needed- Ongoing  Footwear /c Mod A and AE as needed  Tolerate standing functional tasks /c CGA and AE/LRAD as needed  Shower t/f /c CGA and AE/LRAD as needed                           Time Tracking:     OT Date of Treatment: 01/02/24  OT Start Time: 1136    OT Stop Time: 1205  OT Total Time (min): 29 min    Billable Minutes:Therapeutic Activity 29 min    1/2/2024

## 2024-01-02 NOTE — PT/OT/SLP PROGRESS
"Physical Therapy Treatment    Patient Name:  Rosario Menendez   MRN:  [id number]  Admit Date: 12/26/2023  Admitting Diagnosis: Closed intertrochanteric fracture of left femur  Recent Surgeries:     General Precautions: Standard, fall, hearing impaired  Orthopedic Precautions: LLE weight bearing as tolerated  Braces: N/A    Recommendations:     Discharge Recommendations: home health PT  Level of Assistance Recommended at Discharge: 24 hours significant assistance  Discharge Equipment Recommendations: wheelchair, bedside commode  Barriers to discharge: Decreased caregiver support    Assessment:     Rosario Menendez is a 88 y.o. female admitted with a medical diagnosis of Closed intertrochanteric fracture of left femur . Pt tolerated well, pt does not appear motivated but pleasant and cooperative, pt would continue to benefit from skilled PT services to improve overall functional mobility, strength and endurance.  .      Performance deficits affecting function: weakness, impaired endurance, impaired self care skills, impaired functional mobility, gait instability, impaired balance, decreased upper extremity function, decreased lower extremity function, decreased ROM, impaired skin, impaired cardiopulmonary response to activity, orthopedic precautions.    Rehab Potential is good    Activity Tolerance: Fair    Plan:     Patient to be seen 5 x/week to address the above listed problems via gait training, therapeutic activities, therapeutic exercises, wheelchair management/training    Plan of Care Expires: 01/26/24  Plan of Care Reviewed with: patient    Subjective     "Don't feel like getting up, tired" pt agreeable to therapy with encouragement, CATALINO present.     Pain/Comfort:  Pain Rating 1: 3/10  Location - Side 1: Left  Location - Orientation 1: upper  Location 1: leg  Pain Addressed 1: Reposition, Distraction, Cessation of Activity (declined needing meds)  Pain Rating Post-Intervention 1: 3/10 (inc with mobility, dec " with rest)    Patient's cultural, spiritual, Confucianist conflicts given the current situation:  no    Objective:      Patient found with  (in BSC) upon PT entry to room.     Therapeutic Activities and Exercises: 2x10 heel raises,toe raises, SAQ,hip flex,abd/add with feet on floor, knee flex with cloth on floor    Functional Mobility:  Transfers:     Sit to Stand:  minimum assistance with rolling walker and vcs for tech/ hand placement  BSC to hair: contact guard assistance and minimum assistance with  rolling walker  using  Stand Pivot and vcs for sequencing/RW mgmt, feeling chair behine leg before sitting ~ 10 ft  WC to BS with RW min A vcs for sequencing/RW mgmt for stand pivot  Gait: amb with RW ~ 10 ft in the room from [facility] to  and 15 ft in the gym with RW min A vcs for sequencing steps and RW mgmt, slow ekta  Wc mobility ~ 25 ft with min A vcs for tech    AM-PAC 6 CLICK MOBILITY  14    Patient left up in chair with call button in reach and belonging sin reach .    GOALS:   Multidisciplinary Problems       Physical Therapy Goals          Problem: Physical Therapy    Goal Priority Disciplines Outcome Goal Variances Interventions   Physical Therapy Goal     PT, PT/OT Ongoing, Progressing     Description: Goals to be met by: 1/27/23     Patient will increase functional independence with mobility by performing:    . Supine to sit with MInimal Assistance  . Sit to supine with MInimal Assistance  . Rolling to Left and Right with Minimal Assistance.  . Sit to stand transfer with Contact Guard Assistance  . Bed to chair transfer with Contact Guard Assistance using Rolling Walker  . Gait  x 50 feet with Contact Guard Assistance using Rolling Walker.   . Wheelchair propulsion x100 feet with Supervision using bilateral uppper extremities                         Time Tracking:     PT Received On: 01/02/24  PT Start Time: 1302  PT Stop Time: 1350  PT Total Time (min): 48 min    Billable Minutes: Gait Training 15,  Therapeutic Activity 13, and Therapeutic Exercise 15    Treatment Type: Treatment  PT/PTA: PTA     Number of PTA visits since last PT visit: 3     01/02/2024

## 2024-01-02 NOTE — PROGRESS NOTES
[facility]                                  Skilled Nursing Facility                   Progress Note     Admit Date: 12/26/2023  AMARA   [id number]  Principal Problem:  Closed intertrochanteric fracture of left femur   HPI obtained from patient interview and chart review     Chief Complaint: Establish Care/ Initial Visit     HPI:   Ms. Menendez is a 88 year old female PMHx of osteoporosis, dementia, thrombocytopenia and hypothyroidism presents to SNF following hospitalization for closed intertrochanteric left femur fracture s/p cephalomedullary nail fixation on 12/23 with Dr. Jerry.  Admission to SNF for secondary weakness and debility.    [place] [facility] [place] with left hip pain.  Per [facility] [place],  Patient was walking at home and fell onto left hip.  Most of the history was obtained for son who was at the bedside.  Patient has dementia does not remember how she fell.  Immediate pain and difficulty bearing weight. Lives at an assisted living facility. Uses a walker for ambulation at baseline. UA positive for trace leukocyte esterase.  CT cervical spine showed multi level degenerative changes.  CT head showed chronic microvascular changes.  Chest x-ray was unremarkable.  X-ray hip  showed irregular lucency transversing the left greater trochanter extending into the intertrochanteric portion of the proximal left femur.  A nondisplaced fracture.Ms. Menendez was admitted to Hospital Medicine for management of a closed intertrochanteric fracture of the left femur.  Ortho was consulted.  She went to the OR on 12/23 for a left CMN with Dr. Mancilla.  No intraop complications noted,  mL, skin closed with Dermabond.  She was initially started on Eliquis 2.5mg BID for DVT prophylaxis, but given Thrombocytopenia <50K, she was changed to Aspirin 81mg BID.  PT/OT were consulted who suggest moderate therapy.  She was  "discharged to [facility]."      Today, patient is at her mental baseline which is very confused.  Son-in-law at bedside and provided care update.  Patient states her postoperative pain is well controlled.  She is having slightly lower SpO2 at 94% and BP is low to normal, also having low-grade fevers.  Patient reminded to take deep breaths and elicit a cough every once in a while, she is likely having atelectasis, will also provide incentive spirometer.    Patient will be treated at [facility] with PT and OT to improve functional status and ability to perform ADLs.     Past Medical History: Patient has a past medical history of Arthritis, Depression, Hypercholesteremia, Thrombocytopenia, and Thyroid disease.    Past Surgical History: Patient has a past surgical history that includes Hysterectomy; Knee surgery; Ankle surgery; Wrist surgery; Colonoscopy (N/A, 6/2/2021); and Intramedullary rodding of femur (Left, 12/23/2023).    Social History: Patient reports that she has never smoked. She has never used smokeless tobacco.    Family History: family history includes Cancer in her maternal uncle; Heart failure in her father and mother; Suicide in her son.    Allergies: Patient is allergic to codeine.    ROS  Constitutional: Negative for fever   Eyes: Negative for blurred vision, double vision   Respiratory: Negative for shortness of breath.  +dry intermittent cough   Cardiovascular: Negative for chest pain, palpitations, and leg swelling.   Gastrointestinal: Negative for abdominal pain, constipation, diarrhea, nausea, vomiting.   Genitourinary: Negative for dysuria, frequency   Musculoskeletal:  + generalized weakness.  +mild intermittent LLE pain  Skin: Negative for itching and rash.   Neurological: Negative for dizziness, headaches.   Psychiatric/Behavioral: Negative for depression. The patient is not nervous/anxious.      24 hour Vital Sign Range   Temp:  [98 °F (36.7 °C)-99.1 °F (37.3 °C)]   Pulse: " " [78-99]   Resp:  [17-18]   BP: (108-135)/(58-68)   SpO2:  [92 %-94 %]     Current BMI: Body mass index is 25.53 kg/m².    PEx  Constitutional: Patient appears debilitated.  No distress noted  HENT:   Head: Normocephalic and atraumatic.   Eyes: Pupils are equal, round  Neck: Normal range of motion. Neck supple.   Cardiovascular: Normal rate, regular rhythm and normal heart sounds.    Pulmonary/Chest: Effort normal and breath sounds are clear  Abdominal: Soft. Bowel sounds are normal.   Musculoskeletal: Normal range of motion.   Neurological: Alert and oriented to person.  Disoriented to place, and time.  Confused.  Higher level thinking impaired  Psychiatric: Normal mood and affect. Behavior is normal.   Skin: Skin is warm and dry.  +surgical dressing to LLE    Recent Labs   Lab 01/02/24  0539      K 4.1   *   CO2 21*   BUN 12   CREATININE 0.8   CALCIUM 9.5   MG 2.2       Recent Labs   Lab 01/02/24  0539   WBC 6.00   RBC 3.37*   HGB 10.6*   HCT 32.4*      MCV 96   MCH 31.5*   MCHC 32.7       No results for input(s): "POCTGLUCOSE" in the last 168 hours.     Assessment and Plan:    Closed intertrochanteric fracture of left femur  S/p cephalomedullary nail fixation on 12/23  - PT/OT, WBAT  - DVT PPX with ASA 81 mg BID x 30 days  - maintain surgical dressing until removed by Orthopedics  - ortho APAP to see patient weekly at SNF  - follow-up with ortho after discharge    Acute postoperative pain  - continue acetaminophen 1000 mg q.8 hours PRN, gabapentin 500 mg qHS., methocarbamol 500 mg TID.  Bowel regimen in place     Acute Blood loss anemia  - expected postoperatively  - continue monitor twice weekly CBC, transfuse for hemoglobin < 7    Amnestic MCI (mild cognitive impairment with memory loss)  - Continue memantine 10 mg BID, galantamine 16 mg daily, and buproprion 150 mg BID, B12   Delirium precautions:  - Avoid antihistamines, anticholinergics, hypnotics, and minimize opiates while controlling " for pain as these medications may exacerbate delirium. Cues for day/night will assist with keeping patient calm and oriented - during daytime, please keep adequate light in room (open windows, lights on) and please keep room dim at night-time to encourage normal sleep-wake cycles. Continuing to have nursing and family reorient the patient and encourage family to visit    Thrombocytopenia  - Patient was found to have thrombocytopenia, which is chronic.   - transfuse if platelet count is <10k.   - Hold DVT prophylaxis if platelets are <50k.  - platelet count currently within normal limits at 234, continue monitor twice weekly CBCs     Hypercholesteremia  - re-initiated home medication pravastatin 40 mg qHS.  After extensive medication review it seems this medication some how fell off of her med list.      Hypothyroidism  - Continue levothyroxine 75 mcg daily    Debility   - Continue with PT/OT for gait training and strengthening and restoration of ADL's   - Encourage mobility, OOB in chair, and early ambulation as appropriate  - Fall precautions   - Monitor for bowel and bladder dysfunction  - Monitor for and prevent skin breakdown and pressure ulcers  - Continue DVT prophylaxis with ASA 81 mg BID             Anticipate disposition:  Home with home health    IP [facility]HS RISK OF UNPLANNED READMISSION Model: HIGH MODERATE LOW    Follow-up needed during SNF stay-    Follow-up needed after discharge from SNF: PCP     Future Appointments   Date Time Provider Department Center   1/8/2024 12:15 PM Lauren Cosme PA-C [facility] [address]   2/5/2024 10:30 AM Lauren Cosme PA-C [facility] ORTHO [address] [place]   3/21/2024 11:00 AM Shi Simon MD [facility] CAREN [facility]         I certify that SNF services are required to be given on an inpatient basis because Rosario Menendez needs for skilled nursing care and/or skilled rehabilitation are required on a daily basis and such services can only practically be provided in a skilled  nursing facility setting and are for an ongoing condition for which she received inpatient care in the hospital.     Total time of the visit 148 minutes   Description of non physical exam/non charting time: counseling patient on clinical conditions and therapies provided.  Extensive chart review completed including all consultation notes.  All pertinent laboratory and radiographical images reviewed.        Nella Lacey NP  Department of Hospital Medicine   [facility]     DOS: 1/2/2024       Patient note was created using MModal Dictation.  Any errors in syntax or even information may not have been identified and edited on initial review prior to signing this note.

## 2024-01-02 NOTE — PLAN OF CARE
Problem: Adult Inpatient Plan of Care  Goal: Plan of Care Review  Outcome: Ongoing, Progressing  Goal: Patient-Specific Goal (Individualized)  Outcome: Ongoing, Progressing  Goal: Absence of Hospital-Acquired Illness or Injury  Outcome: Ongoing, Progressing  Goal: Optimal Comfort and Wellbeing  Outcome: Ongoing, Progressing  Goal: Readiness for Transition of Care  Outcome: Ongoing, Progressing     Problem: Fall Injury Risk  Goal: Absence of Fall and Fall-Related Injury  Outcome: Ongoing, Progressing  Intervention: Promote Injury-Free Environment  Flowsheets (Taken 1/2/2024 7744)  Safety Promotion/Fall Prevention:   assistive device/personal item within reach   instructed to call staff for mobility   nonskid shoes/socks when out of bed   lighting adjusted   medications reviewed     Problem: Skin Injury Risk Increased  Goal: Skin Health and Integrity  Outcome: Ongoing, Progressing  Intervention: Promote and Optimize Oral Intake  Flowsheets (Taken 1/2/2024 5267)  Oral Nutrition Promotion:   medicated   physical activity promoted

## 2024-01-03 PROCEDURE — 11000004 HC SNF PRIVATE: Mod: HCNC

## 2024-01-03 PROCEDURE — 25000003 PHARM REV CODE 250: Mod: HCNC | Performed by: HOSPITALIST

## 2024-01-03 PROCEDURE — 63700000 PHARM REV CODE 250 ALT 637 W/O HCPCS: Mod: HCNC | Performed by: NURSE PRACTITIONER

## 2024-01-03 PROCEDURE — 97530 THERAPEUTIC ACTIVITIES: CPT | Mod: HCNC

## 2024-01-03 PROCEDURE — 97116 GAIT TRAINING THERAPY: CPT | Mod: HCNC

## 2024-01-03 PROCEDURE — 97110 THERAPEUTIC EXERCISES: CPT | Mod: HCNC

## 2024-01-03 PROCEDURE — 25000003 PHARM REV CODE 250: Mod: HCNC | Performed by: NURSE PRACTITIONER

## 2024-01-03 PROCEDURE — 97535 SELF CARE MNGMENT TRAINING: CPT | Mod: HCNC

## 2024-01-03 RX ORDER — PRAVASTATIN SODIUM 20 MG/1
40 TABLET ORAL NIGHTLY
Status: DISCONTINUED | OUTPATIENT
Start: 2024-01-03 | End: 2024-01-17 | Stop reason: HOSPADM

## 2024-01-03 RX ADMIN — ASPIRIN 81 MG: 81 TABLET, COATED ORAL at 08:01

## 2024-01-03 RX ADMIN — BUPROPION HYDROCHLORIDE 150 MG: 150 TABLET, EXTENDED RELEASE ORAL at 08:01

## 2024-01-03 RX ADMIN — Medication 6 MG: at 08:01

## 2024-01-03 RX ADMIN — LEVOTHYROXINE SODIUM 75 MCG: 75 TABLET ORAL at 05:01

## 2024-01-03 RX ADMIN — METHOCARBAMOL 500 MG: 500 TABLET ORAL at 08:01

## 2024-01-03 RX ADMIN — CYANOCOBALAMIN TAB 1000 MCG 1000 MCG: 1000 TAB at 08:01

## 2024-01-03 RX ADMIN — ACETAMINOPHEN 1000 MG: 500 TABLET ORAL at 01:01

## 2024-01-03 RX ADMIN — PREGABALIN 50 MG: 50 CAPSULE ORAL at 08:01

## 2024-01-03 RX ADMIN — SENNOSIDES AND DOCUSATE SODIUM 1 TABLET: 8.6; 5 TABLET ORAL at 08:01

## 2024-01-03 RX ADMIN — MEMANTINE 10 MG: 10 TABLET ORAL at 08:01

## 2024-01-03 RX ADMIN — GALANTAMINE 16 MG: 16 CAPSULE, EXTENDED RELEASE ORAL at 07:01

## 2024-01-03 RX ADMIN — METHOCARBAMOL 500 MG: 500 TABLET ORAL at 02:01

## 2024-01-03 RX ADMIN — PRAVASTATIN SODIUM 40 MG: 20 TABLET ORAL at 08:01

## 2024-01-03 NOTE — PT/OT/SLP PROGRESS
Occupational Therapy   Treatment    Name: Rosario Menendez  MRN: [id number]  Admit Date: 12/26/2023  Admitting Diagnosis:  Closed intertrochanteric fracture of left femur    General Precautions: Standard, fall, hearing impaired   Orthopedic Precautions: LLE weight bearing as tolerated   Braces: N/A    Recommendations:     Discharge Recommendations:  home health OT  Level of Assistance Recommended at Discharge: 24 hours physical assistance for all ADL's and home management tasks  Discharge Equipment Recommendations: hip kit, 3-in-1 commode, shower chair  Barriers to discharge:   increased assistance for ADLs and mobility    Assessment:     Rosario Menendez is a 88 y.o. female with a medical diagnosis of Closed intertrochanteric fracture of left femur. She presents with performance deficits affecting function are weakness, impaired endurance, impaired self care skills, impaired functional mobility, gait instability, impaired balance, decreased coordination, decreased upper extremity function, decreased lower extremity function, decreased safety awareness, decreased ROM, pain. Pt engaged well in therapy this date, benefits from additional encouragement as pt is hesitant to complete transfers or ADLs due to anxiety re: pain.  Pt able to transfer to/from raised toilet, wc, eob, and upright chair with min A/RW and multisensory cues. Pt completed ADLs in bathroom and at bedside.     Rehab Potential is good    Activity tolerance:  Fair    Plan:     Patient to be seen 5 x/week to address the above listed problems via self-care/home management, therapeutic activities, therapeutic exercises, neuromuscular re-education    Plan of Care Expires: 01/25/24  Plan of Care Reviewed with: patient    Subjective     Communicated with: DAVID Leong prior to session.    Pain/Comfort:  Pain Rating 1:  not rated, groaned and winced with mobility  Location - Side 1: Left  Location 1: hip  Pain Addressed 1: Reposition, Distraction, Cessation of  Activity  Pain Rating Post-Intervention 1:  did not rate    Patient's cultural, spiritual, Mormon conflicts given the current situation:  no    Objective:     Patient found HOB elevated with  (no lines) upon OT entry to room.    Bed Mobility:    Patient completed Rolling/Turning to Left with  moderate assistance guiding leg to decrease resistance against bed linen  Patient completed Rolling/Turning to Right with moderate assistance guiding leg to decrease resistance against bed linen  Patient completed Scooting/Bridging with moderate assistance  Patient completed Supine to Sit with moderate assistance with trunk support    Functional Mobility/Transfers:  Patient completed Sit <> Stand Transfer   From EOB with minimum assistance  with  rolling walker   From wc with min A/RW across 2 trials  To/from toilet 2 trials with min A/RW  Patient completed multiple transfers:   EOB to WC using Step Transfer technique with mod A with rolling walker RW management/sequencing  WC to raised toilet with mod A/gb RW management/sequencing  RT to WC with mod A/RW & multisensory cues for RW management/sequencing  Patient completed Toilet Transfer Step Transfer technique with minimum assistance with  rolling walker  Functional Mobility: Pt walked from wc to upright chair ~3 feet with min A/RW and multisensory cues    Activities of Daily Living:  Grooming: stand by assistance with multisensory cues sitting at sink to brush teeth, wash face, use mouth wash   Upper Body Dressing: minimum assistance don personal robe over shirt  Lower Body Dressing: maximal assistance doffing soiled brief, donning fresh brief, donning pants by threading technique while pt standing at RW  Toileting: maximal assistance completing anterior/posterior perineal care, pt attempted to do so 1x in standing but became weaker so writing OT completed task    [facility] 6 Click ADL: 16    OT Exercises: Not completed this date    Treatment & Education:  Pt educated on role  of OT, POC, and goals for therapy.    POC was dicussed with patient/caregiver, who was included in its development and is in agreement with the identified goals and treatment plan.   Patient and family aware of patient's deficits and therapy progression.   Time provided for therapeutic counseling and discussion of health disposition.   Educated on importance of EOB/OOB mobility, maintaining routine, sitting up in chair, and maximizing independence with ADLs during admission   Pt completed ADLs and functional mobility for treatment session as noted above   Pt/caregiver verbalized understanding and expressed no further concerns/questions.  Updated communication board with level of assist required       Patient left up in chair with call button in reach and PT notified    GOALS:   Multidisciplinary Problems       Occupational Therapy Goals          Problem: Occupational Therapy    Goal Priority Disciplines Outcome Interventions   Occupational Therapy Goal     OT, PT/OT Ongoing, Progressing    Description: Goals to be met by: 1/25/2024     Patient will increase functional independence with ADLs by performing:    UE Dressing with Supervision- Ongoing  LE Dressing with Moderate Assistance and Assistive Devices as needed- Ongoing  Personal Hygiene while seated at sink with Supervision- Ongoing  Oral Hygiene while seated at sink with Supervision- Ongoing  Toileting from toilet with Moderate Assistance for hygiene and clothing management and AE as needed- Ongoing  Bathing from  sitting at sink with Minimal Assistance.  Toilet transfer to toilet with Contact Guard Assistance /c AE/LRAD as needed- Ongoing  Footwear /c Mod A and AE as needed  Tolerate standing functional tasks /c CGA and AE/LRAD as needed  Shower t/f /c CGA and AE/LRAD as needed                           Time Tracking:     OT Date of Treatment: 01/03/24  OT Start Time: 0910    OT Stop Time: 0954  OT Total Time (min): 44 min    Billable Minutes:Self Care/Home  Management 26  Therapeutic Activity 18    1/3/2024

## 2024-01-03 NOTE — PLAN OF CARE
Problem: Adult Inpatient Plan of Care  Goal: Plan of Care Review  Outcome: Ongoing, Progressing  Goal: Patient-Specific Goal (Individualized)  Outcome: Ongoing, Progressing  Goal: Absence of Hospital-Acquired Illness or Injury  Outcome: Ongoing, Progressing  Goal: Optimal Comfort and Wellbeing  Outcome: Ongoing, Progressing  Goal: Readiness for Transition of Care  Outcome: Ongoing, Progressing     Problem: Skin Injury Risk Increased  Goal: Skin Health and Integrity  Outcome: Ongoing, Progressing  Intervention: Promote and Optimize Oral Intake  Flowsheets (Taken 1/3/2024 1012)  Oral Nutrition Promotion:   medicated   physical activity promoted    Pt alert and able to make needs known.  Accepted and tolerated medications whole. Dressing remains intact. Denies pain or discomfort at this time. No new skin issues noted. Pt resting, HOB slightly elevated. Safety maintained.

## 2024-01-03 NOTE — PROGRESS NOTES
[facility]                                  Skilled Nursing Facility                   Progress Note     Admit Date: 12/26/2023  AMARA 1/17/2024  [id number] A  Principal Problem:  Closed intertrochanteric fracture of left femur   HPI obtained from patient interview and chart review     Chief Complaint:Re-evaluation of medical treatment and therapy status    HPI:   Ms. Menendez is a 88 year old female PMHx of osteoporosis, dementia, thrombocytopenia and hypothyroidism presents to SNF following hospitalization for closed intertrochanteric left femur fracture s/p cephalomedullary nail fixation on 12/23 with Dr. Jerry.  Admission to SNF for secondary weakness and debility.    Interval history: 24 hr vital sign ranges reviewed and listed below.  BP low to normal.  SpO2 improved to 94% today.  No further low-grade fevers.  Patient denies shortness of breath, abdominal discomfort, nausea, or vomiting.  Patient reports an adequate appetite.  Patient denies dysuria.  Patient reports having regular bowel movements.  Patient progessing with PT/OT- Gait: amb with RW ~ 10 ft in the room from BSC to  and 15 ft in the gym with RW min A vcs for sequencing steps and RW mgmt, slow ekta. Continuing to follow and treat all acute and chronic conditions.    Past Medical History: Patient has a past medical history of Arthritis, Depression, Hypercholesteremia, Thrombocytopenia, and Thyroid disease.    Past Surgical History: Patient has a past surgical history that includes Hysterectomy; Knee surgery; Ankle surgery; Wrist surgery; Colonoscopy (N/A, 6/2/2021); and Intramedullary rodding of femur (Left, 12/23/2023).    Social History: Patient reports that she has never smoked. She has never used smokeless tobacco.    Family History: family history includes Cancer in her maternal uncle; Heart failure in her father and mother; Suicide in her son.    Allergies:  "Patient is allergic to codeine.    ROS  Constitutional: Negative for fever   Eyes: Negative for blurred vision, double vision   Respiratory: Negative for shortness of breath.  +dry intermittent cough   Cardiovascular: Negative for chest pain, palpitations, and leg swelling.   Gastrointestinal: Negative for abdominal pain, constipation, diarrhea, nausea, vomiting.   Genitourinary: Negative for dysuria, frequency   Musculoskeletal:  + generalized weakness.  +mild intermittent LLE pain  Skin: Negative for itching and rash.   Neurological: Negative for dizziness, headaches.   Psychiatric/Behavioral: Negative for depression. The patient is not nervous/anxious.      24 hour Vital Sign Range   Temp:  [98.5 °F (36.9 °C)-98.6 °F (37 °C)]   Pulse:  [67-68]   Resp:  [17-18]   BP: (124-135)/(56-67)   SpO2:  [93 %-94 %]     Current BMI: Body mass index is 25.53 kg/m².    PEx  Constitutional: Patient appears debilitated.  No distress noted  HENT:   Head: Normocephalic and atraumatic.   Eyes: Pupils are equal, round  Neck: Normal range of motion. Neck supple.   Cardiovascular: Normal rate, regular rhythm and normal heart sounds.    Pulmonary/Chest: Effort normal and breath sounds are clear  Abdominal: Soft. Bowel sounds are normal.   Musculoskeletal: Normal range of motion.   Neurological: Alert and oriented to person.  Disoriented to place, and time.  Confused.  Higher level thinking impaired  Psychiatric: Normal mood and affect. Behavior is normal.   Skin: Skin is warm and dry.  +surgical dressing to LLE    No results for input(s): "GLUCOSE", "NA", "K", "CL", "CO2", "BUN", "CREATININE", "CALCIUM", "MG" in the last 24 hours.      No results for input(s): "WBC", "RBC", "HGB", "HCT", "PLT", "MCV", "MCH", "MCHC" in the last 24 hours.      No results for input(s): "POCTGLUCOSE" in the last 168 hours.     Assessment and Plan:    Closed intertrochanteric fracture of left femur  S/p cephalomedullary nail fixation on 12/23  - PT/OT, " WBAT  - DVT PPX with ASA 81 mg BID x 30 days  - maintain surgical dressing until removed by Orthopedics  - ortho APAP to see patient weekly at SNF  - follow-up with ortho after discharge    Acute postoperative pain  - stable, continue acetaminophen 1000 mg q.8 hours PRN, gabapentin 500 mg qHS., methocarbamol 500 mg TID.  Bowel regimen in place     Acute Blood loss anemia  - expected postoperatively  - stable, continue monitor twice weekly CBC, transfuse for hemoglobin < 7    Amnestic MCI (mild cognitive impairment with memory loss)  - Continue memantine 10 mg BID, galantamine 16 mg daily, and buproprion 150 mg BID, B12   Delirium precautions:  - Avoid antihistamines, anticholinergics, hypnotics, and minimize opiates while controlling for pain as these medications may exacerbate delirium. Cues for day/night will assist with keeping patient calm and oriented - during daytime, please keep adequate light in room (open windows, lights on) and please keep room dim at night-time to encourage normal sleep-wake cycles. Continuing to have nursing and family reorient the patient and encourage family to visit    Thrombocytopenia  - Patient was found to have thrombocytopenia, which is chronic.   - transfuse if platelet count is <10k.   - Hold DVT prophylaxis if platelets are <50k.  - stable, platelet count currently within normal limits at 234, continue monitor twice weekly CBCs     Hypercholesteremia  - continue home medication pravastatin 40 mg qHS.      Hypothyroidism  - stable, Continue levothyroxine 75 mcg daily    Debility   - Continue with PT/OT for gait training and strengthening and restoration of ADL's   - Encourage mobility, OOB in chair, and early ambulation as appropriate  - Fall precautions   - Monitor for bowel and bladder dysfunction  - Monitor for and prevent skin breakdown and pressure ulcers  - Continue DVT prophylaxis with ASA 81 mg BID             Anticipate disposition:  Home with home health    IP [facility] RISK  OF UNPLANNED READMISSION Model: HIGH MODERATE LOW    Follow-up needed during SNF stay-    Apt not send pt to- Ortho 1/8    Follow-up needed after discharge from SNF: PCP     Future Appointments   Date Time Provider Department Center   1/8/2024 12:15 PM Lauren Cosme PA-C [facility] [address]THO [address]   2/5/2024 10:30 AM Lauren Cosme PA-C [facility]TH[address]   3/21/2024 11:00 AM Shi Simon MD [facility] [address] Lacey NP  Department of [facility]   [facility]     DOS: 1/3/2024       Patient note was created using MModal Dictation.  Any errors in syntax or even information may not have been identified and edited on initial review prior to signing this note.

## 2024-01-03 NOTE — CARE UPDATE
Interdisciplinary team, Megha Fernandes, RN Unit Director, Bailee Slater, RN Charge Nurse, Juliana Odell, PT, Rehab, and Lenora Salcido, [place], spoke to patient and patient's son-in-law for care plan conference, weekly status update, and therapy progress update. Tentative discharge date set for 1/17/24.

## 2024-01-03 NOTE — PLAN OF CARE
Pt engaged well in therapy this date, though benefit from additional encouragement to complete therapeutic tasks including transfers.     Problem: Occupational Therapy  Goal: Occupational Therapy Goal  Description: Goals to be met by: 1/25/2024     Patient will increase functional independence with ADLs by performing:    UE Dressing with Supervision- Ongoing  LE Dressing with Moderate Assistance and Assistive Devices as needed- Ongoing  Personal Hygiene while seated at sink with Supervision- Ongoing  Oral Hygiene while seated at sink with Supervision- Ongoing  Toileting from toilet with Moderate Assistance for hygiene and clothing management and AE as needed- Ongoing  Bathing from  sitting at sink with Minimal Assistance.  Toilet transfer to toilet with Contact Guard Assistance /c AE/LRAD as needed- Ongoing  Footwear /c Mod A and AE as needed  Tolerate standing functional tasks /c CGA and AE/LRAD as needed  Shower t/f /c CGA and AE/LRAD as needed      Outcome: Ongoing, Progressing

## 2024-01-03 NOTE — PT/OT/SLP PROGRESS
Physical Therapy Treatment    Patient Name:  Rosario Menendez   MRN:  [id number]  Admit Date: 12/26/2023  Admitting Diagnosis: Closed intertrochanteric fracture of left femur  Recent Surgeries:  INSERTION, INTRAMEDULLARY DIEGO, FEMUR (Left)      General Precautions: Standard, fall, hearing impaired  Orthopedic Precautions: LLE weight bearing as tolerated  Braces: N/A    Recommendations:     Discharge Recommendations: home health PT  Level of Assistance Recommended at Discharge: 24 hours light assistance  Discharge Equipment Recommendations: wheelchair, bedside commode  Barriers to discharge: Decreased caregiver support    Assessment:     Rosario Menendez is a 88 y.o. female admitted with a medical diagnosis of Closed intertrochanteric fracture of left femur . Pt requires max encouragement to participate with PT and at times appears self limiting. Pt will continue to benefit from skilled PT services during this hospital admit to continue to improve transfer ability and efficiency as well as continue to progress pt's ambulation distance and cardiopulmonary endurance to maximize pt's functional independence and return to PLOF.     Performance deficits affecting function: weakness, impaired endurance, impaired self care skills, impaired functional mobility, gait instability, impaired balance, decreased upper extremity function, decreased lower extremity function, impaired cardiopulmonary response to activity, orthopedic precautions.    Rehab Potential is good    Activity Tolerance: Fair    Plan:     Patient to be seen 5 x/week to address the above listed problems via gait training, therapeutic activities, therapeutic exercises, wheelchair management/training    Plan of Care Expires: 01/26/24  Plan of Care Reviewed with: patient    Subjective     Pt. Agreeable to work with PT after some encouragement from PT.     Pain/Comfort:  Pain Rating 1: 0/10  Pain Rating Post-Intervention 1: 0/10    Patient's cultural, spiritual,  "Hinduism conflicts given the current situation:  no    Objective:     Communicated with pt's nurse prior to session.  Patient found up in chair with   upon PT entry to room.     Therapeutic Activities and Exercises: LBEx 10mins and Gagandeep to get pt to keep peddling    Functional Mobility:  Transfers:     Sit to Stand from [place] and w/c:  minimum assistance with rolling walker  Bside Chair to W/C: minimum assistance with  no AD  using  Stand Pivot  Gait: 7ft +10ft +6ft with RW and Gagandeep d.t pt needing assitance for walker management and pt with FFT, short setp length and very slow ekta.pt needed seated rest breaks during each GT trial d.t pt c/o of "feeling tired".    AM-PAC 6 CLICK MOBILITY  14    Patient left up in chair with call button in reach.    GOALS:   Multidisciplinary Problems       Physical Therapy Goals          Problem: Physical Therapy    Goal Priority Disciplines Outcome Goal Variances Interventions   Physical Therapy Goal     PT, PT/OT Ongoing, Progressing     Description: Goals to be met by: 1/27/23     Patient will increase functional independence with mobility by performing:    . Supine to sit with MInimal Assistance  . Sit to supine with MInimal Assistance  . Rolling to Left and Right with Minimal Assistance.  . Sit to stand transfer with Contact Guard Assistance  . Bed to chair transfer with Contact Guard Assistance using Rolling Walker  . Gait  x 50 feet with Contact Guard Assistance using Rolling Walker.   . Wheelchair propulsion x100 feet with Supervision using bilateral uppper extremities                         Time Tracking:     PT Received On: 01/03/24  PT Start Time: 1018  PT Stop Time: 1052  PT Total Time (min): 34 min    Billable Minutes: Gait Training 22 and Therapeutic Exercise 12    Treatment Type: Treatment  PT/PTA: PT     Number of PTA visits since last PT visit: 0     01/03/2024  "

## 2024-01-03 NOTE — PLAN OF CARE
Problem: Adult Inpatient Plan of Care  Goal: Patient-Specific Goal (Individualized)  Outcome: Ongoing, Progressing     Problem: Adult Inpatient Plan of Care  Goal: Absence of Hospital-Acquired Illness or Injury  Outcome: Ongoing, Progressing     Problem: Adult Inpatient Plan of Care  Goal: Readiness for Transition of Care  Outcome: Ongoing, Progressing

## 2024-01-04 LAB
ANION GAP SERPL CALC-SCNC: 7 MMOL/L (ref 8–16)
BASOPHILS # BLD AUTO: 0.06 K/UL (ref 0–0.2)
BASOPHILS NFR BLD: 1 % (ref 0–1.9)
BUN SERPL-MCNC: 12 MG/DL (ref 8–23)
CALCIUM SERPL-MCNC: 9.3 MG/DL (ref 8.7–10.5)
CHLORIDE SERPL-SCNC: 113 MMOL/L (ref 95–110)
CO2 SERPL-SCNC: 23 MMOL/L (ref 23–29)
CREAT SERPL-MCNC: 0.7 MG/DL (ref 0.5–1.4)
DIFFERENTIAL METHOD BLD: ABNORMAL
EOSINOPHIL # BLD AUTO: 0.2 K/UL (ref 0–0.5)
EOSINOPHIL NFR BLD: 3.6 % (ref 0–8)
ERYTHROCYTE [DISTWIDTH] IN BLOOD BY AUTOMATED COUNT: 15.4 % (ref 11.5–14.5)
EST. GFR  (NO RACE VARIABLE): >60 ML/MIN/1.73 M^2
GLUCOSE SERPL-MCNC: 82 MG/DL (ref 70–110)
HCT VFR BLD AUTO: 31 % (ref 37–48.5)
HGB BLD-MCNC: 10.4 G/DL (ref 12–16)
IMM GRANULOCYTES # BLD AUTO: 0.02 K/UL (ref 0–0.04)
IMM GRANULOCYTES NFR BLD AUTO: 0.3 % (ref 0–0.5)
LYMPHOCYTES # BLD AUTO: 1.6 K/UL (ref 1–4.8)
LYMPHOCYTES NFR BLD: 26.9 % (ref 18–48)
MAGNESIUM SERPL-MCNC: 2.2 MG/DL (ref 1.6–2.6)
MCH RBC QN AUTO: 30.9 PG (ref 27–31)
MCHC RBC AUTO-ENTMCNC: 33.5 G/DL (ref 32–36)
MCV RBC AUTO: 92 FL (ref 82–98)
MONOCYTES # BLD AUTO: 0.8 K/UL (ref 0.3–1)
MONOCYTES NFR BLD: 13.2 % (ref 4–15)
NEUTROPHILS # BLD AUTO: 3.3 K/UL (ref 1.8–7.7)
NEUTROPHILS NFR BLD: 55 % (ref 38–73)
NRBC BLD-RTO: 0 /100 WBC
PHOSPHATE SERPL-MCNC: 2.5 MG/DL (ref 2.7–4.5)
PLATELET # BLD AUTO: 199 K/UL (ref 150–450)
PMV BLD AUTO: 10.1 FL (ref 9.2–12.9)
POTASSIUM SERPL-SCNC: 3.8 MMOL/L (ref 3.5–5.1)
RBC # BLD AUTO: 3.37 M/UL (ref 4–5.4)
SODIUM SERPL-SCNC: 143 MMOL/L (ref 136–145)
WBC # BLD AUTO: 5.91 K/UL (ref 3.9–12.7)

## 2024-01-04 PROCEDURE — 97530 THERAPEUTIC ACTIVITIES: CPT | Mod: HCNC,CO

## 2024-01-04 PROCEDURE — 97110 THERAPEUTIC EXERCISES: CPT | Mod: HCNC,CO

## 2024-01-04 PROCEDURE — 97530 THERAPEUTIC ACTIVITIES: CPT | Mod: HCNC,CQ

## 2024-01-04 PROCEDURE — 25000003 PHARM REV CODE 250: Mod: HCNC | Performed by: NURSE PRACTITIONER

## 2024-01-04 PROCEDURE — 80048 BASIC METABOLIC PNL TOTAL CA: CPT | Mod: HCNC | Performed by: NURSE PRACTITIONER

## 2024-01-04 PROCEDURE — 25000003 PHARM REV CODE 250: Mod: HCNC | Performed by: HOSPITALIST

## 2024-01-04 PROCEDURE — 36415 COLL VENOUS BLD VENIPUNCTURE: CPT | Mod: HCNC | Performed by: NURSE PRACTITIONER

## 2024-01-04 PROCEDURE — 84100 ASSAY OF PHOSPHORUS: CPT | Mod: HCNC | Performed by: NURSE PRACTITIONER

## 2024-01-04 PROCEDURE — 85025 COMPLETE CBC W/AUTO DIFF WBC: CPT | Mod: HCNC | Performed by: NURSE PRACTITIONER

## 2024-01-04 PROCEDURE — 97116 GAIT TRAINING THERAPY: CPT | Mod: HCNC,CQ

## 2024-01-04 PROCEDURE — 83735 ASSAY OF MAGNESIUM: CPT | Mod: HCNC | Performed by: NURSE PRACTITIONER

## 2024-01-04 PROCEDURE — 11000004 HC SNF PRIVATE: Mod: HCNC

## 2024-01-04 PROCEDURE — 94761 N-INVAS EAR/PLS OXIMETRY MLT: CPT | Mod: HCNC

## 2024-01-04 PROCEDURE — 97110 THERAPEUTIC EXERCISES: CPT | Mod: HCNC,CQ

## 2024-01-04 PROCEDURE — 63700000 PHARM REV CODE 250 ALT 637 W/O HCPCS: Mod: HCNC | Performed by: NURSE PRACTITIONER

## 2024-01-04 RX ADMIN — Medication 6 MG: at 09:01

## 2024-01-04 RX ADMIN — MEMANTINE 10 MG: 10 TABLET ORAL at 08:01

## 2024-01-04 RX ADMIN — MEMANTINE 10 MG: 10 TABLET ORAL at 09:01

## 2024-01-04 RX ADMIN — SENNOSIDES AND DOCUSATE SODIUM 1 TABLET: 8.6; 5 TABLET ORAL at 08:01

## 2024-01-04 RX ADMIN — METHOCARBAMOL 500 MG: 500 TABLET ORAL at 02:01

## 2024-01-04 RX ADMIN — PRAVASTATIN SODIUM 40 MG: 20 TABLET ORAL at 09:01

## 2024-01-04 RX ADMIN — GALANTAMINE 16 MG: 16 CAPSULE, EXTENDED RELEASE ORAL at 07:01

## 2024-01-04 RX ADMIN — SENNOSIDES AND DOCUSATE SODIUM 1 TABLET: 8.6; 5 TABLET ORAL at 09:01

## 2024-01-04 RX ADMIN — METHOCARBAMOL 500 MG: 500 TABLET ORAL at 08:01

## 2024-01-04 RX ADMIN — CALCIUM CARBONATE (ANTACID) CHEW TAB 500 MG 500 MG: 500 CHEW TAB at 12:01

## 2024-01-04 RX ADMIN — METHOCARBAMOL 500 MG: 500 TABLET ORAL at 09:01

## 2024-01-04 RX ADMIN — DIBASIC SODIUM PHOSPHATE, MONOBASIC POTASSIUM PHOSPHATE AND MONOBASIC SODIUM PHOSPHATE 2 TABLET: 852; 155; 130 TABLET ORAL at 01:01

## 2024-01-04 RX ADMIN — ASPIRIN 81 MG: 81 TABLET, COATED ORAL at 09:01

## 2024-01-04 RX ADMIN — ASPIRIN 81 MG: 81 TABLET, COATED ORAL at 08:01

## 2024-01-04 RX ADMIN — PREGABALIN 50 MG: 50 CAPSULE ORAL at 09:01

## 2024-01-04 RX ADMIN — LEVOTHYROXINE SODIUM 75 MCG: 75 TABLET ORAL at 05:01

## 2024-01-04 RX ADMIN — CYANOCOBALAMIN TAB 1000 MCG 1000 MCG: 1000 TAB at 08:01

## 2024-01-04 RX ADMIN — BUPROPION HYDROCHLORIDE 150 MG: 150 TABLET, EXTENDED RELEASE ORAL at 08:01

## 2024-01-04 NOTE — PROGRESS NOTES
"                                                        [facility]                                  Skilled Nursing Facility                   Progress Note     Admit Date: 12/26/2023  AMARA 1/17/2024  [id number]  Principal Problem:  Closed intertrochanteric fracture of left femur   HPI obtained from patient interview and chart review     Chief Complaint:Re-evaluation of medical treatment and therapy status: Lab review    HPI:   Ms. Menendez is a 88 year old female PMHx of osteoporosis, dementia, thrombocytopenia and hypothyroidism presents to SNF following hospitalization for closed intertrochanteric left femur fracture s/p cephalomedullary nail fixation on 12/23 with Dr. Jerry.  Admission to SNF for secondary weakness and debility.    Interval history:All of today's labs reviewed and are listed below.  24 hr vital sign ranges reviewed and listed below, no acute abnormalities noted.  SpO2 now improved to 96%.  No further low-grade temperatures.  Upon entering the room, patient was complaining of chest pain.  Patient appeared comfortable, was not diaphoretic, denied nausea or radiating pain down either arm, she would just finished eating lunch, Tums provided inpatient stated that pain subsided.  Patient denies shortness of breath, abdominal discomfort, nausea, or vomiting.  Patient reports an adequate appetite.  Patient denies dysuria.  Patient reports having regular bowel movements.  Patient progressing with PT/OT-Gait: 7ft +10ft +6ft with RW and Gagandeep d.t pt needing assitance for walker management and pt with FFT, short setp length and very slow ekta.pt needed seated rest breaks during each GT trial d.t pt c/o of "feeling tired".  Continuing to follow and treat all acute and chronic conditions.  Family at bedside provided care update.      Past Medical History: Patient has a past medical history of Arthritis, Depression, Hypercholesteremia, Thrombocytopenia, and Thyroid " disease.    Past Surgical History: Patient has a past surgical history that includes Hysterectomy; Knee surgery; Ankle surgery; Wrist surgery; Colonoscopy (N/A, 6/2/2021); and Intramedullary rodding of femur (Left, 12/23/2023).    Social History: Patient reports that she has never smoked. She has never used smokeless tobacco.    Family History: family history includes Cancer in her maternal uncle; Heart failure in her father and mother; Suicide in her son.    Allergies: Patient is allergic to codeine.    ROS  Constitutional: Negative for fever   Eyes: Negative for blurred vision, double vision   Respiratory: Negative for shortness of breath.  +dry intermittent cough   Cardiovascular: Negative for chest pain, palpitations, and leg swelling.   Gastrointestinal: Negative for abdominal pain, constipation, diarrhea, nausea, vomiting.  Placenta indigestion  Genitourinary: Negative for dysuria, frequency   Musculoskeletal:  + generalized weakness.  +mild intermittent LLE pain  Skin: Negative for itching and rash.   Neurological: Negative for dizziness, headaches.   Psychiatric/Behavioral: Negative for depression. The patient is not nervous/anxious.      24 hour Vital Sign Range   Temp:  [97.6 °F (36.4 °C)-98.1 °F (36.7 °C)]   Pulse:  [60-62]   Resp:  [17]   BP: (125-136)/(60-63)   SpO2:  [95 %-96 %]     Current BMI: Body mass index is 25.53 kg/m².    PEx  Constitutional: Patient appears debilitated.  No distress noted  HENT:   Head: Normocephalic and atraumatic.   Eyes: Pupils are equal, round  Neck: Normal range of motion. Neck supple.   Cardiovascular: Normal rate, regular rhythm and normal heart sounds.    Pulmonary/Chest: Effort normal and breath sounds are clear  Abdominal: Soft. Bowel sounds are normal.   Musculoskeletal: Normal range of motion.   Neurological: Alert and oriented to person.  Disoriented to place, and time.  Confused.  Higher level thinking impaired  Psychiatric: Normal mood and affect. Behavior is  "normal.   Skin: Skin is warm and dry.  +surgical dressing to LLE    Recent Labs   Lab 01/04/24  0620      K 3.8   *   CO2 23   BUN 12   CREATININE 0.7   CALCIUM 9.3   MG 2.2         Recent Labs   Lab 01/04/24  0620   WBC 5.91   RBC 3.37*   HGB 10.4*   HCT 31.0*      MCV 92   MCH 30.9   MCHC 33.5         No results for input(s): "POCTGLUCOSE" in the last 168 hours.     Assessment and Plan:    Closed intertrochanteric fracture of left femur  S/p cephalomedullary nail fixation on 12/23  - PT/OT, WBAT  - DVT PPX with ASA 81 mg BID x 30 days  - maintain surgical dressing until removed by Orthopedics  - ortho APAP to see patient [facility] at [facility]  - follow-up with ortho after discharge    Hypophosphatemia  - initiated K-Phos 500 mg x 1 dose    Acute postoperative pain  - stable, continue acetaminophen 1000 mg q.8 hours PRN, gabapentin 500 mg qHS., methocarbamol 500 mg TID.  Bowel regimen in place     Acute Blood loss anemia  - expected postoperatively  - stable, continue monitor twice weekly CBC, transfuse for hemoglobin < 7    Amnestic MCI (mild cognitive impairment with memory loss)  - Continue memantine 10 mg BID, galantamine 16 mg daily, and buproprion 150 mg BID, B12   Delirium precautions:  - Avoid antihistamines, anticholinergics, hypnotics, and minimize opiates while controlling for pain as these medications may exacerbate delirium. Cues for day/night will assist with keeping patient calm and oriented - during daytime, please keep adequate light in room (open windows, lights on) and please keep room dim at night-time to encourage normal sleep-wake cycles. Continuing to have nursing and family reorient the patient and encourage family to visit    Thrombocytopenia, resolved  - Patient was found to have thrombocytopenia, which is chronic.   - transfuse if platelet count is <10k.   - Hold DVT prophylaxis if platelets are <50k.  - stable, platelet count is maintaining normal limits, continue monitor " twice weekly CBCs     Hypercholesteremia  - continue home medication pravastatin 40 mg qHS.      Hypothyroidism  - stable, Continue levothyroxine 75 mcg daily    Debility   - Continue with PT/OT for gait training and strengthening and restoration of ADL's   - Encourage mobility, OOB in chair, and early ambulation as appropriate  - Fall precautions   - Monitor for bowel and bladder dysfunction  - Monitor for and prevent skin breakdown and pressure ulcers  - Continue DVT prophylaxis with ASA 81 mg BID             Anticipate disposition:  Home with home health    IP [facility] RISK OF UNPLANNED READMISSION Model: HIGH MODERATE LOW    Follow-up needed during SNF stay-    Apt not send pt to- Ortho 1/8    Follow-up needed after discharge from SNF: PCP, ortho 2/5     Future Appointments   Date Time Provider Department Center   1/8/2024 12:15 PM Lauren Cosme PA-C [facility] [address]THO [address]   2/5/2024 10:30 AM Lauren Cosme PA-C [facility] ORTHO [address]   3/21/2024 11:00 AM Shi Simon MD [facility] IM [address]  Department of Hospital Medicine   [facility]     DOS: 1/4/2024       Patient note was created using MModal Dictation.  Any errors in syntax or even information may not have been identified and edited on initial review prior to signing this note.

## 2024-01-04 NOTE — PLAN OF CARE
Problem: Occupational Therapy  Goal: Occupational Therapy Goal  Description: Goals to be met by: 1/25/2024     Patient will increase functional independence with ADLs by performing:    UE Dressing with Supervision- Ongoing  LE Dressing with Moderate Assistance and Assistive Devices as needed- Ongoing  Personal Hygiene while seated at sink with Supervision- Ongoing  Oral Hygiene while seated at sink with Supervision- Ongoing  Toileting from toilet with Moderate Assistance for hygiene and clothing management and AE as needed- Ongoing  Bathing from  sitting at sink with Minimal Assistance.  Toilet transfer to toilet with Contact Guard Assistance /c AE/LRAD as needed- Ongoing  Footwear /c Mod A and AE as needed  Tolerate standing functional tasks /c CGA and AE/LRAD as needed  Shower t/f /c CGA and AE/LRAD as needed      Outcome: Ongoing, Progressing

## 2024-01-04 NOTE — PT/OT/SLP PROGRESS
"Occupational Therapy   Treatment    Name: Rosario Menendez  MRN: [id number]  Admit Date: 12/26/2023  Admitting Diagnosis:  Closed intertrochanteric fracture of left femur    General Precautions: Standard, fall, hearing impaired   Orthopedic Precautions: LLE weight bearing as tolerated   Braces: N/A    Recommendations:     Discharge Recommendations:  home health OT  Level of Assistance Recommended at Discharge: 24 hours physical assistance for all ADL's and home management tasks  Discharge Equipment Recommendations: hip kit, 3-in-1 commode, shower chair  Barriers to discharge:   (increased assistance for ADLs and mobility)    Assessment:     Rosario Menendez is a 88 y.o. female with a medical diagnosis of Closed intertrochanteric fracture of left femur .  She presents with performance deficits affecting function are weakness, impaired endurance, impaired self care skills, impaired functional mobility, gait instability, impaired balance, decreased coordination, decreased upper extremity function, decreased lower extremity function, decreased safety awareness, decreased ROM, pain.   Pt participated well during today's session. Pt tolerated tx session without incident and is making progress, however, continues to demonstrate deficits with self care skills, balance, functional mobility, UB strength and endurance. Pt will benefit from continued OT services to progress towards goals.     Rehab Potential is good    Activity tolerance:  Fair    Plan:     Patient to be seen 5 x/week to address the above listed problems via self-care/home management, therapeutic activities, therapeutic exercises, neuromuscular re-education    Plan of Care Expires: 01/25/24  Plan of Care Reviewed with: patient    Subjective   "I'm just so tired"  Communicated with: Jay prior to session.     Pain/Comfort:  Pain Rating 1:  (did not rate)  Location - Side 1: Left  Location - Orientation 1: generalized  Location 1: hip  Pain Addressed 1: " Reposition, Pre-medicate for activity, Distraction  Pain Rating Post-Intervention 1:  (did not rate)    Patient's cultural, spiritual, Latter day conflicts given the current situation:  no    Objective:     Patient found sitting edge of bed with PCT present upon OT entry to room.    Bed Mobility:    Patient completed Rolling/Turning to Right with contact guard assistance and with side rail  Patient completed Scooting/Bridging with stand by assistance  Patient completed Sit to Supine with minimum assistance to manage B LE.     Functional Mobility/Transfers:  Patient completed Sit <> Stand Transfer with minimum assistance  with  rolling walker   Patient completed Bed <> Chair Transfer using Stand Pivot technique with minimum assistance with no assistive device  Patient completed Toilet Transfer Stand Pivot technique with minimum assistance with  grab bars    Activities of Daily Living:  Toileting: minimum assistance for clothing management in stance. Pt perform becky hygiene seated on toilet.     [facility] 6 Click ADL: 16    OT Exercises: UE Ergometer 10 min with min resistance.   Pt with intermittent rest breaks to complete secondary to increased fatigue.      Therex performed to improve overall endurance, ROM and UB strength required for functional transfers, ADL's and w/c propulsion.     Treatment & Education:  Pt educated on:  - role of OT  - level of assistance  - energy conservation and task modification to maximize independence with ADL's and mobility   -  safety while performing functional transfers and self care tasks  - orthopedic precautions.   - progress towards OT goals     Patient left HOB elevated with call button in reach    GOALS:   Multidisciplinary Problems       Occupational Therapy Goals          Problem: Occupational Therapy    Goal Priority Disciplines Outcome Interventions   Occupational Therapy Goal     OT, PT/OT Ongoing, Progressing    Description: Goals to be met by: 1/25/2024     Patient will  increase functional independence with ADLs by performing:    UE Dressing with Supervision- Ongoing  LE Dressing with Moderate Assistance and Assistive Devices as needed- Ongoing  Personal Hygiene while seated at sink with Supervision- Ongoing  Oral Hygiene while seated at sink with Supervision- Ongoing  Toileting from toilet with Moderate Assistance for hygiene and clothing management and AE as needed- Ongoing  Bathing from  sitting at sink with Minimal Assistance.  Toilet transfer to toilet with Contact Guard Assistance /c AE/LRAD as needed- Ongoing  Footwear /c Mod A and AE as needed  Tolerate standing functional tasks /c CGA and AE/LRAD as needed  Shower t/f /c CGA and AE/LRAD as needed                           Time Tracking:     OT Date of Treatment: 01/04/24  OT Start Time: 1318    OT Stop Time: 1356  OT Total Time (min): 38 min    Billable Minutes:Therapeutic Activity 24  Therapeutic Exercise 14    1/4/2024

## 2024-01-04 NOTE — PT/OT/SLP PROGRESS
"Physical Therapy Treatment    Patient Name:  Rosario Menendez   MRN:  [id number]  Admit Date: 12/26/2023  Admitting Diagnosis: Closed intertrochanteric fracture of left femur  Recent Surgeries:     General Precautions: Standard, fall, hearing impaired  Orthopedic Precautions: LLE weight bearing as tolerated  Braces: N/A    Recommendations:     Discharge Recommendations: home health PT  Level of Assistance Recommended at Discharge: 24 hours significant assistance  Discharge Equipment Recommendations: wheelchair, bedside commode  Barriers to discharge: Decreased caregiver support    Assessment:     Rosario Menendez is a 88 y.o. female admitted with a medical diagnosis of Closed intertrochanteric fracture of left femur . Pt tolerated well, pt would continue to benefit from skilled PT services to improve overall functional mobility, strength and endurance.  .      Performance deficits affecting function: weakness, impaired endurance, impaired self care skills, impaired functional mobility, gait instability, impaired balance, decreased upper extremity function, decreased lower extremity function, impaired cardiopulmonary response to activity, orthopedic precautions.    Rehab Potential is good    Activity Tolerance: Fair    Plan:     Patient to be seen 5 x/week to address the above listed problems via gait training, therapeutic activities, therapeutic exercises, wheelchair management/training    Plan of Care Expires: 01/26/24  Plan of Care Reviewed with: patient    Subjective     "Have to go to the bathroom first".     Pain/Comfort:  Pain Rating 1: 4/10  Location - Side 1: Left  Location - Orientation 1: upper  Location 1: leg  Pain Addressed 1: Reposition, Distraction, Cessation of Activity (declined needing meds)  Pain Rating Post-Intervention 1: 4/10    Patient's cultural, spiritual, Restoration conflicts given the current situation:  no    Objective:       Patient found with  (in BSC) upon PT entry to room. "     Therapeutic Activities and Exercises: 2x10 reps A/AA as needed LLE with in limited tolerable ROM AP,GS,LAQ,hip flex    Functional Mobility:  Transfers:     Sit to Stand:  contact guard assistance and minimum assistance with rolling walker  Bed to Chair: contact guard assistance and minimum assistance with  rolling walker  using  Stand Pivot and BSChair to BSCommode to WC  Toilet Transfer: contact guard assistance and minimum assistance with  rolling walker  using  Stand Pivot, A with clothing/brief mgmt, set up for hand hygiene and brushing hair in WC  Gait: amb with RW CGA ~ 15 ft to bathroom, 10 ft to WC then 18 ft in the gym with rest breaks slow ekta  Wc mob with BUE SBA ~ 25 ft    AM-PAC 6 CLICK MOBILITY  14    Patient left up in chair with call button in reach and belonging sin reach .    GOALS:   Multidisciplinary Problems       Physical Therapy Goals          Problem: Physical Therapy    Goal Priority Disciplines Outcome Goal Variances Interventions   Physical Therapy Goal     PT, PT/OT Ongoing, Progressing     Description: Goals to be met by: 1/27/23     Patient will increase functional independence with mobility by performing:    . Supine to sit with MInimal Assistance  . Sit to supine with MInimal Assistance  . Rolling to Left and Right with Minimal Assistance.  . Sit to stand transfer with Contact Guard Assistance  . Bed to chair transfer with Contact Guard Assistance using Rolling Walker  . Gait  x 50 feet with Contact Guard Assistance using Rolling Walker.   . Wheelchair propulsion x100 feet with Supervision using bilateral uppper extremities                         Time Tracking:     PT Received On: 01/04/24  PT Start Time: 1131  PT Stop Time: 1211  PT Total Time (min): 40 min    Billable Minutes: Gait Training 15, Therapeutic Activity 12, and Therapeutic Exercise 13    Treatment Type: Treatment  PT/PTA: PTA     Number of PTA visits since last PT visit: 1 01/04/2024

## 2024-01-04 NOTE — PLAN OF CARE
Problem: Adult Inpatient Plan of Care  Goal: Plan of Care Review  Outcome: Ongoing, Progressing  Flowsheets (Taken 1/3/2024 1901)  Plan of Care Reviewed With: patient  Goal: Patient-Specific Goal (Individualized)  Outcome: Ongoing, Progressing  Goal: Absence of Hospital-Acquired Illness or Injury  Outcome: Ongoing, Progressing  Goal: Optimal Comfort and Wellbeing  Outcome: Ongoing, Progressing  Goal: Readiness for Transition of Care  Outcome: Ongoing, Progressing     Problem: Fall Injury Risk  Goal: Absence of Fall and Fall-Related Injury  Outcome: Ongoing, Progressing     Problem: Skin Injury Risk Increased  Goal: Skin Health and Integrity  Outcome: Ongoing, Progressing

## 2024-01-05 PROCEDURE — 25000003 PHARM REV CODE 250: Mod: HCNC | Performed by: HOSPITALIST

## 2024-01-05 PROCEDURE — 97535 SELF CARE MNGMENT TRAINING: CPT | Mod: HCNC,CO

## 2024-01-05 PROCEDURE — 97530 THERAPEUTIC ACTIVITIES: CPT | Mod: HCNC,CQ

## 2024-01-05 PROCEDURE — 97530 THERAPEUTIC ACTIVITIES: CPT | Mod: HCNC,CO

## 2024-01-05 PROCEDURE — 97110 THERAPEUTIC EXERCISES: CPT | Mod: HCNC,CO

## 2024-01-05 PROCEDURE — 63700000 PHARM REV CODE 250 ALT 637 W/O HCPCS: Mod: HCNC | Performed by: NURSE PRACTITIONER

## 2024-01-05 PROCEDURE — 25000003 PHARM REV CODE 250: Mod: HCNC | Performed by: NURSE PRACTITIONER

## 2024-01-05 PROCEDURE — 97110 THERAPEUTIC EXERCISES: CPT | Mod: HCNC,CQ

## 2024-01-05 PROCEDURE — 97116 GAIT TRAINING THERAPY: CPT | Mod: HCNC,CQ

## 2024-01-05 PROCEDURE — 11000004 HC SNF PRIVATE: Mod: HCNC

## 2024-01-05 RX ORDER — FAMOTIDINE 20 MG/1
20 TABLET, FILM COATED ORAL DAILY
Status: DISCONTINUED | OUTPATIENT
Start: 2024-01-05 | End: 2024-01-17 | Stop reason: HOSPADM

## 2024-01-05 RX ADMIN — SENNOSIDES AND DOCUSATE SODIUM 1 TABLET: 8.6; 5 TABLET ORAL at 10:01

## 2024-01-05 RX ADMIN — BUPROPION HYDROCHLORIDE 150 MG: 150 TABLET, EXTENDED RELEASE ORAL at 10:01

## 2024-01-05 RX ADMIN — METHOCARBAMOL 500 MG: 500 TABLET ORAL at 02:01

## 2024-01-05 RX ADMIN — LEVOTHYROXINE SODIUM 75 MCG: 75 TABLET ORAL at 05:01

## 2024-01-05 RX ADMIN — METHOCARBAMOL 500 MG: 500 TABLET ORAL at 10:01

## 2024-01-05 RX ADMIN — ASPIRIN 81 MG: 81 TABLET, COATED ORAL at 08:01

## 2024-01-05 RX ADMIN — MEMANTINE 10 MG: 10 TABLET ORAL at 08:01

## 2024-01-05 RX ADMIN — PREGABALIN 50 MG: 50 CAPSULE ORAL at 08:01

## 2024-01-05 RX ADMIN — METHOCARBAMOL 500 MG: 500 TABLET ORAL at 08:01

## 2024-01-05 RX ADMIN — FAMOTIDINE 20 MG: 20 TABLET ORAL at 12:01

## 2024-01-05 RX ADMIN — PRAVASTATIN SODIUM 40 MG: 20 TABLET ORAL at 08:01

## 2024-01-05 RX ADMIN — CYANOCOBALAMIN TAB 1000 MCG 1000 MCG: 1000 TAB at 10:01

## 2024-01-05 RX ADMIN — MEMANTINE 10 MG: 10 TABLET ORAL at 10:01

## 2024-01-05 RX ADMIN — GALANTAMINE 16 MG: 16 CAPSULE, EXTENDED RELEASE ORAL at 07:01

## 2024-01-05 RX ADMIN — SENNOSIDES AND DOCUSATE SODIUM 1 TABLET: 8.6; 5 TABLET ORAL at 08:01

## 2024-01-05 RX ADMIN — Medication 6 MG: at 08:01

## 2024-01-05 RX ADMIN — ASPIRIN 81 MG: 81 TABLET, COATED ORAL at 10:01

## 2024-01-05 NOTE — PT/OT/SLP PROGRESS
"Physical Therapy Treatment    Patient Name:  Rosario Menendez   MRN:  [id number]  Admit Date: 12/26/2023  Admitting Diagnosis: Closed intertrochanteric fracture of left femur  Recent Surgeries:     General Precautions: Standard, fall, hearing impaired  Orthopedic Precautions: LLE weight bearing as tolerated  Braces: N/A    Recommendations:     Discharge Recommendations: home health PT  Level of Assistance Recommended at Discharge: 24 hours significant assistance  Discharge Equipment Recommendations: wheelchair, bedside commode  Barriers to discharge: Decreased caregiver support    Assessment:     Rosario Menendez is a 88 y.o. female admitted with a medical diagnosis of Closed intertrochanteric fracture of left femur . Pt tolerated well, cooperative but very quiet, no initiation of conversation, pt would continue to benefit from skilled PT services to improve overall functional mobility, strength and endurance.  .      Performance deficits affecting function: weakness, impaired endurance, impaired self care skills, impaired functional mobility, gait instability, impaired balance, decreased upper extremity function, decreased lower extremity function, impaired cardiopulmonary response to activity, orthopedic precautions.    Rehab Potential is good    Activity Tolerance: Fair    Plan:     Patient to be seen 5 x/week to address the above listed problems via gait training, therapeutic activities, therapeutic exercises, wheelchair management/training    Plan of Care Expires: 01/26/24  Plan of Care Reviewed with: patient    Subjective     "OK".     Pain/Comfort:  Pain Rating 1: 3/10  Location - Side 1: Left  Location - Orientation 1: generalized  Location 1: hip  Pain Addressed 1: Reposition, Distraction, Cessation of Activity (declined needing meds)  Pain Rating Post-Intervention 1: 3/10 (appears to inc with mobility, did not rate)    Patient's cultural, spiritual, Sikhism conflicts given the current " situation:  no    Objective:      Patient found with  (in WC) upon PT entry to room.     Therapeutic Activities and Exercises: 2x10 reps AP,GS,LAQ,hip flex within tolerable ROM    Functional Mobility:  Transfers:     Sit to Stand:  contact guard assistance and minimum assistance with rolling walker  Gait: amb with RW CGA ~ 6 ft 10 ft and 22 ft seated rest break no LOB slow ekta  Wheelchair Propulsion: ~ 65 ft with BUE SBA vcs for turning/tech, inc time    AM-PAC 6 CLICK MOBILITY  14    Patient left up in chair with call button in reach and belonging sin reach .    GOALS:   Multidisciplinary Problems       Physical Therapy Goals          Problem: Physical Therapy    Goal Priority Disciplines Outcome Goal Variances Interventions   Physical Therapy Goal     PT, PT/OT Ongoing, Progressing     Description: Goals to be met by: 1/27/23     Patient will increase functional independence with mobility by performing:    . Supine to sit with MInimal Assistance  . Sit to supine with MInimal Assistance  . Rolling to Left and Right with Minimal Assistance.  . Sit to stand transfer with Contact Guard Assistance  . Bed to chair transfer with Contact Guard Assistance using Rolling Walker  . Gait  x 50 feet with Contact Guard Assistance using Rolling Walker.   . Wheelchair propulsion x100 feet with Supervision using bilateral uppper extremities                         Time Tracking:     PT Received On: 01/05/24  PT Start Time: 0934  PT Stop Time: 1012  PT Total Time (min): 38 min    Billable Minutes: Gait Training 15, Therapeutic Activity 10, and Therapeutic Exercise 13    Treatment Type: Treatment  PT/PTA: PTA     Number of PTA visits since last PT visit: 2     01/05/2024

## 2024-01-05 NOTE — PLAN OF CARE
Problem: Adult Inpatient Plan of Care  Goal: Plan of Care Review  Outcome: Ongoing, Progressing  Flowsheets (Taken 1/4/2024 1924)  Plan of Care Reviewed With: patient  Goal: Patient-Specific Goal (Individualized)  Outcome: Ongoing, Progressing  Goal: Absence of Hospital-Acquired Illness or Injury  Outcome: Ongoing, Progressing  Goal: Optimal Comfort and Wellbeing  Outcome: Ongoing, Progressing  Goal: Readiness for Transition of Care  Outcome: Ongoing, Progressing     Problem: Fall Injury Risk  Goal: Absence of Fall and Fall-Related Injury  Outcome: Ongoing, Progressing     Problem: Skin Injury Risk Increased  Goal: Skin Health and Integrity  Outcome: Ongoing, Progressing

## 2024-01-05 NOTE — PT/OT/SLP PROGRESS
"Occupational Therapy   Treatment    Name: Rosario Menendez  MRN: [id number]  Admit Date: 12/26/2023  Admitting Diagnosis:  Closed intertrochanteric fracture of left femur    General Precautions: Standard, fall, hearing impaired   Orthopedic Precautions: LLE weight bearing as tolerated   Braces: N/A    Recommendations:     Discharge Recommendations:  home health OT  Level of Assistance Recommended at Discharge: 24 hours physical assistance for all ADL's and home management tasks  Discharge Equipment Recommendations: hip kit, 3-in-1 commode, shower chair  Barriers to discharge:   (increased assistance for ADLs and mobility)    Assessment:     Rosario Menendez is a 88 y.o. female with a medical diagnosis of Closed intertrochanteric fracture of left femur .  She presents with performance deficits affecting function are weakness, impaired endurance, impaired self care skills, impaired functional mobility, gait instability, impaired balance, decreased coordination, decreased upper extremity function, decreased lower extremity function, decreased safety awareness, decreased ROM, pain.   Pt participated well during today's session. Pt tolerated tx session without incident and is making progress, however, continues to demonstrate deficits with self care skills, balance, functional mobility, UB strength and endurance. Pt will benefit from continued OT services to progress towards goals.     Rehab Potential is good    Activity tolerance:  Good    Plan:     Patient to be seen 5 x/week to address the above listed problems via self-care/home management, therapeutic activities, therapeutic exercises, neuromuscular re-education    Plan of Care Expires: 01/25/24  Plan of Care Reviewed with: patient    Subjective   "I need help cleaning"  Communicated with: Jay prior to session.    Pain/Comfort:  Pain Rating 1: 0/10  Pain Rating Post-Intervention 1: 0/10    Patient's cultural, spiritual, Gnosticism conflicts given the current " situation:  no    Objective:     Patient found  seated on toilet in bathroom  with call button in reach upon OT entry to room.    Functional Mobility/Transfers:  Patient completed Sit <> Stand Transfer with minimum assistance  with  rolling walker   Patient completed Toilet Transfer Stand Pivot technique with minimum assistance with  grab bars    Activities of Daily Living:  Grooming: stand by assistance to perform oral/facial hygiene and groom hair seated at sink.   Toileting: moderate assistance to perform perirectal hygiene and clothing management.     [facility] 6 Click ADL: 16    OT Exercises: AROM x 2 sets of 10 with #2 dowel. Pt perform shoulder flex/ext, forward flex motion and bicep curls.      Therex performed to improve overall endurance, ROM and UB strength required for functional transfers, ADL's and w/c propulsion.     Treatment & Education:  Pt with standing activity on today with min (A) and RW. Pt at raised counter perform matching card activity with focus on standing tolerance, dynamic standing bal, crossing midline, and to promote independence with homemaking and self care tasks.  Pot tolerate stance for~ 2:15 min/sec.     Pt educated on:  - role of OT  - level of assistance  - energy conservation and task modification to maximize independence with ADL's and mobility   -  safety while performing functional transfers and self care tasks  - orthopedic precautions.   - progress towards OT goals     Patient left up in chair with call button in reach and PCT notified.    GOALS:   Multidisciplinary Problems       Occupational Therapy Goals          Problem: Occupational Therapy    Goal Priority Disciplines Outcome Interventions   Occupational Therapy Goal     OT, PT/OT Ongoing, Progressing    Description: Goals to be met by: 1/25/2024     Patient will increase functional independence with ADLs by performing:    UE Dressing with Supervision- Ongoing  LE Dressing with Moderate Assistance and Assistive Devices  as needed- Ongoing  Personal Hygiene while seated at sink with Supervision- Ongoing  Oral Hygiene while seated at sink with Supervision- Ongoing  Toileting from toilet with Moderate Assistance for hygiene and clothing management and AE as needed- Ongoing  Bathing from  sitting at sink with Minimal Assistance.  Toilet transfer to toilet with Contact Guard Assistance /c AE/LRAD as needed- Ongoing  Footwear /c Mod A and AE as needed  Tolerate standing functional tasks /c CGA and AE/LRAD as needed  Shower t/f /c CGA and AE/LRAD as needed                           Time Tracking:     OT Date of Treatment: 01/05/24  OT Start Time: 0850    OT Stop Time: 0929  OT Total Time (min): 39 min    Billable Minutes:Self Care/Home Management 15  Therapeutic Activity 14  Therapeutic Exercise 10    1/5/2024

## 2024-01-05 NOTE — PROGRESS NOTES
[facility] - Skilled Nursing  Adult Nutrition  Progress Note    SUMMARY   Recommendations  Continue regular diet, assist with set up,RD following  Goals: PO to meet 75% of EEN/EPN by next RD follow up  Nutrition Goal Status: progressing towards goal  Communication of RD Recs: other (comment) (POC)    Assessment and Plan   Increased protein needs for healing as evidenced by s/p L femur repair.     New     Plan  Collaboration with other providers  General diet  Vitamin supplement therapy, Vit B12, recommend MVI          Malnutrition Assessment  12/29     Skin (Micronutrient): bruised, edema, dry, wounds unhealed  Hair/Scalp (Micronutrient): dry  Teeth (Micronutrient): broken dentition  Neck/Chest (Micronutrient): muscle wasting  Musculoskeletal/Lower Extremities: muscle wasting           Orbital Region (Subcutaneous Fat Loss): moderate depletion  Upper Arm Region (Subcutaneous Fat Loss): mild depletion  Thoracic and Lumbar Region: well nourished   [place] Region (Muscle Loss): moderate depletion  Clavicle Bone Region (Muscle Loss): mild depletion  Clavicle and Acromion Bone Region (Muscle Loss): mild depletion  Dorsal Hand (Muscle Loss): moderate depletion                 Reason for Assessment    Reason For Assessment: RD follow-up  Diagnosis:  (L hip fracture, s/p IMN)  Relevant Medical History: Osteoporosis, dementia, hypothyroid, hypophosphatemia, blood loss anemia at present,  Interdisciplinary Rounds: did not attend  General Information Comments: poor PO outside treats,  Nutrition Discharge Planning: DC on regular diet    Nutrition/Diet History    Patient Reported Diet/Restrictions/Preferences: general  Typical Food/Fluid Intake: regular margie  Spiritual, Cultural Beliefs, Anabaptist Practices, Values that Affect Care: no  Food Allergies: NKFA  Factors Affecting Nutritional Intake: impaired cognitive status/motor control    Anthropometrics    Temp: 97.8 °F (36.6 °C)  Height Method: Stated  Height: 5' (152.4  cm)  Height (inches): 60 in  Weight Method: Bed Scale  Weight: 59.3 kg (130 lb 11.7 oz)  Weight (lb): 130.73 lb  Ideal Body Weight (IBW), Female: 100 lb  % Ideal Body Weight, Female (lb): 133.82 %  BMI (Calculated): 25.5  BMI Grade: 25 - 29.9 - overweight  Usual Body Weight (UBW), k kg  % Usual Body Weight: 103.1  % Weight Change From Usual Weight: 2.88 %       Lab/Procedures/Meds    Pertinent Labs Reviewed: reviewed  Pertinent Labs Comments: Hg 10.4, Hct 31.0, Cl 113, PO4 2.5  Pertinent Medications Reviewed: reviewed  Pertinent Medications Comments: ASA, Vit B12, Levothyroxine, senna-docusate, memantine  Estimated/Assessed Needs    Weight Used For Calorie Calculations: 60.7 kg (133 lb 13.1 oz)  Energy Calorie Requirements (kcal): 1341  Energy Need Method: [place] Mix-St Jeor (x 1..4(PAL))  Protein Requirements: 73-80  Weight Used For Protein Calculations: 60.7 kg (133 lb 13.1 oz) (x 1.2-1.3g/kg)  Fluid Requirements (mL): 1341 or per MD  Estimated Fluid Requirement Method: RDA Method  RDA Method (mL): 1341  CHO Requirement: -      Nutrition Prescription Ordered    Current Diet Order: Regular  Nutrition Order Comments: PO 60%  Oral Nutrition Supplement: refuses    Evaluation of Received Nutrient/Fluid Intake    I/O: no data  Energy Calories Required: not meeting needs  Protein Required: not meeting needs  Fluid Required: meeting needs  Comments: LBM 1/4  Tolerance: tolerating  % Intake of Estimated Energy Needs: 50 - 75 %  % Meal Intake: 50 - 75 %    Nutrition Risk    Level of Risk/Frequency of Follow-up: low (one time per week)     Monitor and Evaluation    Food and Nutrient Intake: food and beverage intake  Food and Nutrient Adminstration: diet order  Physical Activity and Function: nutrition-related ADLs and IADLs  Anthropometric Measurements: weight change  Biochemical Data, Medical Tests and Procedures: electrolyte and renal panel, gastrointestinal profile  Nutrition-Focused Physical Findings: overall  appearance, skin     Nutrition Follow-Up    RD Follow-up?: Yes

## 2024-01-05 NOTE — PROGRESS NOTES
[facility]                                  Skilled Nursing Facility                   Progress Note     Admit Date: 12/26/2023  AMARA 1/17/2024  [id number]  Principal Problem:  Closed intertrochanteric fracture of left femur   HPI obtained from patient interview and chart review     Chief Complaint:Re-evaluation of medical treatment and therapy status    HPI:   Ms. Menendez is a 88 year old female PMHx of osteoporosis, dementia, thrombocytopenia and hypothyroidism presents to SNF following hospitalization for closed intertrochanteric left femur fracture s/p cephalomedullary nail fixation on 12/23 with Dr. Jerry.  Admission to SNF for secondary weakness and debility.    Interval history:All of today's labs reviewed and are listed below.  24 hr vital sign ranges reviewed and listed below, no acute abnormalities noted.   No further reports of chest pain / indigestion. Patient denies shortness of breath, abdominal discomfort, nausea, or vomiting.  Patient reports an adequate appetite.  Patient denies dysuria.  Patient reports having regular bowel movements.  Patient progressing with PT/OT-Gait: amb with RW CGA ~ 6 ft 10 ft and 22 ft seated rest break no LOB slow ekta . Continuing to follow and treat all acute and chronic conditions.    Past Medical History: Patient has a past medical history of Arthritis, Depression, Hypercholesteremia, Thrombocytopenia, and Thyroid disease.    Past Surgical History: Patient has a past surgical history that includes Hysterectomy; Knee surgery; Ankle surgery; Wrist surgery; Colonoscopy (N/A, 6/2/2021); and Intramedullary rodding of femur (Left, 12/23/2023).    Social History: Patient reports that she has never smoked. She has never used smokeless tobacco.    Family History: family history includes Cancer in her maternal uncle; Heart failure in her father and mother; Suicide in her son.    Allergies:  "Patient is allergic to codeine.    ROS  Constitutional: Negative for fever   Eyes: Negative for blurred vision, double vision   Respiratory: Negative for shortness of breath.  +dry intermittent cough   Cardiovascular: Negative for chest pain, palpitations, and leg swelling.   Gastrointestinal: Negative for abdominal pain, constipation, diarrhea, nausea, vomiting.  Placenta indigestion  Genitourinary: Negative for dysuria, frequency   Musculoskeletal:  + generalized weakness.  +mild intermittent LLE pain  Skin: Negative for itching and rash.   Neurological: Negative for dizziness, headaches.   Psychiatric/Behavioral: Negative for depression. The patient is not nervous/anxious.      24 hour Vital Sign Range   Temp:  [97.8 °F (36.6 °C)]   Pulse:  [64]   Resp:  [16]   BP: (149)/(70)   SpO2:  [94 %]     Current BMI: Body mass index is 25.53 kg/m².    PEx  Constitutional: Patient appears debilitated.  No distress noted  HENT:   Head: Normocephalic and atraumatic.   Eyes: Pupils are equal, round  Neck: Normal range of motion. Neck supple.   Cardiovascular: Normal rate, regular rhythm and normal heart sounds.    Pulmonary/Chest: Effort normal and breath sounds are clear  Abdominal: Soft. Bowel sounds are normal.   Musculoskeletal: Normal range of motion.   Neurological: Alert and oriented to person.  Disoriented to place, and time.  Confused.  Higher level thinking impaired  Psychiatric: Normal mood and affect. Behavior is normal.   Skin: Skin is warm and dry.  +surgical dressing to LLE    No results for input(s): "GLUCOSE", "NA", "K", "CL", "CO2", "BUN", "CREATININE", "CALCIUM", "MG" in the last 24 hours.        No results for input(s): "WBC", "RBC", "HGB", "HCT", "PLT", "MCV", "MCH", "MCHC" in the last 24 hours.        No results for input(s): "POCTGLUCOSE" in the last 168 hours.     Assessment and Plan:    Closed intertrochanteric fracture of left femur  S/p cephalomedullary nail fixation on 12/23  - PT/OT, WBAT  - DVT " PPX with ASA 81 mg BID x 30 days  - maintain surgical dressing until removed by Orthopedics  - ortho APAP to see patient weekly at SNF  - follow-up with ortho after discharge    Acute postoperative pain  - stable, continue acetaminophen 1000 mg q.8 hours PRN, gabapentin 500 mg qHS., methocarbamol 500 mg TID.  Bowel regimen in place     Acute Blood loss anemia  - expected postoperatively  - stable, continue monitor twice weekly CBC, transfuse for hemoglobin < 7    Amnestic MCI (mild cognitive impairment with memory loss)  - Continue memantine 10 mg BID, galantamine 16 mg daily, and buproprion 150 mg BID, B12   Delirium precautions:  - Avoid antihistamines, anticholinergics, hypnotics, and minimize opiates while controlling for pain as these medications may exacerbate delirium. Cues for day/night will assist with keeping patient calm and oriented - during daytime, please keep adequate light in room (open windows, lights on) and please keep room dim at night-time to encourage normal sleep-wake cycles. Continuing to have nursing and family reorient the patient and encourage family to visit    Thrombocytopenia, resolved  - Patient was found to have thrombocytopenia, which is chronic.   - transfuse if platelet count is <10k.   - Hold DVT prophylaxis if platelets are <50k.  - stable, platelet count is maintaining normal limits, continue monitor twice weekly CBCs     Hypercholesteremia  - continue home medication pravastatin 40 mg qHS.      Hypothyroidism  - stable, Continue levothyroxine 75 mcg daily    GERD  -   Initiated famotidine 20 mg daily, Tums PRN    Debility   - Continue with PT/OT for gait training and strengthening and restoration of ADL's   - Encourage mobility, OOB in chair, and early ambulation as appropriate  - Fall precautions   - Monitor for bowel and bladder dysfunction  - Monitor for and prevent skin breakdown and pressure ulcers  - Continue DVT prophylaxis with ASA 81 mg BID             Anticipate  disposition:  Home with home health    IP [facility] RISK OF UNPLANNED READMISSION Model: HIGH MODERATE LOW    Follow-up needed during SNF stay-    Apt not send pt to- Ortho 1/8    Follow-up needed after discharge from SNF: PCP, ortho 2/5     Future Appointments   Date Time Provider Department Center   1/8/2024 12:15 PM Lauren Cosme PA-C [facility] [address] [place]   2/5/2024 10:30 AM Lauren Cosme PA-C [facility] [address]   3/21/2024 11:00 AM Shi Simon MD [facility] CAREN Lacey NP  Department of Hospital Medicine   [facility]- Skilled Nursing [facility]     DOS: 1/5/2024       Patient note was created using MModal Dictation.  Any errors in syntax or even information may not have been identified and edited on initial review prior to signing this note.

## 2024-01-06 PROCEDURE — 97530 THERAPEUTIC ACTIVITIES: CPT | Mod: HCNC,CQ

## 2024-01-06 PROCEDURE — 25000003 PHARM REV CODE 250: Mod: HCNC | Performed by: NURSE PRACTITIONER

## 2024-01-06 PROCEDURE — 97116 GAIT TRAINING THERAPY: CPT | Mod: HCNC,CQ

## 2024-01-06 PROCEDURE — 97110 THERAPEUTIC EXERCISES: CPT | Mod: HCNC,CQ

## 2024-01-06 PROCEDURE — 25000003 PHARM REV CODE 250: Mod: HCNC | Performed by: HOSPITALIST

## 2024-01-06 PROCEDURE — 94761 N-INVAS EAR/PLS OXIMETRY MLT: CPT | Mod: HCNC

## 2024-01-06 PROCEDURE — 63700000 PHARM REV CODE 250 ALT 637 W/O HCPCS: Mod: HCNC | Performed by: NURSE PRACTITIONER

## 2024-01-06 PROCEDURE — 11000004 HC SNF PRIVATE: Mod: HCNC

## 2024-01-06 RX ADMIN — SENNOSIDES AND DOCUSATE SODIUM 1 TABLET: 8.6; 5 TABLET ORAL at 09:01

## 2024-01-06 RX ADMIN — PRAVASTATIN SODIUM 40 MG: 20 TABLET ORAL at 08:01

## 2024-01-06 RX ADMIN — FAMOTIDINE 20 MG: 20 TABLET ORAL at 09:01

## 2024-01-06 RX ADMIN — ASPIRIN 81 MG: 81 TABLET, COATED ORAL at 09:01

## 2024-01-06 RX ADMIN — MEMANTINE 10 MG: 10 TABLET ORAL at 09:01

## 2024-01-06 RX ADMIN — ASPIRIN 81 MG: 81 TABLET, COATED ORAL at 08:01

## 2024-01-06 RX ADMIN — METHOCARBAMOL 500 MG: 500 TABLET ORAL at 09:01

## 2024-01-06 RX ADMIN — Medication 6 MG: at 08:01

## 2024-01-06 RX ADMIN — CYANOCOBALAMIN TAB 1000 MCG 1000 MCG: 1000 TAB at 09:01

## 2024-01-06 RX ADMIN — SENNOSIDES AND DOCUSATE SODIUM 1 TABLET: 8.6; 5 TABLET ORAL at 08:01

## 2024-01-06 RX ADMIN — PREGABALIN 50 MG: 50 CAPSULE ORAL at 08:01

## 2024-01-06 RX ADMIN — GALANTAMINE 16 MG: 16 CAPSULE, EXTENDED RELEASE ORAL at 07:01

## 2024-01-06 RX ADMIN — MEMANTINE 10 MG: 10 TABLET ORAL at 08:01

## 2024-01-06 RX ADMIN — BUPROPION HYDROCHLORIDE 150 MG: 150 TABLET, EXTENDED RELEASE ORAL at 09:01

## 2024-01-06 RX ADMIN — LEVOTHYROXINE SODIUM 75 MCG: 75 TABLET ORAL at 05:01

## 2024-01-06 RX ADMIN — METHOCARBAMOL 500 MG: 500 TABLET ORAL at 08:01

## 2024-01-06 RX ADMIN — METHOCARBAMOL 500 MG: 500 TABLET ORAL at 03:01

## 2024-01-06 NOTE — PT/OT/SLP PROGRESS
"Physical Therapy Treatment    Patient Name:  Rosario Menendez   MRN:  [id number]  Admit Date: 12/26/2023  Admitting Diagnosis: Closed intertrochanteric fracture of left femur  Recent Surgeries: INSERTION, INTRAMEDULLARY DIEGO, FEMUR (Left)     General Precautions: Standard, fall, hearing impaired  Orthopedic Precautions: LLE weight bearing as tolerated  Braces: N/A    Recommendations:     Discharge Recommendations: home health PT  Level of Assistance Recommended at Discharge: 24 hours light assistance  Discharge Equipment Recommendations: wheelchair, bedside commode  Barriers to discharge: Decreased caregiver support    Assessment:     Rosario Menendez is a 88 y.o. female admitted with a medical diagnosis of Closed intertrochanteric fracture of left femur . Pt tolerated well, pt completed bed mobility with CGA and vc. Pt amb x 2 trials with SBA/CGA d/t LLE weakness reported. Pt completed toilet transfer with CGA and grab bars. Pt propelled LBE to tolerance. Pt would continue to benefit from skilled PT services to improve overall functional mobility, strength and endurance.      Performance deficits affecting function: weakness, impaired endurance, impaired self care skills, impaired functional mobility, gait instability, impaired balance, decreased upper extremity function, decreased lower extremity function, impaired cardiopulmonary response to activity, orthopedic precautions.    Rehab Potential is good    Activity Tolerance: Good    Plan:     Patient to be seen 5 x/week to address the above listed problems via gait training, therapeutic activities, therapeutic exercises, wheelchair management/training    Plan of Care Expires: 01/26/24  Plan of Care Reviewed with: patient    Subjective     "I feel weak, feels like my left leg could give out".     Pain/Comfort:  Pain Rating 1: 0/10  Pain Rating Post-Intervention 1: 0/10    Patient's cultural, spiritual, Denominational conflicts given the current " situation:  no    Objective:       Patient found HOB elevated with  (no lines) upon PT entry to room.     Therapeutic Activities and Exercises: LBE X ~8 min For BLE strengthening, endurance and ROM    Patient educated on role of therapy, goals of session, and benefits of out of bed mobility.   Instructed on use of call button and importance of calling nursing staff for assistance with mobility   Questions/concerns addressed within PTA scope of practice  Pt verbalized understanding    Assisted pt donning pants in supine at beginning of session  Assisted pt donning brief/pants in standing, edge of toilet with grab bar    Functional Mobility:  Bed Mobility:     Scooting: contact guard assistance toward EOB, vc and tc  Supine to Sit: contact guard assistance, HOB elevated with rail, vc and tc  Transfers:     Sit to Stand:  stand by assistance and contact guard assistance with rolling walker. Vc for efficient sequencing  Bed to Chair: contact guard assistance with  rolling walker  using  Stand Pivot  Toilet Transfer: contact guard assistance with  grab bars  using  Stand Pivot  Gait: pt amb 19 ft + 10 ft SBA/CGA with RW, LLE weakness reported. Step to gait, dec step length very slow ekta. Vc to stay with MASOOD of RW. Seated rest break in between trials  Balance: pt completed static standing balance edge of toilet with CGA and grab bar    AM-PAC 6 CLICK MOBILITY  14    Patient left up in chair with  PT tech .    GOALS:   Multidisciplinary Problems       Physical Therapy Goals          Problem: Physical Therapy    Goal Priority Disciplines Outcome Goal Variances Interventions   Physical Therapy Goal     PT, PT/OT Ongoing, Progressing     Description: Goals to be met by: 1/27/23     Patient will increase functional independence with mobility by performing:    . Supine to sit with MInimal Assistance  . Sit to supine with MInimal Assistance  . Rolling to Left and Right with Minimal Assistance.  . Sit to stand transfer with  Contact Guard Assistance  . Bed to chair transfer with Contact Guard Assistance using Rolling Walker  . Gait  x 50 feet with Contact Guard Assistance using Rolling Walker.   . Wheelchair propulsion x100 feet with Supervision using bilateral uppper extremities                         Time Tracking:     PT Received On: 01/06/24  PT Start Time: 0822  PT Stop Time: 0903  PT Total Time (min): 41 min    Billable Minutes: Gait Training 14, Therapeutic Activity 17, and Therapeutic Exercise 10    Treatment Type: Treatment  PT/PTA: PTA     Number of PTA visits since last PT visit: 3     01/06/2024

## 2024-01-06 NOTE — PLAN OF CARE
Problem: Adult Inpatient Plan of Care  Goal: Patient-Specific Goal (Individualized)  Outcome: Ongoing, Progressing     Problem: Fall Injury Risk  Goal: Absence of Fall and Fall-Related Injury  Outcome: Ongoing, Progressing     Problem: Adult Inpatient Plan of Care  Goal: Readiness for Transition of Care  Outcome: Ongoing, Progressing

## 2024-01-07 PROCEDURE — 25000003 PHARM REV CODE 250: Mod: HCNC | Performed by: HOSPITALIST

## 2024-01-07 PROCEDURE — 25000003 PHARM REV CODE 250: Mod: HCNC | Performed by: NURSE PRACTITIONER

## 2024-01-07 PROCEDURE — 94761 N-INVAS EAR/PLS OXIMETRY MLT: CPT | Mod: HCNC

## 2024-01-07 PROCEDURE — 11000004 HC SNF PRIVATE: Mod: HCNC

## 2024-01-07 PROCEDURE — 63700000 PHARM REV CODE 250 ALT 637 W/O HCPCS: Mod: HCNC | Performed by: NURSE PRACTITIONER

## 2024-01-07 RX ADMIN — METHOCARBAMOL 500 MG: 500 TABLET ORAL at 08:01

## 2024-01-07 RX ADMIN — MEMANTINE 10 MG: 10 TABLET ORAL at 09:01

## 2024-01-07 RX ADMIN — LEVOTHYROXINE SODIUM 75 MCG: 75 TABLET ORAL at 05:01

## 2024-01-07 RX ADMIN — CYANOCOBALAMIN TAB 1000 MCG 1000 MCG: 1000 TAB at 09:01

## 2024-01-07 RX ADMIN — BUPROPION HYDROCHLORIDE 150 MG: 150 TABLET, EXTENDED RELEASE ORAL at 09:01

## 2024-01-07 RX ADMIN — PRAVASTATIN SODIUM 40 MG: 20 TABLET ORAL at 08:01

## 2024-01-07 RX ADMIN — METHOCARBAMOL 500 MG: 500 TABLET ORAL at 03:01

## 2024-01-07 RX ADMIN — METHOCARBAMOL 500 MG: 500 TABLET ORAL at 09:01

## 2024-01-07 RX ADMIN — SENNOSIDES AND DOCUSATE SODIUM 1 TABLET: 8.6; 5 TABLET ORAL at 08:01

## 2024-01-07 RX ADMIN — MEMANTINE 10 MG: 10 TABLET ORAL at 08:01

## 2024-01-07 RX ADMIN — ASPIRIN 81 MG: 81 TABLET, COATED ORAL at 09:01

## 2024-01-07 RX ADMIN — FAMOTIDINE 20 MG: 20 TABLET ORAL at 09:01

## 2024-01-07 RX ADMIN — PREGABALIN 50 MG: 50 CAPSULE ORAL at 08:01

## 2024-01-07 RX ADMIN — GALANTAMINE 16 MG: 16 CAPSULE, EXTENDED RELEASE ORAL at 07:01

## 2024-01-07 RX ADMIN — ASPIRIN 81 MG: 81 TABLET, COATED ORAL at 08:01

## 2024-01-08 LAB
ANION GAP SERPL CALC-SCNC: 6 MMOL/L (ref 8–16)
BASOPHILS # BLD AUTO: 0.07 K/UL (ref 0–0.2)
BASOPHILS NFR BLD: 1.2 % (ref 0–1.9)
BUN SERPL-MCNC: 7 MG/DL (ref 8–23)
CALCIUM SERPL-MCNC: 9.3 MG/DL (ref 8.7–10.5)
CHLORIDE SERPL-SCNC: 112 MMOL/L (ref 95–110)
CO2 SERPL-SCNC: 22 MMOL/L (ref 23–29)
CREAT SERPL-MCNC: 0.7 MG/DL (ref 0.5–1.4)
DIFFERENTIAL METHOD BLD: ABNORMAL
EOSINOPHIL # BLD AUTO: 0.2 K/UL (ref 0–0.5)
EOSINOPHIL NFR BLD: 2.8 % (ref 0–8)
ERYTHROCYTE [DISTWIDTH] IN BLOOD BY AUTOMATED COUNT: 15.2 % (ref 11.5–14.5)
EST. GFR  (NO RACE VARIABLE): >60 ML/MIN/1.73 M^2
GLUCOSE SERPL-MCNC: 72 MG/DL (ref 70–110)
HCT VFR BLD AUTO: 34.6 % (ref 37–48.5)
HGB BLD-MCNC: 11.2 G/DL (ref 12–16)
IMM GRANULOCYTES # BLD AUTO: 0.02 K/UL (ref 0–0.04)
IMM GRANULOCYTES NFR BLD AUTO: 0.3 % (ref 0–0.5)
LYMPHOCYTES # BLD AUTO: 1.7 K/UL (ref 1–4.8)
LYMPHOCYTES NFR BLD: 27.9 % (ref 18–48)
MAGNESIUM SERPL-MCNC: 2.3 MG/DL (ref 1.6–2.6)
MCH RBC QN AUTO: 30.9 PG (ref 27–31)
MCHC RBC AUTO-ENTMCNC: 32.4 G/DL (ref 32–36)
MCV RBC AUTO: 96 FL (ref 82–98)
MONOCYTES # BLD AUTO: 0.7 K/UL (ref 0.3–1)
MONOCYTES NFR BLD: 11.4 % (ref 4–15)
NEUTROPHILS # BLD AUTO: 3.4 K/UL (ref 1.8–7.7)
NEUTROPHILS NFR BLD: 56.4 % (ref 38–73)
NRBC BLD-RTO: 0 /100 WBC
PHOSPHATE SERPL-MCNC: 2.5 MG/DL (ref 2.7–4.5)
PLATELET # BLD AUTO: 180 K/UL (ref 150–450)
PMV BLD AUTO: 10.8 FL (ref 9.2–12.9)
POTASSIUM SERPL-SCNC: 3.7 MMOL/L (ref 3.5–5.1)
RBC # BLD AUTO: 3.62 M/UL (ref 4–5.4)
SODIUM SERPL-SCNC: 140 MMOL/L (ref 136–145)
WBC # BLD AUTO: 5.98 K/UL (ref 3.9–12.7)

## 2024-01-08 PROCEDURE — 63700000 PHARM REV CODE 250 ALT 637 W/O HCPCS: Mod: HCNC | Performed by: NURSE PRACTITIONER

## 2024-01-08 PROCEDURE — 25000003 PHARM REV CODE 250: Mod: HCNC | Performed by: NURSE PRACTITIONER

## 2024-01-08 PROCEDURE — 80048 BASIC METABOLIC PNL TOTAL CA: CPT | Mod: HCNC | Performed by: NURSE PRACTITIONER

## 2024-01-08 PROCEDURE — 97530 THERAPEUTIC ACTIVITIES: CPT | Mod: HCNC,CQ

## 2024-01-08 PROCEDURE — 36415 COLL VENOUS BLD VENIPUNCTURE: CPT | Mod: HCNC | Performed by: NURSE PRACTITIONER

## 2024-01-08 PROCEDURE — 84100 ASSAY OF PHOSPHORUS: CPT | Mod: HCNC | Performed by: NURSE PRACTITIONER

## 2024-01-08 PROCEDURE — 97116 GAIT TRAINING THERAPY: CPT | Mod: HCNC,CQ

## 2024-01-08 PROCEDURE — 97530 THERAPEUTIC ACTIVITIES: CPT | Mod: HCNC,CO

## 2024-01-08 PROCEDURE — 11000004 HC SNF PRIVATE: Mod: HCNC

## 2024-01-08 PROCEDURE — 83735 ASSAY OF MAGNESIUM: CPT | Mod: HCNC | Performed by: NURSE PRACTITIONER

## 2024-01-08 PROCEDURE — 85025 COMPLETE CBC W/AUTO DIFF WBC: CPT | Mod: HCNC | Performed by: NURSE PRACTITIONER

## 2024-01-08 PROCEDURE — 25000003 PHARM REV CODE 250: Mod: HCNC | Performed by: HOSPITALIST

## 2024-01-08 PROCEDURE — 97110 THERAPEUTIC EXERCISES: CPT | Mod: HCNC,CO

## 2024-01-08 PROCEDURE — 97110 THERAPEUTIC EXERCISES: CPT | Mod: HCNC,CQ

## 2024-01-08 RX ORDER — SODIUM BICARBONATE 650 MG/1
650 TABLET ORAL ONCE
Status: COMPLETED | OUTPATIENT
Start: 2024-01-08 | End: 2024-01-08

## 2024-01-08 RX ADMIN — ACETAMINOPHEN 1000 MG: 500 TABLET ORAL at 09:01

## 2024-01-08 RX ADMIN — MEMANTINE 10 MG: 10 TABLET ORAL at 09:01

## 2024-01-08 RX ADMIN — DIBASIC SODIUM PHOSPHATE, MONOBASIC POTASSIUM PHOSPHATE AND MONOBASIC SODIUM PHOSPHATE 2 TABLET: 852; 155; 130 TABLET ORAL at 03:01

## 2024-01-08 RX ADMIN — CYANOCOBALAMIN TAB 1000 MCG 1000 MCG: 1000 TAB at 09:01

## 2024-01-08 RX ADMIN — SENNOSIDES AND DOCUSATE SODIUM 1 TABLET: 8.6; 5 TABLET ORAL at 09:01

## 2024-01-08 RX ADMIN — METHOCARBAMOL 500 MG: 500 TABLET ORAL at 09:01

## 2024-01-08 RX ADMIN — SODIUM BICARBONATE 650 MG TABLET 650 MG: at 03:01

## 2024-01-08 RX ADMIN — FAMOTIDINE 20 MG: 20 TABLET ORAL at 09:01

## 2024-01-08 RX ADMIN — ASPIRIN 81 MG: 81 TABLET, COATED ORAL at 09:01

## 2024-01-08 RX ADMIN — LEVOTHYROXINE SODIUM 75 MCG: 75 TABLET ORAL at 05:01

## 2024-01-08 RX ADMIN — GALANTAMINE 16 MG: 16 CAPSULE, EXTENDED RELEASE ORAL at 07:01

## 2024-01-08 RX ADMIN — METHOCARBAMOL 500 MG: 500 TABLET ORAL at 03:01

## 2024-01-08 RX ADMIN — PREGABALIN 50 MG: 50 CAPSULE ORAL at 09:01

## 2024-01-08 RX ADMIN — BUPROPION HYDROCHLORIDE 150 MG: 150 TABLET, EXTENDED RELEASE ORAL at 09:01

## 2024-01-08 RX ADMIN — PRAVASTATIN SODIUM 40 MG: 20 TABLET ORAL at 09:01

## 2024-01-08 NOTE — PT/OT/SLP PROGRESS
"Occupational Therapy   Treatment    Name: Rosario Menendez  MRN: [id number]  Admit Date: 12/26/2023  Admitting Diagnosis:  Closed intertrochanteric fracture of left femur    General Precautions: Standard, fall, hearing impaired   Orthopedic Precautions: LLE weight bearing as tolerated   Braces: N/A    Recommendations:     Discharge Recommendations:  home health OT  Level of Assistance Recommended at Discharge: 24 hours physical assistance for all ADL's and home management tasks  Discharge Equipment Recommendations: hip kit, 3-in-1 commode, shower chair  Barriers to discharge:   (increased assistance for ADLs and mobility)    Assessment:     Rosario Menendez is a 88 y.o. female with a medical diagnosis of Closed intertrochanteric fracture of left femur .  She presents with performance deficits affecting function are weakness, impaired endurance, impaired self care skills, impaired functional mobility, gait instability, impaired balance, decreased coordination, decreased upper extremity function, decreased lower extremity function, decreased safety awareness, decreased ROM, pain.   Pt participated fairly during today's session. Pt requires max redirection to complete therapy session. Pt tolerated tx session without incident and is making progress, however, continues to demonstrate deficits with self care skills, balance, functional mobility, UB strength and endurance. Pt will benefit from continued OT services to progress towards goals.     Rehab Potential is fair    Activity tolerance:  Fair    Plan:     Patient to be seen 5 x/week to address the above listed problems via self-care/home management, therapeutic activities, therapeutic exercises, neuromuscular re-education    Plan of Care Expires: 01/25/24  Plan of Care Reviewed with: patient    Subjective   "I'm tired"  Communicated with: Jay prior to session.    Pain/Comfort:  Pain Rating 1: 0/10  Pain Rating Post-Intervention 1: 0/10    Patient's cultural, " spiritual, Latter-day conflicts given the current situation:  no    Objective:     Patient found up in chair upon OT entry to room.    Functional Mobility/Transfers:  Patient completed Sit <> Stand Transfer with minimum assistance  with  no assistive device   Patient completed Wheelchair <> Bedside Chair Transfer using Stand Pivot technique with minimum assistance with use of arm rest for support.     [facility] 6 Click ADL: 16    OT Exercises: UE Ergometer 7 min with min resistance.   Therex performed to improve overall endurance, ROM and UB strength required for functional transfers, ADL's and w/c propulsion.     Treatment & Education:  Pt educated on:  - role of OT  - level of assistance  - energy conservation and task modification to maximize independence with ADL's and mobility   -  safety while performing functional transfers and self care tasks  - orthopedic precautions.   - progress towards OT goals     Patient left up in chair with call button in reach and PCT notified.    GOALS:   Multidisciplinary Problems       Occupational Therapy Goals          Problem: Occupational Therapy    Goal Priority Disciplines Outcome Interventions   Occupational Therapy Goal     OT, PT/OT Ongoing, Progressing    Description: Goals to be met by: 1/25/2024     Patient will increase functional independence with ADLs by performing:    UE Dressing with Supervision- Ongoing  LE Dressing with Moderate Assistance and Assistive Devices as needed- Ongoing  Personal Hygiene while seated at sink with Supervision- Ongoing  Oral Hygiene while seated at sink with Supervision- Ongoing  Toileting from toilet with Moderate Assistance for hygiene and clothing management and AE as needed- Ongoing  Bathing from  sitting at sink with Minimal Assistance.  Toilet transfer to toilet with Contact Guard Assistance /c AE/LRAD as needed- Ongoing  Footwear /c Mod A and AE as needed  Tolerate standing functional tasks /c CGA and AE/LRAD as needed  Shower t/f /c  CGA and AE/LRAD as needed                           Time Tracking:     OT Date of Treatment: 01/08/24  OT Start Time: 0927    OT Stop Time: 0959  OT Total Time (min): 32 min    Billable Minutes:Therapeutic Activity 17  Therapeutic Exercise 15    1/8/2024

## 2024-01-08 NOTE — PT/OT/SLP PROGRESS
"Physical Therapy Treatment    Patient Name:  Rosario Menendez   MRN:  [id number]  Admit Date: 12/26/2023  Admitting Diagnosis: Closed intertrochanteric fracture of left femur  Recent Surgeries: INSERTION, INTRAMEDULLARY DIEGO, FEMUR (Left)      General Precautions: Standard, fall, hearing impaired  Orthopedic Precautions: LLE weight bearing as tolerated  Braces: N/A    Recommendations:     Discharge Recommendations: home health PT  Level of Assistance Recommended at Discharge: 24 hours light assistance  Discharge Equipment Recommendations: wheelchair, bedside commode  Barriers to discharge: Decreased caregiver support    Assessment:     Rosario Menendez is a 88 y.o. female admitted with a medical diagnosis of Closed intertrochanteric fracture of left femur . Pt amb x 2 trials with vc to increase/ correct step length/ gait pattern. Pt completed TE and LBE to tolerance. Pt required Min A for STS d/t posterior lean. Pt forgetful throughout session today. pt would continue to benefit from skilled PT services to improve overall functional mobility, strength and endurance.      Performance deficits affecting function: weakness, impaired endurance, impaired self care skills, impaired functional mobility, gait instability, impaired balance, decreased upper extremity function, decreased lower extremity function, impaired cardiopulmonary response to activity, orthopedic precautions.    Rehab Potential is good    Activity Tolerance: Good    Plan:     Patient to be seen 5 x/week to address the above listed problems via gait training, therapeutic activities, therapeutic exercises, wheelchair management/training    Plan of Care Expires: 01/26/24  Plan of Care Reviewed with: patient    Subjective     "I don't remember".     Pain/Comfort:  Pain Rating 1: 0/10  Location - Side 1: Left  Location - Orientation 1: generalized  Location 1: hip  Pain Addressed 1: Reposition, Distraction, Cessation of Activity  Pain Rating Post-Intervention " 1:  (pt reported pain when ambulating but did not quantify pain)    Patient's cultural, spiritual, Latter-day conflicts given the current situation:  no    Objective:       Patient found up in chair with  (no lines) upon PT entry to room.     Therapeutic Activities and Exercises: seated TE: hip flex, LAQ  2 x 10 reps for BLE strengthening. Demo and vc for proper completion     LBE X 10 min For BLE strengthening, endurance and ROM. 2 rest breaks, vc to continue. Light resistance     Patient educated on role of therapy, goals of session, and benefits of out of bed mobility.   Instructed on use of call button and importance of calling nursing staff for assistance with mobility   Questions/concerns addressed within PTA scope of practice  Pt verbalized understanding    Pants donned sitting in wheelchair/ standing edge of W/C with RW.    Functional Mobility:  Transfers:     Sit to Stand:  minimum assistance with rolling walker. Vc for hand placement  Gait: pt amb 4 ft + 28 ft CGA with RW. Vc to increase step length, pt with decreased stance time on LLE d/t discomfort. Pt with step to gait without cues and able to complete step through only when PTA gives vcs to increase step length on RLE. Seated rets break in between trials. Very slow ekta  Balance: pt completed static standing balance with CGA and RW. Pt required tc to lean anteriorly    AM-PAC 6 CLICK MOBILITY  14    Patient left up in chair with  PT tech .    GOALS:   Multidisciplinary Problems       Physical Therapy Goals          Problem: Physical Therapy    Goal Priority Disciplines Outcome Goal Variances Interventions   Physical Therapy Goal     PT, PT/OT Ongoing, Progressing     Description: Goals to be met by: 1/27/23     Patient will increase functional independence with mobility by performing:    . Supine to sit with MInimal Assistance  . Sit to supine with MInimal Assistance  . Rolling to Left and Right with Minimal Assistance.  . Sit to stand transfer with  Contact Guard Assistance  . Bed to chair transfer with Contact Guard Assistance using Rolling Walker  . Gait  x 50 feet with Contact Guard Assistance using Rolling Walker.   . Wheelchair propulsion x100 feet with Supervision using bilateral uppper extremities    Rehab Services' DME recommendations    Wheelchair  Number of hours up in a wheelchair per day 8      Style Light weight    Justification for light weight w/c: patient cannot propel in a standard wheelchair, patient can and does self-propel in a lightweight wheelchair, patient has impaired ability to participate in MRADLs, mobility limitations cannot be sifficiently resolved with a cane/walker, the home provides adequate access between rooms for a wheelchair, a wheelchair will significantly improve the ability to participate in MRADLs and will be used in the home on a regular basis, the patient is willing to use a wheelchair in the home, the patient has a caregiver who is available, willing, and able to provide assistance with the wheelchair, and the patient requires additional person for safety with mobility with walker  Seat Width 16 (Small adult)  Seat Depth 16  Back Height Standard  Leg Support Standard, Heel Loops, and Swing Away  Arm Height Desk and Swing Away  Lap Belt Buckle  Accessories Anti-tippers and Safety belt  Cushion Basic  Justification for Cushion Skin Integrity      3 in 1 commode Standard     Justification for 3 in 1 commode: The patient's deficits will not be sufficiently addressed by a raised toilet seat       Shower Chair Without back      Hip Kit Standard/Short                         Time Tracking:     PT Received On: 01/08/24  PT Start Time: 0842  PT Stop Time: 0922  PT Total Time (min): 40 min    Billable Minutes: Gait Training 14, Therapeutic Activity 10, and Therapeutic Exercise 16    Treatment Type: Treatment  PT/PTA: PTA     Number of PTA visits since last PT visit: 4     01/08/2024

## 2024-01-08 NOTE — CARE UPDATE
Discharge Planning      While out during holiday, pt's family stated [facility] was the location of where the pt would be discharging to. Business Card received. Will send eMR when time draws nearer.    [phone]  Jennifer Whiteside  [facility], \[facility] [place] [facility]\"" Dementia and [email or web address]

## 2024-01-08 NOTE — PROGRESS NOTES
[facility]                                  Skilled Nursing Facility                   Progress Note     Admit Date: 12/26/2023  AMARA 1/17/2024  [id number]  Principal Problem:  Closed intertrochanteric fracture of left femur   HPI obtained from patient interview and chart review     Chief Complaint:Re-evaluation of medical treatment and therapy status: Lab review    HPI:   Ms. Menendez is a 88 year old female PMHx of osteoporosis, dementia, thrombocytopenia and hypothyroidism presents to SNF following hospitalization for closed intertrochanteric left femur fracture s/p cephalomedullary nail fixation on 12/23 with Dr. Jerry.  Admission to SNF for secondary weakness and debility.    Interval history:All of today's labs reviewed and are listed below.  24 hr vital sign ranges reviewed and listed below, no acute abnormalities noted, heart rates improved.  Patient denies shortness of breath, abdominal discomfort, nausea, or vomiting.  Patient reports an adequate appetite.  Patient denies dysuria.  Patient reports having regular bowel movements.  Patient progressing with PT/OT- Gait: pt amb 4 ft + 28 ft CGA with RW. Vc to increase step length, pt with decreased stance time on LLE d/t discomfort. Pt with step to gait without cues and able to complete step through only when PTA gives vcs to increase step length on RLE. Seated rets break in between trials. Very slow ekta. Continuing to follow and treat all acute and chronic conditions.      Past Medical History: Patient has a past medical history of Arthritis, Depression, Hypercholesteremia, Thrombocytopenia, and Thyroid disease.    Past Surgical History: Patient has a past surgical history that includes Hysterectomy; Knee surgery; Ankle surgery; Wrist surgery; Colonoscopy (N/A, 6/2/2021); and Intramedullary rodding of femur (Left, 12/23/2023).    Social History: Patient reports that she  has never smoked. She has never used smokeless tobacco.    Family History: family history includes Cancer in her maternal uncle; Heart failure in her father and mother; Suicide in her son.    Allergies: Patient is allergic to codeine.    ROS  Constitutional: Negative for fever   Eyes: Negative for blurred vision, double vision   Respiratory: Negative for shortness of breath.  +dry intermittent cough   Cardiovascular: Negative for chest pain, palpitations, and leg swelling.   Gastrointestinal: Negative for abdominal pain, constipation, diarrhea, nausea, vomiting.  Placenta indigestion  Genitourinary: Negative for dysuria, frequency   Musculoskeletal:  + generalized weakness.  +mild intermittent LLE pain  Skin: Negative for itching and rash.   Neurological: Negative for dizziness, headaches.   Psychiatric/Behavioral: Negative for depression. The patient is not nervous/anxious.      24 hour Vital Sign Range   Temp:  [98.3 °F (36.8 °C)-98.8 °F (37.1 °C)]   Pulse:  [60-73]   Resp:  [17]   BP: (134-159)/(67-81)   SpO2:  [95 %]     Current BMI: Body mass index is 25.53 kg/m².    PEx  Constitutional: Patient appears debilitated.  No distress noted  HENT:   Head: Normocephalic and atraumatic.   Eyes: Pupils are equal, round  Neck: Normal range of motion. Neck supple.   Cardiovascular: Normal rate, regular rhythm and normal heart sounds.    Pulmonary/Chest: Effort normal and breath sounds are clear  Abdominal: Soft. Bowel sounds are normal.   Musculoskeletal: Normal range of motion.   Neurological: Alert and oriented to person.  Disoriented to place, and time.  Confused.  Higher level thinking impaired  Psychiatric: Normal mood and affect. Behavior is normal.   Skin: Skin is warm and dry.  +surgical dressing to LLE    Recent Labs   Lab 01/08/24  0632      K 3.7   *   CO2 22*   BUN 7*   CREATININE 0.7   CALCIUM 9.3   MG 2.3           Recent Labs   Lab 01/08/24  0632   WBC 5.98   RBC 3.62*   HGB 11.2*   HCT 34.6*  "     MCV 96   MCH 30.9   MCHC 32.4           No results for input(s): "POCTGLUCOSE" in the last 168 hours.     Assessment and Plan:    Closed intertrochanteric fracture of left femur  S/p cephalomedullary nail fixation on 12/23  - PT/OT, WBAT  - DVT PPX with ASA 81 mg BID x 30 days  - maintain surgical dressing until removed by Orthopedics  - ortho APAP to see patient weekly at SNF  - follow-up with ortho after discharge    Acute postoperative pain  - stable, continue acetaminophen 1000 mg q.8 hours PRN, gabapentin 500 mg qHS., methocarbamol 500 mg TID.  Bowel regimen in place     Acute Blood loss anemia  - expected postoperatively  - stable, continue monitor twice weekly CBC, transfuse for hemoglobin < 7    Metabolic acidosis, mild  - initiated sodium bicarbonate 650 mg x 1 dose    Hypophosphatemia  - initiated K-Phos 500 mg x 1 dose    Amnestic MCI (mild cognitive impairment with memory loss)  - Continue memantine 10 mg BID, galantamine 16 mg daily, and buproprion 150 mg BID, B12   Delirium precautions:  - Avoid antihistamines, anticholinergics, hypnotics, and minimize opiates while controlling for pain as these medications may exacerbate delirium. Cues for day/night will assist with keeping patient calm and oriented - during daytime, please keep adequate light in room (open windows, lights on) and please keep room dim at night-time to encourage normal sleep-wake cycles. Continuing to have nursing and family reorient the patient and encourage family to visit    Thrombocytopenia, resolved  - Patient was found to have thrombocytopenia, which is chronic.   - transfuse if platelet count is <10k.   - Hold DVT prophylaxis if platelets are <50k.  - stable, platelet count is maintaining normal limits, continue monitor twice weekly CBCs     Hypercholesteremia  - continue home medication pravastatin 40 mg qHS.      Hypothyroidism  - stable, Continue levothyroxine 75 mcg daily    GERD  -   Initiated famotidine 20 mg " daily, Tums PRN    Debility   - Continue with PT/OT for gait training and strengthening and restoration of ADL's   - Encourage mobility, OOB in chair, and early ambulation as appropriate  - Fall precautions   - Monitor for bowel and bladder dysfunction  - Monitor for and prevent skin breakdown and pressure ulcers  - Continue DVT prophylaxis with ASA 81 mg BID             Anticipate disposition:  Home with home health    IP [facility] RISK OF UNPLANNED READMISSION Model: MODERATE     Follow-up needed during SNF stay-    Apt not send pt to- Ortho 1/8    Follow-up needed after discharge from SNF: PCP, ortho 2/5     Future Appointments   Date Time Provider Department Center   2/5/2024 10:30 AM Lauren Cosme PA-C [facility] ORTHO [facility]   3/21/2024 11:00 AM Shi Simon MD [facility] IM O[facility] Lacey NP  Department of [facility]   [facility]     DOS: 1/8/2024       Patient note was created using MModal Dictation.  Any errors in syntax or even information may not have been identified and edited on initial review prior to signing this note.

## 2024-01-09 LAB
INFLUENZA A, MOLECULAR: NEGATIVE
INFLUENZA B, MOLECULAR: NEGATIVE
SPECIMEN SOURCE: NORMAL

## 2024-01-09 PROCEDURE — 25000003 PHARM REV CODE 250: Mod: HCNC | Performed by: FAMILY MEDICINE

## 2024-01-09 PROCEDURE — 25000003 PHARM REV CODE 250: Mod: HCNC | Performed by: NURSE PRACTITIONER

## 2024-01-09 PROCEDURE — 97110 THERAPEUTIC EXERCISES: CPT | Mod: HCNC,CQ

## 2024-01-09 PROCEDURE — 25000003 PHARM REV CODE 250: Mod: HCNC | Performed by: HOSPITALIST

## 2024-01-09 PROCEDURE — 97530 THERAPEUTIC ACTIVITIES: CPT | Mod: HCNC,CO

## 2024-01-09 PROCEDURE — 11000004 HC SNF PRIVATE: Mod: HCNC

## 2024-01-09 PROCEDURE — 97116 GAIT TRAINING THERAPY: CPT | Mod: HCNC,CQ

## 2024-01-09 PROCEDURE — 87502 INFLUENZA DNA AMP PROBE: CPT | Mod: HCNC | Performed by: HOSPITALIST

## 2024-01-09 PROCEDURE — 63700000 PHARM REV CODE 250 ALT 637 W/O HCPCS: Mod: HCNC | Performed by: NURSE PRACTITIONER

## 2024-01-09 RX ORDER — OSELTAMIVIR PHOSPHATE 30 MG/1
30 CAPSULE ORAL DAILY
Status: DISCONTINUED | OUTPATIENT
Start: 2024-01-09 | End: 2024-01-17 | Stop reason: HOSPADM

## 2024-01-09 RX ADMIN — ASPIRIN 81 MG: 81 TABLET, COATED ORAL at 09:01

## 2024-01-09 RX ADMIN — PRAVASTATIN SODIUM 40 MG: 20 TABLET ORAL at 09:01

## 2024-01-09 RX ADMIN — CYANOCOBALAMIN TAB 1000 MCG 1000 MCG: 1000 TAB at 09:01

## 2024-01-09 RX ADMIN — BUPROPION HYDROCHLORIDE 150 MG: 150 TABLET, EXTENDED RELEASE ORAL at 09:01

## 2024-01-09 RX ADMIN — METHOCARBAMOL 500 MG: 500 TABLET ORAL at 03:01

## 2024-01-09 RX ADMIN — SENNOSIDES AND DOCUSATE SODIUM 1 TABLET: 8.6; 5 TABLET ORAL at 09:01

## 2024-01-09 RX ADMIN — OSELTAMIVIR PHOSPHATE 30 MG: 30 CAPSULE ORAL at 06:01

## 2024-01-09 RX ADMIN — METHOCARBAMOL 500 MG: 500 TABLET ORAL at 09:01

## 2024-01-09 RX ADMIN — GALANTAMINE 16 MG: 16 CAPSULE, EXTENDED RELEASE ORAL at 09:01

## 2024-01-09 RX ADMIN — LEVOTHYROXINE SODIUM 75 MCG: 75 TABLET ORAL at 05:01

## 2024-01-09 RX ADMIN — MEMANTINE 10 MG: 10 TABLET ORAL at 09:01

## 2024-01-09 RX ADMIN — FAMOTIDINE 20 MG: 20 TABLET ORAL at 09:01

## 2024-01-09 RX ADMIN — PREGABALIN 50 MG: 50 CAPSULE ORAL at 09:01

## 2024-01-09 NOTE — PROGRESS NOTES
[facility]                                  Skilled Nursing Facility                   Progress Note     Admit Date: 12/26/2023  AMARA 1/17/2024  [id number] A  Principal Problem:  Closed intertrochanteric fracture of left femur   HPI obtained from patient interview and chart review     Chief Complaint:Re-evaluation of medical treatment and therapy status    HPI:   Ms. Menendez is a 88 year old female PMHx of osteoporosis, dementia, thrombocytopenia and hypothyroidism presents to SNF following hospitalization for closed intertrochanteric left femur fracture s/p cephalomedullary nail fixation on 12/23 with Dr. Jerry.  Admission to SNF for secondary weakness and debility.    Interval history:  24 hr vital sign ranges reviewed and listed below, no acute abnormalities noted, heart rates improved.  To be at mental baseline which is pleasantly confused.  Patient denies shortness of breath, abdominal discomfort, nausea, or vomiting.  Patient reports an adequate appetite.  Patient denies dysuria.  Patient reports having regular bowel movements.  Patient progressing with PT/OT- Gait: pt amb 4 ft + 28 ft CGA with RW. Vc to increase step length, pt with decreased stance time on LLE d/t discomfort. Pt with step to gait without cues and able to complete step through only when PTA gives vcs to increase step length on RLE. Seated rets break in between trials. Very slow ekta. Continuing to follow and treat all acute and chronic conditions.    Past Medical History: Patient has a past medical history of Arthritis, Depression, Hypercholesteremia, Thrombocytopenia, and Thyroid disease.    Past Surgical History: Patient has a past surgical history that includes Hysterectomy; Knee surgery; Ankle surgery; Wrist surgery; Colonoscopy (N/A, 6/2/2021); and Intramedullary rodding of femur (Left, 12/23/2023).    Social History: Patient reports that she has never  "smoked. She has never used smokeless tobacco.    Family History: family history includes Cancer in her maternal uncle; Heart failure in her father and mother; Suicide in her son.    Allergies: Patient is allergic to codeine.    ROS  Constitutional: Negative for fever   Eyes: Negative for blurred vision, double vision   Respiratory: Negative for shortness of breath.  +dry intermittent cough   Cardiovascular: Negative for chest pain, palpitations, and leg swelling.   Gastrointestinal: Negative for abdominal pain, constipation, diarrhea, nausea, vomiting.  Placenta indigestion  Genitourinary: Negative for dysuria, frequency   Musculoskeletal:  + generalized weakness.  +mild intermittent LLE pain  Skin: Negative for itching and rash.   Neurological: Negative for dizziness, headaches.   Psychiatric/Behavioral: Negative for depression. The patient is not nervous/anxious.      24 hour Vital Sign Range   Temp:  [97.6 °F (36.4 °C)-97.8 °F (36.6 °C)]   Pulse:  [61-68]   Resp:  [17]   BP: (133-141)/(65-67)   SpO2:  [95 %]     Current BMI: Body mass index is 25.53 kg/m².    PEx  Constitutional: Patient appears debilitated.  No distress noted  HENT:   Head: Normocephalic and atraumatic.   Eyes: Pupils are equal, round  Neck: Normal range of motion. Neck supple.   Cardiovascular: Normal rate, regular rhythm and normal heart sounds.    Pulmonary/Chest: Effort normal and breath sounds are clear  Abdominal: Soft. Bowel sounds are normal.   Musculoskeletal: Normal range of motion.   Neurological: Alert and oriented to person.  Disoriented to place, and time.  Confused.  Higher level thinking impaired  Psychiatric: Normal mood and affect. Behavior is normal.   Skin: Skin is warm and dry.  +surgical dressing to LLE    No results for input(s): "GLUCOSE", "NA", "K", "CL", "CO2", "BUN", "CREATININE", "CALCIUM", "MG" in the last 24 hours.          No results for input(s): "WBC", "RBC", "HGB", "HCT", "PLT", "MCV", "MCH", "MCHC" in the last " "24 hours.          No results for input(s): "POCTGLUCOSE" in the last 168 hours.     Assessment and Plan:    Closed intertrochanteric fracture of left femur  S/p cephalomedullary nail fixation on 12/23  - PT/OT, WBAT  - DVT PPX with ASA 81 mg BID x 30 days  - maintain surgical dressing until removed by Orthopedics  - ortho APAP to see patient weekly at SNF  - follow-up with ortho after discharge    Acute postoperative pain  - stable, continue acetaminophen 1000 mg q.8 hours PRN, gabapentin 500 mg qHS., methocarbamol 500 mg TID.  Bowel regimen in place     Acute Blood loss anemia  - expected postoperatively  - stable, continue monitor twice weekly CBC, transfuse for hemoglobin < 7    Amnestic MCI (mild cognitive impairment with memory loss)  - Continue memantine 10 mg BID, galantamine 16 mg daily, and buproprion 150 mg BID, B12   Delirium precautions:  - Avoid antihistamines, anticholinergics, hypnotics, and minimize opiates while controlling for pain as these medications may exacerbate delirium. Cues for day/night will assist with keeping patient calm and oriented - during daytime, please keep adequate light in room (open windows, lights on) and please keep room dim at night-time to encourage normal sleep-wake cycles. Continuing to have nursing and family reorient the patient and encourage family to visit    Hypercholesteremia  - continue home medication pravastatin 40 mg qHS.      Hypothyroidism  - stable, Continue levothyroxine 75 mcg daily    GERD  -   Initiated famotidine 20 mg daily, Tums PRN    Debility   - Continue with PT/OT for gait training and strengthening and restoration of ADL's   - Encourage mobility, OOB in chair, and early ambulation as appropriate  - Fall precautions   - Monitor for bowel and bladder dysfunction  - Monitor for and prevent skin breakdown and pressure ulcers  - Continue DVT prophylaxis with ASA 81 mg BID             Anticipate disposition:  Home with home health    IP [facility] RISK OF " UNPLANNED READMISSION Model: MODERATE     Follow-up needed during SNF stay-    Apt not send pt to- Ortho 1/8    Follow-up needed after discharge from SNF: PCP, ortho 2/5     Future Appointments   Date Time Provider Department Center   2/5/2024 10:30 AM Lauren Cosme PA-C [facility] ORTHO [address]   3/21/2024 11:00 AM Shi Simon MD [facility] IM [address] NP  Department of Hospital Medicine   [facility]     DOS: 1/9/2024       Patient note was created using MModal Dictation.  Any errors in syntax or even information may not have been identified and edited on initial review prior to signing this note.

## 2024-01-09 NOTE — PLAN OF CARE
Interdisciplinary team, Bailee Slater, RN Charge Nurse, Lenora Arvizu, , Juliana Odell PT, Rehab, and Lenora Salcido, Activities, spoke to patient for care plan conference, weekly status update, and therapy progress update. Tentative discharge date set for 1/17/24.

## 2024-01-09 NOTE — PLAN OF CARE
Problem: Adult Inpatient Plan of Care  Goal: Plan of Care Review  Outcome: Ongoing, Progressing  Flowsheets (Taken 1/9/2024 0036)  Plan of Care Reviewed With: patient  Goal: Patient-Specific Goal (Individualized)  Outcome: Ongoing, Progressing  Goal: Absence of Hospital-Acquired Illness or Injury  Outcome: Ongoing, Progressing  Goal: Optimal Comfort and Wellbeing  Outcome: Ongoing, Progressing  Goal: Readiness for Transition of Care  Outcome: Ongoing, Progressing     Problem: Fall Injury Risk  Goal: Absence of Fall and Fall-Related Injury  Outcome: Ongoing, Progressing     Problem: Skin Injury Risk Increased  Goal: Skin Health and Integrity  Outcome: Ongoing, Progressing

## 2024-01-09 NOTE — PT/OT/SLP PROGRESS
"Physical Therapy Treatment    Patient Name:  Rosario Menendez   MRN:  [id number]  Admit Date: 12/26/2023  Admitting Diagnosis: Closed intertrochanteric fracture of left femur  Recent Surgeries:     General Precautions: Standard, fall, hearing impaired  Orthopedic Precautions: LLE weight bearing as tolerated  Braces: N/A    Recommendations:     Discharge Recommendations: home health PT  Level of Assistance Recommended at Discharge: 24 hours light assistance  Discharge Equipment Recommendations: wheelchair, bedside commode  Barriers to discharge: Decreased caregiver support    Assessment:     Rosario Menendez is a 88 y.o. female admitted with a medical diagnosis of Closed intertrochanteric fracture of left femur . Pt tolerated fairly well, however did require some education/encouragement for participation, pt would continue to benefit from skilled PT services to improve overall functional mobility, strength and endurance.  .      Performance deficits affecting function: weakness, impaired endurance, impaired self care skills, impaired functional mobility, gait instability, impaired balance, decreased upper extremity function, decreased lower extremity function, impaired cardiopulmonary response to activity, orthopedic precautions.    Rehab Potential is good    Activity Tolerance: Fair    Plan:     Patient to be seen 5 x/week to address the above listed problems via gait training, therapeutic activities, therapeutic exercises, wheelchair management/training    Plan of Care Expires: 01/26/24  Plan of Care Reviewed with: patient    Subjective     "Tired".  Pt agreeable to shorten session    Pain/Comfort:  Pain Rating 1:  (did not rate "mild" with moving, declined needing meds)  Location - Side 1: Left  Location - Orientation 1: generalized  Location 1: hip  Pain Addressed 1: Reposition, Distraction, Cessation of Activity  Pain Rating Post-Intervention 1:  ("OK")    Patient's cultural, spiritual, Hinduism conflicts given " the current situation:  no    Objective:     .  Patient found with  (in wc) upon PT entry to room.     Therapeutic Activities and Exercises: mini elliptical x 10 min 2x10 reps AP,LAQ    Functional Mobility:  Transfers:     Sit to Stand:  minimum assistance with rolling walker and vcs for tech  Gait: amb with RW CGA slow ekta encouragement ~ 20 ft declined 2nd trial    AM-PAC 6 CLICK MOBILITY  14    Patient left up in chair with  agreeable to do a little with OT .    GOALS:   Multidisciplinary Problems       Physical Therapy Goals          Problem: Physical Therapy    Goal Priority Disciplines Outcome Goal Variances Interventions   Physical Therapy Goal     PT, PT/OT Ongoing, Progressing     Description: Goals to be met by: 1/27/23     Patient will increase functional independence with mobility by performing:    . Supine to sit with MInimal Assistance  . Sit to supine with MInimal Assistance  . Rolling to Left and Right with Minimal Assistance.  . Sit to stand transfer with Contact Guard Assistance  . Bed to chair transfer with Contact Guard Assistance using Rolling Walker  . Gait  x 50 feet with Contact Guard Assistance using Rolling Walker.   . Wheelchair propulsion x100 feet with Supervision using bilateral uppper extremities    Rehab Services' DME recommendations    Wheelchair  Number of hours up in a wheelchair per day 8      Style Light weight    Justification for light weight w/c: patient cannot propel in a standard wheelchair, patient can and does self-propel in a lightweight wheelchair, patient has impaired ability to participate in MRADLs, mobility limitations cannot be sifficiently resolved with a cane/walker, the home provides adequate access between rooms for a wheelchair, a wheelchair will significantly improve the ability to participate in MRADLs and will be used in the home on a regular basis, the patient is willing to use a wheelchair in the home, the patient has a caregiver who is available,  willing, and able to provide assistance with the wheelchair, and the patient requires additional person for safety with mobility with walker  Seat Width 16 (Small adult)  Seat Depth 16  Back Height Standard  Leg Support Standard, Heel Loops, and Swing Away  Arm Height Desk and Swing Away  Lap Belt Buckle  Accessories Anti-tippers and Safety belt  Cushion Basic  Justification for Cushion Skin Integrity      3 in 1 commode Standard     Justification for 3 in 1 commode: The patient's deficits will not be sufficiently addressed by a raised toilet seat       Shower Chair Without back      Hip Kit Standard/Short                         Time Tracking:     PT Received On: 01/09/24  PT Start Time: 1315  PT Stop Time: 1346  PT Total Time (min): 31 min    Billable Minutes: Gait Training 16 and Therapeutic Exercise 15    Treatment Type: Treatment  PT/PTA: PTA     Number of PTA visits since last PT visit: 5 01/09/2024

## 2024-01-09 NOTE — PT/OT/SLP PROGRESS
"Occupational Therapy   Treatment    Name: Rosario Menendez  MRN: [id number]  Admit Date: 12/26/2023  Admitting Diagnosis:  Closed intertrochanteric fracture of left femur    General Precautions: Standard, fall, hearing impaired   Orthopedic Precautions: LLE weight bearing as tolerated   Braces: N/A    Recommendations:     Discharge Recommendations:  home health OT  Level of Assistance Recommended at Discharge: 24 hours physical assistance for all ADL's and home management tasks  Discharge Equipment Recommendations: hip kit, 3-in-1 commode, shower chair  Barriers to discharge:   (increased assistance for ADLs and mobility)    Assessment:     Rosario Menendez is a 88 y.o. female with a medical diagnosis of Closed intertrochanteric fracture of left femur .  She presents with performance deficits affecting function are weakness, impaired endurance, impaired self care skills, impaired functional mobility, gait instability, impaired balance, decreased coordination, decreased upper extremity function, decreased lower extremity function, decreased safety awareness, decreased ROM, pain.   Pt participated fairly during today's session. Pt required max encouragement and redirection to participate. Pt appeared very sleepy, falling asleep during session, return to room. Pt continues to demonstrate deficits with self care skills, balance, functional mobility, UB strength and endurance. Pt will benefit from continued OT services to progress towards goals.     Rehab Potential is fair    Activity tolerance:  Fair    Plan:     Patient to be seen 5 x/week to address the above listed problems via self-care/home management, therapeutic activities, therapeutic exercises, neuromuscular re-education    Plan of Care Expires: 01/25/24  Plan of Care Reviewed with: patient    Subjective   "I'm tired"  Communicated with: Jay prior to session.     Pain/Comfort:  Pain Rating 1: 0/10  Pain Rating Post-Intervention 1: 0/10    Patient's cultural, " spiritual, Anglican conflicts given the current situation:  no    Objective:     Patient found up in chair with PTA present in rehab gym.     Bed Mobility:    Patient completed Rolling/Turning to Right with minimum assistance and with side rail  Patient completed Scooting/Bridging with stand by assistance and with side rail  Patient completed Sit to Supine with minimum assistance to manage B LE.     Functional Mobility/Transfers:  Patient completed Sit <> Stand Transfer with minimum assistance  with  rolling walker   Patient completed Bed <> Chair Transfer using Stand Pivot technique with minimum assistance with no assistive device    [facility] 6 Click ADL: 16    OT Exercises: Pt seated at table to perform Bilateral Sunil 1 x 10 of lateral slides and forward and circular motion. Pt required max cueing to complete. Cessation of activity 2/2 to patient falling asleep.     Treatment & Education:  Pt educated on:  - role of OT  - level of assistance  - energy conservation and task modification to maximize independence with ADL's and mobility   -  safety while performing functional transfers and self care tasks  - orthopedic precautions  - progress towards OT goals     Patient left HOB elevated with call button in reach and PCT notified.    GOALS:   Multidisciplinary Problems       Occupational Therapy Goals          Problem: Occupational Therapy    Goal Priority Disciplines Outcome Interventions   Occupational Therapy Goal     OT, PT/OT Ongoing, Progressing    Description: Goals to be met by: 1/25/2024     Patient will increase functional independence with ADLs by performing:    UE Dressing with Supervision- Ongoing  LE Dressing with Moderate Assistance and Assistive Devices as needed- Ongoing  Personal Hygiene while seated at sink with Supervision- Ongoing  Oral Hygiene while seated at sink with Supervision- Ongoing  Toileting from toilet with Moderate Assistance for hygiene and clothing management and AE as needed-  Ongoing  Bathing from  sitting at sink with Minimal Assistance.  Toilet transfer to toilet with Contact Guard Assistance /c AE/LRAD as needed- Ongoing  Footwear /c Mod A and AE as needed  Tolerate standing functional tasks /c CGA and AE/LRAD as needed  Shower t/f /c CGA and AE/LRAD as needed                           Time Tracking:     OT Date of Treatment: 01/09/24  OT Start Time: 1347    OT Stop Time: 1406  OT Total Time (min): 19 min    Billable Minutes:Therapeutic Activity 19 1/9/2024

## 2024-01-10 PROCEDURE — 63700000 PHARM REV CODE 250 ALT 637 W/O HCPCS: Mod: HCNC | Performed by: NURSE PRACTITIONER

## 2024-01-10 PROCEDURE — 25000003 PHARM REV CODE 250: Mod: HCNC | Performed by: HOSPITALIST

## 2024-01-10 PROCEDURE — 97535 SELF CARE MNGMENT TRAINING: CPT | Mod: HCNC

## 2024-01-10 PROCEDURE — 25000003 PHARM REV CODE 250: Mod: HCNC | Performed by: FAMILY MEDICINE

## 2024-01-10 PROCEDURE — 25000003 PHARM REV CODE 250: Mod: HCNC | Performed by: NURSE PRACTITIONER

## 2024-01-10 PROCEDURE — 11000004 HC SNF PRIVATE: Mod: HCNC

## 2024-01-10 PROCEDURE — 97530 THERAPEUTIC ACTIVITIES: CPT | Mod: HCNC

## 2024-01-10 RX ADMIN — MEMANTINE 10 MG: 10 TABLET ORAL at 09:01

## 2024-01-10 RX ADMIN — OSELTAMIVIR PHOSPHATE 30 MG: 30 CAPSULE ORAL at 12:01

## 2024-01-10 RX ADMIN — METHOCARBAMOL 500 MG: 500 TABLET ORAL at 09:01

## 2024-01-10 RX ADMIN — ASPIRIN 81 MG: 81 TABLET, COATED ORAL at 09:01

## 2024-01-10 RX ADMIN — ACETAMINOPHEN 1000 MG: 500 TABLET ORAL at 09:01

## 2024-01-10 RX ADMIN — PREGABALIN 50 MG: 50 CAPSULE ORAL at 09:01

## 2024-01-10 RX ADMIN — GALANTAMINE 16 MG: 16 CAPSULE, EXTENDED RELEASE ORAL at 09:01

## 2024-01-10 RX ADMIN — CYANOCOBALAMIN TAB 1000 MCG 1000 MCG: 1000 TAB at 09:01

## 2024-01-10 RX ADMIN — METHOCARBAMOL 500 MG: 500 TABLET ORAL at 02:01

## 2024-01-10 RX ADMIN — SENNOSIDES AND DOCUSATE SODIUM 1 TABLET: 8.6; 5 TABLET ORAL at 09:01

## 2024-01-10 RX ADMIN — FAMOTIDINE 20 MG: 20 TABLET ORAL at 09:01

## 2024-01-10 RX ADMIN — LEVOTHYROXINE SODIUM 75 MCG: 75 TABLET ORAL at 06:01

## 2024-01-10 RX ADMIN — BUPROPION HYDROCHLORIDE 150 MG: 150 TABLET, EXTENDED RELEASE ORAL at 09:01

## 2024-01-10 RX ADMIN — PRAVASTATIN SODIUM 40 MG: 20 TABLET ORAL at 09:01

## 2024-01-10 NOTE — PROGRESS NOTES
[facility]                                  Skilled Nursing Facility                   Progress Note     Admit Date: 12/26/2023  AMARA 1/17/2024  [id number] A  Principal Problem:  Closed intertrochanteric fracture of left femur   HPI obtained from patient interview and chart review     Chief Complaint:Re-evaluation of medical treatment and therapy status    HPI:   Ms. Menendez is a 88 year old female PMHx of osteoporosis, dementia, thrombocytopenia and hypothyroidism presents to SNF following hospitalization for closed intertrochanteric left femur fracture s/p cephalomedullary nail fixation on 12/23 with Dr. Jerry.  Admission to SNF for secondary weakness and debility.    Interval history:   All patients in our facility have been initiated on prophylactic Tamiflu times 14 days per protocol for influenza outbreak in an institutionalized setting. 24 hr vital sign ranges reviewed and listed below, no acute abnormalities noted. Pt is at mental baseline which is pleasantly confused.  Patient denies shortness of breath, abdominal discomfort, nausea, or vomiting.  Patient reports an adequate appetite.  Patient denies dysuria.  Patient reports having regular bowel movements.  Patient progressing with PT/OT- Gait: amb with RW CGA slow ekta encouragement ~ 20 ft declined 2nd trial. Continuing to follow and treat all acute and chronic conditions.    Past Medical History: Patient has a past medical history of Arthritis, Depression, Hypercholesteremia, Thrombocytopenia, and Thyroid disease.    Past Surgical History: Patient has a past surgical history that includes Hysterectomy; Knee surgery; Ankle surgery; Wrist surgery; Colonoscopy (N/A, 6/2/2021); and Intramedullary rodding of femur (Left, 12/23/2023).    Social History: Patient reports that she has never smoked. She has never used smokeless tobacco.    Family History: family history includes  "Cancer in her maternal uncle; Heart failure in her father and mother; Suicide in her son.    Allergies: Patient is allergic to codeine.    ROS  Constitutional: Negative for fever   Eyes: Negative for blurred vision, double vision   Respiratory: Negative for shortness of breath.  +dry intermittent cough   Cardiovascular: Negative for chest pain, palpitations, and leg swelling.   Gastrointestinal: Negative for abdominal pain, constipation, diarrhea, nausea, vomiting.  Placenta indigestion  Genitourinary: Negative for dysuria, frequency   Musculoskeletal:  + generalized weakness.  +mild intermittent LLE pain  Skin: Negative for itching and rash.   Neurological: Negative for dizziness, headaches.   Psychiatric/Behavioral: Negative for depression. The patient is not nervous/anxious.      24 hour Vital Sign Range   Temp:  [96.6 °F (35.9 °C)-97.4 °F (36.3 °C)]   Pulse:  [63-73]   Resp:  [14-18]   BP: (127-130)/(60-65)   SpO2:  [93 %-97 %]     Current BMI: Body mass index is 25.53 kg/m².    PEx  Constitutional: Patient appears debilitated.  No distress noted  HENT:   Head: Normocephalic and atraumatic.   Eyes: Pupils are equal, round  Neck: Normal range of motion. Neck supple.   Cardiovascular: Normal rate, regular rhythm and normal heart sounds.    Pulmonary/Chest: Effort normal and breath sounds are clear  Abdominal: Soft. Bowel sounds are normal.   Musculoskeletal: Normal range of motion.   Neurological: Alert and oriented to person.  Disoriented to place, and time.  Confused.  Higher level thinking impaired  Psychiatric: Normal mood and affect. Behavior is normal.   Skin: Skin is warm and dry.  +surgical dressing to LLE    No results for input(s): "GLUCOSE", "NA", "K", "CL", "CO2", "BUN", "CREATININE", "CALCIUM", "MG" in the last 24 hours.          No results for input(s): "WBC", "RBC", "HGB", "HCT", "PLT", "MCV", "MCH", "MCHC" in the last 24 hours.          No results for input(s): "POCTGLUCOSE" in the last 168 hours. "     Assessment and Plan:    Closed intertrochanteric fracture of left femur  S/p cephalomedullary nail fixation on 12/23  - PT/OT, WBAT  - DVT PPX with ASA 81 mg BID x 30 days  - maintain surgical dressing until removed by Orthopedics  - ortho APAP to see patient weekly at [facility]  - follow-up with ortho after discharge    Prophylactic therapy  - 1/10 initiated on prophylactic Tamiflu 75 mg daily times 14 days per protocol for influenza outbreak in an institutionalized setting.     Acute postoperative pain  - stable, continue acetaminophen 1000 mg q.8 hours PRN, gabapentin 500 mg qHS., methocarbamol 500 mg TID.  Bowel regimen in place     Acute Blood loss anemia  - expected postoperatively  - stable, continue monitor twice weekly CBC, transfuse for hemoglobin < 7    Amnestic MCI (mild cognitive impairment with memory loss)  - Continue memantine 10 mg BID, galantamine 16 mg daily, and buproprion 150 mg BID, B12   Delirium precautions:  - Avoid antihistamines, anticholinergics, hypnotics, and minimize opiates while controlling for pain as these medications may exacerbate delirium. Cues for day/night will assist with keeping patient calm and oriented - during daytime, please keep adequate light in room (open windows, lights on) and please keep room dim at night-time to encourage normal sleep-wake cycles. Continuing to have nursing and family reorient the patient and encourage family to visit    Hypercholesteremia  - continue home medication pravastatin 40 mg qHS.      Hypothyroidism  - stable, Continue levothyroxine 75 mcg daily    GERD  -   Initiated famotidine 20 mg daily, Tums PRN    Debility   - Continue with PT/OT for gait training and strengthening and restoration of ADL's   - Encourage mobility, OOB in chair, and early ambulation as appropriate  - Fall precautions   - Monitor for bowel and bladder dysfunction  - Monitor for and prevent skin breakdown and pressure ulcers  - Continue DVT prophylaxis with ASA 81 mg  BID             Anticipate disposition:  Home with home health    IP [facility] RISK OF UNPLANNED READMISSION Model: MODERATE     Follow-up needed during SNF stay-    Apt not send pt to- Ortho 1/8    Follow-up needed after discharge from SNF: PCP, ortho 2/5     Future Appointments   Date Time Provider Department Center   2/5/2024 10:30 AM Lauren Cosme PA-C [facility] ORTHO [address]   3/21/2024 11:00 AM Shi Simon MD [facility] IM [address] NP  Department of Hospital Medicine   [facility]     DOS: 1/10/2024       Patient note was created using MModal Dictation.  Any errors in syntax or even information may not have been identified and edited on initial review prior to signing this note.

## 2024-01-10 NOTE — PLAN OF CARE
Problem: Occupational Therapy  Goal: Occupational Therapy Goal  Description: Goals to be met by: 1/25/2024     Patient will increase functional independence with ADLs by performing:    UE Dressing with Supervision- Met  LE Dressing with Moderate Assistance and Assistive Devices as needed- Partially met, preforming inconsistently  Personal Hygiene while seated at sink with Supervision- Met  Oral Hygiene while seated at sink with Supervision- Met  Toileting from toilet with Moderate Assistance for hygiene and clothing management and AE as needed- Partially met, preforming inconsistently  Bathing from  sitting at sink with Minimal Assistance- Partially met; preforming inconsistently  Toilet transfer to toilet with Contact Guard Assistance /c AE/LRAD as needed- Partially met; preforming inconsistently  Footwear /c Mod A and AE as needed  Tolerate standing functional tasks for ~3 minutes /c CGA and AE/LRAD as needed  Shower t/f /c CGA and AE/LRAD as needed      Outcome: Ongoing, Progressing

## 2024-01-10 NOTE — PT/OT/SLP PROGRESS
Occupational Therapy   Treatment    Name: Rosario Menendez  MRN: [id number]  Admit Date: 12/26/2023  Admitting Diagnosis:  Closed intertrochanteric fracture of left femur    General Precautions: Standard, fall, hearing impaired   Orthopedic Precautions: LLE weight bearing as tolerated   Braces: N/A    Recommendations:     Discharge Recommendations:  home health OT  Level of Assistance Recommended at Discharge: 24 hours light assistance for ADL's and homemaking tasks  Discharge Equipment Recommendations: hip kit, 3-in-1 commode, shower chair  Barriers to discharge:   (increased assistance for ADLs and mobility)    Assessment:     Rosario Menendez is a 88 y.o. female with a medical diagnosis of Closed intertrochanteric fracture of left femur. Pt tolerated session well and without incident and shows excellent motivation and potential to improve, but the pt continues to require assistance to perform self-care tasks and mobility. Pt strengths include  Family support and Willingness to participate. Pt improved UBD, LBD, Grooming/hygiene, Toileting, and Toilet transfers. However, pt would continue to benefit from cont'd OT services in the SNF setting to improve safety and independence /c functional tasks and ADLs upon discharge. Pt req extensive vcs throughout session to initiate and complete tasks.   Performance deficits affecting function are weakness, impaired endurance, impaired self care skills, impaired functional mobility, gait instability, impaired balance, decreased coordination, decreased upper extremity function, decreased lower extremity function, decreased safety awareness, decreased ROM, pain.     Rehab Potential is good    Activity tolerance:  Good    Plan:     Patient to be seen 5 x/week to address the above listed problems via self-care/home management, therapeutic activities, therapeutic exercises, neuromuscular re-education    Plan of Care Expires: 01/25/24  Plan of Care Reviewed with:  "patient    Subjective     Communicated with: NSG prior to session.    "Now what do I do?" Re: Pt asked to clean body, but asking about how to complete task after cleaning face and BUE    Pain/Comfort:  Pain Rating 1: 0/10  Pain Rating Post-Intervention 1: 0/10    Patient's cultural, spiritual, Uatsdin conflicts given the current situation:  no    Objective:     Patient found supine with Other (comments) (no line) upon OT entry to room. Pt alert and agreeable to therapy.       Bed Mobility:    Patient completed Supine to Sit with moderate assistance and use of bed rail. (A) req for initiation of task, minor assist /c legs and trunk control.    Functional Mobility/Transfers:  Patient completed Sit <> Stand Transfer with minimum assistance  with  rolling walker and vcs for proper use of RW.   Patient completed Bed <> Chair Transfer using Stand Pivot technique with minimum assistance with rolling walker and extensive vcs for use of RW and proper foot placement  Patient completed Toilet Transfer Stand Pivot technique with minimum assistance with  rolling walker and  extensive vcs for use of RW and proper foot placement      Activities of Daily Living:  Oral Hygiene: set-up assistance /c SPV to squeeze tooth paste onto tooth brush seated in WC at sink   Personal Hygiene: SPV to wash face and hands at sink   Bathing: minimum assistance for wipe down seated at toilet /c extensive vcs for completion of task. (A) req for cleaning b/l feet and lower legs  Upper Body Dressing: supervision and set-up assistance to dof/don shirts seated on toilet  Lower Body Dressing: moderate assistance to doff/don /c BLE threaded seated on toilet and managed over hips in standing /c RW. Extensive vcs given to maintain standing balance and assist /c performance of task. (A) given to don over R foot, to don over buttocks, and for maintenance of standing balance.  Toileting: moderate assistance (A) given to maintain standing balance /c RW while " preforming standing perirectal care and for clothing mgmt over buttocks    AMPAC 6 Click ADL: 18      Treatment & Education:  Pt educated on POC, role of OT, and safety. Pt performed tasks to improve safety and independence in functional tasks and ADLs as mentioned above.       Patient left up in chair with call button in reach, NSG present, and all needs met    GOALS:   Multidisciplinary Problems       Occupational Therapy Goals          Problem: Occupational Therapy    Goal Priority Disciplines Outcome Interventions   Occupational Therapy Goal     OT, PT/OT Ongoing, Progressing    Description: Goals to be met by: 1/25/2024     Patient will increase functional independence with ADLs by performing:    UE Dressing with Supervision- Met  LE Dressing with Moderate Assistance and Assistive Devices as needed- Partially met, preforming inconsistently  Personal Hygiene while seated at sink with Supervision- Met  Oral Hygiene while seated at sink with Supervision- Met  Toileting from toilet with Moderate Assistance for hygiene and clothing management and AE as needed- Partially met, preforming inconsistently  Bathing from  sitting at sink with Minimal Assistance- Partially met; preforming inconsistently  Toilet transfer to toilet with Contact Guard Assistance /c AE/LRAD as needed- Partially met; preforming inconsistently  Footwear /c Mod A and AE as needed  Tolerate standing functional tasks for ~3 minutes /c CGA and AE/LRAD as needed  Shower t/f /c CGA and AE/LRAD as needed                           Time Tracking:     OT Date of Treatment: 01/10/24  OT Start Time: 0837    OT Stop Time: 0917  OT Total Time (min): 40 min    Billable Minutes:Self Care/Home Management 30  Therapeutic Activity 10    1/10/2024

## 2024-01-10 NOTE — CARE UPDATE
Discharge planning  DME,     Pt's information sent to Jennifer at [facility], will send other info as requested, email.      [address]eline daughter called and returned call, dtr upset due to process of acquiring DME (a hospital bed) is too late for familyif insurance states no.     Explained process of orders, explained 1-2 days before (for a delivery) a discharge due to company works day to day only; explained Monday is when pt's insurance would be processed and submitted to [facility].  Dtr asked if she can call [employer] to see if they will pay for it and SS explained yes, she can, but they are going to require documentation to approve.  That is part of the process.    Daughter said she may acquire from [employer] on her own and call to report that. I stated that is fine, no problem.

## 2024-01-11 LAB
ANION GAP SERPL CALC-SCNC: 5 MMOL/L (ref 8–16)
BASOPHILS # BLD AUTO: 0.07 K/UL (ref 0–0.2)
BASOPHILS NFR BLD: 1.3 % (ref 0–1.9)
BUN SERPL-MCNC: 15 MG/DL (ref 8–23)
CALCIUM SERPL-MCNC: 9.7 MG/DL (ref 8.7–10.5)
CHLORIDE SERPL-SCNC: 113 MMOL/L (ref 95–110)
CO2 SERPL-SCNC: 21 MMOL/L (ref 23–29)
CREAT SERPL-MCNC: 0.8 MG/DL (ref 0.5–1.4)
DIFFERENTIAL METHOD BLD: ABNORMAL
EOSINOPHIL # BLD AUTO: 0.2 K/UL (ref 0–0.5)
EOSINOPHIL NFR BLD: 3.3 % (ref 0–8)
ERYTHROCYTE [DISTWIDTH] IN BLOOD BY AUTOMATED COUNT: 15.5 % (ref 11.5–14.5)
EST. GFR  (NO RACE VARIABLE): >60 ML/MIN/1.73 M^2
GLUCOSE SERPL-MCNC: 78 MG/DL (ref 70–110)
HCT VFR BLD AUTO: 34 % (ref 37–48.5)
HGB BLD-MCNC: 11.1 G/DL (ref 12–16)
IMM GRANULOCYTES # BLD AUTO: 0.03 K/UL (ref 0–0.04)
IMM GRANULOCYTES NFR BLD AUTO: 0.6 % (ref 0–0.5)
LYMPHOCYTES # BLD AUTO: 1.8 K/UL (ref 1–4.8)
LYMPHOCYTES NFR BLD: 35.1 % (ref 18–48)
MAGNESIUM SERPL-MCNC: 2.3 MG/DL (ref 1.6–2.6)
MCH RBC QN AUTO: 30.9 PG (ref 27–31)
MCHC RBC AUTO-ENTMCNC: 32.6 G/DL (ref 32–36)
MCV RBC AUTO: 95 FL (ref 82–98)
MONOCYTES # BLD AUTO: 0.9 K/UL (ref 0.3–1)
MONOCYTES NFR BLD: 16.4 % (ref 4–15)
NEUTROPHILS # BLD AUTO: 2.3 K/UL (ref 1.8–7.7)
NEUTROPHILS NFR BLD: 43.3 % (ref 38–73)
NRBC BLD-RTO: 0 /100 WBC
PHOSPHATE SERPL-MCNC: 3.3 MG/DL (ref 2.7–4.5)
PLATELET # BLD AUTO: 143 K/UL (ref 150–450)
PMV BLD AUTO: 11.1 FL (ref 9.2–12.9)
POTASSIUM SERPL-SCNC: 3.9 MMOL/L (ref 3.5–5.1)
RBC # BLD AUTO: 3.59 M/UL (ref 4–5.4)
SODIUM SERPL-SCNC: 139 MMOL/L (ref 136–145)
WBC # BLD AUTO: 5.19 K/UL (ref 3.9–12.7)

## 2024-01-11 PROCEDURE — 94761 N-INVAS EAR/PLS OXIMETRY MLT: CPT | Mod: HCNC

## 2024-01-11 PROCEDURE — 97116 GAIT TRAINING THERAPY: CPT | Mod: HCNC

## 2024-01-11 PROCEDURE — 36415 COLL VENOUS BLD VENIPUNCTURE: CPT | Mod: HCNC | Performed by: NURSE PRACTITIONER

## 2024-01-11 PROCEDURE — 63700000 PHARM REV CODE 250 ALT 637 W/O HCPCS: Mod: HCNC | Performed by: NURSE PRACTITIONER

## 2024-01-11 PROCEDURE — 97110 THERAPEUTIC EXERCISES: CPT | Mod: HCNC

## 2024-01-11 PROCEDURE — 85025 COMPLETE CBC W/AUTO DIFF WBC: CPT | Mod: HCNC | Performed by: NURSE PRACTITIONER

## 2024-01-11 PROCEDURE — 25000003 PHARM REV CODE 250: Mod: HCNC | Performed by: FAMILY MEDICINE

## 2024-01-11 PROCEDURE — 11000004 HC SNF PRIVATE: Mod: HCNC

## 2024-01-11 PROCEDURE — 83735 ASSAY OF MAGNESIUM: CPT | Mod: HCNC | Performed by: NURSE PRACTITIONER

## 2024-01-11 PROCEDURE — 97535 SELF CARE MNGMENT TRAINING: CPT | Mod: HCNC

## 2024-01-11 PROCEDURE — 25000003 PHARM REV CODE 250: Mod: HCNC | Performed by: NURSE PRACTITIONER

## 2024-01-11 PROCEDURE — 80048 BASIC METABOLIC PNL TOTAL CA: CPT | Mod: HCNC | Performed by: NURSE PRACTITIONER

## 2024-01-11 PROCEDURE — 25000003 PHARM REV CODE 250: Mod: HCNC | Performed by: HOSPITALIST

## 2024-01-11 PROCEDURE — 84100 ASSAY OF PHOSPHORUS: CPT | Mod: HCNC | Performed by: NURSE PRACTITIONER

## 2024-01-11 RX ORDER — SODIUM BICARBONATE 650 MG/1
650 TABLET ORAL ONCE
Status: COMPLETED | OUTPATIENT
Start: 2024-01-11 | End: 2024-01-11

## 2024-01-11 RX ADMIN — METHOCARBAMOL 500 MG: 500 TABLET ORAL at 04:01

## 2024-01-11 RX ADMIN — METHOCARBAMOL 500 MG: 500 TABLET ORAL at 08:01

## 2024-01-11 RX ADMIN — GALANTAMINE 16 MG: 16 CAPSULE, EXTENDED RELEASE ORAL at 08:01

## 2024-01-11 RX ADMIN — PRAVASTATIN SODIUM 40 MG: 20 TABLET ORAL at 08:01

## 2024-01-11 RX ADMIN — SODIUM BICARBONATE 650 MG TABLET 650 MG: at 12:01

## 2024-01-11 RX ADMIN — MEMANTINE 10 MG: 10 TABLET ORAL at 08:01

## 2024-01-11 RX ADMIN — OSELTAMIVIR PHOSPHATE 30 MG: 30 CAPSULE ORAL at 08:01

## 2024-01-11 RX ADMIN — THERA TABS 1 TABLET: TAB at 12:01

## 2024-01-11 RX ADMIN — LEVOTHYROXINE SODIUM 75 MCG: 75 TABLET ORAL at 05:01

## 2024-01-11 RX ADMIN — SENNOSIDES AND DOCUSATE SODIUM 1 TABLET: 8.6; 5 TABLET ORAL at 08:01

## 2024-01-11 RX ADMIN — BUPROPION HYDROCHLORIDE 150 MG: 150 TABLET, EXTENDED RELEASE ORAL at 08:01

## 2024-01-11 RX ADMIN — ASPIRIN 81 MG: 81 TABLET, COATED ORAL at 08:01

## 2024-01-11 RX ADMIN — FAMOTIDINE 20 MG: 20 TABLET ORAL at 08:01

## 2024-01-11 RX ADMIN — Medication 6 MG: at 08:01

## 2024-01-11 RX ADMIN — CYANOCOBALAMIN TAB 1000 MCG 1000 MCG: 1000 TAB at 08:01

## 2024-01-11 RX ADMIN — PREGABALIN 50 MG: 50 CAPSULE ORAL at 08:01

## 2024-01-11 NOTE — PT/OT/SLP PROGRESS
Physical Therapy Treatment    Patient Name:  Rosario Menendez   MRN:  [id number]  Admit Date: 12/26/2023  Admitting Diagnosis: Closed intertrochanteric fracture of left femur  Recent Surgeries:  INSERTION, INTRAMEDULLARY DIEGO, FEMUR (Left)       General Precautions: Standard, fall, hearing impaired  Orthopedic Precautions: LLE weight bearing as tolerated  Braces: N/A  PT had a face-to-face encounter with regards to the patient's plan of care with the PTA who has been seeing this patient regularly.   Recommendations:     Discharge Recommendations: home health PT  Level of Assistance Recommended at Discharge: 24 hours light assistance  Discharge Equipment Recommendations: wheelchair, bedside commode  Barriers to discharge: Decreased caregiver support    Assessment:     Rosario Menendez is a 88 y.o. female admitted with a medical diagnosis of Closed intertrochanteric fracture of left femur . Pt will continue to benefit from skilled PT services during this hospital admit to continue to improve transfer ability and efficiency as well as continue to progress pt's ambulation distance and cardiopulmonary endurance to maximize pt's functional independence and return to PLOF.    .    Performance deficits affecting function: weakness, impaired endurance, impaired self care skills, impaired functional mobility, gait instability, impaired balance, decreased upper extremity function, decreased lower extremity function, impaired cardiopulmonary response to activity, orthopedic precautions.    Rehab Potential is good    Activity Tolerance: Good    Plan:     Patient to be seen 5 x/week to address the above listed problems via gait training, therapeutic activities, therapeutic exercises, wheelchair management/training    Plan of Care Expires: 01/26/24  Plan of Care Reviewed with: patient    Subjective     Pt. Agreeable to work with PT.     Pain/Comfort:  Pain Rating 1: 0/10  Pain Rating Post-Intervention 1: 0/10    Patient's cultural,  spiritual, Adventism conflicts given the current situation:  no    Objective:     Communicated with pt's nurse prior to session.  Patient found up in chair with   upon PT entry to room.     Therapeutic Activities and Exercises: LBEx 10mins to help improve B l/E MMT and endurance.    Functional Mobility:  Transfers:     Sit to Stand:  contact guard assistance with rolling walker  Gait: 25ft +18ft with RW and SBA for safety. Pt with very slow ekta and step to Gt pattern    AM-PAC 6 CLICK MOBILITY  16    Patient left up in chair with call button in reach.    GOALS:   Multidisciplinary Problems       Physical Therapy Goals          Problem: Physical Therapy    Goal Priority Disciplines Outcome Goal Variances Interventions   Physical Therapy Goal     PT, PT/OT Ongoing, Progressing     Description: Goals to be met by: 1/27/23     Patient will increase functional independence with mobility by performing:    . Supine to sit with MInimal Assistance  . Sit to supine with MInimal Assistance  . Rolling to Left and Right with Minimal Assistance.  . Sit to stand transfer with Contact Guard Assistance  . Bed to chair transfer with Contact Guard Assistance using Rolling Walker  . Gait  x 50 feet with Contact Guard Assistance using Rolling Walker.   . Wheelchair propulsion x100 feet with Supervision using bilateral uppper extremities    Rehab Services' DME recommendations    Wheelchair  Number of hours up in a wheelchair per day 8      Style Light weight    Justification for light weight w/c: patient cannot propel in a standard wheelchair, patient can and does self-propel in a lightweight wheelchair, patient has impaired ability to participate in MRADLs, mobility limitations cannot be sifficiently resolved with a cane/walker, the home provides adequate access between rooms for a wheelchair, a wheelchair will significantly improve the ability to participate in MRADLs and will be used in the home on a regular basis, the patient is  willing to use a wheelchair in the home, the patient has a caregiver who is available, willing, and able to provide assistance with the wheelchair, and the patient requires additional person for safety with mobility with walker  Seat Width 16 (Small adult)  Seat Depth 16  Back Height Standard  Leg Support Standard, Heel Loops, and Swing Away  Arm Height Desk and Swing Away  Lap Belt Buckle  Accessories Anti-tippers and Safety belt  Cushion Basic  Justification for Cushion Skin Integrity      3 in 1 commode Standard     Justification for 3 in 1 commode: The patient's deficits will not be sufficiently addressed by a raised toilet seat       Shower Chair Without back      Hip Kit Standard/Short                         Time Tracking:     PT Received On: 01/11/24  PT Start Time: 1148  PT Stop Time: 1216  PT Total Time (min): 28 min    Billable Minutes: Gait Training 18 and Therapeutic Exercise 10    Treatment Type: Treatment, 6th Visit  PT/PTA: PT     Number of PTA visits since last PT visit: 0     01/11/2024

## 2024-01-11 NOTE — PROGRESS NOTES
[facility]                                  Skilled Nursing Facility                   Progress Note     Admit Date: 12/26/2023  AMARA 1/17/2024  [id number]  Principal Problem:  Closed intertrochanteric fracture of left femur   HPI obtained from patient interview and chart review     Chief Complaint:Re-evaluation of medical treatment and therapy status: Lab review    HPI:   Ms. Menendez is a 88 year old female PMHx of osteoporosis, dementia, thrombocytopenia and hypothyroidism presents to SNF following hospitalization for closed intertrochanteric left femur fracture s/p cephalomedullary nail fixation on 12/23 with Dr. Jerry.  Admission to SNF for secondary weakness and debility.    Interval history:   All of today's labs reviewed and are listed below.  Bicarb 21.  24 hr vital sign ranges reviewed and listed below, no acute abnormalities noted. Pt is at mental baseline which is pleasantly confused.  Patient reports increased weakness.  Seen and discussed with orthopedics today.  Patient denies shortness of breath, abdominal discomfort, nausea, or vomiting.  Patient reports an adequate appetite.  Patient denies dysuria.  Patient reports having regular bowel movements.  Patient progressing with PT/OT-Gait: amb with RW CGA slow ekta encouragement ~ 20 ft declined 2nd trial. Continuing to follow and treat all acute and chronic conditions.      Past Medical History: Patient has a past medical history of Arthritis, Depression, Hypercholesteremia, Thrombocytopenia, and Thyroid disease.    Past Surgical History: Patient has a past surgical history that includes Hysterectomy; Knee surgery; Ankle surgery; Wrist surgery; Colonoscopy (N/A, 6/2/2021); and Intramedullary rodding of femur (Left, 12/23/2023).    Social History: Patient reports that she has never smoked. She has never used smokeless tobacco.    Family History: family history  includes Cancer in her maternal uncle; Heart failure in her father and mother; Suicide in her son.    Allergies: Patient is allergic to codeine.    ROS  Constitutional: Negative for fever   Eyes: Negative for blurred vision, double vision   Respiratory: Negative for shortness of breath.  +dry intermittent cough   Cardiovascular: Negative for chest pain, palpitations, and leg swelling.   Gastrointestinal: Negative for abdominal pain, constipation, diarrhea, nausea, vomiting.  Placenta indigestion  Genitourinary: Negative for dysuria, frequency   Musculoskeletal:  + generalized weakness.  +mild intermittent LLE pain  Skin: Negative for itching and rash.   Neurological: Negative for dizziness, headaches.   Psychiatric/Behavioral: Negative for depression. The patient is not nervous/anxious.      24 hour Vital Sign Range   Temp:  [97.2 °F (36.2 °C)-97.6 °F (36.4 °C)]   Pulse:  [67-69]   Resp:  [15-16]   BP: (137-142)/(71)   SpO2:  [94 %-95 %]     Current BMI: Body mass index is 25.53 kg/m².    PEx  Constitutional: Patient appears debilitated.  No distress noted  HENT:   Head: Normocephalic and atraumatic.   Eyes: Pupils are equal, round  Neck: Normal range of motion. Neck supple.   Cardiovascular: Normal rate, regular rhythm and normal heart sounds.    Pulmonary/Chest: Effort normal and breath sounds are clear  Abdominal: Soft. Bowel sounds are normal.   Musculoskeletal: Normal range of motion.   Neurological: Alert and oriented to person.  Disoriented to place, and time.  Confused.  Higher level thinking impaired  Psychiatric: Normal mood and affect. Behavior is normal.   Skin: Skin is warm and dry.  +surgical dressing to LLE    Recent Labs   Lab 01/11/24  0400      K 3.9   *   CO2 21*   BUN 15   CREATININE 0.8   CALCIUM 9.7   MG 2.3             Recent Labs   Lab 01/11/24  0400   WBC 5.19   RBC 3.59*   HGB 11.1*   HCT 34.0*   *   MCV 95   MCH 30.9   MCHC 32.6             No results for input(s):  ""POCTGLUCOSE" in the last 168 hours.     Assessment and Plan:    Closed intertrochanteric fracture of left femur  S/p cephalomedullary nail fixation on 12/23  - PT/OT, WBAT  - DVT PPX with ASA 81 mg BID x 30 days  - maintain surgical dressing until removed by Orthopedics  - ortho APAP to see patient weekly at [facility]  - follow-up with ortho after discharge    Metabolic acidosis, mild  - initiated sodium bicarbonate 650 mg x 1 dose    Prophylactic therapy  - 1/10 initiated on prophylactic Tamiflu 75 mg daily times 14 days per protocol for influenza outbreak in an institutionalized setting.     Acute postoperative pain  - stable, continue acetaminophen 1000 mg q.8 hours PRN, gabapentin 500 mg qHS., methocarbamol 500 mg TID.  Bowel regimen in place     Acute Blood loss anemia  - expected postoperatively  - stable, continue monitor twice weekly CBC, transfuse for hemoglobin < 7    Dementia  - Continue memantine 10 mg BID, galantamine 16 mg daily, and buproprion 150 mg BID, B12   Delirium precautions:  - Avoid antihistamines, anticholinergics, hypnotics, and minimize opiates while controlling for pain as these medications may exacerbate delirium. Cues for day/night will assist with keeping patient calm and oriented - during daytime, please keep adequate light in room (open windows, lights on) and please keep room dim at night-time to encourage normal sleep-wake cycles. Continuing to have nursing and family reorient the patient and encourage family to visit    Hypercholesteremia  - continue home medication pravastatin 40 mg qHS.      Hypothyroidism  - stable, Continue levothyroxine 75 mcg daily    GERD  -   continue famotidine 20 mg daily, Tums PRN    Debility  Weakness   - Continue with PT/OT for gait training and strengthening and restoration of ADL's   - Encourage mobility, OOB in chair, and early ambulation as appropriate  - Fall precautions   - Monitor for bowel and bladder dysfunction  - Monitor for and prevent skin " breakdown and pressure ulcers  - Continue DVT prophylaxis with ASA 81 mg BID  - added multivitamin today             Anticipate disposition:  Home with home health    Hospital bed justification:  Patient requires a hospital bed due to requiring positioning of the body in ways not feasible with an ordinary bed to alleviate pain/ is completely immobile or limited mobility and cannot independently make changes in body position without the use of the bed.       IP [facility] RISK OF UNPLANNED READMISSION Model: MODERATE     Follow-up needed during SNF stay-    Apt not send pt to- Ortho 1/8    Follow-up needed after discharge from SNF: PCP, ortho 2/5     Future Appointments   Date Time Provider Department Center   2/5/2024 10:30 AM Lauren Cosme PA-C [facility] ORTHO [facility]   3/21/2024 11:00 AM Shi Simon MD [facility] IM [facility] NP  Department of Hospital Medicine   [facility]     DOS: 1/11/2024       Patient note was created using MModal Dictation.  Any errors in syntax or even information may not have been identified and edited on initial review prior to signing this note.

## 2024-01-11 NOTE — PT/OT/SLP PROGRESS
Occupational Therapy   Treatment    Name: Rosario Menendez  MRN: [id number]  Admit Date: 12/26/2023  Admitting Diagnosis:  Closed intertrochanteric fracture of left femur    General Precautions: Standard, fall, hearing impaired   Orthopedic Precautions: LLE weight bearing as tolerated   Braces: N/A    Recommendations:     Discharge Recommendations:   (low intensity)  Level of Assistance Recommended at Discharge: 24 hours light assistance for ADL's and homemaking tasks  Discharge Equipment Recommendations: hip kit, 3-in-1 commode, shower chair  Barriers to discharge:   (increased level of assistance required)    Assessment:     Rosario Menendez is a 88 y.o. female with a medical diagnosis of Closed intertrochanteric fracture of left femur .  Pt noted with good participation in today's session. Session focused on ADL performance and increasing BUE strength. Pt tolerated treatment well without incident. Pt is progressing towards goals, however would benefit from continued skilled OT for improved coordination, strength, and activity tolerance neccessary for safe ADL completion  to maximize QOL and functional independence. She presents with deficits listed below. Performance deficits affecting function are weakness, impaired endurance, impaired self care skills, impaired functional mobility, gait instability, impaired balance, decreased coordination, decreased lower extremity function, orthopedic precautions.     Rehab Potential is good    Activity tolerance:  Good    Plan:     Patient to be seen 5 x/week to address the above listed problems via self-care/home management, therapeutic activities, therapeutic exercises, community/work re-entry, neuromuscular re-education    Plan of Care Expires: 01/25/24  Plan of Care Reviewed with: patient    Subjective     Communicated with: RN prior to session. Pt agreeable to OT .    Pain/Comfort:  Pain Rating 1: 2/10  Location - Side 1: Left  Location - Orientation 1:  generalized  Location 1: hip  Pain Addressed 1: Reposition, Distraction    Patient's cultural, spiritual, Jain conflicts given the current situation:  no    Objective:     Patient found up in chair with  (no lines) upon OT entry to room.    Bed Mobility:    No bed mobility observed.      Functional Mobility/Transfers:  Patient completed Sit <> Stand Transfer with minimum assistance  with  rolling walker   Patient completed Toilet Transfer Stand Pivot technique with minimum assistance with  rolling walker and grab bars  Functional Mobility: Pt able to self propel wheelchair ~85' with SUP using BUEs for support.     Activities of Daily Living:  Grooming: stand by assistance while seated in wheelchair at sink for oral and hand hygiene  Lower Body Dressing: moderate assistance to lelia brief and pants this date . Pt using Grab bar and RW to steady during stand.   Toileting: moderate assistance for hygiene and clothes management.     [facility] 6 Click ADL: 18    OT Exercises: AROM with 2 # dowel, shoulder flex/ scapular retraction, bicep curls x 15 reps. Pt fatigued requesting to discontinue activity     Treatment & Education:  Role of OT and OT POC  Fall Prevention/Safety Awareness Training - RW management during functional mobility and standing ADLs to decrease risk of falls  Educated on OOB activity and impact on increasing functional independence.  Educated on importance of progressive mobilization to increase strength and endurance.      Patient left up in chair with  Physical Therapist.    GOALS:   Multidisciplinary Problems       Occupational Therapy Goals          Problem: Occupational Therapy    Goal Priority Disciplines Outcome Interventions   Occupational Therapy Goal     OT, PT/OT Ongoing, Progressing    Description: Goals to be met by: 1/25/2024     Patient will increase functional independence with ADLs by performing:    UE Dressing with Supervision- Met  LE Dressing with Moderate Assistance and Assistive  Devices as needed- Partially met, preforming inconsistently  Personal Hygiene while seated at sink with Supervision- Met  Oral Hygiene while seated at sink with Supervision- Met  Toileting from toilet with Moderate Assistance for hygiene and clothing management and AE as needed- Partially met, preforming inconsistently  Bathing from  sitting at sink with Minimal Assistance- Partially met; preforming inconsistently  Toilet transfer to toilet with Contact Guard Assistance /c AE/LRAD as needed- Partially met; preforming inconsistently  Footwear /c Mod A and AE as needed  Tolerate standing functional tasks for ~3 minutes /c CGA and AE/LRAD as needed  Shower t/f /c CGA and AE/LRAD as needed                           Time Tracking:     OT Date of Treatment: 01/11/24  OT Start Time: 1116    OT Stop Time: 1147  OT Total Time (min): 31 min    Billable Minutes:Self Care/Home Management 20  Therapeutic Exercise 11    1/11/2024

## 2024-01-11 NOTE — NURSING
POC reviewed with pt, pt verbalized understanding. Safety maintained throughout shift, bed locked and in lowest position, call light in reach. Pt remained free of fall/trauma. VSS, afebrile this shift pt stable. Pt denies any needs at this time. Care will continue.

## 2024-01-11 NOTE — NURSING
"Patient complains of feeling "weak". Vitals are wnl and patient denies coughing, shortness of breath, chills, shortness of breath, pain. Care of plan ongoing.  CN notified.   "

## 2024-01-11 NOTE — PROGRESS NOTES
Ms. Menendez was seen at [facility] today for a post-operative visit after undergoing Cephalomedullary nail fixation of left intertrochanteric femur fracture    by Dr. Mancilla on 12/23/2023.      Interval History:  She reports that she is doing well.  Pain is controlled.  She is taking pain medication.    She denies fever, chills, and sweats since the time of the surgery.    She is participating in her therapy sessions.   Functional Mobility:  Transfers:     Sit to Stand:  minimum assistance with rolling walker and vcs for tech  Gait: amb with RW CGA slow ekta encouragement ~ 20 ft declined 2nd trial    Physical exam:  Dressing removed.  Incision is clean, dry and intact.  Sutures removed without difficulty.  She has tactile stimulation to their lower leg, she denies calf pain, there is no leg edema and their pedal pulse is palpable x 2.     RADS: none done today    Assessment:  Post-op visit (2 weeks)    Plan:  Current care, treatment plan, precautions, activity level/ modifications, limitations, rehabilitation exercises and proposed future treatment were discussed with the patient. We discussed the need to monitor for changes in symptoms and condition and report them to the physician.  Discussed importance of compliance with all appointments and follow up examinations.     WOUND CARE ORDERS  Do not remove surgical dressing for 2 weeks post-op. This will be done only by MD at initial post-op visit. If dressing is completely saturated, Call number below.      Do not get dressings wet. Do not shower.      If dressing continues to be saturated or there are signs of infection, please call [facility] [phone] for further instructions and to make appt to be seen.         PHYSICAL THERAPY:    - Continue therapy as ordered.        - weight bearing as tolerated         - range of motion as tolerated.      PAIN MEDICATION:               - Pain medication: refill was not needed,               - Pain medication refill policy  provided to patient for review, yes      DVT PROPHYLAXIS:               - Aspirin     FOLLOW UP:   - Patient will continue to be seen on SNF weekly until their discharge then he is to return to clinic for follow up.  - Future Appointments:   Future Appointments   Date Time Provider Department Center   2/5/2024 10:30 AM Lauren Cosme PA-C [facility] ORTHO [address]   3/21/2024 11:00 AM Shi Simon MD [facility] [address]             If there are any questions prior to scheduled follow up, the patient was instructed to contact the office

## 2024-01-12 PROCEDURE — 97535 SELF CARE MNGMENT TRAINING: CPT | Mod: HCNC,CO

## 2024-01-12 PROCEDURE — 97530 THERAPEUTIC ACTIVITIES: CPT | Mod: HCNC,CQ

## 2024-01-12 PROCEDURE — 63700000 PHARM REV CODE 250 ALT 637 W/O HCPCS: Mod: HCNC | Performed by: NURSE PRACTITIONER

## 2024-01-12 PROCEDURE — 97116 GAIT TRAINING THERAPY: CPT | Mod: HCNC,CQ

## 2024-01-12 PROCEDURE — 11000004 HC SNF PRIVATE: Mod: HCNC

## 2024-01-12 PROCEDURE — 97110 THERAPEUTIC EXERCISES: CPT | Mod: HCNC,CQ

## 2024-01-12 PROCEDURE — 25000003 PHARM REV CODE 250: Mod: HCNC | Performed by: FAMILY MEDICINE

## 2024-01-12 PROCEDURE — 25000003 PHARM REV CODE 250: Mod: HCNC | Performed by: NURSE PRACTITIONER

## 2024-01-12 PROCEDURE — 25000003 PHARM REV CODE 250: Mod: HCNC | Performed by: HOSPITALIST

## 2024-01-12 PROCEDURE — 97530 THERAPEUTIC ACTIVITIES: CPT | Mod: HCNC,CO

## 2024-01-12 RX ADMIN — GALANTAMINE 16 MG: 16 CAPSULE, EXTENDED RELEASE ORAL at 09:01

## 2024-01-12 RX ADMIN — ASPIRIN 81 MG: 81 TABLET, COATED ORAL at 09:01

## 2024-01-12 RX ADMIN — ASPIRIN 81 MG: 81 TABLET, COATED ORAL at 08:01

## 2024-01-12 RX ADMIN — METHOCARBAMOL 500 MG: 500 TABLET ORAL at 08:01

## 2024-01-12 RX ADMIN — BUPROPION HYDROCHLORIDE 150 MG: 150 TABLET, EXTENDED RELEASE ORAL at 09:01

## 2024-01-12 RX ADMIN — METHOCARBAMOL 500 MG: 500 TABLET ORAL at 09:01

## 2024-01-12 RX ADMIN — MEMANTINE 10 MG: 10 TABLET ORAL at 09:01

## 2024-01-12 RX ADMIN — PREGABALIN 50 MG: 50 CAPSULE ORAL at 08:01

## 2024-01-12 RX ADMIN — OSELTAMIVIR PHOSPHATE 30 MG: 30 CAPSULE ORAL at 09:01

## 2024-01-12 RX ADMIN — LEVOTHYROXINE SODIUM 75 MCG: 75 TABLET ORAL at 06:01

## 2024-01-12 RX ADMIN — FAMOTIDINE 20 MG: 20 TABLET ORAL at 09:01

## 2024-01-12 RX ADMIN — CYANOCOBALAMIN TAB 1000 MCG 1000 MCG: 1000 TAB at 09:01

## 2024-01-12 RX ADMIN — THERA TABS 1 TABLET: TAB at 09:01

## 2024-01-12 RX ADMIN — METHOCARBAMOL 500 MG: 500 TABLET ORAL at 03:01

## 2024-01-12 RX ADMIN — Medication 6 MG: at 08:01

## 2024-01-12 RX ADMIN — SENNOSIDES AND DOCUSATE SODIUM 1 TABLET: 8.6; 5 TABLET ORAL at 09:01

## 2024-01-12 RX ADMIN — SENNOSIDES AND DOCUSATE SODIUM 1 TABLET: 8.6; 5 TABLET ORAL at 08:01

## 2024-01-12 RX ADMIN — MEMANTINE 10 MG: 10 TABLET ORAL at 08:01

## 2024-01-12 RX ADMIN — PRAVASTATIN SODIUM 40 MG: 20 TABLET ORAL at 08:01

## 2024-01-12 NOTE — PROGRESS NOTES
[facility] - Skilled Nursing  Adult Nutrition  Progress Note    SUMMARY   Recommendations  Continue regular diet, MVI added, assist with set up,RD following  Goals: PO to meet 75% of EEN/EPN by next RD follow up  Nutrition Goal Status: progressing towards goal  Communication of RD Recs: other (comment) (POC)    Assessment and Plan  Increased protein needs for healing as evidenced by s/p L femur repair.     Active     Plan  Collaboration with other providers  General diet  Vitamin supplement therapy, Vit B12  Multivitamin/mineral supplement therapy- MVI     Malnutrition Assessment 12/29     Skin (Micronutrient): bruised, edema, dry, wounds unhealed  Hair/Scalp (Micronutrient): dry  Teeth (Micronutrient): broken dentition  Neck/Chest (Micronutrient): muscle wasting  Musculoskeletal/Lower Extremities: muscle wasting           Orbital Region (Subcutaneous Fat Loss): moderate depletion  Upper Arm Region (Subcutaneous Fat Loss): mild depletion  Thoracic and Lumbar Region: well nourished   [place] Region (Muscle Loss): moderate depletion  Clavicle Bone Region (Muscle Loss): mild depletion  Clavicle and Acromion Bone Region (Muscle Loss): mild depletion  Dorsal Hand (Muscle Loss): moderate depletion                 Reason for Assessment    Reason For Assessment: RD follow-up  Diagnosis:  (L hip fracture, s/p IMN)  Relevant Medical History: Osteoporosis, dementia, hypothyroid, hypophosphatemia, blood loss anemia at present,  Interdisciplinary Rounds: attended  General Information Comments: outside food continues, patient will dc to a different community home, PO 80%  Nutrition Discharge Planning: DC on regular diet    Nutrition/Diet History    Patient Reported Diet/Restrictions/Preferences: general  Typical Food/Fluid Intake: regular margie  Spiritual, Cultural Beliefs, Mosque Practices, Values that Affect Care: no  Food Allergies: NKFA  Factors Affecting Nutritional Intake: impaired cognitive status/motor  control    Anthropometrics    Temp: 97.6 °F (36.4 °C)  Height Method: Stated  Height: 5' (152.4 cm)  Height (inches): 60 in  Weight Method: Bed Scale  Weight: 59.3 kg (130 lb 11.7 oz)  Weight (lb): 130.73 lb  Ideal Body Weight (IBW), Female: 100 lb  % Ideal Body Weight, Female (lb): 133.82 %  BMI (Calculated): 25.5  BMI Grade: 25 - 29.9 - overweight  Usual Body Weight (UBW), k kg  % Usual Body Weight: 103.1  % Weight Change From Usual Weight: 2.88 %       Lab/Procedures/Meds    Pertinent Labs Reviewed: reviewed  Pertinent Labs Comments: Hg 11.1, Hct 34.0, Cl 113  Pertinent Medications Reviewed: reviewed  Pertinent Medications Comments: statin, famotidine, antiviral  Estimated/Assessed Needs    Weight Used For Calorie Calculations: 60.7 kg (133 lb 13.1 oz)  Energy Calorie Requirements (kcal): 1341  Energy Need Method: Arnett-St Jeor (x 1..4(PAL))  Protein Requirements: 73-80  Weight Used For Protein Calculations: 60.7 kg (133 lb 13.1 oz) (x 1.2-1.3g/kg)  Fluid Requirements (mL): 1341 or per MD  Estimated Fluid Requirement Method: RDA Method  RDA Method (mL): 1341  CHO Requirement: -    Nutrition Prescription Ordered  Current Diet Order: Regular  Nutrition Order Comments: PO 80%  Oral Nutrition Supplement: refuses    Evaluation of Received Nutrient/Fluid Intake  I/O: no data  Energy Calories Required: not meeting needs  Protein Required: meeting needs, other (see comments)  Fluid Required: meeting needs  Comments: LBM   Tolerance: tolerating  % Intake of Estimated Energy Needs: 75 - 100 %  % Meal Intake: 75 - 100 %  Nutrition Risk  Level of Risk/Frequency of Follow-up: low (one time per week)     Monitor and Evaluation    Food and Nutrient Intake: food and beverage intake  Food and Nutrient Adminstration: diet order  Physical Activity and Function: nutrition-related ADLs and IADLs  Anthropometric Measurements: weight change  Biochemical Data, Medical Tests and Procedures: electrolyte and renal panel,  gastrointestinal profile  Nutrition-Focused Physical Findings: overall appearance, skin     Nutrition Follow-Up    RD Follow-up?: Yes

## 2024-01-12 NOTE — CARE UPDATE
Requested hospital bed documentation from therapy and provider per family request, so SS could process through DME if possible.    Awaiting justifications, then will forward to DmEShannon.      1/12  Received a message from [facility] DME that family did not want the hospital bed; they purchased one on their own. FYI

## 2024-01-12 NOTE — PROGRESS NOTES
[facility]                                  Skilled Nursing Facility                   Progress Note     Admit Date: 12/26/2023  AMARA 1/17/2024  [id number]  Principal Problem:  Closed intertrochanteric fracture of left femur   HPI obtained from patient interview and chart review     Chief Complaint:Re-evaluation of medical treatment and therapy status    HPI:   Ms. Menendez is a 88 year old female PMHx of osteoporosis, dementia, thrombocytopenia and hypothyroidism presents to SNF following hospitalization for closed intertrochanteric left femur fracture s/p cephalomedullary nail fixation on 12/23 with Dr. Jerry.  Admission to SNF for secondary weakness and debility.    Interval history:    24 hr vital sign ranges reviewed and listed below, no acute abnormalities noted. Pt is at mental baseline which is pleasantly confused. Patient denies shortness of breath, abdominal discomfort, nausea, or vomiting.  Patient reports an adequate appetite.  Patient denies dysuria.  Patient reports having regular bowel movements.  Patient progressing with PT/OT-Gait: pt amb 30 ft + 43 ft SBA/CGA with RW. CGA d/t pt reported weakness in LLE. Vc to inc step length and to follow through with RLE. Step to gait noted at beginning of first ambulation and pt able to complete step through with vc. Continuing to follow and treat all acute and chronic conditions.      Past Medical History: Patient has a past medical history of Arthritis, Depression, Hypercholesteremia, Thrombocytopenia, and Thyroid disease.    Past Surgical History: Patient has a past surgical history that includes Hysterectomy; Knee surgery; Ankle surgery; Wrist surgery; Colonoscopy (N/A, 6/2/2021); and Intramedullary rodding of femur (Left, 12/23/2023).    Social History: Patient reports that she has never smoked. She has never used smokeless tobacco.    Family History: family history includes  "Cancer in her maternal uncle; Heart failure in her father and mother; Suicide in her son.    Allergies: Patient is allergic to codeine.    ROS  Constitutional: Negative for fever   Eyes: Negative for blurred vision, double vision   Respiratory: Negative for shortness of breath.  +dry intermittent cough   Cardiovascular: Negative for chest pain, palpitations, and leg swelling.   Gastrointestinal: Negative for abdominal pain, constipation, diarrhea, nausea, vomiting.  Placenta indigestion  Genitourinary: Negative for dysuria, frequency   Musculoskeletal:  + generalized weakness.  +mild intermittent LLE pain  Skin: Negative for itching and rash.   Neurological: Negative for dizziness, headaches.   Psychiatric/Behavioral: Negative for depression. The patient is not nervous/anxious.      24 hour Vital Sign Range   Temp:  [97.6 °F (36.4 °C)-98.4 °F (36.9 °C)]   Pulse:  [67-69]   Resp:  [17]   BP: (133-155)/(62-74)   SpO2:  [96 %-97 %]     Current BMI: Body mass index is 25.53 kg/m².    PEx  Constitutional: Patient appears debilitated.  No distress noted  HENT:   Head: Normocephalic and atraumatic.   Eyes: Pupils are equal, round  Neck: Normal range of motion. Neck supple.   Cardiovascular: Normal rate, regular rhythm and normal heart sounds.    Pulmonary/Chest: Effort normal and breath sounds are clear  Abdominal: Soft. Bowel sounds are normal.   Musculoskeletal: Normal range of motion.   Neurological: Alert and oriented to person.  Disoriented to place, and time.  Confused.  Higher level thinking impaired  Psychiatric: Normal mood and affect. Behavior is normal.   Skin: Skin is warm and dry.  +surgical dressing to LLE    No results for input(s): "GLUCOSE", "NA", "K", "CL", "CO2", "BUN", "CREATININE", "CALCIUM", "MG" in the last 24 hours.            No results for input(s): "WBC", "RBC", "HGB", "HCT", "PLT", "MCV", "MCH", "MCHC" in the last 24 hours.            No results for input(s): "POCTGLUCOSE" in the last 168 hours. "     Assessment and Plan:    Closed intertrochanteric fracture of left femur  S/p cephalomedullary nail fixation on 12/23  - PT/OT, WBAT  - DVT PPX with ASA 81 mg BID x 30 days  - maintain surgical dressing until removed by Orthopedics  - ortho APAP to see patient weekly at [facility]  - follow-up with ortho after discharge    Metabolic acidosis, mild  - initiated sodium bicarbonate 650 mg x 1 dose    Prophylactic therapy  - 1/10 initiated on prophylactic Tamiflu 75 mg daily times 14 days per protocol for influenza outbreak in an institutionalized setting.     Acute postoperative pain  - stable, continue acetaminophen 1000 mg q.8 hours PRN, gabapentin 500 mg qHS., methocarbamol 500 mg TID.  Bowel regimen in place     Acute Blood loss anemia  - expected postoperatively  - stable, continue monitor twice weekly CBC, transfuse for hemoglobin < 7    Dementia  - Continue memantine 10 mg BID, galantamine 16 mg daily, and buproprion 150 mg BID, B12   Delirium precautions:  - Avoid antihistamines, anticholinergics, hypnotics, and minimize opiates while controlling for pain as these medications may exacerbate delirium. Cues for day/night will assist with keeping patient calm and oriented - during daytime, please keep adequate light in room (open windows, lights on) and please keep room dim at night-time to encourage normal sleep-wake cycles. Continuing to have nursing and family reorient the patient and encourage family to visit    Hypercholesteremia  - continue home medication pravastatin 40 mg qHS.      Hypothyroidism  - stable, Continue levothyroxine 75 mcg daily    GERD  -   continue famotidine 20 mg daily, Tums PRN    Debility  Weakness   - Continue with PT/OT for gait training and strengthening and restoration of ADL's   - Encourage mobility, OOB in chair, and early ambulation as appropriate  - Fall precautions   - Monitor for bowel and bladder dysfunction  - Monitor for and prevent skin breakdown and pressure ulcers  -  Continue DVT prophylaxis with ASA 81 mg BID  - added multivitamin today             Anticipate disposition:  [facility]     Hospital bed justification:  Patient requires a hospital bed due to requiring positioning of the body in ways not feasible with an ordinary bed to alleviate pain/ is completely immobile or limited mobility and cannot independently make changes in body position without the use of the bed. Postioning of the body can not be sufficiently resolved by the use of pillows and wedges.       IP [facility] RISK OF UNPLANNED READMISSION Model: MODERATE     Follow-up needed during SNF stay-    Apt not send pt to- Ortho 1/8    Follow-up needed after discharge from SNF: PCP, ortho 2/5     Future Appointments   Date Time Provider Department Center   2/5/2024 10:30 AM Lauren Cosme PA-C [facility] ORTHO [address]   3/21/2024 11:00 AM Shi Simon MD [facility] IM [address]         I spent 46 minutes reviewing patient records, examining, and counseling the patient/ patient's family with greater than 50% of the time spent in direct patient care and coordination.  Discharge coordination      Nella Lacey NP  Department of Hospital Medicine   [facility] Skilled [facility]     DOS: 1/12/2024       Patient note was created using MModal Dictation.  Any errors in syntax or even information may not have been identified and edited on initial review prior to signing this note.

## 2024-01-12 NOTE — CARE UPDATE
Pt is discharging to the follow address    [facility]  [address]  [address] [place] [address]   [phone]

## 2024-01-12 NOTE — PT/OT/SLP PROGRESS
"Physical Therapy Treatment    Patient Name:  Rosario Menendez   MRN:  [id number]  Admit Date: 12/26/2023  Admitting Diagnosis: Closed intertrochanteric fracture of left femur  Recent Surgeries: INSERTION, INTRAMEDULLARY DIEGO, FEMUR (Left)     General Precautions: Standard, fall, hearing impaired  Orthopedic Precautions: LLE weight bearing as tolerated  Braces: N/A    Recommendations:     Discharge Recommendations: home health PT  Level of Assistance Recommended at Discharge: 24 hours light assistance  Discharge Equipment Recommendations: wheelchair, bedside commode  Barriers to discharge: Decreased caregiver support    Assessment:     Rosario Menendez is a 88 y.o. female admitted with a medical diagnosis of Closed intertrochanteric fracture of left femur . Pt tolerated well, pt amb x 2 trials with inc distance SBA/CGA d/t pt reported LLE weakness. Pt propelled LBE to tolerance. Pt would continue to benefit from skilled PT services to improve overall functional mobility, strength and endurance.      Performance deficits affecting function: weakness, impaired endurance, impaired self care skills, impaired functional mobility, gait instability, impaired balance, decreased upper extremity function, decreased lower extremity function, impaired cardiopulmonary response to activity, orthopedic precautions.    Rehab Potential is good    Activity Tolerance: Good    Plan:     Patient to be seen 5 x/week to address the above listed problems via gait training, therapeutic activities, therapeutic exercises, wheelchair management/training    Plan of Care Expires: 01/26/24  Plan of Care Reviewed with: patient    Subjective     Pt agreeable for PT "My left leg feels weak".     Pain/Comfort:  Pain Rating 1: 0/10  Location - Side 1: Left  Location - Orientation 1: generalized  Location 1: hip  Pain Addressed 1: Reposition, Distraction, Cessation of Activity  Pain Rating Post-Intervention 1: 3/10    Patient's cultural, spiritual, " Sabianist conflicts given the current situation:  no    Objective:      Patient found up in chair with  (no lines) upon PT entry to room.     Therapeutic Activities and Exercises: LBE x ~ 8 min For BLE strengthening, endurance and ROM. Pt needing vc to continue. 2 rest breaks noted    Patient educated on role of therapy, goals of session, and benefits of out of bed mobility.   Instructed on use of call button and importance of calling nursing staff for assistance with mobility   Questions/concerns addressed within PTA scope of practice  Pt verbalized understanding.    Functional Mobility:  Transfers:     Sit to Stand:  contact guard assistance with rolling walker. Vc for scooting to edge of chair and hand placement   Gait: pt amb 30 ft + 43 ft SBA/CGA with RW. CGA d/t pt reported weakness in LLE. Vc to inc step length and to follow through with RLE. Step to gait noted at beginning of first ambulation and pt able to complete step through with vc.     AM-PAC 6 CLICK MOBILITY  16    Patient left up in chair with  OT .    GOALS:   Multidisciplinary Problems       Physical Therapy Goals          Problem: Physical Therapy    Goal Priority Disciplines Outcome Goal Variances Interventions   Physical Therapy Goal     PT, PT/OT Ongoing, Progressing     Description: Goals to be met by: 1/27/23     Patient will increase functional independence with mobility by performing:    . Supine to sit with MInimal Assistance  . Sit to supine with MInimal Assistance  . Rolling to Left and Right with Minimal Assistance.  . Sit to stand transfer with Contact Guard Assistance  . Bed to chair transfer with Contact Guard Assistance using Rolling Walker  . Gait  x 50 feet with Contact Guard Assistance using Rolling Walker.   . Wheelchair propulsion x100 feet with Supervision using bilateral uppper extremities    Rehab Services' DME recommendations    Wheelchair justification:  Patient has a mobility limitation that significantly impairs their  ability to participate in one or more mobility related activities of daily living (MRADL's) such as toileting, feeding, dressing, grooming and bathing in customary locations in the home.  The mobility limitation cannot be sufficiently  resolved by the use of a cane or walker.   The use of a manual wheelchair will significantly improve the patient's ability to participate in MRADLS and the patient will use it on regular basis in the home.  Patient has expressed their willingness to use a manual wheelchair in the home.   Patient also has a caregiver who is available, willing and able to provide assistance with the wheelchair when needed.      Seat Width 16 (Small adult)  Seat Depth 16  Back Height Standard  Leg Support Standard, Heel Loops, and Swing Away  Arm Height Desk and Swing Away  Lap Belt Buckle  Accessories Anti-tippers and Safety belt  Cushion Basic  Justification for Cushion Skin Integrity      3 in 1 commode Standard     Justification for 3 in 1 commode: The patient is confined to a single room , The patient is confined to one level of the home environment and there is no toilet on that level, The patient is confined to the home and there are no toilet facilities in the home, The patient is bedroom confined for an extended time and non-ambulatory, The patient's deficits will not be sufficiently addressed by a raised toilet seat                         Time Tracking:     PT Received On: 01/12/24  PT Start Time: 0939  PT Stop Time: 1011  PT Total Time (min): 32 min    Billable Minutes: Gait Training 20 and Therapeutic Exercise 12    Treatment Type: Treatment  PT/PTA: PTA     Number of PTA visits since last PT visit: 1 01/12/2024

## 2024-01-12 NOTE — PLAN OF CARE
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       Follow Up Appointments:  Future Appointments   Date Time Provider Department Center   2/5/2024 10:30 AM Lauren Cosme PA-C [facility]   3/21/2024 11:00 AM [facility]       Allergies:  Review of patient's allergies indicates:   Allergen Reactions    Codeine Other (See Comments)       Medications: Review discharge medications with patient and family and provide education.      I have seen and examined this patient within the last 30 days. My clinical findings that support the need for the home health skilled services and home bound status are the following:no   Weakness/numbness causing balance and gait disturbance due to Fracture and Surgery making it taxing to leave home.     Referrals/ Consults  Physical Therapy to evaluate and treat. Evaluate for home safety and equipment needs; Establish/upgrade home exercise program. Perform / instruct on therapeutic exercises, gait training, transfer training, and Range of Motion.  Occupational Therapy to evaluate and treat. Evaluate home environment for safety and equipment needs. Perform/Instruct on transfers,  ADL training, ROM, and therapeutic exercises.  Aide to provide assistance with personal care, ADLs, and vital signs.    Activities:   activity as tolerated  Orthopedic Precautions: LLE weight bearing as tolerated     Nursing:   Agency to admit patient within 24 hours of hospital discharge unless specified on physician order or at patient request    SN to complete comprehensive assessment including routine vital signs. Instruct on disease process and s/s of complications to report to MD. Review/verify medication list sent home with the patient at time of discharge  and instruct patient/caregiver as needed. Frequency may be adjusted depending on start of care date.     Skilled nurse to perform up to 3 visits PRN for symptoms related to diagnosis    Notify MD if SBP > 160 or < 90; DBP > 90 or < 50; HR > 120 or < 50; Temp > 101; O2 < 88%    Ok to schedule additional visits based on staff availability and patient request on consecutive days within the home health episode.      Home Health Aide:  Nursing Three times weekly, Physical Therapy Three times weekly, Occupational Therapy Three times weekly,  and Home Health Aide Three times weekly    Wound Care Orders    Surgical incision-  Keep incision clean and dry.  Cleanse with soap and water daily, pat dry.  No lotions or ointments.  Do not submerge under water until cleared by Ortho.       I certify that this patient is confined to her home and needs intermittent skilled nursing care, physical therapy, and occupational therapy.

## 2024-01-12 NOTE — PT/OT/SLP PROGRESS
"Occupational Therapy   Treatment    Name: Rosario Menendez  MRN: [id number]  Admit Date: 12/26/2023  Admitting Diagnosis:  Closed intertrochanteric fracture of left femur    General Precautions: Standard, fall, hearing impaired   Orthopedic Precautions: LLE weight bearing as tolerated   Braces: N/A    Recommendations:     Discharge Recommendations:   (low intensity)  Level of Assistance Recommended at Discharge: 24 hours light assistance for ADL's and homemaking tasks  Discharge Equipment Recommendations: hip kit, 3-in-1 commode, shower chair  Barriers to discharge:   (increased level of assistance required)    Assessment:     Rosario Menendez is a 88 y.o. female with a medical diagnosis of Closed intertrochanteric fracture of left femur .  She presents with performance deficits affecting function are weakness, impaired endurance, impaired self care skills, impaired functional mobility, gait instability, impaired balance, decreased coordination, decreased lower extremity function, orthopedic precautions.   Pt participated fairly during today's session. Pt tolerated tx session without incident and is making progress, however, continues to demonstrate deficits with self care skills, balance, functional mobility, UB strength and endurance. Pt will benefit from continued OT services to progress towards goals. Pt with decreased participation, pt stated"I'm so tired"    Rehab Potential is good    Activity tolerance:  Fair    Plan:     Patient to be seen 5 x/week to address the above listed problems via self-care/home management, therapeutic activities, therapeutic exercises, community/work re-entry, neuromuscular re-education    Plan of Care Expires: 01/25/24  Plan of Care Reviewed with: patient    Subjective   "I have to use the bathroom"  Communicated with: Jay prior to session.    Pain/Comfort:  Pain Rating 1:  (Not rated)  Location - Side 1: Left  Location - Orientation 1: generalized  Location 1: hip  Pain Addressed " 1: Reposition, Distraction, Cessation of Activity  Pain Rating Post-Intervention 1:  (did not rate)    Patient's cultural, spiritual, Bahai conflicts given the current situation:  no    Objective:     Patient found up in chair with PTA in rehab gym.     Bed Mobility:    Patient completed Rolling/Turning to Right with contact guard assistance and with side rail  Patient completed Scooting/Bridging with stand by assistance and with side rail  Patient completed Sit to Supine with minimum assistance to manage B LE.     Functional Mobility/Transfers:  Patient completed Sit <> Stand Transfer with minimum assistance  with  rolling walker   Patient completed Bed <> Chair Transfer using Stand Pivot technique with minimum assistance with no assistive device  Patient completed Toilet Transfer Stand Pivot technique with minimum assistance with  grab bars    Activities of Daily Living:  Toileting: maximal assistance to perform perirectal hygiene in stance with use of grab bars for safety.     [facility] 6 Click ADL: 18    Treatment & Education:  Pt educated on:  - role of OT  - level of assistance  - energy conservation and task modification to maximize independence with ADL's and mobility   -  safety while performing functional transfers and self care tasks  - orthopedic precautions  - progress towards OT goals     Patient left HOB elevated with call button in reach and PCT notified.    GOALS:   Multidisciplinary Problems       Occupational Therapy Goals          Problem: Occupational Therapy    Goal Priority Disciplines Outcome Interventions   Occupational Therapy Goal     OT, PT/OT Ongoing, Progressing    Description: Goals to be met by: 1/25/2024     Patient will increase functional independence with ADLs by performing:    UE Dressing with Supervision- Met  LE Dressing with Moderate Assistance and Assistive Devices as needed- Partially met, preforming inconsistently  Personal Hygiene while seated at sink with Supervision-  Met  Oral Hygiene while seated at sink with Supervision- Met  Toileting from toilet with Moderate Assistance for hygiene and clothing management and AE as needed- Partially met, preforming inconsistently  Bathing from  sitting at sink with Minimal Assistance- Partially met; preforming inconsistently  Toilet transfer to toilet with Contact Guard Assistance /c AE/LRAD as needed- Partially met; preforming inconsistently  Footwear /c Mod A and AE as needed  Tolerate standing functional tasks for ~3 minutes /c CGA and AE/LRAD as needed  Shower t/f /c CGA and AE/LRAD as needed                           Time Tracking:     OT Date of Treatment: 01/12/24  OT Start Time: 1013    OT Stop Time: 1039  OT Total Time (min): 26 min    Billable Minutes:Self Care/Home Management 12  Therapeutic Activity 14    1/12/2024

## 2024-01-13 PROCEDURE — 25000003 PHARM REV CODE 250: Mod: HCNC | Performed by: HOSPITALIST

## 2024-01-13 PROCEDURE — 25000003 PHARM REV CODE 250: Mod: HCNC | Performed by: FAMILY MEDICINE

## 2024-01-13 PROCEDURE — 11000004 HC SNF PRIVATE: Mod: HCNC

## 2024-01-13 PROCEDURE — 25000003 PHARM REV CODE 250: Mod: HCNC | Performed by: NURSE PRACTITIONER

## 2024-01-13 PROCEDURE — 63700000 PHARM REV CODE 250 ALT 637 W/O HCPCS: Mod: HCNC | Performed by: NURSE PRACTITIONER

## 2024-01-13 RX ADMIN — GALANTAMINE 16 MG: 16 CAPSULE, EXTENDED RELEASE ORAL at 09:01

## 2024-01-13 RX ADMIN — MEMANTINE 10 MG: 10 TABLET ORAL at 09:01

## 2024-01-13 RX ADMIN — METHOCARBAMOL 500 MG: 500 TABLET ORAL at 09:01

## 2024-01-13 RX ADMIN — CYANOCOBALAMIN TAB 1000 MCG 1000 MCG: 1000 TAB at 09:01

## 2024-01-13 RX ADMIN — Medication 6 MG: at 09:01

## 2024-01-13 RX ADMIN — SENNOSIDES AND DOCUSATE SODIUM 1 TABLET: 8.6; 5 TABLET ORAL at 09:01

## 2024-01-13 RX ADMIN — PRAVASTATIN SODIUM 40 MG: 20 TABLET ORAL at 09:01

## 2024-01-13 RX ADMIN — PREGABALIN 50 MG: 50 CAPSULE ORAL at 09:01

## 2024-01-13 RX ADMIN — FAMOTIDINE 20 MG: 20 TABLET ORAL at 09:01

## 2024-01-13 RX ADMIN — LEVOTHYROXINE SODIUM 75 MCG: 75 TABLET ORAL at 05:01

## 2024-01-13 RX ADMIN — ASPIRIN 81 MG: 81 TABLET, COATED ORAL at 09:01

## 2024-01-13 RX ADMIN — METHOCARBAMOL 500 MG: 500 TABLET ORAL at 03:01

## 2024-01-13 RX ADMIN — BUPROPION HYDROCHLORIDE 150 MG: 150 TABLET, EXTENDED RELEASE ORAL at 09:01

## 2024-01-13 RX ADMIN — OSELTAMIVIR PHOSPHATE 30 MG: 30 CAPSULE ORAL at 09:01

## 2024-01-13 RX ADMIN — THERA TABS 1 TABLET: TAB at 09:01

## 2024-01-13 NOTE — PLAN OF CARE
"CHW spoke with daughter/aditi about d/c time. She stated, "She will take mom home after the training."  Also, Aditi said, "She will not need the hospital bed, they purchased one."   "

## 2024-01-13 NOTE — PLAN OF CARE
CHW called daughter/Aditi @ [phone] and served per facility [facility]. Aditi stated she's aware of d/c date. CHW informed her that a copy of [facility] will be left at patient's bedside. Also, a copy of [facility] was faxed to [facility].

## 2024-01-14 PROCEDURE — 97110 THERAPEUTIC EXERCISES: CPT | Mod: HCNC,CQ

## 2024-01-14 PROCEDURE — 63700000 PHARM REV CODE 250 ALT 637 W/O HCPCS: Mod: HCNC | Performed by: NURSE PRACTITIONER

## 2024-01-14 PROCEDURE — 11000004 HC SNF PRIVATE: Mod: HCNC

## 2024-01-14 PROCEDURE — 25000003 PHARM REV CODE 250: Mod: HCNC | Performed by: FAMILY MEDICINE

## 2024-01-14 PROCEDURE — 97116 GAIT TRAINING THERAPY: CPT | Mod: HCNC,CQ

## 2024-01-14 PROCEDURE — 25000003 PHARM REV CODE 250: Mod: HCNC | Performed by: HOSPITALIST

## 2024-01-14 PROCEDURE — 25000003 PHARM REV CODE 250: Mod: HCNC | Performed by: NURSE PRACTITIONER

## 2024-01-14 PROCEDURE — 97530 THERAPEUTIC ACTIVITIES: CPT | Mod: HCNC,CQ

## 2024-01-14 RX ADMIN — BUPROPION HYDROCHLORIDE 150 MG: 150 TABLET, EXTENDED RELEASE ORAL at 09:01

## 2024-01-14 RX ADMIN — ASPIRIN 81 MG: 81 TABLET, COATED ORAL at 09:01

## 2024-01-14 RX ADMIN — SENNOSIDES AND DOCUSATE SODIUM 1 TABLET: 8.6; 5 TABLET ORAL at 09:01

## 2024-01-14 RX ADMIN — PREGABALIN 50 MG: 50 CAPSULE ORAL at 09:01

## 2024-01-14 RX ADMIN — PRAVASTATIN SODIUM 40 MG: 20 TABLET ORAL at 09:01

## 2024-01-14 RX ADMIN — METHOCARBAMOL 500 MG: 500 TABLET ORAL at 09:01

## 2024-01-14 RX ADMIN — FAMOTIDINE 20 MG: 20 TABLET ORAL at 09:01

## 2024-01-14 RX ADMIN — GALANTAMINE 16 MG: 16 CAPSULE, EXTENDED RELEASE ORAL at 09:01

## 2024-01-14 RX ADMIN — MEMANTINE 10 MG: 10 TABLET ORAL at 09:01

## 2024-01-14 RX ADMIN — OSELTAMIVIR PHOSPHATE 30 MG: 30 CAPSULE ORAL at 09:01

## 2024-01-14 RX ADMIN — LEVOTHYROXINE SODIUM 75 MCG: 75 TABLET ORAL at 05:01

## 2024-01-14 RX ADMIN — THERA TABS 1 TABLET: TAB at 09:01

## 2024-01-14 RX ADMIN — CYANOCOBALAMIN TAB 1000 MCG 1000 MCG: 1000 TAB at 09:01

## 2024-01-14 RX ADMIN — METHOCARBAMOL 500 MG: 500 TABLET ORAL at 02:01

## 2024-01-14 NOTE — PT/OT/SLP PROGRESS
"Physical Therapy Treatment    Patient Name:  Rosario Menendez   MRN:  [id number]  Admit Date: 12/26/2023  Admitting Diagnosis: Closed intertrochanteric fracture of left femur  Recent Surgeries:    General Precautions: Standard, fall, hearing impaired  Orthopedic Precautions: LLE weight bearing as tolerated  Braces: N/A    Recommendations:     Discharge Recommendations: home health PT  Level of Assistance Recommended at Discharge: 24 hours light assistance  Discharge Equipment Recommendations: wheelchair, bedside commode  Barriers to discharge: Decreased caregiver support    Assessment:     Rosario Menendez is a 88 y.o. female admitted with a medical diagnosis of Closed intertrochanteric fracture of left femur . Pt tolerated well, however gait distance limited by fatigue and poor cognition to motivate and understand the importance of mobility, CATALINO present on last gait trial able to encourage inc distance, pt would continue to benefit from skilled PT services to improve overall functional mobility, strength and endurance.  .      Performance deficits affecting function: weakness, impaired endurance, impaired self care skills, impaired functional mobility, gait instability, impaired balance, decreased upper extremity function, decreased lower extremity function, impaired cardiopulmonary response to activity, orthopedic precautions.    Rehab Potential is good    Activity Tolerance: Fair    Plan:     Patient to be seen 5 x/week to address the above listed problems via gait training, therapeutic activities, therapeutic exercises, wheelchair management/training    Plan of Care Expires: 01/26/24  Plan of Care Reviewed with: patient    Subjective     "OK".     Pain/Comfort:  Pain Rating 1:  (none at rest, "hurts" with gait)  Location - Side 1: Left  Location - Orientation 1: generalized  Location 1: hip  Pain Addressed 1: Reposition, Distraction, Cessation of Activity, Nurse notified (declined needing pain meds yet)  Pain " Rating Post-Intervention 1:  (no further mentioned at rest)    Patient's cultural, spiritual, Druze conflicts given the current situation:  no    Objective:       Patient found with  (in wc) upon PT entry to room.     Therapeutic Activities and Exercises: 2x10 reps LAQ,hip flex mini elliptical x 10 min    Functional Mobility:  Transfers:     Sit to Stand:  minimum assistance with rolling walker and x 3 trials min A without pillow cushion, min/CGA with pillow added to inc surface height vcs for tech/hand placement  Stand pivot with RW CGA WC to BSC ~ 3 ft no vcs for sequencing task, except to reach bach for arm rest   Gait: amb with RW CGA ~ 7 ft 5 ft and 26 ft seated rest break limited distance by inc fatigue, CATALINO encourage on last trial     AM-PAC 6 CLICK MOBILITY  16    Patient left up in chair with call button in reach, CATALINO present, and belongings in reach .    GOALS:   Multidisciplinary Problems       Physical Therapy Goals          Problem: Physical Therapy    Goal Priority Disciplines Outcome Goal Variances Interventions   Physical Therapy Goal     PT, PT/OT Ongoing, Progressing     Description: Goals to be met by: 1/27/23     Patient will increase functional independence with mobility by performing:    . Supine to sit with MInimal Assistance  . Sit to supine with MInimal Assistance  . Rolling to Left and Right with Minimal Assistance.  . Sit to stand transfer with Contact Guard Assistance  . Bed to chair transfer with Contact Guard Assistance using Rolling Walker  . Gait  x 50 feet with Contact Guard Assistance using Rolling Walker.   . Wheelchair propulsion x100 feet with Supervision using bilateral uppper extremities    Rehab Services' DME recommendations    Wheelchair justification:  Patient has a mobility limitation that significantly impairs their ability to participate in one or more mobility related activities of daily living (MRADL's) such as toileting, feeding, dressing, grooming and bathing in  customary locations in the home.  The mobility limitation cannot be sufficiently  resolved by the use of a cane or walker.   The use of a manual wheelchair will significantly improve the patient's ability to participate in MRADLS and the patient will use it on regular basis in the home.  Patient has expressed their willingness to use a manual wheelchair in the home.   Patient also has a caregiver who is available, willing and able to provide assistance with the wheelchair when needed.      Seat Width 16 (Small adult)  Seat Depth 16  Back Height Standard  Leg Support Standard, Heel Loops, and Swing Away  Arm Height Desk and Swing Away  Lap Belt Buckle  Accessories Anti-tippers and Safety belt  Cushion Basic  Justification for Cushion Skin Integrity      3 in 1 commode Standard     Justification for 3 in 1 commode: The patient is confined to a single room , The patient is confined to one level of the home environment and there is no toilet on that level, The patient is confined to the home and there are no toilet facilities in the home, The patient is bedroom confined for an extended time and non-ambulatory, The patient's deficits will not be sufficiently addressed by a raised toilet seat                         Time Tracking:     PT Received On: 01/14/24  PT Start Time: 0906  PT Stop Time: 0955  PT Total Time (min): 49 min    Billable Minutes: Gait Training 19, Therapeutic Activity 15, and Therapeutic Exercise 15    Treatment Type: Treatment  PT/PTA: PTA     Number of PTA visits since last PT visit: 2     01/14/2024

## 2024-01-14 NOTE — PLAN OF CARE
Problem: Adult Inpatient Plan of Care  Goal: Absence of Hospital-Acquired Illness or Injury  Intervention: Prevent Infection  1/14/2024 0437 by Suze Thayer LPN  Flowsheets (Taken 1/14/2024 0437)  Infection Prevention:   hand hygiene promoted   rest/sleep promoted   single patient room provided     Problem: Adult Inpatient Plan of Care  Goal: Plan of Care Review  1/14/2024 0438 by Suze Thayer LPN  Outcome: Ongoing, Progressing  1/14/2024 0437 by Suze Thayer LPN  Outcome: Ongoing, Progressing     Problem: Adult Inpatient Plan of Care  Goal: Patient-Specific Goal (Individualized)  1/14/2024 0437 by Suze Thayer LPN  Outcome: Ongoing, Progressing

## 2024-01-15 LAB
ANION GAP SERPL CALC-SCNC: 5 MMOL/L (ref 8–16)
BASOPHILS # BLD AUTO: 0.05 K/UL (ref 0–0.2)
BASOPHILS NFR BLD: 1.2 % (ref 0–1.9)
BUN SERPL-MCNC: 8 MG/DL (ref 8–23)
CALCIUM SERPL-MCNC: 9.8 MG/DL (ref 8.7–10.5)
CHLORIDE SERPL-SCNC: 112 MMOL/L (ref 95–110)
CO2 SERPL-SCNC: 25 MMOL/L (ref 23–29)
CREAT SERPL-MCNC: 0.8 MG/DL (ref 0.5–1.4)
DIFFERENTIAL METHOD BLD: ABNORMAL
EOSINOPHIL # BLD AUTO: 0.1 K/UL (ref 0–0.5)
EOSINOPHIL NFR BLD: 3.2 % (ref 0–8)
ERYTHROCYTE [DISTWIDTH] IN BLOOD BY AUTOMATED COUNT: 15.2 % (ref 11.5–14.5)
EST. GFR  (NO RACE VARIABLE): >60 ML/MIN/1.73 M^2
GLUCOSE SERPL-MCNC: 70 MG/DL (ref 70–110)
HCT VFR BLD AUTO: 38.7 % (ref 37–48.5)
HGB BLD-MCNC: 12.3 G/DL (ref 12–16)
IMM GRANULOCYTES # BLD AUTO: 0.01 K/UL (ref 0–0.04)
IMM GRANULOCYTES NFR BLD AUTO: 0.2 % (ref 0–0.5)
LYMPHOCYTES # BLD AUTO: 1.4 K/UL (ref 1–4.8)
LYMPHOCYTES NFR BLD: 35.1 % (ref 18–48)
MAGNESIUM SERPL-MCNC: 2.2 MG/DL (ref 1.6–2.6)
MCH RBC QN AUTO: 30.8 PG (ref 27–31)
MCHC RBC AUTO-ENTMCNC: 31.8 G/DL (ref 32–36)
MCV RBC AUTO: 97 FL (ref 82–98)
MONOCYTES # BLD AUTO: 0.7 K/UL (ref 0.3–1)
MONOCYTES NFR BLD: 17.2 % (ref 4–15)
NEUTROPHILS # BLD AUTO: 1.8 K/UL (ref 1.8–7.7)
NEUTROPHILS NFR BLD: 43.1 % (ref 38–73)
NRBC BLD-RTO: 0 /100 WBC
PHOSPHATE SERPL-MCNC: 2.4 MG/DL (ref 2.7–4.5)
PLATELET # BLD AUTO: 103 K/UL (ref 150–450)
PMV BLD AUTO: 11.5 FL (ref 9.2–12.9)
POTASSIUM SERPL-SCNC: 3.6 MMOL/L (ref 3.5–5.1)
RBC # BLD AUTO: 3.99 M/UL (ref 4–5.4)
SODIUM SERPL-SCNC: 142 MMOL/L (ref 136–145)
WBC # BLD AUTO: 4.07 K/UL (ref 3.9–12.7)

## 2024-01-15 PROCEDURE — 25000003 PHARM REV CODE 250: Mod: HCNC | Performed by: NURSE PRACTITIONER

## 2024-01-15 PROCEDURE — 63700000 PHARM REV CODE 250 ALT 637 W/O HCPCS: Mod: HCNC | Performed by: NURSE PRACTITIONER

## 2024-01-15 PROCEDURE — 11000004 HC SNF PRIVATE: Mod: HCNC

## 2024-01-15 PROCEDURE — 80048 BASIC METABOLIC PNL TOTAL CA: CPT | Mod: HCNC | Performed by: NURSE PRACTITIONER

## 2024-01-15 PROCEDURE — 25000003 PHARM REV CODE 250: Mod: HCNC | Performed by: HOSPITALIST

## 2024-01-15 PROCEDURE — 25000003 PHARM REV CODE 250: Mod: HCNC | Performed by: FAMILY MEDICINE

## 2024-01-15 PROCEDURE — 83735 ASSAY OF MAGNESIUM: CPT | Mod: HCNC | Performed by: NURSE PRACTITIONER

## 2024-01-15 PROCEDURE — 84100 ASSAY OF PHOSPHORUS: CPT | Mod: HCNC | Performed by: NURSE PRACTITIONER

## 2024-01-15 PROCEDURE — 85025 COMPLETE CBC W/AUTO DIFF WBC: CPT | Mod: HCNC | Performed by: NURSE PRACTITIONER

## 2024-01-15 RX ADMIN — GALANTAMINE 16 MG: 16 CAPSULE, EXTENDED RELEASE ORAL at 08:01

## 2024-01-15 RX ADMIN — FAMOTIDINE 20 MG: 20 TABLET ORAL at 08:01

## 2024-01-15 RX ADMIN — ASPIRIN 81 MG: 81 TABLET, COATED ORAL at 08:01

## 2024-01-15 RX ADMIN — METHOCARBAMOL 500 MG: 500 TABLET ORAL at 02:01

## 2024-01-15 RX ADMIN — THERA TABS 1 TABLET: TAB at 08:01

## 2024-01-15 RX ADMIN — CYANOCOBALAMIN TAB 1000 MCG 1000 MCG: 1000 TAB at 08:01

## 2024-01-15 RX ADMIN — OSELTAMIVIR PHOSPHATE 30 MG: 30 CAPSULE ORAL at 08:01

## 2024-01-15 RX ADMIN — METHOCARBAMOL 500 MG: 500 TABLET ORAL at 08:01

## 2024-01-15 RX ADMIN — MEMANTINE 10 MG: 10 TABLET ORAL at 08:01

## 2024-01-15 RX ADMIN — PREGABALIN 50 MG: 50 CAPSULE ORAL at 08:01

## 2024-01-15 RX ADMIN — PRAVASTATIN SODIUM 40 MG: 20 TABLET ORAL at 08:01

## 2024-01-15 RX ADMIN — LEVOTHYROXINE SODIUM 75 MCG: 75 TABLET ORAL at 05:01

## 2024-01-15 RX ADMIN — DIBASIC SODIUM PHOSPHATE, MONOBASIC POTASSIUM PHOSPHATE AND MONOBASIC SODIUM PHOSPHATE 2 TABLET: 852; 155; 130 TABLET ORAL at 02:01

## 2024-01-15 RX ADMIN — ACETAMINOPHEN 1000 MG: 500 TABLET ORAL at 08:01

## 2024-01-15 RX ADMIN — SENNOSIDES AND DOCUSATE SODIUM 1 TABLET: 8.6; 5 TABLET ORAL at 08:01

## 2024-01-15 RX ADMIN — BUPROPION HYDROCHLORIDE 150 MG: 150 TABLET, EXTENDED RELEASE ORAL at 08:01

## 2024-01-15 NOTE — PROGRESS NOTES
[facility]                                  Skilled Nursing Facility                   Progress Note     Admit Date: 12/26/2023  AMARA 1/17/2024  [id number]  Principal Problem:  Closed intertrochanteric fracture of left femur   HPI obtained from patient interview and chart review     Chief Complaint:Re-evaluation of medical treatment and therapy status: Lab review    HPI:   Ms. Menendez is a 88 year old female PMHx of osteoporosis, dementia, thrombocytopenia and hypothyroidism presents to SNF following hospitalization for closed intertrochanteric left femur fracture s/p cephalomedullary nail fixation on 12/23 with Dr. Jerry.  Admission to SNF for secondary weakness and debility.    Interval history: All of today's labs reviewed and are listed below.  24 hr vital sign ranges reviewed and listed below, no acute abnormalities noted.  Patient denies shortness of breath, abdominal discomfort, nausea, or vomiting.  Patient reports an adequate appetite.  Patient denies dysuria.  Patient reports having regular bowel movements.  Patient progressing with PT/OT-Gait: amb with RW CGA ~ 7 ft 5 ft and 26 ft seated rest break limited distance by inc fatigue, CATALINO encourage on last trial. Continuing to follow and treat all acute and chronic conditions.    Past Medical History: Patient has a past medical history of Arthritis, Depression, Hypercholesteremia, Thrombocytopenia, and Thyroid disease.    Past Surgical History: Patient has a past surgical history that includes Hysterectomy; Knee surgery; Ankle surgery; Wrist surgery; Colonoscopy (N/A, 6/2/2021); and Intramedullary rodding of femur (Left, 12/23/2023).    Social History: Patient reports that she has never smoked. She has never used smokeless tobacco.    Family History: family history includes Cancer in her maternal uncle; Heart failure in her father and mother; Suicide in her son.    Allergies:  "Patient is allergic to codeine.    ROS  Constitutional: Negative for fever   Eyes: Negative for blurred vision, double vision   Respiratory: Negative for shortness of breath.  +dry intermittent cough   Cardiovascular: Negative for chest pain, palpitations, and leg swelling.   Gastrointestinal: Negative for abdominal pain, constipation, diarrhea, nausea, vomiting.  Placenta indigestion  Genitourinary: Negative for dysuria, frequency   Musculoskeletal:  + generalized weakness.  +mild intermittent LLE pain  Skin: Negative for itching and rash.   Neurological: Negative for dizziness, headaches.   Psychiatric/Behavioral: Negative for depression. The patient is not nervous/anxious.      24 hour Vital Sign Range   Temp:  [98 °F (36.7 °C)-98.9 °F (37.2 °C)]   Pulse:  [62-66]   Resp:  [16]   BP: (139-148)/(75)   SpO2:  [94 %-97 %]     Current BMI: Body mass index is 25.53 kg/m².    PEx  Constitutional: Patient appears debilitated.  No distress noted  HENT:   Head: Normocephalic and atraumatic.   Eyes: Pupils are equal, round  Neck: Normal range of motion. Neck supple.   Cardiovascular: Normal rate, regular rhythm and normal heart sounds.    Pulmonary/Chest: Effort normal and breath sounds are clear  Abdominal: Soft. Bowel sounds are normal.   Musculoskeletal: Normal range of motion.   Neurological: Alert and oriented to person.  Disoriented to place, and time.  Confused.  Higher level thinking impaired  Psychiatric: Normal mood and affect. Behavior is normal.   Skin: Skin is warm and dry.  +surgical dressing to LLE    Recent Labs   Lab 01/15/24  0636      K 3.6   *   CO2 25   BUN 8   CREATININE 0.8   CALCIUM 9.8   MG 2.2               Recent Labs   Lab 01/15/24  0636   WBC 4.07   RBC 3.99*   HGB 12.3   HCT 38.7   *   MCV 97   MCH 30.8   MCHC 31.8*               No results for input(s): "POCTGLUCOSE" in the last 168 hours.     Assessment and Plan:    Closed intertrochanteric fracture of left femur  S/p " cephalomedullary nail fixation on 12/23  - PT/OT, WBAT  - DVT PPX with ASA 81 mg BID x 30 days  - maintain surgical dressing until removed by Orthopedics  - ortho APAP to see patient weekly at [facility]  - follow-up with ortho after discharge    Hypophosphatemia  - initiated K-Phos 500 mg x 1 dose    Prophylactic therapy  - 1/10 initiated on prophylactic Tamiflu 75 mg daily times 14 days per protocol for influenza outbreak in an institutionalized setting.     Acute postoperative pain  - stable, continue acetaminophen 1000 mg q.8 hours PRN, gabapentin 500 mg qHS., methocarbamol 500 mg TID.  Bowel regimen in place     Acute Blood loss anemia  - expected postoperatively  - stable, continue monitor twice weekly CBC, transfuse for hemoglobin < 7    Dementia  - Continue memantine 10 mg BID, galantamine 16 mg daily, and buproprion 150 mg BID, B12   Delirium precautions:  - Avoid antihistamines, anticholinergics, hypnotics, and minimize opiates while controlling for pain as these medications may exacerbate delirium. Cues for day/night will assist with keeping patient calm and oriented - during daytime, please keep adequate light in room (open windows, lights on) and please keep room dim at night-time to encourage normal sleep-wake cycles. Continuing to have nursing and family reorient the patient and encourage family to visit    Hypercholesteremia  - continue home medication pravastatin 40 mg qHS.      Hypothyroidism  - stable, Continue levothyroxine 75 mcg daily    GERD  -   continue famotidine 20 mg daily, Tums PRN    Debility  Weakness   - Continue with PT/OT for gait training and strengthening and restoration of ADL's   - Encourage mobility, OOB in chair, and early ambulation as appropriate  - Fall precautions   - Monitor for bowel and bladder dysfunction  - Monitor for and prevent skin breakdown and pressure ulcers  - Continue DVT prophylaxis with ASA 81 mg BID  - added multivitamin today             Anticipate disposition:   [facility]     Hospital bed justification:  Patient requires a hospital bed due to requiring positioning of the body in ways not feasible with an ordinary bed to alleviate pain/ is completely immobile or limited mobility and cannot independently make changes in body position without the use of the bed. Postioning of the body can not be sufficiently resolved by the use of pillows and wedges.       IP [facility] RISK OF UNPLANNED READMISSION Model: MODERATE     Follow-up needed during SNF stay-    Apt not send pt to- Ortho 1/8    Follow-up needed after discharge from SNF: PCP, ortho 2/5     Future Appointments   Date Time Provider Department Center   2/5/2024 10:30 AM Lauren Cosme PA-C [facility] ORTHO [address]   3/21/2024 11:00 AM Shi Simon MD [facility] IM [address] NP  Department of Hospital Medicine   [facility]     DOS: 1/15/2024       Patient note was created using MModal Dictation.  Any errors in syntax or even information may not have been identified and edited on initial review prior to signing this note.

## 2024-01-16 PROCEDURE — 11000004 HC SNF PRIVATE: Mod: HCNC

## 2024-01-16 PROCEDURE — 97535 SELF CARE MNGMENT TRAINING: CPT | Mod: HCNC

## 2024-01-16 PROCEDURE — 25000003 PHARM REV CODE 250: Mod: HCNC | Performed by: FAMILY MEDICINE

## 2024-01-16 PROCEDURE — 97110 THERAPEUTIC EXERCISES: CPT | Mod: HCNC

## 2024-01-16 PROCEDURE — 25000003 PHARM REV CODE 250: Mod: HCNC | Performed by: HOSPITALIST

## 2024-01-16 PROCEDURE — 63700000 PHARM REV CODE 250 ALT 637 W/O HCPCS: Mod: HCNC | Performed by: NURSE PRACTITIONER

## 2024-01-16 PROCEDURE — 97116 GAIT TRAINING THERAPY: CPT | Mod: HCNC

## 2024-01-16 PROCEDURE — 25000003 PHARM REV CODE 250: Mod: HCNC | Performed by: NURSE PRACTITIONER

## 2024-01-16 RX ORDER — ALENDRONATE SODIUM 70 MG/1
70 TABLET ORAL
Qty: 4 TABLET | Refills: 5
Start: 2024-01-16 | End: 2024-01-18 | Stop reason: SDUPTHER

## 2024-01-16 RX ORDER — ASPIRIN 81 MG/1
81 TABLET ORAL 2 TIMES DAILY
Qty: 16 TABLET | Refills: 0
Start: 2024-01-16 | End: 2024-02-05

## 2024-01-16 RX ORDER — PRAVASTATIN SODIUM 40 MG/1
40 TABLET ORAL NIGHTLY
Qty: 90 TABLET | Refills: 3
Start: 2024-01-16 | End: 2024-02-05

## 2024-01-16 RX ORDER — OSELTAMIVIR PHOSPHATE 30 MG/1
30 CAPSULE ORAL DAILY
Qty: 5 CAPSULE | Refills: 0 | Status: SHIPPED | OUTPATIENT
Start: 2024-01-17 | End: 2024-01-22

## 2024-01-16 RX ADMIN — MEMANTINE 10 MG: 10 TABLET ORAL at 08:01

## 2024-01-16 RX ADMIN — MEMANTINE 10 MG: 10 TABLET ORAL at 09:01

## 2024-01-16 RX ADMIN — FAMOTIDINE 20 MG: 20 TABLET ORAL at 08:01

## 2024-01-16 RX ADMIN — METHOCARBAMOL 500 MG: 500 TABLET ORAL at 08:01

## 2024-01-16 RX ADMIN — ASPIRIN 81 MG: 81 TABLET, COATED ORAL at 08:01

## 2024-01-16 RX ADMIN — ASPIRIN 81 MG: 81 TABLET, COATED ORAL at 09:01

## 2024-01-16 RX ADMIN — PRAVASTATIN SODIUM 40 MG: 20 TABLET ORAL at 09:01

## 2024-01-16 RX ADMIN — METHOCARBAMOL 500 MG: 500 TABLET ORAL at 02:01

## 2024-01-16 RX ADMIN — GALANTAMINE 16 MG: 16 CAPSULE, EXTENDED RELEASE ORAL at 08:01

## 2024-01-16 RX ADMIN — BUPROPION HYDROCHLORIDE 150 MG: 150 TABLET, EXTENDED RELEASE ORAL at 08:01

## 2024-01-16 RX ADMIN — Medication 6 MG: at 09:01

## 2024-01-16 RX ADMIN — ACETAMINOPHEN 1000 MG: 500 TABLET ORAL at 08:01

## 2024-01-16 RX ADMIN — SENNOSIDES AND DOCUSATE SODIUM 1 TABLET: 8.6; 5 TABLET ORAL at 09:01

## 2024-01-16 RX ADMIN — PREGABALIN 50 MG: 50 CAPSULE ORAL at 09:01

## 2024-01-16 RX ADMIN — CYANOCOBALAMIN TAB 1000 MCG 1000 MCG: 1000 TAB at 08:01

## 2024-01-16 RX ADMIN — METHOCARBAMOL 500 MG: 500 TABLET ORAL at 09:01

## 2024-01-16 RX ADMIN — THERA TABS 1 TABLET: TAB at 08:01

## 2024-01-16 RX ADMIN — OSELTAMIVIR PHOSPHATE 30 MG: 30 CAPSULE ORAL at 08:01

## 2024-01-16 RX ADMIN — LEVOTHYROXINE SODIUM 75 MCG: 75 TABLET ORAL at 06:01

## 2024-01-16 NOTE — PLAN OF CARE
Problem: Adult Inpatient Plan of Care  Goal: Plan of Care Review  Outcome: Ongoing, Progressing  Goal: Patient-Specific Goal (Individualized)  Outcome: Ongoing, Progressing  Goal: Absence of Hospital-Acquired Illness or Injury  Outcome: Ongoing, Progressing  Goal: Optimal Comfort and Wellbeing  Outcome: Ongoing, Progressing  Goal: Readiness for Transition of Care  Outcome: Ongoing, Progressing     Problem: Adult Inpatient Plan of Care  Goal: Plan of Care Review  Outcome: Ongoing, Progressing  Goal: Patient-Specific Goal (Individualized)  Outcome: Ongoing, Progressing  Goal: Absence of Hospital-Acquired Illness or Injury  Outcome: Ongoing, Progressing  Goal: Optimal Comfort and Wellbeing  Outcome: Ongoing, Progressing  Goal: Readiness for Transition of Care  Outcome: Ongoing, Progressing     Problem: Adult Inpatient Plan of Care  Goal: Plan of Care Review  Outcome: Ongoing, Progressing  Goal: Patient-Specific Goal (Individualized)  Outcome: Ongoing, Progressing  Goal: Absence of Hospital-Acquired Illness or Injury  Outcome: Ongoing, Progressing  Goal: Optimal Comfort and Wellbeing  Outcome: Ongoing, Progressing  Goal: Readiness for Transition of Care  Outcome: Ongoing, Progressing     Problem: Adult Inpatient Plan of Care  Goal: Plan of Care Review  Outcome: Ongoing, Progressing  Goal: Patient-Specific Goal (Individualized)  Outcome: Ongoing, Progressing  Goal: Absence of Hospital-Acquired Illness or Injury  Outcome: Ongoing, Progressing  Goal: Optimal Comfort and Wellbeing  Outcome: Ongoing, Progressing  Goal: Readiness for Transition of Care  Outcome: Ongoing, Progressing     Problem: Adult Inpatient Plan of Care  Goal: Plan of Care Review  Outcome: Ongoing, Progressing  Goal: Patient-Specific Goal (Individualized)  Outcome: Ongoing, Progressing  Goal: Absence of Hospital-Acquired Illness or Injury  Outcome: Ongoing, Progressing  Goal: Optimal Comfort and Wellbeing  Outcome: Ongoing, Progressing  Goal: Readiness  for Transition of Care  Outcome: Ongoing, Progressing     Problem: Adult Inpatient Plan of Care  Goal: Plan of Care Review  Outcome: Ongoing, Progressing  Goal: Patient-Specific Goal (Individualized)  Outcome: Ongoing, Progressing  Goal: Absence of Hospital-Acquired Illness or Injury  Outcome: Ongoing, Progressing  Goal: Optimal Comfort and Wellbeing  Outcome: Ongoing, Progressing  Goal: Readiness for Transition of Care  Outcome: Ongoing, Progressing     Problem: Adult Inpatient Plan of Care  Goal: Plan of Care Review  Outcome: Ongoing, Progressing  Goal: Patient-Specific Goal (Individualized)  Outcome: Ongoing, Progressing  Goal: Absence of Hospital-Acquired Illness or Injury  Outcome: Ongoing, Progressing  Goal: Optimal Comfort and Wellbeing  Outcome: Ongoing, Progressing  Goal: Readiness for Transition of Care  Outcome: Ongoing, Progressing     Problem: Adult Inpatient Plan of Care  Goal: Plan of Care Review  Outcome: Ongoing, Progressing  Goal: Patient-Specific Goal (Individualized)  Outcome: Ongoing, Progressing  Goal: Absence of Hospital-Acquired Illness or Injury  Outcome: Ongoing, Progressing  Goal: Optimal Comfort and Wellbeing  Outcome: Ongoing, Progressing  Goal: Readiness for Transition of Care  Outcome: Ongoing, Progressing     Problem: Adult Inpatient Plan of Care  Goal: Plan of Care Review  Outcome: Ongoing, Progressing  Goal: Patient-Specific Goal (Individualized)  Outcome: Ongoing, Progressing  Goal: Absence of Hospital-Acquired Illness or Injury  Outcome: Ongoing, Progressing  Goal: Optimal Comfort and Wellbeing  Outcome: Ongoing, Progressing  Goal: Readiness for Transition of Care  Outcome: Ongoing, Progressing     Problem: Adult Inpatient Plan of Care  Goal: Plan of Care Review  Outcome: Ongoing, Progressing  Goal: Patient-Specific Goal (Individualized)  Outcome: Ongoing, Progressing  Goal: Absence of Hospital-Acquired Illness or Injury  Outcome: Ongoing, Progressing  Goal: Optimal Comfort and  Wellbeing  Outcome: Ongoing, Progressing  Goal: Readiness for Transition of Care  Outcome: Ongoing, Progressing

## 2024-01-16 NOTE — CARE UPDATE
Discharge Planning : Update    Pt is to discharge to [facility] [address] [phone], a residential private care home.     Transportation: [facility] van transfer per family request (family covers cost) set up for 1:30 pm     DME: none  *family paid for hospital bed themself, family did not want other DME that [employer] would not cover (3 in one, etc.) SS pursued bed through [employer] then family said they did not want it to O DME    [facility] is to follow pt per family request. Fax and called them sent to  today.      *informed family they will need to pay for transportation cost as [employer] does not cover wc van for transfers home, can always send in a claim if you feel they should pay for it or check your policy for coverage

## 2024-01-16 NOTE — CARE UPDATE
Discharge Planning    Pt will discharge tomorrow to [facility] [address] [phone] with daughter in personal vehicle after their family training.    DME: none    [facility]: home health 1.18

## 2024-01-16 NOTE — PT/OT/SLP PROGRESS
Physical Therapy Treatment/ Discharge Summary    Patient Name:  Rosario Menendez   MRN:  [id number]  Admit Date: 12/26/2023  Admitting Diagnosis: Closed intertrochanteric fracture of left femur  Recent Surgeries: INSERTION, INTRAMEDULLARY DIEGO, FEMUR (Left)       General Precautions: Standard, fall, hearing impaired  Orthopedic Precautions: LLE weight bearing as tolerated  Braces: N/A    Recommendations:     Discharge Recommendations: home health PT  Level of Assistance Recommended at Discharge: 24 hours light assistance  Discharge Equipment Recommendations: wheelchair, bedside commode  Barriers to discharge: Decreased caregiver support    Assessment:     Rosario Menendez is a 88 y.o. female admitted with a medical diagnosis of Closed intertrochanteric fracture of left femur . Pt is appropriate for d/c tomorrow.      Performance deficits affecting function: weakness, impaired endurance, impaired self care skills, impaired functional mobility, gait instability, impaired balance, decreased upper extremity function, decreased lower extremity function, impaired cardiopulmonary response to activity, orthopedic precautions.    Rehab Potential is good    Activity Tolerance: Fair    Plan:     Patient to be seen 5 x/week to address the above listed problems via gait training, therapeutic activities, therapeutic exercises, wheelchair management/training    Plan of Care Expires: 01/26/24  Plan of Care Reviewed with: patient    Subjective     Pt. Agreeable to work with PT.     Pain/Comfort:  Pain Rating 1: 0/10  Pain Rating Post-Intervention 1: 0/10    Patient's cultural, spiritual, [employer] conflicts given the current situation:  no    Objective:     Communicated with pt's nurse prior to session.  Patient found up in chair with   upon PT entry to room.     Therapeutic Activities and Exercises: LBEx 10mins to help improve B l/e MMT and endurance.    Functional Mobility:     01/16/24 1126   Roll Left and Right   Was the activity  attempted? Yes   Was the activity done independently? No   Assistance Needed Supervision   Was adaptive equipment used? No   CARE Score - Roll Left and Right 4   Sit to Lying   Was the activity attempted? Yes   Was the activity done independently? No   Assistance Needed Physical assistance   Physical Assistance Level Less than half   Was adaptive equipment used? No   CARE Score - Sit to Lying 3   Lying to Sitting on Side of Bed   Was the activity attempted? Yes   Was the activity done independently? No   Assistance Needed Supervision   Was adaptive equipment used? No   CARE Score - Lying to Sitting on Side of Bed 4   Sit to Stand   Was the activity attempted? Yes   Was the activity done independently? No   Assistance Needed Touching assistance   Was adaptive equipment used? Yes   CARE Score - Sit to Stand 4   Chair/Bed-to-Chair Transfer   Was the activity attempted? Yes   Was the activity done independently? No   Assistance Needed Touching assistance   Was adaptive equipment used? No   CARE Score - Chair/Bed-to-Chair Transfer 4   Car Transfer   Reason if not Attempted Environmental limitations   CARE Score - Car Transfer 10   Walk 10 Feet   Was the activity attempted? Yes   Was the activity done independently? No   Assistance Needed Touching assistance  (11ft x 2 trials with RW)   Was adaptive equipment used? Yes   CARE Score - Walk 10 Feet 4   Walk 50 Feet with Two Turns   Reason if not Attempted Safety concerns   CARE Score - Walk 50 Feet with Two Turns 88   Walk 150 Feet   Reason if not Attempted Safety concerns   CARE Score - Walk 150 Feet 88   Walking 10 Feet on Uneven Surfaces   Reason if not Attempted Safety concerns   CARE Score - Walking 10 Feet on Uneven Surfaces 88   1 Step (Curb)   Reason if not Attempted Safety concerns   CARE Score - 1 Step (Curb) 88   4 Steps   Reason if not Attempted Safety concerns   CARE Score - 4 Steps 88   12 Steps   Reason if not Attempted Safety concerns   CARE Score - 12 Steps  88   Picking Up Object   Reason if not Attempted Safety concerns   CARE Score - Picking Up Object 88   OTHER   Uses a Wheelchair/Scooter? Yes   Wheel 50 Feet with Two Turns   Was the activity attempted? Yes   Was the activity done independently? No   Assistance Needed Physical assistance   Physical Assistance Level Less than half   Type of Wheelchair/Scooter Manual   CARE Score - Wheel 50 Feet with Two Turns 3   Wheel 150 Feet   Reason if not Attempted Safety concerns   CARE Score - Wheel 150 Feet 88       AM-PAC 6 CLICK MOBILITY  16    Patient left up in chair with call button in reach.    GOALS:   Multidisciplinary Problems       Physical Therapy Goals          Problem: Physical Therapy    Goal Priority Disciplines Outcome Goal Variances Interventions   Physical Therapy Goal     PT, PT/OT Ongoing, Progressing     Description: Goals to be met by: 1/27/23     Patient will increase functional independence with mobility by performing:    . Supine to sit with MInimal Assistance  . Sit to supine with MInimal Assistance  . Rolling to Left and Right with Minimal Assistance.  . Sit to stand transfer with Contact Guard Assistance  . Bed to chair transfer with Contact Guard Assistance using Rolling Walker  . Gait  x 50 feet with Contact Guard Assistance using Rolling Walker.   . Wheelchair propulsion x100 feet with Supervision using bilateral uppper extremities    Rehab Services' DME recommendations    Wheelchair justification:  Patient has a mobility limitation that significantly impairs their ability to participate in one or more mobility related activities of daily living (MRADL's) such as toileting, feeding, dressing, grooming and bathing in customary locations in the home.  The mobility limitation cannot be sufficiently  resolved by the use of a cane or walker.   The use of a manual wheelchair will significantly improve the patient's ability to participate in MRADLS and the patient will use it on regular basis in the  home.  Patient has expressed their willingness to use a manual wheelchair in the home.   Patient also has a caregiver who is available, willing and able to provide assistance with the wheelchair when needed.      Seat Width 16 (Small adult)  Seat Depth 16  Back Height Standard  Leg Support Standard, Heel Loops, and Swing Away  Arm Height Desk and Swing Away  Lap Belt Buckle  Accessories Anti-tippers and Safety belt  Cushion Basic  Justification for Cushion Skin Integrity      3 in 1 commode Standard     Justification for 3 in 1 commode: The patient is confined to a single room , The patient is confined to one level of the home environment and there is no toilet on that level, The patient is confined to the home and there are no toilet facilities in the home, The patient is bedroom confined for an extended time and non-ambulatory, The patient's deficits will not be sufficiently addressed by a raised toilet seat                         Time Tracking:     PT Received On: 01/16/24  PT Start Time: 1103  PT Stop Time: 1132  PT Total Time (min): 29 min    Billable Minutes: Gait Training 15 and Therapeutic Exercise 14    Treatment Type: Treatment  PT/PTA: PT     Number of PTA visits since last PT visit: 0     01/16/2024

## 2024-01-16 NOTE — PROGRESS NOTES
[facility]                                  Skilled Nursing Facility                   Progress Note     Admit Date: 12/26/2023  AMARA 1/17/2024  [id number]  Principal Problem:  Closed intertrochanteric fracture of left femur   HPI obtained from patient interview and chart review     Chief Complaint:Re-evaluation of medical treatment and therapy status    HPI:   Ms. Menendez is a 88 year old female PMHx of osteoporosis, dementia, thrombocytopenia and hypothyroidism presents to SNF following hospitalization for closed intertrochanteric left femur fracture s/p cephalomedullary nail fixation on 12/23 with Dr. Jerry.  Admission to SNF for secondary weakness and debility.    Interval history: 24 hr vital sign ranges reviewed and listed below, no acute abnormalities noted.  Patient denies shortness of breath, abdominal discomfort, nausea, or vomiting.  Patient reports an adequate appetite.  Patient denies dysuria.  Patient reports having regular bowel movements.  Patient progressing with PT/OT- Gait: amb with RW CGA ~ 7 ft 5 ft and 26 ft seated rest break limited distance by inc fatigue, CATALINO encourage on last trial. Continuing to follow and treat all acute and chronic conditions.      Past Medical History: Patient has a past medical history of Arthritis, Depression, Hypercholesteremia, Thrombocytopenia, and Thyroid disease.    Past Surgical History: Patient has a past surgical history that includes Hysterectomy; Knee surgery; Ankle surgery; Wrist surgery; Colonoscopy (N/A, 6/2/2021); and Intramedullary rodding of femur (Left, 12/23/2023).    Social History: Patient reports that she has never smoked. She has never used smokeless tobacco.    Family History: family history includes Cancer in her maternal uncle; Heart failure in her father and mother; Suicide in her son.    Allergies: Patient is allergic to codeine.    ROS  Constitutional:  "Negative for fever   Eyes: Negative for blurred vision, double vision   Respiratory: Negative for shortness of breath.  +dry intermittent cough   Cardiovascular: Negative for chest pain, palpitations, and leg swelling.   Gastrointestinal: Negative for abdominal pain, constipation, diarrhea, nausea, vomiting.  Placenta indigestion  Genitourinary: Negative for dysuria, frequency   Musculoskeletal:  + generalized weakness.  +mild intermittent LLE pain  Skin: Negative for itching and rash.   Neurological: Negative for dizziness, headaches.   Psychiatric/Behavioral: Negative for depression. The patient is not nervous/anxious.      24 hour Vital Sign Range   Temp:  [97.4 °F (36.3 °C)-98.3 °F (36.8 °C)]   Pulse:  [60-62]   Resp:  [16]   BP: (137-138)/(66-67)   SpO2:  [93 %-95 %]     Current BMI: Body mass index is 25.53 kg/m².    PEx  Constitutional: Patient appears debilitated.  No distress noted  HENT:   Head: Normocephalic and atraumatic.   Eyes: Pupils are equal, round  Neck: Normal range of motion. Neck supple.   Cardiovascular: Normal rate, regular rhythm and normal heart sounds.    Pulmonary/Chest: Effort normal and breath sounds are clear  Abdominal: Soft. Bowel sounds are normal.   Musculoskeletal: Normal range of motion.   Neurological: Alert and oriented to person.  Disoriented to place, and time.  Confused.  Higher level thinking impaired  Psychiatric: Normal mood and affect. Behavior is normal.   Skin: Skin is warm and dry.  +surgical dressing to LLE    No results for input(s): "GLUCOSE", "NA", "K", "CL", "CO2", "BUN", "CREATININE", "CALCIUM", "MG" in the last 24 hours.              No results for input(s): "WBC", "RBC", "HGB", "HCT", "PLT", "MCV", "MCH", "MCHC" in the last 24 hours.              No results for input(s): "POCTGLUCOSE" in the last 168 hours.     Assessment and Plan:    Closed intertrochanteric fracture of left femur  S/p cephalomedullary nail fixation on 12/23  - PT/OT, WBAT  - DVT PPX with ASA " 81 mg BID x 30 days  - maintain surgical dressing until removed by Orthopedics  - ortho APAP to see patient weekly at [facility]  - follow-up with ortho after discharge    Prophylactic therapy  - 1/10 initiated on prophylactic Tamiflu 75 mg daily times 14 days per protocol for influenza outbreak in an institutionalized setting.     Acute postoperative pain  - stable, continue acetaminophen 1000 mg q.8 hours PRN, gabapentin 500 mg qHS., methocarbamol 500 mg TID.  Bowel regimen in place     Acute Blood loss anemia  - expected postoperatively  - stable, continue monitor twice weekly CBC, transfuse for hemoglobin < 7    Dementia  - Continue memantine 10 mg BID, galantamine 16 mg daily, and buproprion 150 mg BID, B12   Delirium precautions:  - Avoid antihistamines, anticholinergics, hypnotics, and minimize opiates while controlling for pain as these medications may exacerbate delirium. Cues for day/night will assist with keeping patient calm and oriented - during daytime, please keep adequate light in room (open windows, lights on) and please keep room dim at night-time to encourage normal sleep-wake cycles. Continuing to have nursing and family reorient the patient and encourage family to visit    Hypercholesteremia  - continue home medication pravastatin 40 mg qHS.      Hypothyroidism  - stable, Continue levothyroxine 75 mcg daily    GERD  -   continue famotidine 20 mg daily, Tums PRN    Debility  Weakness   - Continue with PT/OT for gait training and strengthening and restoration of ADL's   - Encourage mobility, OOB in chair, and early ambulation as appropriate  - Fall precautions   - Monitor for bowel and bladder dysfunction  - Monitor for and prevent skin breakdown and pressure ulcers  - Continue DVT prophylaxis with ASA 81 mg BID  - added multivitamin today             Anticipate disposition:  [facility]     Hospital bed justification:  Patient requires a hospital bed due to requiring positioning of the body in  ways not feasible with an ordinary bed to alleviate pain/ is completely immobile or limited mobility and cannot independently make changes in body position without the use of the bed. Postioning of the body can not be sufficiently resolved by the use of pillows and wedges.       IP OHS RISK OF UNPLANNED READMISSION Model: MODERATE     Follow-up needed during SNF stay-    Apt not send pt to- Ortho 1/8    Follow-up needed after discharge from SNF: PCP, ortho 2/5     Future Appointments   Date Time Provider Department Center   2/5/2024 10:30 AM Lauren Cosme PA-C [facility] ORTHO [facility]   2/14/2024 10:30 AM Shi Simon MD [facility] IM [facility]   3/21/2024 11:00 AM Shi Simon MD [facility]       I spent 46 minutes reviewing patient records, examining, and counseling the patient/ patient's family with greater than 50% of the time spent in direct patient care and coordination.  Discharge coordination    Nella Lacey NP  Department of Hospital Medicine   [facility]     DOS: 1/16/2024       Patient note was created using MModal Dictation.  Any errors in syntax or even information may not have been identified and edited on initial review prior to signing this note.

## 2024-01-16 NOTE — PT/OT/SLP PROGRESS
"Occupational Therapy   Treatment/Discharge Summary    Name: Rosario Menendez  MRN: [id number]  Admit Date: 12/26/2023  Admitting Diagnosis:  Closed intertrochanteric fracture of left femur    General Precautions: Standard, fall, hearing impaired   Orthopedic Precautions: LLE weight bearing as tolerated   Braces: N/A    Recommendations:     Discharge Recommendations:  home health OT  Level of Assistance Recommended at Discharge: 24 hours light assistance for ADL's and homemaking tasks  Discharge Equipment Recommendations: hip kit, 3-in-1 commode, shower chair  Barriers to discharge:  Decreased caregiver support    Assessment:     Rosario Menendez is a 88 y.o. female with a medical diagnosis of Closed intertrochanteric fracture of left femur.  Pt tolerated session well and without incident.  She is scheduled to d/c from this SNF tomorrow on 1/17/2024 and is appropriate for continued HHOT.  She presents with the following.  Performance deficits affecting function are weakness, impaired endurance, impaired balance, impaired self care skills, impaired functional mobility, gait instability, orthopedic precautions, decreased lower extremity function.     Rehab Potential is good    Activity tolerance:  Fair    Plan:     Pt is scheduled to d/c from this SNF tomorrow on 1/17/2024 and is appropriate for continued HHOT.    Plan of Care Expires: 01/25/24  Plan of Care Reviewed with: patient    Subjective   "What's next?"  "I want to sit in the sun."  Communicated with: nurse prior to session.    Pain/Comfort:  Pain Rating 1: 0/10 (not rated during mobility)  Location - Side 1: Left  Location - Orientation 1: generalized  Location 1: hip  Pain Addressed 1: Cessation of Activity, Reposition, Distraction  Pain Rating Post-Intervention 1: 0/10    Patient's cultural, spiritual, Mormon conflicts given the current situation:  no    Objective:     Patient found up in chair with Other (comments) (no lines attached) upon OT entry to " room.    Bed Mobility:    N/A due to pt's sitting in bedside chair at beginning of session and in w/c at end of session    Functional Mobility/Transfers:  Patient completed Sit <> Stand Transfer from bedside chair x 1 trial with contact guard assistance  with  rolling walker   Patient completed Bed <> Chair Transfer using Step Transfer technique with contact guard assistance with rolling walker  Functional Mobility: Pt ambulated ~5 ft from bedside chair to the w/c with CGA with RW.     Activities of Daily Livin/16/24 1803   Eating   Was the activity attempted? Yes  (prior to OT session)   Reason if not Attempted   (pt had already eaten her breakfast - would require setup assistance based on her oral care performance)   Oral Hygiene   Was the activity attempted? Yes   Was the activity done independently? No   Assistance Needed Setup / clean-up  (to perform oral care while seated at sink)   Was adaptive equipment used? Yes  (w/c to sit)   CARE Score - Oral Hygiene 5   Toileting Hygiene   Was the activity attempted? No   Physical Assistance Level More than half  (maximal assistance to perform perirectal hygiene in stance with use of grab bars for safety - from HASSAN note on 2024)   Reason if not Attempted Refused to perform  (pt did not need to void)   CARE Score - Toileting Hygiene -   Toileting Hygiene Discharge Goal   Discharge Goal 3  (not met, per HASSAN note on 2024)   Shower/Bathe Self   Was the activity attempted? No   Reason if not Attempted Refused to perform   CARE Score - Shower/Bathe Self 7   Upper Body Dressing   Was the activity attempted? No   Reason if not Attempted Refused to perform   CARE Score - Upper Body Dressing 7   Lower Body Dressing   Was the activity attempted? No   Physical Assistance Level More than half  (moderate assistance to lelia brief and pants this date . Pt using Grab bar and RW to steady during stand - from OT note on 2024)   Reason if not Attempted  Environmental limitations  (pt already dressed)   CARE Score - Lower Body Dressing -   Putting On/Taking Off Footwear   Was the activity attempted? Yes   Was the activity done independently? No   Assistance Needed Supervision  (to doff/lelia non-skid socks while seated in chair, taking increased time to perform.  Pt refused hip kit equipment.)   Was adaptive equipment used? No   CARE Score - Putting On/Taking Off Footwear 4   Personal Hygiene   Was the activity attempted? Yes   Was the activity done independently? No   Assistance Needed Setup / clean-up  (to wash her face and brush her hair while seated at sink)   CARE Score - Personal Hygiene 5   Toilet Transfer   Was the activity attempted? No   Reason if not Attempted Refused to perform   CARE Score - Toilet Transfer 7   Tub/Shower Transfer   Was the activity attempted? No   Reason if not Attempted Refused to perform   CARE Score - Tub/Shower Transfer 7       AMPAC 6 Click ADL: 18    OT Exercises: UE ergometer performed for 8 min on minimal resistance to increase functional endurance and strength in order increase independence when performing self care tasks, functional ambulation, W/C propulsion, and functional standing activities.  Pt also performed the following with 2 lb dowel x 2 sets each: 15 reps of biceps curls and brachioradialis curls and 10 reps of rows and shoulder presses.     Treatment & Education:  Pt edu on role of OT, POC, safety when performing self care tasks, benefit of performing OOB activity, and safety when performing functional transfers and mobility.    - Self care tasks completed-- as noted above      Patient left up in chair in the rehab gym with  PT present about to initiate PT session    GOALS:   Multidisciplinary Problems       Occupational Therapy Goals          Problem: Occupational Therapy    Goal Priority Disciplines Outcome Interventions   Occupational Therapy Goal     OT, PT/OT Adequate for Care Transition    Description: Goals  to be met by: 1/25/2024     Patient will increase functional independence with ADLs by performing:    UE Dressing with Supervision- Met  LE Dressing with Moderate Assistance and Assistive Devices as needed- Partially met, preforming inconsistently  Personal Hygiene while seated at sink with Supervision- Met  Oral Hygiene while seated at sink with Supervision- Met  Toileting from toilet with Moderate Assistance for hygiene and clothing management and AE as needed- Partially met, preforming inconsistently  Bathing from  sitting at sink with Minimal Assistance- Partially met; preforming inconsistently  Toilet transfer to toilet with Contact Guard Assistance /c AE/LRAD as needed- Partially met; preforming inconsistently  Footwear /c Mod A and AE as needed - Met  Tolerate standing functional tasks for ~3 minutes /c CGA and AE/LRAD as needed - Not Met  Shower t/f /c CGA and AE/LRAD as needed - Not Met                           Time Tracking:     OT Date of Treatment: 01/16/24  OT Start Time: 1021    OT Stop Time: 1055  OT Total Time (min): 34 min    Billable Minutes:Self Care/Home Management 22 min  Therapeutic Exercise 12 min    1/16/2024

## 2024-01-17 VITALS
RESPIRATION RATE: 18 BRPM | WEIGHT: 130.75 LBS | SYSTOLIC BLOOD PRESSURE: 123 MMHG | TEMPERATURE: 98 F | HEART RATE: 60 BPM | DIASTOLIC BLOOD PRESSURE: 62 MMHG | OXYGEN SATURATION: 95 % | BODY MASS INDEX: 25.67 KG/M2 | HEIGHT: 60 IN

## 2024-01-17 PROCEDURE — 25000003 PHARM REV CODE 250: Mod: HCNC | Performed by: NURSE PRACTITIONER

## 2024-01-17 PROCEDURE — 63700000 PHARM REV CODE 250 ALT 637 W/O HCPCS: Mod: HCNC | Performed by: NURSE PRACTITIONER

## 2024-01-17 PROCEDURE — 25000003 PHARM REV CODE 250: Mod: HCNC | Performed by: FAMILY MEDICINE

## 2024-01-17 PROCEDURE — 25000003 PHARM REV CODE 250: Mod: HCNC | Performed by: HOSPITALIST

## 2024-01-17 RX ADMIN — CYANOCOBALAMIN TAB 1000 MCG 1000 MCG: 1000 TAB at 08:01

## 2024-01-17 RX ADMIN — METHOCARBAMOL 500 MG: 500 TABLET ORAL at 03:01

## 2024-01-17 RX ADMIN — ASPIRIN 81 MG: 81 TABLET, COATED ORAL at 08:01

## 2024-01-17 RX ADMIN — OSELTAMIVIR PHOSPHATE 30 MG: 30 CAPSULE ORAL at 08:01

## 2024-01-17 RX ADMIN — LEVOTHYROXINE SODIUM 75 MCG: 75 TABLET ORAL at 06:01

## 2024-01-17 RX ADMIN — BUPROPION HYDROCHLORIDE 150 MG: 150 TABLET, EXTENDED RELEASE ORAL at 08:01

## 2024-01-17 RX ADMIN — THERA TABS 1 TABLET: TAB at 08:01

## 2024-01-17 RX ADMIN — METHOCARBAMOL 500 MG: 500 TABLET ORAL at 08:01

## 2024-01-17 RX ADMIN — FAMOTIDINE 20 MG: 20 TABLET ORAL at 08:01

## 2024-01-17 RX ADMIN — MEMANTINE 10 MG: 10 TABLET ORAL at 08:01

## 2024-01-17 RX ADMIN — GALANTAMINE 16 MG: 16 CAPSULE, EXTENDED RELEASE ORAL at 08:01

## 2024-01-17 NOTE — DISCHARGE INSTRUCTIONS
Discharge instructions    Please follow up with your primary care physician within 2 weeks of discharge.    Check this discharge summary for all other appoints made for you and honor those appointments.    [facility] will follow you at home upon discharge per your request.

## 2024-01-17 NOTE — PLAN OF CARE
Problem: Occupational Therapy  Goal: Occupational Therapy Goal  Description: Goals to be met by: 1/25/2024     Patient will increase functional independence with ADLs by performing:    UE Dressing with Supervision- Met  LE Dressing with Moderate Assistance and Assistive Devices as needed- Partially met, preforming inconsistently  Personal Hygiene while seated at sink with Supervision- Met  Oral Hygiene while seated at sink with Supervision- Met  Toileting from toilet with Moderate Assistance for hygiene and clothing management and AE as needed- Partially met, preforming inconsistently  Bathing from  sitting at sink with Minimal Assistance- Partially met; preforming inconsistently  Toilet transfer to toilet with Contact Guard Assistance /c AE/LRAD as needed- Partially met; preforming inconsistently  Footwear /c Mod A and AE as needed - Met  Tolerate standing functional tasks for ~3 minutes /c CGA and AE/LRAD as needed - Not Met  Shower t/f /c CGA and AE/LRAD as needed - Not Met      Outcome: Adequate for Care Transition     Pt is scheduled to d/c from this SNF tomorrow on 1/17/2024 and is appropriate for continued HHOT.

## 2024-01-17 NOTE — NURSING
Patient ambulated to bathroom by self and talking on phone with daughter.Had large bowel movement incontinent.Cleaned with soap and water attend changed.Assisted back to bed.Charge nurse Bailee Slater notified and patient placed on telesitter camera monitoring to prevent fall.

## 2024-01-17 NOTE — NURSING
Admission Diagnosis: Displaced intertrochanteric fractu*     POC reviewed with pt, pt verbalized understanding. Safety maintained throughout shift, bed locked and in lowest position, call light in reach. Pt remained free of fall/trauma. VSS, afebrile this shift.    Pt pain  Last Documentation = Comfort/Acceptable Pain Level: 0 (01/15/24 2012)    Last Documentation = Pain Rating (0-10): Activity: 0 (01/14/24 0900)    Last Documentation = Pain Rating (0-10): Rest: 0 (01/17/24 1300)    Skin assessment completed prior to discharge   Location: no skin breakdown   Description:   Recommendation:      AVS reviewed with patient prior to discharge. Discharge instruction, follow up appointments and medication instructions given to pt, pt verbalized understanding. Patient IV lines have been removed prior to discharge. Rx sent to patients pharmacy.   DISCHARGE INSTRUCTIONS GIVEN TO DAUGHTER VICENTE AND UNDERSTANDS.DISCHARGED TO [facility] [place] [facility] WHEELCHAIR VAN TRANSPORT SERVICE ACCOMPANIED BY TRANSPORTER AND FAMILY MEMBERS.PERSONAL BELONGINGS AND  WHEELCHAIR FOR HOME USE WITH PATIENT.    OUTCOME: Condition has improved and patient is now ready for discharge.    [place] [facility]    FOLLOWUP:   With primary care provider

## 2024-01-17 NOTE — DISCHARGE SUMMARY
[facility] Medicine  Discharge Summary      Patient Name: Rosario Menendez  MRN: [id number]  CICI: [id number]  Patient Class: IP- SNF  Admission Date: 12/26/2023  Hospital Length of Stay: 22 days  Discharge Date and Time:  01/17/2024 11:22 AM  Attending Physician: Leo Bowen MD   Discharging Provider: Nella Lacey NP  Primary Care Provider: Shi Simon MD    Primary Care Team: [facility] 8 NELLA LACEY     HPI:   Ms. Menendez is a 88 year old female PMHx of osteoporosis, dementia, thrombocytopenia and hypothyroidism presents to SNF following hospitalization for closed intertrochanteric left femur fracture s/p cephalomedullary nail fixation on 12/23 with Dr. Jerry.  Admission to SNF for secondary weakness and debility.     [place] [facility] [place] with left hip pain.  [place] [facility] [place],  Patient was walking at home and fell onto left hip.  Most of the history was obtained for son who was at the bedside.  Patient has dementia does not remember how she fell.  Immediate pain and difficulty bearing weight. Lives at an assisted living facility. Uses a walker for ambulation at baseline. UA positive for trace leukocyte esterase.  CT cervical spine showed multi level degenerative changes.  CT head showed chronic microvascular changes.  Chest x-ray was unremarkable.  X-ray hip  showed irregular lucency transversing the left greater trochanter extending into the intertrochanteric portion of the proximal left femur.  A nondisplaced fracture.Ms. Menendez was admitted to Hospital Medicine for management of a closed intertrochanteric fracture of the left femur.  Ortho was consulted.  She went to the OR on 12/23 for a left CMN with Dr. Mancilla.  No intraop complications noted,  mL, skin closed with Dermabond.  She was initially started on Eliquis 2.5mg BID for DVT prophylaxis, but given Thrombocytopenia <50K, she was changed to Aspirin 81mg BID.  PT/OT were  "consulted who suggest moderate therapy.  She was discharged to [facility]."      Today, patient is at her mental baseline which is very confused.  Son-in-law at bedside and provided care update.  Patient states her postoperative pain is well controlled.  She is having slightly lower SpO2 at 94% and BP is low to normal, also having low-grade fevers.  Patient reminded to take deep breaths and elicit a cough every once in a while, she is likely having atelectasis, will also provide incentive spirometer.     Patient will be treated at [facility] with PT and OT to improve functional status and ability to perform ADLs.    Hospital Course:   Patient progressed well with PT and OT- last PT note states that patient ambulated RW CGA ~ 7 ft 5 ft and 26 ft seated rest break limited distance by inc fatigue, CATALINO encourage on last trial   . Patient had no significant events during their stay at [facility]. Home health was set up. DME was ordered if needed. Follow up appointment to be made by patient within one week. All prescriptions and discharge instructions were ordered to be given to the patient prior to discharge.        Goals of Care Treatment Preferences:  Code Status: Full Code      Consults:   Consults (From admission, onward)          Status Ordering Provider     Inpatient consult to Registered Dietitian/Nutritionist  Once        Provider:  (Not yet assigned)    Completed JAY WATSON            No new Assessment & Plan notes have been filed under this hospital service since the last note was generated.  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 walker, rolling    Does patient have medical equipment at home? wheelchair    Length of need (1-99 months): 99      HIP KIT FOR HOME USE     Order Specific Question Answer Comments   Height: 5' (1.524 m)    Weight: 59.3 kg (130 lb 11.7 oz)    Does patient have medical equipment at home? walker, rolling    Does patient have medical equipment at home? wheelchair    Length of need (1-99 months): 99    Type: Short Horn Hip Kit      WHEELCHAIR FOR HOME USE     Order Specific Question Answer Comments   Hours in W/C per day: 8    Type of Wheelchair: Lightweight    Patient unable to propel in Standard wheelchair? Yes    Size(Width): 16"(small adult)    Leg Support: STD footrests    Leg Support: Swing Away    Arm Height: Desk length    Arm Height: Swing away    Lap Belt: Buckle    Accessories: Anti-tippers    Accessories: Front brakes    Accessories: Heel loops    Cushion: Basic    Justification for cushion: Prevent pressure ulcers    Reclining Back No    Height: 5' (1.524 m)    Weight: 59.3 kg (130 lb 11.7 oz)    Does patient have medical equipment at home? walker, rolling    Does patient have medical equipment at home? wheelchair    Length of need (1-99 months): 99    Please check all that apply: Caregiver is capable and willing to operate wheelchair safely.    Please check all that apply: The patient requires the use of a w/c for activities of daily living within the Home.    Please check all that apply: Patient mobility limitations cannot be " sufficiently resolved by the use of other ambulatory therapies.      BATH/SHOWER CHAIR FOR HOME USE     Order Specific Question Answer Comments   Height: 5' (1.524 m)    Weight: 59.3 kg (130 lb 11.7 oz)    Does patient have medical equipment at home? walker, rolling    Does patient have medical equipment at home? wheelchair    Length of need (1-99 months): 99    Type: With back      HOSPITAL BED FOR HOME USE     Order Specific Question Answer Comments   Type: Semi-electric    Length of need (1-99 months): 3    Does patient have medical equipment at home? walker, rolling    Does patient have medical equipment at home? wheelchair    Height: 5' (1.524 m)    Weight: 59.3 kg (130 lb 11.7 oz)    Please check all that apply: Patient requires a bed height different than a fixed height hospital bed to permit transfers to chair, wheelchair, or standing.      No driving until:   Order Comments: Cleared by PCP     Notify your health care provider if you experience any of the following:  temperature >100.4     Notify your health care provider if you experience any of the following:  persistent nausea and vomiting or diarrhea     Notify your health care provider if you experience any of the following:  severe uncontrolled pain     Notify your health care provider if you experience any of the following:  redness, tenderness, or signs of infection (pain, swelling, redness, odor or green/yellow discharge around incision site)     Notify your health care provider if you experience any of the following:  difficulty breathing or increased cough     Notify your health care provider if you experience any of the following:  severe persistent headache     Notify your health care provider if you experience any of the following:  worsening rash     Notify your health care provider if you experience any of the following:  persistent dizziness, light-headedness, or visual disturbances     Notify your health care provider if you experience any  of the following:  increased confusion or weakness     Activity as tolerated       Significant Diagnostic Studies: N/A    Pending Diagnostic Studies:       None           Medications:  Reconciled Home Medications:      Medication List        START taking these medications      oseltamivir 30 MG capsule  Commonly known as: TAMIFLU  Take 1 capsule (30 mg total) by mouth once daily. for 5 days     pravastatin 40 MG tablet  Commonly known as: PRAVACHOL  Take 1 tablet (40 mg total) by mouth every evening.            CHANGE how you take these medications      alendronate 70 MG tablet  Commonly known as: FOSAMAX  Take 1 tablet (70 mg total) by mouth every 7 days.  What changed: additional instructions            CONTINUE taking these medications      acetaminophen 500 MG tablet  Commonly known as: TYLENOL  Take 2 tablets (1,000 mg total) by mouth every 8 (eight) hours as needed for Pain.     aspirin 81 MG EC tablet  Commonly known as: ECOTRIN  Take 1 tablet (81 mg total) by mouth 2 (two) times a day. for 8 days     buPROPion 150 MG TB24 tablet  Commonly known as: WELLBUTRIN XL  TAKE 1 TABLET EVERY DAY     cyanocobalamin 1000 MCG tablet  Commonly known as: VITAMIN B-12  Take 1 tablet (1,000 mcg total) by mouth once daily.     galantamine 16 MG 24 hr capsule  Commonly known as: RAZADYNE ER  TAKE 1 CAPSULE EVERY DAY WITH BREAKFAST     levothyroxine 75 MCG tablet  Commonly known as: SYNTHROID  Take 1 tablet (75 mcg total) by mouth before breakfast.     memantine 10 MG Tab  Commonly known as: NAMENDA  Take 1 tablet (10 mg total) by mouth 2 (two) times daily.     pregabalin 50 MG capsule  Commonly known as: LYRICA  Take 1 capsule (50 mg total) by mouth every evening.            STOP taking these medications      methocarbamoL 500 MG Tab  Commonly known as: ROBAXIN              Indwelling Lines/Drains at time of discharge:   Lines/Drains/Airways       Drain  Duration             Female External Urinary Catheter w/ Suction -- days                     Time spent on the discharge of patient: 37 minutes         Nella Lacey NP  Department of Hospital Medicine  [facility]

## 2024-01-18 ENCOUNTER — PATIENT OUTREACH (OUTPATIENT)
Dept: ADMINISTRATIVE | Facility: CLINIC | Age: 89
End: 2024-01-18
Payer: MEDICARE

## 2024-01-18 RX ORDER — PREGABALIN 50 MG/1
50 CAPSULE ORAL NIGHTLY
Qty: 15 CAPSULE | Refills: 0 | Status: SHIPPED | OUTPATIENT
Start: 2024-01-18 | End: 2024-02-05

## 2024-01-18 RX ORDER — PREGABALIN 50 MG/1
50 CAPSULE ORAL NIGHTLY
Qty: 15 CAPSULE | Refills: 0 | Status: CANCELLED | OUTPATIENT
Start: 2024-01-18 | End: 2024-07-18

## 2024-01-18 NOTE — TELEPHONE ENCOUNTER
----- Message from Ana Maria Olivo sent at 1/18/2024 10:25 AM CST -----  Contact: Gregoria @ [facility] [phone]  Pt needs a refill on  1) buPROPion (WELLBUTRIN XL) 150 MG TB24 tablet    2) levothyroxine (SYNTHROID) 75 MCG tablet    3) galantamine (RAZADYNE ER) 16 MG 24 hr capsule    4) memantine (NAMENDA) 10 MG Tab    5) pregabalin (LYRICA) 50 MG capsule    6) alendronate (FOSAMAX) 70 MG tablet    called into   [facility] - [place] - [address]  [address]  [place] [address]  Phone: [phone] Fax: [phone]    Gregoria at [facility] can be reached at [phone]    Gregoria at [facility] to request a refill on the pt's RX. Gregoria states the pt just moved into an assistant living service and will need a refill on all her RX. Please call Gregoria back for advice.

## 2024-01-18 NOTE — PROGRESS NOTES
C3 nurse attempted to contact Rosario Menendez (Daughter) for a TCC post hospital discharge follow up call. The patient is unable to conduct the call @ this time. The patient's daughter requested a callback.    The patient has a scheduled HOS appointment with Shi Simon MD on 02/14/2024 @ 1030.

## 2024-01-18 NOTE — TELEPHONE ENCOUNTER
No care due was identified.  [facility] Embedded Care Due Messages. Reference number: [id number].   1/18/2024 1:45:26 PM CST

## 2024-01-18 NOTE — PROGRESS NOTES
C3 nurse spoke with Rosario Menendez (Daughter, Aditi) for a TCC post hospital discharge follow up call. The patient has a scheduled HOSFU appointment with Shi Simon MD on 02/14/2024 @ 1030.

## 2024-01-19 RX ORDER — BUPROPION HYDROCHLORIDE 150 MG/1
150 TABLET ORAL DAILY
Qty: 90 TABLET | Refills: 1 | Status: SHIPPED | OUTPATIENT
Start: 2024-01-19

## 2024-01-19 RX ORDER — MEMANTINE HYDROCHLORIDE 10 MG/1
10 TABLET ORAL 2 TIMES DAILY
Qty: 180 TABLET | Refills: 1 | Status: SHIPPED | OUTPATIENT
Start: 2024-01-19

## 2024-01-19 RX ORDER — ALENDRONATE SODIUM 70 MG/1
70 TABLET ORAL
Qty: 4 TABLET | Refills: 1 | Status: SHIPPED | OUTPATIENT
Start: 2024-01-19 | End: 2024-02-19

## 2024-01-19 RX ORDER — GALANTAMINE HYDROBROMIDE 16 MG/1
16 CAPSULE, EXTENDED RELEASE ORAL
Qty: 90 CAPSULE | Refills: 0 | Status: SHIPPED | OUTPATIENT
Start: 2024-01-19

## 2024-01-19 RX ORDER — LEVOTHYROXINE SODIUM 75 UG/1
75 TABLET ORAL
Qty: 30 TABLET | Refills: 5 | Status: SHIPPED | OUTPATIENT
Start: 2024-01-19 | End: 2025-01-18

## 2024-01-29 ENCOUNTER — TELEPHONE (OUTPATIENT)
Dept: LAB | Facility: HOSPITAL | Age: 89
End: 2024-01-29
Payer: MEDICARE

## 2024-01-29 NOTE — TELEPHONE ENCOUNTER
----- Message from Shanda Denton sent at 1/29/2024 12:10 PM CST -----  Contact: [facility] [phone]  Would like to receive medical advice.  Would they like a call back or a response via [facility]:  Call Back   Additional information:      [facility] is calling in regards to the pt being very lethargic as well as having increased incontinence, however no odor or any other symptoms have been reported at this time.   They need an order for a skilled home health nurse to go out and assess the pt.     [facility] Fax: [phone]    [phone]

## 2024-01-30 ENCOUNTER — TELEPHONE (OUTPATIENT)
Dept: ORTHOPEDICS | Facility: CLINIC | Age: 89
End: 2024-01-30
Payer: MEDICARE

## 2024-01-30 NOTE — TELEPHONE ENCOUNTER
Spoke with pt daughter. Pt will keep appointment 2/2024. Patient states verbal understanding and has no further questions.

## 2024-01-30 NOTE — TELEPHONE ENCOUNTER
----- Message from Bianca Edward sent at 1/30/2024  9:23 AM CST -----  Regarding: Appt Advise 2/5/24  Contact: [phone]  Rosario Menendez / Aditi (daughter) calling regarding Appointment Access  (message) for # pt is not able to make appt. she is in a assisting living facility and she not too mobile to come to appt. Caller states if there is anyway a Home Health nurse is able to come out to her for the Post Op appt. please call Aditi to advise, thanks.

## 2024-01-31 ENCOUNTER — TELEPHONE (OUTPATIENT)
Dept: INTERNAL MEDICINE | Facility: CLINIC | Age: 89
End: 2024-01-31
Payer: MEDICARE

## 2024-01-31 NOTE — TELEPHONE ENCOUNTER
----- Message from Terrence Meyer sent at 1/31/2024 10:04 AM CST -----  Contact: [facility] [phone]  1MEDICALADVICE     Patient is calling for Medical Advice regarding: wants to know if you would like to put orders in for labs     How long has patient had these symptoms:    Pharmacy name and phone#:    Would like response via [facility]:  n/a     Comments:    Please call [facility] [phone]

## 2024-02-05 ENCOUNTER — OFFICE VISIT (OUTPATIENT)
Dept: ORTHOPEDICS | Facility: CLINIC | Age: 89
End: 2024-02-05
Payer: MEDICARE

## 2024-02-05 ENCOUNTER — HOSPITAL ENCOUNTER (OUTPATIENT)
Dept: RADIOLOGY | Facility: HOSPITAL | Age: 89
Discharge: HOME | End: 2024-02-05
Attending: PHYSICIAN ASSISTANT
Payer: MEDICARE

## 2024-02-05 VITALS — WEIGHT: 130.75 LBS | BODY MASS INDEX: 25.67 KG/M2 | HEIGHT: 60 IN

## 2024-02-05 DIAGNOSIS — S72.142D CLOSED DISPLACED INTERTROCHANTERIC FRACTURE OF LEFT FEMUR WITH ROUTINE HEALING, SUBSEQUENT ENCOUNTER: ICD-10-CM

## 2024-02-05 DIAGNOSIS — S72.142D CLOSED DISPLACED INTERTROCHANTERIC FRACTURE OF LEFT FEMUR WITH ROUTINE HEALING, SUBSEQUENT ENCOUNTER: Primary | ICD-10-CM

## 2024-02-05 PROCEDURE — 1160F RVW MEDS BY RX/DR IN RCRD: CPT | Mod: HCNC,CPTII,S$GLB, | Performed by: PHYSICIAN ASSISTANT

## 2024-02-05 PROCEDURE — 1159F MED LIST DOCD IN RCRD: CPT | Mod: HCNC,CPTII,S$GLB, | Performed by: PHYSICIAN ASSISTANT

## 2024-02-05 PROCEDURE — 1126F AMNT PAIN NOTED NONE PRSNT: CPT | Mod: HCNC,CPTII,S$GLB, | Performed by: PHYSICIAN ASSISTANT

## 2024-02-05 PROCEDURE — 73552 X-RAY EXAM OF FEMUR 2/>: CPT | Mod: 26,HCNC,LT, | Performed by: RADIOLOGY

## 2024-02-05 PROCEDURE — 99024 POSTOP FOLLOW-UP VISIT: CPT | Mod: HCNC,S$GLB,, | Performed by: PHYSICIAN ASSISTANT

## 2024-02-05 PROCEDURE — 73552 X-RAY EXAM OF FEMUR 2/>: CPT | Mod: TC,HCNC,LT

## 2024-02-05 PROCEDURE — 99999 PR PBB SHADOW E&M-EST. PATIENT-LVL IV: CPT | Mod: PBBFAC,HCNC,, | Performed by: PHYSICIAN ASSISTANT

## 2024-02-05 NOTE — PROGRESS NOTES
Principal Orthopedic Problem:  Left intertrochanteric femur fracture      12/23/24: :  Cephalomedullary nail fixation of left intertrochanteric femur fracture     Ms. Menendez is here today for a post-operative visit    Interval History:  she reports that she is doing well.   she is at assisted living . she is  participating in PT/OT. Home health   Pain is controlled.  she is not taking pain medication.    she denies fever, chills, and sweats .       Physical exam:    Patient arrives to exam room: wheelchair.  Patient is  accompanied    Dressing taken down.  Incision is clean, dry and intact.  Healing well no signs of breakdown or infection. FROm knee and ankle    RADS: All pertinent images were reviewed by myself:   Femoral head nail and intramedullary mina can be seen in the femur of alignment of the fracture of the intertrochanteric area is excellent.  Two screws are seen in the patella.     Assessment:  Post-op visit ( 6 weeks)    Plan:  Current care, treatment plan, precautions, activity level/ modifications, limitations, rehabilitation exercises and proposed future treatment were discussed with the patient. We discussed the need to monitor for changes in symptoms and condition and report them to the physician.  Discussed importance of compliance with all appointments and follow up examinations.     WOUND CARE :  -You may use vitamin E (break the capsule open), aloe, scar cream (mederma) or hand lotion to rub the scar.  You must rub across the scar and in the line of the scar.  The goal is to make the scar not tender and make the scar not adhere (stick to) the tissues below the scar.  There may be some mild discomfort with this initially.    -Patient was advised to monitor wound closely and multiple times daily for any problems. Call clinic immediately or report to ED for immediate medical attention for any complications including reopening of wound, drainage, purulence, redness, streaking, odor, pain  out of proportion, fever, chills, etc.       ACTIVITY:   - as tolerated, no high impact  -range of motion as tolerated    - WBAT      -PT/OT, HH, Patient is responsible to establish and continue care      PAIN MEDICATION:   - Multimodal pain control  - Pain medication: refill was not needed  - Pain medication refill policy provided to patient for review, yes.    - Patient was informed a multi-modal approach is used to treat their pain. With the goal to get off of narcotic pain medication and discontinue as soon as possible.   - ice and elevation to reduce pain and swelling     DVT PROPHYLAXIS:   - Aspirin: post op regime completed.     FOLLOW UP:   - Patient will follow up in the clinic in 5 weeks, sooner if any concerns.  - X-ray of her femur is needed.        If there are any questions prior to scheduled follow up, the patient was instructed to contact the office

## 2024-02-06 NOTE — PROGRESS NOTES
This encounter was created in error - please disregard.   [place]  OBSTETR[facility]EC[facility]  [address] [id number]  Phone: [phone]  Fax: [phone]  Follow Up Time:

## 2024-02-07 ENCOUNTER — TELEPHONE (OUTPATIENT)
Dept: ORTHOPEDICS | Facility: CLINIC | Age: 89
End: 2024-02-07
Payer: MEDICARE

## 2024-02-07 NOTE — TELEPHONE ENCOUNTER
Spoke with pt daughter.  Pt is schedule. Patient states verbal understanding and has no further questions.

## 2024-02-07 NOTE — TELEPHONE ENCOUNTER
----- Message from Bailee Alexandre sent at 2/7/2024 12:38 PM CST -----  Regarding: rescheduled appt  Contact: daughter @ [phone]  Pt's daughter is calling to get appt rescheduled. Asking for a call back

## 2024-02-14 ENCOUNTER — OFFICE VISIT (OUTPATIENT)
Dept: INTERNAL MEDICINE | Facility: CLINIC | Age: 89
End: 2024-02-14
Payer: MEDICARE

## 2024-02-14 ENCOUNTER — LAB VISIT (OUTPATIENT)
Dept: LAB | Facility: HOSPITAL | Age: 89
End: 2024-02-14
Attending: INTERNAL MEDICINE
Payer: MEDICARE

## 2024-02-14 DIAGNOSIS — D69.6 THROMBOCYTOPENIA: ICD-10-CM

## 2024-02-14 DIAGNOSIS — I70.0 AORTIC ATHEROSCLEROSIS: ICD-10-CM

## 2024-02-14 DIAGNOSIS — R53.83 FATIGUE, UNSPECIFIED TYPE: ICD-10-CM

## 2024-02-14 DIAGNOSIS — I10 HYPERTENSION, UNSPECIFIED TYPE: ICD-10-CM

## 2024-02-14 DIAGNOSIS — E53.8 B12 DEFICIENCY: ICD-10-CM

## 2024-02-14 DIAGNOSIS — F03.90 DEMENTIA, UNSPECIFIED DEMENTIA SEVERITY, UNSPECIFIED DEMENTIA TYPE, UNSPECIFIED WHETHER BEHAVIORAL, PSYCHOTIC, OR MOOD DISTURBANCE OR ANXIETY: ICD-10-CM

## 2024-02-14 DIAGNOSIS — D69.3 IMMUNE THROMBOCYTOPENIC PURPURA: ICD-10-CM

## 2024-02-14 DIAGNOSIS — I10 HYPERTENSION, UNSPECIFIED TYPE: Primary | ICD-10-CM

## 2024-02-14 DIAGNOSIS — N39.0 URINARY TRACT INFECTION WITHOUT HEMATURIA, SITE UNSPECIFIED: ICD-10-CM

## 2024-02-14 LAB
ALBUMIN SERPL BCP-MCNC: 3.6 G/DL (ref 3.5–5.2)
ALP SERPL-CCNC: 142 U/L (ref 55–135)
ALT SERPL W/O P-5'-P-CCNC: 13 U/L (ref 10–44)
ANION GAP SERPL CALC-SCNC: 9 MMOL/L (ref 8–16)
AST SERPL-CCNC: 15 U/L (ref 10–40)
BASOPHILS # BLD AUTO: 0.08 K/UL (ref 0–0.2)
BASOPHILS NFR BLD: 1.6 % (ref 0–1.9)
BILIRUB SERPL-MCNC: 0.4 MG/DL (ref 0.1–1)
BUN SERPL-MCNC: 13 MG/DL (ref 8–23)
CALCIUM SERPL-MCNC: 11.2 MG/DL (ref 8.7–10.5)
CHLORIDE SERPL-SCNC: 108 MMOL/L (ref 95–110)
CO2 SERPL-SCNC: 23 MMOL/L (ref 23–29)
CREAT SERPL-MCNC: 0.9 MG/DL (ref 0.5–1.4)
DIFFERENTIAL METHOD BLD: ABNORMAL
EOSINOPHIL # BLD AUTO: 0.1 K/UL (ref 0–0.5)
EOSINOPHIL NFR BLD: 2.8 % (ref 0–8)
ERYTHROCYTE [DISTWIDTH] IN BLOOD BY AUTOMATED COUNT: 14.1 % (ref 11.5–14.5)
EST. GFR  (NO RACE VARIABLE): >60 ML/MIN/1.73 M^2
GLUCOSE SERPL-MCNC: 74 MG/DL (ref 70–110)
HCT VFR BLD AUTO: 46.7 % (ref 37–48.5)
HGB BLD-MCNC: 14.5 G/DL (ref 12–16)
IMM GRANULOCYTES # BLD AUTO: 0.01 K/UL (ref 0–0.04)
IMM GRANULOCYTES NFR BLD AUTO: 0.2 % (ref 0–0.5)
LYMPHOCYTES # BLD AUTO: 1.7 K/UL (ref 1–4.8)
LYMPHOCYTES NFR BLD: 33.6 % (ref 18–48)
MCH RBC QN AUTO: 30.5 PG (ref 27–31)
MCHC RBC AUTO-ENTMCNC: 31 G/DL (ref 32–36)
MCV RBC AUTO: 98 FL (ref 82–98)
MONOCYTES # BLD AUTO: 0.6 K/UL (ref 0.3–1)
MONOCYTES NFR BLD: 12.2 % (ref 4–15)
NEUTROPHILS # BLD AUTO: 2.5 K/UL (ref 1.8–7.7)
NEUTROPHILS NFR BLD: 49.6 % (ref 38–73)
NRBC BLD-RTO: 0 /100 WBC
PLATELET # BLD AUTO: 76 K/UL (ref 150–450)
PMV BLD AUTO: 12.4 FL (ref 9.2–12.9)
POTASSIUM SERPL-SCNC: 3.7 MMOL/L (ref 3.5–5.1)
PROT SERPL-MCNC: 6.8 G/DL (ref 6–8.4)
RBC # BLD AUTO: 4.76 M/UL (ref 4–5.4)
SODIUM SERPL-SCNC: 140 MMOL/L (ref 136–145)
TSH SERPL DL<=0.005 MIU/L-ACNC: 1.4 UIU/ML (ref 0.4–4)
VIT B12 SERPL-MCNC: >2000 PG/ML (ref 210–950)
WBC # BLD AUTO: 5.09 K/UL (ref 3.9–12.7)

## 2024-02-14 PROCEDURE — 85025 COMPLETE CBC W/AUTO DIFF WBC: CPT | Mod: HCNC | Performed by: INTERNAL MEDICINE

## 2024-02-14 PROCEDURE — 1126F AMNT PAIN NOTED NONE PRSNT: CPT | Mod: HCNC,CPTII,S$GLB, | Performed by: INTERNAL MEDICINE

## 2024-02-14 PROCEDURE — 84443 ASSAY THYROID STIM HORMONE: CPT | Mod: HCNC | Performed by: INTERNAL MEDICINE

## 2024-02-14 PROCEDURE — 99214 OFFICE O/P EST MOD 30 MIN: CPT | Mod: HCNC,S$GLB,, | Performed by: INTERNAL MEDICINE

## 2024-02-14 PROCEDURE — 1111F DSCHRG MED/CURRENT MED MERGE: CPT | Mod: HCNC,CPTII,S$GLB, | Performed by: INTERNAL MEDICINE

## 2024-02-14 PROCEDURE — 99999 PR PBB SHADOW E&M-EST. PATIENT-LVL III: CPT | Mod: PBBFAC,HCNC,, | Performed by: INTERNAL MEDICINE

## 2024-02-14 PROCEDURE — 36415 COLL VENOUS BLD VENIPUNCTURE: CPT | Mod: HCNC | Performed by: INTERNAL MEDICINE

## 2024-02-14 PROCEDURE — 82607 VITAMIN B-12: CPT | Mod: HCNC | Performed by: INTERNAL MEDICINE

## 2024-02-14 PROCEDURE — 1159F MED LIST DOCD IN RCRD: CPT | Mod: HCNC,CPTII,S$GLB, | Performed by: INTERNAL MEDICINE

## 2024-02-14 PROCEDURE — 1101F PT FALLS ASSESS-DOCD LE1/YR: CPT | Mod: HCNC,CPTII,S$GLB, | Performed by: INTERNAL MEDICINE

## 2024-02-14 PROCEDURE — 80053 COMPREHEN METABOLIC PANEL: CPT | Mod: HCNC | Performed by: INTERNAL MEDICINE

## 2024-02-14 PROCEDURE — 3288F FALL RISK ASSESSMENT DOCD: CPT | Mod: HCNC,CPTII,S$GLB, | Performed by: INTERNAL MEDICINE

## 2024-02-15 ENCOUNTER — PATIENT MESSAGE (OUTPATIENT)
Dept: INTERNAL MEDICINE | Facility: CLINIC | Age: 89
End: 2024-02-15
Payer: MEDICARE

## 2024-02-17 ENCOUNTER — TELEPHONE (OUTPATIENT)
Dept: INTERNAL MEDICINE | Facility: CLINIC | Age: 89
End: 2024-02-17
Payer: MEDICARE

## 2024-02-17 VITALS
TEMPERATURE: 99 F | SYSTOLIC BLOOD PRESSURE: 118 MMHG | WEIGHT: 130.06 LBS | HEIGHT: 60 IN | HEART RATE: 62 BPM | BODY MASS INDEX: 25.53 KG/M2 | DIASTOLIC BLOOD PRESSURE: 82 MMHG | OXYGEN SATURATION: 97 %

## 2024-02-17 DIAGNOSIS — E83.52 HYPERCALCEMIA: Primary | ICD-10-CM

## 2024-02-17 PROBLEM — D69.3 IMMUNE THROMBOCYTOPENIC PURPURA: Status: ACTIVE | Noted: 2018-04-11

## 2024-02-17 PROBLEM — F03.90 DEMENTIA, UNSPECIFIED DEMENTIA SEVERITY, UNSPECIFIED DEMENTIA TYPE, UNSPECIFIED WHETHER BEHAVIORAL, PSYCHOTIC, OR MOOD DISTURBANCE OR ANXIETY: Status: ACTIVE | Noted: 2024-02-17

## 2024-02-17 NOTE — PROGRESS NOTES
Subjective:       Patient ID: Rosario Menendez is a 88 y.o. female.    Chief Complaint: Hypertension    HPI  She returns for management of hypertension.  She has had hypertension for over a year.  Current treatment has included medications outlined in medication list.  She denies chest pain or shortness of breath.  No palpitations.  Denies left arm or neck pain.  She complains of fatigue     Past medical history:  ITP, hypertension, hyperlipidemia, hypothyroidism, osteoporosis, sleep apnea, status post hysterectomy, wrist surgery, dementia, colon adenoma.  She had a colonoscopy June 2021     Medications: Razadyne, Synthroid .075 mg daily, Namenda,  fosamax     Allergies: Codeine        Review of Systems   Constitutional:  Negative for chills, fatigue, fever and unexpected weight change.   Respiratory:  Negative for chest tightness and shortness of breath.    Cardiovascular:  Negative for chest pain and palpitations.   Gastrointestinal:  Negative for abdominal pain and blood in stool.   Neurological:  Negative for dizziness, syncope, numbness and headaches.       Objective:      Physical Exam  HENT:      Right Ear: External ear normal.      Left Ear: External ear normal.      Nose: Nose normal.      Mouth/Throat:      Mouth: Mucous membranes are moist.      Pharynx: Oropharynx is clear.   Eyes:      Pupils: Pupils are equal, round, and reactive to light.   Cardiovascular:      Rate and Rhythm: Normal rate and regular rhythm.      Heart sounds: No murmur heard.  Pulmonary:      Breath sounds: Normal breath sounds.   Abdominal:      General: There is no distension.      Palpations: There is no hepatomegaly or splenomegaly.      Tenderness: There is no abdominal tenderness.   Musculoskeletal:      Cervical back: Normal range of motion.   Lymphadenopathy:      Cervical: No cervical adenopathy.      Upper Body:      Right upper body: No axillary adenopathy.      Left upper body: No axillary adenopathy.   Neurological:       Cranial Nerves: No cranial nerve deficit.      Sensory: No sensory deficit.      Motor: Motor function is intact.      Deep Tendon Reflexes: Reflexes are normal and symmetric.         Assessment/Plan       Assessment and plan:  1.  Hypertension:  Controlled.  Continue low-sodium diet.  Check CMP  2.  Fatigue:  Check CBC, TSH, B12.  Urine sent for urinalysis and culture  3. She declines endocrinology appointment in regards to osteoporosis

## 2024-02-17 NOTE — TELEPHONE ENCOUNTER
Please contact patient and inform her that her calcium is elevated.  I would like to recheck it in 1 week.  Lab order in.  Please schedule

## 2024-02-18 NOTE — TELEPHONE ENCOUNTER
Care Due:                  Date            Visit Type   Department     Provider  --------------------------------------------------------------------------------                                HOSPITAL     [facility] INTERNAL  Last Visit: 02-      FOLLOW UP    MEDICINE       Shi Simon                               -                              PRIMARY      [facility] INTERNAL  Next Visit: 03-      CARE ([facility])   MEDICINE       Shi Simon                                                            Last  Test          Frequency    Reason                     Performed    Due Date  --------------------------------------------------------------------------------    Vitamin D...  12 months..  alendronate..............  Not Found    Overdue    [employer] Embedded Care Due Messages. Reference number: [id number].   2/18/2024 2:24:25 AM CST

## 2024-02-19 ENCOUNTER — PATIENT MESSAGE (OUTPATIENT)
Dept: INTERNAL MEDICINE | Facility: CLINIC | Age: 89
End: 2024-02-19
Payer: MEDICARE

## 2024-02-19 RX ORDER — ALENDRONATE SODIUM 70 MG/1
TABLET ORAL
Qty: 12 TABLET | Refills: 1 | Status: SHIPPED | OUTPATIENT
Start: 2024-02-19

## 2024-02-19 NOTE — TELEPHONE ENCOUNTER
----- Message from Lan Brewer sent at 2/19/2024 11:38 AM CST -----  Contact: Aditi (daughter) [phone]  Pt daughter requesting a call in regards to my chart conversation.    Please call and advise

## 2024-02-29 ENCOUNTER — LAB VISIT (OUTPATIENT)
Dept: LAB | Facility: HOSPITAL | Age: 89
End: 2024-02-29
Attending: INTERNAL MEDICINE
Payer: MEDICARE

## 2024-02-29 DIAGNOSIS — E83.52 HYPERCALCEMIA: ICD-10-CM

## 2024-02-29 LAB
ANION GAP SERPL CALC-SCNC: 7 MMOL/L (ref 8–16)
BUN SERPL-MCNC: 11 MG/DL (ref 8–23)
CALCIUM SERPL-MCNC: 10 MG/DL (ref 8.7–10.5)
CHLORIDE SERPL-SCNC: 111 MMOL/L (ref 95–110)
CO2 SERPL-SCNC: 27 MMOL/L (ref 23–29)
CREAT SERPL-MCNC: 0.9 MG/DL (ref 0.5–1.4)
EST. GFR  (NO RACE VARIABLE): >60 ML/MIN/1.73 M^2
GLUCOSE SERPL-MCNC: 72 MG/DL (ref 70–110)
POTASSIUM SERPL-SCNC: 3.9 MMOL/L (ref 3.5–5.1)
SODIUM SERPL-SCNC: 145 MMOL/L (ref 136–145)

## 2024-02-29 PROCEDURE — 36415 COLL VENOUS BLD VENIPUNCTURE: CPT | Mod: HCNC | Performed by: INTERNAL MEDICINE

## 2024-02-29 PROCEDURE — 80048 BASIC METABOLIC PNL TOTAL CA: CPT | Mod: HCNC | Performed by: INTERNAL MEDICINE

## 2024-03-21 ENCOUNTER — OFFICE VISIT (OUTPATIENT)
Dept: INTERNAL MEDICINE | Facility: CLINIC | Age: 89
End: 2024-03-21
Payer: MEDICARE

## 2024-03-21 DIAGNOSIS — I10 HYPERTENSION, UNSPECIFIED TYPE: Primary | ICD-10-CM

## 2024-03-21 DIAGNOSIS — E03.9 HYPOTHYROIDISM, UNSPECIFIED TYPE: ICD-10-CM

## 2024-03-21 PROCEDURE — 99214 OFFICE O/P EST MOD 30 MIN: CPT | Mod: HCNC,S$GLB,, | Performed by: INTERNAL MEDICINE

## 2024-03-21 PROCEDURE — 99999 PR PBB SHADOW E&M-EST. PATIENT-LVL III: CPT | Mod: PBBFAC,HCNC,, | Performed by: INTERNAL MEDICINE

## 2024-03-21 PROCEDURE — 1126F AMNT PAIN NOTED NONE PRSNT: CPT | Mod: HCNC,CPTII,S$GLB, | Performed by: INTERNAL MEDICINE

## 2024-03-21 PROCEDURE — 3288F FALL RISK ASSESSMENT DOCD: CPT | Mod: HCNC,CPTII,S$GLB, | Performed by: INTERNAL MEDICINE

## 2024-03-21 PROCEDURE — 1159F MED LIST DOCD IN RCRD: CPT | Mod: HCNC,CPTII,S$GLB, | Performed by: INTERNAL MEDICINE

## 2024-03-21 PROCEDURE — 1101F PT FALLS ASSESS-DOCD LE1/YR: CPT | Mod: HCNC,CPTII,S$GLB, | Performed by: INTERNAL MEDICINE

## 2024-03-21 RX ORDER — IBUPROFEN 200 MG
400 TABLET ORAL 2 TIMES DAILY PRN
Qty: 30 TABLET | Refills: 0
Start: 2024-03-21

## 2024-03-21 RX ORDER — GUAIFENESIN AND DEXTROMETHORPHAN HYDROBROMIDE 1200; 60 MG/1; MG/1
1 TABLET, EXTENDED RELEASE ORAL DAILY PRN
Qty: 30 TABLET | Refills: 1
Start: 2024-03-21 | End: 2024-05-20

## 2024-03-24 VITALS
OXYGEN SATURATION: 95 % | HEIGHT: 60 IN | BODY MASS INDEX: 25.62 KG/M2 | HEART RATE: 62 BPM | WEIGHT: 130.5 LBS | SYSTOLIC BLOOD PRESSURE: 126 MMHG | TEMPERATURE: 99 F | DIASTOLIC BLOOD PRESSURE: 82 MMHG

## 2024-03-24 NOTE — PROGRESS NOTES
Subjective:       Patient ID: Rosario Menendez is a 88 y.o. female.    Chief Complaint: Hypertension    HPI  She returns for management of hypertension.  She has had hypertension for over a year.  Current treatment has included medications outlined in medication list.  She denies chest pain or shortness of breath.  No palpitations.  Denies left arm or neck pain.  She has hypothyroidism.  Currently on Synthroid     Medications:  See medication list     Social history:  Does not smoke, does not drink alcohol       Review of Systems   Constitutional:  Negative for chills, fatigue, fever and unexpected weight change.   Respiratory:  Negative for chest tightness and shortness of breath.    Cardiovascular:  Negative for chest pain and palpitations.   Gastrointestinal:  Negative for abdominal pain and blood in stool.   Neurological:  Negative for dizziness, syncope, numbness and headaches.       Objective:      Physical Exam  HENT:      Right Ear: External ear normal.      Left Ear: External ear normal.      Nose: Nose normal.      Mouth/Throat:      Mouth: Mucous membranes are moist.      Pharynx: Oropharynx is clear.   Eyes:      Pupils: Pupils are equal, round, and reactive to light.   Cardiovascular:      Rate and Rhythm: Normal rate and regular rhythm.      Heart sounds: No murmur heard.  Pulmonary:      Breath sounds: Normal breath sounds.   Abdominal:      General: There is no distension.      Palpations: There is no hepatomegaly or splenomegaly.      Tenderness: There is no abdominal tenderness.   Musculoskeletal:      Cervical back: Normal range of motion.   Lymphadenopathy:      Cervical: No cervical adenopathy.      Upper Body:      Right upper body: No axillary adenopathy.      Left upper body: No axillary adenopathy.   Neurological:      Cranial Nerves: No cranial nerve deficit.      Sensory: No sensory deficit.      Motor: Motor function is intact.      Deep Tendon Reflexes: Reflexes are normal and  symmetric.         Assessment/Plan       Assessment and plan:  1.  Hypertension:  Controlled.  Continue low-sodium diet  2.  Hypothyroidism:  Continue Synthroid

## 2024-03-25 ENCOUNTER — TELEPHONE (OUTPATIENT)
Dept: ORTHOPEDICS | Facility: CLINIC | Age: 89
End: 2024-03-25
Payer: MEDICARE

## 2024-06-10 ENCOUNTER — PATIENT MESSAGE (OUTPATIENT)
Dept: INTERNAL MEDICINE | Facility: CLINIC | Age: 89
End: 2024-06-10
Payer: MEDICARE

## 2024-07-07 NOTE — TELEPHONE ENCOUNTER
Care Due:                  Date            Visit Type   Department     Provider  --------------------------------------------------------------------------------                                EP -                              PRIMARY      [facility] INTER[facility]  Last Visit: 03-      CARE ([facility])   MEDICINE       Shi Simon  Next Visit: None Scheduled  None         None Found                                                            Last  Test          Frequency    Reason                     Performed    Due Date  --------------------------------------------------------------------------------    Vitamin D...  12 months..  alendronate..............  Not Found    Overdue    [employer] Embedded Care Due Messages. Reference number: [id number].   7/07/2024 3:18:34 AM CDT

## 2024-07-08 RX ORDER — ALENDRONATE SODIUM 70 MG/1
70 TABLET ORAL
Qty: 12 TABLET | Refills: 1 | Status: SHIPPED | OUTPATIENT
Start: 2024-07-08

## 2024-09-25 NOTE — TELEPHONE ENCOUNTER
----- Message from Jasen Brewer sent at 9/25/2024 12:09 PM CDT -----  Contact: [phone]  Requesting an RX refill or new RX.    Pharmacy is calling to check in on refill request that was sent on 9/20/24    Is this a refill or new RX: refill    RX name and strength (copy/paste from chart):  galantamine (RAZADYNE ER) 16 MG 24 hr capsule    Is this a 30 day or 90 day RX: 30    Pharmacy name and phone # (copy/paste from chart):    [facility] - [facility] [address] [facility] [place] - [address]  [address] [facility] [address] [address]  [place] [address]  Phone: [phone] Fax: [phone]        The doctors have asked that we provide their patients with the following 2 reminders -- prescription refills can take up to 72 hours, and a friendly reminder that in the future you can use your [facility] account to request refills: y

## 2024-09-26 RX ORDER — GALANTAMINE HYDROBROMIDE 16 MG/1
16 CAPSULE, EXTENDED RELEASE ORAL
Qty: 90 CAPSULE | Refills: 0 | Status: SHIPPED | OUTPATIENT
Start: 2024-09-26

## 2024-09-26 NOTE — TELEPHONE ENCOUNTER
Refill Routing Note   Medication(s) are not appropriate for processing by [facility] for the following reason(s):        Outside of protocol    [facility] action(s):  Route      Medication Therapy Plan: lov 03-      Appointments  past 12m or future 3m with PCP    Date Provider   Last Visit   Visit date not found Shi Simon MD   Next Visit   Visit date not found Shi Simon MD   ED visits in past 90 days: 0        Note composed:7:06 PM 09/25/2024

## 2024-09-27 RX ORDER — GALANTAMINE HYDROBROMIDE 16 MG/1
16 CAPSULE, EXTENDED RELEASE ORAL
Qty: 90 CAPSULE | Refills: 0 | Status: CANCELLED | OUTPATIENT
Start: 2024-09-27

## 2024-09-27 RX ORDER — GALANTAMINE HYDROBROMIDE 16 MG/1
16 CAPSULE, EXTENDED RELEASE ORAL
Qty: 90 CAPSULE | Refills: 0 | OUTPATIENT
Start: 2024-09-27

## 2024-09-27 NOTE — TELEPHONE ENCOUNTER
----- Message from Fallon Sinclair sent at 9/27/2024 10:19 AM CDT -----  Contact: [facility]/[phone]  Requesting an RX refill or new RX.    Is this a refill or new RX: New    RX name and strength :  galantamine (RAZADYNE ER) 16 MG 24 hr capsule    Is this a 30 day or 90 day RX:     Pharmacy name and phone # :  [facility] - [place] - [address]  [address]  [place] [address]  Phone: [phone] Fax: [phone]       The doctors have asked that we provide their patients with the following 2 reminders -- prescription refills can take up to 72 hours, and a friendly reminder that in the future you can use your [facility] account to request refills: Yes

## 2024-09-30 RX ORDER — GALANTAMINE HYDROBROMIDE 8 MG/1
16 CAPSULE, EXTENDED RELEASE ORAL
OUTPATIENT
Start: 2024-09-30

## 2024-09-30 NOTE — TELEPHONE ENCOUNTER
----- Message from Lesa sent at 9/30/2024  9:44 AM CDT -----  Contact: Pharmacy [phone]  1MEDICALADVICE     Patient is calling for Medical Advice regarding:    Patient wants a call back or thru [facility]:Call back     Comments:Pt pharmacy would like a call back due to medication they sent over to doctor's office.    Please advise patient replies from provider may take up to 48 hours.

## 2024-09-30 NOTE — TELEPHONE ENCOUNTER
Pharmacy called about refill for galantamine. 16 mg is not available but 2 8 mg are available. Pt needs refill. Pended medication

## 2024-10-03 ENCOUNTER — TELEPHONE (OUTPATIENT)
Dept: INTERNAL MEDICINE | Facility: CLINIC | Age: 89
End: 2024-10-03
Payer: MEDICARE

## 2024-10-03 RX ORDER — SULFAMETHOXAZOLE AND TRIMETHOPRIM 800; 160 MG/1; MG/1
1 TABLET ORAL 2 TIMES DAILY
Qty: 14 TABLET | Refills: 0 | Status: SHIPPED | OUTPATIENT
Start: 2024-10-03 | End: 2024-10-10

## 2024-10-03 NOTE — TELEPHONE ENCOUNTER
----- Message from Jasen sent at 10/3/2024  9:32 AM CDT -----  Contact: [phone]  1MEDICALADVICE     Patient is calling for Medical Advice regarding: UTI    How long has patient had these symptoms: 1 DAY    Pharmacy name and phone#:     [facility] - [place] - [address] [address] [address]  [address] [facility] [address]  [place] [address]  Phone: [phone] Fax: [phone]        Patient wants a call back or thru [facility]: CALL    Comments:    Please advise patient replies from provider may take up to 48 hours.

## 2024-10-03 NOTE — TELEPHONE ENCOUNTER
Called pt. Pt is not mobile, lives in residential facility. Pt is complaining about burning and itching upon urination starting this morning. It is difficult for her to get places and is asking if she can medication sent to pharmacy. She has no fever and has had UTIs that have required hospitalization.

## 2024-11-20 ENCOUNTER — TELEPHONE (OUTPATIENT)
Dept: INTERNAL MEDICINE | Facility: CLINIC | Age: 89
End: 2024-11-20
Payer: MEDICARE

## 2024-11-20 RX ORDER — ALENDRONATE SODIUM 70 MG/1
70 TABLET ORAL
Qty: 12 TABLET | Refills: 0 | Status: SHIPPED | OUTPATIENT
Start: 2024-11-20

## 2024-11-20 NOTE — TELEPHONE ENCOUNTER
----- Message from Alison sent at 11/20/2024 10:17 AM CST -----  Contact: [facility] [phone]  Requesting an RX refill or new RX. Pharmacy requests refill for patient    Is this a refill or new RX: Refill     RX name and strength (copy/paste from chart):  alendronate (FOSAMAX) 70 MG tablet    Is this a 30 day or 90 day RX:     Pharmacy name and phone # (copy/paste from chart):      [facility] - [place] - [address]  [address] [facility] [address]  [place] [address]  Phone: [phone] Fax: [phone]     __________    Requesting an RX refill or new RX.    Is this a refill or new RX:Refill     RX name and strength (copy/paste from chart):  levothyroxine (SYNTHROID) 75 MCG tablet    Is this a 30 day or 90 day RX:

## 2024-11-20 NOTE — TELEPHONE ENCOUNTER
Refill Routing Note   Medication(s) are not appropriate for processing by [facility] for the following reason(s):        Outside of protocol    [facility] action(s):  Route     Requires labs : Yes             Appointments  past 12m or future 3m with PCP    Date Provider   Last Visit   3/21/2024 Shi Simon MD   Next Visit   Visit date not found Shi Simon MD   ED visits in past 90 days: 0        Note composed:12:08 PM 11/20/2024

## 2024-11-20 NOTE — TELEPHONE ENCOUNTER
Care Due:                  Date            Visit Type   Department     Provider  --------------------------------------------------------------------------------                                EP -                              PRIMARY      [facility]  Last Visit: 03-      CARE ([facility])   MEDICINE       Shi Simon  Next Visit: None Scheduled  None         None Found                                                            Last  Test          Frequency    Reason                     Performed    Due Date  --------------------------------------------------------------------------------    Albumin.....  12 months..  alendronate..............  02-   02-    CBC.........  12 months..  ibuprofen................  02- 02-    Mg Level....  12 months..  alendronate..............  01-   01-    Phosphate...  12 months..  alendronate..............  01-   01-    TSH.........  12 months..  levothyroxine............  02-   02-    Health [facility] Embedded Care Due Messages. Reference number: [id number].   11/20/2024 10:30:23 AM CST

## 2024-11-21 ENCOUNTER — TELEPHONE (OUTPATIENT)
Dept: INTERNAL MEDICINE | Facility: CLINIC | Age: 89
End: 2024-11-21
Payer: MEDICARE

## 2024-11-21 RX ORDER — LEVOTHYROXINE SODIUM 75 UG/1
75 TABLET ORAL
Qty: 30 TABLET | Refills: 5 | Status: SHIPPED | OUTPATIENT
Start: 2024-11-21 | End: 2025-11-21

## 2024-11-21 NOTE — TELEPHONE ENCOUNTER
----- Message from Susanne sent at 11/21/2024 10:45 AM CST -----  Contact: [facility] Phone# [phone]  Requesting an RX refill or new RX.    Is this a refill or new RX: Refill    RX name and strength  levothyroxine (SYNTHROID) 75 MCG tablet    Is this a 30 day or 90 day RX: 30    Pharmacy name and phone #   [facility] - [place] - [address]  [address]  [place] [address]  Phone: [phone] Fax: [phone]        The doctors have asked that we provide their patients with the following 2 reminders -- prescription refills can take up to 72 hours, and a friendly reminder that in the future you can use your [facility] account to request refills:

## 2024-11-21 NOTE — TELEPHONE ENCOUNTER
No care due was identified.  Health [facility] Embedded Care Due Messages. Reference number: [id number].   11/21/2024 10:59:28 AM CST

## 2024-12-09 ENCOUNTER — TELEPHONE (OUTPATIENT)
Dept: INTERNAL MEDICINE | Facility: CLINIC | Age: 89
End: 2024-12-09
Payer: MEDICARE

## 2024-12-09 RX ORDER — GALANTAMINE HYDROBROMIDE 16 MG/1
CAPSULE, EXTENDED RELEASE ORAL
Qty: 90 CAPSULE | Refills: 0 | Status: SHIPPED | OUTPATIENT
Start: 2024-12-09

## 2024-12-30 ENCOUNTER — TELEPHONE (OUTPATIENT)
Dept: INTERNAL MEDICINE | Facility: CLINIC | Age: 89
End: 2024-12-30
Payer: MEDICARE

## 2024-12-30 RX ORDER — BUPROPION HYDROCHLORIDE 150 MG/1
150 TABLET ORAL DAILY
Qty: 90 TABLET | Refills: 0 | Status: SHIPPED | OUTPATIENT
Start: 2024-12-30

## 2024-12-30 RX ORDER — MEMANTINE HYDROCHLORIDE 10 MG/1
10 TABLET ORAL 2 TIMES DAILY
Qty: 180 TABLET | Refills: 0 | Status: SHIPPED | OUTPATIENT
Start: 2024-12-30

## 2024-12-30 NOTE — TELEPHONE ENCOUNTER
Care Due:                  Date            Visit Type   Department     Provider  --------------------------------------------------------------------------------                                EP -                              PRIMARY      [facility]  Last Visit: 03-      CARE ([facility])   MEDICINE       Shi Simon  Next Visit: None Scheduled  None         None Found                                                            Last  Test          Frequency    Reason                     Performed    Due Date  --------------------------------------------------------------------------------    Office Visit  12 months..  alendronate, ibuprofen...  03- 03-    CMP.........  12 months..  alendronate, ibuprofen...  02- 02-    Health [facility] Embedded Care Due Messages. Reference number: [id number].   12/30/2024 10:46:53 AM CST

## 2024-12-30 NOTE — TELEPHONE ENCOUNTER
----- Message from Ana Maria sent at 12/30/2024 10:15 AM CST -----  Contact: Luh @ [facility] [phone]  Pt needs a refill on memantine (NAMENDA) 10 MG Tab called into     [facility] - [place] - [address]  [address]  [place] [address]  Phone: [phone] Fax: [phone]    Luh from [facility] can be reached at [phone]  ______________________________    Pt needs a refill on buPROPion (WELLBUTRIN XL) 150 MG TB24 tablet called into       [facility] - [place] - [address]  [address]  [place] [address]  Phone: [phone] Fax: [phone]        Luh from [facility] can be reached at [phone]

## 2025-01-05 ENCOUNTER — HOSPITAL ENCOUNTER (EMERGENCY)
Facility: HOSPITAL | Age: OVER 89
Discharge: HOME | End: 2025-01-05
Attending: EMERGENCY MEDICINE
Payer: MEDICARE

## 2025-01-05 ENCOUNTER — ON-DEMAND VIRTUAL (OUTPATIENT)
Dept: URGENT CARE | Facility: CLINIC | Age: OVER 89
End: 2025-01-05
Payer: MEDICARE

## 2025-01-05 VITALS
TEMPERATURE: 98 F | HEART RATE: 80 BPM | BODY MASS INDEX: 25.52 KG/M2 | DIASTOLIC BLOOD PRESSURE: 83 MMHG | OXYGEN SATURATION: 95 % | WEIGHT: 130 LBS | RESPIRATION RATE: 20 BRPM | HEIGHT: 60 IN | SYSTOLIC BLOOD PRESSURE: 167 MMHG

## 2025-01-05 DIAGNOSIS — R05.1 ACUTE COUGH: Primary | ICD-10-CM

## 2025-01-05 DIAGNOSIS — J18.9 PNEUMONIA DUE TO INFECTIOUS ORGANISM, UNSPECIFIED LATERALITY, UNSPECIFIED PART OF LUNG: ICD-10-CM

## 2025-01-05 DIAGNOSIS — R06.2 WHEEZING: ICD-10-CM

## 2025-01-05 DIAGNOSIS — R05.9 COUGH, UNSPECIFIED TYPE: Primary | ICD-10-CM

## 2025-01-05 LAB
ALBUMIN SERPL BCP-MCNC: 3.7 G/DL (ref 3.5–5.2)
ALP SERPL-CCNC: 80 U/L (ref 40–150)
ALT SERPL W/O P-5'-P-CCNC: 13 U/L (ref 10–44)
ANION GAP SERPL CALC-SCNC: 9 MMOL/L (ref 8–16)
AST SERPL-CCNC: 13 U/L (ref 10–40)
BASOPHILS # BLD AUTO: 0.04 K/UL (ref 0–0.2)
BASOPHILS NFR BLD: 0.4 % (ref 0–1.9)
BILIRUB SERPL-MCNC: 0.7 MG/DL (ref 0.1–1)
BILIRUB UR QL STRIP: NEGATIVE
BNP SERPL-MCNC: 54 PG/ML (ref 0–99)
BUN SERPL-MCNC: 8 MG/DL (ref 8–23)
CALCIUM SERPL-MCNC: 9.7 MG/DL (ref 8.7–10.5)
CHLORIDE SERPL-SCNC: 107 MMOL/L (ref 95–110)
CLARITY UR REFRACT.AUTO: ABNORMAL
CO2 SERPL-SCNC: 22 MMOL/L (ref 23–29)
COLOR UR AUTO: YELLOW
CREAT SERPL-MCNC: 0.7 MG/DL (ref 0.5–1.4)
DIFFERENTIAL METHOD BLD: ABNORMAL
EOSINOPHIL # BLD AUTO: 0 K/UL (ref 0–0.5)
EOSINOPHIL NFR BLD: 0.1 % (ref 0–8)
ERYTHROCYTE [DISTWIDTH] IN BLOOD BY AUTOMATED COUNT: 13.9 % (ref 11.5–14.5)
EST. GFR  (NO RACE VARIABLE): >60 ML/MIN/1.73 M^2
GLUCOSE SERPL-MCNC: 108 MG/DL (ref 70–110)
GLUCOSE UR QL STRIP: NEGATIVE
HCT VFR BLD AUTO: 44.4 % (ref 37–48.5)
HGB BLD-MCNC: 14.4 G/DL (ref 12–16)
HGB UR QL STRIP: NEGATIVE
IMM GRANULOCYTES # BLD AUTO: 0.04 K/UL (ref 0–0.04)
IMM GRANULOCYTES NFR BLD AUTO: 0.4 % (ref 0–0.5)
INFLUENZA A, MOLECULAR: NEGATIVE
INFLUENZA B, MOLECULAR: NEGATIVE
KETONES UR QL STRIP: ABNORMAL
LEUKOCYTE ESTERASE UR QL STRIP: NEGATIVE
LYMPHOCYTES # BLD AUTO: 1.2 K/UL (ref 1–4.8)
LYMPHOCYTES NFR BLD: 11.4 % (ref 18–48)
MCH RBC QN AUTO: 30.6 PG (ref 27–31)
MCHC RBC AUTO-ENTMCNC: 32.4 G/DL (ref 32–36)
MCV RBC AUTO: 95 FL (ref 82–98)
MONOCYTES # BLD AUTO: 1 K/UL (ref 0.3–1)
MONOCYTES NFR BLD: 9.6 % (ref 4–15)
NEUTROPHILS # BLD AUTO: 8.1 K/UL (ref 1.8–7.7)
NEUTROPHILS NFR BLD: 78.1 % (ref 38–73)
NITRITE UR QL STRIP: NEGATIVE
NRBC BLD-RTO: 0 /100 WBC
PH UR STRIP: 7 [PH] (ref 5–8)
PLATELET # BLD AUTO: 40 K/UL (ref 150–450)
PMV BLD AUTO: 12.9 FL (ref 9.2–12.9)
POTASSIUM SERPL-SCNC: 4 MMOL/L (ref 3.5–5.1)
PROT SERPL-MCNC: 7.1 G/DL (ref 6–8.4)
PROT UR QL STRIP: NEGATIVE
RBC # BLD AUTO: 4.7 M/UL (ref 4–5.4)
SARS-COV-2 RDRP RESP QL NAA+PROBE: NEGATIVE
SODIUM SERPL-SCNC: 138 MMOL/L (ref 136–145)
SP GR UR STRIP: 1.02 (ref 1–1.03)
SPECIMEN SOURCE: NORMAL
TROPONIN I SERPL DL<=0.01 NG/ML-MCNC: <3 NG/L (ref 0–14)
URN SPEC COLLECT METH UR: ABNORMAL
WBC # BLD AUTO: 10.37 K/UL (ref 3.9–12.7)

## 2025-01-05 PROCEDURE — 80053 COMPREHEN METABOLIC PANEL: CPT | Mod: HCNC | Performed by: EMERGENCY MEDICINE

## 2025-01-05 PROCEDURE — 83880 ASSAY OF NATRIURETIC PEPTIDE: CPT | Mod: HCNC | Performed by: EMERGENCY MEDICINE

## 2025-01-05 PROCEDURE — 87502 INFLUENZA DNA AMP PROBE: CPT | Mod: HCNC | Performed by: EMERGENCY MEDICINE

## 2025-01-05 PROCEDURE — 99284 EMERGENCY DEPT VISIT MOD MDM: CPT | Mod: 25,HCNC

## 2025-01-05 PROCEDURE — 81003 URINALYSIS AUTO W/O SCOPE: CPT | Mod: HCNC | Performed by: EMERGENCY MEDICINE

## 2025-01-05 PROCEDURE — 85025 COMPLETE CBC W/AUTO DIFF WBC: CPT | Mod: HCNC | Performed by: EMERGENCY MEDICINE

## 2025-01-05 PROCEDURE — 84484 ASSAY OF TROPONIN QUANT: CPT | Mod: HCNC | Performed by: EMERGENCY MEDICINE

## 2025-01-05 PROCEDURE — 87635 SARS-COV-2 COVID-19 AMP PRB: CPT | Mod: HCNC | Performed by: EMERGENCY MEDICINE

## 2025-01-05 RX ORDER — AMOXICILLIN AND CLAVULANATE POTASSIUM 875; 125 MG/1; MG/1
1 TABLET, FILM COATED ORAL 2 TIMES DAILY
Qty: 14 TABLET | Refills: 0 | Status: SHIPPED | OUTPATIENT
Start: 2025-01-05

## 2025-01-05 RX ORDER — DOXYCYCLINE 100 MG/1
100 CAPSULE ORAL 2 TIMES DAILY
Qty: 14 CAPSULE | Refills: 0 | Status: SHIPPED | OUTPATIENT
Start: 2025-01-05 | End: 2025-01-12

## 2025-01-05 NOTE — ED PROVIDER NOTES
Encounter Date: 1/5/2025       History     Chief Complaint   Patient presents with    Diarrhea    URI     cough     HPI  89-year-old female with past medical history as noted below, coming in with cough and fatigue since last night.    Daughter gives most of the history.      She says that her mom was in her usual state of health yesterday got her hair done, had activities.  Last night she started having a cough, the nursing home where she lives called it productive, daughter feels like it is dry as is nothing coming out.  Today she had continued cough and during frequent cough episode she had some stool incontinence of soft stool.  Nursing home suggested she get seen she had a virtual visit who referred her for in-person evaluation.  Nursing home noted that she was having a wheeze/girl with breathing.     Patient herself says she feels tired, she denies chest pain, denies shortness of breath, denies abdominal pain.  She does state that she sometimes has some dysuria and suprapubic discomfort but daughter notes that she just finished a course of antibiotics for UTI.  Mild headache previously today but has now since resolved.    No fevers.    Daughter does note that she usually has a low oxygen saturation has been told that before.     Review of patient's allergies indicates:   Allergen Reactions    Codeine Other (See Comments)     Past Medical History:   Diagnosis Date    Arthritis     Depression     Hypercholesteremia     Thrombocytopenia     Thyroid disease      Past Surgical History:   Procedure Laterality Date    ANKLE SURGERY      COLONOSCOPY N/A 6/2/2021    Procedure: COLONOSCOPY;  Surgeon: Alfonso Haque MD;  Location: [facility] (4TH FLR);  Service: Endoscopy;  Laterality: N/A;  any crs  covid test 5/30-[facility]    HYSTERECTOMY      INTRAMEDULLARY RODDING OF FEMUR Left 12/23/2023    Procedure: INSERTION, INTRAMEDULLARY DIEGO, FEMUR;  Surgeon: Troy Mancilla MD;  [facility];  Service:  Orthopedics;  Laterality: Left;    KNEE SURGERY      WRIST SURGERY       Family History   Problem Relation Name Age of Onset    Heart failure Mother           at 64    Heart failure Father           at 93    Suicide Son           at 34    Cancer Maternal Uncle          colon     Social History     Tobacco Use    Smoking status: Never    Smokeless tobacco: Never     Review of Systems    Physical Exam     Initial Vitals [25 1414]   BP Pulse Resp Temp SpO2   (!) 203/103 84 20 98.3 °F (36.8 °C) (!) 94 %      MAP       --         Physical Exam    Physical Exam:  CONSTITUTIONAL: Well developed, well nourished, in no acute distress.  Tired appearing  HENT: Normocephalic, atraumatic    EYES: Sclerae anicteric   NECK: Supple, no thyroid enlargement  CARDIOVASCULAR: Regular rate and rhythm without any murmurs, gallops, rubs.  No JVD, there is no lower extremity swelling or tenderness to palpation.  RESPIRATORY: Speaking in full sentences. Breathing comfortably. Auscultation of the lungs revealed good air movement with scattered rales and rhonchi.  No true wheezing.  ABDOMEN: Soft and nontender, no masses, no rebound or guarding   NEUROLOGIC: Alert, interacting normally. No facial droop. Voice is clear. Speech is fluent.  MSK: Moving all four extremities.  SKIN: Warm and dry. No visible rash on exposed areas of skin.    Psych:  Flat affect      ED Course   Procedures  Labs Reviewed   URINALYSIS, REFLEX TO URINE CULTURE - Abnormal       Result Value    Specimen UA Urine, Clean Catch      Color, UA Yellow      Appearance, UA Hazy (*)     pH, UA 7.0      Specific Gravity, UA 1.020      Protein, UA Negative      Glucose, UA Negative      Ketones, UA Trace (*)     Bilirubin (UA) Negative      Occult Blood UA Negative      Nitrite, UA Negative      Leukocytes, UA Negative      Narrative:     Specimen Source->Urine   CBC W/ AUTO DIFFERENTIAL - Abnormal    WBC 10.37      RBC 4.70      Hemoglobin 14.4      Hematocrit  44.4      MCV 95      MCH 30.6      MCHC 32.4      RDW 13.9      Platelets 40 (*)     MPV 12.9      Immature Granulocytes 0.4      Gran # (ANC) 8.1 (*)     Immature Grans (Abs) 0.04      Lymph # 1.2      Mono # 1.0      Eos # 0.0      Baso # 0.04      nRBC 0      Gran % 78.1 (*)     Lymph % 11.4 (*)     Mono % 9.6      Eosinophil % 0.1      Basophil % 0.4      Differential Method Automated     COMPREHENSIVE METABOLIC PANEL - Abnormal    Sodium 138      Potassium 4.0      Chloride 107      CO2 22 (*)     Glucose 108      BUN 8      Creatinine 0.7      Calcium 9.7      Total Protein 7.1      Albumin 3.7      Total Bilirubin 0.7      Alkaline Phosphatase 80      AST 13      ALT 13      eGFR >60.0      Anion Gap 9     INFLUENZA A & B BY MOLECULAR    Influenza A, Molecular Negative      Influenza B, Molecular Negative      Flu A & B Source NP     SARS-COV-2 RNA AMPLIFICATION, QUAL    SARS-CoV-2 RNA, Amplification, Qual Negative     B-TYPE NATRIURETIC PEPTIDE    BNP 54     TROPONIN I HIGH SENSITIVITY    Troponin I High Sensitivity <3            Imaging Results              X-Ray Chest AP Portable (Final result)  Result time 01/05/25 15:49:08      Final result by Lionel Braun DO (01/05/25 15:49:08)                   Impression:      No acute abnormality.      Electronically signed by: Lionel Braun  Date:    01/05/2025  Time:    15:49               Narrative:    EXAMINATION:  XR CHEST AP PORTABLE    CLINICAL HISTORY:  Cough;    TECHNIQUE:  Single frontal view of the chest was performed.    COMPARISON:  12/26/2023.    FINDINGS:  The lungs are well expanded.  Minimal subsegmental atelectasis or scarring again noted in the left mid lung, stable.  The lungs are otherwise clear.  The pleural spaces are clear. The cardiac silhouette is unremarkable. The visualized osseous structures are intact.  There are calcifications of the aortic arch.                                       Medications - No data to display  Medical  Decision Making  Amount and/or Complexity of Data Reviewed  Independent Historian: caregiver     Details: Part of history obtained from daughter.  External Data Reviewed: notes.     Details: Urgent care note reviewed  Labs: ordered.  Radiology: ordered.    Risk  Prescription drug management.      89-year-old female with past medical history as noted coming in with cough, fatigue since yesterday, some episodes of loose stool with coughs.  Also some dysuria in the context of a recent UTI treatment.    On exam she is well-appearing no acute distress, lungs with some scattered rhonchi and rales abdomen is entirely been benign.  Does not appear fluid overload clinically.    At this point appears to be consistent with likely viral upper respiratory tract infection versus pneumonia.  Will also check for UTI given recent UTI and patient is complaining of dysuria and suprapubic discomfort however this maybe old complaint from her previous UTI.    Daughter notes that she has some dementia in the history from the patient is a little bit unreliable as it pertains to the events past.     Will undertake COVID and flu swab.  Chest x-ray to look for any signs of pneumonia/infiltrate.    She has no chest pain, no shortness a breath, no signs of fluid overload clinically, only complaint is fatigue, this time will hold off labs.     Disposition based on ED workup and patient's pathology.  Anticipate likely discharge home with supportive care possible antibiotics for pneumonia.             ED Course as of 01/05/25 2041   Sun Jan 05, 2025   1629 Viral swabs unremarkable, chest x-ray is negative, awaiting UA. [BA]   1630 Previous sats reviewed, she is baseline from 91-97.  Daughter says that she has baseline low sats [BA]      ED Course User Index  [BA] Radu Lazcano MD            Update:  Given her age decision was made to obtain labs to look for any metabolic, hematologic, cardiovascular abnormalities.  They are benign.  In the ED  she remains well-appearing hemodynamically stable.    Sats at baseline for her.  UA without signs of infection, chest x-ray is clear.  Viral swabs negative.    Re-evaluated she says she feels fine, still intermittently coughing.  At this point differential is viral infection that is not flu or COVID versus pneumonia that is not visible on chest x-ray.    Given her cough and rales on exam will treat for possible pneumonia.    She will return to her assisted living, nurses check on her twice a day, return precautions for any worsening shortness of breath, fevers, new or increasing weakness, change in mental status, chest pain, or any other new, worsening worrisome symptoms.    Given some water and crackers prior to being discharged.    Findings of ED work up and return precautions verbally explained to patient and daughter. Patient and daughter agree with discharge plan, return instructions and verbalizes understanding of return precautions.                      Clinical Impression:  Final diagnoses:  [R05.9] Cough, unspecified type (Primary)          ED Disposition Condition    Discharge Stable          ED Prescriptions       Medication Sig Dispense Start Date End Date Auth. Provider    amoxicillin-clavulanate 875-125mg (AUGMENTIN) 875-125 mg per tablet Take 1 tablet by mouth 2 (two) times daily. 14 tablet 1/5/2025 -- Radu Lazcano MD    doxycycline (VIBRAMYCIN) 100 MG Cap Take 1 capsule (100 mg total) by mouth 2 (two) times daily. for 7 days 14 capsule 1/5/2025 1/12/2025 Radu Lazcano MD          Follow-up Information       Follow up With Specialties Details Why Contact Info    Shi Simon MD Internal Medicine In 5 days  [address]  [place] [address]  [phone]               Radu Lazcano MD  01/05/25 7643

## 2025-01-05 NOTE — ED TRIAGE NOTES
Pt reports she lives in assisted living and was brought into the ED by her son in law. Pt's family reports that the assisted living center has stated that she has been having frequent episodes of uncontrolled liquid stool for the past few days. Pt reports she has been more fatigued recently and has had some intermittent chest pain. Denies cp at this time. Pt also reports requent non-productive cough that she says makes her chest hurt. Denies fevers. Pt has difficulty hearing at baseline per daughter.

## 2025-01-05 NOTE — PROGRESS NOTES
Subjective:      Patient ID: Rosario Menendez is a 89 y.o. female.    Vitals:  vitals were not taken for this visit.     Chief Complaint: No chief complaint on file.      Visit Type: TELE AUDIOVISUAL - This visit was conducted virtually based on  subjective information and limited objective exam    Present with the patient at the time of consultation: TELEMED PRESENT WITH PATIENT: family member  LOCATION OF PATIENT [place]  Two patient identifiers used to verify patient- saying out date of birth and full name.       Past Medical History:   Diagnosis Date    Arthritis     Depression     Hypercholesteremia     Thrombocytopenia     Thyroid disease      Past Surgical History:   Procedure Laterality Date    ANKLE SURGERY      COLONOSCOPY N/A 6/2/2021    Procedure: COLONOSCOPY;  Surgeon: Alfonso Haque MD;  Location: [facility] (4TH FLR);  Service: Endoscopy;  Laterality: N/A;  any crs  covid test 5/30-[place]    HYSTERECTOMY      INTRAMEDULLARY RODDING OF FEMUR Left 12/23/2023    Procedure: INSERTION, INTRAMEDULLARY DIEGO, FEMUR;  Surgeon: Troy Mancilla MD;  [facility];  Service: Orthopedics;  Laterality: Left;    KNEE SURGERY      WRIST SURGERY       Review of patient's allergies indicates:   Allergen Reactions    Codeine Other (See Comments)     Current Outpatient Medications on File Prior to Visit   Medication Sig Dispense Refill    alendronate (FOSAMAX) 70 MG tablet Take 1 tablet (70 mg total) by mouth every 7 days. 12 tablet 0    buPROPion (WELLBUTRIN XL) 150 MG TB24 tablet Take 1 tablet (150 mg total) by mouth once daily. 90 tablet 0    cyanocobalamin (VITAMIN B-12) 1000 MCG tablet Take 1 tablet (1,000 mcg total) by mouth once daily.      galantamine (RAZADYNE ER) 16 MG 24 hr capsule TAKE 1 CAPSULE EVERY DAY WITH BREAKFAST 90 capsule 0    ibuprofen (ADVIL,MOTRIN) 200 MG tablet Take 2 tablets (400 mg total) by mouth 2 (two) times daily as needed for Pain. 30 tablet 0     levothyroxine (SYNTHROID) 75 MCG tablet Take 1 tablet (75 mcg total) by mouth before breakfast. 30 tablet 5    memantine (NAMENDA) 10 MG Tab Take 1 tablet (10 mg total) by mouth 2 (two) times daily. 180 tablet 0     No current facility-administered medications on file prior to visit.     Family History   Problem Relation Name Age of Onset    Heart failure Mother           at 64    Heart failure Father           at 93    Suicide Son           at 34    Cancer Maternal Uncle          colon           Ohs Peq Odvv Intake    2025 12:13 PM CST - Filed by Patient   What is your current physical address in the event of a medical emergency? [address], [place], [place]   Are you able to take your vital signs? No   Please attach any relevant images or files    Is your employer contracted with [facility]? No         Daughter is power of .   She states she was called by the nursing home today with cough and wheezing that started today. She states she had her hair done yesterday. She denies sob. She she denies uri symptoms.   She does not have hx of chf. She has hx of aortic plaque.         ROS     Objective:   The physical exam was conducted virtually.    AAO x 3 ; no acute distress noted; appearance normal; mood and behavior normal; thought process normal  Head- normocephalic  Nose- appears normal, no discharge or erythema  Eyes- pupils appear normal in size, no drainage, no erythema  Ears- normal appearing; no discharge, no erythema  Mouth- appears normal  Oropharynx- no erythema, lesions  Lungs- breathing at a normal rate, no acute distress noted  Heart- no reports of tachycardia, palpitations, chest pain  Abdomen- non distended, non tender reported by patient  Skin- warm and dry, no erythema or edema noted by patient or visualized  Psych- as above; no si/hi      Assessment:     1. Acute cough    2. Wheezing        Plan:     88 yo with cough and wheezing. Can not r/o viral vs pneumonia vs chf.    Advised in person visit for evaluation    Thank you for choosing [facility]!    Our goal in the [facility] is to always provide outstanding medical care. You may receive a survey by mail or e-mail in the next week regarding your experience today. We would greatly appreciate you completing and returning the survey. Your feedback provides us with a way to recognize our staff who provide very good care, and it helps us learn how to improve when your experience was below our aspiration of excellence.         We appreciate you trusting us with your medical care. We hope you feel better soon. We will be happy to take care of you for all of your future medical needs.    You must understand that you've received an Urgent Care treatment only and that you may be released before all your medical problems are known or treated. You, the patient, will arrange for follow up care as instructed.    Follow up with your PCP or specialty clinic as directed in the next 1-2 weeks if not improved or as needed.  You can call [phone] to schedule an appointment with the appropriate provider.    If your condition worsens we recommend that you receive another evaluation in person, with your primary care provider, urgent care or at the emergency room immediately or contact your primary medical clinics after hours call service to discuss your concerns.         Acute cough    Wheezing

## 2025-01-06 LAB
OHS QRS DURATION: 90 MS
OHS QTC CALCULATION: 445 MS

## 2025-01-06 NOTE — DISCHARGE INSTRUCTIONS
Your chest x-ray, labs, urine studies did not show any signs of dangerous conditions.    Your oxygen was low but it appears to be chronically low.    Given your cough, weakness, crackles in the lungs on exam will treat you with a course of antibiotics for possible pneumonia.    If you develop worsening symptoms, shortness of breath, fevers, worsening weakness, or any other new, worsening worrisome symptoms please return emergency department for re-evaluation.    Otherwise follow-up with your primary care doctor within 1

## 2025-01-16 ENCOUNTER — TELEPHONE (OUTPATIENT)
Dept: INTERNAL MEDICINE | Facility: CLINIC | Age: OVER 89
End: 2025-01-16
Payer: MEDICARE

## 2025-01-16 RX ORDER — GALANTAMINE HYDROBROMIDE 16 MG/1
16 CAPSULE, EXTENDED RELEASE ORAL
Qty: 90 CAPSULE | Refills: 0 | Status: SHIPPED | OUTPATIENT
Start: 2025-01-16

## 2025-01-16 NOTE — TELEPHONE ENCOUNTER
----- Message from Domonique sent at 1/16/2025 10:35 AM CST -----  Contact: [phone]@patient  Requesting an RX refill or new RX.galantamine (RAZADYNE ER) 16 MG 24 hr capsule    Is this a refill or new RX: refill     RX name and strength (copy/paste from chart):      Is this a 30 day or 90 day RX:     Pharmacy name and phone # [facility] - [place], [place] - [address]   Phone: [phone]         The doctors have asked that we provide their patients with the following 2 reminders -- prescription refills can take up to 72 hours, and a friendly reminder that in the future you can use your [facility] account to request refills:

## 2025-01-16 NOTE — TELEPHONE ENCOUNTER
2nd Attempt called patient to schedule annual  No Answer again/LVM for patient to call back when ever she gets the time to schedule appointment   Cyclosporine Pregnancy And Lactation Text: This medication is Pregnancy Category C and it isn't know if it is safe during pregnancy. This medication is excreted in breast milk.

## 2025-01-30 DIAGNOSIS — Z00.00 ENCOUNTER FOR MEDICARE ANNUAL WELLNESS EXAM: ICD-10-CM

## 2025-04-08 ENCOUNTER — PATIENT MESSAGE (OUTPATIENT)
Dept: INTERNAL MEDICINE | Facility: CLINIC | Age: OVER 89
End: 2025-04-08
Payer: MEDICARE

## 2025-04-08 DIAGNOSIS — N39.0 URINARY TRACT INFECTION WITHOUT HEMATURIA, SITE UNSPECIFIED: Primary | ICD-10-CM

## 2025-04-10 ENCOUNTER — LAB VISIT (OUTPATIENT)
Dept: LAB | Facility: HOSPITAL | Age: OVER 89
End: 2025-04-10
Attending: INTERNAL MEDICINE
Payer: MEDICARE

## 2025-04-10 DIAGNOSIS — N39.0 URINARY TRACT INFECTION WITHOUT HEMATURIA, SITE UNSPECIFIED: ICD-10-CM

## 2025-04-10 PROCEDURE — 81003 URINALYSIS AUTO W/O SCOPE: CPT | Mod: HCNC

## 2025-04-10 PROCEDURE — 87086 URINE CULTURE/COLONY COUNT: CPT | Mod: HCNC

## 2025-04-11 LAB
BILIRUB UR QL STRIP.AUTO: NEGATIVE
CLARITY UR: CLEAR
COLOR UR AUTO: YELLOW
GLUCOSE UR QL STRIP: NEGATIVE
HGB UR QL STRIP: NEGATIVE
KETONES UR QL STRIP: NEGATIVE
LEUKOCYTE ESTERASE UR QL STRIP: NEGATIVE
NITRITE UR QL STRIP: NEGATIVE
PH UR STRIP: 6 [PH]
PROT UR QL STRIP: NEGATIVE
SP GR UR STRIP: 1.03
UROBILINOGEN UR STRIP-ACNC: NEGATIVE EU/DL

## 2025-04-11 RX ORDER — GALANTAMINE HYDROBROMIDE 16 MG/1
16 CAPSULE, EXTENDED RELEASE ORAL
Qty: 90 CAPSULE | Refills: 0 | OUTPATIENT
Start: 2025-04-11

## 2025-04-11 NOTE — TELEPHONE ENCOUNTER
----- Message from Alison sent at 4/11/2025  9:16 AM CDT -----  Contact: [facility] [phone]  Requesting an RX refill or new RX.   REQUEST HAS BEEN SENT EVERYDAY SINCE April 9Is this a refill or new RX: REFILLRX name and strength (copy/paste from chart):  galantamine (RAZADYNE ER) 16 MG 24 hr capsuleIs this a 30 day or 90 day RX: Pharmacy name and phone # (copy/paste from chart):  [facility] - [place] [address]  [address] [place] [address]Chumq: [phone] Fax: [phone] The doctors have asked that we provide their patients with the following 2 reminders -- prescription refills can take up to 72 hours, and a friendly reminder that in the future you can use your [facility] account to request refills:

## 2025-04-12 LAB — BACTERIA UR CULT: NORMAL

## 2025-04-15 RX ORDER — GALANTAMINE HYDROBROMIDE 16 MG/1
16 CAPSULE, EXTENDED RELEASE ORAL
Qty: 90 CAPSULE | Refills: 0 | OUTPATIENT
Start: 2025-04-15

## 2025-04-15 NOTE — TELEPHONE ENCOUNTER
----- Message from Tech Amaiya sent at 4/15/2025  1:42 PM CDT -----  Contact: [phone]  Type: Rx Clarification/ Additional Information/ QuestionsMedication: galantamine HBr What questions do you have about the medication, if any?What information is needed? REQUEST HAS BEEN SENT SINCE April 9IPharmacy number:[facility] - [place] - [address]  [address] [place] [address]Srcno: [phone] Fax: [phone]:

## 2025-04-17 RX ORDER — GALANTAMINE HYDROBROMIDE 16 MG/1
16 CAPSULE, EXTENDED RELEASE ORAL
Qty: 90 CAPSULE | Refills: 0 | OUTPATIENT
Start: 2025-04-17

## 2025-04-17 NOTE — TELEPHONE ENCOUNTER
----- Message from Ana Maria sent at 4/17/2025 10:11 AM CDT -----  Contact: Rosario [phone]  Pt needs a refill on galantamine (RAZADYNE ER) 16 MG 24 hr capsule called into [facility] - [place] - [address]  [address] [place] [address]Jvonq: [phone] Fax: [phone] mom/dad/guardian can be reached at [phone]

## 2025-04-21 NOTE — TELEPHONE ENCOUNTER
----- Message from Kristina sent at 4/21/2025 10:39 AM CDT -----  Contact: Luh [phone]  Type: Rx Clarification/ Additional Information/ QuestionsMedication: galantamine (RAZADYNE ER) 16 MG 24 hr capsuleWhat questions do you have about the medication, if any?What information is needed?Pharmacy number:[facility] - [place] - [address]  [address] [place] [address]Lcyei: [phone] Fax: [phone] Contact Name:Comments:Pharmacy is calling  to check the status of this refill request.  It was requested several times.

## 2025-04-22 RX ORDER — GALANTAMINE HYDROBROMIDE 16 MG/1
16 CAPSULE, EXTENDED RELEASE ORAL
Qty: 90 CAPSULE | Refills: 0 | OUTPATIENT
Start: 2025-04-22

## 2025-04-22 NOTE — TELEPHONE ENCOUNTER
----- Message from Ana Maria sent at 4/22/2025 10:04 AM CDT -----  Contact: [facility] [phone]  Pt needs a refill on galantamine (RAZADYNE ER) 16 MG 24 hr capsule  called into [facility] - [place] - [address] [place] [address]Kqoph: [phone] Fax: [phone] can be reached at [phone]

## 2025-04-24 RX ORDER — GALANTAMINE HYDROBROMIDE 16 MG/1
16 CAPSULE, EXTENDED RELEASE ORAL
Qty: 90 CAPSULE | Refills: 0 | Status: SHIPPED | OUTPATIENT
Start: 2025-04-24

## 2025-04-24 NOTE — TELEPHONE ENCOUNTER
----- Message from CoTweet sent at 4/24/2025 10:27 AM CDT -----  Contact: [phone]  Type: Rx Clarification/ Additional Information/ QuestionsMedication: galantamine (RAZADYNE ER) 16 MG 24What questions do you have about the medication, if any?What information is needed?    REQUEST HAS BEEN SENT EVERYDAY SINCE April 9th with no responsePharmacy number: [facility] - [place] - [address]  [address] [place] [address]Nwgou: [phone] Fax: [phone]:

## 2025-04-24 NOTE — TELEPHONE ENCOUNTER
Not sure was it was refused other then refill not appropriate. What should I tell the pt on why it was refused

## 2025-04-24 NOTE — TELEPHONE ENCOUNTER
Looks like duplicate but  new rx sent    Owen Buitrago  [facility]  [facility] [address]  Scheduled Appointment: 02/02/2023

## 2025-04-30 DIAGNOSIS — M81.0 OSTEOPOROSIS, UNSPECIFIED OSTEOPOROSIS TYPE, UNSPECIFIED PATHOLOGICAL FRACTURE PRESENCE: Primary | ICD-10-CM

## 2025-04-30 RX ORDER — ALENDRONATE SODIUM 70 MG/1
TABLET ORAL
Qty: 12 TABLET | Refills: 0 | Status: SHIPPED | OUTPATIENT
Start: 2025-04-30

## 2025-04-30 NOTE — TELEPHONE ENCOUNTER
Care Due:                  Date            Visit Type   Department     Provider  --------------------------------------------------------------------------------                                EP -                              PRIMARY      [facility] INTERNAL  Last Visit: 03-      CARE ([facility])   MEDICINE       Shi Simon  Next Visit: None Scheduled  None         None Found                                                            Last  Test          Frequency    Reason                     Performed    Due Date  --------------------------------------------------------------------------------    Office Visit  12 months..  alendronate, buPROPion,    03- 03-                             ibuprofen, levothyroxine.    Mg Level....  12 months..  alendronate..............  01-   01-    Phosphate...  12 months..  alendronate..............  01-   01-    TSH.........  12 months..  levothyroxine............  02-   02-    [facility] Embedded Care Due Messages. Reference number: [id number].   4/30/2025 1:15:13 PM CDT

## 2025-05-30 ENCOUNTER — TELEPHONE (OUTPATIENT)
Dept: INTERNAL MEDICINE | Facility: CLINIC | Age: OVER 89
End: 2025-05-30

## 2025-05-30 RX ORDER — BUPROPION HYDROCHLORIDE 150 MG/1
150 TABLET ORAL
Qty: 90 TABLET | Refills: 0 | Status: SHIPPED | OUTPATIENT
Start: 2025-05-30

## 2025-05-30 RX ORDER — MEMANTINE HYDROCHLORIDE 10 MG/1
10 TABLET ORAL 2 TIMES DAILY
Qty: 180 TABLET | Refills: 0 | Status: SHIPPED | OUTPATIENT
Start: 2025-05-30

## 2025-05-30 RX ORDER — LEVOTHYROXINE SODIUM 75 UG/1
75 TABLET ORAL
Qty: 30 TABLET | Refills: 0 | Status: SHIPPED | OUTPATIENT
Start: 2025-05-30

## 2025-05-30 NOTE — TELEPHONE ENCOUNTER
No care due was identified.  [facility] Embedded Care Due Messages. Reference number: [id number].   5/30/2025 8:01:44 AM CDT

## 2025-06-26 NOTE — TELEPHONE ENCOUNTER
No care due was identified.  [facility] Embedded Care Due Messages. Reference number: [id number].   6/26/2025 3:48:30 PM CDT

## 2025-06-27 ENCOUNTER — TELEPHONE (OUTPATIENT)
Dept: INTERNAL MEDICINE | Facility: CLINIC | Age: OVER 89
End: 2025-06-27
Payer: MEDICARE

## 2025-06-27 RX ORDER — LEVOTHYROXINE SODIUM 75 UG/1
75 TABLET ORAL
Qty: 30 TABLET | Refills: 0 | Status: SHIPPED | OUTPATIENT
Start: 2025-06-27

## 2025-07-02 RX ORDER — GALANTAMINE HYDROBROMIDE 16 MG/1
16 CAPSULE, EXTENDED RELEASE ORAL
Qty: 90 CAPSULE | Refills: 0 | OUTPATIENT
Start: 2025-07-02

## 2025-07-02 NOTE — TELEPHONE ENCOUNTER
Dr. Garibay's says pt needs an appt. They have tried to reach her a few months ago no success. I just tried to call, no answer lvm. Will also send portal message

## 2025-07-02 NOTE — TELEPHONE ENCOUNTER
Copied from CRM #[id number]. Topic: Medications - Pharmacy  >> Jul 2, 2025 11:09 AM Don wrote:  Requesting an RX refill or new RX.    Is this a refill or new RX: Refill    RX name and strength (copy/paste from chart):  galantamine (RAZADYNE ER) 16 MG 24 hr capsule      Is this a 30 day or 90 day RX:     [facility] - [place] - [address]  [address]  [place] [address]  Phone: [phone] Fax: [phone]         Who called and call back number: Pharmacy; [phone]

## 2025-07-07 RX ORDER — GALANTAMINE HYDROBROMIDE 16 MG/1
16 CAPSULE, EXTENDED RELEASE ORAL
Qty: 90 CAPSULE | Refills: 0 | Status: SHIPPED | OUTPATIENT
Start: 2025-07-07 | End: 2025-07-09 | Stop reason: SDUPTHER

## 2025-07-07 NOTE — TELEPHONE ENCOUNTER
Copied from CRM #[id number]. Topic: Medications - Medication Status Check   >> Jul 7, 2025 10:52 AM Sharee wrote:  [facility] calling checking on a prescription  refill that was sent over on 6/30 galantamine (RAZADYNE ER) 16 MG 24 hr capsule please advise thank you         [facility] [place] - [address]  [address]  [place] [address]  Phone: [phone] Fax: [phone]

## 2025-07-07 NOTE — TELEPHONE ENCOUNTER
Rx request was previously refused due to pt needing an appt. Appt has been scheduled for 8/26. The pharmacist states the pt will be out of the medication by then. Requesting a refill to hold her over.

## 2025-07-08 RX ORDER — GALANTAMINE HYDROBROMIDE 16 MG/1
16 CAPSULE, EXTENDED RELEASE ORAL
Qty: 90 CAPSULE | Refills: 0 | OUTPATIENT
Start: 2025-07-08

## 2025-07-08 NOTE — TELEPHONE ENCOUNTER
Copied from CRM #[id number]. Topic: Medications - Medication Refill  >> Jul 8, 2025 10:26 AM Svetlana wrote:  Requesting an RX refill or new RX.    Is this a refill or new RX:     RX name and strength galantamine (RAZADYNE ER) 16 MG 24 hr capsule    Is this a 30 day or 90 day RX:     Pharmacy name and phone #     [facility] - [place] - [address]  [address]  [place] [address]  Phone: [phone] Fax: [phone]            Who called and call back number:  [facility] - [place] - [address]  [email or web address]  [place] [address]  Phone: [phone] Fax: [phone]            Comment: Does not use [employer] . Want sent to [facility]

## 2025-07-09 RX ORDER — GALANTAMINE HYDROBROMIDE 16 MG/1
16 CAPSULE, EXTENDED RELEASE ORAL
Qty: 90 CAPSULE | Refills: 0 | Status: SHIPPED | OUTPATIENT
Start: 2025-07-09

## 2025-07-24 RX ORDER — LEVOTHYROXINE SODIUM 75 UG/1
75 TABLET ORAL
Qty: 30 TABLET | Refills: 1 | Status: SHIPPED | OUTPATIENT
Start: 2025-07-24

## 2025-07-24 NOTE — TELEPHONE ENCOUNTER
Care Due:                  Date            Visit Type   Department     Provider  --------------------------------------------------------------------------------                                EP -                              PRIMARY      [facility] INTERNAL  Last Visit: 03-      CARE ([facility])   MEDICINE       Shi Simon                              [facility]                              PRIMARY      [facility] INTERNAL  Next Visit: 08-      CARE ([facility])   MEDICINE       Shi Simon                                                            Last  Test          Frequency    Reason                     Performed    Due Date  --------------------------------------------------------------------------------    Mg Level....  12 months..  alendronate..............  01-   01-    Phosphate...  12 months..  alendronate..............  01-   01-    TSH.........  12 months..  levothyroxine............  02-   02-    [facility] Embedded Care Due Messages. Reference number: [id number].   7/24/2025 8:02:39 AM CDT

## 2025-07-30 DIAGNOSIS — M81.0 OSTEOPOROSIS, UNSPECIFIED OSTEOPOROSIS TYPE, UNSPECIFIED PATHOLOGICAL FRACTURE PRESENCE: ICD-10-CM

## 2025-07-30 RX ORDER — ALENDRONATE SODIUM 70 MG/1
TABLET ORAL
Qty: 12 TABLET | Refills: 0 | Status: SHIPPED | OUTPATIENT
Start: 2025-07-30

## 2025-07-30 NOTE — TELEPHONE ENCOUNTER
No care due was identified.  [facility] Embedded Care Due Messages. Reference number: [id number].   7/30/2025 1:01:32 PM CDT

## 2025-08-20 ENCOUNTER — TELEPHONE (OUTPATIENT)
Dept: INTERNAL MEDICINE | Facility: CLINIC | Age: OVER 89
End: 2025-08-20
Payer: MEDICARE

## 2025-08-22 RX ORDER — MEMANTINE HYDROCHLORIDE 10 MG/1
10 TABLET ORAL 2 TIMES DAILY
Qty: 180 TABLET | Refills: 0 | Status: SHIPPED | OUTPATIENT
Start: 2025-08-22

## 2025-08-22 RX ORDER — BUPROPION HYDROCHLORIDE 150 MG/1
150 TABLET ORAL
Qty: 90 TABLET | Refills: 0 | Status: SHIPPED | OUTPATIENT
Start: 2025-08-22

## 2025-08-25 ENCOUNTER — PATIENT MESSAGE (OUTPATIENT)
Dept: INTERNAL MEDICINE | Facility: CLINIC | Age: OVER 89
End: 2025-08-25
Payer: MEDICARE

## 2025-08-26 ENCOUNTER — OFFICE VISIT (OUTPATIENT)
Dept: INTERNAL MEDICINE | Facility: CLINIC | Age: OVER 89
End: 2025-08-26
Payer: MEDICARE

## 2025-08-26 ENCOUNTER — LAB VISIT (OUTPATIENT)
Dept: LAB | Facility: HOSPITAL | Age: OVER 89
End: 2025-08-26
Attending: INTERNAL MEDICINE
Payer: MEDICARE

## 2025-08-26 ENCOUNTER — IMMUNIZATION (OUTPATIENT)
Dept: INTERNAL MEDICINE | Facility: CLINIC | Age: OVER 89
End: 2025-08-26
Payer: MEDICARE

## 2025-08-26 DIAGNOSIS — F03.90 DEMENTIA, UNSPECIFIED DEMENTIA SEVERITY, UNSPECIFIED DEMENTIA TYPE, UNSPECIFIED WHETHER BEHAVIORAL, PSYCHOTIC, OR MOOD DISTURBANCE OR ANXIETY: ICD-10-CM

## 2025-08-26 DIAGNOSIS — Z00.00 PREVENTATIVE HEALTH CARE: ICD-10-CM

## 2025-08-26 DIAGNOSIS — M81.0 OSTEOPOROSIS, UNSPECIFIED OSTEOPOROSIS TYPE, UNSPECIFIED PATHOLOGICAL FRACTURE PRESENCE: ICD-10-CM

## 2025-08-26 DIAGNOSIS — Z23 NEED FOR VACCINATION: Primary | ICD-10-CM

## 2025-08-26 DIAGNOSIS — E03.9 HYPOTHYROIDISM, UNSPECIFIED TYPE: ICD-10-CM

## 2025-08-26 DIAGNOSIS — I10 HYPERTENSION, UNSPECIFIED TYPE: Primary | ICD-10-CM

## 2025-08-26 DIAGNOSIS — D69.3 IMMUNE THROMBOCYTOPENIC PURPURA: ICD-10-CM

## 2025-08-26 PROCEDURE — 1126F AMNT PAIN NOTED NONE PRSNT: CPT | Mod: CPTII,HCNC,S$GLB, | Performed by: INTERNAL MEDICINE

## 2025-08-26 PROCEDURE — 1159F MED LIST DOCD IN RCRD: CPT | Mod: CPTII,HCNC,S$GLB, | Performed by: INTERNAL MEDICINE

## 2025-08-26 PROCEDURE — 99397 PER PM REEVAL EST PAT 65+ YR: CPT | Mod: HCNC,S$GLB,, | Performed by: INTERNAL MEDICINE

## 2025-08-26 PROCEDURE — 3288F FALL RISK ASSESSMENT DOCD: CPT | Mod: CPTII,HCNC,S$GLB, | Performed by: INTERNAL MEDICINE

## 2025-08-26 PROCEDURE — G0008 ADMIN INFLUENZA VIRUS VAC: HCPCS | Mod: HCNC,S$GLB,, | Performed by: INTERNAL MEDICINE

## 2025-08-26 PROCEDURE — 90653 IIV ADJUVANT VACCINE IM: CPT | Mod: HCNC,S$GLB,, | Performed by: INTERNAL MEDICINE

## 2025-08-26 PROCEDURE — 99999 PR PBB SHADOW E&M-EST. PATIENT-LVL IV: CPT | Mod: PBBFAC,HCNC,, | Performed by: INTERNAL MEDICINE

## 2025-08-26 PROCEDURE — 1101F PT FALLS ASSESS-DOCD LE1/YR: CPT | Mod: CPTII,HCNC,S$GLB, | Performed by: INTERNAL MEDICINE

## 2025-08-26 RX ORDER — CALCIUM CARBONATE 200(500)MG
1 TABLET,CHEWABLE ORAL DAILY PRN
Start: 2025-08-26 | End: 2026-08-26

## 2025-08-26 RX ORDER — GUAIFENESIN 100 MG/5ML
200 LIQUID ORAL DAILY PRN
Start: 2025-08-26 | End: 2025-09-05

## 2025-08-31 VITALS
HEIGHT: 60 IN | HEART RATE: 84 BPM | BODY MASS INDEX: 26.84 KG/M2 | SYSTOLIC BLOOD PRESSURE: 136 MMHG | OXYGEN SATURATION: 95 % | WEIGHT: 136.69 LBS | DIASTOLIC BLOOD PRESSURE: 86 MMHG | TEMPERATURE: 99 F

## (undated) DEVICE — DRAPE U SPLIT SHEET 54X76IN

## (undated) DEVICE — DRAPE INCISE IOBAN 2 23X17IN

## (undated) DEVICE — DRESSING AQUACEL RIBBON 2X45CM

## (undated) DEVICE — SUT MONOCRYL 3-0 PS-2 UND

## (undated) DEVICE — DRAPE C-ARMOR EQUIPMENT COVER

## (undated) DEVICE — TRAY MINOR ORTHO OMC

## (undated) DEVICE — Device

## (undated) DEVICE — GOWN AERO CHROME W/ TOWEL XL

## (undated) DEVICE — DRAPE C-ARM ELAS CLIP 42X120IN

## (undated) DEVICE — DRAPE THREE-QTR REINF 53X77IN

## (undated) DEVICE — SUT VICRYL PLUS 3-0 SH 18IN

## (undated) DEVICE — APPLICATOR CHLORAPREP ORN 26ML

## (undated) DEVICE — BIT DRILL CANN TAPERED 10MM

## (undated) DEVICE — WIRE GUIDE 3.2MM 400MM
Type: IMPLANTABLE DEVICE | Site: FEMUR | Status: NON-FUNCTIONAL
Removed: 2023-12-23

## (undated) DEVICE — REAMER STEPPED DHS DCS

## (undated) DEVICE — DRAPE IOBAN 2 STERI

## (undated) DEVICE — SPONGE COTTON TRAY 4X4IN

## (undated) DEVICE — ADHESIVE DERMABOND ADVANCED

## (undated) DEVICE — SUT VICRYL PLUS 0 CT1 18IN

## (undated) DEVICE — TAPE SURG DURAPORE 2 X10YD

## (undated) DEVICE — DRAPE TOP 53X102IN

## (undated) DEVICE — BIT DRILL QC 3FLUTED 4.2X145 S

## (undated) DEVICE — KIT PT CARE HANA PROFX SSXT

## (undated) DEVICE — DRAPE STERI U-SHAPED 47X51IN